# Patient Record
Sex: FEMALE | Race: WHITE | NOT HISPANIC OR LATINO | Employment: OTHER | URBAN - NONMETROPOLITAN AREA
[De-identification: names, ages, dates, MRNs, and addresses within clinical notes are randomized per-mention and may not be internally consistent; named-entity substitution may affect disease eponyms.]

---

## 2017-01-18 ENCOUNTER — HOSPITAL ENCOUNTER (OUTPATIENT)
Dept: GENERAL RADIOLOGY | Facility: HOSPITAL | Age: 67
Discharge: HOME | End: 2017-01-18
Attending: FAMILY MEDICINE | Admitting: FAMILY MEDICINE
Payer: MEDICARE

## 2017-01-18 PROCEDURE — 77080 DXA BONE DENSITY AXIAL: CPT | Mod: TC

## 2017-01-20 ENCOUNTER — TELEPHONE (OUTPATIENT)
Dept: FAMILY MEDICINE | Facility: OTHER | Age: 67
End: 2017-01-20

## 2017-04-17 DIAGNOSIS — R20.2 PARESTHESIA: ICD-10-CM

## 2017-04-19 RX ORDER — GABAPENTIN 300 MG/1
CAPSULE ORAL
Qty: 180 CAPSULE | Refills: 0 | Status: SHIPPED | OUTPATIENT
Start: 2017-04-19 | End: 2020-05-14

## 2017-04-19 NOTE — TELEPHONE ENCOUNTER
Last office visit 11/07/16.  Neurontin last filled 12/14/15 #180 with 1 refill. Medication pended.

## 2017-05-24 ENCOUNTER — TRANSFERRED RECORDS (OUTPATIENT)
Dept: HEALTH INFORMATION MANAGEMENT | Facility: HOSPITAL | Age: 67
End: 2017-05-24

## 2017-05-25 ENCOUNTER — OFFICE VISIT (OUTPATIENT)
Dept: FAMILY MEDICINE | Facility: OTHER | Age: 67
End: 2017-05-25
Attending: FAMILY MEDICINE
Payer: COMMERCIAL

## 2017-05-25 ENCOUNTER — TELEPHONE (OUTPATIENT)
Dept: FAMILY MEDICINE | Facility: OTHER | Age: 67
End: 2017-05-25

## 2017-05-25 VITALS
OXYGEN SATURATION: 99 % | DIASTOLIC BLOOD PRESSURE: 76 MMHG | BODY MASS INDEX: 20.12 KG/M2 | HEART RATE: 62 BPM | TEMPERATURE: 97.3 F | WEIGHT: 110 LBS | SYSTOLIC BLOOD PRESSURE: 116 MMHG | RESPIRATION RATE: 16 BRPM

## 2017-05-25 DIAGNOSIS — L30.9 DERMATITIS: Primary | ICD-10-CM

## 2017-05-25 PROCEDURE — 99213 OFFICE O/P EST LOW 20 MIN: CPT | Performed by: FAMILY MEDICINE

## 2017-05-25 PROCEDURE — 99212 OFFICE O/P EST SF 10 MIN: CPT

## 2017-05-25 RX ORDER — TRIAMCINOLONE ACETONIDE 1 MG/G
CREAM TOPICAL
Qty: 30 G | Refills: 0 | Status: SHIPPED | OUTPATIENT
Start: 2017-05-25 | End: 2017-09-12

## 2017-05-25 ASSESSMENT — PAIN SCALES - GENERAL: PAINLEVEL: NO PAIN (0)

## 2017-05-25 NOTE — MR AVS SNAPSHOT
"              After Visit Summary   5/25/2017    Mihaela Jang    MRN: [id number]           Patient Information     Date Of Birth          1950        Visit Information        Provider Department      5/25/2017 1:30 PM Melyssa Connors MD [facility]        Today's Diagnoses     Dermatitis    -  1      Care Instructions    Gentle soap/cleanser - unscented.    Gentle laundry detergent.    Was bedding, clothing, long socks, etc.  Start nightly antihistamine - Claritin (Loratadine) or Zyrtec (Cetirizein) 10 mg.  Start topical steroid Triamcinolone 2-3 times per day for next 1-2 weeks.  Call if not resolving.            Follow-ups after your visit        Your next 10 appointments already scheduled     Sep 12, 2017  2:30 PM CDT   (Arrive by 2:15 PM)   PROCEDURE with Sejal Gutierrez PA-C   [facility] ([facility])    [address]  [facility] [address]   [phone]              Who to contact     If you have questions or need follow up information about today's clinic visit or your schedule please contact [facility] directly at [phone].  Normal or non-critical lab and imaging results will be communicated to you by MyChart, letter or phone within 4 business days after the clinic has received the results. If you do not hear from us within 7 days, please contact the clinic through MyChart or phone. If you have a critical or abnormal lab result, we will notify you by phone as soon as possible.  Submit refill requests through [employer] or call your pharmacy and they will forward the refill request to us. Please allow 3 business days for your refill to be completed.          Additional Information About Your Visit        MyChart Information     [employer] lets you send messages to your doctor, view your test results, renew your prescriptions, schedule appointments and more. To sign up, go to [email or web address] . Click on \"Log in\" on the left side of the screen, " "which will take you to the Welcome page. Then click on \"Sign up Now\" on the right side of the page.     You will be asked to enter the access code listed below, as well as some personal information. Please follow the directions to create your username and password.     Your access code is: [email or web address]  Expires: 2017  1:30 PM     Your access code will  in 90 days. If you need help or a new code, please call your [facility] or [phone].        Care EveryWhere ID     This is your Care EveryWhere ID. This could be used by other organizations to access your [place] medical records  [id number]        Your Vitals Were     Pulse Temperature Respirations Pulse Oximetry BMI (Body Mass Index)       62 97.3  F (36.3  C) 16 99% 20.12 kg/m2        Blood Pressure from Last 3 Encounters:   17 116/76   16 102/58   16 120/70    Weight from Last 3 Encounters:   17 110 lb (49.9 kg)   16 109 lb (49.4 kg)   16 108 lb (49 kg)              Today, you had the following     No orders found for display         Today's Medication Changes          These changes are accurate as of: 17  1:30 PM.  If you have any questions, ask your nurse or doctor.               Start taking these medicines.        Dose/Directions    triamcinolone 0.1 % cream   Commonly known as:  KENALOG   Used for:  Dermatitis   Started by:  Melyssa Connors MD        Apply sparingly to affected area three times daily for 14 days.   Quantity:  30 g   Refills:  0         These medicines have changed or have updated prescriptions.        Dose/Directions    fluticasone 50 MCG/ACT spray   Commonly known as:  FLONASE   This may have changed:    - when to take this  - reasons to take this   Used for:  Laryngopharyngeal reflux (LPR), Post-nasal drip, Globus sensation        Dose:  1-2 spray   Spray 1-2 sprays into both nostrils daily   Quantity:  16 g   Refills:  5         Stop taking these medicines if you haven't " already. Please contact your care team if you have questions.     Selenium 200 MCG Tabs   Stopped by:  Melyssa Connors MD                Where to get your medicines      These medications were sent to [id number] - [place], [place] - [address]   [address] , [place] [address]     Phone:  [phone]     triamcinolone 0.1 % cream                Primary Care Provider Office Phone # Fax #    Melyssa Connors [phone]       [facility] [address] [place] [address] [address]  [place] [address]        Thank you!     Thank you for choosing [facility]  for your care. Our goal is always to provide you with excellent care. Hearing back from our patients is one way we can continue to improve our services. Please take a few minutes to complete the written survey that you may receive in the mail after your visit with us. Thank you!             Your Updated Medication List - Protect others around you: Learn how to safely use, store and throw away your medicines at www.disposemymeds.org.          This list is accurate as of: 5/25/17  1:30 PM.  Always use your most recent med list.                   Brand Name Dispense Instructions for use    Acidophilus Tabs      Take 1 tablet by mouth daily       aspirin EC 81 MG EC tablet     1 tablet    Take 1 tablet (81 mg) by mouth daily       calcium carbonate 500 MG tablet    OS-LEONID 500 mg [place]. [place]     Take 1,000 mg by mouth       CALCIUM CITRATE      1,500 mg two times daily       cholecalciferol 1000 UNITS Tabs      Take  by mouth. 2 capsules po daily.       cinnamon 500 MG Caps      Take 2 capsules by mouth daily       fish oil-omega-3 fatty acids 1000 MG capsule      Take 1 g by mouth 2 times daily       fluticasone 50 MCG/ACT spray    FLONASE    16 g    Spray 1-2 sprays into both nostrils daily       gabapentin 300 MG capsule    NEURONTIN    180 capsule    TAKE TWO CAPSULES BY MOUTH AT BEDTIME       garlic 150 MG Tabs tablet      Take 150 mg by  mouth daily       Lutein 6 MG Caps      Take 20 mg by mouth every other day 1 tablet daily       MAGNESIUM OXIDE PO      Take 200 mg by mouth daily       MIRALAX PO      Use every other day       Multiple vitamin  s Tabs      Take 1 tablet by mouth daily.       RA TURMERIC 500 MG Caps   Generic drug:  Turmeric      Take 2 capsules by mouth daily       triamcinolone 0.1 % cream    KENALOG    30 g    Apply sparingly to affected area three times daily for 14 days.       zinc 50 MG Tabs

## 2017-05-25 NOTE — NURSING NOTE
"Chief Complaint   Patient presents with     Rash     back of right leg for the last 2 weeks       Initial /76 (BP Location: Left arm, Patient Position: Chair, Cuff Size: Adult Regular)  Pulse 62  Temp 97.3  F (36.3  C)  Resp 16  Wt 110 lb (49.9 kg)  SpO2 99%  BMI 20.12 kg/m2 Estimated body mass index is 20.12 kg/(m^2) as calculated from the following:    Height as of 11/7/16: 5' 2\" (1.575 m).    Weight as of this encounter: 110 lb (49.9 kg).  Medication Reconciliation: complete     Florence Kirkpatrick    "

## 2017-05-25 NOTE — TELEPHONE ENCOUNTER
10:51 AM    Reason for Call: OVERBOOK    Patient is having the following symptoms: Rash/RT leg for 2 weeks.    The patient is requesting an appointment for this afternoon or tomorrow with Dr Connors.    Was an appointment offered for this call? Yes    Preferred method for responding to this message: Telephone Call    If we cannot reach you directly, may we leave a detailed response at the number you provided? Yes    Can this message wait until your PCP/provider returns, if unavailable today? Not applicable    Marysol Mack

## 2017-05-25 NOTE — PATIENT INSTRUCTIONS
Gentle soap/cleanser - unscented.    Gentle laundry detergent.    Was bedding, clothing, long socks, etc.  Start nightly antihistamine - Claritin (Loratadine) or Zyrtec (Cetirizein) 10 mg.  Start topical steroid Triamcinolone 2-3 times per day for next 1-2 weeks.  Call if not resolving.

## 2017-05-25 NOTE — PROGRESS NOTES
SUBJECTIVE:  Mihaela is a 67 year old female who comes in today for rash on back of right leg, behind knee mostly, for past 2 weeks.  It is itchy, not painful.  No exposures or tick bites.  No fever, chills, joint pains.  Has tried topical hydrocortisone 2 times per day for past week with some help.  No blisters.      Current Outpatient Prescriptions   Medication     triamcinolone (KENALOG) 0.1 % cream     gabapentin (NEURONTIN) 300 MG capsule     Lactobacillus (ACIDOPHILUS) TABS     calcium carbonate (OS-LEONID 500 MG [place]. [place]) 500 MG tablet     MAGNESIUM OXIDE PO     garlic 150 MG TABS     zinc 50 MG TABS     fluticasone (FLONASE) 50 MCG/ACT nasal spray     aspirin EC 81 MG tablet     Polyethylene Glycol 3350 (MIRALAX PO)     cinnamon 500 MG CAPS     fish oil-omega-3 fatty acids (FISH OIL) 1000 MG capsule     Lutein 6 MG CAPS     CALCIUM CITRATE     cholecalciferol 1000 UNITS TABS     Multiple vitamin  s TABS     Turmeric (RA TURMERIC) 500 MG CAPS     No current facility-administered medications for this visit.         Allergies   Allergen Reactions     Povidone Iodine Rash     Betadine      Soap Rash     Betadine        Past Medical History:   Diagnosis Date     Atypical nevi      Degenerative skin disorder     solar elastosis     Hallux rigidus 06/15/2000     Hyperlipidemia, unspecified hyperlipidemia type 9/15/2016     Laryngopharyngeal reflux disease     PPI     Malignant neoplasm of breast (female), unspecified site 03/10/2004     Notalgia paresthetica      Osteoarthritis 07/20/2011     Osteoporosis, unspecified 09/10/2001    Dr Moses 3/1/15; hold Reclast; repeat DEXA 1 year      Other abnormal glucose 12/20/2013     Paresthesia     neck; notalgiaparesthetic; dr leahy & Dr Nevarez; neurontin     Personal history of malignant neoplasm of breast 06/07/2005     Rhinitis      Schamberg's purpura      Past Surgical History:   Procedure Laterality Date     BONE MARROW BIOPSY      negative     COLONOSCOPY  07/2000      COLONOSCOPY  8/13/2013    DR Fu; repeat 3 years     double mastectomy  03/2004    right sided breast cancer     HC COLONOSCOPY THRU STOMA WITH BIOPSY  08/25/2010    cancer screening, family h/o colon cancer; hyperplastic polyp     HEMORRHOIDECTOMY  1986     UPPER GI ENDOSCOPY       Social History     Social History     Marital status: Single     Spouse name: N/A     Number of children: N/A     Years of education: N/A     Occupational History     Not on file.     Social History Main Topics     Smoking status: Never Smoker     Smokeless tobacco: Never Used     Alcohol use 0.0 oz/week     0 Standard drinks or equivalent per week      Comment: 1 glasso wine, weekly      Drug use: No     Sexual activity: No     Other Topics Concern      Service No     Blood Transfusions Yes     Permits if needed     Caffeine Concern Yes     Tea, 2 cups daily      Exercise Yes     Walking, walks steps, daily      Seat Belt Yes     Parent/Sibling W/ Cabg, Mi Or Angioplasty Before 65f 55m? No     Social History Narrative       ROS:  General: negative for, fever, chills  Skin: positive for as above, rash, itching  Musculoskeletal: negative for, joint pain and joint swelling  Neurologic: negative for, local weakness, numbness or tingling of hands and numbness or tingling of feet      OBJECTIVE:  Vitals:    05/25/17 1304   BP: 116/76   BP Location: Left arm   Patient Position: Chair   Cuff Size: Adult Regular   Pulse: 62   Resp: 16   Temp: 97.3  F (36.3  C)   SpO2: 99%   Weight: 110 lb (49.9 kg)     GENERAL APPEARANCE: alert, no distress, cooperative and slim  NECK: no adenopathy, no asymmetry, masses, or scars and thyroid normal to palpation  RESP: lungs clear to auscultation - no rales, rhonchi or wheezes  CV: regular rates and rhythm, normal S1 S2, no S3 or S4 and no murmur, click or rub -  ABDOMEN:  soft, nontender, no HSM or masses and bowel sounds normal  MS: extremities normal- no gross deformities noted, no evidence of  inflammation in joints, FROM in all extremities.  SKIN: posterior right knee and upper calf with faint, pink to red macular rash; no discrete papules or vesicles; no thickening or scale  PSYCH: mentation appears normal. and affect normal/bright    ASSESSMENT/ORDERS:    ICD-10-CM    1. Dermatitis L30.9 triamcinolone (KENALOG) 0.1 % cream     PLAN:  Patient Instructions   Gentle soap/cleanser - unscented.    Gentle laundry detergent.    Was bedding, clothing, long socks, etc.  Start nightly antihistamine - Claritin (Loratadine) or Zyrtec (Cetirizein) 10 mg.  Start topical steroid Triamcinolone 2-3 times per day for next 1-2 weeks.  Call if not resolving.          Melyssa Encinas

## 2017-06-01 RX ORDER — ZOLEDRONIC ACID 5 MG/100ML
5 INJECTION, SOLUTION INTRAVENOUS ONCE
Status: CANCELLED
Start: 2017-06-05 | End: 2017-06-05

## 2017-06-01 NOTE — NURSING NOTE
Orders received from Dr. Moses, Dr. Melyssa Connors to cosign for Reclast. Plan updated. [facility] to schedule patient.   Kerri Melgoza RN

## 2017-06-02 ENCOUNTER — INFUSION THERAPY VISIT (OUTPATIENT)
Dept: INFUSION THERAPY | Facility: OTHER | Age: 67
End: 2017-06-02
Attending: FAMILY MEDICINE
Payer: MEDICARE

## 2017-06-02 VITALS
BODY MASS INDEX: 20.68 KG/M2 | OXYGEN SATURATION: 97 % | WEIGHT: 112.4 LBS | SYSTOLIC BLOOD PRESSURE: 115 MMHG | HEIGHT: 62 IN | RESPIRATION RATE: 16 BRPM | DIASTOLIC BLOOD PRESSURE: 47 MMHG | TEMPERATURE: 97.8 F | HEART RATE: 56 BPM

## 2017-06-02 DIAGNOSIS — M81.0 OSTEOPOROSIS: Primary | ICD-10-CM

## 2017-06-02 PROCEDURE — 96365 THER/PROPH/DIAG IV INF INIT: CPT

## 2017-06-02 PROCEDURE — 96374 THER/PROPH/DIAG INJ IV PUSH: CPT

## 2017-06-02 PROCEDURE — 25000128 H RX IP 250 OP 636: Performed by: FAMILY MEDICINE

## 2017-06-02 RX ORDER — ZOLEDRONIC ACID 5 MG/100ML
5 INJECTION, SOLUTION INTRAVENOUS ONCE
Status: CANCELLED
Start: 2017-06-05 | End: 2017-06-05

## 2017-06-02 RX ORDER — ZOLEDRONIC ACID 5 MG/100ML
5 INJECTION, SOLUTION INTRAVENOUS ONCE
Status: COMPLETED | OUTPATIENT
Start: 2017-06-02 | End: 2017-06-02

## 2017-06-02 RX ADMIN — ZOLEDRONIC ACID 5 MG: 0.05 INJECTION, SOLUTION INTRAVENOUS at 14:30

## 2017-06-02 RX ADMIN — SODIUM CHLORIDE 250 ML: 9 INJECTION, SOLUTION INTRAVENOUS at 14:29

## 2017-06-02 NOTE — PATIENT INSTRUCTIONS
Patient Education    Zoledronic Acid Solution for injection    Zoledronic Acid Solution for injection [Hypercalcemia of Malignancy]    Zoledronic Acid Solution for injection [Pagets Disease]  Zoledronic Acid Solution for injection  What is this medicine?  ZOLEDRONIC ACID (YOLETTE le charlie ik AS id) lowers the amount of calcium loss from bone. It is used to treat Paget's disease and osteoporosis in women.  This medicine may be used for other purposes; ask your health care provider or pharmacist if you have questions.  What should I tell my health care provider before I take this medicine?  They need to know if you have any of these conditions:    aspirin-sensitive asthma    cancer, especially if you are receiving medicines used to treat cancer    dental disease or wear dentures    infection    kidney disease    low levels of calcium in the blood    past surgery on the parathyroid gland or intestines    receiving corticosteroids like dexamethasone or prednisone    an unusual or allergic reaction to zoledronic acid, other medicines, foods, dyes, or preservatives    pregnant or trying to get pregnant    breast-feeding  How should I use this medicine?  This medicine is for infusion into a vein. It is given by a health care professional in a hospital or clinic setting.  Talk to your pediatrician regarding the use of this medicine in children. This medicine is not approved for use in children.  Overdosage: If you think you have taken too much of this medicine contact a poison control center or emergency room at once.  NOTE: This medicine is only for you. Do not share this medicine with others.  What if I miss a dose?  It is important not to miss your dose. Call your doctor or health care professional if you are unable to keep an appointment.  What may interact with this medicine?    certain antibiotics given by injection    NSAIDs, medicines for pain and inflammation, like ibuprofen or naproxen    some diuretics like  bumetanide, furosemide    teriparatide  This list may not describe all possible interactions. Give your health care provider a list of all the medicines, herbs, non-prescription drugs, or dietary supplements you use. Also tell them if you smoke, drink alcohol, or use illegal drugs. Some items may interact with your medicine.  What should I watch for while using this medicine?  Visit your doctor or health care professional for regular checkups. It may be some time before you see the benefit from this medicine. Do not stop taking your medicine unless your doctor tells you to. Your doctor may order blood tests or other tests to see how you are doing.  Women should inform their doctor if they wish to become pregnant or think they might be pregnant. There is a potential for serious side effects to an unborn child. Talk to your health care professional or pharmacist for more information.  You should make sure that you get enough calcium and vitamin D while you are taking this medicine. Discuss the foods you eat and the vitamins you take with your health care professional.  Some people who take this medicine have severe bone, joint, and/or muscle pain. This medicine may also increase your risk for jaw problems or a broken thigh bone. Tell your doctor right away if you have severe pain in your jaw, bones, joints, or muscles. Tell your doctor if you have any pain that does not go away or that gets worse.  Tell your dentist and dental surgeon that you are taking this medicine. You should not have major dental surgery while on this medicine. See your dentist to have a dental exam and fix any dental problems before starting this medicine. Take good care of your teeth while on this medicine. Make sure you see your dentist for regular follow-up appointments.  What side effects may I notice from receiving this medicine?  Side effects that you should report to your doctor or health care professional as soon as possible:    allergic  reactions like skin rash, itching or hives, swelling of the face, lips, or tongue    anxiety, confusion, or depression    breathing problems    changes in vision    eye pain    feeling faint or lightheaded, falls    jaw pain, especially after dental work    mouth sores    muscle cramps, stiffness, or weakness    redness, blistering, peeling or loosening of the skin, including inside the mouth    trouble passing urine or change in the amount of urine  Side effects that usually do not require medical attention (report to your doctor or health care professional if they continue or are bothersome):    bone, joint, or muscle pain    constipation    diarrhea    fever    hair loss    irritation at site where injected    loss of appetite    nausea, vomiting    stomach upset    trouble sleeping    trouble swallowing    weak or tired  This list may not describe all possible side effects. Call your doctor for medical advice about side effects. You may report side effects to FDA at 5-398-FDA-2034.    NOTE:This sheet is a summary. It may not cover all possible information. If you have questions about this medicine, talk to your doctor, pharmacist, or health care provider. Copyright  2016 [employer]

## 2017-06-02 NOTE — MR AVS SNAPSHOT
After Visit Summary   6/2/2017    Mihaela Jang    MRN: [id number]           Patient Information     Date Of Birth          1950        Visit Information        Provider Department      6/2/2017 2:30 PM  INF  [address] [facility] [place]        Today's Diagnoses     Osteoporosis    -  1      Care Instructions      Patient Education    Zoledronic Acid Solution for injection    Zoledronic Acid Solution for injection [Hypercalcemia of Malignancy]    Zoledronic Acid Solution for injection [Pagets Disease]  Zoledronic Acid Solution for injection  What is this medicine?  ZOLEDRONIC ACID (YOLETTE le dron ik AS id) lowers the amount of calcium loss from bone. It is used to treat Paget's disease and osteoporosis in women.  This medicine may be used for other purposes; ask your health care provider or pharmacist if you have questions.  What should I tell my health care provider before I take this medicine?  They need to know if you have any of these conditions:    aspirin-sensitive asthma    cancer, especially if you are receiving medicines used to treat cancer    dental disease or wear dentures    infection    kidney disease    low levels of calcium in the blood    past surgery on the parathyroid gland or intestines    receiving corticosteroids like dexamethasone or prednisone    an unusual or allergic reaction to zoledronic acid, other medicines, foods, dyes, or preservatives    pregnant or trying to get pregnant    breast-feeding  How should I use this medicine?  This medicine is for infusion into a vein. It is given by a health care professional in a hospital or clinic setting.  Talk to your pediatrician regarding the use of this medicine in children. This medicine is not approved for use in children.  Overdosage: If you think you have taken too much of this medicine contact a poison control center or emergency room at once.  NOTE: This medicine is only for you. Do not share this medicine with  others.  What if I miss a dose?  It is important not to miss your dose. Call your doctor or health care professional if you are unable to keep an appointment.  What may interact with this medicine?    certain antibiotics given by injection    NSAIDs, medicines for pain and inflammation, like ibuprofen or naproxen    some diuretics like bumetanide, furosemide    teriparatide  This list may not describe all possible interactions. Give your health care provider a list of all the medicines, herbs, non-prescription drugs, or dietary supplements you use. Also tell them if you smoke, drink alcohol, or use illegal drugs. Some items may interact with your medicine.  What should I watch for while using this medicine?  Visit your doctor or health care professional for regular checkups. It may be some time before you see the benefit from this medicine. Do not stop taking your medicine unless your doctor tells you to. Your doctor may order blood tests or other tests to see how you are doing.  Women should inform their doctor if they wish to become pregnant or think they might be pregnant. There is a potential for serious side effects to an unborn child. Talk to your health care professional or pharmacist for more information.  You should make sure that you get enough calcium and vitamin D while you are taking this medicine. Discuss the foods you eat and the vitamins you take with your health care professional.  Some people who take this medicine have severe bone, joint, and/or muscle pain. This medicine may also increase your risk for jaw problems or a broken thigh bone. Tell your doctor right away if you have severe pain in your jaw, bones, joints, or muscles. Tell your doctor if you have any pain that does not go away or that gets worse.  Tell your dentist and dental surgeon that you are taking this medicine. You should not have major dental surgery while on this medicine. See your dentist to have a dental exam and fix any  dental problems before starting this medicine. Take good care of your teeth while on this medicine. Make sure you see your dentist for regular follow-up appointments.  What side effects may I notice from receiving this medicine?  Side effects that you should report to your doctor or health care professional as soon as possible:    allergic reactions like skin rash, itching or hives, swelling of the face, lips, or tongue    anxiety, confusion, or depression    breathing problems    changes in vision    eye pain    feeling faint or lightheaded, falls    jaw pain, especially after dental work    mouth sores    muscle cramps, stiffness, or weakness    redness, blistering, peeling or loosening of the skin, including inside the mouth    trouble passing urine or change in the amount of urine  Side effects that usually do not require medical attention (report to your doctor or health care professional if they continue or are bothersome):    bone, joint, or muscle pain    constipation    diarrhea    fever    hair loss    irritation at site where injected    loss of appetite    nausea, vomiting    stomach upset    trouble sleeping    trouble swallowing    weak or tired  This list may not describe all possible side effects. Call your doctor for medical advice about side effects. You may report side effects to FDA at 1-658-FDA-3310.    NOTE:This sheet is a summary. It may not cover all possible information. If you have questions about this medicine, talk to your doctor, pharmacist, or health care provider. Copyright  2016 Gold Standard                Follow-ups after your visit        Your next 10 appointments already scheduled     Sep 12, 2017  2:30 PM CDT   (Arrive by 2:15 PM)   PROCEDURE with Sejal Gutierrez PA-C   [facility] ([facility])    [place] [address]   [phone]              Who to contact     If you have questions or need follow up information about today's clinic visit or  "your schedule please contact [facility] directly at [phone].  Normal or non-critical lab and imaging results will be communicated to you by MyChart, letter or phone within 4 business days after the clinic has received the results. If you do not hear from us within 7 days, please contact the clinic through [employer] or phone. If you have a critical or abnormal lab result, we will notify you by phone as soon as possible.  Submit refill requests through [employer] or call your pharmacy and they will forward the refill request to us. Please allow 3 business days for your refill to be completed.          Additional Information About Your Visit        [employer] Information     [employer] lets you send messages to your doctor, view your test results, renew your prescriptions, schedule appointments and more. To sign up, go to [email or web address] . Click on \"Log in\" on the left side of the screen, which will take you to the Welcome page. Then click on \"Sign up Now\" on the right side of the page.     You will be asked to enter the access code listed below, as well as some personal information. Please follow the directions to create your username and password.     Your access code is: [email or web address]  Expires: 2017  1:30 PM     Your access code will  in 90 days. If you need help or a new code, please call your [place] clinic or [phone].        Care EveryWhere ID     This is your Care EveryWhere ID. This could be used by other organizations to access your [place] medical records  [id number]        Your Vitals Were     Pulse Temperature Respirations Height Pulse Oximetry BMI (Body Mass Index)    56 97.8  F (36.6  C) (Oral) 16 1.575 m (5' 2\") 97% 20.56 kg/m2       Blood Pressure from Last 3 Encounters:   17 115/47   17 116/76   16 102/58    Weight from Last 3 Encounters:   17 51 kg (112 lb 6.4 oz)   17 49.9 kg (110 lb)   16 49.4 kg (109 lb)              Today, you " had the following     No orders found for display         Today's Medication Changes          These changes are accurate as of: 6/2/17  2:54 PM.  If you have any questions, ask your nurse or doctor.               These medicines have changed or have updated prescriptions.        Dose/Directions    fluticasone 50 MCG/ACT spray   Commonly known as:  FLONASE   This may have changed:    - when to take this  - reasons to take this   Used for:  Laryngopharyngeal reflux (LPR), Post-nasal drip, Globus sensation        Dose:  1-2 spray   Spray 1-2 sprays into both nostrils daily   Quantity:  16 g   Refills:  5                Primary Care Provider Office Phone # Fax #    Melyssa Connors [phone]        FAMILY [facility] [address] [place], [place] [address] [address]        Thank you!     Thank you for choosing [facility]  for your care. Our goal is always to provide you with excellent care. Hearing back from our patients is one way we can continue to improve our services. Please take a few minutes to complete the written survey that you may receive in the mail after your visit with us. Thank you!             Your Updated Medication List - Protect others around you: Learn how to safely use, store and throw away your medicines at www.disposemymeds.org.          This list is accurate as of: 6/2/17  2:54 PM.  Always use your most recent med list.                   Brand Name Dispense Instructions for use    Acidophilus Tabs      Take 1 tablet by mouth daily       aspirin EC 81 MG EC tablet     1 tablet    Take 1 tablet (81 mg) by mouth daily       calcium carbonate 1250 MG tablet    OS-LEONID 500 mg [place]. Ca     Take 1,000 mg by mouth       CALCIUM CITRATE      1,500 mg two times daily       cholecalciferol 1000 UNITS Tabs      Take  by mouth. 2 capsules po daily.       cinnamon 500 MG Caps      Take 2 capsules by mouth daily       fish oil-omega-3 fatty acids 1000 MG capsule      Take 1 g by mouth 2 times  daily       fluticasone 50 MCG/ACT spray    FLONASE    16 g    Spray 1-2 sprays into both nostrils daily       gabapentin 300 MG capsule    NEURONTIN    180 capsule    TAKE TWO CAPSULES BY MOUTH AT BEDTIME       garlic 150 MG Tabs tablet      Take 150 mg by mouth daily       Lutein 6 MG Caps      Take 20 mg by mouth every other day 1 tablet daily       MAGNESIUM OXIDE PO      Take 200 mg by mouth daily       MIRALAX PO      Use every other day       Multiple vitamin  s Tabs      Take 1 tablet by mouth daily.       RA TURMERIC 500 MG Caps   Generic drug:  Turmeric      Take 2 capsules by mouth daily       triamcinolone 0.1 % cream    KENALOG    30 g    Apply sparingly to affected area three times daily for 14 days.       zinc 50 MG Tabs

## 2017-06-02 NOTE — PROGRESS NOTES
Patient is a 67 year old female here accompanied by self today for infusion of reclast per order of Dr. Moses under the supervision of Dr. Connors.  Patient meets parameters for today's infusion. Patient identified with two identifiers, order verified, and verbal consent for today's infusion obtained from patient.      5-24-17 lab values:  Creatinine: 0.73, calcium: 9.6.  Patient meets order parameters for today's treatment.     1412: 22 gauge angio cath inserted into right hand.  Immediate blood return noted.  IV secured with sterile, transparent dressing and tape.  Patient tolerated well, denies pain or discomfort at this time.  Flushes easily without resistance, no signs or symptoms of infiltration or infection.   Patient denies questions or concerns regarding infusion and/or medication(s) being administered.    1430: IV pump verified with reclast 5mg dose, drug, and rate of administration with MAR and medication label.  Infusion administered per protocol.  Patient tolerated infusion well, no signs or symptoms of adverse reaction noted.  Patient denies pain nor discomfort.     1453: IV removed, catheter intact.  Site clean, dry and intact.  No signs or symptoms of infiltration or infection.  Covered with a sterile bandage, slight pressure applied for 30 seconds.  Pt instructed to leave bandage intact for a minimum of one hour, and to call with questions or concerns.  Copy of appointments, discharge instructions, and after visit summary (AVS) provided to patient.  Patient states understanding, discharged ambulatory.  Carina Go RN

## 2017-06-02 NOTE — PHARMACY
Labs were drawn in [place] per Kerri.  Scr=0.73 & Calcium=9.6.  Using Scr of 0.3, CrCl~57.5 ml/min

## 2017-07-20 ENCOUNTER — TRANSFERRED RECORDS (OUTPATIENT)
Dept: HEALTH INFORMATION MANAGEMENT | Facility: HOSPITAL | Age: 67
End: 2017-07-20

## 2017-09-12 ENCOUNTER — OFFICE VISIT (OUTPATIENT)
Dept: OTOLARYNGOLOGY | Facility: OTHER | Age: 67
End: 2017-09-12
Attending: PHYSICIAN ASSISTANT
Payer: COMMERCIAL

## 2017-09-12 VITALS
DIASTOLIC BLOOD PRESSURE: 58 MMHG | TEMPERATURE: 98.7 F | BODY MASS INDEX: 20.24 KG/M2 | HEIGHT: 62 IN | HEART RATE: 62 BPM | SYSTOLIC BLOOD PRESSURE: 112 MMHG | WEIGHT: 110 LBS

## 2017-09-12 DIAGNOSIS — J32.0 CHRONIC MAXILLARY SINUSITIS: Primary | ICD-10-CM

## 2017-09-12 DIAGNOSIS — R09.A2 GLOBUS PHARYNGEUS: ICD-10-CM

## 2017-09-12 DIAGNOSIS — K21.9 LARYNGOPHARYNGEAL REFLUX (LPR): ICD-10-CM

## 2017-09-12 DIAGNOSIS — R09.82 POST-NASAL DRIP: ICD-10-CM

## 2017-09-12 DIAGNOSIS — K21.00 GASTROESOPHAGEAL REFLUX DISEASE WITH ESOPHAGITIS: ICD-10-CM

## 2017-09-12 DIAGNOSIS — H61.002 CHONDRODERMATITIS NODULARIS CHRONICA HELICIS, LEFT: ICD-10-CM

## 2017-09-12 DIAGNOSIS — R09.A2 GLOBUS SENSATION: ICD-10-CM

## 2017-09-12 PROCEDURE — 99212 OFFICE O/P EST SF 10 MIN: CPT

## 2017-09-12 PROCEDURE — 99214 OFFICE O/P EST MOD 30 MIN: CPT | Mod: 25 | Performed by: PHYSICIAN ASSISTANT

## 2017-09-12 PROCEDURE — 92504 EAR MICROSCOPY EXAMINATION: CPT | Performed by: PHYSICIAN ASSISTANT

## 2017-09-12 PROCEDURE — 92504 EAR MICROSCOPY EXAMINATION: CPT

## 2017-09-12 RX ORDER — FLUTICASONE PROPIONATE 50 MCG
2 SPRAY, SUSPENSION (ML) NASAL DAILY
Qty: 16 G | Refills: 5 | Status: SHIPPED | OUTPATIENT
Start: 2017-09-12 | End: 2019-11-05

## 2017-09-12 ASSESSMENT — PAIN SCALES - GENERAL: PAINLEVEL: NO PAIN (0)

## 2017-09-12 NOTE — NURSING NOTE
"Chief Complaint   Patient presents with     Cerumen Impaction     Pt is here for an ear cleaning.       Initial /58 (BP Location: Right arm, Cuff Size: Adult Regular)  Pulse 62  Temp 98.7  F (37.1  C) (Tympanic)  Ht 5' 2\" (1.575 m)  Wt 110 lb (49.9 kg)  BMI 20.12 kg/m2 Estimated body mass index is 20.12 kg/(m^2) as calculated from the following:    Height as of this encounter: 5' 2\" (1.575 m).    Weight as of this encounter: 110 lb (49.9 kg).  Medication Reconciliation: complete   Fela Moses LPN      "

## 2017-09-12 NOTE — PATIENT INSTRUCTIONS
Ears were cleaned    Use aqupahor to left ear.   If lesions remains, call nurse in 2 weeks    Use Flonase 2 sprays to  Each nostril daily  Proceed with allergy testing at [facility]     If there are concerns or questions, Call [phone] and ask for nurse

## 2017-09-12 NOTE — PROGRESS NOTES
Chief Complaint   Patient presents with     Cerumen Impaction     Pt is here for an ear cleaning.       Mihaela presents for ear cleaning. Mihaela has some canal irritation and felt something her ears.   She has no otalgia, otorrhea.   Hearing is stable. Denies fluctuating loss.   No tinnitus.   No vertigo.     No COM or otologic surgeries  She was seen last by Dr. Lau for skin lesions. She had a couple lesions removed. No concerns. She follows with dermatology every year.     EGD was completed with Dr. Moreno. Completed PPI w/o relief.   Barium swallow was negative, completed.    She did have sinus CT completed. CT completed- mild scattered pansinusitis.   Mihaela has some rhinitis, post nasal drip. She feels like there is something in her throat remaining.   She uses Flonase as needed. No improvement w/ Zeb med.   No maxi seasonal allergies.     Past Medical History:   Diagnosis Date     Atypical nevi      Degenerative skin disorder     solar elastosis     Hallux rigidus 06/15/2000     Hyperlipidemia, unspecified hyperlipidemia type 9/15/2016     Laryngopharyngeal reflux disease     PPI     Malignant neoplasm of breast (female), unspecified site 03/10/2004     Notalgia paresthetica      Osteoarthritis 07/20/2011     Osteoporosis, unspecified 09/10/2001    Dr Moses; intermittent reclast     Other abnormal glucose 12/20/2013     Paresthesia     neck; notalgiaparesthetic; dr leahy & Dr Nevarez; neurontin     Personal history of malignant neoplasm of breast 06/07/2005     Rhinitis      Schamberg's purpura         Allergies   Allergen Reactions     Povidone Iodine Rash     Betadine      Soap Rash     Betadine      Current Outpatient Prescriptions   Medication     triamcinolone (KENALOG) 0.1 % cream     gabapentin (NEURONTIN) 300 MG capsule     Lactobacillus (ACIDOPHILUS) TABS     calcium carbonate (OS-LEONID 500 MG [place]. [place]) 500 MG tablet     MAGNESIUM OXIDE PO     garlic 150 MG TABS     zinc 50 MG TABS  "    fluticasone (FLONASE) 50 MCG/ACT nasal spray     aspirin EC 81 MG tablet     Polyethylene Glycol 3350 (MIRALAX PO)     cinnamon 500 MG CAPS     fish oil-omega-3 fatty acids (FISH OIL) 1000 MG capsule     Lutein 6 MG CAPS     CALCIUM CITRATE     cholecalciferol 1000 UNITS TABS     Multiple vitamin  s TABS     Turmeric (RA TURMERIC) 500 MG CAPS     No current facility-administered medications for this visit.       ROS: 10 point ROS neg other than the symptoms noted above in the HPI.  /58 (BP Location: Right arm, Cuff Size: Adult Regular)  Pulse 62  Temp 98.7  F (37.1  C) (Tympanic)  Ht 5' 2\" (1.575 m)  Wt 110 lb (49.9 kg)  BMI 20.12 kg/m2  General - The patient is well nourished and well developed, and appears to have good nutritional status.  Alert and oriented to person and place, answers questions and cooperates with examination appropriately.   Head and Face - Normocephalic and atraumatic, with no gross asymmetry noted.  The facial nerve is intact, with strong symmetric movements.  Voice and Breathing - The patient was breathing comfortably without the use of accessory muscles. There was no wheezing, stridor, or stertor.  The patients voice was clear and strong, and had appropriate pitch and quality.  Ears -examined under microscopy bilaterally  The external auditory canals are cerumen, the tympanic membranes are intact without effusion, retraction or mass.  Bony landmarks are intact.  Small amount of cerumen and cotton filaments removed with cupped forceps.     Left antihelical rim with raised red mildly tender lesion, small raised lesion <0.5 cm.  Eyes - Extraocular movements intact, and the pupils were reactive to light.  Sclera were not icteric or injected, conjunctiva were pink and moist.  Mouth - Examination of the oral cavity showed pink, healthy oral mucosa. No lesions or ulcerations noted.  The tongue was mobile and midline, and the dentition were in good condition.    Throat - The walls of " the oropharynx were smooth, pink, moist, symmetric, and had no lesions or ulcerations.  The tonsillar pillars and soft palate were symmetric.  The uvula was midline on elevation.    Neck - Normal midline excursion of the laryngotracheal complex during swallowing.  Full range of motion on passive movement.  Palpation of the occipital, submental, submandibular, internal jugular chain, and supraclavicular nodes did not demonstrate any abnormal lymph nodes or masses.  Palpation of the thyroid was soft and smooth, with no nodules or goiter appreciated.  The trachea was mobile and midline.  Nose - External contour is symmetric, no gross deflection or scars.  Nasal mucosa is pink and moist with no abnormal mucus.  The septum was intact, turbinates of normal size and position.  No polyps, masses, or purulence noted on examination      ASSESSMENT:    ICD-10-CM    1. Chronic maxillary sinusitis J32.0    2. Laryngopharyngeal reflux (LPR) K21.9 fluticasone (FLONASE) 50 MCG/ACT spray   3. Post-nasal drip R09.82 fluticasone (FLONASE) 50 MCG/ACT spray   4. Globus sensation F45.8 fluticasone (FLONASE) 50 MCG/ACT spray   5. 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 Gastroesophageal reflux disease with esophagitis K21.0    7. Globus pharyngeus F45.8          Would reocommend 24 hour pH dual probe to eval for LPR. However, per Mihaela Moreno did not think this needed to be completed.   JOHNNIE/tanesha Chairez and she is interested in pursuing this to see if any continued PPI may be beneficial     If globus persists despite above, I should rescope her larynx in office within the next 3-4 months    She will have allergy testing at Dr. Messina's office in October  Consider farshad med rinse and daily Flonase.   Renewed Rx for Flonase.   Patient may have mold exposure at home, and working to ensure mold is removed.     Continue LPR precautions (per Dr. Moreno's note GERD with esophagitis). Will monitor.   Increase water  intake. Limit caffeine.   Adult lifestyle changes to prevent LPR reviewed      Avoid eating and drinking within two to three hours prior to bedtime    Do not drink alcohol    Eat small meals and slowly    Limit problem foods:    o Caffeine  o Carbonated drinks  o Chocolate  o Peppermint  o Tomato  o Citrus fruits  o Fatty and fried foods      Lose weight    Quit smoking    Wear loose clothing    Monitor left ear. 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 Patient needs to avoid sleeping on that ear.     Sejal Gutierrez PA-C  ENT  [facility] [place]  [phone]

## 2017-09-12 NOTE — MR AVS SNAPSHOT
"              After Visit Summary   9/12/2017    Mihaela Jang    MRN: [id number]           Patient Information     Date Of Birth          1950        Visit Information        Provider Department      9/12/2017 2:45 PM Sejal Gutierrez PA-C [facility]        Today's Diagnoses     Chronic maxillary sinusitis    -  1    Laryngopharyngeal reflux (LPR)        Post-nasal drip        Globus sensation          Care Instructions    Ears were cleaned    Use aqupahor to left ear.   If lesions remains, call nurse in 2 weeks    Use Flonase 2 sprays to  Each nostril daily  Proceed with allergy testing at [facility]     If there are concerns or questions, Call [phone] and ask for nurse          Follow-ups after your visit        Who to contact     If you have questions or need follow up information about today's clinic visit or your schedule please contact [facility] directly at [phone].  Normal or non-critical lab and imaging results will be communicated to you by MyChart, letter or phone within 4 business days after the clinic has received the results. If you do not hear from us within 7 days, please contact the clinic through MyChart or phone. If you have a critical or abnormal lab result, we will notify you by phone as soon as possible.  Submit refill requests through [employer] or call your pharmacy and they will forward the refill request to us. Please allow 3 business days for your refill to be completed.          Additional Information About Your Visit        MyChart Information     [employer] lets you send messages to your doctor, view your test results, renew your prescriptions, schedule appointments and more. To sign up, go to [email or web address] . Click on \"Log in\" on the left side of the screen, which will take you to the Welcome page. Then click on \"Sign up Now\" on the right side of the page.     You will be asked to enter the access code listed below, as well as some personal " "information. Please follow the directions to create your username and password.     Your access code is: [email or web address]  Expires: 2017  3:00 PM     Your access code will  in 90 days. If you need help or a new code, please call your [place] clinic or [phone].        Care EveryWhere ID     This is your Care EveryWhere ID. This could be used by other organizations to access your [place] medical records  [id number]        Your Vitals Were     Pulse Temperature Height BMI (Body Mass Index)          62 98.7  F (37.1  C) (Tympanic) 5' 2\" (1.575 m) 20.12 kg/m2         Blood Pressure from Last 3 Encounters:   17 112/58   17 115/47   17 116/76    Weight from Last 3 Encounters:   17 110 lb (49.9 kg)   17 112 lb 6.4 oz (51 kg)   17 110 lb (49.9 kg)              Today, you had the following     No orders found for display         Today's Medication Changes          These changes are accurate as of: 17  3:00 PM.  If you have any questions, ask your nurse or doctor.               These medicines have changed or have updated prescriptions.        Dose/Directions    fluticasone 50 MCG/ACT spray   Commonly known as:  FLONASE   This may have changed:  how much to take   Used for:  Laryngopharyngeal reflux (LPR), Post-nasal drip, Globus sensation   Changed by:  Sejal Gutierrez PA-C        Dose:  2 spray   Spray 2 sprays into both nostrils daily   Quantity:  16 g   Refills:  5            Where to get your medicines      These medications were sent to [id number] - [address] - [address]  [address] , [place] [address]     Phone:  [phone]     fluticasone 50 MCG/ACT spray                Primary Care Provider Office Phone # Fax #    Melyssa Connors [phone]       [facility] [address] [facility] [place] [address]        Equal Access to Services     [facility] ANISHA AH: Hadjack Munson, wajosé miguelda lurosalinaavtar clark" haile carrasquillotrey jeynwilma lyon'aan ah. So [facility] [phone].    ATENCIÓN: Si alicela dillan, tiene a quezada disposición servicios gratuitos de asistencia lingüística. Audelia al [phone].    We comply with applicable federal civil rights laws and [place] laws. We do not discriminate on the basis of race, color, national origin, age, disability sex, sexual orientation or gender identity.            Thank you!     Thank you for choosing [facility]  [facility] your [facility]. Our goal is always to provide you with excellent care. Hearing back from our patients is one way we can continue to improve our services. Please take a few minutes to complete the written survey that you may receive in the mail after your visit with us. Thank you!             Your Updated Medication List - Protect others around you: Learn how to safely use, store and throw away your medicines at www.disposemymeds.org.          This list is accurate as of: 9/12/17  3:00 PM.  Always use your most recent med list.                   Brand Name Dispense Instructions for use Diagnosis    Acidophilus Tabs      Take 1 tablet by mouth daily        aspirin EC 81 MG EC tablet     1 tablet    Take 1 tablet (81 mg) by mouth daily        CALCIUM CITRATE      1,500 mg two times daily        cholecalciferol 1000 UNITS Tabs      Take  by mouth. 2 capsules po daily.        cinnamon 500 MG Caps      Take 2 capsules by mouth daily        fish oil-omega-3 fatty acids 1000 MG capsule      Take 1 g by mouth 2 times daily        fluticasone 50 MCG/ACT spray    FLONASE    16 g    Spray 2 sprays into both nostrils daily    Laryngopharyngeal reflux (LPR), Post-nasal drip, Globus sensation       gabapentin 300 MG capsule    NEURONTIN    180 capsule    TAKE TWO CAPSULES BY MOUTH AT BEDTIME    Paresthesia       Lutein 6 MG Caps      Take 20 mg by mouth every other day 1 tablet daily        MAGNESIUM OXIDE PO      Take 200 mg by mouth daily        MIRALAX PO       Use every other day        Multiple vitamin  s Tabs      Take 1 tablet by mouth daily.        RA TURMERIC 500 MG Caps   Generic drug:  Turmeric      Take 2 capsules by mouth daily        zinc 50 MG Tabs

## 2017-09-27 ENCOUNTER — TELEPHONE (OUTPATIENT)
Dept: OTOLARYNGOLOGY | Facility: OTHER | Age: 67
End: 2017-09-27

## 2017-09-27 NOTE — TELEPHONE ENCOUNTER
Pt called and states that she has been using her aquaphor on her sore on her ear.  She states that it looks better but is isn't completely healed.  Please advise in what she should do.

## 2017-09-28 NOTE — TELEPHONE ENCOUNTER
Pt was notified and transferred to [facility] to make an appointment.

## 2017-10-12 ENCOUNTER — OFFICE VISIT (OUTPATIENT)
Dept: OTOLARYNGOLOGY | Facility: OTHER | Age: 67
End: 2017-10-12
Attending: PHYSICIAN ASSISTANT
Payer: COMMERCIAL

## 2017-10-12 VITALS
DIASTOLIC BLOOD PRESSURE: 64 MMHG | TEMPERATURE: 97.7 F | WEIGHT: 110 LBS | SYSTOLIC BLOOD PRESSURE: 120 MMHG | HEIGHT: 62 IN | BODY MASS INDEX: 20.24 KG/M2 | HEART RATE: 60 BPM

## 2017-10-12 DIAGNOSIS — J31.0 CHRONIC RHINITIS, UNSPECIFIED TYPE: ICD-10-CM

## 2017-10-12 DIAGNOSIS — K21.9 LARYNGOPHARYNGEAL REFLUX (LPR): ICD-10-CM

## 2017-10-12 DIAGNOSIS — R09.A2 GLOBUS SENSATION: ICD-10-CM

## 2017-10-12 DIAGNOSIS — H61.002 CHONDRODERMATITIS NODULARIS CHRONICA HELICIS, LEFT: Primary | ICD-10-CM

## 2017-10-12 DIAGNOSIS — R09.82 POST-NASAL DRIP: ICD-10-CM

## 2017-10-12 PROCEDURE — 92504 EAR MICROSCOPY EXAMINATION: CPT

## 2017-10-12 PROCEDURE — 92504 EAR MICROSCOPY EXAMINATION: CPT | Performed by: PHYSICIAN ASSISTANT

## 2017-10-12 PROCEDURE — 99213 OFFICE O/P EST LOW 20 MIN: CPT | Mod: 25 | Performed by: PHYSICIAN ASSISTANT

## 2017-10-12 PROCEDURE — 99212 OFFICE O/P EST SF 10 MIN: CPT

## 2017-10-12 ASSESSMENT — PAIN SCALES - GENERAL: PAINLEVEL: NO PAIN (0)

## 2017-10-12 NOTE — NURSING NOTE
"Chief Complaint   Patient presents with     Lesion     Pt is here for a f/u ear sore.  Left antihelical rim.  Pt states that it looks better but it is still sore.       Initial /64 (BP Location: Right arm, Cuff Size: Adult Regular)  Pulse 60  Temp 97.7  F (36.5  C) (Tympanic)  Ht 5' 2\" (1.575 m)  Wt 110 lb (49.9 kg)  BMI 20.12 kg/m2 Estimated body mass index is 20.12 kg/(m^2) as calculated from the following:    Height as of this encounter: 5' 2\" (1.575 m).    Weight as of this encounter: 110 lb (49.9 kg).  Medication Reconciliation: complete   Fela Moses LPN      "

## 2017-10-12 NOTE — MR AVS SNAPSHOT
After Visit Summary   10/12/2017    Mihaela Jang    MRN: [id number]           Patient Information     Date Of Birth          1950        Visit Information        Provider Department      10/12/2017 11:00 AM Sejal Gutierrez PA-C [facility]        Care Instructions    Try covering site at night  Consider repeat excision w/ Dr. Lau in office    Will have nurse call you in 2-3 weeks  Best treatment for chondrodermatitis is excision.   Topical remedies have limited success.       Complete allergy testing.   Return for repeat scope in 6 weeks  Continue w/ LPR precautions    If there are concerns or questions, Call [phone] and ask for nurse          Follow-ups after your visit        Your next 10 appointments already scheduled     Nov 03, 2017  2:30 PM CDT   (Arrive by 2:15 PM)   PHYSICAL with Melyssa Connors MD   [facility] ([facility] )    [address]  [place] [address]   [phone]              Who to contact     If you have questions or need follow up information about today's clinic visit or your schedule please contact [facility] directly at [phone].  Normal or non-critical lab and imaging results will be communicated to you by MyChart, letter or phone within 4 business days after the clinic has received the results. If you do not hear from us within 7 days, please contact the clinic through MyChart or phone. If you have a critical or abnormal lab result, we will notify you by phone as soon as possible.  Submit refill requests through "[employer]" or call your pharmacy and they will forward the refill request to us. Please allow 3 business days for your refill to be completed.          Additional Information About Your Visit        MyChart Information     "[employer]" lets you send messages to your doctor, view your test results, renew your prescriptions, schedule appointments and more. To sign up, go to  [email or web address] . Click on \"Log in\" on the left side of the screen, which will take you to the Welcome page. Then click on \"Sign up Now\" on the right side of the page.     You will be asked to enter the access code listed below, as well as some personal information. Please follow the directions to create your username and password.     Your access code is: [email or web address]  Expires: 2017  3:00 PM     Your access code will  in 90 days. If you need help or a new code, please call your [place] clinic or [phone].        Care EveryWhere ID     This is your Care EveryWhere ID. This could be used by other organizations to access your [place] medical records  [id number]        Your Vitals Were     Pulse Temperature Height BMI (Body Mass Index)          60 97.7  F (36.5  C) (Tympanic) 5' 2\" (1.575 m) 20.12 kg/m2         Blood Pressure from Last 3 Encounters:   10/12/17 120/64   17 112/58   17 115/47    Weight from Last 3 Encounters:   10/12/17 110 lb (49.9 kg)   17 110 lb (49.9 kg)   17 112 lb 6.4 oz (51 kg)              Today, you had the following     No orders found for display       Primary Care Provider Office Phone # Fax #    Melyssa Connors [phone]       [address] [address]        Equal Access to Services     GREG LANCASTER AH: Hadii patsy ku hadasho Soomaali, waaxda luqadaha, qaybta kaalmada adeegyawanda, haile fernández . So [facility] [phone].    ATENCIÓN: Si habla español, tiene a quezada disposición servicios gratuitos de asistencia lingüística. Llame al [phone].    We comply with applicable federal civil rights laws and [place] laws. We do not discriminate on the basis of race, color, national origin, age, disability, sex, sexual orientation, or gender identity.            Thank you!     Thank you for choosing [facility]  for your care. Our goal is always to provide you " with excellent care. Hearing back from our patients is one way we can continue to improve our services. Please take a few minutes to complete the written survey that you may receive in the mail after your visit with us. Thank you!             Your Updated Medication List - Protect others around you: Learn how to safely use, store and throw away your medicines at www.disposemymeds.org.          This list is accurate as of: 10/12/17 11:28 AM.  Always use your most recent med list.                   Brand Name Dispense Instructions for use Diagnosis    Acidophilus Tabs      Take 1 tablet by mouth daily        aspirin EC 81 MG EC tablet     1 tablet    Take 1 tablet (81 mg) by mouth daily        CALCIUM CITRATE      1,500 mg two times daily        cholecalciferol 1000 UNITS Tabs      Take  by mouth. 2 capsules po daily.        cinnamon 500 MG Caps      Take 2 capsules by mouth daily        fish oil-omega-3 fatty acids 1000 MG capsule      Take 1 g by mouth 2 times daily        fluticasone 50 MCG/ACT spray    FLONASE    16 g    Spray 2 sprays into both nostrils daily    Laryngopharyngeal reflux (LPR), Post-nasal drip, Globus sensation       gabapentin 300 MG capsule    NEURONTIN    180 capsule    TAKE TWO CAPSULES BY MOUTH AT BEDTIME    Paresthesia       Lutein 6 MG Caps      Take 20 mg by mouth every other day 1 tablet daily        MAGNESIUM OXIDE PO      Take 200 mg by mouth daily        MIRALAX PO      Use every other day        Multiple vitamin  s Tabs      Take 1 tablet by mouth daily.        RA TURMERIC 500 MG Caps   Generic drug:  Turmeric      Take 2 capsules by mouth daily        zinc 50 MG Tabs

## 2017-10-12 NOTE — PATIENT INSTRUCTIONS
Try covering site at night  Consider repeat excision w/ Dr. Lau in office    Will have nurse call you in 2-3 weeks  Best treatment for chondrodermatitis is excision.   Topical remedies have limited success.       Complete allergy testing.   Return for repeat scope in 6 weeks  Continue w/ LPR precautions    If there are concerns or questions, Call [phone] and ask for nurse

## 2017-10-12 NOTE — PROGRESS NOTES
Chief Complaint   Patient presents with     Lesion     Pt is here for a f/u ear sore.  Left antihelical rim.  Pt states that it looks better but it is still sore.     She reports past excision w/ Dr. Lau in office on 9/30/16. Since, lesion has slowly returned and painful with pressure and sleeping.   She has tried Aquaphor to area, but soothes short term. She is concerned for removal in office due to discomfort.     EGD was completed with Dr. Moreno. Completed PPI w/o relief. She had 4 trials of PPI.   Barium swallow was negative, completed.  EGD was a few years ago     She did have sinus CT completed. CT completed- mild scattered pansinusitis.   Mihaela has some rhinitis, post nasal drip. She feels like there is something in her throat remaining.   She uses Flonase as needed. No improvement w/ Zeb med.   No maxi seasonal allergies.   Past Medical History:   Diagnosis Date     Atypical nevi      Degenerative skin disorder     solar elastosis     Hallux rigidus 06/15/2000     Hyperlipidemia, unspecified hyperlipidemia type 9/15/2016     Laryngopharyngeal reflux disease     PPI     Malignant neoplasm of breast (female), unspecified site 03/10/2004     Notalgia paresthetica      Osteoarthritis 07/20/2011     Osteoporosis, unspecified 09/10/2001    Dr Moses; intermittent reclast     Other abnormal glucose 12/20/2013     Paresthesia     neck; notalgiaparesthetic; dr leahy & Dr Nevarez; neurontin     Personal history of malignant neoplasm of breast 06/07/2005     Rhinitis      Schamberg's purpura         Allergies   Allergen Reactions     Povidone Iodine Rash     Betadine      Soap Rash     Betadine      Current Outpatient Prescriptions   Medication     fluticasone (FLONASE) 50 MCG/ACT spray     gabapentin (NEURONTIN) 300 MG capsule     Lactobacillus (ACIDOPHILUS) TABS     MAGNESIUM OXIDE PO     zinc 50 MG TABS     aspirin EC 81 MG tablet     Polyethylene Glycol 3350 (MIRALAX PO)     cinnamon 500 MG CAPS      "fish oil-omega-3 fatty acids (FISH OIL) 1000 MG capsule     Lutein 6 MG CAPS     CALCIUM CITRATE     cholecalciferol 1000 UNITS TABS     Multiple vitamin  s TABS     Turmeric (RA TURMERIC) 500 MG CAPS     No current facility-administered medications for this visit.       ROS: 10 point ROS neg other than the symptoms noted above in the HPI.  /64 (BP Location: Right arm, Cuff Size: Adult Regular)  Pulse 60  Temp 97.7  F (36.5  C) (Tympanic)  Ht 5' 2\" (1.575 m)  Wt 110 lb (49.9 kg)  BMI 20.12 kg/m2  General - The patient is well nourished and well developed, and appears to have good nutritional status.  Alert and oriented to person and place, answers questions and cooperates with examination appropriately.   Head and Face - Normocephalic and atraumatic, with no gross asymmetry noted.  The facial nerve is intact, with strong symmetric movements.  Voice and Breathing - The patient was breathing comfortably without the use of accessory muscles. There was no wheezing, stridor, or stertor.  The patients voice was clear and strong, and had appropriate pitch and quality.  Ears -examined under microscopy bilaterally  The external auditory canals are cerumen, the tympanic membranes are intact without effusion, retraction or mass.  Bony landmarks are intact.    Left antihelical rim with raised red mildly tender lesion extending 1.5 cm  Eyes - Extraocular movements intact, and the pupils were reactive to light.  Sclera were not icteric or injected, conjunctiva were pink and moist.  Mouth - Examination of the oral cavity showed pink, healthy oral mucosa. No lesions or ulcerations noted.  The tongue was mobile and midline, and the dentition were in good condition.    Throat - The walls of the oropharynx were smooth, pink, moist, symmetric, and had no lesions or ulcerations.  The tonsillar pillars and soft palate were symmetric.  The uvula was midline on elevation.    Neck - Normal midline excursion of the laryngotracheal " complex during swallowing.  Full range of motion on passive movement.  Palpation of the occipital, submental, submandibular, internal jugular chain, and supraclavicular nodes did not demonstrate any abnormal lymph nodes or masses.  Palpation of the thyroid was soft and smooth, with no nodules or goiter appreciated.  The trachea was mobile and midline.  Nose - External contour is symmetric, no gross deflection or scars.  Nasal mucosa is pink and moist with no abnormal mucus.  The septum was intact, turbinates of normal size and position.  No polyps, masses, or purulence noted on examination    ASSESSMENT:    ICD-10-CM    1. 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 Globus sensation F45.8    3. Laryngopharyngeal reflux (LPR) K21.9    4. Post-nasal drip R09.82    5. Chronic rhinitis, unspecified type J31.0        Would reocommend 24 hour pH dual probe to eval for LPR. However, per Mihaela Moreno did not think this needed to be completed.   D/tanesha Chairez and she is interested in pursuing this to see if any continued PPI may be beneficial      If globus persists despite above, examination of larynx will be completed at her next visit.     She will have allergy testing at Dr. Messina's office in October  Consider farshad med rinse and daily Flonase.   Renewed Rx for Flonase.   Patient may have mold exposure at home, and working to ensure mold is removed.      Continue LPR precautions (per Dr. Moreno's note GERD with esophagitis). Will monitor.   Increase water intake. Limit caffeine.   Adult lifestyle changes to prevent LPR reviewed      Avoid eating and drinking within two to three hours prior to bedtime    Do not drink alcohol    Eat small meals and slowly    Limit problem foods:    o Caffeine  o Carbonated drinks  o Chocolate  o Peppermint  o Tomato  o Citrus fruits  o Fatty and fried foods      Lose weight    Quit smoking    Wear loose clothing     Monitor left ear. There is left helical  chondrodermatitis. Patient needs to avoid sleeping on that ear.      Sejal Gutierrez PA-C  ENT  [facility] [place]  [phone]

## 2017-10-19 ENCOUNTER — TELEPHONE (OUTPATIENT)
Dept: FAMILY MEDICINE | Facility: OTHER | Age: 67
End: 2017-10-19

## 2017-10-19 DIAGNOSIS — Z11.59 ENCOUNTER FOR HEPATITIS C SCREENING TEST FOR LOW RISK PATIENT: ICD-10-CM

## 2017-10-19 DIAGNOSIS — D70.9 NEUTROPENIA, UNSPECIFIED TYPE (H): ICD-10-CM

## 2017-10-19 DIAGNOSIS — E78.5 HYPERLIPIDEMIA, UNSPECIFIED HYPERLIPIDEMIA TYPE: Primary | ICD-10-CM

## 2017-10-19 DIAGNOSIS — R73.09 ABNORMAL GLUCOSE: ICD-10-CM

## 2017-10-19 DIAGNOSIS — M81.0 AGE-RELATED OSTEOPOROSIS WITHOUT CURRENT PATHOLOGICAL FRACTURE: ICD-10-CM

## 2017-10-19 DIAGNOSIS — R73.03 PREDIABETES: ICD-10-CM

## 2017-10-19 NOTE — TELEPHONE ENCOUNTER
3:23 PM    Reason for Call: Phone Call    Description: Patient is requesting that her basic lab orders are put in so she can come in and do them before her physical so they can discuss the results of her lab work at her physical, thank you, please call patient when this is done, so she knows when she can come in to do this    Was an appointment offered for this call? No, patient has an upcoming physical with Dr. Melyssa Connors    Preferred method for responding to this message: Telephone Call     What is your phone number ?   [phone]    If we cannot reach you directly, may we leave a detailed response at the number you provided? Yes    Can this message wait until your PCP/provider returns, if available today? PCP is in    Shazia Austin

## 2017-10-23 ENCOUNTER — TRANSFERRED RECORDS (OUTPATIENT)
Dept: HEALTH INFORMATION MANAGEMENT | Facility: HOSPITAL | Age: 67
End: 2017-10-23

## 2017-11-01 DIAGNOSIS — M81.0 AGE-RELATED OSTEOPOROSIS WITHOUT CURRENT PATHOLOGICAL FRACTURE: ICD-10-CM

## 2017-11-01 DIAGNOSIS — R73.09 ABNORMAL GLUCOSE: ICD-10-CM

## 2017-11-01 DIAGNOSIS — E78.5 HYPERLIPIDEMIA, UNSPECIFIED HYPERLIPIDEMIA TYPE: ICD-10-CM

## 2017-11-01 DIAGNOSIS — R73.03 PREDIABETES: ICD-10-CM

## 2017-11-01 DIAGNOSIS — Z11.59 ENCOUNTER FOR HEPATITIS C SCREENING TEST FOR LOW RISK PATIENT: ICD-10-CM

## 2017-11-01 DIAGNOSIS — D70.9 NEUTROPENIA, UNSPECIFIED TYPE (H): ICD-10-CM

## 2017-11-01 LAB
ALBUMIN SERPL-MCNC: 3.7 G/DL (ref 3.4–5)
ALP SERPL-CCNC: 62 U/L (ref 40–150)
ALT SERPL W P-5'-P-CCNC: 22 U/L (ref 0–50)
ANION GAP SERPL CALCULATED.3IONS-SCNC: 4 MMOL/L (ref 3–14)
AST SERPL W P-5'-P-CCNC: 19 U/L (ref 0–45)
BASOPHILS # BLD AUTO: 0 10E9/L (ref 0–0.2)
BASOPHILS NFR BLD AUTO: 1.1 %
BILIRUB SERPL-MCNC: 0.3 MG/DL (ref 0.2–1.3)
BUN SERPL-MCNC: 9 MG/DL (ref 7–30)
CALCIUM SERPL-MCNC: 9.2 MG/DL (ref 8.5–10.1)
CHLORIDE SERPL-SCNC: 106 MMOL/L (ref 94–109)
CHOLEST SERPL-MCNC: 199 MG/DL
CO2 SERPL-SCNC: 28 MMOL/L (ref 20–32)
CREAT SERPL-MCNC: 0.72 MG/DL (ref 0.52–1.04)
DIFFERENTIAL METHOD BLD: ABNORMAL
EOSINOPHIL # BLD AUTO: 0.1 10E9/L (ref 0–0.7)
EOSINOPHIL NFR BLD AUTO: 2.3 %
ERYTHROCYTE [DISTWIDTH] IN BLOOD BY AUTOMATED COUNT: 13.2 % (ref 10–15)
EST. AVERAGE GLUCOSE BLD GHB EST-MCNC: 111 MG/DL
GFR SERPL CREATININE-BSD FRML MDRD: 81 ML/MIN/1.7M2
GLUCOSE SERPL-MCNC: 93 MG/DL (ref 70–99)
HBA1C MFR BLD: 5.5 % (ref 4.3–6)
HCT VFR BLD AUTO: 37.5 % (ref 35–47)
HDLC SERPL-MCNC: 82 MG/DL
HGB BLD-MCNC: 12.7 G/DL (ref 11.7–15.7)
IMM GRANULOCYTES # BLD: 0 10E9/L (ref 0–0.4)
IMM GRANULOCYTES NFR BLD: 0.3 %
LDLC SERPL CALC-MCNC: 106 MG/DL
LYMPHOCYTES # BLD AUTO: 1.1 10E9/L (ref 0.8–5.3)
LYMPHOCYTES NFR BLD AUTO: 30.7 %
MCH RBC QN AUTO: 32.1 PG (ref 26.5–33)
MCHC RBC AUTO-ENTMCNC: 33.9 G/DL (ref 31.5–36.5)
MCV RBC AUTO: 95 FL (ref 78–100)
MONOCYTES # BLD AUTO: 0.5 10E9/L (ref 0–1.3)
MONOCYTES NFR BLD AUTO: 12.9 %
NEUTROPHILS # BLD AUTO: 1.8 10E9/L (ref 1.6–8.3)
NEUTROPHILS NFR BLD AUTO: 52.7 %
NONHDLC SERPL-MCNC: 117 MG/DL
NRBC # BLD AUTO: 0 10*3/UL
NRBC BLD AUTO-RTO: 0 /100
PLATELET # BLD AUTO: 285 10E9/L (ref 150–450)
POTASSIUM SERPL-SCNC: 4.3 MMOL/L (ref 3.4–5.3)
PROT SERPL-MCNC: 8 G/DL (ref 6.8–8.8)
RBC # BLD AUTO: 3.96 10E12/L (ref 3.8–5.2)
SODIUM SERPL-SCNC: 138 MMOL/L (ref 133–144)
TRIGL SERPL-MCNC: 55 MG/DL
WBC # BLD AUTO: 3.5 10E9/L (ref 4–11)

## 2017-11-01 PROCEDURE — 80053 COMPREHEN METABOLIC PANEL: CPT | Mod: ZL | Performed by: FAMILY MEDICINE

## 2017-11-01 PROCEDURE — 86803 HEPATITIS C AB TEST: CPT | Mod: ZL | Performed by: FAMILY MEDICINE

## 2017-11-01 PROCEDURE — 40000788 ZZHCL STATISTIC ESTIMATED AVERAGE GLUCOSE: Mod: ZL | Performed by: FAMILY MEDICINE

## 2017-11-01 PROCEDURE — 85025 COMPLETE CBC W/AUTO DIFF WBC: CPT | Mod: ZL | Performed by: FAMILY MEDICINE

## 2017-11-01 PROCEDURE — 99000 SPECIMEN HANDLING OFFICE-LAB: CPT | Performed by: FAMILY MEDICINE

## 2017-11-01 PROCEDURE — 82306 VITAMIN D 25 HYDROXY: CPT | Mod: ZL | Performed by: FAMILY MEDICINE

## 2017-11-01 PROCEDURE — 83036 HEMOGLOBIN GLYCOSYLATED A1C: CPT | Mod: ZL | Performed by: FAMILY MEDICINE

## 2017-11-01 PROCEDURE — 36415 COLL VENOUS BLD VENIPUNCTURE: CPT | Mod: ZL | Performed by: FAMILY MEDICINE

## 2017-11-01 PROCEDURE — 80061 LIPID PANEL: CPT | Mod: ZL | Performed by: FAMILY MEDICINE

## 2017-11-02 LAB
DEPRECATED CALCIDIOL+CALCIFEROL SERPL-MC: 65 UG/L (ref 20–75)
HCV AB SERPL QL IA: NONREACTIVE

## 2017-11-03 ENCOUNTER — OFFICE VISIT (OUTPATIENT)
Dept: FAMILY MEDICINE | Facility: OTHER | Age: 67
End: 2017-11-03
Attending: FAMILY MEDICINE
Payer: COMMERCIAL

## 2017-11-03 VITALS
BODY MASS INDEX: 20.01 KG/M2 | WEIGHT: 106 LBS | HEART RATE: 68 BPM | OXYGEN SATURATION: 100 % | RESPIRATION RATE: 20 BRPM | TEMPERATURE: 98.3 F | DIASTOLIC BLOOD PRESSURE: 60 MMHG | HEIGHT: 61 IN | SYSTOLIC BLOOD PRESSURE: 120 MMHG

## 2017-11-03 DIAGNOSIS — Z23 NEED FOR PROPHYLACTIC VACCINATION AND INOCULATION AGAINST INFLUENZA: ICD-10-CM

## 2017-11-03 DIAGNOSIS — M81.0 AGE-RELATED OSTEOPOROSIS WITHOUT CURRENT PATHOLOGICAL FRACTURE: ICD-10-CM

## 2017-11-03 DIAGNOSIS — Z00.00 ROUTINE GENERAL MEDICAL EXAMINATION AT A HEALTH CARE FACILITY: Primary | ICD-10-CM

## 2017-11-03 DIAGNOSIS — R73.09 ABNORMAL GLUCOSE: ICD-10-CM

## 2017-11-03 DIAGNOSIS — D72.819 LEUKOPENIA, UNSPECIFIED TYPE: ICD-10-CM

## 2017-11-03 DIAGNOSIS — R09.82 POST-NASAL DRIP: ICD-10-CM

## 2017-11-03 DIAGNOSIS — J31.0 CHRONIC RHINITIS, UNSPECIFIED TYPE: ICD-10-CM

## 2017-11-03 PROCEDURE — 99397 PER PM REEVAL EST PAT 65+ YR: CPT | Performed by: FAMILY MEDICINE

## 2017-11-03 PROCEDURE — 90662 IIV NO PRSV INCREASED AG IM: CPT | Performed by: FAMILY MEDICINE

## 2017-11-03 PROCEDURE — G0008 ADMIN INFLUENZA VIRUS VAC: HCPCS | Performed by: FAMILY MEDICINE

## 2017-11-03 ASSESSMENT — ANXIETY QUESTIONNAIRES
5. BEING SO RESTLESS THAT IT IS HARD TO SIT STILL: NOT AT ALL
1. FEELING NERVOUS, ANXIOUS, OR ON EDGE: NOT AT ALL
3. WORRYING TOO MUCH ABOUT DIFFERENT THINGS: NOT AT ALL
GAD7 TOTAL SCORE: 0
6. BECOMING EASILY ANNOYED OR IRRITABLE: NOT AT ALL
2. NOT BEING ABLE TO STOP OR CONTROL WORRYING: NOT AT ALL
7. FEELING AFRAID AS IF SOMETHING AWFUL MIGHT HAPPEN: NOT AT ALL

## 2017-11-03 ASSESSMENT — PAIN SCALES - GENERAL: PAINLEVEL: NO PAIN (0)

## 2017-11-03 ASSESSMENT — PATIENT HEALTH QUESTIONNAIRE - PHQ9
SUM OF ALL RESPONSES TO PHQ QUESTIONS 1-9: 0
5. POOR APPETITE OR OVEREATING: NOT AT ALL

## 2017-11-03 NOTE — NURSING NOTE
"Chief Complaint   Patient presents with     Physical       Initial /60  Pulse 68  Temp 98.3  F (36.8  C) (Tympanic)  Resp 20  Ht 5' 0.5\" (1.537 m)  Wt 106 lb (48.1 kg)  SpO2 100%  BMI 20.36 kg/m2 Estimated body mass index is 20.36 kg/(m^2) as calculated from the following:    Height as of this encounter: 5' 0.5\" (1.537 m).    Weight as of this encounter: 106 lb (48.1 kg).  Medication Reconciliation: complete   AQUILINO PATTON LPN  "

## 2017-11-03 NOTE — MR AVS SNAPSHOT
After Visit Summary   11/3/2017    Mihaeal Jang    MRN: [id number]           Patient Information     Date Of Birth          1950        Visit Information        Provider Department      11/3/2017 2:30 PM Melyssa Connors MD [facility]        Today's Diagnoses     Routine general medical examination at a health care facility    -  1    Need for prophylactic vaccination and inoculation against influenza        Age-related osteoporosis without current pathological fracture        Abnormal glucose        Chronic rhinitis, unspecified type        Post-nasal drip        Leukopenia, unspecified type          Care Instructions    Flu shot given.  Labs stable.  Colonoscopy due 2018.  Follow up with dermatology, ENT, and endocrinology as planned.  Continue with daily Flonase for a few weeks, and if not responding, add antihistamine to it (Zyrtec, Claritin, Allegra).      Results for orders placed or performed in visit on 11/01/17   Hepatitis C antibody   Result Value Ref Range    Hepatitis C Antibody Nonreactive NR^Nonreactive   CBC with platelets and differential   Result Value Ref Range    WBC 3.5 (L) 4.0 - 11.0 10e9/L    RBC Count 3.96 3.8 - 5.2 10e12/L    Hemoglobin 12.7 11.7 - 15.7 g/dL    Hematocrit 37.5 35.0 - 47.0 %    MCV 95 78 - 100 fl    MCH 32.1 26.5 - 33.0 pg    MCHC 33.9 31.5 - 36.5 g/dL    RDW 13.2 10.0 - 15.0 %    Platelet Count 285 150 - 450 10e9/L    Diff Method Automated Method     % Neutrophils 52.7 %    % Lymphocytes 30.7 %    % Monocytes 12.9 %    % Eosinophils 2.3 %    % Basophils 1.1 %    % Immature Granulocytes 0.3 %    Nucleated RBCs 0 0 /100    Absolute Neutrophil 1.8 1.6 - 8.3 10e9/L    Absolute Lymphocytes 1.1 0.8 - 5.3 10e9/L    Absolute Monocytes 0.5 0.0 - 1.3 10e9/L    Absolute Eosinophils 0.1 0.0 - 0.7 10e9/L    Absolute Basophils 0.0 0.0 - 0.2 10e9/L    Abs Immature Granulocytes 0.0 0 - 0.4 10e9/L    Absolute Nucleated RBC 0.0    Comprehensive  metabolic panel   Result Value Ref Range    Sodium 138 133 - 144 mmol/L    Potassium 4.3 3.4 - 5.3 mmol/L    Chloride 106 94 - 109 mmol/L    Carbon Dioxide 28 20 - 32 mmol/L    Anion Gap 4 3 - 14 mmol/L    Glucose 93 70 - 99 mg/dL    Urea Nitrogen 9 7 - 30 mg/dL    Creatinine 0.72 0.52 - 1.04 mg/dL    GFR Estimate 81 >60 mL/min/1.7m2    GFR Estimate If Black >90 >60 mL/min/1.7m2    Calcium 9.2 8.5 - 10.1 mg/dL    Bilirubin Total 0.3 0.2 - 1.3 mg/dL    Albumin 3.7 3.4 - 5.0 g/dL    Protein Total 8.0 6.8 - 8.8 g/dL    Alkaline Phosphatase 62 40 - 150 U/L    ALT 22 0 - 50 U/L    AST 19 0 - 45 U/L   Lipid Profile (Chol, Trig, HDL, LDL calc)   Result Value Ref Range    Cholesterol 199 <200 mg/dL    Triglycerides 55 <150 mg/dL    HDL Cholesterol 82 >49 mg/dL    LDL Cholesterol Calculated 106 (H) <100 mg/dL    Non HDL Cholesterol 117 <130 mg/dL   Hemoglobin A1c   Result Value Ref Range    Hemoglobin A1C 5.5 4.3 - 6.0 %   Vitamin D Deficiency   Result Value Ref Range    Vitamin D Deficiency screening 65 20 - 75 ug/L   Estimated Average Glucose   Result Value Ref Range    Estimated Average Glucose 111 mg/dL       Preventive Health Recommendations  Female Ages 65 +    Yearly exam:     See your health care provider every year in order to  o Review health changes.   o Discuss preventive care.    o Review your medicines if your doctor has prescribed any.      You no longer need a yearly Pap test unless you've had an abnormal Pap test in the past 10 years. If you have vaginal symptoms, such as bleeding or discharge, be sure to talk with your provider about a Pap test.      Every 1 to 2 years, have a mammogram.  If you are over 69, talk with your health care provider about whether or not you want to continue having screening mammograms.      Every 10 years, have a colonoscopy. Or, have a yearly FIT test (stool test). These exams will check for colon cancer.       Have a cholesterol test every 5 years, or more often if your doctor  advises it.       Have a diabetes test (fasting glucose) every three years. If you are at risk for diabetes, you should have this test more often.       At age 65, have a bone density scan (DEXA) to check for osteoporosis (brittle bone disease).    Shots:    Get a flu shot each year.    Get a tetanus shot every 10 years.    Talk to your doctor about your pneumonia vaccines. There are now two you should receive - Pneumovax (PPSV 23) and Prevnar (PCV 13).    Talk to your doctor about the shingles vaccine.    Talk to your doctor about the hepatitis B vaccine.    Nutrition:     Eat at least 5 servings of fruits and vegetables each day.      Eat whole-grain bread, whole-wheat pasta and brown rice instead of white grains and rice.      Talk to your provider about Calcium and Vitamin D.     Lifestyle    Exercise at least 150 minutes a week (30 minutes a day, 5 days a week). This will help you control your weight and prevent disease.      Limit alcohol to one drink per day.      No smoking.       Wear sunscreen to prevent skin cancer.       See your dentist twice a year for an exam and cleaning.      See your eye doctor every 1 to 2 years to screen for conditions such as glaucoma, macular degeneration, cataracts, etc           Follow-ups after your visit        Your next 10 appointments already scheduled     Nov 28, 2017  1:45 PM CST   (Arrive by 1:30 PM)   Return Visit with Sejal Gutierrez PA-C   [facility] ([facility] )    [address] [place] [address]   [phone]              Who to contact     If you have questions or need follow up information about today's clinic visit or your schedule please contact [facility] directly at [phone].  Normal or non-critical lab and imaging results will be communicated to you by MyChart, letter or phone within 4 business days after the clinic has received the results. If you do not hear from us within 7 days, please contact the  "clinic through T-Quad [employer]t or phone. If you have a critical or abnormal lab result, we will notify you by phone as soon as possible.  Submit refill requests through Music Messenger (MM) or call your pharmacy and they will forward the refill request to us. Please allow 3 business days for your refill to be completed.          Additional Information About Your Visit        daysofthart Information     Music Messenger (MM) lets you send messages to your doctor, view your test results, renew your prescriptions, schedule appointments and more. To sign up, go to [email or web address] (MM) . Click on \"Log in\" on the left side of the screen, which will take you to the Welcome page. Then click on \"Sign up Now\" on the right side of the page.     You will be asked to enter the access code listed below, as well as some personal information. Please follow the directions to create your username and password.     Your access code is: [email or web address]  Expires: 2017  3:00 PM     Your access code will  in 90 days. If you need help or a new code, please call your [facility] clinic or [phone].        Care EveryWhere ID     This is your Care EveryWhere ID. This could be used by other organizations to access your [facility] medical records  [id number]        Your Vitals Were     Pulse Temperature Respirations Height Pulse Oximetry BMI (Body Mass Index)    68 98.3  F (36.8  C) (Tympanic) 20 5' 0.5\" (1.537 m) 100% 20.36 kg/m2       Blood Pressure from Last 3 Encounters:   17 120/60   10/12/17 120/64   17 112/58    Weight from Last 3 Encounters:   17 106 lb (48.1 kg)   10/12/17 110 lb (49.9 kg)   17 110 lb (49.9 kg)              We Performed the Following     ADMIN INFLUENZA (For MEDICARE Patients ONLY) []     HC FLU VACCINE, INCREASED ANTIGEN, PRESV FREE        Primary Care Provider Office Phone # Fax #    Melyssa Connors [phone]       [address]  [place] [address]        Equal Access " to Services     [facility] : Kami Munson, wajhon guillen, qastevan stokesalhaile jara. So [place] -[phone].    ATENCIÓN: Si habla dillan, tiene a quezada disposición servicios gratuitos de asistencia lingüística. Llame al [phone].    We comply with applicable federal civil rights laws and [place] laws. We do not discriminate on the basis of race, color, national origin, age, disability, sex, sexual orientation, or gender identity.            Thank you!     Thank you for choosing [facility]  for your care. Our goal is always to provide you with excellent care. Hearing back from our patients is one way we can continue to improve our services. Please take a few minutes to complete the written survey that you may receive in the mail after your visit with us. Thank you!             Your Updated Medication List - Protect others around you: Learn how to safely use, store and throw away your medicines at www.disposemymeds.org.          This list is accurate as of: 11/3/17  2:53 PM.  Always use your most recent med list.                   Brand Name Dispense Instructions for use Diagnosis    Acidophilus Tabs      Take 1 tablet by mouth daily        aspirin EC 81 MG EC tablet     1 tablet    Take 1 tablet (81 mg) by mouth daily        CALCIUM CITRATE      1,500 mg two times daily        cholecalciferol 1000 UNITS Tabs      Take  by mouth. 2 capsules po daily.        cinnamon 500 MG Caps      Take 2 capsules by mouth daily        fish oil-omega-3 fatty acids 1000 MG capsule      Take 1 g by mouth 2 times daily        fluticasone 50 MCG/ACT spray    FLONASE    16 g    Spray 2 sprays into both nostrils daily    Laryngopharyngeal reflux (LPR), Post-nasal drip, Globus sensation       gabapentin 300 MG capsule    NEURONTIN    180 capsule    TAKE TWO CAPSULES BY MOUTH AT BEDTIME    Paresthesia       Lutein 6 MG Caps      Take 20 mg by mouth every other  day 1 tablet daily        MAGNESIUM OXIDE PO      Take 200 mg by mouth daily        MIRALAX PO      Use every other day        Multiple vitamin  s Tabs      Take 1 tablet by mouth daily.        RA TURMERIC 500 MG Caps   Generic drug:  Turmeric      Take 2 capsules by mouth daily        zinc 50 MG Tabs

## 2017-11-03 NOTE — PATIENT INSTRUCTIONS
Flu shot given.  Labs stable.  Colonoscopy due 2018.  Follow up with dermatology, ENT, and endocrinology as planned.  Continue with daily Flonase for a few weeks, and if not responding, add antihistamine to it (Zyrtec, Claritin, Allegra).      Results for orders placed or performed in visit on 11/01/17   Hepatitis C antibody   Result Value Ref Range    Hepatitis C Antibody Nonreactive NR^Nonreactive   CBC with platelets and differential   Result Value Ref Range    WBC 3.5 (L) 4.0 - 11.0 10e9/L    RBC Count 3.96 3.8 - 5.2 10e12/L    Hemoglobin 12.7 11.7 - 15.7 g/dL    Hematocrit 37.5 35.0 - 47.0 %    MCV 95 78 - 100 fl    MCH 32.1 26.5 - 33.0 pg    MCHC 33.9 31.5 - 36.5 g/dL    RDW 13.2 10.0 - 15.0 %    Platelet Count 285 150 - 450 10e9/L    Diff Method Automated Method     % Neutrophils 52.7 %    % Lymphocytes 30.7 %    % Monocytes 12.9 %    % Eosinophils 2.3 %    % Basophils 1.1 %    % Immature Granulocytes 0.3 %    Nucleated RBCs 0 0 /100    Absolute Neutrophil 1.8 1.6 - 8.3 10e9/L    Absolute Lymphocytes 1.1 0.8 - 5.3 10e9/L    Absolute Monocytes 0.5 0.0 - 1.3 10e9/L    Absolute Eosinophils 0.1 0.0 - 0.7 10e9/L    Absolute Basophils 0.0 0.0 - 0.2 10e9/L    Abs Immature Granulocytes 0.0 0 - 0.4 10e9/L    Absolute Nucleated RBC 0.0    Comprehensive metabolic panel   Result Value Ref Range    Sodium 138 133 - 144 mmol/L    Potassium 4.3 3.4 - 5.3 mmol/L    Chloride 106 94 - 109 mmol/L    Carbon Dioxide 28 20 - 32 mmol/L    Anion Gap 4 3 - 14 mmol/L    Glucose 93 70 - 99 mg/dL    Urea Nitrogen 9 7 - 30 mg/dL    Creatinine 0.72 0.52 - 1.04 mg/dL    GFR Estimate 81 >60 mL/min/1.7m2    GFR Estimate If Black >90 >60 mL/min/1.7m2    Calcium 9.2 8.5 - 10.1 mg/dL    Bilirubin Total 0.3 0.2 - 1.3 mg/dL    Albumin 3.7 3.4 - 5.0 g/dL    Protein Total 8.0 6.8 - 8.8 g/dL    Alkaline Phosphatase 62 40 - 150 U/L    ALT 22 0 - 50 U/L    AST 19 0 - 45 U/L   Lipid Profile (Chol, Trig, HDL, LDL calc)   Result Value Ref Range     Cholesterol 199 <200 mg/dL    Triglycerides 55 <150 mg/dL    HDL Cholesterol 82 >49 mg/dL    LDL Cholesterol Calculated 106 (H) <100 mg/dL    Non HDL Cholesterol 117 <130 mg/dL   Hemoglobin A1c   Result Value Ref Range    Hemoglobin A1C 5.5 4.3 - 6.0 %   Vitamin D Deficiency   Result Value Ref Range    Vitamin D Deficiency screening 65 20 - 75 ug/L   Estimated Average Glucose   Result Value Ref Range    Estimated Average Glucose 111 mg/dL       Preventive Health Recommendations  Female Ages 65 +    Yearly exam:     See your health care provider every year in order to  o Review health changes.   o Discuss preventive care.    o Review your medicines if your doctor has prescribed any.      You no longer need a yearly Pap test unless you've had an abnormal Pap test in the past 10 years. If you have vaginal symptoms, such as bleeding or discharge, be sure to talk with your provider about a Pap test.      Every 1 to 2 years, have a mammogram.  If you are over 69, talk with your health care provider about whether or not you want to continue having screening mammograms.      Every 10 years, have a colonoscopy. Or, have a yearly FIT test (stool test). These exams will check for colon cancer.       Have a cholesterol test every 5 years, or more often if your doctor advises it.       Have a diabetes test (fasting glucose) every three years. If you are at risk for diabetes, you should have this test more often.       At age 65, have a bone density scan (DEXA) to check for osteoporosis (brittle bone disease).    Shots:    Get a flu shot each year.    Get a tetanus shot every 10 years.    Talk to your doctor about your pneumonia vaccines. There are now two you should receive - Pneumovax (PPSV 23) and Prevnar (PCV 13).    Talk to your doctor about the shingles vaccine.    Talk to your doctor about the hepatitis B vaccine.    Nutrition:     Eat at least 5 servings of fruits and vegetables each day.      Eat whole-grain bread,  whole-wheat pasta and brown rice instead of white grains and rice.      Talk to your provider about Calcium and Vitamin D.     Lifestyle    Exercise at least 150 minutes a week (30 minutes a day, 5 days a week). This will help you control your weight and prevent disease.      Limit alcohol to one drink per day.      No smoking.       Wear sunscreen to prevent skin cancer.       See your dentist twice a year for an exam and cleaning.      See your eye doctor every 1 to 2 years to screen for conditions such as glaucoma, macular degeneration, cataracts, etc

## 2017-11-03 NOTE — PROGRESS NOTES
SUBJECTIVE:   CC: Mihaela Jang is an 67 year old woman who presents for preventive health visit.     Healthy Habits:    Do you get at least three servings of calcium containing foods daily (dairy, green leafy vegetables, etc.)? yes    Amount of exercise or daily activities, outside of work: active lifestyle    Problems taking medications regularly No    Medication side effects: No    Have you had an eye exam in the past two years? Yes, May 2017    Do you see a dentist twice per year? yes  Do you have sleep apnea, excessive snoring or daytime drowsiness?no      Recent allergy testing for chronic post nasal drip, clearing, sore throat.  Negative.  Prior negative EGD.  Seeing ENT next on 12/8/17 along with dermatology for left ear.      Follows with Dr. Moses for osteoporosis.  DEXA 2016.  On Reclast.    Today's PHQ-2 Score: PHQ-2 ( 1999 Pfizer) 12/1/2015 11/19/2015   Q1: Little interest or pleasure in doing things 0 0   Q2: Feeling down, depressed or hopeless 0 0   PHQ-2 Score 0 0       PHQ-9 SCORE 11/3/2017   Total Score -   Total Score 0     JENNIFER-7 SCORE 11/3/2017   Total Score 0         Abuse: Current or Past(Physical, Sexual or Emotional)- No  Do you feel safe in your environment - Yes    Social History   Substance Use Topics     Smoking status: Never Smoker     Smokeless tobacco: Never Used     Alcohol use 0.0 oz/week     0 Standard drinks or equivalent per week      Comment: 1 glasso wine, weekly      The patient does not drink >3 drinks per day nor >7 drinks per week.    Reviewed orders with patient.  Reviewed health maintenance and updated orders accordingly - Yes  Labs reviewed in EPIC    Mammo discussed, not appropriate for or declined by this patient.  S/p bilateral mastectomy.    Pertinent mammograms are reviewed under the imaging tab.  History of abnormal Pap smear: NO - age 65 - see link Cervical Cytology Screening Guidelines      Reviewed and updated as needed this visit by clinical  staffTobacco  Allergies  Meds  Problems  Med Hx  Surg Hx  Fam Hx  Soc Hx          Reviewed and updated as needed this visit by Provider  Allergies  Meds  Problems        Past Medical History:   Diagnosis Date     Atypical nevi      Chronic rhinitis     Dr Messina, [facility]'s allergy; negative skin testing; IgE mediated allergies; f/u with ENT     Degenerative skin disorder     solar elastosis     Hallux rigidus 06/15/2000     Hyperlipidemia, unspecified hyperlipidemia type 9/15/2016     Laryngopharyngeal reflux disease     PPI     Malignant neoplasm of breast (female), unspecified site 03/10/2004     Notalgia paresthetica      Osteoarthritis 07/20/2011     Osteoporosis, unspecified 09/10/2001    Dr Moses; intermittent reclast     Other abnormal glucose 12/20/2013     Paresthesia     neck; notalgiaparesthetic; dr leahy & Dr Nevarez; neurontin     Personal history of malignant neoplasm of breast 06/07/2005     Rhinitis      Schamberg's purpura       Past Surgical History:   Procedure Laterality Date     BONE MARROW BIOPSY      negative     COLONOSCOPY  07/2000     COLONOSCOPY  8/13/2013    DR Fu; repeat 5 years     double mastectomy  03/2004    right sided breast cancer     HC COLONOSCOPY THRU STOMA WITH BIOPSY  08/25/2010    cancer screening, family h/o colon cancer; hyperplastic polyp     HEMORRHOIDECTOMY  1986     UPPER GI ENDOSCOPY         ROS:  C: NEGATIVE for fever, chills, change in weight  I: NEGATIVE for worrisome rashes, moles or lesions  E: NEGATIVE for vision changes or irritation  ENT: NEGATIVE for ear, mouth and throat problems  ENT: POSITIVE for as above, morning phlegm, post nasal drip, sore throat  R: NEGATIVE for significant cough or SOB  B: NEGATIVE for masses, tenderness or discharge  CV: NEGATIVE for chest pain, palpitations or peripheral edema  GI: NEGATIVE for nausea, abdominal pain, heartburn, or change in bowel habits  : NEGATIVE for unusual urinary or vaginal symptoms. No  "vaginal bleeding.  M: NEGATIVE for significant arthralgias or myalgia  N: NEGATIVE for weakness, dizziness or paresthesias  P: NEGATIVE for changes in mood or affect     OBJECTIVE:   /60  Pulse 68  Temp 98.3  F (36.8  C) (Tympanic)  Resp 20  Ht 5' 0.5\" (1.537 m)  Wt 106 lb (48.1 kg)  SpO2 100%  BMI 20.36 kg/m2  EXAM:  GENERAL APPEARANCE: alert, no distress and thin  EYES: Eyes grossly normal to inspection, PERRL and conjunctivae and sclerae normal  HENT: ear canals and TM's normal, nose and mouth without ulcers or lesions, oropharynx clear and oral mucous membranes moist  HENT: left pinna with scabbed area, surrounding erythema  NECK: no adenopathy, no asymmetry, masses, or scars and thyroid normal to palpation  RESP: lungs clear to auscultation - no rales, rhonchi or wheezes  BREAST: s/p bilateral mastectomy; no masses, skin changes, or lymphadenopathy  CV: regular rate and rhythm, normal S1 S2, no S3 or S4, no murmur, click or rub, no peripheral edema and peripheral pulses strong  ABDOMEN: soft, nontender, no hepatosplenomegaly, no masses and bowel sounds normal   (female): normal female external genitalia, normal urethral meatus, vaginal mucosal atrophy noted, normal  adnexae, and uterus without masses. and small introitus  MS: no musculoskeletal defects are noted and gait is age appropriate without ataxia  SKIN: no suspicious lesions or rashes  NEURO: Normal strength and tone, sensory exam grossly normal, mentation intact and speech normal  PSYCH: mentation appears normal and affect normal/bright    ASSESSMENT/PLAN:       ICD-10-CM    1. Routine general medical examination at a health care facility Z00.00    2. Need for prophylactic vaccination and inoculation against influenza Z23 HC FLU VACCINE, INCREASED ANTIGEN, PRESV FREE     ADMIN INFLUENZA (For MEDICARE Patients ONLY) []   3. Age-related osteoporosis without current pathological fracture M81.0    4. Abnormal glucose R73.09    5. " "Chronic rhinitis, unspecified type J31.0    6. Post-nasal drip R09.82    7. Leukopenia, unspecified type D72.819        Flu shot given.  Labs stable.  Colonoscopy due 2018.  Follow up with dermatology, ENT, and endocrinology as planned.  Continue with daily Flonase for a few weeks, and if not responding, add antihistamine to it (Zyrtec, Claritin, Allegra).    COUNSELING:   Reviewed preventive health counseling, as reflected in patient instructions  Special attention given to:        Regular exercise       Healthy diet/nutrition       Vision screening       Hearing screening       Immunizations    Vaccinated for: Influenza           Aspirin Prophylaxsis       Alcohol Use       Osteoporosis Prevention/Bone Health       Colon cancer screening       Consider Hep C screening for patients born between 1945 and 1965             reports that she has never smoked. She has never used smokeless tobacco.    Estimated body mass index is 20.36 kg/(m^2) as calculated from the following:    Height as of this encounter: 5' 0.5\" (1.537 m).    Weight as of this encounter: 106 lb (48.1 kg).   Weight management plan noted, stable and monitoring    Counseling Resources:  ATP IV Guidelines  Pooled Cohorts Equation Calculator  Breast Cancer Risk Calculator  FRAX Risk Assessment  ICSI Preventive Guidelines  Dietary Guidelines for Americans, 2010  USDA's MyPlate  ASA Prophylaxis  Lung CA Screening    Melyssa Encinas MD  [facility] [place]  Injectable Influenza Immunization Documentation    1.  Is the person to be vaccinated sick today?   No    2. Does the person to be vaccinated have an allergy to a component   of the vaccine?   No  Egg Allergy Algorithm Link    3. Has the person to be vaccinated ever had a serious reaction   to influenza vaccine in the past?   No    4. Has the person to be vaccinated ever had Guillain-Barré syndrome?   No    Form completed by [employer]         "

## 2017-11-04 ASSESSMENT — ANXIETY QUESTIONNAIRES: GAD7 TOTAL SCORE: 0

## 2017-11-07 ENCOUNTER — TELEPHONE (OUTPATIENT)
Dept: OTOLARYNGOLOGY | Facility: OTHER | Age: 67
End: 2017-11-07

## 2017-11-07 NOTE — TELEPHONE ENCOUNTER
I called pt to see how the lesion on her left ear was.  She states that she has had no change in it.  She said that she is seeing D[facility]atology and ENT in [place] on 12/8/17 for another opinion.  She will let us know how that goes.

## 2017-11-08 NOTE — TELEPHONE ENCOUNTER
The patient has an appointment with [facility] on 12/8. She will be getting a second opinion and will call us back if she wishes to schedule a follow up.

## 2017-12-08 ENCOUNTER — TRANSFERRED RECORDS (OUTPATIENT)
Dept: HEALTH INFORMATION MANAGEMENT | Facility: HOSPITAL | Age: 67
End: 2017-12-08

## 2018-01-22 ENCOUNTER — TELEPHONE (OUTPATIENT)
Dept: FAMILY MEDICINE | Facility: OTHER | Age: 68
End: 2018-01-22

## 2018-01-22 NOTE — TELEPHONE ENCOUNTER
12:00 PM    Reason for Call: OVERBOOK    Patient is having the following symptoms: hurt thumb for 1 months.    The patient is requesting an appointment for Sometime this week  with .    Was an appointment offered for this call? No  If yes : Appointment type              Date    Preferred method for responding to this message: Telephone Call  What is your phone number ?    If we cannot reach you directly, may we leave a detailed response at the number you provided? Yes    Can this message wait until your PCP/provider returns, if unavailable today? Not applicable[facility]

## 2018-01-24 ENCOUNTER — RADIANT APPOINTMENT (OUTPATIENT)
Dept: GENERAL RADIOLOGY | Facility: OTHER | Age: 68
End: 2018-01-24
Attending: FAMILY MEDICINE
Payer: MEDICARE

## 2018-01-24 ENCOUNTER — OFFICE VISIT (OUTPATIENT)
Dept: FAMILY MEDICINE | Facility: OTHER | Age: 68
End: 2018-01-24
Attending: FAMILY MEDICINE
Payer: MEDICARE

## 2018-01-24 VITALS
WEIGHT: 110 LBS | DIASTOLIC BLOOD PRESSURE: 74 MMHG | HEART RATE: 74 BPM | SYSTOLIC BLOOD PRESSURE: 118 MMHG | BODY MASS INDEX: 21.6 KG/M2 | RESPIRATION RATE: 19 BRPM | OXYGEN SATURATION: 99 % | HEIGHT: 60 IN

## 2018-01-24 DIAGNOSIS — M79.644 THUMB PAIN, RIGHT: ICD-10-CM

## 2018-01-24 DIAGNOSIS — M79.644 THUMB PAIN, RIGHT: Primary | ICD-10-CM

## 2018-01-24 PROCEDURE — 99213 OFFICE O/P EST LOW 20 MIN: CPT | Performed by: FAMILY MEDICINE

## 2018-01-24 PROCEDURE — G0463 HOSPITAL OUTPT CLINIC VISIT: HCPCS

## 2018-01-24 PROCEDURE — 73140 X-RAY EXAM OF FINGER(S): CPT | Mod: TC,RT

## 2018-01-24 ASSESSMENT — PAIN SCALES - GENERAL: PAINLEVEL: MILD PAIN (2)

## 2018-01-24 NOTE — PROGRESS NOTES
SUBJECTIVE:  Mihaela is a 67 year old female who comes in today for right thumb pain for past few weeks.  [place] a popping several nights ago.  No injury.  Is right handed.  No redness or rash.  Does note some swelling.  No wrist or forearm pain.    Current Outpatient Prescriptions   Medication     fluticasone (FLONASE) 50 MCG/ACT spray     gabapentin (NEURONTIN) 300 MG capsule     Lactobacillus (ACIDOPHILUS) TABS     MAGNESIUM OXIDE PO     zinc 50 MG TABS     aspirin EC 81 MG tablet     Polyethylene Glycol 3350 (MIRALAX PO)     cinnamon 500 MG CAPS     fish oil-omega-3 fatty acids (FISH OIL) 1000 MG capsule     Lutein 6 MG CAPS     CALCIUM CITRATE     cholecalciferol 1000 UNITS TABS     Multiple vitamin  s TABS     Turmeric (RA TURMERIC) 500 MG CAPS     No current facility-administered medications for this visit.         Allergies   Allergen Reactions     Povidone Iodine Rash     Betadine      Soap Rash     Betadine        Past Medical History:   Diagnosis Date     Atypical nevi      Chondrodermatitis nodularis helicis of left ear     Dr Shipley, [facility]     Chronic rhinitis     Dr Messina, [facility] allergy; negative skin testing; IgE mediated allergies; ENT - DC nasal steroid and antihistamine; saline rinses     Degenerative skin disorder     solar elastosis     Hallux rigidus 06/15/2000     Hyperlipidemia, unspecified hyperlipidemia type 9/15/2016     Laryngopharyngeal reflux disease     PPI     Malignant neoplasm of breast (female), unspecified site 03/10/2004     Notalgia paresthetica      Osteoarthritis 07/20/2011     Osteoporosis, unspecified 09/10/2001    Dr Moses; intermittent reclast     Other abnormal glucose 12/20/2013     Paresthesia     neck; notalgiaparesthetic; dr leahy & Dr Nevarez; neurontin     Personal history of malignant neoplasm of breast 06/07/2005     Rhinitis      Schamberg's purpura      Past Surgical History:   Procedure Laterality Date     BONE MARROW BIOPSY      negative      "COLONOSCOPY  07/2000     COLONOSCOPY  8/13/2013    DR Fu; repeat 5 years     double mastectomy  03/2004    right sided breast cancer     HC COLONOSCOPY THRU STOMA WITH BIOPSY  08/25/2010    cancer screening, family h/o colon cancer; hyperplastic polyp     HEMORRHOIDECTOMY  1986     UPPER GI ENDOSCOPY       Social History     Social History     Marital status: Single     Spouse name: N/A     Number of children: N/A     Years of education: N/A     Occupational History     Not on file.     Social History Main Topics     Smoking status: Never Smoker     Smokeless tobacco: Never Used     Alcohol use 0.0 oz/week     0 Standard drinks or equivalent per week      Comment: 1 glasso wine, weekly      Drug use: No     Sexual activity: No     Other Topics Concern      Service No     Blood Transfusions Yes     Permits if needed     Caffeine Concern Yes     Tea, 2 cups daily      Exercise Yes     Walking, walks steps, daily      Seat Belt Yes     Parent/Sibling W/ Cabg, Mi Or Angioplasty Before 65f 55m? No     Social History Narrative       ROS:  General: negative for, fever  Skin: negative for, rash  Musculoskeletal: positive for as above, joint pain and joint swelling  Neurologic: negative for, local weakness and numbness or tingling of hands    OBJECTIVE:  Vitals:    01/24/18 1110   BP: 118/74   BP Location: Right arm   Patient Position: Chair   Cuff Size: Adult Regular   Pulse: 74   Resp: 19   SpO2: 99%   Weight: 110 lb (49.9 kg)   Height: 5' 0.05\" (1.525 m)     GENERAL APPEARANCE: healthy, alert and no distress  MS: extremities normal- no gross deformities noted, normal muscle tone, peripheral pulses normal, normal range of motion right wrist and hand and tender to palpation minimally thumb IP, MCP joints; ligaments table; negative Finkelstein's  SKIN: no suspicious lesions or rashes  PSYCH: mentation appears normal. and affect normal/bright    ASSESSMENT/ORDERS:    ICD-10-CM    1. Thumb pain, right M79.644 XR " FINGER RT G/E 2 VW (Clinic Performed)     PLAN:  Patient Instructions   Will call with xray results.  Consider orthopedic consultation, possible cortisone injection.  Fitted with thumb spica splint.  Ice intermittently.    OTC Tylenol or NSAID such as Aleve or Ibuprofen/Motrin as needed.             Melyssa Encinas

## 2018-01-24 NOTE — MR AVS SNAPSHOT
"              After Visit Summary   1/24/2018    Mihaela Jang    MRN: [id number]           Patient Information     Date Of Birth          1950        Visit Information        Provider Department      1/24/2018 11:45 AM Melyssa Connors MD [facility]        Today's Diagnoses     Thumb pain, right    -  1      Care Instructions    Will call with xray results.  Consider orthopedic consultation, possible cortisone injection.  Fitted with thumb spica splint.  Ice intermittently.    OTC Tylenol or NSAID such as Aleve or Ibuprofen/Motrin as needed.              Follow-ups after your visit        Who to contact     If you have questions or need follow up information about today's clinic visit or your schedule please contact [facility] directly at [phone].  Normal or non-critical lab and imaging results will be communicated to you by MyChart, letter or phone within 4 business days after the clinic has received the results. If you do not hear from us within 7 days, please contact the clinic through MyChart or phone. If you have a critical or abnormal lab result, we will notify you by phone as soon as possible.  Submit refill requests through YAMAP or call your pharmacy and they will forward the refill request to us. Please allow 3 business days for your refill to be completed.          Additional Information About Your Visit        MyChart Information     YAMAP lets you send messages to your doctor, view your test results, renew your prescriptions, schedule appointments and more. To sign up, go to [email or web address] . Click on \"Log in\" on the left side of the screen, which will take you to the Welcome page. Then click on \"Sign up Now\" on the right side of the page.     You will be asked to enter the access code listed below, as well as some personal information. Please follow the directions to create your username and password.     Your access code is: " "[email or web address]  Expires: 2018 12:13 PM     Your access code will  in 90 days. If you need help or a new code, please call your [place] clinic or [phone].        Care EveryWhere ID     This is your Care EveryWhere ID. This could be used by other organizations to access your [facility] medical records  [id number]        Your Vitals Were     Pulse Respirations Height Pulse Oximetry Breastfeeding? BMI (Body Mass Index)    74 19 5' 0.05\" (1.525 m) 99% No 21.45 kg/m2       Blood Pressure from Last 3 Encounters:   18 118/74   17 120/60   10/12/17 120/64    Weight from Last 3 Encounters:   18 110 lb (49.9 kg)   17 106 lb (48.1 kg)   10/12/17 110 lb (49.9 kg)               Primary Care Provider Office Phone # Fax #    Melyssa Connors [phone]       [facility] [address] [facility] [address]        Equal Access to Services     [facility]: Hadii aad ku hadasho Soomaali, waaxda luqadaha, qaybta kaalmada adebeba, haile fernández . So [facility] [phone].    ATENCIÓN: Si habla español, tiene a quezada disposición servicios gratuitos de asistencia lingüística. Audelia al [phone].    We comply with applicable federal civil rights laws and [place] laws. We do not discriminate on the basis of race, color, national origin, age, disability, sex, sexual orientation, or gender identity.            Thank you!     Thank you for choosing [facility]  for your care. Our goal is always to provide you with excellent care. Hearing back from our patients is one way we can continue to improve our services. Please take a few minutes to complete the written survey that you may receive in the mail after your visit with us. Thank you!             Your Updated Medication List - Protect others around you: Learn how to safely use, store and throw away your medicines at www.disposemymeds.org.          This list is accurate as of 18 " 12:13 PM.  Always use your most recent med list.                   Brand Name Dispense Instructions for use Diagnosis    Acidophilus Tabs      Take 1 tablet by mouth daily        aspirin EC 81 MG EC tablet     1 tablet    Take 1 tablet (81 mg) by mouth daily        CALCIUM CITRATE      1,500 mg two times daily        cholecalciferol 1000 UNITS Tabs      Take  by mouth. 2 capsules po daily.        cinnamon 500 MG Caps      Take 2 capsules by mouth daily        fish oil-omega-3 fatty acids 1000 MG capsule      Take 1 g by mouth 2 times daily        fluticasone 50 MCG/ACT spray    FLONASE    16 g    Spray 2 sprays into both nostrils daily    Laryngopharyngeal reflux (LPR), Post-nasal drip, Globus sensation       gabapentin 300 MG capsule    NEURONTIN    180 capsule    TAKE TWO CAPSULES BY MOUTH AT BEDTIME    Paresthesia       Lutein 6 MG Caps      Take 20 mg by mouth every other day 1 tablet daily        MAGNESIUM OXIDE PO      Take 200 mg by mouth daily        MIRALAX PO      Use every other day        Multiple vitamin  s Tabs      Take 1 tablet by mouth daily.        RA TURMERIC 500 MG Caps   Generic drug:  Turmeric      Take 2 capsules by mouth daily        zinc 50 MG Tabs

## 2018-01-24 NOTE — PATIENT INSTRUCTIONS
Will call with xray results.  Consider orthopedic consultation, possible cortisone injection.  Fitted with thumb spica splint.  Ice intermittently.    OTC Tylenol or NSAID such as Aleve or Ibuprofen/Motrin as needed.

## 2018-01-24 NOTE — NURSING NOTE
"Chief Complaint   Patient presents with     Musculoskeletal Problem     right thumb pain sore for a few weeks, worsened on friday night states she heard a popping sound. Rates pain 2/10       Initial /74 (BP Location: Right arm, Patient Position: Chair, Cuff Size: Adult Regular)  Pulse 74  Resp 19  Ht 5' 0.05\" (1.525 m)  Wt 110 lb (49.9 kg)  SpO2 99%  Breastfeeding? No  BMI 21.45 kg/m2 Estimated body mass index is 21.45 kg/(m^2) as calculated from the following:    Height as of this encounter: 5' 0.05\" (1.525 m).    Weight as of this encounter: 110 lb (49.9 kg).  Medication Reconciliation: complete   Ragini Claudio      "

## 2018-02-07 ENCOUNTER — TELEPHONE (OUTPATIENT)
Dept: FAMILY MEDICINE | Facility: OTHER | Age: 68
End: 2018-02-07

## 2018-02-07 DIAGNOSIS — M79.641 PAIN OF RIGHT HAND: Primary | ICD-10-CM

## 2018-02-07 NOTE — TELEPHONE ENCOUNTER
9:51 AM    Reason for Call: Phone Call    Description:  Mihaela would like a referral done to Orthopedics for he thumb. It is not much better. Please call Mihaela to advise.    Was an appointment offered for this call?   No    Preferred method for responding to this message: [phone]    If we cannot reach you directly, may we leave a detailed response at the number you provided?  No, but she does have caller ID        Sara Guallpa

## 2018-02-07 NOTE — TELEPHONE ENCOUNTER
Spoke with patient who would like to be see by an orthopedic doctor here at [facility].  Referral placed for you to sign.  Patient aware of situation et agreeable.

## 2018-02-27 ENCOUNTER — OFFICE VISIT (OUTPATIENT)
Dept: ORTHOPEDICS | Facility: OTHER | Age: 68
End: 2018-02-27
Attending: FAMILY MEDICINE
Payer: COMMERCIAL

## 2018-02-27 VITALS
HEART RATE: 68 BPM | WEIGHT: 110 LBS | SYSTOLIC BLOOD PRESSURE: 120 MMHG | BODY MASS INDEX: 20.77 KG/M2 | TEMPERATURE: 97.5 F | DIASTOLIC BLOOD PRESSURE: 70 MMHG | OXYGEN SATURATION: 99 % | HEIGHT: 61 IN

## 2018-02-27 DIAGNOSIS — M79.641 PAIN OF RIGHT HAND: ICD-10-CM

## 2018-02-27 PROCEDURE — 99202 OFFICE O/P NEW SF 15 MIN: CPT | Performed by: ORTHOPAEDIC SURGERY

## 2018-02-27 PROCEDURE — G0463 HOSPITAL OUTPT CLINIC VISIT: HCPCS

## 2018-02-27 RX ORDER — SPIRONOLACTONE 25 MG
20 TABLET ORAL DAILY
COMMUNITY
End: 2021-11-26

## 2018-02-27 ASSESSMENT — PAIN SCALES - GENERAL: PAINLEVEL: NO PAIN (0)

## 2018-02-27 NOTE — PROGRESS NOTES
"Patient presents today for evaluation of her right thumb.  For several months and catching and popping at the IP joint and swelling along the volar aspect of the digit.  She recalls no specific injury but does use the hand a lot completed this is related to overuse.  He has tried avoiding any reactivity tape up splint and etc. without any improvement in the symptoms.    Past medical history is reviewed and updated with her and is accurate in her appetite chart.    Allergies: Povidone iodine and other soaps    Physical exam: The patient has no obvious deformity to either left or right hand.  She has full range of motion of all digits with the exception of the right thumb IP joint.  This joint locks and snaps with flexion and extension.  There is tenderness over the base of the thumb at the MCP joint and some swelling along the tendon sheath.  The limb is neurovascularly intact.    X-rays demonstrate no abnormalities specifically no arthritic change in the MCP or IP joint of the thumb.    Assessment and plan: Trigger digit, right thumb we discussed treatment options and she elected to proceed with surgical release as this gives the best option for complete correction.  The surgery is scheduled for 7 March 2018.  Risks and benefits and anesthetic options were also discussed and her consent was obtained today.  She will schedule a preoperative physical with her primary care provider./70  Pulse 68  Temp 97.5  F (36.4  C) (Tympanic)  Ht 5' 0.5\" (1.537 m)  Wt 110 lb (49.9 kg)  SpO2 99%  BMI 21.13 kg/m2  "

## 2018-02-27 NOTE — NURSING NOTE
"Chief Complaint   Patient presents with     Musculoskeletal Problem     NPT Right thumb  X 2 months       Initial /70  Pulse 68  Temp 97.5  F (36.4  C) (Tympanic)  Ht 5' 0.5\" (1.537 m)  Wt 110 lb (49.9 kg)  SpO2 99%  BMI 21.13 kg/m2 Estimated body mass index is 21.13 kg/(m^2) as calculated from the following:    Height as of this encounter: 5' 0.5\" (1.537 m).    Weight as of this encounter: 110 lb (49.9 kg).  Medication Reconciliation: complete   Tammi Coffey      "

## 2018-02-27 NOTE — MR AVS SNAPSHOT
"              After Visit Summary   2/27/2018    Mihaela Jang    MRN: [id number]           Patient Information     Date Of Birth          1950        Visit Information        Provider Department      2/27/2018 1:40 PM Jose Alfredo Brewster DO  ORTHOPEDICS        Today's Diagnoses     Pain of right hand           Follow-ups after your visit        Your next 10 appointments already scheduled     Feb 28, 2018  3:00 PM CST   (Arrive by 2:45 PM)   Pre-Op physical with MICHAEL Rai CNP   [facility] [place] [facility]    [address]  [place] [address]   [phone]            Mar 20, 2018  4:20 PM CDT   (Arrive by 4:05 PM)   Post Op with Jose Alfredo Brewster DO    ORTHOPEDICS ([facility] )    [address]  [place] [id number] [phone]              Who to contact     If you have questions or need follow up information about today's clinic visit or your schedule please contact  ORTHOPEDICS directly at [phone].  Normal or non-critical lab and imaging results will be communicated to you by MyChart, letter or phone within 4 business days after the clinic has received the results. If you do not hear from us within 7 days, please contact the clinic through FanTrail[employer] or phone. If you have a critical or abnormal lab result, we will notify you by phone as soon as possible.  Submit refill requests through Zokem or call your pharmacy and they will forward the refill request to us. Please allow 3 business days for your refill to be completed.          Additional Information About Your Visit        MyChart Information     Zokem lets you send messages to your doctor, view your test results, renew your prescriptions, schedule appointments and more. To sign up, go to [email or web address] . Click on \"Log in\" on the left side of the screen, which will take you to the Welcome page. Then click on \"Sign up Now\" on the right side of the " "page.     You will be asked to enter the access code listed below, as well as some personal information. Please follow the directions to create your username and password.     Your access code is: [email or web address]  Expires: 2018 12:13 PM     Your access code will  in 90 days. If you need help or a new code, please call your [place] clinic or [phone].        Care EveryWhere ID     This is your Care EveryWhere ID. This could be used by other organizations to access your [facility] medical records  [id number]        Your Vitals Were     Pulse Temperature Height Pulse Oximetry BMI (Body Mass Index)       68 97.5  F (36.4  C) (Tympanic) 5' 0.5\" (1.537 m) 99% 21.13 kg/m2        Blood Pressure from Last 3 Encounters:   18 120/70   18 118/74   17 120/60    Weight from Last 3 Encounters:   18 110 lb (49.9 kg)   18 110 lb (49.9 kg)   17 106 lb (48.1 kg)              Today, you had the following     No orders found for display         Today's Medication Changes          These changes are accurate as of 18  1:42 PM.  If you have any questions, ask your nurse or doctor.               These medicines have changed or have updated prescriptions.        Dose/Directions    Lutein 20 MG Caps   This may have changed:  Another medication with the same name was removed. Continue taking this medication, and follow the directions you see here.   Changed by:  Jose Alfredo Brewster, DO        Refills:  0                Primary Care Provider Office Phone # Fax #    Melyssa Connors [phone]       [address]  [place] [address]        Equal Access to Services     [facility]: Hadjack Munson, waaxda luqadaha, qaybta kaalmada foreign, haile wade. So [facility] [phone].    ATENCIÓN: Si habla español, tiene a quezada disposición servicios gratuitos de asistencia lingüística. Llame al [phone].    We comply with applicable " federal civil rights laws and [place] laws. We do not discriminate on the basis of race, color, national origin, age, disability, sex, sexual orientation, or gender identity.            Thank you!     Thank you for choosing  ORTHOPEDICS  for your care. Our goal is always to provide you with excellent care. Hearing back from our patients is one way we can continue to improve our services. Please take a few minutes to complete the written survey that you may receive in the mail after your visit with us. Thank you!             Your Updated Medication List - Protect others around you: Learn how to safely use, store and throw away your medicines at www.disposemymeds.org.          This list is accurate as of 2/27/18  1:42 PM.  Always use your most recent med list.                   Brand Name Dispense Instructions for use Diagnosis    Acidophilus Tabs      Take 1 tablet by mouth daily        aspirin EC 81 MG EC tablet     1 tablet    Take 1 tablet (81 mg) by mouth daily        CALCIUM CITRATE      1,500 mg two times daily        cholecalciferol 1000 UNITS Tabs      Take  by mouth. 2 capsules po daily.        cinnamon 500 MG Caps      Take 2 capsules by mouth daily        fish oil-omega-3 fatty acids 1000 MG capsule      Take 1 g by mouth 2 times daily        fluticasone 50 MCG/ACT spray    FLONASE    16 g    Spray 2 sprays into both nostrils daily    Laryngopharyngeal reflux (LPR), Post-nasal drip, Globus sensation       gabapentin 300 MG capsule    NEURONTIN    180 capsule    TAKE TWO CAPSULES BY MOUTH AT BEDTIME    Paresthesia       Lutein 20 MG Caps           MAGNESIUM OXIDE PO      Take 200 mg by mouth daily        MIRALAX PO      Use every other day        Multiple vitamin  s Tabs      Take 1 tablet by mouth daily.        RA TURMERIC 500 MG Caps   Generic drug:  Turmeric      Take 2 capsules by mouth daily        zinc 50 MG Tabs

## 2018-02-28 ENCOUNTER — OFFICE VISIT (OUTPATIENT)
Dept: FAMILY MEDICINE | Facility: OTHER | Age: 68
End: 2018-02-28
Attending: NURSE PRACTITIONER
Payer: COMMERCIAL

## 2018-02-28 VITALS
RESPIRATION RATE: 16 BRPM | DIASTOLIC BLOOD PRESSURE: 60 MMHG | TEMPERATURE: 98.8 F | HEART RATE: 60 BPM | HEIGHT: 62 IN | BODY MASS INDEX: 20.24 KG/M2 | WEIGHT: 110 LBS | OXYGEN SATURATION: 97 % | SYSTOLIC BLOOD PRESSURE: 124 MMHG

## 2018-02-28 DIAGNOSIS — Z01.818 PREOP GENERAL PHYSICAL EXAM: Primary | ICD-10-CM

## 2018-02-28 DIAGNOSIS — M65.311 TRIGGER FINGER OF RIGHT THUMB: ICD-10-CM

## 2018-02-28 LAB
ANION GAP SERPL CALCULATED.3IONS-SCNC: 5 MMOL/L (ref 3–14)
BASOPHILS # BLD AUTO: 0 10E9/L (ref 0–0.2)
BASOPHILS NFR BLD AUTO: 0.7 %
BUN SERPL-MCNC: 11 MG/DL (ref 7–30)
CALCIUM SERPL-MCNC: 9.1 MG/DL (ref 8.5–10.1)
CHLORIDE SERPL-SCNC: 99 MMOL/L (ref 94–109)
CO2 SERPL-SCNC: 29 MMOL/L (ref 20–32)
CREAT SERPL-MCNC: 0.69 MG/DL (ref 0.52–1.04)
DIFFERENTIAL METHOD BLD: ABNORMAL
EOSINOPHIL # BLD AUTO: 0 10E9/L (ref 0–0.7)
EOSINOPHIL NFR BLD AUTO: 0.5 %
ERYTHROCYTE [DISTWIDTH] IN BLOOD BY AUTOMATED COUNT: 13.3 % (ref 10–15)
GFR SERPL CREATININE-BSD FRML MDRD: 84 ML/MIN/1.7M2
GLUCOSE SERPL-MCNC: 99 MG/DL (ref 70–99)
HCT VFR BLD AUTO: 35.7 % (ref 35–47)
HGB BLD-MCNC: 12.2 G/DL (ref 11.7–15.7)
IMM GRANULOCYTES # BLD: 0 10E9/L (ref 0–0.4)
IMM GRANULOCYTES NFR BLD: 0 %
LYMPHOCYTES # BLD AUTO: 1 10E9/L (ref 0.8–5.3)
LYMPHOCYTES NFR BLD AUTO: 22.8 %
MCH RBC QN AUTO: 32.4 PG (ref 26.5–33)
MCHC RBC AUTO-ENTMCNC: 34.2 G/DL (ref 31.5–36.5)
MCV RBC AUTO: 95 FL (ref 78–100)
MONOCYTES # BLD AUTO: 0.5 10E9/L (ref 0–1.3)
MONOCYTES NFR BLD AUTO: 11.4 %
NEUTROPHILS # BLD AUTO: 2.7 10E9/L (ref 1.6–8.3)
NEUTROPHILS NFR BLD AUTO: 64.6 %
NRBC # BLD AUTO: 0 10*3/UL
NRBC BLD AUTO-RTO: 0 /100
PLATELET # BLD AUTO: 272 10E9/L (ref 150–450)
POTASSIUM SERPL-SCNC: 4.4 MMOL/L (ref 3.4–5.3)
RBC # BLD AUTO: 3.76 10E12/L (ref 3.8–5.2)
SODIUM SERPL-SCNC: 133 MMOL/L (ref 133–144)
WBC # BLD AUTO: 4.2 10E9/L (ref 4–11)

## 2018-02-28 PROCEDURE — 80048 BASIC METABOLIC PNL TOTAL CA: CPT | Mod: ZL | Performed by: NURSE PRACTITIONER

## 2018-02-28 PROCEDURE — 36415 COLL VENOUS BLD VENIPUNCTURE: CPT | Mod: ZL | Performed by: NURSE PRACTITIONER

## 2018-02-28 PROCEDURE — 99214 OFFICE O/P EST MOD 30 MIN: CPT | Performed by: NURSE PRACTITIONER

## 2018-02-28 PROCEDURE — G0463 HOSPITAL OUTPT CLINIC VISIT: HCPCS | Mod: 25

## 2018-02-28 PROCEDURE — G0463 HOSPITAL OUTPT CLINIC VISIT: HCPCS

## 2018-02-28 PROCEDURE — 85025 COMPLETE CBC W/AUTO DIFF WBC: CPT | Mod: ZL | Performed by: NURSE PRACTITIONER

## 2018-02-28 ASSESSMENT — ANXIETY QUESTIONNAIRES
GAD7 TOTAL SCORE: 0
6. BECOMING EASILY ANNOYED OR IRRITABLE: NOT AT ALL
IF YOU CHECKED OFF ANY PROBLEMS ON THIS QUESTIONNAIRE, HOW DIFFICULT HAVE THESE PROBLEMS MADE IT FOR YOU TO DO YOUR WORK, TAKE CARE OF THINGS AT HOME, OR GET ALONG WITH OTHER PEOPLE: NOT DIFFICULT AT ALL
1. FEELING NERVOUS, ANXIOUS, OR ON EDGE: NOT AT ALL
4. TROUBLE RELAXING: NOT AT ALL
5. BEING SO RESTLESS THAT IT IS HARD TO SIT STILL: NOT AT ALL
2. NOT BEING ABLE TO STOP OR CONTROL WORRYING: NOT AT ALL
3. WORRYING TOO MUCH ABOUT DIFFERENT THINGS: NOT AT ALL
7. FEELING AFRAID AS IF SOMETHING AWFUL MIGHT HAPPEN: NOT AT ALL

## 2018-02-28 ASSESSMENT — PAIN SCALES - GENERAL: PAINLEVEL: NO PAIN (0)

## 2018-02-28 NOTE — MR AVS SNAPSHOT
After Visit Summary   2/28/2018    Mihaela Jang    MRN: [id number]           Patient Information     Date Of Birth          1950        Visit Information        Provider Department      2/28/2018 3:00 PM Hazel Goodman APRN CNP [facility]        Today's Diagnoses     Preop general physical exam    -  1    Trigger finger of right thumb          Care Instructions      Before Your Surgery      Call your surgeon if there is any change in your health. This includes signs of a cold or flu (such as a sore throat, runny nose, cough, rash or fever).    Do not smoke, drink alcohol or take over the counter medicine (unless your surgeon or primary care doctor tells you to) for the 24 hours before and after surgery.    If you take prescribed drugs: Follow your doctor s orders about which medicines to take and which to stop until after surgery.    Eating and drinking prior to surgery: follow the instructions from your surgeon    Take a shower or bath the night before surgery. Use the soap your surgeon gave you to gently clean your skin. If you do not have soap from your surgeon, use your regular soap. Do not shave or scrub the surgery site.  Wear clean pajamas and have clean sheets on your bed.           Follow-ups after your visit        Your next 10 appointments already scheduled     Mar 07, 2018   Procedure with Jose Alfredo Brewster DO   [facility] ([facility] )    [address] [place] [id number] [phone]            Mar 20, 2018  4:20 PM CDT   (Arrive by 4:05 PM)   Post Op with Jose Alfredo Brewster DO    ORTHOPEDICS ([facility] )    [address] [id number] [phone]              Who to contact     If you have questions or need follow up information about today's clinic visit or your schedule please contact [facility] directly at [phone].  Normal or non-critical lab and imaging results  "will be communicated to you by MyChart, letter or phone within 4 business days after the clinic has received the results. If you do not hear from us within 7 days, please contact the clinic through [employer] or phone. If you have a critical or abnormal lab result, we will notify you by phone as soon as possible.  Submit refill requests through [employer] or call your pharmacy and they will forward the refill request to us. Please allow 3 business days for your refill to be completed.          Additional Information About Your Visit        [employer]ely Information     [employer] lets you send messages to your doctor, view your test results, renew your prescriptions, schedule appointments and more. To sign up, go to [email or web address] . Click on \"Log in\" on the left side of the screen, which will take you to the Welcome page. Then click on \"Sign up Now\" on the right side of the page.     You will be asked to enter the access code listed below, as well as some personal information. Please follow the directions to create your username and password.     Your access code is: [email or web address]  Expires: 2018 12:13 PM     Your access code will  in 90 days. If you need help or a new code, please call your [facility] clinic or [phone].        Care EveryWhere ID     This is your Care EveryWhere ID. This could be used by other organizations to access your [facility] medical records  [id number]        Your Vitals Were     Pulse Temperature Respirations Height Pulse Oximetry BMI (Body Mass Index)    60 98.8  F (37.1  C) (Tympanic) 16 5' 2\" (1.575 m) 97% 20.12 kg/m2       Blood Pressure from Last 3 Encounters:   18 124/60   18 120/70   18 118/74    Weight from Last 3 Encounters:   18 110 lb (49.9 kg)   18 110 lb (49.9 kg)   18 110 lb (49.9 kg)              We Performed the Following     Basic metabolic panel  (Ca, Cl, CO2, Creat, Gluc, K, Na, BUN)     CBC with platelets and differential        " Primary Care Provider Office Phone # Fax #    Melyssa Connors [phone]  [facility] [address]        Equal Access to Services     JANETGREG ANISHA : Kami patsy zhang robbieo Sosunitha, waaxda luqadaha, qaybta kaalmada foreign, haile tilleyvicki sheri. So [facility] [phone].    ATENCIÓN: Si habla español, tiene a quezada disposición servicios gratuitos de asistencia lingüística. Llame al [phone].    We comply with applicable federal civil rights laws and [place] laws. We do not discriminate on the basis of race, color, national origin, age, disability, sex, sexual orientation, or gender identity.            Thank you!     Thank you for choosing [facility]  for your care. Our goal is always to provide you with excellent care. Hearing back from our patients is one way we can continue to improve our services. Please take a few minutes to complete the written survey that you may receive in the mail after your visit with us. Thank you!             Your Updated Medication List - Protect others around you: Learn how to safely use, store and throw away your medicines at www.disposemymeds.org.          This list is accurate as of 2/28/18  3:16 PM.  Always use your most recent med list.                   Brand Name Dispense Instructions for use Diagnosis    Acidophilus Tabs      Take 1 tablet by mouth daily        aspirin EC 81 MG EC tablet     1 tablet    Take 1 tablet (81 mg) by mouth daily        CALCIUM CITRATE      1,500 mg two times daily        cholecalciferol 1000 UNITS Tabs      Take  by mouth. 2 capsules po daily.        cinnamon 500 MG Caps      Take 2 capsules by mouth daily        fish oil-omega-3 fatty acids 1000 MG capsule      Take 1 g by mouth 2 times daily        fluticasone 50 MCG/ACT spray    FLONASE    16 g    Spray 2 sprays into both nostrils daily    Laryngopharyngeal reflux (LPR), Post-nasal drip, Globus sensation       gabapentin 300  MG capsule    NEURONTIN    180 capsule    TAKE TWO CAPSULES BY MOUTH AT BEDTIME    Paresthesia       Lutein 20 MG Caps      Take 20 mg by mouth daily        MAGNESIUM OXIDE PO      Take 200 mg by mouth daily        MIRALAX PO      Use every other day        Multiple vitamin  s Tabs      Take 1 tablet by mouth daily.        RA TURMERIC 500 MG Caps   Generic drug:  Turmeric      Take 2 capsules by mouth daily        zinc 50 MG Tabs      Take 1 tablet by mouth daily

## 2018-02-28 NOTE — NURSING NOTE
"Chief Complaint   Patient presents with     Pre-Op Exam       Initial /60  Pulse 60  Temp 98.8  F (37.1  C) (Tympanic)  Resp 16  Ht 5' 2\" (1.575 m)  Wt 110 lb (49.9 kg)  SpO2 97%  BMI 20.12 kg/m2 Estimated body mass index is 20.12 kg/(m^2) as calculated from the following:    Height as of this encounter: 5' 2\" (1.575 m).    Weight as of this encounter: 110 lb (49.9 kg).  Medication Reconciliation: complete   Selma Littlejohn      "

## 2018-02-28 NOTE — PROGRESS NOTES
[facility] [place]  [address]  [place] [address]  [phone]  Dept: [phone]    PRE-OP EVALUATION:  Today's date: 2018    Mihaela Jang (: 1950) presents for pre-operative evaluation assessment as requested by Dr. Brewster.  She requires evaluation and anesthesia risk assessment prior to undergoing surgery/procedure for treatment of right thumb, trigger finger .    Proposed Surgery/ Procedure: Trigger release right thumb  Date of Surgery/ Procedure: 3-7-2018  Time of Surgery/ Procedure: TBD  Hospital/Surgical Facility[facility]  Primary Physician: Melyssa Connors  Type of Anesthesia Anticipated: to be determined    Patient has a Health Care Directive or Living Will:  YES on file here    1. NO - Do you have a history of heart attack, stroke, stent, bypass or surgery on an artery in the head, neck, heart or legs?  2. NO - Do you ever have any pain or discomfort in your chest?  3. NO - Do you have a history of  Heart Failure?  4. NO - Are you troubled by shortness of breath when: walking on the level, up a slight hill or at night?  5. NO - Do you currently have a cold, bronchitis or other respiratory infection?  6. NO - Do you have a cough, shortness of breath or wheezing?  7. NO - Do you sometimes get pains in the calves of your legs when you walk?  8. NO - Do you or anyone in your family have previous history of blood clots?  9. NO - Do you or does anyone in your family have a serious bleeding problem such as prolonged bleeding following surgeries or cuts?  10. NO - Have you ever had problems with anemia or been told to take iron pills?  11. NO - Have you had any abnormal blood loss such as black, tarry or bloody stools, or abnormal vaginal bleeding?  12. NO - Have you ever had a blood transfusion?  13. NO - Have you or any of your relatives ever had problems with anesthesia?  14. NO - Do you have sleep apnea, excessive snoring or daytime drowsiness?  15. NO - Do you have any  prosthetic heart valves?  16. NO - Do you have prosthetic joints?  17. NO - Is there any chance that you may be pregnant?      HPI:     HPI related to upcoming procedure: difficulty using right hand because of pain in the right thumb with use      See problem list for active medical problems.  Problems all longstanding and stable, except as noted/documented.  See ROS for pertinent symptoms related to these conditions.                                                                                                  .    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  Ensure this is not a melanoma, path pending       Hyperlipidemia, unspecified hyperlipidemia type 09/15/2016     Priority: Medium     ACP (advance care planning) 01/22/2016     Priority: Medium     Advance Care Planning 6/2/2016: Receipt of ACP document:  Received: Health Care Directive which was witnessed or notarized on 6/1/16.  Document not previously scanned.  Validation form completed and sent with document to be scanned.  Code Status reflects choices in most recent ACP document.  Confirmed/documented designated decision maker(s).  Added by Paola Johnson  Advance Care Planning 1/22/2016: Receipt of ACP document:  Received: Health Care Directive which was witnessed or notarized on 10-21-08.  Document previously scanned on 12-7-15.  Validation form completed and sent to be scanned.  Code Status needs to be updated to reflect choices in most recent ACP document.  Confirmed/documented designated decision maker(s).  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 11/16/2015     Priority: Medium     Personal history of malignant neoplasm of breast 12/23/2014     Priority: Medium      Neutropenia (H) 12/23/2014     Priority: Medium     Problem list name updated by automated process. Provider to review       Early satiety 12/23/2014     Priority: Medium     Abnormal glucose 12/20/2013     Priority: Medium     Problem list name updated by automated process. 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 Provider to review        Past Medical History:   Diagnosis Date     Atypical nevi      Chondrodermatitis nodularis helicis of left ear     Dr Shipley, [facility]     Chronic rhinitis     Dr Messina, [facility] allergy; negative skin testing; IgE mediated allergies; ENT - DC nasal steroid and antihistamine; saline rinses     Degenerative skin disorder     solar elastosis     Hallux rigidus 06/15/2000     Hyperlipidemia, unspecified hyperlipidemia type 9/15/2016     Laryngopharyngeal reflux disease     PPI     Malignant neoplasm of breast (female), unspecified site 03/10/2004     Notalgia paresthetica      Osteoarthritis 07/20/2011     Osteoporosis, unspecified 09/10/2001    Dr Moses; intermittent reclast     Other abnormal glucose 12/20/2013     Paresthesia     neck; notalgiaparesthetic; dr leahy & Dr Nevarez; neurontin     Personal history of malignant neoplasm of breast 06/07/2005     Rhinitis      Schamberg's purpura      Past Surgical History:   Procedure Laterality Date     BONE MARROW BIOPSY      negative     COLONOSCOPY  07/2000     COLONOSCOPY  8/13/2013    DR Fu; repeat 5 years     double mastectomy  03/2004    right sided breast cancer     HC COLONOSCOPY  THRU STOMA WITH BIOPSY  08/25/2010    cancer screening, family h/o colon cancer; hyperplastic polyp     HEMORRHOIDECTOMY  1986     UPPER GI ENDOSCOPY       Current Outpatient Prescriptions   Medication Sig Dispense Refill     Lutein 20 MG CAPS Take 20 mg by mouth daily        fluticasone (FLONASE) 50 MCG/ACT spray Spray 2 sprays into both nostrils daily 16 g 5     gabapentin (NEURONTIN) 300 MG capsule TAKE TWO CAPSULES BY MOUTH AT BEDTIME 180 capsule 0     Lactobacillus (ACIDOPHILUS) TABS Take 1 tablet by mouth daily       MAGNESIUM OXIDE PO Take 200 mg by mouth daily       zinc 50 MG TABS Take 1 tablet by mouth daily        aspirin EC 81 MG tablet Take 1 tablet (81 mg) by mouth daily 1 tablet 0     Polyethylene Glycol 3350 (MIRALAX PO) Use every other day       cinnamon 500 MG CAPS Take 2 capsules by mouth daily        fish oil-omega-3 fatty acids (FISH OIL) 1000 MG capsule Take 1 g by mouth 2 times daily        CALCIUM CITRATE 1,500 mg two times daily        cholecalciferol 1000 UNITS TABS Take  by mouth. 2 capsules po daily.        Multiple vitamin  s TABS Take 1 tablet by mouth daily.       Turmeric (RA TURMERIC) 500 MG CAPS Take 2 capsules by mouth daily        OTC products: no recent use of OTC ASA, NSAIDS or Steroids  Plan to stop Turmeric, ASA, and fish oil prior to surgery    Allergies   Allergen Reactions     Povidone Iodine Rash     Betadine      Soap Rash     Betadine       Latex Allergy: NO    Social History   Substance Use Topics     Smoking status: Never Smoker     Smokeless tobacco: Never Used     Alcohol use 0.0 oz/week     0 Standard drinks or equivalent per week      Comment: 1 glasso wine, weekly      History   Drug Use No       REVIEW OF SYSTEMS:   CONSTITUTIONAL: NEGATIVE for fever, chills, change in weight  INTEGUMENTARY/SKIN: NEGATIVE for worrisome rashes, moles or lesions  EYES: NEGATIVE for vision changes or irritation  ENT/MOUTH: NEGATIVE for ear, mouth and throat problems  RESP: NEGATIVE  "for significant cough or SOB  CV: NEGATIVE for chest pain, palpitations or peripheral edema  GI: NEGATIVE for nausea, abdominal pain, heartburn, or change in bowel habits  : NEGATIVE for frequency, dysuria, or hematuria  MUSCULOSKELETAL:right thumb trigger finger  NEURO: NEGATIVE for weakness, dizziness or paresthesias  ENDOCRINE: NEGATIVE for temperature intolerance, skin/hair changes  HEME: NEGATIVE for bleeding problems  PSYCHIATRIC: NEGATIVE for changes in mood or affect    EXAM:   /60  Pulse 60  Temp 98.8  F (37.1  C) (Tympanic)  Resp 16  Ht 5' 2\" (1.575 m)  Wt 110 lb (49.9 kg)  SpO2 97%  BMI 20.12 kg/m2    GENERAL APPEARANCE: healthy, alert and no distress     EYES: EOMI, PERRL     HENT: ear canals and TM's normal and nose and mouth without ulcers or lesions     NECK: no adenopathy, no asymmetry, masses, or scars and thyroid normal to palpation     RESP: lungs clear to auscultation - no rales, rhonchi or wheezes     CV: regular rates and rhythm, normal S1 S2, no S3 or S4 and no murmur, click or rub     ABDOMEN:  soft, nontender, no HSM or masses and bowel sounds normal     MS: extremities normal- no gross deformities noted, no evidence of inflammation in joints, FROM in all extremities.     SKIN: no suspicious lesions or rashes     NEURO: Normal strength and tone, sensory exam grossly normal, mentation intact and speech normal     PSYCH: mentation appears normal. and affect normal/bright     LYMPHATICS: normal ant/post cervical, supraclavicular nodes    DIAGNOSTICS:     Labs Resulted Today:   Results for orders placed or performed in visit on 02/28/18   Basic metabolic panel  (Ca, Cl, CO2, Creat, Gluc, K, Na, BUN)   Result Value Ref Range    Sodium 133 133 - 144 mmol/L    Potassium 4.4 3.4 - 5.3 mmol/L    Chloride 99 94 - 109 mmol/L    Carbon Dioxide 29 20 - 32 mmol/L    Anion Gap 5 3 - 14 mmol/L    Glucose 99 70 - 99 mg/dL    Urea Nitrogen 11 7 - 30 mg/dL    Creatinine 0.69 0.52 - 1.04 mg/dL    " GFR Estimate 84 >60 mL/min/1.7m2    GFR Estimate If Black >90 >60 mL/min/1.7m2    Calcium 9.1 8.5 - 10.1 mg/dL   CBC with platelets and differential   Result Value Ref Range    WBC 4.2 4.0 - 11.0 10e9/L    RBC Count 3.76 (L) 3.8 - 5.2 10e12/L    Hemoglobin 12.2 11.7 - 15.7 g/dL    Hematocrit 35.7 35.0 - 47.0 %    MCV 95 78 - 100 fl    MCH 32.4 26.5 - 33.0 pg    MCHC 34.2 31.5 - 36.5 g/dL    RDW 13.3 10.0 - 15.0 %    Platelet Count 272 150 - 450 10e9/L    Diff Method Automated Method     % Neutrophils 64.6 %    % Lymphocytes 22.8 %    % Monocytes 11.4 %    % Eosinophils 0.5 %    % Basophils 0.7 %    % Immature Granulocytes 0.0 %    Nucleated RBCs 0 0 /100    Absolute Neutrophil 2.7 1.6 - 8.3 10e9/L    Absolute Lymphocytes 1.0 0.8 - 5.3 10e9/L    Absolute Monocytes 0.5 0.0 - 1.3 10e9/L    Absolute Eosinophils 0.0 0.0 - 0.7 10e9/L    Absolute Basophils 0.0 0.0 - 0.2 10e9/L    Abs Immature Granulocytes 0.0 0 - 0.4 10e9/L    Absolute Nucleated RBC 0.0        Recent Labs   Lab Test  11/01/17   1043  10/28/16   1204   HGB  12.7  12.8   PLT  285  274   NA  138  141   POTASSIUM  4.3  4.2   CR  0.72  0.76   A1C  5.5  5.6        IMPRESSION:   Reason for surgery/procedure: pain with use of right thumb  Diagnosis/reason for consult: pre-op    The proposed surgical procedure is considered LOW risk.    REVISED CARDIAC RISK INDEX  The patient has the following serious cardiovascular risks for perioperative complications such as (MI, PE, VFib and 3  AV Block):  No serious cardiac risks  INTERPRETATION: 0 risks: Class I (very low risk - 0.4% complication rate)    The patient has the following additional risks for perioperative complications:  No identified additional risks    ASSESSMENT / PLAN:  (Z01.818) Preop general physical exam  (primary encounter diagnosis)  Comment: discussed with Mihaela to stop fish oil, turmeric and ASA before surgery.  Plan:  Basic metabolic panel  (Ca, Cl, CO2, Creat, Gluc, K, Na, BUN),    CBC with  platelets and differential            (M65.311) Trigger finger of right thumb  Comment: cleared for surgery  Plan:         RECOMMENDATIONS:         --Patient is to take all scheduled medications on the day of surgery EXCEPT for modifications listed below.    APPROVAL GIVEN to proceed with proposed procedure, without further diagnostic evaluation       Signed Electronically by: MICHAEL Weaver CNP    Copy of this evaluation report is provided to requesting physician.    [place] Preop Guidelines

## 2018-03-01 ASSESSMENT — ANXIETY QUESTIONNAIRES: GAD7 TOTAL SCORE: 0

## 2018-03-01 ASSESSMENT — PATIENT HEALTH QUESTIONNAIRE - PHQ9: SUM OF ALL RESPONSES TO PHQ QUESTIONS 1-9: 0

## 2018-03-02 ENCOUNTER — TELEPHONE (OUTPATIENT)
Dept: FAMILY MEDICINE | Facility: OTHER | Age: 68
End: 2018-03-02

## 2018-03-02 ENCOUNTER — ALLIED HEALTH/NURSE VISIT (OUTPATIENT)
Dept: FAMILY MEDICINE | Facility: OTHER | Age: 68
End: 2018-03-02
Attending: NURSE PRACTITIONER
Payer: COMMERCIAL

## 2018-03-02 DIAGNOSIS — M79.89 SWELLING OF LIMB: Primary | ICD-10-CM

## 2018-03-02 NOTE — H&P (VIEW-ONLY)
[facility] [place]  [address]  [place] [address]  [phone]  Dept: [phone]    PRE-OP EVALUATION:  Today's date: 2018    Mihaela Jang (: 1950) presents for pre-operative evaluation assessment as requested by Dr. Brewster.  She requires evaluation and anesthesia risk assessment prior to undergoing surgery/procedure for treatment of right thumb, trigger finger .    Proposed Surgery/ Procedure: Trigger release right thumb  Date of Surgery/ Procedure: 3-7-2018  Time of Surgery/ Procedure: TBD  Hospital/Surgical Facility: [facility]  Primary Physician: Melyssa Connors  Type of Anesthesia Anticipated: to be determined    Patient has a Health Care Directive or Living Will:  YES on file here    1. NO - Do you have a history of heart attack, stroke, stent, bypass or surgery on an artery in the head, neck, heart or legs?  2. NO - Do you ever have any pain or discomfort in your chest?  3. NO - Do you have a history of  Heart Failure?  4. NO - Are you troubled by shortness of breath when: walking on the level, up a slight hill or at night?  5. NO - Do you currently have a cold, bronchitis or other respiratory infection?  6. NO - Do you have a cough, shortness of breath or wheezing?  7. NO - Do you sometimes get pains in the calves of your legs when you walk?  8. NO - Do you or anyone in your family have previous history of blood clots?  9. NO - Do you or does anyone in your family have a serious bleeding problem such as prolonged bleeding following surgeries or cuts?  10. NO - Have you ever had problems with anemia or been told to take iron pills?  11. NO - Have you had any abnormal blood loss such as black, tarry or bloody stools, or abnormal vaginal bleeding?  12. NO - Have you ever had a blood transfusion?  13. NO - Have you or any of your relatives ever had problems with anesthesia?  14. NO - Do you have sleep apnea, excessive snoring or daytime drowsiness?  15. NO - Do you have any  prosthetic heart valves?  16. NO - Do you have prosthetic joints?  17. NO - Is there any chance that you may be pregnant?      HPI:     HPI related to upcoming procedure: difficulty using right hand because of pain in the right thumb with use      See problem list for active medical problems.  Problems all longstanding and stable, except as noted/documented.  See ROS for pertinent symptoms related to these conditions.                                                                                                  .    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  Ensure this is not a melanoma, path pending       Hyperlipidemia, unspecified hyperlipidemia type 09/15/2016     Priority: Medium     ACP (advance care planning) 01/22/2016     Priority: Medium     Advance Care Planning 6/2/2016: Receipt of ACP document:  Received: Health Care Directive which was witnessed or notarized on 6/1/16.  Document not previously scanned.  Validation form completed and sent with document to be scanned.  Code Status reflects choices in most recent ACP document.  Confirmed/documented designated decision maker(s).  Added by Paola Johnson  Advance Care Planning 1/22/2016: Receipt of ACP document:  Received: Health Care Directive which was witnessed or notarized on 10-21-08.  Document previously scanned on 12-7-15.  Validation form completed and sent to be scanned.  Code Status needs to be updated to reflect choices in most recent ACP document.  Confirmed/documented designated decision maker(s).  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 11/16/2015     Priority: Medium     Personal history of malignant neoplasm of breast 12/23/2014     Priority: Medium      Neutropenia (H) 12/23/2014     Priority: Medium     Problem list name updated by automated process. Provider to review       Early satiety 12/23/2014     Priority: Medium     Abnormal glucose 12/20/2013     Priority: Medium     Problem list name updated by automated process. 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 Provider to review        Past Medical History:   Diagnosis Date     Atypical nevi      Chondrodermatitis nodularis helicis of left ear     Dr Shipley, [facility]     Chronic rhinitis     Dr Messina, [facility] allergy; negative skin testing; IgE mediated allergies; ENT - DC nasal steroid and antihistamine; saline rinses     Degenerative skin disorder     solar elastosis     Hallux rigidus 06/15/2000     Hyperlipidemia, unspecified hyperlipidemia type 9/15/2016     Laryngopharyngeal reflux disease     PPI     Malignant neoplasm of breast (female), unspecified site 03/10/2004     Notalgia paresthetica      Osteoarthritis 07/20/2011     Osteoporosis, unspecified 09/10/2001    Dr Moses; intermittent reclast     Other abnormal glucose 12/20/2013     Paresthesia     neck; notalgiaparesthetic; dr leahy & Dr Nevarez; neurontin     Personal history of malignant neoplasm of breast 06/07/2005     Rhinitis      Schamberg's purpura      Past Surgical History:   Procedure Laterality Date     BONE MARROW BIOPSY      negative     COLONOSCOPY  07/2000     COLONOSCOPY  8/13/2013    DR Fu; repeat 5 years     double mastectomy  03/2004    right sided breast cancer     HC COLONOSCOPY  THRU STOMA WITH BIOPSY  08/25/2010    cancer screening, family h/o colon cancer; hyperplastic polyp     HEMORRHOIDECTOMY  1986     UPPER GI ENDOSCOPY       Current Outpatient Prescriptions   Medication Sig Dispense Refill     Lutein 20 MG CAPS Take 20 mg by mouth daily        fluticasone (FLONASE) 50 MCG/ACT spray Spray 2 sprays into both nostrils daily 16 g 5     gabapentin (NEURONTIN) 300 MG capsule TAKE TWO CAPSULES BY MOUTH AT BEDTIME 180 capsule 0     Lactobacillus (ACIDOPHILUS) TABS Take 1 tablet by mouth daily       MAGNESIUM OXIDE PO Take 200 mg by mouth daily       zinc 50 MG TABS Take 1 tablet by mouth daily        aspirin EC 81 MG tablet Take 1 tablet (81 mg) by mouth daily 1 tablet 0     Polyethylene Glycol 3350 (MIRALAX PO) Use every other day       cinnamon 500 MG CAPS Take 2 capsules by mouth daily        fish oil-omega-3 fatty acids (FISH OIL) 1000 MG capsule Take 1 g by mouth 2 times daily        CALCIUM CITRATE 1,500 mg two times daily        cholecalciferol 1000 UNITS TABS Take  by mouth. 2 capsules po daily.        Multiple vitamin  s TABS Take 1 tablet by mouth daily.       Turmeric (RA TURMERIC) 500 MG CAPS Take 2 capsules by mouth daily        OTC products: no recent use of OTC ASA, NSAIDS or Steroids  Plan to stop Turmeric, ASA, and fish oil prior to surgery    Allergies   Allergen Reactions     Povidone Iodine Rash     Betadine      Soap Rash     Betadine       Latex Allergy: NO    Social History   Substance Use Topics     Smoking status: Never Smoker     Smokeless tobacco: Never Used     Alcohol use 0.0 oz/week     0 Standard drinks or equivalent per week      Comment: 1 glasso wine, weekly      History   Drug Use No       REVIEW OF SYSTEMS:   CONSTITUTIONAL: NEGATIVE for fever, chills, change in weight  INTEGUMENTARY/SKIN: NEGATIVE for worrisome rashes, moles or lesions  EYES: NEGATIVE for vision changes or irritation  ENT/MOUTH: NEGATIVE for ear, mouth and throat problems  RESP: NEGATIVE  "for significant cough or SOB  CV: NEGATIVE for chest pain, palpitations or peripheral edema  GI: NEGATIVE for nausea, abdominal pain, heartburn, or change in bowel habits  : NEGATIVE for frequency, dysuria, or hematuria  MUSCULOSKELETAL:right thumb trigger finger  NEURO: NEGATIVE for weakness, dizziness or paresthesias  ENDOCRINE: NEGATIVE for temperature intolerance, skin/hair changes  HEME: NEGATIVE for bleeding problems  PSYCHIATRIC: NEGATIVE for changes in mood or affect    EXAM:   /60  Pulse 60  Temp 98.8  F (37.1  C) (Tympanic)  Resp 16  Ht 5' 2\" (1.575 m)  Wt 110 lb (49.9 kg)  SpO2 97%  BMI 20.12 kg/m2    GENERAL APPEARANCE: healthy, alert and no distress     EYES: EOMI, PERRL     HENT: ear canals and TM's normal and nose and mouth without ulcers or lesions     NECK: no adenopathy, no asymmetry, masses, or scars and thyroid normal to palpation     RESP: lungs clear to auscultation - no rales, rhonchi or wheezes     CV: regular rates and rhythm, normal S1 S2, no S3 or S4 and no murmur, click or rub     ABDOMEN:  soft, nontender, no HSM or masses and bowel sounds normal     MS: extremities normal- no gross deformities noted, no evidence of inflammation in joints, FROM in all extremities.     SKIN: no suspicious lesions or rashes     NEURO: Normal strength and tone, sensory exam grossly normal, mentation intact and speech normal     PSYCH: mentation appears normal. and affect normal/bright     LYMPHATICS: normal ant/post cervical, supraclavicular nodes    DIAGNOSTICS:     Labs Resulted Today:   Results for orders placed or performed in visit on 02/28/18   Basic metabolic panel  (Ca, Cl, CO2, Creat, Gluc, K, Na, BUN)   Result Value Ref Range    Sodium 133 133 - 144 mmol/L    Potassium 4.4 3.4 - 5.3 mmol/L    Chloride 99 94 - 109 mmol/L    Carbon Dioxide 29 20 - 32 mmol/L    Anion Gap 5 3 - 14 mmol/L    Glucose 99 70 - 99 mg/dL    Urea Nitrogen 11 7 - 30 mg/dL    Creatinine 0.69 0.52 - 1.04 mg/dL    " GFR Estimate 84 >60 mL/min/1.7m2    GFR Estimate If Black >90 >60 mL/min/1.7m2    Calcium 9.1 8.5 - 10.1 mg/dL   CBC with platelets and differential   Result Value Ref Range    WBC 4.2 4.0 - 11.0 10e9/L    RBC Count 3.76 (L) 3.8 - 5.2 10e12/L    Hemoglobin 12.2 11.7 - 15.7 g/dL    Hematocrit 35.7 35.0 - 47.0 %    MCV 95 78 - 100 fl    MCH 32.4 26.5 - 33.0 pg    MCHC 34.2 31.5 - 36.5 g/dL    RDW 13.3 10.0 - 15.0 %    Platelet Count 272 150 - 450 10e9/L    Diff Method Automated Method     % Neutrophils 64.6 %    % Lymphocytes 22.8 %    % Monocytes 11.4 %    % Eosinophils 0.5 %    % Basophils 0.7 %    % Immature Granulocytes 0.0 %    Nucleated RBCs 0 0 /100    Absolute Neutrophil 2.7 1.6 - 8.3 10e9/L    Absolute Lymphocytes 1.0 0.8 - 5.3 10e9/L    Absolute Monocytes 0.5 0.0 - 1.3 10e9/L    Absolute Eosinophils 0.0 0.0 - 0.7 10e9/L    Absolute Basophils 0.0 0.0 - 0.2 10e9/L    Abs Immature Granulocytes 0.0 0 - 0.4 10e9/L    Absolute Nucleated RBC 0.0        Recent Labs   Lab Test  11/01/17   1043  10/28/16   1204   HGB  12.7  12.8   PLT  285  274   NA  138  141   POTASSIUM  4.3  4.2   CR  0.72  0.76   A1C  5.5  5.6        IMPRESSION:   Reason for surgery/procedure: pain with use of right thumb  Diagnosis/reason for consult: pre-op    The proposed surgical procedure is considered LOW risk.    REVISED CARDIAC RISK INDEX  The patient has the following serious cardiovascular risks for perioperative complications such as (MI, PE, VFib and 3  AV Block):  No serious cardiac risks  INTERPRETATION: 0 risks: Class I (very low risk - 0.4% complication rate)    The patient has the following additional risks for perioperative complications:  No identified additional risks    ASSESSMENT / PLAN:  (Z01.818) Preop general physical exam  (primary encounter diagnosis)  Comment: discussed with Mihaela to stop fish oil, turmeric and ASA before surgery.  Plan:  Basic metabolic panel  (Ca, Cl, CO2, Creat, Gluc, K, Na, BUN),    CBC with  platelets and differential            (M65.311) Trigger finger of right thumb  Comment: cleared for surgery  Plan:         RECOMMENDATIONS:         --Patient is to take all scheduled medications on the day of surgery EXCEPT for modifications listed below.    APPROVAL GIVEN to proceed with proposed procedure, without further diagnostic evaluation       Signed Electronically by: MICHAEL Weaver CNP    Copy of this evaluation report is provided to requesting physician.    [facility] Preop Guidelines

## 2018-03-02 NOTE — NURSING NOTE
Chema'd phone call and missed it. Labs are reviewed with her here today.  Also, looks like a missed call from pre-admitting.  She will wait for their return call.  Selma Littlejohn

## 2018-03-02 NOTE — MR AVS SNAPSHOT
"              After Visit Summary   3/2/2018    Mihaela Jang    MRN: [id number]           Patient Information     Date Of Birth          1950        Visit Information        Provider Department      3/2/2018 4:00 PM HC FP NURSE [facility]        Today's Diagnoses     Swelling of limb    -  1       Follow-ups after your visit        Your next 10 appointments already scheduled     Mar 07, 2018   Procedure with Jose Alfredo Brewster DO   [facility] ([facility] )    [address] [facility] [id number] [phone]            Mar 20, 2018  4:20 PM CDT   (Arrive by 4:05 PM)   Post Op with Jose Alfredo Brewster DO    ORTHOPEDICS ([facility] )    [address] [facility] [id number] [phone]              Who to contact     If you have questions or need follow up information about today's clinic visit or your schedule please contact [facility] directly at [phone].  Normal or non-critical lab and imaging results will be communicated to you by Biotteryhart, letter or phone within 4 business days after the clinic has received the results. If you do not hear from us within 7 days, please contact the clinic through Skribitt or phone. If you have a critical or abnormal lab result, we will notify you by phone as soon as possible.  Submit refill requests through FanFueled or call your pharmacy and they will forward the refill request to us. Please allow 3 business days for your refill to be completed.          Additional Information About Your Visit        BiotteryharJRKICKZ Information     FanFueled lets you send messages to your doctor, view your test results, renew your prescriptions, schedule appointments and more. To sign up, go to [email or web address] . Click on \"Log in\" on the left side of the screen, which will take you to the Welcome page. Then click on \"Sign up Now\" on the right side of the page.     You will be asked to enter the access code " listed below, as well as some personal information. Please follow the directions to create your username and password.     Your access code is: [email or web address]  Expires: 2018 12:13 PM     Your access code will  in 90 days. If you need help or a new code, please call your [place] clinic or [phone].        Care EveryWhere ID     This is your Care EveryWhere ID. This could be used by other organizations to access your [place] medical records  [id number]         Blood Pressure from Last 3 Encounters:   18 124/60   18 120/70   18 118/74    Weight from Last 3 Encounters:   18 110 lb (49.9 kg)   18 110 lb (49.9 kg)   18 110 lb (49.9 kg)              Today, you had the following     No orders found for display       Primary Care Provider Office Phone # Fax #    Melyssa Connors [phone] [address]  [facility] [address]        Equal Access to Services     [facility]: Hadii aad ku hadasho Soomaali, waaxda luqadaha, qaybta kaalmada adeegyawanda, haile fernández . So [facility] [phone].    ATENCIÓN: Si habla español, tiene a quezada disposición servicios gratuitos de asistencia lingüística. Llame al [phone].    We comply with applicable federal civil rights laws and [place] laws. We do not discriminate on the basis of race, color, national origin, age, disability, sex, sexual orientation, or gender identity.            Thank you!     Thank you for choosing [facility]  for your care. Our goal is always to provide you with excellent care. Hearing back from our patients is one way we can continue to improve our services. Please take a few minutes to complete the written survey that you may receive in the mail after your visit with us. Thank you!             Your Updated Medication List - Protect others around you: Learn how to safely use, store and throw away your medicines at www.disposemymeds.org.           This list is accurate as of 3/2/18  4:26 PM.  Always use your most recent med list.                   Brand Name Dispense Instructions for use Diagnosis    Acidophilus Tabs      Take 1 tablet by mouth daily        aspirin EC 81 MG EC tablet     1 tablet    Take 1 tablet (81 mg) by mouth daily        CALCIUM CITRATE      1,500 mg two times daily        cholecalciferol 1000 UNITS Tabs      Take  by mouth. 2 capsules po daily.        cinnamon 500 MG Caps      Take 2 capsules by mouth daily        fish oil-omega-3 fatty acids 1000 MG capsule      Take 1 g by mouth 2 times daily        fluticasone 50 MCG/ACT spray    FLONASE    16 g    Spray 2 sprays into both nostrils daily    Laryngopharyngeal reflux (LPR), Post-nasal drip, Globus sensation       gabapentin 300 MG capsule    NEURONTIN    180 capsule    TAKE TWO CAPSULES BY MOUTH AT BEDTIME    Paresthesia       Lutein 20 MG Caps      Take 20 mg by mouth daily        MAGNESIUM OXIDE PO      Take 200 mg by mouth daily        MIRALAX PO      Use every other day        Multiple vitamin  s Tabs      Take 1 tablet by mouth daily.        RA TURMERIC 500 MG Caps   Generic drug:  Turmeric      Take 2 capsules by mouth daily        zinc 50 MG Tabs      Take 1 tablet by mouth daily

## 2018-03-02 NOTE — TELEPHONE ENCOUNTER
11:08 AM    Reason for Call: Phone Call    Description: Pt received a call regarding blood test results from her pre-op with Hazel Rex on 02/28/18.  Please call back    Was an appointment offered for this call? No  If yes : Appointment type              Date    Preferred method for responding to this message: Telephone Call  What is your phone number ? [phone] but if she is not home, please try her cell [phone]    If we cannot reach you directly, may we leave a detailed response at the number you provided? N/A pt states voicemail not set up    Can this message wait until your PCP/provider returns, if available today? Not applicable, provider is in    Dieter Zarco

## 2018-03-06 ENCOUNTER — ANESTHESIA EVENT (OUTPATIENT)
Dept: SURGERY | Facility: HOSPITAL | Age: 68
End: 2018-03-06
Payer: MEDICARE

## 2018-03-06 NOTE — ANESTHESIA PREPROCEDURE EVALUATION
Anesthesia Evaluation     . Pt has had prior anesthetic.            ROS/MED HX    ENT/Pulmonary:     (+)allergic rhinitis, other ENT- LEFT first branchial cleft cyst, Hypertrophic soft palate, , . .    Neurologic:  - neg neurologic ROS     Cardiovascular:     (+) Dyslipidemia, ----. : . . . :. .       METS/Exercise Tolerance:     Hematologic:     (+) Other Hematologic Disorder-chronic neutropenia      Musculoskeletal:   (+) arthritis, , , other musculoskeletal- PAIN OF RIGHT HAND, solar elastosis, Schamberg's purpura, Notalgia paresthetica      GI/Hepatic:     (+) GERD       Renal/Genitourinary:  - ROS Renal section negative       Endo:  - neg endo ROS       Psychiatric:  - neg psychiatric ROS       Infectious Disease:  - neg infectious disease ROS       Malignancy:   (+) Malignancy History of Breast  Breast CA status post Surgery.         Other:    - neg other ROS                 Physical Exam  Normal systems: cardiovascular and pulmonary    Airway   Mallampati: II  TM distance: >3 FB  Neck ROM: full    Dental     Cardiovascular   Rhythm and rate: regular and normal      Pulmonary    breath sounds clear to auscultation                    Anesthesia Plan      History & Physical Review  History and physical reviewed and following examination; no interval change.    ASA Status:  3 .    NPO Status:  > 8 hours    Plan for IV Regional Anesthesia with Intravenous and Propofol induction. Maintenance will be TIVA.    PONV prophylaxis:  Ondansetron (or other 5HT-3) and Dexamethasone or Solumedrol       Postoperative Care  Postoperative pain management:  IV analgesics and Oral pain medications.      Consents  Anesthetic plan, risks, benefits and alternatives discussed with:  Patient..                          .

## 2018-03-07 ENCOUNTER — HOSPITAL ENCOUNTER (OUTPATIENT)
Facility: HOSPITAL | Age: 68
Discharge: HOME | End: 2018-03-07
Attending: ORTHOPAEDIC SURGERY | Admitting: ORTHOPAEDIC SURGERY
Payer: MEDICARE

## 2018-03-07 ENCOUNTER — SURGERY (OUTPATIENT)
Age: 68
End: 2018-03-07

## 2018-03-07 ENCOUNTER — ANESTHESIA (OUTPATIENT)
Dept: SURGERY | Facility: HOSPITAL | Age: 68
End: 2018-03-07
Payer: MEDICARE

## 2018-03-07 VITALS
TEMPERATURE: 97 F | SYSTOLIC BLOOD PRESSURE: 124 MMHG | WEIGHT: 112.2 LBS | HEIGHT: 62 IN | DIASTOLIC BLOOD PRESSURE: 70 MMHG | HEART RATE: 61 BPM | BODY MASS INDEX: 20.65 KG/M2 | OXYGEN SATURATION: 96 % | RESPIRATION RATE: 16 BRPM

## 2018-03-07 DIAGNOSIS — Z98.890 H/O HAND SURGERY: Primary | ICD-10-CM

## 2018-03-07 PROCEDURE — 27210794 ZZH OR GENERAL SUPPLY STERILE: Performed by: ORTHOPAEDIC SURGERY

## 2018-03-07 PROCEDURE — 27210995 ZZH RX 272: Performed by: NURSE ANESTHETIST, CERTIFIED REGISTERED

## 2018-03-07 PROCEDURE — 71000027 ZZH RECOVERY PHASE 2 EACH 15 MINS: Performed by: ORTHOPAEDIC SURGERY

## 2018-03-07 PROCEDURE — 26055 INCISE FINGER TENDON SHEATH: CPT | Mod: F5 | Performed by: ORTHOPAEDIC SURGERY

## 2018-03-07 PROCEDURE — 25000125 ZZHC RX 250: Performed by: ORTHOPAEDIC SURGERY

## 2018-03-07 PROCEDURE — 26055 INCISE FINGER TENDON SHEATH: CPT | Performed by: NURSE ANESTHETIST, CERTIFIED REGISTERED

## 2018-03-07 PROCEDURE — 37000008 ZZH ANESTHESIA TECHNICAL FEE, 1ST 30 MIN: Performed by: ORTHOPAEDIC SURGERY

## 2018-03-07 PROCEDURE — 25000128 H RX IP 250 OP 636: Performed by: NURSE ANESTHETIST, CERTIFIED REGISTERED

## 2018-03-07 PROCEDURE — 37000009 ZZH ANESTHESIA TECHNICAL FEE, EACH ADDTL 15 MIN: Performed by: ORTHOPAEDIC SURGERY

## 2018-03-07 PROCEDURE — 25000125 ZZHC RX 250: Performed by: NURSE ANESTHETIST, CERTIFIED REGISTERED

## 2018-03-07 PROCEDURE — 36000052 ZZH SURGERY LEVEL 2 EA 15 ADDTL MIN: Performed by: ORTHOPAEDIC SURGERY

## 2018-03-07 PROCEDURE — 25000128 H RX IP 250 OP 636: Performed by: ANESTHESIOLOGY

## 2018-03-07 PROCEDURE — 40000305 ZZH STATISTIC PRE PROC ASSESS I: Performed by: ORTHOPAEDIC SURGERY

## 2018-03-07 PROCEDURE — 36000050 ZZH SURGERY LEVEL 2 1ST 30 MIN: Performed by: ORTHOPAEDIC SURGERY

## 2018-03-07 RX ORDER — NALOXONE HYDROCHLORIDE 0.4 MG/ML
.1-.4 INJECTION, SOLUTION INTRAMUSCULAR; INTRAVENOUS; SUBCUTANEOUS
Status: DISCONTINUED | OUTPATIENT
Start: 2018-03-07 | End: 2018-03-07 | Stop reason: HOSPADM

## 2018-03-07 RX ORDER — ONDANSETRON 4 MG/1
4 TABLET, ORALLY DISINTEGRATING ORAL
Status: DISCONTINUED | OUTPATIENT
Start: 2018-03-07 | End: 2018-03-07 | Stop reason: HOSPADM

## 2018-03-07 RX ORDER — BUPIVACAINE HYDROCHLORIDE 5 MG/ML
INJECTION, SOLUTION PERINEURAL PRN
Status: DISCONTINUED | OUTPATIENT
Start: 2018-03-07 | End: 2018-03-07 | Stop reason: HOSPADM

## 2018-03-07 RX ORDER — MEPERIDINE HYDROCHLORIDE 25 MG/ML
12.5 INJECTION INTRAMUSCULAR; INTRAVENOUS; SUBCUTANEOUS
Status: DISCONTINUED | OUTPATIENT
Start: 2018-03-07 | End: 2018-03-07 | Stop reason: HOSPADM

## 2018-03-07 RX ORDER — FENTANYL CITRATE 50 UG/ML
25-50 INJECTION, SOLUTION INTRAMUSCULAR; INTRAVENOUS
Status: DISCONTINUED | OUTPATIENT
Start: 2018-03-07 | End: 2018-03-07 | Stop reason: HOSPADM

## 2018-03-07 RX ORDER — OXYCODONE HYDROCHLORIDE 5 MG/1
5 TABLET ORAL EVERY 4 HOURS PRN
Status: DISCONTINUED | OUTPATIENT
Start: 2018-03-07 | End: 2018-03-07 | Stop reason: HOSPADM

## 2018-03-07 RX ORDER — HYDRALAZINE HYDROCHLORIDE 20 MG/ML
2.5-5 INJECTION INTRAMUSCULAR; INTRAVENOUS EVERY 10 MIN PRN
Status: DISCONTINUED | OUTPATIENT
Start: 2018-03-07 | End: 2018-03-07 | Stop reason: HOSPADM

## 2018-03-07 RX ORDER — LABETALOL HYDROCHLORIDE 5 MG/ML
10 INJECTION, SOLUTION INTRAVENOUS
Status: DISCONTINUED | OUTPATIENT
Start: 2018-03-07 | End: 2018-03-07 | Stop reason: HOSPADM

## 2018-03-07 RX ORDER — DEXAMETHASONE SODIUM PHOSPHATE 4 MG/ML
4 INJECTION, SOLUTION INTRA-ARTICULAR; INTRALESIONAL; INTRAMUSCULAR; INTRAVENOUS; SOFT TISSUE EVERY 10 MIN PRN
Status: DISCONTINUED | OUTPATIENT
Start: 2018-03-07 | End: 2018-03-07 | Stop reason: HOSPADM

## 2018-03-07 RX ORDER — ONDANSETRON 2 MG/ML
4 INJECTION INTRAMUSCULAR; INTRAVENOUS EVERY 30 MIN PRN
Status: DISCONTINUED | OUTPATIENT
Start: 2018-03-07 | End: 2018-03-07 | Stop reason: HOSPADM

## 2018-03-07 RX ORDER — LIDOCAINE HYDROCHLORIDE 5 MG/ML
INJECTION, SOLUTION INFILTRATION; PERINEURAL PRN
Status: DISCONTINUED | OUTPATIENT
Start: 2018-03-07 | End: 2018-03-07

## 2018-03-07 RX ORDER — HYDROCODONE BITARTRATE AND ACETAMINOPHEN 5; 325 MG/1; MG/1
1-2 TABLET ORAL EVERY 4 HOURS PRN
Qty: 20 TABLET | Refills: 0 | Status: SHIPPED | OUTPATIENT
Start: 2018-03-07 | End: 2018-03-20

## 2018-03-07 RX ORDER — ALBUTEROL SULFATE 0.83 MG/ML
2.5 SOLUTION RESPIRATORY (INHALATION) EVERY 4 HOURS PRN
Status: DISCONTINUED | OUTPATIENT
Start: 2018-03-07 | End: 2018-03-07 | Stop reason: HOSPADM

## 2018-03-07 RX ORDER — SODIUM CHLORIDE, SODIUM LACTATE, POTASSIUM CHLORIDE, CALCIUM CHLORIDE 600; 310; 30; 20 MG/100ML; MG/100ML; MG/100ML; MG/100ML
INJECTION, SOLUTION INTRAVENOUS CONTINUOUS
Status: DISCONTINUED | OUTPATIENT
Start: 2018-03-07 | End: 2018-03-07 | Stop reason: HOSPADM

## 2018-03-07 RX ORDER — CEFAZOLIN SODIUM 2 G/100ML
2 INJECTION, SOLUTION INTRAVENOUS
Status: DISCONTINUED | OUTPATIENT
Start: 2018-03-07 | End: 2018-03-07 | Stop reason: HOSPADM

## 2018-03-07 RX ORDER — HYDROCODONE BITARTRATE AND ACETAMINOPHEN 5; 325 MG/1; MG/1
1 TABLET ORAL
Status: DISCONTINUED | OUTPATIENT
Start: 2018-03-07 | End: 2018-03-07 | Stop reason: HOSPADM

## 2018-03-07 RX ORDER — ONDANSETRON 4 MG/1
4 TABLET, ORALLY DISINTEGRATING ORAL EVERY 30 MIN PRN
Status: DISCONTINUED | OUTPATIENT
Start: 2018-03-07 | End: 2018-03-07 | Stop reason: HOSPADM

## 2018-03-07 RX ORDER — PROPOFOL 10 MG/ML
INJECTION, EMULSION INTRAVENOUS PRN
Status: DISCONTINUED | OUTPATIENT
Start: 2018-03-07 | End: 2018-03-07

## 2018-03-07 RX ORDER — PROMETHAZINE HYDROCHLORIDE 25 MG/ML
12.5 INJECTION, SOLUTION INTRAMUSCULAR; INTRAVENOUS
Status: DISCONTINUED | OUTPATIENT
Start: 2018-03-07 | End: 2018-03-07 | Stop reason: HOSPADM

## 2018-03-07 RX ORDER — LIDOCAINE HYDROCHLORIDE 20 MG/ML
INJECTION, SOLUTION INFILTRATION; PERINEURAL PRN
Status: DISCONTINUED | OUTPATIENT
Start: 2018-03-07 | End: 2018-03-07

## 2018-03-07 RX ADMIN — BUPIVACAINE HYDROCHLORIDE 4 ML: 5 INJECTION, SOLUTION PERINEURAL at 08:19

## 2018-03-07 RX ADMIN — MIDAZOLAM 2 MG: 1 INJECTION INTRAMUSCULAR; INTRAVENOUS at 07:54

## 2018-03-07 RX ADMIN — LIDOCAINE HYDROCHLORIDE 20 MG: 20 INJECTION, SOLUTION INFILTRATION; PERINEURAL at 08:00

## 2018-03-07 RX ADMIN — LIDOCAINE HYDROCHLORIDE 30 ML: 5 INJECTION, SOLUTION INFILTRATION; PERINEURAL at 08:03

## 2018-03-07 RX ADMIN — PROPOFOL 30 MG: 10 INJECTION, EMULSION INTRAVENOUS at 08:00

## 2018-03-07 RX ADMIN — PROPOFOL 40 MG: 10 INJECTION, EMULSION INTRAVENOUS at 08:12

## 2018-03-07 RX ADMIN — PROPOFOL 30 MG: 10 INJECTION, EMULSION INTRAVENOUS at 08:08

## 2018-03-07 RX ADMIN — SODIUM CHLORIDE, POTASSIUM CHLORIDE, SODIUM LACTATE AND CALCIUM CHLORIDE: 600; 310; 30; 20 INJECTION, SOLUTION INTRAVENOUS at 07:30

## 2018-03-07 NOTE — ANESTHESIA POSTPROCEDURE EVALUATION
Patient: Mihaela Jang    Procedure(s):  RELEASE TRIGGER DIGIT RIGHT THUMB - Wound Class: I-Clean    Diagnosis:PAIN OF RIGHT HAND  Diagnosis Additional Information: No value filed.    Anesthesia Type:  IV Regional Anesthesia    Note:  Anesthesia Post Evaluation    Patient location during evaluation: Phase 2  Patient participation: Able to fully participate in evaluation  Level of consciousness: awake and alert  Pain management: adequate  Airway patency: patent  Cardiovascular status: acceptable  Respiratory status: acceptable  Hydration status: acceptable  PONV: none             Last vitals:  Vitals:    03/07/18 0700 03/07/18 0830   BP: 146/71 118/62   Pulse: 61    Resp: 16 16   Temp: 97.4  F (36.3  C)    SpO2: 98% 100%         Electronically Signed By: MICHAEL Frey CRNA  March 7, 2018  8:36 AM

## 2018-03-07 NOTE — IP AVS SNAPSHOT
[facility]    [address] [address]    Phone:  [phone]                                       After Visit Summary   3/7/2018    Mihaela Jang    MRN: [id number]           After Visit Summary Signature Page     I have received my discharge instructions, and my questions have been answered. I have discussed any challenges I see with this plan with the nurse or doctor.    ..........................................................................................................................................  Patient/Patient Representative Signature      ..........................................................................................................................................  Patient Representative Print Name and Relationship to Patient    ..................................................               ................................................  Date                                            Time    ..........................................................................................................................................  Reviewed by Signature/Title    ...................................................              ..............................................  Date                                                            Time

## 2018-03-07 NOTE — ANESTHESIA POSTPROCEDURE EVALUATION
Patient: Mihaela Jang    Procedure(s):  RELEASE TRIGGER DIGIT RIGHT THUMB - Wound Class: I-Clean    Diagnosis:PAIN OF RIGHT HAND  Diagnosis Additional Information: No value filed.    Anesthesia Type:  IV Regional Anesthesia    Note:  Anesthesia Post Evaluation    Patient location during evaluation: Bedside  Patient participation: Able to fully participate in evaluation  Level of consciousness: awake and alert  Pain management: adequate  Airway patency: patent  Cardiovascular status: acceptable and stable  Respiratory status: acceptable and room air  Hydration status: acceptable  PONV: none     Anesthetic complications: None          Last vitals:  Vitals:    03/07/18 0855 03/07/18 0900 03/07/18 0905   BP: 125/64 135/68 125/68   Pulse:      Resp: 16 16 16   Temp:      SpO2: 97% 96% 96%         Electronically Signed By: MICHAEL Hi CRNA  March 7, 2018  9:11 AM

## 2018-03-07 NOTE — OP NOTE
Preoperative diagnosis: Right thumb trigger digit    Postoperative diagnosis: Right thumb trigger digit    Procedure performed: Release trigger digit, right thumb    Anesthetic utilized: Sedation plus IV regional block    Wound classification clean    Estimated blood loss: Minimal    Complications: None    Description of procedure: After appropriate level anesthetic administered, her right upper extremity was prepped and draped.  A small incision was made at the crease of the base the thumb and blunt sharp dissection used to expose the first annular pulley.  The digital nerve crossing the pulley was carefully protected with a Ragnell retractor, and then the transversely oriented fibers of the annular pulley were opened sharply with para tenotomy scissors.  This allowed the tendon to glide freely with no obstruction, and up and corrected the triggering at the IP joint.  The tendon appeared abraded and inflamed but was intact.  Wound was then closed with some particular suture and a sterile dressing applied.  She was then awakened from her anesthetic sedation, and taken back to the day surgery unit to complete her recovery.

## 2018-03-07 NOTE — OR NURSING
Denies nausea.Dressing dry.CMS to rt hand dry and intact.Patient and responsible adult given discharge instructions with no questions regarding instructions. Ashwini score 20. Pain level 0/10.  Discharged from unit via walking. Patient discharged to home.

## 2018-03-07 NOTE — ANESTHESIA CARE TRANSFER NOTE
Patient: Mihaela Jang    Procedure(s):  RELEASE TRIGGER DIGIT RIGHT THUMB - Wound Class: I-Clean    Diagnosis: PAIN OF RIGHT HAND  Diagnosis Additional Information: No value filed.    Anesthesia Type:   IV Regional Anesthesia     Note:  Airway :Nasal Cannula  Patient transferred to:Phase II  Handoff Report: Identifed the Patient, Identified the Reponsible Provider, Reviewed the pertinent medical history, Discussed the surgical course, Reviewed Intra-OP anesthesia mangement and issues during anesthesia, Set expectations for post-procedure period and Allowed opportunity for questions and acknowledgement of understanding      Vitals: (Last set prior to Anesthesia Care Transfer)    CRNA VITALS  3/7/2018 0753 - 3/7/2018 0836      3/7/2018             Pulse: 64    SpO2: 99 %    Resp Rate (set): 8                Electronically Signed By: MICHAEL Frey CRNA  March 7, 2018  8:36 AM

## 2018-03-07 NOTE — ADDENDUM NOTE
Addendum  created 03/07/18 0943 by Isidro Miller APRN CRNA    Anesthesia Intra Flowsheets edited

## 2018-03-08 ENCOUNTER — TELEPHONE (OUTPATIENT)
Dept: ORTHOPEDICS | Facility: OTHER | Age: 68
End: 2018-03-08

## 2018-03-08 NOTE — TELEPHONE ENCOUNTER
Patient is doing okay but did take pain medication due to pain and she thought maybe wrap is to tight and she release wrap and she is now doing fine patient asked if she could wear thumb spica splint after bedtime after she removes all dressing to protect surgical site, writer stated yes but not to be wearing all day patient agreed to plan.  Marcie Goodman, ROSALIA

## 2018-03-08 NOTE — TELEPHONE ENCOUNTER
Patient called and left message that she had surgery and has a couple of questions.     Marcie please call patient.     Thank you

## 2018-03-09 ENCOUNTER — TELEPHONE (OUTPATIENT)
Dept: ORTHOPEDICS | Facility: OTHER | Age: 68
End: 2018-03-09

## 2018-03-09 NOTE — TELEPHONE ENCOUNTER
Patient called and left message on voice mail. She was wondering if she could shower today or tomorrow. Stated that her discharge papers stated Saturday and not to scrub wound area. I instructed patient that was corrwct and after shower to keep it clean and dry and cover with band aides. Patient also stated that she was having little pain at this point and was only taking pain medication at night to help her rest. Answered all patient questions and concerns. Patient states understands and will follow up at post op. Also if she has any other questions or concerns to call the office back and we will assist her as best as we can.

## 2018-03-14 ENCOUNTER — OFFICE VISIT (OUTPATIENT)
Dept: FAMILY MEDICINE | Facility: OTHER | Age: 68
End: 2018-03-14
Attending: NURSE PRACTITIONER
Payer: COMMERCIAL

## 2018-03-14 VITALS
TEMPERATURE: 98.3 F | OXYGEN SATURATION: 98 % | BODY MASS INDEX: 20.24 KG/M2 | RESPIRATION RATE: 18 BRPM | HEIGHT: 62 IN | DIASTOLIC BLOOD PRESSURE: 54 MMHG | SYSTOLIC BLOOD PRESSURE: 94 MMHG | WEIGHT: 110 LBS | HEART RATE: 68 BPM

## 2018-03-14 DIAGNOSIS — L23.3 ALLERGIC CONTACT DERMATITIS DUE TO DRUGS IN CONTACT WITH SKIN: Primary | ICD-10-CM

## 2018-03-14 PROCEDURE — G0463 HOSPITAL OUTPT CLINIC VISIT: HCPCS

## 2018-03-14 PROCEDURE — 99213 OFFICE O/P EST LOW 20 MIN: CPT | Performed by: NURSE PRACTITIONER

## 2018-03-14 RX ORDER — PREDNISONE 20 MG/1
40 TABLET ORAL DAILY
Qty: 10 TABLET | Refills: 0 | Status: SHIPPED | OUTPATIENT
Start: 2018-03-14 | End: 2019-02-06

## 2018-03-14 ASSESSMENT — ANXIETY QUESTIONNAIRES
6. BECOMING EASILY ANNOYED OR IRRITABLE: NOT AT ALL
4. TROUBLE RELAXING: NOT AT ALL
5. BEING SO RESTLESS THAT IT IS HARD TO SIT STILL: NOT AT ALL
3. WORRYING TOO MUCH ABOUT DIFFERENT THINGS: NOT AT ALL
GAD7 TOTAL SCORE: 0
2. NOT BEING ABLE TO STOP OR CONTROL WORRYING: NOT AT ALL
IF YOU CHECKED OFF ANY PROBLEMS ON THIS QUESTIONNAIRE, HOW DIFFICULT HAVE THESE PROBLEMS MADE IT FOR YOU TO DO YOUR WORK, TAKE CARE OF THINGS AT HOME, OR GET ALONG WITH OTHER PEOPLE: NOT DIFFICULT AT ALL
1. FEELING NERVOUS, ANXIOUS, OR ON EDGE: NOT AT ALL
7. FEELING AFRAID AS IF SOMETHING AWFUL MIGHT HAPPEN: NOT AT ALL

## 2018-03-14 ASSESSMENT — PAIN SCALES - GENERAL: PAINLEVEL: NO PAIN (0)

## 2018-03-14 NOTE — PATIENT INSTRUCTIONS
Diphenhydramine capsules or tablets  Brand Names: [place]ini-[place] Plus Allergy, Aller-G-Time, Banophen, Benadryl Allergy, Benadryl Allergy Dye Free, Benadryl Allergy Kapgel, Benadryl Allergy Ultratab, Diphedryl, Diphenhist, Genahist, PHARBEDRYL, Q-Dryl, Sleepinal, Valu-Dryl  What is this medicine?  DIPHENHYDRAMINE (dye sam scott) is an antihistamine. It is used to treat the symptoms of an allergic reaction. It is also used to treat Parkinson's disease. This medicine is also used to prevent and to treat motion sickness and as a nighttime sleep aid.  How should I use this medicine?  Take this medicine by mouth with a full glass of water. Follow the directions on the prescription label. Take your doses at regular intervals. Do not take your medicine more often than directed. To prevent motion sickness start taking this medicine 30 to 60 minutes before you leave.  Talk to your pediatrician regarding the use of this medicine in children. Special care may be needed.  Patients over 65 years old may have a stronger reaction and need a smaller dose.  What side effects may I notice from receiving this medicine?  Side effects that you should report to your doctor or health care professional as soon as possible:    allergic reactions like skin rash, itching or hives, swelling of the face, lips, or tongue    changes in vision    confused, agitated, nervous    irregular or fast heartbeat    tremor    trouble passing urine    unusual bleeding or bruising    unusually weak or tired  Side effects that usually do not require medical attention (report to your doctor or health care professional if they continue or are bothersome):    constipation, diarrhea    drowsy    headache    loss of appetite    stomach upset, vomiting    thick mucous  What may interact with this medicine?  Do not take this medicine with any of the following medications:    MAOIs like Carbex, Eldepryl, Marplan, Nardil, and Parnate  This medicine may also  interact with the following medications:    alcohol    barbiturates, like phenobarbital    medicines for bladder spasm like oxybutynin, tolterodine    medicines for blood pressure    medicines for depression, anxiety, or psychotic disturbances    medicines for movement abnormalities or Parkinson's disease    medicines for sleep    other medicines for cold, cough or allergy    some medicines for the stomach like chlordiazepoxide, dicyclomine  What if I miss a dose?  If you miss a dose, take it as soon as you can. If it is almost time for your next dose, take only that dose. Do not take double or extra doses.  Where should I keep my medicine?  Keep out of the reach of children.  Store at room temperature between 15 and 30 degrees C (59 and 86 degrees F). Keep container closed tightly. Throw away any unused medicine after the expiration date.  What should I tell my health care provider before I take this medicine?  They need to know if you have any of these conditions:    asthma or lung disease    glaucoma    high blood pressure or heart disease    liver disease    pain or difficulty passing urine    prostate trouble    ulcers or other stomach problems    an unusual or allergic reaction to diphenhydramine, other medicines foods, dyes, or preservatives such as sulfites    pregnant or trying to get pregnant    breast-feeding  What should I watch for while using this medicine?  Visit your doctor or health care professional for regular check ups. Tell your doctor if your symptoms do not improve or if they get worse.  Your mouth may get dry. Chewing sugarless gum or sucking hard candy, and drinking plenty of water may help. Contact your doctor if the problem does not go away or is severe.  This medicine may cause dry eyes and blurred vision. If you wear contact lenses you may feel some discomfort. Lubricating drops may help. See your eye doctor if the problem does not go away or is severe.  You may get drowsy or dizzy. Do  "not drive, use machinery, or do anything that needs mental alertness until you know how this medicine affects you. Do not stand or sit up quickly, especially if you are an older patient. This reduces the risk of dizzy or fainting spells. Alcohol may interfere with the effect of this medicine. Avoid alcoholic drinks.  NOTE:This sheet is a summary. It may not cover all possible information. If you have questions about this medicine, talk to your doctor, pharmacist, or health care provider. Copyright  2017 [employer]        Contact Dermatitis  Contact dermatitis is a skin rash caused by something that touches the skin and makes it irritated and inflamed. Your skin may be red, swollen, dry, and may be cracked. Blisters may form and ooze. The rash will itch.  Contact dermatitis can form on the face and neck, backs of hands, forearms, genitals, and lower legs.  People can get contact dermatitis from lots of sources. These include:    Plants such as poison ivy, oak, or sumac    Chemicals in hair dyes and rinses, soaps, solvents, waxes, fingernail polish, and deodorants     Jewelry or watchbands made of nickel  Contact dermatitis is not passed from person to person.  Talk with your healthcare provider about what may have caused the rash. A type of allergy testing called \"patch testing\" may be used to discover what you are allergic to. You will need to avoid the source of your rash in the future to prevent it from coming back.  Treatment is done to relieve itching and prevent the rash from coming back. The rash should go away in a few days to a few weeks.  Home care  Your healthcare provider may prescribe medicine to relieve swelling and itching. Follow all instructions when using these medicines.  General care:    Avoid anything that heats up your skin, such as hot showers or baths, or direct sunlight. This can make itching worse.    Apply cold compresses to soothe your sores to help relieve your symptoms. Do this for 30 " minutes 3 to 4 times a day. You can make a cold compress by soaking a cloth in cold water. Squeeze out excess water. You can add colloidal oatmeal to the water to help reduce itching. For severe itching in a small area, apply an ice pack wrapped in a thin towel. Do this for 20 minutes 3 to 4 times a day.    You can also try wet dressings. One way to do this is to wear a wet piece of clothing under a dry one. Wear a damp shirt under a dry shirt if your upper body is affected. This can relieve itching and prevent you from scratching the affected area.    You can also help relieve large areas of itching by taking a lukewarm bath with colloidal oatmeal added to the water.    Use hydrocortisone cream for redness and irritation, unless another medicine was prescribed. You can also use benzocaine anesthetic cream or spray. Calamine lotion can also relieve mild symptoms.    Use oral diphenhydramine to help reduce itching. You can buy this antihistamine at drug and grocery stores. It can make you sleepy, so use lower doses during the daytime. Or you can use loratadine. This is an antihistamine that will not make you sleepy. Do not use diphenhydramine if you have glaucoma or have trouble urinating due to an enlarged prostate.    If a plant causes your rash, make sure to wash your skin and the clothes you were wearing when you came into contact with the plant. This is to wash away the plant oils that gave you the rash and prevent more or worse symptoms.    Stay away from the substance or object that causes your symptoms. If you can t avoid it, wear gloves or some other type of protection.  Follow-up care  Follow up with your healthcare provider, or as advised.  When to seek medical advice  Call your healthcare provider right away if any of these occur:    Spreading of the rash to other parts of your body    Severe swelling of your face, eyelids, mouth, throat or tongue    Trouble urinating due to swelling in the genital area     Fever of 100.4 F (38 C) or higher    Redness or swelling that gets worse    Pain that gets worse    Foul-smelling fluid leaking from the skin    Yellow-brown crusts on the open blisters  Date Last Reviewed: 9/1/2016 2000-2017 [employer]. [address] [place] [id number]. All rights reserved. This information is not intended as a substitute for professional medical care. Always follow your healthcare professional's instructions.

## 2018-03-14 NOTE — PROGRESS NOTES
SUBJECTIVE:   Mihaela Jang is a 67 year old female who presents to clinic today for the following health issues:        Rash      Duration: 3-7-18    Description  Location: right arm from the elbow to wrist  Itching: severe    Intensity:  severe    Accompanying signs and symptoms: red itchy bumps    History (similar episodes/previous evaluation): recent surgery with topical scrub, rash and itchy started after that.    Precipitating or alleviating factors:  New exposures:  Surgical scrub  Recent travel: no      Therapies tried and outcome: OTC hydrocortisone 10 and kenalog cream - mild relief          Problem list and histories reviewed & adjusted, as indicated.  Additional history: as documented    Patient Active Problem List   Diagnosis     Laryngopharyngeal reflux (LPR)     Chronic rhinitis     ETD (eustachian tube dysfunction)     Osteoporosis     Abnormal glucose     Personal history of malignant neoplasm of breast     Neutropenia (H)     Early satiety     chronic neutropenia.     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     Past Surgical History:   Procedure Laterality Date     BONE MARROW BIOPSY      negative     COLONOSCOPY  07/2000     COLONOSCOPY  8/13/2013    DR Fu; repeat 5 years     double mastectomy  03/2004    right sided breast cancer     HC COLONOSCOPY THRU STOMA WITH BIOPSY  08/25/2010    cancer screening, family h/o colon cancer; hyperplastic polyp     HEMORRHOIDECTOMY  1986     RELEASE TRIGGER FINGER Right 3/7/2018    Procedure: RELEASE TRIGGER FINGER;  RELEASE TRIGGER DIGIT RIGHT THUMB;  Surgeon: Jose Alfredo Brewster DO;  Location: [facility]     UPPER GI ENDOSCOPY         Social History   Substance Use Topics     Smoking status: Never Smoker     Smokeless tobacco: Never Used     Alcohol use 0.0 oz/week      0 Standard drinks or equivalent per week      Comment: 1 glasso wine, weekly      Family History   Problem Relation Age of Onset     Dementia Mother      (cause of death)      High cholesterol Mother      Osteoarthritis Mother      C.A.D. Father      (cause of death)      Prostate Cancer Father      Breast Cancer Maternal Aunt 72     CEREBROVASCULAR DISEASE Maternal Grandmother      CVA     DIABETES Maternal Grandmother          Current Outpatient Prescriptions   Medication Sig Dispense Refill     HYDROcodone-acetaminophen (NORCO) 5-325 MG per tablet Take 1-2 tablets by mouth every 4 hours as needed for other (Moderate to Severe Pain) 20 tablet 0     Lutein 20 MG CAPS Take 20 mg by mouth daily        fluticasone (FLONASE) 50 MCG/ACT spray Spray 2 sprays into both nostrils daily 16 g 5     gabapentin (NEURONTIN) 300 MG capsule TAKE TWO CAPSULES BY MOUTH AT BEDTIME 180 capsule 0     Lactobacillus (ACIDOPHILUS) TABS Take 1 tablet by mouth daily       MAGNESIUM OXIDE PO Take 200 mg by mouth daily       zinc 50 MG TABS Take 1 tablet by mouth daily        aspirin EC 81 MG tablet Take 1 tablet (81 mg) by mouth daily 1 tablet 0     Polyethylene Glycol 3350 (MIRALAX PO) Use every other day       cinnamon 500 MG CAPS Take 2 capsules by mouth daily        fish oil-omega-3 fatty acids (FISH OIL) 1000 MG capsule Take 1 g by mouth 2 times daily        CALCIUM CITRATE 1,500 mg two times daily        cholecalciferol 1000 UNITS TABS Take  by mouth. 2 capsules po daily.        Multiple vitamin  s TABS Take 1 tablet by mouth daily.       Turmeric (RA TURMERIC) 500 MG CAPS Take 2 capsules by mouth daily        Allergies   Allergen Reactions     Povidone Iodine Rash     Betadine      Soap Rash     Betadine        Reviewed and updated as needed this visit by clinical staff  Tobacco  Allergies  Meds  Med Hx  Surg Hx  Fam Hx  Soc Hx      Reviewed and updated as needed this visit by Provider         ROS:  CONSTITUTIONAL: NEGATIVE  "for fever, chills, change in weight  INTEGUMENTARY/SKIN: itchy rash to right arm elbow to wrist  RESP: NEGATIVE for significant cough or SOB  CV: NEGATIVE for chest pain, palpitations or peripheral edema    OBJECTIVE:     BP 94/54  Pulse 68  Temp 98.3  F (36.8  C) (Tympanic)  Resp 18  Ht 5' 2\" (1.575 m)  Wt 110 lb (49.9 kg)  SpO2 98%  BMI 20.12 kg/m2  Body mass index is 20.12 kg/(m^2).   GENERAL: healthy, alert and no distress  RESP: lungs clear to auscultation - no rales, rhonchi or wheezes  CV: regular rate and rhythm, normal S1 S2, no S3 or S4, no murmur, click or rub, no peripheral edema and peripheral pulses strong  SKIN: multiple macular/papular areas to right lower arm from elbow to wrist.  PSYCH: mentation appears normal, affect normal/bright    Diagnostic Test Results:  none     ASSESSMENT/PLAN:     1. Allergic contact dermatitis due to drugs in contact with skin  Possible reaction to skin prep from surgery. Mihaela is encouraged to try benadryl for localized itching. Prednisone prescription provided for Mihaela if she does not have any improvement in the rash over the next couple of days or if rash and itching worsens. If rash spreads, she develops SOB or difficulty swallowing she is encouraged to go to the ER.  Mihaela agrees with plan.   - predniSONE (DELTASONE) 20 MG tablet; Take 2 tablets (40 mg) by mouth daily for 5 days  Dispense: 10 tablet; Refill: 0        See Patient Instructions    MICHAEL Weaver [facility] [place]  "

## 2018-03-14 NOTE — MR AVS SNAPSHOT
After Visit Summary   3/14/2018    Mihaela Jang    MRN: [id number]           Patient Information     Date Of Birth          1950        Visit Information        Provider Department      3/14/2018 4:00 PM Hazel Goodman APRN [facility]        Today's Diagnoses     Allergic contact dermatitis due to drugs in contact with skin    -  1      Care Instructions      Diphenhydramine capsules or tablets  Brand Names: [place]ini-[place] Plus Allergy, Aller-G-Time, Banophen, Benadryl Allergy, Benadryl Allergy Dye Free, Benadryl Allergy Kapgel, Benadryl Allergy Ultratab, Diphedryl, Diphenhist, Genahist, PHARBEDRYL, Q-Dryl, Sleepinal, Valu-Dryl  What is this medicine?  DIPHENHYDRAMINE (dye fen GILMA scott) is an antihistamine. It is used to treat the symptoms of an allergic reaction. It is also used to treat Parkinson's disease. This medicine is also used to prevent and to treat motion sickness and as a nighttime sleep aid.  How should I use this medicine?  Take this medicine by mouth with a full glass of water. Follow the directions on the prescription label. Take your doses at regular intervals. Do not take your medicine more often than directed. To prevent motion sickness start taking this medicine 30 to 60 minutes before you leave.  Talk to your pediatrician regarding the use of this medicine in children. Special care may be needed.  Patients over 65 years old may have a stronger reaction and need a smaller dose.  What side effects may I notice from receiving this medicine?  Side effects that you should report to your doctor or health care professional as soon as possible:    allergic reactions like skin rash, itching or hives, swelling of the face, lips, or tongue    changes in vision    confused, agitated, nervous    irregular or fast heartbeat    tremor    trouble passing urine    unusual bleeding or bruising    unusually weak or tired  Side effects that usually do not  require medical attention (report to your doctor or health care professional if they continue or are bothersome):    constipation, diarrhea    drowsy    headache    loss of appetite    stomach upset, vomiting    thick mucous  What may interact with this medicine?  Do not take this medicine with any of the following medications:    MAOIs like Carbex, Eldepryl, Marplan, Nardil, and Parnate  This medicine may also interact with the following medications:    alcohol    barbiturates, like phenobarbital    medicines for bladder spasm like oxybutynin, tolterodine    medicines for blood pressure    medicines for depression, anxiety, or psychotic disturbances    medicines for movement abnormalities or Parkinson's disease    medicines for sleep    other medicines for cold, cough or allergy    some medicines for the stomach like chlordiazepoxide, dicyclomine  What if I miss a dose?  If you miss a dose, take it as soon as you can. If it is almost time for your next dose, take only that dose. Do not take double or extra doses.  Where should I keep my medicine?  Keep out of the reach of children.  Store at room temperature between 15 and 30 degrees C (59 and 86 degrees F). Keep container closed tightly. Throw away any unused medicine after the expiration date.  What should I tell my health care provider before I take this medicine?  They need to know if you have any of these conditions:    asthma or lung disease    glaucoma    high blood pressure or heart disease    liver disease    pain or difficulty passing urine    prostate trouble    ulcers or other stomach problems    an unusual or allergic reaction to diphenhydramine, other medicines foods, dyes, or preservatives such as sulfites    pregnant or trying to get pregnant    breast-feeding  What should I watch for while using this medicine?  Visit your doctor or health care professional for regular check ups. Tell your doctor if your symptoms do not improve or if they get  "worse.  Your mouth may get dry. Chewing sugarless gum or sucking hard candy, and drinking plenty of water may help. Contact your doctor if the problem does not go away or is severe.  This medicine may cause dry eyes and blurred vision. If you wear contact lenses you may feel some discomfort. Lubricating drops may help. See your eye doctor if the problem does not go away or is severe.  You may get drowsy or dizzy. Do not drive, use machinery, or do anything that needs mental alertness until you know how this medicine affects you. Do not stand or sit up quickly, especially if you are an older patient. This reduces the risk of dizzy or fainting spells. Alcohol may interfere with the effect of this medicine. Avoid alcoholic drinks.  NOTE:This sheet is a summary. It may not cover all possible information. If you have questions about this medicine, talk to your doctor, pharmacist, or health care provider. Copyright  2017 [employer]        Contact Dermatitis  Contact dermatitis is a skin rash caused by something that touches the skin and makes it irritated and inflamed. Your skin may be red, swollen, dry, and may be cracked. Blisters may form and ooze. The rash will itch.  Contact dermatitis can form on the face and neck, backs of hands, forearms, genitals, and lower legs.  People can get contact dermatitis from lots of sources. These include:    Plants such as poison ivy, oak, or sumac    Chemicals in hair dyes and rinses, soaps, solvents, waxes, fingernail polish, and deodorants     Jewelry or watchbands made of nickel  Contact dermatitis is not passed from person to person.  Talk with your healthcare provider about what may have caused the rash. A type of allergy testing called \"patch testing\" may be used to discover what you are allergic to. You will need to avoid the source of your rash in the future to prevent it from coming back.  Treatment is done to relieve itching and prevent the rash from coming back. The rash " should go away in a few days to a few weeks.  Home care  Your healthcare provider may prescribe medicine to relieve swelling and itching. Follow all instructions when using these medicines.  General care:    Avoid anything that heats up your skin, such as hot showers or baths, or direct sunlight. This can make itching worse.    Apply cold compresses to soothe your sores to help relieve your symptoms. Do this for 30 minutes 3 to 4 times a day. You can make a cold compress by soaking a cloth in cold water. Squeeze out excess water. You can add colloidal oatmeal to the water to help reduce itching. For severe itching in a small area, apply an ice pack wrapped in a thin towel. Do this for 20 minutes 3 to 4 times a day.    You can also try wet dressings. One way to do this is to wear a wet piece of clothing under a dry one. Wear a damp shirt under a dry shirt if your upper body is affected. This can relieve itching and prevent you from scratching the affected area.    You can also help relieve large areas of itching by taking a lukewarm bath with colloidal oatmeal added to the water.    Use hydrocortisone cream for redness and irritation, unless another medicine was prescribed. You can also use benzocaine anesthetic cream or spray. Calamine lotion can also relieve mild symptoms.    Use oral diphenhydramine to help reduce itching. You can buy this antihistamine at drug and grocery stores. It can make you sleepy, so use lower doses during the daytime. Or you can use loratadine. This is an antihistamine that will not make you sleepy. Do not use diphenhydramine if you have glaucoma or have trouble urinating due to an enlarged prostate.    If a plant causes your rash, make sure to wash your skin and the clothes you were wearing when you came into contact with the plant. This is to wash away the plant oils that gave you the rash and prevent more or worse symptoms.    Stay away from the substance or object that causes your  symptoms. If you can t avoid it, wear gloves or some other type of protection.  Follow-up care  Follow up with your healthcare provider, or as advised.  When to seek medical advice  Call your healthcare provider right away if any of these occur:    Spreading of the rash to other parts of your body    Severe swelling of your face, eyelids, mouth, throat or tongue    Trouble urinating due to swelling in the genital area    Fever of 100.4 F (38 C) or higher    Redness or swelling that gets worse    Pain that gets worse    Foul-smelling fluid leaking from the skin    Yellow-brown crusts on the open blisters  Date Last Reviewed: 9/1/2016 2000-2017 [employer]. [address] [place] [address], [place] [address]. All rights reserved. This information is not intended as a substitute for professional medical care. Always follow your healthcare professional's instructions.                Follow-ups after your visit        Follow-up notes from your care team     Return if symptoms worsen or fail to improve.      Your next 10 appointments already scheduled     Mar 20, 2018  4:20 PM CDT   (Arrive by 4:05 PM)   Post Op with oJse Alfredo Brewster DO    ORTHOPEDICS ([facility] )    [address] [facility] [address] [phone]              Who to contact     If you have questions or need follow up information about today's clinic visit or your schedule please contact [facility] directly at [phone].  Normal or non-critical lab and imaging results will be communicated to you by MyChart, letter or phone within 4 business days after the clinic has received the results. If you do not hear from us within 7 days, please contact the clinic through MyChart or phone. If you have a critical or abnormal lab result, we will notify you by phone as soon as possible.  Submit refill requests through [employer] or call your pharmacy and they will forward the refill request to us. Please allow 3  "business days for your refill to be completed.          Additional Information About Your Visit        MyChart Information     [employer] lets you send messages to your doctor, view your test results, renew your prescriptions, schedule appointments and more. To sign up, go to [email or web address] . Click on \"Log in\" on the left side of the screen, which will take you to the Welcome page. Then click on \"Sign up Now\" on the right side of the page.     You will be asked to enter the access code listed below, as well as some personal information. Please follow the directions to create your username and password.     Your access code is: [email or web address]  Expires: 2018  1:13 PM     Your access code will  in 90 days. If you need help or a new code, please call your [place] clinic or [phone].        Care EveryWhere ID     This is your Care EveryWhere ID. This could be used by other organizations to access your [place] medical records  [id number]        Your Vitals Were     Pulse Temperature Respirations Height Pulse Oximetry BMI (Body Mass Index)    68 98.3  F (36.8  C) (Tympanic) 18 5' 2\" (1.575 m) 98% 20.12 kg/m2       Blood Pressure from Last 3 Encounters:   18 94/54   18 124/70   18 124/60    Weight from Last 3 Encounters:   18 110 lb (49.9 kg)   18 112 lb 3.2 oz (50.9 kg)   18 110 lb (49.9 kg)              Today, you had the following     No orders found for display         Today's Medication Changes          These changes are accurate as of 3/14/18  4:05 PM.  If you have any questions, ask your nurse or doctor.               Start taking these medicines.        Dose/Directions    predniSONE 20 MG tablet   Commonly known as:  DELTASONE   Used for:  Allergic contact dermatitis due to drugs in contact with skin   Started by:  Hazel Goodman APRN CNP        Dose:  40 mg   Take 2 tablets (40 mg) by mouth daily for 5 days   Quantity:  10 tablet   Refills:  0    "         Where to get your medicines      Some of these will need a paper prescription and others can be bought over the counter.  Ask your nurse if you have questions.     Bring a paper prescription for each of these medications     predniSONE 20 MG tablet                Primary Care Provider Office Phone # Fax #    Melyssa oCnnors -[phone] [address] [place] [address]        Equal Access to Services     [facility] : Hadii aad ku hadasho Soomaali, waaxda luqadaha, qaybta kaalmada adeegyada, waxay idiin hayaan adeeg khtimosh la'aan . So [facility] [phone].    ATENCIÓN: Si habla español, tiene a quezada disposición servicios gratuitos de asistencia lingüística. Llame al [phone].    We comply with applicable federal civil rights laws and [place] laws. We do not discriminate on the basis of race, color, national origin, age, disability, sex, sexual orientation, or gender identity.            Thank you!     Thank you for choosing [facility]  for your care. Our goal is always to provide you with excellent care. Hearing back from our patients is one way we can continue to improve our services. Please take a few minutes to complete the written survey that you may receive in the mail after your visit with us. Thank you!             Your Updated Medication List - Protect others around you: Learn how to safely use, store and throw away your medicines at www.disposemymeds.org.          This list is accurate as of 3/14/18  4:05 PM.  Always use your most recent med list.                   Brand Name Dispense Instructions for use Diagnosis    Acidophilus Tabs      Take 1 tablet by mouth daily        aspirin EC 81 MG EC tablet     1 tablet    Take 1 tablet (81 mg) by mouth daily        CALCIUM CITRATE      1,500 mg two times daily        cholecalciferol 1000 UNITS Tabs      Take  by mouth. 2 capsules po daily.        cinnamon 500 MG Caps      Take 2 capsules by mouth daily         fish oil-omega-3 fatty acids 1000 MG capsule      Take 1 g by mouth 2 times daily        fluticasone 50 MCG/ACT spray    FLONASE    16 g    Spray 2 sprays into both nostrils daily    Laryngopharyngeal reflux (LPR), Post-nasal drip, Globus sensation       gabapentin 300 MG capsule    NEURONTIN    180 capsule    TAKE TWO CAPSULES BY MOUTH AT BEDTIME    Paresthesia       HYDROcodone-acetaminophen 5-325 MG per tablet    NORCO    20 tablet    Take 1-2 tablets by mouth every 4 hours as needed for other (Moderate to Severe Pain)    H/O hand surgery       Lutein 20 MG Caps      Take 20 mg by mouth daily        MAGNESIUM OXIDE PO      Take 200 mg by mouth daily        MIRALAX PO      Use every other day        Multiple vitamin  s Tabs      Take 1 tablet by mouth daily.        predniSONE 20 MG tablet    DELTASONE    10 tablet    Take 2 tablets (40 mg) by mouth daily for 5 days    Allergic contact dermatitis due to drugs in contact with skin       RA TURMERIC 500 MG Caps   Generic drug:  Turmeric      Take 2 capsules by mouth daily        zinc 50 MG Tabs      Take 1 tablet by mouth daily

## 2018-03-14 NOTE — NURSING NOTE
"Chief Complaint   Patient presents with     Derm Problem       Initial BP 94/54  Pulse 68  Temp 98.3  F (36.8  C) (Tympanic)  Resp 18  Ht 5' 2\" (1.575 m)  Wt 110 lb (49.9 kg)  SpO2 98%  BMI 20.12 kg/m2 Estimated body mass index is 20.12 kg/(m^2) as calculated from the following:    Height as of this encounter: 5' 2\" (1.575 m).    Weight as of this encounter: 110 lb (49.9 kg).  Medication Reconciliation: complete   Selma Littlejohn      "

## 2018-03-15 ASSESSMENT — ANXIETY QUESTIONNAIRES: GAD7 TOTAL SCORE: 0

## 2018-03-15 ASSESSMENT — PATIENT HEALTH QUESTIONNAIRE - PHQ9: SUM OF ALL RESPONSES TO PHQ QUESTIONS 1-9: 0

## 2018-03-20 ENCOUNTER — OFFICE VISIT (OUTPATIENT)
Dept: ORTHOPEDICS | Facility: OTHER | Age: 68
End: 2018-03-20
Attending: ORTHOPAEDIC SURGERY
Payer: MEDICARE

## 2018-03-20 VITALS
HEART RATE: 60 BPM | HEIGHT: 62 IN | OXYGEN SATURATION: 99 % | TEMPERATURE: 97 F | WEIGHT: 110 LBS | DIASTOLIC BLOOD PRESSURE: 50 MMHG | SYSTOLIC BLOOD PRESSURE: 110 MMHG | BODY MASS INDEX: 20.24 KG/M2

## 2018-03-20 DIAGNOSIS — M79.641 PAIN OF RIGHT HAND: Primary | ICD-10-CM

## 2018-03-20 PROCEDURE — G0463 HOSPITAL OUTPT CLINIC VISIT: HCPCS

## 2018-03-20 PROCEDURE — 99024 POSTOP FOLLOW-UP VISIT: CPT | Performed by: ORTHOPAEDIC SURGERY

## 2018-03-20 ASSESSMENT — PAIN SCALES - GENERAL: PAINLEVEL: NO PAIN (0)

## 2018-03-20 NOTE — MR AVS SNAPSHOT
"              After Visit Summary   3/20/2018    Mihaela Jang    MRN: [id number]           Patient Information     Date Of Birth          1950        Visit Information        Provider Department      3/20/2018 4:20 PM Jose Alfredo Brewster DO  ORTHOPEDICS        Today's Diagnoses     Pain of right hand    -  1       Follow-ups after your visit        Your next 10 appointments already scheduled     Mar 20, 2018  4:20 PM CDT   (Arrive by 4:05 PM)   Post Op with Jose Alfredo Brewster DO    ORTHOPEDICS ([facility] )    [address] [facility] [id number] [phone]              Who to contact     If you have questions or need follow up information about today's clinic visit or your schedule please contact  ORTHOPEDICS directly at [phone].  Normal or non-critical lab and imaging results will be communicated to you by [employer], letter or phone within 4 business days after the clinic has received the results. If you do not hear from us within 7 days, please contact the clinic through [employer] or phone. If you have a critical or abnormal lab result, we will notify you by phone as soon as possible.  Submit refill requests through [employer] or call your pharmacy and they will forward the refill request to us. Please allow 3 business days for your refill to be completed.          Additional Information About Your Visit        MyChart Information     [employer] lets you send messages to your doctor, view your test results, renew your prescriptions, schedule appointments and more. To sign up, go to [email or web address] . Click on \"Log in\" on the left side of the screen, which will take you to the Welcome page. Then click on \"Sign up Now\" on the right side of the page.     You will be asked to enter the access code listed below, as well as some personal information. Please follow the directions to create your username and password.     Your access code is: [email or web address]  Expires: 4/24/2018  " "1:13 PM     Your access code will  in 90 days. If you need help or a new code, please call your [place] clinic or [phone].        Care EveryWhere ID     This is your Care EveryWhere ID. This could be used by other organizations to access your [place] medical records  [id number]        Your Vitals Were     Pulse Temperature Height Pulse Oximetry BMI (Body Mass Index)       60 97  F (36.1  C) (Tympanic) 5' 2\" (1.575 m) 99% 20.12 kg/m2        Blood Pressure from Last 3 Encounters:   18 110/50   18 94/54   18 124/70    Weight from Last 3 Encounters:   18 110 lb (49.9 kg)   18 110 lb (49.9 kg)   18 112 lb 3.2 oz (50.9 kg)              Today, you had the following     No orders found for display         Today's Medication Changes          These changes are accurate as of 3/20/18  4:16 PM.  If you have any questions, ask your nurse or doctor.               Stop taking these medicines if you haven't already. Please contact your care team if you have questions.     HYDROcodone-acetaminophen 5-325 MG per tablet   Commonly known as:  NORCO   Stopped by:  Jose Alfredo Brewster,                     Primary Care Provider Office Phone # Fax #    Melyssa Connors [phone] [address] [facility] [address]        Equal Access to Services     [facility] AH: Hadii patsy avaloso Sosunitha, waaxda luqadaha, qaybta kaalmada rasyada, haile wade. So [facility] [phone].    ATENCIÓN: Si habla español, tiene a quezada disposición servicios gratuitos de asistencia lingüística. Llame al [phone].    We comply with applicable federal civil rights laws and [place] laws. We do not discriminate on the basis of race, color, national origin, age, disability, sex, sexual orientation, or gender identity.            Thank you!     Thank you for choosing  ORTHOPEDICS  for your care. Our goal is always to provide you with excellent care. " Hearing back from our patients is one way we can continue to improve our services. Please take a few minutes to complete the written survey that you may receive in the mail after your visit with us. Thank you!             Your Updated Medication List - Protect others around you: Learn how to safely use, store and throw away your medicines at www.disposemymeds.org.          This list is accurate as of 3/20/18  4:16 PM.  Always use your most recent med list.                   Brand Name Dispense Instructions for use Diagnosis    Acidophilus Tabs      Take 1 tablet by mouth daily        aspirin EC 81 MG EC tablet     1 tablet    Take 1 tablet (81 mg) by mouth daily        CALCIUM CITRATE      1,500 mg two times daily        cholecalciferol 1000 UNITS Tabs      Take  by mouth. 2 capsules po daily.        cinnamon 500 MG Caps      Take 2 capsules by mouth daily        fish oil-omega-3 fatty acids 1000 MG capsule      Take 1 g by mouth 2 times daily        fluticasone 50 MCG/ACT spray    FLONASE    16 g    Spray 2 sprays into both nostrils daily    Laryngopharyngeal reflux (LPR), Post-nasal drip, Globus sensation       gabapentin 300 MG capsule    NEURONTIN    180 capsule    TAKE TWO CAPSULES BY MOUTH AT BEDTIME    Paresthesia       Lutein 20 MG Caps      Take 20 mg by mouth daily        MAGNESIUM OXIDE PO      Take 200 mg by mouth daily        MIRALAX PO      Use every other day        Multiple vitamin  s Tabs      Take 1 tablet by mouth daily.        RA TURMERIC 500 MG Caps   Generic drug:  Turmeric      Take 2 capsules by mouth daily        zinc 50 MG Tabs      Take 1 tablet by mouth daily

## 2018-03-20 NOTE — NURSING NOTE
"Chief Complaint   Patient presents with     Surgical Followup     Right trigger finger 3/7/18       Initial /50  Pulse 60  Temp 97  F (36.1  C) (Tympanic)  Ht 5' 2\" (1.575 m)  Wt 110 lb (49.9 kg)  SpO2 99%  BMI 20.12 kg/m2 Estimated body mass index is 20.12 kg/(m^2) as calculated from the following:    Height as of this encounter: 5' 2\" (1.575 m).    Weight as of this encounter: 110 lb (49.9 kg).  Medication Reconciliation: complete   Tammi Coffey      "

## 2018-03-20 NOTE — PROGRESS NOTES
"Patient presents today for follow-up after trigger thumb release of her right hand that was performed on 3/7/2018.  The wound is healed well, she is neurovascularly intact, she no longer has any catching or popping at the IP joint of her thumb.  She is encouraged to increase use of her thumb as tolerated, and follow-up on an as-needed basis.  She states that since she is had to use her left hand more, she is developing mild but similar symptoms in her left thumb she should continue to monitor this hopefully will resolve as she is able to use her right hand more she is free to follow-up with us at any time for anything that she thinks we may be able to help her with./50  Pulse 60  Temp 97  F (36.1  C) (Tympanic)  Ht 5' 2\" (1.575 m)  Wt 110 lb (49.9 kg)  SpO2 99%  BMI 20.12 kg/m2  "

## 2018-03-29 DIAGNOSIS — L30.9 DERMATITIS: ICD-10-CM

## 2018-03-29 RX ORDER — TRIAMCINOLONE ACETONIDE 1 MG/G
CREAM TOPICAL
Qty: 30 G | Refills: 1 | Status: SHIPPED | OUTPATIENT
Start: 2018-03-29 | End: 2018-07-25

## 2018-03-29 NOTE — TELEPHONE ENCOUNTER
Please see note below.  Kenalog not on current medication list.  Last signed 5/25/17 #30 g, 0 R.  Medication discontinued on 9/12/17 for reason therapy completed.  Please advise.  Thank you.

## 2018-04-30 ENCOUNTER — OFFICE VISIT (OUTPATIENT)
Dept: FAMILY MEDICINE | Facility: OTHER | Age: 68
End: 2018-04-30
Attending: FAMILY MEDICINE
Payer: MEDICARE

## 2018-04-30 VITALS
WEIGHT: 110 LBS | TEMPERATURE: 98 F | SYSTOLIC BLOOD PRESSURE: 122 MMHG | RESPIRATION RATE: 18 BRPM | DIASTOLIC BLOOD PRESSURE: 62 MMHG | HEIGHT: 62 IN | HEART RATE: 61 BPM | OXYGEN SATURATION: 99 % | BODY MASS INDEX: 20.24 KG/M2

## 2018-04-30 DIAGNOSIS — L72.3 INFECTED SEBACEOUS CYST: Primary | ICD-10-CM

## 2018-04-30 DIAGNOSIS — L08.9 INFECTED SEBACEOUS CYST: Primary | ICD-10-CM

## 2018-04-30 PROCEDURE — G0463 HOSPITAL OUTPT CLINIC VISIT: HCPCS | Mod: 25

## 2018-04-30 PROCEDURE — 10060 I&D ABSCESS SIMPLE/SINGLE: CPT | Performed by: FAMILY MEDICINE

## 2018-04-30 PROCEDURE — 87070 CULTURE OTHR SPECIMN AEROBIC: CPT | Mod: ZL | Performed by: FAMILY MEDICINE

## 2018-04-30 PROCEDURE — 99213 OFFICE O/P EST LOW 20 MIN: CPT | Mod: 25 | Performed by: FAMILY MEDICINE

## 2018-04-30 PROCEDURE — G0463 HOSPITAL OUTPT CLINIC VISIT: HCPCS

## 2018-04-30 RX ORDER — CEPHALEXIN 500 MG/1
500 CAPSULE ORAL 2 TIMES DAILY
Qty: 14 CAPSULE | Refills: 0 | Status: SHIPPED | OUTPATIENT
Start: 2018-04-30 | End: 2019-02-06

## 2018-04-30 ASSESSMENT — PAIN SCALES - GENERAL: PAINLEVEL: NO PAIN (1)

## 2018-04-30 ASSESSMENT — ANXIETY QUESTIONNAIRES
7. FEELING AFRAID AS IF SOMETHING AWFUL MIGHT HAPPEN: NOT AT ALL
IF YOU CHECKED OFF ANY PROBLEMS ON THIS QUESTIONNAIRE, HOW DIFFICULT HAVE THESE PROBLEMS MADE IT FOR YOU TO DO YOUR WORK, TAKE CARE OF THINGS AT HOME, OR GET ALONG WITH OTHER PEOPLE: NOT DIFFICULT AT ALL
3. WORRYING TOO MUCH ABOUT DIFFERENT THINGS: NOT AT ALL
5. BEING SO RESTLESS THAT IT IS HARD TO SIT STILL: NOT AT ALL
1. FEELING NERVOUS, ANXIOUS, OR ON EDGE: NOT AT ALL
GAD7 TOTAL SCORE: 0
4. TROUBLE RELAXING: NOT AT ALL
2. NOT BEING ABLE TO STOP OR CONTROL WORRYING: NOT AT ALL
6. BECOMING EASILY ANNOYED OR IRRITABLE: NOT AT ALL

## 2018-04-30 NOTE — PROGRESS NOTES
SUBJECTIVE:                                                    Mihaela Jang is a 68 year old female who presents to clinic today for the following health issues:        cyst      Duration: years    Description (location/character/radiation): inside the right armpit    Intensity:  Varies dependent on the amount of pressure she puts on it.  From moderate to severe    Accompanying signs and symptoms: n/a    History (similar episodes/previous evaluation): has had it for some time but was always flesh colored.    Precipitating or alleviating factors: None    Therapies tried and outcome: None     Has had cyst for quite some time, but past week, red, warm, painful; almost to a head, but not draining      Problem list and histories reviewed & adjusted, as indicated.  Additional history: as documented    Current Outpatient Prescriptions   Medication     aspirin EC 81 MG tablet     CALCIUM CITRATE     cephALEXin (KEFLEX) 500 MG capsule     cholecalciferol 1000 UNITS TABS     cinnamon 500 MG CAPS     fish oil-omega-3 fatty acids (FISH OIL) 1000 MG capsule     fluticasone (FLONASE) 50 MCG/ACT spray     gabapentin (NEURONTIN) 300 MG capsule     Lactobacillus (ACIDOPHILUS) TABS     Lutein 20 MG CAPS     MAGNESIUM OXIDE PO     Multiple vitamin  s TABS     Polyethylene Glycol 3350 (MIRALAX PO)     triamcinolone (KENALOG) 0.1 % cream     Turmeric (RA TURMERIC) 500 MG CAPS     zinc 50 MG TABS     No current facility-administered medications for this visit.        Patient Active Problem List   Diagnosis     Laryngopharyngeal reflux (LPR)     Chronic rhinitis     ETD (eustachian tube dysfunction)     Osteoporosis     Abnormal glucose     Personal history of malignant neoplasm of breast     Neutropenia (H)     Early satiety     chronic neutropenia.     Hypertrophic soft palate     ACP (advance care planning)     Atypical nevus     Skin sensation disturbance     Notalgia paresthetica     Hyperlipidemia, unspecified  "hyperlipidemia type     probably LEFT first branchial cleft cyst     Past Surgical History:   Procedure Laterality Date     BONE MARROW BIOPSY      negative     COLONOSCOPY  07/2000     COLONOSCOPY  8/13/2013    DR Fu; repeat 5 years     double mastectomy  03/2004    right sided breast cancer     HC COLONOSCOPY THRU STOMA WITH BIOPSY  08/25/2010    cancer screening, family h/o colon cancer; hyperplastic polyp     HEMORRHOIDECTOMY  1986     RELEASE TRIGGER FINGER Right 3/7/2018    Procedure: RELEASE TRIGGER FINGER;  RELEASE TRIGGER DIGIT RIGHT THUMB;  Surgeon: Jose Alfredo Brewster DO;  Location: [facility]     UPPER GI ENDOSCOPY         Social History   Substance Use Topics     Smoking status: Never Smoker     Smokeless tobacco: Never Used     Alcohol use 0.0 oz/week     0 Standard drinks or equivalent per week      Comment: 1 glasso wine, weekly      Family History   Problem Relation Age of Onset     Dementia Mother      (cause of death)      High cholesterol Mother      Osteoarthritis Mother      C.A.D. Father      (cause of death)      Prostate Cancer Father      Breast Cancer Maternal Aunt 72     CEREBROVASCULAR DISEASE Maternal Grandmother      CVA     DIABETES Maternal Grandmother            ROS:  CONSTITUTIONAL:NEGATIVE for fever, chills, change in weight  INTEGUMENTARY/SKIN: POSITIVE for as above    OBJECTIVE:     /62 (BP Location: Right arm, Patient Position: Sitting, Cuff Size: Adult Regular)  Pulse 61  Temp 98  F (36.7  C) (Tympanic)  Resp 18  Ht 5' 2\" (1.575 m)  Wt 110 lb (49.9 kg)  SpO2 99%  BMI 20.12 kg/m2  Body mass index is 20.12 kg/(m^2).  GENERAL: healthy, alert and no distress  CV: regular rate, rhythm  RESP: clear to ascultatoin  SKIN: right axilla with 2cm cyst with overlying erythema, warmth, tenderness  PSYCH: mentation appears normal, affect normal/bright      ASSESSMENT/PLAN:     (L72.3,  L08.9) Infected sebaceous cyst  (primary encounter diagnosis)  Plan: cephALEXin (KEFLEX) 500 MG " capsule, Skin         Culture Aerobic Bacterial, DRAIN SKIN ABSCESS         SIMPLE [[id number]]      Melyssa Encinas MD  [facility] [place]        Subjective: Mihaela Jang a 68 year old female who presents today for incision and drainage.  The lesion(s) is/are located on the right axilla, number 1 and measures 2cm. The patient reports the lesion is painfull, red, swollen, and denies other significant symptoms on ROS. Medications reviewed.    Pause for the cause has been completed prior to the prceedure.   1. Mihaela was identified by both name and date of birth   2. The correct site was identified   3. Site was marked by provider    4. Written informed consent correct and signed or verbal authorization  to proceed was obtained   5. Verifed necessary supplies, equipment, and diagnostics are available    6. Time out was performed immediately prior to procedure    Objective: The lesion(s) is/are of the above mentioned size and location and is/are erythematous. The area was prepped and appropriately anesthetized with lidoaine. Using the usual technique, incision and drainage of abscess was performed. An appropriate dressing was applied. The procedure was well tolerated and without complications.     Assessment: sebaceous cyst, infected    Plan: Follow up: The patient may return prn.. Wound care instructions provided.  Patient was instructed to be alert for any signs of cutaneous infection.

## 2018-04-30 NOTE — PATIENT INSTRUCTIONS
Epidermoid Cyst (Sebaceous Cyst), Infected (Antibiotic Treatment)  You have an epidermoid cyst. This is a small, painless lump under your skin. An epidermoid cyst (often called a sebaceous cyst, epidermal cyst, or epidermal inclusion cyst) is a term most often used for 2 similar types of cysts:    Epidermoid cysts. These cysts form slowly under the skin. They can be found on most parts of the body. But they are most often found on areas with more hair such as the scalp, face, upper back, and genitals.    Pilar cysts. These are similar to epidermoid cysts. But they start from a different part of the hair follicle. They are more likely to be on the scalp.  Here are some general facts about these cysts:    A cyst is a sac filled with material that is often cheesy, fatty, oily, or stringy. The material inside can be thick. Or it can be a liquid.    You can usually move the cyst slightly if you try.    The cysts can be smaller than a pea or as large as a few inches.    The cysts are usually not painful, unless they become inflamed or infected.    The area around the cyst may smell bad. If the cyst breaks open, the material inside it often smells bad as well.  Your cyst became infected and your healthcare provider wanted to treat it with antibiotics. If the antibiotics don t clear up the infection, the cyst will need to be drained by making a small cut (incision). Local anesthesia will be used to numb the area.  Home care    Resist the temptation to squeeze or pop the cyst, stick a needle in it, or cut it open. This often leads to a worsening infection and scarring.    Take the antibiotic as directed until it is all used up.    Soak the affected area in hot water or apply a hot pack (a thin, clean towel soaked in hot water) for 20 minutes at a time. Do this 3 to 4 times a day.    Apply antibiotic cream or ointment 3 times a day.    You may use over-the-counter pain medicine to control pain, unless another medicine was  given. If you have chronic liver or kidney disease or ever had a stomach ulcer or GI bleeding, talk with your healthcare provider before using these medicines.  Prevention  Once this infection has healed, reduce the risk of future infections by:    Keeping the cyst area clean by bathing or showering daily    Avoiding tight-fitting clothing in the cyst area  Follow-up care  Follow up with your healthcare provider, or as advised. If a gauze packing was put in your wound, it should be removed in a few days as advised by your healthcare provider. Check your wound every day for the signs listed below.  When to seek medical advice  Call your healthcare provider right away if any of these occur:    Pus coming from the cyst    Increasing redness around the wound    Increasing local pain or swelling    Fever of 100.4 F (38 C) or higher, or as directed by your provider  Date Last Reviewed: 10/5/2016    7683-5507 [employer]. [address] [place] [address]. All rights reserved. This information is not intended as a substitute for professional medical care. Always follow your healthcare professional's instructions.      Complete antibiotic course.  If recurring, please call for general surgery referral.

## 2018-04-30 NOTE — NURSING NOTE
"Chief Complaint   Patient presents with     Derm Problem     cyst       Initial /62 (BP Location: Right arm, Patient Position: Sitting, Cuff Size: Adult Regular)  Pulse 61  Temp 98  F (36.7  C) (Tympanic)  Resp 18  Ht 5' 2\" (1.575 m)  Wt 110 lb (49.9 kg)  SpO2 99%  BMI 20.12 kg/m2 Estimated body mass index is 20.12 kg/(m^2) as calculated from the following:    Height as of this encounter: 5' 2\" (1.575 m).    Weight as of this encounter: 110 lb (49.9 kg).  Medication Reconciliation: complete     Elsa Swartz    "

## 2018-04-30 NOTE — MR AVS SNAPSHOT
After Visit Summary   4/30/2018    Mihaela Jang    MRN: [id number]           Patient Information     Date Of Birth          1950        Visit Information        Provider Department      4/30/2018 2:00 PM Melyssa Connors MD [facility]        Today's Diagnoses     Infected sebaceous cyst    -  1      Care Instructions      Epidermoid Cyst (Sebaceous Cyst), Infected (Antibiotic Treatment)  You have an epidermoid cyst. This is a small, painless lump under your skin. An epidermoid cyst (often called a sebaceous cyst, epidermal cyst, or epidermal inclusion cyst) is a term most often used for 2 similar types of cysts:    Epidermoid cysts. These cysts form slowly under the skin. They can be found on most parts of the body. But they are most often found on areas with more hair such as the scalp, face, upper back, and genitals.    Pilar cysts. These are similar to epidermoid cysts. But they start from a different part of the hair follicle. They are more likely to be on the scalp.  Here are some general facts about these cysts:    A cyst is a sac filled with material that is often cheesy, fatty, oily, or stringy. The material inside can be thick. Or it can be a liquid.    You can usually move the cyst slightly if you try.    The cysts can be smaller than a pea or as large as a few inches.    The cysts are usually not painful, unless they become inflamed or infected.    The area around the cyst may smell bad. If the cyst breaks open, the material inside it often smells bad as well.  Your cyst became infected and your healthcare provider wanted to treat it with antibiotics. If the antibiotics don t clear up the infection, the cyst will need to be drained by making a small cut (incision). Local anesthesia will be used to numb the area.  Home care    Resist the temptation to squeeze or pop the cyst, stick a needle in it, or cut it open. This often leads to a worsening infection and  scarring.    Take the antibiotic as directed until it is all used up.    Soak the affected area in hot water or apply a hot pack (a thin, clean towel soaked in hot water) for 20 minutes at a time. Do this 3 to 4 times a day.    Apply antibiotic cream or ointment 3 times a day.    You may use over-the-counter pain medicine to control pain, unless another medicine was given. If you have chronic liver or kidney disease or ever had a stomach ulcer or GI bleeding, talk with your healthcare provider before using these medicines.  Prevention  Once this infection has healed, reduce the risk of future infections by:    Keeping the cyst area clean by bathing or showering daily    Avoiding tight-fitting clothing in the cyst area  Follow-up care  Follow up with your healthcare provider, or as advised. If a gauze packing was put in your wound, it should be removed in a few days as advised by your healthcare provider. Check your wound every day for the signs listed below.  When to seek medical advice  Call your healthcare provider right away if any of these occur:    Pus coming from the cyst    Increasing redness around the wound    Increasing local pain or swelling    Fever of 100.4 F (38 C) or higher, or as directed by your provider  Date Last Reviewed: 10/5/2016    0146-3532 [employer]. [address] [place] [address], [place] [address]. All rights reserved. This information is not intended as a substitute for professional medical care. Always follow your healthcare professional's instructions.      Complete antibiotic course.  If recurring, please call for general surgery referral.            Follow-ups after your visit        Who to contact     If you have questions or need follow up information about today's clinic visit or your schedule please contact [facility] directly at [phone].  Normal or non-critical lab and imaging results will be communicated to you by MyChart, letter or phone within 4  "business days after the clinic has received the results. If you do not hear from us within 7 days, please contact the clinic through [employer] or phone. If you have a critical or abnormal lab result, we will notify you by phone as soon as possible.  Submit refill requests through [employer] or call your pharmacy and they will forward the refill request to us. Please allow 3 business days for your refill to be completed.          Additional Information About Your Visit        [employer] Information     [employer] lets you send messages to your doctor, view your test results, renew your prescriptions, schedule appointments and more. To sign up, go to [email or web address] . Click on \"Log in\" on the left side of the screen, which will take you to the Welcome page. Then click on \"Sign up Now\" on the right side of the page.     You will be asked to enter the access code listed below, as well as some personal information. Please follow the directions to create your username and password.     Your access code is: [email or web address]  Expires: 2018  2:34 PM     Your access code will  in 90 days. If you need help or a new code, please call your [facility] clinic or [phone].        Care EveryWhere ID     This is your Care EveryWhere ID. This could be used by other organizations to access your [facility] medical records  [id number]        Your Vitals Were     Pulse Temperature Respirations Height Pulse Oximetry BMI (Body Mass Index)    61 98  F (36.7  C) (Tympanic) 18 5' 2\" (1.575 m) 99% 20.12 kg/m2       Blood Pressure from Last 3 Encounters:   18 122/62   18 110/50   18 94/54    Weight from Last 3 Encounters:   18 110 lb (49.9 kg)   18 110 lb (49.9 kg)   18 110 lb (49.9 kg)              We Performed the Following     DRAIN SKIN ABSCESS SIMPLE [[id number]]     Skin Culture Aerobic Bacterial          Today's Medication Changes          These changes are accurate as of 18  2:34 PM.  If you have " any questions, ask your nurse or doctor.               Start taking these medicines.        Dose/Directions    cephALEXin 500 MG capsule   Commonly known as:  KEFLEX   Used for:  Infected sebaceous cyst   Started by:  Melyssa Connors MD        Dose:  500 mg   Take 1 capsule (500 mg) by mouth 2 times daily for 7 days   Quantity:  14 capsule   Refills:  0            Where to get your medicines      These medications were sent to [id number] - [place], [place] - [address]   [id number] , [place] [address]     Phone:  [phone]     cephALEXin 500 MG capsule                Primary Care Provider Office Phone # Fax #    Melyssa Connors [phone]       [address]  [place] [address]        Equal Access to Services     [facility]: Hadii patsy zhang hadasho Soomaali, waaxda luqadaha, qaybta kaalmada adeegyada, waxay idiin haygladys fernández . So [facility] [phone].    ATENCIÓN: Si habla español, tiene a quezada disposición servicios gratuitos de asistencia lingüística. [facility] [phone].    We comply with applicable federal civil rights laws and [place] laws. We do not discriminate on the basis of race, color, national origin, age, disability, sex, sexual orientation, or gender identity.            Thank you!     Thank you for choosing [facility]  for your care. Our goal is always to provide you with excellent care. Hearing back from our patients is one way we can continue to improve our services. Please take a few minutes to complete the written survey that you may receive in the mail after your visit with us. Thank you!             Your Updated Medication List - Protect others around you: Learn how to safely use, store and throw away your medicines at www.disposemymeds.org.          This list is accurate as of 4/30/18  2:34 PM.  Always use your most recent med list.                   Brand Name Dispense Instructions for use Diagnosis    Acidophilus Tabs       Take 1 tablet by mouth daily        aspirin EC 81 MG EC tablet     1 tablet    Take 1 tablet (81 mg) by mouth daily        CALCIUM CITRATE      1,500 mg two times daily        cephALEXin 500 MG capsule    KEFLEX    14 capsule    Take 1 capsule (500 mg) by mouth 2 times daily for 7 days    Infected sebaceous cyst       cholecalciferol 1000 units Tabs      Take  by mouth. 2 capsules po daily.        cinnamon 500 MG Caps      Take 2 capsules by mouth daily        fish oil-omega-3 fatty acids 1000 MG capsule      Take 1 g by mouth 2 times daily        fluticasone 50 MCG/ACT spray    FLONASE    16 g    Spray 2 sprays into both nostrils daily    Laryngopharyngeal reflux (LPR), Post-nasal drip, Globus sensation       gabapentin 300 MG capsule    NEURONTIN    180 capsule    TAKE TWO CAPSULES BY MOUTH AT BEDTIME    Paresthesia       Lutein 20 MG Caps      Take 20 mg by mouth daily        MAGNESIUM OXIDE PO      Take 200 mg by mouth daily        MIRALAX PO      Use every other day        Multiple vitamin  s Tabs      Take 1 tablet by mouth daily.        RA TURMERIC 500 MG Caps   Generic drug:  Turmeric      Take 2 capsules by mouth daily        triamcinolone 0.1 % cream    KENALOG    30 g    SPARINGLY APPLY  CREAM EXTERNALLY TO AFFECTED AREA THREE TIMES DAILY FOR 14 DAYS    Dermatitis       zinc 50 MG Tabs      Take 1 tablet by mouth daily

## 2018-05-01 ENCOUNTER — TELEPHONE (OUTPATIENT)
Dept: FAMILY MEDICINE | Facility: OTHER | Age: 68
End: 2018-05-01

## 2018-05-01 ASSESSMENT — PATIENT HEALTH QUESTIONNAIRE - PHQ9: SUM OF ALL RESPONSES TO PHQ QUESTIONS 1-9: 0

## 2018-05-01 ASSESSMENT — ANXIETY QUESTIONNAIRES: GAD7 TOTAL SCORE: 0

## 2018-05-01 NOTE — TELEPHONE ENCOUNTER
2:25 PM    Reason for Call: Phone Call    Description: Pt stated she was seen yesterday for a cyst. She was told if it comes back she should call and referral would be put in for surgery to remove it. Pt wondering if she should just do this right away? Please call her back at [phone]. Pt will be gone most of the day on Wed and asked if nurse could call her on Thurs instead    Was an appointment offered for this call? No  If yes : Appointment type              Date    Preferred method for responding to this message: Telephone Call  What is your phone number ?    If we cannot reach you directly, may we leave a detailed response at the number you provided? No    Can this message wait until your PCP/provider returns, if available today? YES, pt aware provider out today    Marysol Mack

## 2018-05-02 ENCOUNTER — ALLIED HEALTH/NURSE VISIT (OUTPATIENT)
Dept: FAMILY MEDICINE | Facility: OTHER | Age: 68
End: 2018-05-02
Attending: FAMILY MEDICINE
Payer: MEDICARE

## 2018-05-02 ENCOUNTER — TELEPHONE (OUTPATIENT)
Dept: FAMILY MEDICINE | Facility: OTHER | Age: 68
End: 2018-05-02

## 2018-05-02 DIAGNOSIS — L72.3 SEBACEOUS CYST OF RIGHT AXILLA: Primary | ICD-10-CM

## 2018-05-02 LAB
BACTERIA SPEC CULT: NORMAL
SPECIMEN SOURCE: NORMAL

## 2018-05-02 NOTE — TELEPHONE ENCOUNTER
This writer did attempt to call but patient does not have an answering machine.  Will try tomorrow per patient request.

## 2018-05-02 NOTE — TELEPHONE ENCOUNTER
Patient came in today for a nurse only visit to have a referral for general surgery.  Referral placed for you.

## 2018-05-16 ENCOUNTER — OFFICE VISIT (OUTPATIENT)
Dept: SURGERY | Facility: OTHER | Age: 68
End: 2018-05-16
Attending: FAMILY MEDICINE
Payer: COMMERCIAL

## 2018-05-16 VITALS
BODY MASS INDEX: 20.24 KG/M2 | HEIGHT: 62 IN | TEMPERATURE: 98 F | OXYGEN SATURATION: 98 % | HEART RATE: 60 BPM | SYSTOLIC BLOOD PRESSURE: 116 MMHG | DIASTOLIC BLOOD PRESSURE: 60 MMHG | WEIGHT: 110 LBS

## 2018-05-16 DIAGNOSIS — L72.3 SEBACEOUS CYST OF RIGHT AXILLA: ICD-10-CM

## 2018-05-16 PROCEDURE — G0463 HOSPITAL OUTPT CLINIC VISIT: HCPCS

## 2018-05-16 PROCEDURE — 99213 OFFICE O/P EST LOW 20 MIN: CPT | Performed by: SURGERY

## 2018-05-16 ASSESSMENT — PAIN SCALES - GENERAL: PAINLEVEL: NO PAIN (1)

## 2018-05-16 NOTE — PROGRESS NOTES
"[facility] Surgery Consultation    CC:  Sebaceous cyst    HPI:  This 68 year old year old female is seen at the request of Melyssa Connors for evaluation of sebaceous cyst.  The history is obtained from the patient, and reviewing the medical record.  She is good medical historian. She says that recently she developed swelling to her right axilla. She had noticed a lump there for many years but has never had it become red and inflamed. She saw her primary care who lanced the cyst and drained out purulent discharge/white material. She was then placed on antibiotics. Since that time she says she has been feeling good with no problems. She has not had any further pain in the area and says the lump is basically gone at this point.     She then goes on to ask about a lesion she has on her left ear lobe. She says its tender and concerned about an open wound. She says she has problems laying on it and it is very sore.     She also mentions a concern about a \"swelling\" along her lateral aspect of her right pectoralis. She has not pain here and no problems but was concerned about the swelling and possibly aspirating out fluid.    Past Medical History:   Diagnosis Date     Atypical nevi      Chondrodermatitis nodularis helicis of left ear     [facility]     Chronic rhinitis     [facility] allergy; negative skin testing; IgE mediated allergies; ENT - DC nasal steroid and antihistamine; saline rinses     Degenerative skin disorder     solar elastosis     Hallux rigidus 06/15/2000     Hyperlipidemia, unspecified hyperlipidemia type 9/15/2016     Laryngopharyngeal reflux disease     PPI     Malignant neoplasm of breast (female), unspecified site 03/10/2004     Notalgia paresthetica      Osteoarthritis 07/20/2011     Osteoporosis, unspecified 09/10/2001    Dr Moses; intermittent reclast     Other abnormal glucose 12/20/2013     Paresthesia     neck; notalgiaparesthetic; dr leahy & Dr Nevarez; neurontin "     Personal history of malignant neoplasm of breast 06/07/2005     Rhinitis      Schamberg's purpura        Past Surgical History:   Procedure Laterality Date     BONE MARROW BIOPSY      negative     COLONOSCOPY  07/2000     COLONOSCOPY  8/13/2013    DR Fu; repeat 5 years     double mastectomy  03/2004    right sided breast cancer     HC COLONOSCOPY THRU STOMA WITH BIOPSY  08/25/2010    cancer screening, family h/o colon cancer; hyperplastic polyp     HEMORRHOIDECTOMY  1986     RELEASE TRIGGER FINGER Right 3/7/2018    Procedure: RELEASE TRIGGER FINGER;  RELEASE TRIGGER DIGIT RIGHT THUMB;  Surgeon: Jose Alfredo Brewster DO;  Location: [facility]     UPPER GI ENDOSCOPY         Pt denied problems with bleeding or anesthesia    Prior to Admission medications    Medication Sig Start Date End Date Taking? Authorizing Provider   aspirin EC 81 MG tablet Take 1 tablet (81 mg) by mouth daily 5/19/15  Yes Tamera Proctor NP   CALCIUM CITRATE 1,500 mg two times daily    Yes Reported, Patient   cholecalciferol 1000 UNITS TABS Take  by mouth. 2 capsules po daily.    Yes Reported, Patient   cinnamon 500 MG CAPS Take 2 capsules by mouth daily    Yes Reported, Patient   fish oil-omega-3 fatty acids (FISH OIL) 1000 MG capsule Take 1 g by mouth 2 times daily    Yes Reported, Patient   fluticasone (FLONASE) 50 MCG/ACT spray Spray 2 sprays into both nostrils daily 9/12/17  Yes Sejal Gutierrez PA-C   gabapentin (NEURONTIN) 300 MG capsule TAKE TWO CAPSULES BY MOUTH AT BEDTIME 4/19/17  Yes Melyssa Connors MD   Lactobacillus (ACIDOPHILUS) TABS Take 1 tablet by mouth daily   Yes Reported, Patient   Lutein 20 MG CAPS Take 20 mg by mouth daily    Yes Reported, Patient   MAGNESIUM OXIDE PO Take 200 mg by mouth daily   Yes Reported, Patient   Multiple vitamin  s TABS Take 1 tablet by mouth daily.   Yes Reported, Patient   Polyethylene Glycol 3350 (MIRALAX PO) Use every other day   Yes Reported, Patient   Turmeric (RA TURMERIC) 500 MG CAPS  "Take 2 capsules by mouth daily    Yes Reported, Patient   zinc 50 MG TABS Take 1 tablet by mouth daily    Yes Reported, Patient   triamcinolone (KENALOG) 0.1 % cream SPARINGLY APPLY  CREAM EXTERNALLY TO AFFECTED AREA THREE TIMES DAILY FOR 14 DAYS  Patient not taking: Reported on 5/16/2018 3/29/18   Melyssa Connors MD          Allergies   Allergen Reactions     Chloraprep One Step Rash     Povidone Iodine Rash     Betadine      Soap Rash     Betadine          HABITS:    Social History   Substance Use Topics     Smoking status: Never Smoker     Smokeless tobacco: Never Used     Alcohol use 0.0 oz/week     0 Standard drinks or equivalent per week      Comment: 1 glasso wine, weekly      No mood altering drug use.    Family History   Problem Relation Age of Onset     Dementia Mother      (cause of death)      High cholesterol Mother      Osteoarthritis Mother      C.A.D. Father      (cause of death)      Prostate Cancer Father      Breast Cancer Maternal Aunt 72     CEREBROVASCULAR DISEASE Maternal Grandmother      CVA     DIABETES Maternal Grandmother        REVIEW OF SYSTEMS:  Ten point review of systems negative except those mentioned in the HPI.     The patient denies sleep apnea, latex allergies or MRSA    OBJECTIVE:    /60 (BP Location: Left arm, Patient Position: Sitting, Cuff Size: Adult Regular)  Pulse 60  Temp 98  F (36.7  C) (Tympanic)  Ht 5' 2\" (1.575 m)  Wt 110 lb (49.9 kg)  SpO2 98%  BMI 20.12 kg/m2    GENERAL: Generally appears well, in no distress with appropriate affect.  HEENT:   Sclerae anicteric - No cervical, supra/infraclavicular lymphadenopathy, no thyroid masses. Left ear lobe there was no open wound, the previous biopsy site appears well healed with no exposed subcutaneous tissue or cartilage. Non-tender to paplation  Respiratory:  Lungs clear to ausculation bilaterally with good air excursion  Cardiovascular:  Regular Rate and Rhythm with no murmurs gallops or rubs, normal " "  Extremities:  Extremities normal. No deformities, edema, or skin discoloration.  Skin:  no suspicious lesions or rashes, right axilla previous cyst nonpalpable at this point. Incision well healed with no erythema or drainage. Right lateral aspect of the mastectomy site overlying the pectoralis muscle there was no fluctuance, mass or abnormality. The \"swelling\" is soft subcutaneous tissue  Neurological: grossly intact    Psych:  Alert, oriented, affect appropriate with normal decision making ability.      IMPRESSION:  68 year old female with a sebaceous cyst    PLAN:  I discussed that at this time the sebaceous cyst appears to be well healed and drained. There is minimal palpable abnormality. As the lesion has been fully drained I would not recommend further treatment as I would have to excise the surrounding skin and subcutaneous tissue. If she does develop a recurrent sebaceous cyst then we can re-evaluate possible excision.     As for the patients earlobe I recommend that she continue to follow her dermatologists recommendations. I discussed that I do not perform operations on the ear and I would not perform any excision.    Reassurance was given regarding the patients soft tissue swelling along her lateral aspect of the right mastectomy site.     All questions and concerns were addressed.    Thank you for allowing me to participate in the care of your patient.             Ajit Uriarte MD    5/16/2018  5:44 PM    cc:  Melyssa Connors    "

## 2018-05-16 NOTE — MR AVS SNAPSHOT
"              After Visit Summary   2018    Mihaela Jang    MRN: [id number]           Patient Information     Date Of Birth          1950        Visit Information        Provider Department      2018 3:00 PM Ajit Uriarte MD [facility]        Today's Diagnoses     Sebaceous cyst of right axilla           Follow-ups after your visit        Who to contact     If you have questions or need follow up information about today's clinic visit or your schedule please contact [facility] directly at [phone].  Normal or non-critical lab and imaging results will be communicated to you by [employer], letter or phone within 4 business days after the clinic has received the results. If you do not hear from us within 7 days, please contact the clinic through [employer] or phone. If you have a critical or abnormal lab result, we will notify you by phone as soon as possible.  Submit refill requests through [employer] or call your pharmacy and they will forward the refill request to us. Please allow 3 business days for your refill to be completed.          Additional Information About Your Visit        MyChart Information     [employer] lets you send messages to your doctor, view your test results, renew your prescriptions, schedule appointments and more. To sign up, go to [email or web address] . Click on \"Log in\" on the left side of the screen, which will take you to the Welcome page. Then click on \"Sign up Now\" on the right side of the page.     You will be asked to enter the access code listed below, as well as some personal information. Please follow the directions to create your username and password.     Your access code is: [email or web address]  Expires: 2018  2:34 PM     Your access code will  in 90 days. If you need help or a new code, please call your [facility] or [phone].        Care EveryWhere ID     This is your Care EveryWhere ID. This could be used by " "other organizations to access your [place] medical records  [id number]        Your Vitals Were     Pulse Temperature Height Pulse Oximetry BMI (Body Mass Index)       60 98  F (36.7  C) (Tympanic) 5' 2\" (1.575 m) 98% 20.12 kg/m2        Blood Pressure from Last 3 Encounters:   05/16/18 116/60   04/30/18 122/62   03/20/18 110/50    Weight from Last 3 Encounters:   05/16/18 110 lb (49.9 kg)   04/30/18 110 lb (49.9 kg)   03/20/18 110 lb (49.9 kg)              Today, you had the following     No orders found for display       Primary Care Provider Office Phone # Fax #    Melyssa Connors [phone]  [facility] [address]        Equal Access to Services     [facility] : Hadii patsy zhang hadasho Soomaali, waaxda luqadaha, qaybta kaalmada adeegyada, haile fernnádez . So [facility] [phone].    ATENCIÓN: Si habla español, tiene a quezada disposición servicios gratuitos de asistencia lingüística. Llame al [phone].    We comply with applicable federal civil rights laws and [place] laws. We do not discriminate on the basis of race, color, national origin, age, disability, sex, sexual orientation, or gender identity.            Thank you!     Thank you for choosing [facility]  for your care. Our goal is always to provide you with excellent care. Hearing back from our patients is one way we can continue to improve our services. Please take a few minutes to complete the written survey that you may receive in the mail after your visit with us. Thank you!             Your Updated Medication List - Protect others around you: Learn how to safely use, store and throw away your medicines at www.disposemymeds.org.          This list is accurate as of 5/16/18 11:59 PM.  Always use your most recent med list.                   Brand Name Dispense Instructions for use Diagnosis    Acidophilus Tabs      Take 1 tablet by mouth daily        aspirin 81 MG EC tablet "     1 tablet    Take 1 tablet (81 mg) by mouth daily        CALCIUM CITRATE      1,500 mg two times daily        cholecalciferol 1000 units Tabs      Take  by mouth. 2 capsules po daily.        cinnamon 500 MG Caps      Take 2 capsules by mouth daily        fish oil-omega-3 fatty acids 1000 MG capsule      Take 1 g by mouth 2 times daily        fluticasone 50 MCG/ACT spray    FLONASE    16 g    Spray 2 sprays into both nostrils daily    Laryngopharyngeal reflux (LPR), Post-nasal drip, Globus sensation       gabapentin 300 MG capsule    NEURONTIN    180 capsule    TAKE TWO CAPSULES BY MOUTH AT BEDTIME    Paresthesia       Lutein 20 MG Caps      Take 20 mg by mouth daily        MAGNESIUM OXIDE PO      Take 200 mg by mouth daily        MIRALAX PO      Use every other day        Multiple vitamin  s Tabs      Take 1 tablet by mouth daily.        RA TURMERIC 500 MG Caps   Generic drug:  Turmeric      Take 2 capsules by mouth daily        triamcinolone 0.1 % cream    KENALOG    30 g    SPARINGLY APPLY  CREAM EXTERNALLY TO AFFECTED AREA THREE TIMES DAILY FOR 14 DAYS    Dermatitis       zinc 50 MG Tabs      Take 1 tablet by mouth daily

## 2018-05-16 NOTE — NURSING NOTE
"Chief Complaint   Patient presents with     Consult     Sebaceous cyst on right axilla.       Initial /60 (BP Location: Left arm, Patient Position: Sitting, Cuff Size: Adult Regular)  Pulse 60  Temp 98  F (36.7  C) (Tympanic)  Ht 5' 2\" (1.575 m)  Wt 110 lb (49.9 kg)  SpO2 98%  BMI 20.12 kg/m2 Estimated body mass index is 20.12 kg/(m^2) as calculated from the following:    Height as of this encounter: 5' 2\" (1.575 m).    Weight as of this encounter: 110 lb (49.9 kg).  Medication Reconciliation: complete    Naomi Poole LPN    "

## 2018-07-03 ENCOUNTER — TELEPHONE (OUTPATIENT)
Dept: INFUSION THERAPY | Facility: OTHER | Age: 68
End: 2018-07-03

## 2018-07-03 NOTE — TELEPHONE ENCOUNTER
Our records show this patient is due for her third Reclast. The last infusion was given on 6/2/17. Would you like her to receive another Reclast infusion?     Carina Jha RN

## 2018-07-06 RX ORDER — ZOLEDRONIC ACID 5 MG/100ML
5 INJECTION, SOLUTION INTRAVENOUS ONCE
Status: CANCELLED
Start: 2018-07-06 | End: 2018-07-06

## 2018-07-10 NOTE — TELEPHONE ENCOUNTER
Reclast therapy plan updated, TORB from Dr. Melyssa Connors. Patient to be scheduled, waiting for return phone call.

## 2018-07-25 ENCOUNTER — INFUSION THERAPY VISIT (OUTPATIENT)
Dept: INFUSION THERAPY | Facility: OTHER | Age: 68
End: 2018-07-25
Attending: FAMILY MEDICINE
Payer: COMMERCIAL

## 2018-07-25 ENCOUNTER — OFFICE VISIT (OUTPATIENT)
Dept: SURGERY | Facility: OTHER | Age: 68
End: 2018-07-25
Attending: SURGERY
Payer: MEDICARE

## 2018-07-25 VITALS
OXYGEN SATURATION: 97 % | SYSTOLIC BLOOD PRESSURE: 104 MMHG | WEIGHT: 113 LBS | HEIGHT: 62 IN | TEMPERATURE: 97.7 F | DIASTOLIC BLOOD PRESSURE: 62 MMHG | HEART RATE: 57 BPM | BODY MASS INDEX: 20.8 KG/M2

## 2018-07-25 DIAGNOSIS — Z53.9 ERRONEOUS ENCOUNTER--DISREGARD: ICD-10-CM

## 2018-07-25 DIAGNOSIS — Z12.11 ENCOUNTER FOR COLORECTAL CANCER SCREENING: Primary | ICD-10-CM

## 2018-07-25 DIAGNOSIS — M81.0 AGE-RELATED OSTEOPOROSIS WITHOUT CURRENT PATHOLOGICAL FRACTURE: Primary | ICD-10-CM

## 2018-07-25 DIAGNOSIS — Z12.12 ENCOUNTER FOR COLORECTAL CANCER SCREENING: Primary | ICD-10-CM

## 2018-07-25 PROCEDURE — 99213 OFFICE O/P EST LOW 20 MIN: CPT | Performed by: SURGERY

## 2018-07-25 PROCEDURE — G0463 HOSPITAL OUTPT CLINIC VISIT: HCPCS

## 2018-07-25 PROCEDURE — 82310 ASSAY OF CALCIUM: CPT | Mod: ZL | Performed by: FAMILY MEDICINE

## 2018-07-25 PROCEDURE — 36415 COLL VENOUS BLD VENIPUNCTURE: CPT | Mod: ZL | Performed by: FAMILY MEDICINE

## 2018-07-25 RX ORDER — ZOLEDRONIC ACID 5 MG/100ML
5 INJECTION, SOLUTION INTRAVENOUS ONCE
Status: CANCELLED
Start: 2018-07-25 | End: 2018-07-25

## 2018-07-25 ASSESSMENT — PAIN SCALES - GENERAL: PAINLEVEL: NO PAIN (0)

## 2018-07-25 NOTE — PROGRESS NOTES
[facility] Surgery Consultation    CC:  Colorectal cancer screengin    HPI:  This 68 year old year old female is seen for evaluation of colorectal cancer screengin.  The history is obtained from the patient, and reviewing the medical record.  She is good medical historian. She has undergone previous colonoscopies with identification of polyps. She then has a normal colonoscopy done 5 years with a repeat needed in 5 years. Due to this she can in for evaluation. She has not had any issues with constipation or diarrhea. She has not had any abdominal pain or bloating. She has not has any melena or hematochezia. She has no family history of colon cancer.    Past Medical History:   Diagnosis Date     Atypical nevi      Chondrodermatitis nodularis helicis of left ear     Dr Shipley, [facility]     Chronic rhinitis     Dr Messina, [facility] allergy; negative skin testing; IgE mediated allergies; ENT - DC nasal steroid and antihistamine; saline rinses     Degenerative skin disorder     solar elastosis     Hallux rigidus 06/15/2000     Hyperlipidemia, unspecified hyperlipidemia type 9/15/2016     Laryngopharyngeal reflux disease     PPI     Malignant neoplasm of breast (female), unspecified site 03/10/2004     Notalgia paresthetica      Osteoarthritis 07/20/2011     Osteoporosis, unspecified 09/10/2001    Dr Moses; intermittent reclast     Other abnormal glucose 12/20/2013     Paresthesia     neck; notalgiaparesthetic; dr leahy & Dr Nevarez; neurontin     Personal history of malignant neoplasm of breast 06/07/2005     Rhinitis      Schamberg's purpura        Past Surgical History:   Procedure Laterality Date     BONE MARROW BIOPSY      negative     COLONOSCOPY  07/2000     COLONOSCOPY  8/13/2013    DR Fu; repeat 5 years     HC COLONOSCOPY THRU STOMA WITH BIOPSY  08/25/2010    cancer screening, family h/o colon cancer; hyperplastic polyp     HEMORRHOIDECTOMY  1986     MASTECTOMY, BILATERAL  03/01/2004    right sided  breast cancer     RELEASE TRIGGER FINGER Right 3/7/2018    Procedure: RELEASE TRIGGER FINGER;  RELEASE TRIGGER DIGIT RIGHT THUMB;  Surgeon: Jose Alfredo Brewster DO;  Location: [facility]     UPPER GI ENDOSCOPY         Pt denied problems with bleeding or anesthesia    Prior to Admission medications    Medication Sig Start Date End Date Taking? Authorizing Provider   aspirin EC 81 MG tablet Take 1 tablet (81 mg) by mouth daily 5/19/15  Yes Tamera Proctor NP   CALCIUM CITRATE 1,500 mg two times daily    Yes Reported, Patient   cholecalciferol 1000 UNITS TABS Take  by mouth. 2 capsules po daily.    Yes Reported, Patient   cinnamon 500 MG CAPS Take 2 capsules by mouth daily    Yes Reported, Patient   fish oil-omega-3 fatty acids (FISH OIL) 1000 MG capsule Take 1 g by mouth 2 times daily    Yes Reported, Patient   fluticasone (FLONASE) 50 MCG/ACT spray Spray 2 sprays into both nostrils daily  Patient taking differently: Spray 2 sprays into both nostrils daily as needed  9/12/17  Yes Sejal Gutierrez PA-C   gabapentin (NEURONTIN) 300 MG capsule TAKE TWO CAPSULES BY MOUTH AT BEDTIME  Patient taking differently: TAKE 1-2 CAPSULES BY MOUTH AT BEDTIME 4/19/17  Yes Melyssa Connors MD   Lactobacillus (ACIDOPHILUS) TABS Take 1 tablet by mouth daily   Yes Reported, Patient   Lutein 20 MG CAPS Take 20 mg by mouth daily    Yes Reported, Patient   MAGNESIUM OXIDE PO Take 200 mg by mouth daily   Yes Reported, Patient   Multiple vitamin  s TABS Take 1 tablet by mouth daily.   Yes Reported, Patient   Polyethylene Glycol 3350 (MIRALAX PO) Use every other day   Yes Reported, Patient   Turmeric (RA TURMERIC) 500 MG CAPS Take 2 capsules by mouth daily    Yes Reported, Patient   zinc 50 MG TABS Take 1 tablet by mouth daily    Yes Reported, Patient          Allergies   Allergen Reactions     Chloraprep One Step Rash     Povidone Iodine Rash     Betadine      Soap Rash     Betadine          HABITS:    Social History   Substance Use Topics      "Smoking status: Never Smoker     Smokeless tobacco: Never Used     Alcohol use 0.0 oz/week     0 Standard drinks or equivalent per week      Comment: 1 glasso wine, weekly      No mood altering drug use.    Family History   Problem Relation Age of Onset     Dementia Mother      (cause of death)      High cholesterol Mother      Osteoarthritis Mother      C.A.D. Father      (cause of death)      Prostate Cancer Father      Breast Cancer Maternal Aunt 72     Cerebrovascular Disease Maternal Grandmother      CVA     Diabetes Maternal Grandmother        REVIEW OF SYSTEMS:  Ten point review of systems negative except those mentioned in the HPI.     The patient denies sleep apnea, latex allergies or MRSA    OBJECTIVE:    /62  Pulse 57  Temp 97.7  F (36.5  C)  Ht 5' 2\" (1.575 m)  Wt 113 lb (51.3 kg)  SpO2 97%  BMI 20.67 kg/m2    GENERAL: Generally appears well, in no distress with appropriate affect.  HEENT:   Sclerae anicteric - No cervical, supra/infraclavicular lymphadenopathy, no thyroid masses  Respiratory:  Lungs clear to ausculation bilaterally with good air excursion  Cardiovascular:  Regular Rate and Rhythm with no murmurs gallops or rubs, normal   Abdomen: soft, NT/ND  :  deferred  Extremities:  Extremities normal. No deformities, edema, or skin discoloration.  Skin:  no suspicious lesions or rashes  Neurological: grossly intact    Psych:  Alert, oriented, affect appropriate with normal decision making ability.      IMPRESSION:  69 yo female for colorectal cancer screening    PLAN:  A detailed description of the [place] Preventive Task Force development of colorectal cancer screening was had. I described the pathology of the adenoma to carcinoma progression and its genetic changes that occur. I discussed how with colorectal cancer screening by endoscopic surveillance we are able to identify potential malignancies and remove them before they progress along the adenoma to carcinoma pathway. The " risks for colon cancer progression were discussed including first degree relatives with colon cancer, inflammatory bowel disease, smoking, obesity, and diet. I then discussed how there are certain attributes which can decrease the risk of colon cancer such as a healthy diet and physical activity. The patient understood the adenoma to carcinoma sequence, the reasoning for screening at specific intervals, and risk factor modification. I then described the technical portion of the procedure.    The indications, risks, benefits and technical aspects of whole colon colonoscopy were outlined with risks including, but not limited to, perforation, bleeding and inability to visualize entire colon.  Management of each was reviewed.  The need of mechanical preparation of the colon was reviewed along with the use of monitored anesthetic care.  The patient's questions were asked and answered.  Scheduled first available date.        Thank you for allowing me to participate in the care of your patient.       Ajit Uriarte MD    7/25/2018  3:06 PM

## 2018-07-25 NOTE — PATIENT INSTRUCTIONS
PLEASE CALL NURSE [phone] TO CHOOSE A SURGERY DATE.    THESE ARE THE AVAILABLE DATES: 8/6/18, 8/13/18, 8/20/18, 8/27/18, 8/31/18, 9/7/18, 9/10/18, 9/17/18, 9/28/18.       Guide to your Colonoscopy with Gatorade prep       Date of Procedure ____________________ with Dr. Uriarte    Admit time: Surgery Department will call you the day before your procedure by 5pm with your admit time. If your surgery is on Monday, please expect a call on Friday.    If we were unable to reach you before 5PM, you may call admitting at [phone] or toll free at [phone] ext [phone]    Please call the Registered Nurse in Surgery Education at [phone] or toll free at [phone] one to two weeks before your procedure and have an allergy and medication list ready        YOUR UPCOMING COLONOSCOPY    At [facility], we want to make sure that your colonoscopy is as pleasant as possible. This guide is designed to answer any questions you might have and to walk you through the preparations you will need to make before your procedure.    Should you have additional questions, please feel free to contact us. Contact numbers are listed below. Thank you for choosing [facility].    Important Numbers    Clinic Health Unit Coordinator: [phone]  Clinic Nurse: [phone] (or [phone]; [phone])  Surgery Education Nurse: [phone] or toll free [phone]    All nursing questions or concerns can be directed to the clinic or surgery education nurse    If you have a scheduling or appointment question, or need to postoone your procedure, please call the Health Unit Coordinator between 8am and 4pm Monday through Friday.    After hours or on weekends, please call [phone] to postpone.           COLONOSCOPY PREP    7 DAYS BEFORE THE EXAM:     Do not take Aspirin or other NSAIDS (Ibuprofen, Motrin, Aleve, Celebrex, Naproxen, etc) 7 days before your surgery. Tylenol is fine.    If you are prescribed blood  thinners (Aspirin, Coumadin/Warfarin, Plavix, etc) talk to your provider.    Stop taking fiber supplements, vitamins, iron or multivitamins that contain iron.    Stop eating nuts and seeds.    If you are a diabetic and take medications to control your blood sugar, follow the special instructions listed or talk to your provider.     Arrange transportation with a family member or friend to drive you home and have an adult available to stay with you for the next 4 hours when you arrive home for your safety. If you need to take a taxi or the bus, you MUST have a responsible adult to ride with you OR YOUR PROCEDURE WILL BE CANCELLED. It is recommended that you DO NOT DRIVE for the next 24 hours after receiving anesthesia.     You have been ordered Gatorade Prep as your mechanical bowel prep. Please  the items listed below from your preferred pharmacy. They are all over the counter and do not require a prescription.     Two 5 mg Dulcolax tablets   One 8.3 ounce bottle of miralax   One 10 ounce bottle of magnesium citrate   One 64 ounce bottle of gatorade-No red or purple, not powdered.      2 DAYS BEFORE THE EXAM:   Begin a low fiber diet. No raw fruits or vegatables, whole grains, nuts, seeds, popcorn, or other high-fiber foods. No bulking agents (metamucil, Fibercon, etc) and no Olestra (fat substitute).     Drink at least 4-6 large glasses of sports drink each day (not red or purple).      1 DAY BEFORE THE EXAM:    DO NOT EAT ANY SOLID FOOD OR MILK PRODUCTS AFTER 12:00 AM (MIDNIGHT).    Drink only clear liquids for breakfast, lunch and dinner. (No red or purple colors as these colors can be mistaken for blood.)    Clear liquids include water, flavored water, coconut water, juices without pulp, soft drinks, broth, bouillon, black coffee without cream, tea, Delonte-Aid, Propel, Gatorade, Clear nutrition drinks (Resource Breeze, Ensure Active Protein peach flavor) Jell-O and popsicles.     Drink at least 8-10 glasses  "clear liquids during the day.    Follow the instructions of your colon preparation.    No red or purple colors, milk products, or alcohol.     AT 12:00 PM NOON THE DAY BEFORE EXAM:  Take 2 Dulcolax tablets by mouth with clear liquids.    AT 6:00 PM THE DAY BEFORE EXAM:  Mix the bottle of Miralax and 64 ounces of Gatorade in a pitcher. Drink one 8 ounce glass every 15 minutes until gone.    Stay near a toilet. You will have watery stools and may have cramping, bloating and nausea.        DAY OF COLONOSCOPY EXAM:     6 HOURS PRIOR TO EXAM:  Drink the bottle of magnesium citrate. Right after drinking the medication, drink one full glass of water.    You many have clear liquids up until 3 hours before you check in at admitting.  Wear comfortable clothes. No jewelry, body piercings, make-up, nail polish, hair spray, lotions, perfumes or colognes. Shower before you arrive.  Take blood pressure and heart medications as usual with a sip of water.  If you have asthma, bring your inhaler with you.  [place] in Admitting through the [place] Entrance.  You must have a  with you and and adult available to stay with your for 4 hours at home. The medicine used in this test will make you sleepy. If you do not have someone, please reschedule or your test will be cancelled.  It is recommended that you do not drive for 24 hours after your test. Do not operate power equipment, drink alcoholic beverages, make important decisions or sign legal documents.     COLONOSCOPY FREQUENTLY ASKED QUESTIONS    What is a colonoscopy?    A colonoscopy is a test to look at the lining of your large intestine. The purpose of the exam is to check for abnormalities including growths called \"polyps\" that can lead to serious disease. A flexibles scope is inserted into your rectum by the doctor to examine your large intestine.    What are polyps?  Polyps are abnormal growths on the lining of the colon. Most polyps are not cancerous, but some polyps have " the potential to turn into cancer with time. Polyps can also bleed. For these reasons, most polyps are removed during a colonoscopy and sent to the laboratory for microscopic examination.    What preparation is needed?  The colon must be completely clean for the procedure to be performed. You may be given one or two different prep solutions to cleanse your bowel. You will also need to follow a clear liquid diet the day before your procedure.    What happens after the procedure?  After your procedure is complete, you will be taken back to your day surgery room where you will be monitored for approximately 1 hour. You can expect to feel drowsy for several hours afterward. You may experience some cramping or bloating due to the air introduced into your colon during the exam. You will not be able to drive or operate machinery the rest of the day. You will be given written discharge instructions and appropriate learning material before you go home. You must have an adult to stay at home with you for the next 4 hours after you leave the hospital for your safety.    When will I find out the results of my test?  Your surgeon will talk to you and your designated  before you leave and usually the preliminary results can be given to you at that time. If a biopsy was taken during your procedure, it will be sent to the laboratory for examination. Results usually take one week. You will be contacted by phone or by letter with results.      TIPS FOR COLON CLEANSING BEFORE YOUR COLONOSCOPY    To get accurate results from your exam, your colon must be completely clean and empty. Please follow your doctor's instructions. If you do not, you may need to repeat both the exam and colon-cleansing process.    The medicine you take may cause bloating, nausea and other discomfort. Follow these tips to make the process as easy as possible:     You may use alcohol-free baby wipes to ease anal irritation. You may also use Vaseline to help  protect the skin. Other options include Tucks wipes, hemorrhoid treatments and hydrocortisone cream.     You may wish to squeeze some lemon juice into your preparation or add a packet of Crystal Light lemon-lime or ice tea flavor. (remember to not use red or purple)    To chill the solution, put it in your refrigerator or set it in a bowl of ice. Do not add ice in your drinking glass. You may remove the colon preparation from the refrigerator 15-30 minutes before drinking.    Quickly drink one whole glass every 5 to 10 minutes. It may help to use a timer. If the liquid is too salty, use a straw.    Stay near a toilet!    You will have diarrhea (loose watery stools) and may also have chills. Dress for comfort.    Expect to feel discomfort until the stool clears from your colon. This usually takes about 2 to 4 hours.    Even when you are sitting on the toilet, keep drinking a glass of solution every 10-15 minutes.    If you have nausea or vomiting, rinse your mouth with water. Take a break for 15 to 30 minutes, and then keep drinking the solution.    Some people find it helpful to suck on a wedge of lemon or lime. You may also try sucking on hard candy (not red or purple) or washing your mouth out with water, clear soda or mouthwash.    If you followed your doctor's orders and your stool is a clear or yellow liquid, you are ready for the exam.    If you are not sure if your colon is clean, please call your clinic and ask to speak to a nurse.       .

## 2018-07-25 NOTE — MR AVS SNAPSHOT
"              After Visit Summary   7/25/2018    Mihaela Jang    MRN: [id number]           Patient Information     Date Of Birth          1950        Visit Information        Provider Department      7/25/2018 12:30 PM [facility]        Today's Diagnoses     Age-related osteoporosis without current pathological fracture    -  1    ERRONEOUS ENCOUNTER--DISREGARD           Follow-ups after your visit        Your next 10 appointments already scheduled     Jul 25, 2018  3:00 PM CDT   (Arrive by 2:45 PM)   CONSULT with Ajit Uriarte MD   [facility] [place] ([facility] )    [address]  [place] [address]   [phone]              Who to contact     If you have questions or need follow up information about today's clinic visit or your schedule please contact [facility] directly at [phone].  Normal or non-critical lab and imaging results will be communicated to you by MyChart, letter or phone within 4 business days after the clinic has received the results. If you do not hear from us within 7 days, please contact the clinic through MyChart or phone. If you have a critical or abnormal lab result, we will notify you by phone as soon as possible.  Submit refill requests through [employer] or call your pharmacy and they will forward the refill request to us. Please allow 3 business days for your refill to be completed.          Additional Information About Your Visit        MyChart Information     [employer] lets you send messages to your doctor, view your test results, renew your prescriptions, schedule appointments and more. To sign up, go to [email or web address] . Click on \"Log in\" on the left side of the screen, which will take you to the Welcome page. Then click on \"Sign up Now\" on the right side of the page.     You will be asked to enter the access code listed below, as well as some personal information. Please follow the " directions to create your username and password.     Your access code is: [email or web address]  Expires: 2018  2:34 PM     Your access code will  in 90 days. If you need help or a new code, please call your [place] clinic or [phone].        Care EveryWhere ID     This is your Care EveryWhere ID. This could be used by other organizations to access your [place] medical records  [id number]         Blood Pressure from Last 3 Encounters:   18 116/60   18 122/62   18 110/50    Weight from Last 3 Encounters:   18 49.9 kg (110 lb)   18 49.9 kg (110 lb)   18 49.9 kg (110 lb)              Today, you had the following     No orders found for display       Primary Care Provider Office Phone # Fax #    Melyssa Connors [phone]       [address]  [facility] [address]        Equal Access to Services     [facility] : Hadii aad ku hadasho Soomaali, waaxda luqadaha, qaybta kaalmada adeegyada, waxay idiin haykodin ras fernández . So [facility] [phone].    ATENCIÓN: Si habla español, tiene a quezada disposición servicios gratuitos de asistencia lingüística. Llame al [phone].    We comply with applicable federal civil rights laws and [place] laws. We do not discriminate on the basis of race, color, national origin, age, disability, sex, sexual orientation, or gender identity.            Thank you!     Thank you for choosing [facility] [place]  for your care. Our goal is always to provide you with excellent care. Hearing back from our patients is one way we can continue to improve our services. Please take a few minutes to complete the written survey that you may receive in the mail after your visit with us. Thank you!             Your Updated Medication List - Protect others around you: Learn how to safely use, store and throw away your medicines at www.disposemymeds.org.          This list is accurate as of 18  1:09 PM.  Always use your most  recent med list.                   Brand Name Dispense Instructions for use Diagnosis    Acidophilus Tabs      Take 1 tablet by mouth daily        aspirin 81 MG EC tablet     1 tablet    Take 1 tablet (81 mg) by mouth daily        CALCIUM CITRATE      1,500 mg two times daily        cholecalciferol 1000 units Tabs      Take  by mouth. 2 capsules po daily.        cinnamon 500 MG Caps      Take 2 capsules by mouth daily        fish oil-omega-3 fatty acids 1000 MG capsule      Take 1 g by mouth 2 times daily        fluticasone 50 MCG/ACT spray    FLONASE    16 g    Spray 2 sprays into both nostrils daily    Laryngopharyngeal reflux (LPR), Post-nasal drip, Globus sensation       gabapentin 300 MG capsule    NEURONTIN    180 capsule    TAKE TWO CAPSULES BY MOUTH AT BEDTIME    Paresthesia       Lutein 20 MG Caps      Take 20 mg by mouth daily        MAGNESIUM OXIDE PO      Take 200 mg by mouth daily        MIRALAX PO      Use every other day        Multiple vitamin  s Tabs      Take 1 tablet by mouth daily.        RA TURMERIC 500 MG Caps   Generic drug:  Turmeric      Take 2 capsules by mouth daily        triamcinolone 0.1 % cream    KENALOG    30 g    SPARINGLY APPLY  CREAM EXTERNALLY TO AFFECTED AREA THREE TIMES DAILY FOR 14 DAYS    Dermatitis       zinc 50 MG Tabs      Take 1 tablet by mouth daily

## 2018-07-25 NOTE — MR AVS SNAPSHOT
After Visit Summary   7/25/2018    Mihaela Jang    MRN: [id number]           Patient Information     Date Of Birth          1950        Visit Information        Provider Department      7/25/2018 3:00 PM Ajit Uriarte MD [facility] [place]        Care Instructions    PLEASE CALL NURSE [phone] TO CHOOSE A SURGERY DATE.    THESE ARE THE AVAILABLE DATES: 8/6/18, 8/13/18, 8/20/18, 8/27/18, 8/31/18, 9/7/18, 9/10/18, 9/17/18, 9/28/18.       Guide to your Colonoscopy with Gatorade prep       Date of Procedure ____________________ with Dr. Uriarte    Admit time: Surgery Department will call you the day before your procedure by 5pm with your admit time. If your surgery is on Monday, please expect a call on Friday.    If we were unable to reach you before 5PM, you may call admitting at [phone] or toll free at [phone] ext [phone]    Please call the Registered Nurse in Surgery Education at [phone] or toll free at [phone] one to two weeks before your procedure and have an allergy and medication list ready        YOUR UPCOMING COLONOSCOPY    At [facility], we want to make sure that your colonoscopy is as pleasant as possible. This guide is designed to answer any questions you might have and to walk you through the preparations you will need to make before your procedure.    Should you have additional questions, please feel free to contact us. Contact numbers are listed below. Thank you for choosing [facility].    Important Numbers    Clinic Health Unit Coordinator: [phone]  Clinic Nurse: [phone] (or [phone]; [phone])  Surgery Education Nurse: [phone] or toll free [phone]    All nursing questions or concerns can be directed to the clinic or surgery education nurse    If you have a scheduling or appointment question, or need to postoone your procedure, please call the Health Unit Coordinator between 8am and 4pm  Monday through Friday.    After hours or on weekends, please call [phone] to postpone.           COLONOSCOPY PREP    7 DAYS BEFORE THE EXAM:     Do not take Aspirin or other NSAIDS (Ibuprofen, Motrin, Aleve, Celebrex, Naproxen, etc) 7 days before your surgery. Tylenol is fine.    If you are prescribed blood thinners (Aspirin, Coumadin/Warfarin, Plavix, etc) talk to your provider.    Stop taking fiber supplements, vitamins, iron or multivitamins that contain iron.    Stop eating nuts and seeds.    If you are a diabetic and take medications to control your blood sugar, follow the special instructions listed or talk to your provider.     Arrange transportation with a family member or friend to drive you home and have an adult available to stay with you for the next 4 hours when you arrive home for your safety. If you need to take a taxi or the bus, you MUST have a responsible adult to ride with you OR YOUR PROCEDURE WILL BE CANCELLED. It is recommended that you DO NOT DRIVE for the next 24 hours after receiving anesthesia.     You have been ordered Gatorade Prep as your mechanical bowel prep. Please  the items listed below from your preferred pharmacy. They are all over the counter and do not require a prescription.     Two 5 mg Dulcolax tablets   One 8.3 ounce bottle of miralax   One 10 ounce bottle of magnesium citrate   One 64 ounce bottle of gatorade-No red or purple, not powdered.      2 DAYS BEFORE THE EXAM:   Begin a low fiber diet. No raw fruits or vegatables, whole grains, nuts, seeds, popcorn, or other high-fiber foods. No bulking agents (metamucil, Fibercon, etc) and no Olestra (fat substitute).     Drink at least 4-6 large glasses of sports drink each day (not red or purple).      1 DAY BEFORE THE EXAM:    DO NOT EAT ANY SOLID FOOD OR MILK PRODUCTS AFTER 12:00 AM (MIDNIGHT).    Drink only clear liquids for breakfast, lunch and dinner. (No red or purple colors as these colors can be mistaken for  blood.)    Clear liquids include water, flavored water, coconut water, juices without pulp, soft drinks, broth, bouillon, black coffee without cream, tea, Delonte-Aid, Propel, Gatorade, Clear nutrition drinks (Resource Breeze, Ensure Active Protein peach flavor) Jell-O and popsicles.     Drink at least 8-10 glasses clear liquids during the day.    Follow the instructions of your colon preparation.    No red or purple colors, milk products, or alcohol.     AT 12:00 PM NOON THE DAY BEFORE EXAM:  Take 2 Dulcolax tablets by mouth with clear liquids.    AT 6:00 PM THE DAY BEFORE EXAM:  Mix the bottle of Miralax and 64 ounces of Gatorade in a pitcher. Drink one 8 ounce glass every 15 minutes until gone.    Stay near a toilet. You will have watery stools and may have cramping, bloating and nausea.        DAY OF COLONOSCOPY EXAM:     6 HOURS PRIOR TO EXAM:  Drink the bottle of magnesium citrate. Right after drinking the medication, drink one full glass of water.    You many have clear liquids up until 3 hours before you check in at admitting.  Wear comfortable clothes. No jewelry, body piercings, make-up, nail polish, hair spray, lotions, perfumes or colognes. Shower before you arrive.  Take blood pressure and heart medications as usual with a sip of water.  If you have asthma, bring your inhaler with you.  [place] in Admitting through the [facility].  You must have a  with you and and adult available to stay with your for 4 hours at home. The medicine used in this test will make you sleepy. If you do not have someone, please reschedule or your test will be cancelled.  It is recommended that you do not drive for 24 hours after your test. Do not operate power equipment, drink alcoholic beverages, make important decisions or sign legal documents.     COLONOSCOPY FREQUENTLY ASKED QUESTIONS    What is a colonoscopy?    A colonoscopy is a test to look at the lining of your large intestine. The purpose of the exam is to  "check for abnormalities including growths called \"polyps\" that can lead to serious disease. A flexibles scope is inserted into your rectum by the doctor to examine your large intestine.    What are polyps?  Polyps are abnormal growths on the lining of the colon. Most polyps are not cancerous, but some polyps have the potential to turn into cancer with time. Polyps can also bleed. For these reasons, most polyps are removed during a colonoscopy and sent to the laboratory for microscopic examination.    What preparation is needed?  The colon must be completely clean for the procedure to be performed. You may be given one or two different prep solutions to cleanse your bowel. You will also need to follow a clear liquid diet the day before your procedure.    What happens after the procedure?  After your procedure is complete, you will be taken back to your day surgery room where you will be monitored for approximately 1 hour. You can expect to feel drowsy for several hours afterward. You may experience some cramping or bloating due to the air introduced into your colon during the exam. You will not be able to drive or operate machinery the rest of the day. You will be given written discharge instructions and appropriate learning material before you go home. You must have an adult to stay at home with you for the next 4 hours after you leave the hospital for your safety.    When will I find out the results of my test?  Your surgeon will talk to you and your designated  before you leave and usually the preliminary results can be given to you at that time. If a biopsy was taken during your procedure, it will be sent to the laboratory for examination. Results usually take one week. You will be contacted by phone or by letter with results.      TIPS FOR COLON CLEANSING BEFORE YOUR COLONOSCOPY    To get accurate results from your exam, your colon must be completely clean and empty. Please follow your doctor's " instructions. If you do not, you may need to repeat both the exam and colon-cleansing process.    The medicine you take may cause bloating, nausea and other discomfort. Follow these tips to make the process as easy as possible:     You may use alcohol-free baby wipes to ease anal irritation. You may also use Vaseline to help protect the skin. Other options include Tucks wipes, hemorrhoid treatments and hydrocortisone cream.     You may wish to squeeze some lemon juice into your preparation or add a packet of Crystal Light lemon-lime or ice tea flavor. (remember to not use red or purple)    To chill the solution, put it in your refrigerator or set it in a bowl of ice. Do not add ice in your drinking glass. You may remove the colon preparation from the refrigerator 15-30 minutes before drinking.    Quickly drink one whole glass every 5 to 10 minutes. It may help to use a timer. If the liquid is too salty, use a straw.    Stay near a toilet!    You will have diarrhea (loose watery stools) and may also have chills. Dress for comfort.    Expect to feel discomfort until the stool clears from your colon. This usually takes about 2 to 4 hours.    Even when you are sitting on the toilet, keep drinking a glass of solution every 10-15 minutes.    If you have nausea or vomiting, rinse your mouth with water. Take a break for 15 to 30 minutes, and then keep drinking the solution.    Some people find it helpful to suck on a wedge of lemon or lime. You may also try sucking on hard candy (not red or purple) or washing your mouth out with water, clear soda or mouthwash.    If you followed your doctor's orders and your stool is a clear or yellow liquid, you are ready for the exam.    If you are not sure if your colon is clean, please call your clinic and ask to speak to a nurse.       .          Follow-ups after your visit        Who to contact     If you have questions or need follow up information about today's clinic visit or your  "schedule please contact [facility] directly at [phone].  Normal or non-critical lab and imaging results will be communicated to you by MyChart, letter or phone within 4 business days after the clinic has received the results. If you do not hear from us within 7 days, please contact the clinic through MyChart or phone. If you have a critical or abnormal lab result, we will notify you by phone as soon as possible.  Submit refill requests through [employer] or call your pharmacy and they will forward the refill request to us. Please allow 3 business days for your refill to be completed.          Additional Information About Your Visit        [employer] Information     [employer] lets you send messages to your doctor, view your test results, renew your prescriptions, schedule appointments and more. To sign up, go to [email or web address] . Click on \"Log in\" on the left side of the screen, which will take you to the Welcome page. Then click on \"Sign up Now\" on the right side of the page.     You will be asked to enter the access code listed below, as well as some personal information. Please follow the directions to create your username and password.     Your access code is: [email or web address]  Expires: 2018  2:34 PM     Your access code will  in 90 days. If you need help or a new code, please call your [place] clinic or [phone].        Care EveryWhere ID     This is your Care EveryWhere ID. This could be used by other organizations to access your [facility] medical records  [id number]        Your Vitals Were     Pulse Temperature Height Pulse Oximetry BMI (Body Mass Index)       57 97.7  F (36.5  C) 5' 2\" (1.575 m) 97% 20.67 kg/m2        Blood Pressure from Last 3 Encounters:   18 104/62   18 116/60   18 122/62    Weight from Last 3 Encounters:   18 113 lb (51.3 kg)   18 110 lb (49.9 kg)   18 110 lb (49.9 kg)              Today, you had the following     No " orders found for display         Today's Medication Changes          These changes are accurate as of 7/25/18  3:08 PM.  If you have any questions, ask your nurse or doctor.               These medicines have changed or have updated prescriptions.        Dose/Directions    fluticasone 50 MCG/ACT spray   Commonly known as:  FLONASE   This may have changed:    - when to take this  - reasons to take this   Used for:  Laryngopharyngeal reflux (LPR), Post-nasal drip, Globus sensation        Dose:  2 spray   Spray 2 sprays into both nostrils daily   Quantity:  16 g   Refills:  5       gabapentin 300 MG capsule   Commonly known as:  NEURONTIN   This may have changed:  See the new instructions.   Used for:  Paresthesia        TAKE TWO CAPSULES BY MOUTH AT BEDTIME   Quantity:  180 capsule   Refills:  0                Primary Care Provider Office Phone # Fax #    Melyssa Connors [phone]  [place] [address]        Equal Access to Services     [facility]: Hadii patsy zhang hadasho Soomaali, waaxda luqadaha, qaybta kaalmada adeegyada, waxay nicollein haykodin ras fernández . So [facility] [phone].    ATENCIÓN: Si habla español, tiene a quezada disposición servicios gratuitos de asistencia lingüística. Audelia al [phone].    We comply with applicable federal civil rights laws and [place] laws. We do not discriminate on the basis of race, color, national origin, age, disability, sex, sexual orientation, or gender identity.            Thank you!     Thank you for choosing [facility]  for your care. Our goal is always to provide you with excellent care. Hearing back from our patients is one way we can continue to improve our services. Please take a few minutes to complete the written survey that you may receive in the mail after your visit with us. Thank you!             Your Updated Medication List - Protect others around you: Learn how to safely use, store and throw away  your medicines at www.disposemymeds.org.          This list is accurate as of 7/25/18  3:08 PM.  Always use your most recent med list.                   Brand Name Dispense Instructions for use Diagnosis    Acidophilus Tabs      Take 1 tablet by mouth daily        aspirin 81 MG EC tablet     1 tablet    Take 1 tablet (81 mg) by mouth daily        CALCIUM CITRATE      1,500 mg two times daily        cholecalciferol 1000 units Tabs      Take  by mouth. 2 capsules po daily.        cinnamon 500 MG Caps      Take 2 capsules by mouth daily        fish oil-omega-3 fatty acids 1000 MG capsule      Take 1 g by mouth 2 times daily        fluticasone 50 MCG/ACT spray    FLONASE    16 g    Spray 2 sprays into both nostrils daily    Laryngopharyngeal reflux (LPR), Post-nasal drip, Globus sensation       gabapentin 300 MG capsule    NEURONTIN    180 capsule    TAKE TWO CAPSULES BY MOUTH AT BEDTIME    Paresthesia       Lutein 20 MG Caps      Take 20 mg by mouth daily        MAGNESIUM OXIDE PO      Take 200 mg by mouth daily        MIRALAX PO      Use every other day        Multiple vitamin  s Tabs      Take 1 tablet by mouth daily.        RA TURMERIC 500 MG Caps   Generic drug:  Turmeric      Take 2 capsules by mouth daily        zinc 50 MG Tabs      Take 1 tablet by mouth daily

## 2018-07-25 NOTE — NURSING NOTE
"Chief Complaint   Patient presents with     Colonoscopy       Initial /62  Pulse 57  Temp 97.7  F (36.5  C)  Ht 5' 2\" (1.575 m)  Wt 113 lb (51.3 kg)  SpO2 97%  BMI 20.67 kg/m2 Estimated body mass index is 20.67 kg/(m^2) as calculated from the following:    Height as of this encounter: 5' 2\" (1.575 m).    Weight as of this encounter: 113 lb (51.3 kg).  Medication Reconciliation: complete    MARY ELLEN RAMACHANDRAN LPN\  "

## 2018-07-25 NOTE — PROGRESS NOTES
Patient here for Reclast infusion per order of Dr. Connors. Patient questioning whether or not she should be receiving this infusion, thought that Dr. Moses wanted it held or D/C'd. TC to Dr. Connors and verbal order received to hold Reclast infusions. Is due for f/u with Dr. Moses and DEXA scan later this year. Patient updated and in agreement with plan.

## 2018-07-30 NOTE — H&P (VIEW-ONLY)
[facility] Surgery Consultation    CC:  Colorectal cancer screengin    HPI:  This 68 year old year old female is seen for evaluation of colorectal cancer screengin.  The history is obtained from the patient, and reviewing the medical record.  She is good medical historian. She has undergone previous colonoscopies with identification of polyps. She then has a normal colonoscopy done 5 years with a repeat needed in 5 years. Due to this she can in for evaluation. She has not had any issues with constipation or diarrhea. She has not had any abdominal pain or bloating. She has not has any melena or hematochezia. She has no family history of colon cancer.    Past Medical History:   Diagnosis Date     Atypical nevi      Chondrodermatitis nodularis helicis of left ear     Dr Shipley, [facility]     Chronic rhinitis     Dr Messina, [facility] allergy; negative skin testing; IgE mediated allergies; ENT - DC nasal steroid and antihistamine; saline rinses     Degenerative skin disorder     solar elastosis     Hallux rigidus 06/15/2000     Hyperlipidemia, unspecified hyperlipidemia type 9/15/2016     Laryngopharyngeal reflux disease     PPI     Malignant neoplasm of breast (female), unspecified site 03/10/2004     Notalgia paresthetica      Osteoarthritis 07/20/2011     Osteoporosis, unspecified 09/10/2001    Dr Moses; intermittent reclast     Other abnormal glucose 12/20/2013     Paresthesia     neck; notalgiaparesthetic; dr leahy & Dr Nevarez; neurontin     Personal history of malignant neoplasm of breast 06/07/2005     Rhinitis      Schamberg's purpura        Past Surgical History:   Procedure Laterality Date     BONE MARROW BIOPSY      negative     COLONOSCOPY  07/2000     COLONOSCOPY  8/13/2013    DR Fu; repeat 5 years     HC COLONOSCOPY THRU STOMA WITH BIOPSY  08/25/2010    cancer screening, family h/o colon cancer; hyperplastic polyp     HEMORRHOIDECTOMY  1986     MASTECTOMY, BILATERAL  03/01/2004    right sided  breast cancer     RELEASE TRIGGER FINGER Right 3/7/2018    Procedure: RELEASE TRIGGER FINGER;  RELEASE TRIGGER DIGIT RIGHT THUMB;  Surgeon: Jose Alfredo Brewster DO;  Location: [facility] OR     UPPER GI ENDOSCOPY         Pt denied problems with bleeding or anesthesia    Prior to Admission medications    Medication Sig Start Date End Date Taking? Authorizing Provider   aspirin EC 81 MG tablet Take 1 tablet (81 mg) by mouth daily 5/19/15  Yes Tamera Proctor NP   CALCIUM CITRATE 1,500 mg two times daily    Yes Reported, Patient   cholecalciferol 1000 UNITS TABS Take  by mouth. 2 capsules po daily.    Yes Reported, Patient   cinnamon 500 MG CAPS Take 2 capsules by mouth daily    Yes Reported, Patient   fish oil-omega-3 fatty acids (FISH OIL) 1000 MG capsule Take 1 g by mouth 2 times daily    Yes Reported, Patient   fluticasone (FLONASE) 50 MCG/ACT spray Spray 2 sprays into both nostrils daily  Patient taking differently: Spray 2 sprays into both nostrils daily as needed  9/12/17  Yes Sejal Gutierrez PA-C   gabapentin (NEURONTIN) 300 MG capsule TAKE TWO CAPSULES BY MOUTH AT BEDTIME  Patient taking differently: TAKE 1-2 CAPSULES BY MOUTH AT BEDTIME 4/19/17  Yes Melyssa Connors MD   Lactobacillus (ACIDOPHILUS) TABS Take 1 tablet by mouth daily   Yes Reported, Patient   Lutein 20 MG CAPS Take 20 mg by mouth daily    Yes Reported, Patient   MAGNESIUM OXIDE PO Take 200 mg by mouth daily   Yes Reported, Patient   Multiple vitamin  s TABS Take 1 tablet by mouth daily.   Yes Reported, Patient   Polyethylene Glycol 3350 (MIRALAX PO) Use every other day   Yes Reported, Patient   Turmeric (RA TURMERIC) 500 MG CAPS Take 2 capsules by mouth daily    Yes Reported, Patient   zinc 50 MG TABS Take 1 tablet by mouth daily    Yes Reported, Patient          Allergies   Allergen Reactions     Chloraprep One Step Rash     Povidone Iodine Rash     Betadine      Soap Rash     Betadine          HABITS:    Social History   Substance Use Topics      "Smoking status: Never Smoker     Smokeless tobacco: Never Used     Alcohol use 0.0 oz/week     0 Standard drinks or equivalent per week      Comment: 1 glasso wine, weekly      No mood altering drug use.    Family History   Problem Relation Age of Onset     Dementia Mother      (cause of death)      High cholesterol Mother      Osteoarthritis Mother      C.A.D. Father      (cause of death)      Prostate Cancer Father      Breast Cancer Maternal Aunt 72     Cerebrovascular Disease Maternal Grandmother      CVA     Diabetes Maternal Grandmother        REVIEW OF SYSTEMS:  Ten point review of systems negative except those mentioned in the HPI.     The patient denies sleep apnea, latex allergies or MRSA    OBJECTIVE:    /62  Pulse 57  Temp 97.7  F (36.5  C)  Ht 5' 2\" (1.575 m)  Wt 113 lb (51.3 kg)  SpO2 97%  BMI 20.67 kg/m2    GENERAL: Generally appears well, in no distress with appropriate affect.  HEENT:   Sclerae anicteric - No cervical, supra/infraclavicular lymphadenopathy, no thyroid masses  Respiratory:  Lungs clear to ausculation bilaterally with good air excursion  Cardiovascular:  Regular Rate and Rhythm with no murmurs gallops or rubs, normal   Abdomen: soft, NT/ND  :  deferred  Extremities:  Extremities normal. No deformities, edema, or skin discoloration.  Skin:  no suspicious lesions or rashes  Neurological: grossly intact    Psych:  Alert, oriented, affect appropriate with normal decision making ability.      IMPRESSION:  69 yo female for colorectal cancer screening    PLAN:  A detailed description of the [place] Preventive Task Force development of colorectal cancer screening was had. I described the pathology of the adenoma to carcinoma progression and its genetic changes that occur. I discussed how with colorectal cancer screening by endoscopic surveillance we are able to identify potential malignancies and remove them before they progress along the adenoma to carcinoma pathway. The " risks for colon cancer progression were discussed including first degree relatives with colon cancer, inflammatory bowel disease, smoking, obesity, and diet. I then discussed how there are certain attributes which can decrease the risk of colon cancer such as a healthy diet and physical activity. The patient understood the adenoma to carcinoma sequence, the reasoning for screening at specific intervals, and risk factor modification. I then described the technical portion of the procedure.    The indications, risks, benefits and technical aspects of whole colon colonoscopy were outlined with risks including, but not limited to, perforation, bleeding and inability to visualize entire colon.  Management of each was reviewed.  The need of mechanical preparation of the colon was reviewed along with the use of monitored anesthetic care.  The patient's questions were asked and answered.  Scheduled first available date.        Thank you for allowing me to participate in the care of your patient.       Ajit Uriarte MD    7/25/2018  3:06 PM

## 2018-08-10 ENCOUNTER — ANESTHESIA EVENT (OUTPATIENT)
Dept: SURGERY | Facility: HOSPITAL | Age: 68
End: 2018-08-10
Payer: MEDICARE

## 2018-08-10 NOTE — ANESTHESIA PREPROCEDURE EVALUATION
Anesthesia Evaluation     . Pt has had prior anesthetic. Type: MAC    No history of anesthetic complications          ROS/MED HX    ENT/Pulmonary:     (+)allergic rhinitis, , . .    Neurologic:     (+)other neuro parasthesia    Cardiovascular:     (+) Dyslipidemia, ----. : . . . :. .       METS/Exercise Tolerance:     Hematologic:  - neg hematologic  ROS       Musculoskeletal:   (+) arthritis, , , other musculoskeletal- osteoporosis      GI/Hepatic:     (+) GERD bowel prep, Other GI/Hepatic hx colon polyps, LPR      Renal/Genitourinary:  - ROS Renal section negative       Endo:  - neg endo ROS       Psychiatric:  - neg psychiatric ROS       Infectious Disease:  - neg infectious disease ROS       Malignancy:   (+) Malignancy History of Breast          Other: Comment: Degenerative skin disorder   - neg other ROS                 Physical Exam  Normal systems: cardiovascular, pulmonary and dental    Airway   Mallampati: III  TM distance: >3 FB  Neck ROM: full    Dental     Cardiovascular   Rhythm and rate: regular and normal      Pulmonary    breath sounds clear to auscultation                    Anesthesia Plan      History & Physical Review  History and physical reviewed and following examination; no interval change.    ASA Status:  3 .    NPO Status:  > 8 hours    Plan for MAC with Propofol induction. Maintenance will be TIVA.  Reason for MAC:  Deep or markedly invasive procedure (G8) and Difficulty with conscious sedation (QS)  PONV prophylaxis:  Ondansetron (or other 5HT-3)  Risks and benefits of MAC anesthetic discussed including dental damage, aspiration, loss of airway, conversion to general anesthetic, CV complications, MI, stroke, death. Pt wishes to proceed.       Postoperative Care  Postoperative pain management:  IV analgesics.      Consents  Anesthetic plan, risks, benefits and alternatives discussed with:  Patient..                          .

## 2018-08-11 ENCOUNTER — TRANSFERRED RECORDS (OUTPATIENT)
Dept: HEALTH INFORMATION MANAGEMENT | Facility: CLINIC | Age: 68
End: 2018-08-11

## 2018-08-13 ENCOUNTER — HOSPITAL ENCOUNTER (OUTPATIENT)
Facility: HOSPITAL | Age: 68
Discharge: HOME | End: 2018-08-13
Attending: SURGERY | Admitting: SURGERY
Payer: MEDICARE

## 2018-08-13 ENCOUNTER — ANESTHESIA (OUTPATIENT)
Dept: SURGERY | Facility: HOSPITAL | Age: 68
End: 2018-08-13
Payer: MEDICARE

## 2018-08-13 ENCOUNTER — SURGERY (OUTPATIENT)
Age: 68
End: 2018-08-13

## 2018-08-13 VITALS
HEIGHT: 62 IN | WEIGHT: 111.3 LBS | BODY MASS INDEX: 20.48 KG/M2 | RESPIRATION RATE: 18 BRPM | TEMPERATURE: 96.7 F | SYSTOLIC BLOOD PRESSURE: 148 MMHG | OXYGEN SATURATION: 98 % | DIASTOLIC BLOOD PRESSURE: 81 MMHG

## 2018-08-13 PROCEDURE — 25000128 H RX IP 250 OP 636: Performed by: NURSE ANESTHETIST, CERTIFIED REGISTERED

## 2018-08-13 PROCEDURE — 45378 DIAGNOSTIC COLONOSCOPY: CPT | Performed by: NURSE ANESTHETIST, CERTIFIED REGISTERED

## 2018-08-13 PROCEDURE — 40000305 ZZH STATISTIC PRE PROC ASSESS I: Performed by: SURGERY

## 2018-08-13 PROCEDURE — 37000008 ZZH ANESTHESIA TECHNICAL FEE, 1ST 30 MIN: Performed by: SURGERY

## 2018-08-13 PROCEDURE — G0105 COLORECTAL SCRN; HI RISK IND: HCPCS | Performed by: SURGERY

## 2018-08-13 PROCEDURE — 71000027 ZZH RECOVERY PHASE 2 EACH 15 MINS: Performed by: SURGERY

## 2018-08-13 PROCEDURE — 36000050 ZZH SURGERY LEVEL 2 1ST 30 MIN: Performed by: SURGERY

## 2018-08-13 PROCEDURE — 25000125 ZZHC RX 250: Performed by: NURSE ANESTHETIST, CERTIFIED REGISTERED

## 2018-08-13 RX ORDER — LIDOCAINE HYDROCHLORIDE 20 MG/ML
INJECTION, SOLUTION INFILTRATION; PERINEURAL PRN
Status: DISCONTINUED | OUTPATIENT
Start: 2018-08-13 | End: 2018-08-13

## 2018-08-13 RX ORDER — PROPOFOL 10 MG/ML
INJECTION, EMULSION INTRAVENOUS PRN
Status: DISCONTINUED | OUTPATIENT
Start: 2018-08-13 | End: 2018-08-13

## 2018-08-13 RX ORDER — LIDOCAINE 40 MG/G
CREAM TOPICAL
Status: DISCONTINUED | OUTPATIENT
Start: 2018-08-13 | End: 2018-08-13 | Stop reason: HOSPADM

## 2018-08-13 RX ORDER — FLUMAZENIL 0.1 MG/ML
0.2 INJECTION, SOLUTION INTRAVENOUS
Status: DISCONTINUED | OUTPATIENT
Start: 2018-08-13 | End: 2018-08-13 | Stop reason: HOSPADM

## 2018-08-13 RX ORDER — NALOXONE HYDROCHLORIDE 0.4 MG/ML
.1-.4 INJECTION, SOLUTION INTRAMUSCULAR; INTRAVENOUS; SUBCUTANEOUS
Status: DISCONTINUED | OUTPATIENT
Start: 2018-08-13 | End: 2018-08-13 | Stop reason: HOSPADM

## 2018-08-13 RX ORDER — SODIUM CHLORIDE, SODIUM LACTATE, POTASSIUM CHLORIDE, CALCIUM CHLORIDE 600; 310; 30; 20 MG/100ML; MG/100ML; MG/100ML; MG/100ML
INJECTION, SOLUTION INTRAVENOUS CONTINUOUS PRN
Status: DISCONTINUED | OUTPATIENT
Start: 2018-08-13 | End: 2018-08-13

## 2018-08-13 RX ADMIN — LIDOCAINE HYDROCHLORIDE 40 MG: 20 INJECTION, SOLUTION INFILTRATION; PERINEURAL at 08:38

## 2018-08-13 RX ADMIN — PROPOFOL 10 MG: 10 INJECTION, EMULSION INTRAVENOUS at 08:44

## 2018-08-13 RX ADMIN — PROPOFOL 10 MG: 10 INJECTION, EMULSION INTRAVENOUS at 08:42

## 2018-08-13 RX ADMIN — SODIUM CHLORIDE, POTASSIUM CHLORIDE, SODIUM LACTATE AND CALCIUM CHLORIDE: 600; 310; 30; 20 INJECTION, SOLUTION INTRAVENOUS at 07:49

## 2018-08-13 RX ADMIN — PROPOFOL 10 MG: 10 INJECTION, EMULSION INTRAVENOUS at 08:45

## 2018-08-13 RX ADMIN — PROPOFOL 10 MG: 10 INJECTION, EMULSION INTRAVENOUS at 08:52

## 2018-08-13 RX ADMIN — PROPOFOL 10 MG: 10 INJECTION, EMULSION INTRAVENOUS at 08:41

## 2018-08-13 RX ADMIN — PROPOFOL 10 MG: 10 INJECTION, EMULSION INTRAVENOUS at 08:47

## 2018-08-13 RX ADMIN — PROPOFOL 10 MG: 10 INJECTION, EMULSION INTRAVENOUS at 08:43

## 2018-08-13 RX ADMIN — PROPOFOL 30 MG: 10 INJECTION, EMULSION INTRAVENOUS at 08:40

## 2018-08-13 RX ADMIN — PROPOFOL 10 MG: 10 INJECTION, EMULSION INTRAVENOUS at 08:50

## 2018-08-13 RX ADMIN — PROPOFOL 10 MG: 10 INJECTION, EMULSION INTRAVENOUS at 08:46

## 2018-08-13 RX ADMIN — PROPOFOL 40 MG: 10 INJECTION, EMULSION INTRAVENOUS at 08:39

## 2018-08-13 NOTE — ANESTHESIA POSTPROCEDURE EVALUATION
Patient: Mihaela Jang    Procedure(s):  COLONOSCOPY - Wound Class: II-Clean Contaminated    Diagnosis:SCREENING FOR COLORECTAL CANCER  Diagnosis Additional Information: No value filed.    Anesthesia Type:  MAC    Note:  Anesthesia Post Evaluation    Patient location during evaluation: Phase 2  Patient participation: Able to fully participate in evaluation  Level of consciousness: awake and alert  Pain management: adequate  Airway patency: patent  Cardiovascular status: acceptable  Respiratory status: acceptable  Hydration status: acceptable  PONV: none     Anesthetic complications: None          Last vitals:  Vitals:    08/13/18 0915 08/13/18 0920 08/13/18 0925   BP: 122/66 135/75 148/81   Resp:      Temp:      SpO2: 100% 99% 98%         Electronically Signed By: MICHAEL Wilkinson CRNA  August 13, 2018  9:40 AM

## 2018-08-13 NOTE — DISCHARGE INSTRUCTIONS
INSTRUCTIONS AFTER COLONOSCOPY    WHEN YOU ARE BACK HOME:    Plan to rest for an hour or two after you get home.    You may have some cramping or pressure until you pass gas.    You may resume your regular medications.    Eat a small, light meal at first, and then gradually return to normal meal sizes.  If you had a polyp removed:    Slight bleeding may occur.  You may have a slight blood stain on the toilet paper after a bowel movement.    To lessen the chance of bleeding, avoid heavy exercise for ONE WEEK.  This includes heavy lifting, vigorous sport activities, and heavy physical labor.  You may resume your normal sexual activity.      Avoid aspirin or aspirin products if instructed by your doctor.    WHAT TO WATCH FOR:  Problems rarely occur after the exam; however, it is important for you to watch for early signs of possible problems.  If you have     Unusual pain in your abdomen    Nausea and vomiting that persists    Excessive bleeding    Black or bloody bowel movements    Fever or temperature above 100.6 F  Please call your doctor ([facility] [phone]) or go to the nearest hospital emergency room.    Post-Anesthesia Patient Instructions    IMMEDIATELY FOLLOWING SURGERY:  Do not drive or operate machinery for the first twenty four hours after surgery.  Do not make any important decisions for twenty four hours after surgery or while taking narcotic pain medications or sedatives.  If you develop intractable nausea and vomiting or a severe headache please notify your doctor immediately.    FOLLOW-UP:  Please make an appointment with your surgeon as instructed. You do not need to follow up with anesthesia unless specifically instructed to do so.    WOUND CARE INSTRUCTIONS (if applicable):  Keep a dry clean dressing on the anesthesia/puncture wound site if there is drainage.  Once the wound has quit draining you may leave it open to air.  Generally you should leave the bandage intact for twenty four  hours unless there is drainage.  If the epidural site drains for more than 36-48 hours please call the anesthesia department.    QUESTIONS?:  Please feel free to call your physician or the hospital  if you have any questions, and they will be happy to assist you.

## 2018-08-13 NOTE — ANESTHESIA CARE TRANSFER NOTE
Patient: Mihaela Jang    Procedure(s):  COLONOSCOPY - Wound Class: II-Clean Contaminated    Diagnosis: SCREENING FOR COLORECTAL CANCER  Diagnosis Additional Information: No value filed.    Anesthesia Type:   MAC     Note:  Airway :Nasal Cannula  Patient transferred to:Phase II  Handoff Report: Identifed the Patient, Identified the Reponsible Provider, Reviewed the pertinent medical history, Discussed the surgical course, Reviewed Intra-OP anesthesia mangement and issues during anesthesia, Set expectations for post-procedure period and Allowed opportunity for questions and acknowledgement of understanding      Vitals: (Last set prior to Anesthesia Care Transfer)    CRNA VITALS  8/13/2018 0828 - 8/13/2018 0902      8/13/2018             Resp Rate (set): 8                Electronically Signed By: MICHAEL Cantu CRNA  August 13, 2018  9:02 AM

## 2018-08-13 NOTE — OR NURSING
Pateint discharged to home .  Ashwini score 20. Pain level 0/10.  Discharged from unit via walking .

## 2018-08-13 NOTE — OP NOTE
Mihaela Jang MRN# [id number]   YOB: 1950 Age: 68 year old      Date of Admission:  8/13/2018    Primary care provider: Melyssa Connors    PREOPERATIVE DIAGNOSIS:  Screening colonoscopy.         POSTOPERATIVE DIAGNOSIS:  Normal colonoscopic examination.          PROCEDURE:  Whole colon colonoscopy.         INDICATIONS:  This 68 year old female presents for interval screening colonoscopy.        OPERATIVE FINDINGS:  The colonic mucosa was normal from anus to cecum.          DESCRIPTION OF PROCEDURE:  With the patient in the supine position on the transport cart, IV sedation was administered by the nurse anesthetist.  His correct identity and planned procedure were confirmed during the requisite timeout pause and he was rolled to the left lateral position.  The anus was digitally dilated and the fiberoptic colonoscope was introduced and negotiated through the length of the colon to the cecal base.  The cecum was intubated and its landmarks clearly identified.  A circumferential examination of the mucosa on introduction of the colonoscope and on its slow withdrawal confirmed the absence of polyp, neoplasia, inflammation and/or stricture.  There were minimal diverticuli noted.  Retroflex in the rectal ampulla showed no evidence of pathology.  Air was aspirated and the colonoscope was withdrawn; the patient was returned to day surgery in good condition, without suggestion of complication and with our invitation to return in 10 years for followup screening examination.   The post surgical debriefing was held and acknowledged at completion.          Ajit Uriarte MD    8/13/2018 8:55 AM

## 2018-08-13 NOTE — IP AVS SNAPSHOT
[facility] Preop/Phase II    [address] [address]    Phone:  [phone]                                       After Visit Summary   8/13/2018    Mihaela Jang    MRN: [id number]           After Visit Summary Signature Page     I have received my discharge instructions, and my questions have been answered. I have discussed any challenges I see with this plan with the nurse or doctor.    ..........................................................................................................................................  Patient/Patient Representative Signature      ..........................................................................................................................................  Patient Representative Print Name and Relationship to Patient    ..................................................               ................................................  Date                                            Time    ..........................................................................................................................................  Reviewed by Signature/Title    ...................................................              ..............................................  Date                                                            Time

## 2018-08-13 NOTE — IP AVS SNAPSHOT
MRN:[id number]                      After Visit Summary   8/13/2018    Mihaela Jang    MRN: [id number]           Thank you!     Thank you for choosing [facility] for your care. Our goal is always to provide you with excellent care. Hearing back from our patients is one way we can continue to improve our services. Please take a few minutes to complete the written survey that you may receive in the mail after you visit with us. Thank you!        Patient Information     Date Of Birth          1950        About your hospital stay     You were admitted on:  August 13, 2018 You last received care in the:  [facility] Preop/Phase II    You were discharged on:  August 13, 2018       Who to Call     For medical emergencies, please call 911.  For non-urgent questions about your medical care, please call your primary care provider or clinic, [phone]  For questions related to your surgery, please call your surgery clinic        Attending Provider     Provider Specialty    Ajit Uriarte MD Surgery       Primary Care Provider Office Phone # Fax #    Melyssa Connors [phone]      After Care Instructions     Discharge Instructions       Resume pre procedure diet            Discharge Instructions       Restart home medications.                  Further instructions from your care team           INSTRUCTIONS AFTER COLONOSCOPY    WHEN YOU ARE BACK HOME:    Plan to rest for an hour or two after you get home.    You may have some cramping or pressure until you pass gas.    You may resume your regular medications.    Eat a small, light meal at first, and then gradually return to normal meal sizes.  If you had a polyp removed:    Slight bleeding may occur.  You may have a slight blood stain on the toilet paper after a bowel movement.    To lessen the chance of bleeding, avoid heavy exercise for ONE WEEK.  This includes heavy lifting, vigorous sport activities, and heavy physical  labor.  You may resume your normal sexual activity.      Avoid aspirin or aspirin products if instructed by your doctor.    WHAT TO WATCH FOR:  Problems rarely occur after the exam; however, it is important for you to watch for early signs of possible problems.  If you have     Unusual pain in your abdomen    Nausea and vomiting that persists    Excessive bleeding    Black or bloody bowel movements    Fever or temperature above 100.6 F  Please call your doctor ([facility] [phone]) or go to the nearest hospital emergency room.    Post-Anesthesia Patient Instructions    IMMEDIATELY FOLLOWING SURGERY:  Do not drive or operate machinery for the first twenty four hours after surgery.  Do not make any important decisions for twenty four hours after surgery or while taking narcotic pain medications or sedatives.  If you develop intractable nausea and vomiting or a severe headache please notify your doctor immediately.    FOLLOW-UP:  Please make an appointment with your surgeon as instructed. You do not need to follow up with anesthesia unless specifically instructed to do so.    WOUND CARE INSTRUCTIONS (if applicable):  Keep a dry clean dressing on the anesthesia/puncture wound site if there is drainage.  Once the wound has quit draining you may leave it open to air.  Generally you should leave the bandage intact for twenty four hours unless there is drainage.  If the epidural site drains for more than 36-48 hours please call the anesthesia department.    QUESTIONS?:  Please feel free to call your physician or the hospital  if you have any questions, and they will be happy to assist you.       Pending Results     No orders found from 8/11/2018 to 8/14/2018.            Admission Information     Date & Time Provider Department Dept. Phone    8/13/2018 Ajit Uriarte MD [facility] Preop/Phase [phone]      Your Vitals Were     Blood Pressure Temperature Respirations Height Weight Pulse Oximetry    147/65  "96.7  F (35.9  C) 18 1.575 m (5' 2\") 50.5 kg (111 lb 4.8 oz) 100%    BMI (Body Mass Index)                   20.36 kg/m2           [employer] Information     [employer] lets you send messages to your doctor, view your test results, renew your prescriptions, schedule appointments and more. To sign up, go to [email or web address] . Click on \"Log in\" on the left side of the screen, which will take you to the Welcome page. Then click on \"Sign up Now\" on the right side of the page.     You will be asked to enter the access code listed below, as well as some personal information. Please follow the directions to create your username and password.     Your access code is: [email or web address]  Expires: 2018  8:58 AM     Your access code will  in 90 days. If you need help or a new code, please call your [facility] clinic or [phone].        Care EveryWhere ID     This is your Care EveryWhere ID. This could be used by other organizations to access your [facility] medical records  [id number]        Equal Access to Services     CORINNA LANCASTER AH: Hadii patsy Munson, chip guillen, avtar carrasquillo, haile wade. So [facility] [phone].    ATENCIÓN: Si habla español, tiene a quezada disposición servicios gratuitos de asistencia lingüística. Audelia al [phone].    We comply with applicable federal civil rights laws and [place] laws. We do not discriminate on the basis of race, color, national origin, age, disability, sex, sexual orientation, or gender identity.               Review of your medicines      CONTINUE these medicines which may have CHANGED, or have new prescriptions. If we are uncertain of the size of tablets/capsules you have at home, strength may be listed as something that might have changed.        Dose / Directions    fluticasone 50 MCG/ACT spray   Commonly known as:  FLONASE   This may have changed:    - when to take this  - reasons to take this   Used for:  " Laryngopharyngeal reflux (LPR), Post-nasal drip, Globus sensation        Dose:  2 spray   Spray 2 sprays into both nostrils daily   Quantity:  16 g   Refills:  5       gabapentin 300 MG capsule   Commonly known as:  NEURONTIN   This may have changed:  See the new instructions.   Used for:  Paresthesia        TAKE TWO CAPSULES BY MOUTH AT BEDTIME   Quantity:  180 capsule   Refills:  0         CONTINUE these medicines which have NOT CHANGED        Dose / Directions    Acidophilus Tabs        Dose:  1 tablet   Take 1 tablet by mouth daily   Refills:  0       aspirin 81 MG EC tablet        Dose:  81 mg   Take 1 tablet (81 mg) by mouth daily   Quantity:  1 tablet   Refills:  0       CALCIUM CITRATE   Indication:  uncertain dose is correct        Dose:  1500 mg   1,500 mg two times daily   Refills:  0       cholecalciferol 1000 units Tabs        Take  by mouth. 2 capsules po daily.   Refills:  0       cinnamon 500 MG Caps        Dose:  2 capsule   Take 2 capsules by mouth daily   Refills:  0       fish oil-omega-3 fatty acids 1000 MG capsule        Dose:  1 g   Take 1 g by mouth 2 times daily   Refills:  0       Lutein 20 MG Caps        Dose:  20 mg   Take 20 mg by mouth daily   Refills:  0       MAGNESIUM OXIDE PO        Dose:  200 mg   Take 200 mg by mouth daily   Refills:  0       MIRALAX PO        Use every other day   Refills:  0       Multiple vitamin  s Tabs        Dose:  1 tablet   Take 1 tablet by mouth daily.   Refills:  0       RA TURMERIC 500 MG Caps   Generic drug:  Turmeric        Dose:  2 capsule   Take 2 capsules by mouth daily   Refills:  0       zinc 50 MG Tabs        Dose:  1 tablet   Take 1 tablet by mouth daily   Refills:  0                Protect others around you: Learn how to safely use, store and throw away your medicines at www.disposemymeds.org.             Medication List: This is a list of all your medications and when to take them. Check marks below indicate your daily home schedule. Keep  this list as a reference.      Medications           Morning Afternoon Evening Bedtime As Needed    Acidophilus Tabs   Take 1 tablet by mouth daily                                aspirin 81 MG EC tablet   Take 1 tablet (81 mg) by mouth daily                                CALCIUM CITRATE   1,500 mg two times daily                                cholecalciferol 1000 units Tabs   Take  by mouth. 2 capsules po daily.                                cinnamon 500 MG Caps   Take 2 capsules by mouth daily                                fish oil-omega-3 fatty acids 1000 MG capsule   Take 1 g by mouth 2 times daily                                fluticasone 50 MCG/ACT spray   Commonly known as:  FLONASE   Spray 2 sprays into both nostrils daily                                gabapentin 300 MG capsule   Commonly known as:  NEURONTIN   TAKE TWO CAPSULES BY MOUTH AT BEDTIME                                Lutein 20 MG Caps   Take 20 mg by mouth daily                                MAGNESIUM OXIDE PO   Take 200 mg by mouth daily                                MIRALAX PO   Use every other day                                Multiple vitamin  s Tabs   Take 1 tablet by mouth daily.                                RA TURMERIC 500 MG Caps   Take 2 capsules by mouth daily   Generic drug:  Turmeric                                zinc 50 MG Tabs   Take 1 tablet by mouth daily

## 2018-09-28 ENCOUNTER — OFFICE VISIT (OUTPATIENT)
Dept: OTOLARYNGOLOGY | Facility: OTHER | Age: 68
End: 2018-09-28
Attending: PHYSICIAN ASSISTANT
Payer: COMMERCIAL

## 2018-09-28 VITALS
TEMPERATURE: 97.9 F | HEART RATE: 63 BPM | WEIGHT: 111 LBS | DIASTOLIC BLOOD PRESSURE: 64 MMHG | HEIGHT: 62 IN | OXYGEN SATURATION: 97 % | BODY MASS INDEX: 20.43 KG/M2 | SYSTOLIC BLOOD PRESSURE: 112 MMHG

## 2018-09-28 DIAGNOSIS — R09.82 POST-NASAL DRIP: ICD-10-CM

## 2018-09-28 DIAGNOSIS — H61.23 EXCESSIVE CERUMEN IN BOTH EAR CANALS: Primary | ICD-10-CM

## 2018-09-28 PROCEDURE — 92504 EAR MICROSCOPY EXAMINATION: CPT | Performed by: PHYSICIAN ASSISTANT

## 2018-09-28 PROCEDURE — 92504 EAR MICROSCOPY EXAMINATION: CPT

## 2018-09-28 PROCEDURE — G0463 HOSPITAL OUTPT CLINIC VISIT: HCPCS | Mod: 25

## 2018-09-28 PROCEDURE — 99213 OFFICE O/P EST LOW 20 MIN: CPT | Mod: 25 | Performed by: PHYSICIAN ASSISTANT

## 2018-09-28 ASSESSMENT — PAIN SCALES - GENERAL: PAINLEVEL: NO PAIN (0)

## 2018-09-28 NOTE — LETTER
9/28/2018         RE: Mihaela Jang  [address]  [address]  [facility] [address]        Dear Colleague,    Thank you for referring your patient, Mihaela Jang, to the [facility]. Please see a copy of my visit note below.    Chief Complaint   Patient presents with     Cerumen Impaction     ear cleaning      Patient presents for ear cleaning and ear exam. SHe has no otalgia, otorrhea.   Hearing has been good. Denies concerns with tinnitus.   She does use ear plugs and wishes to have her ears checked and cleaned.     She has been using sinus rinse for drainage which has aided in throat irritation. Mihaela noted her throat irritation remains. No reflux. She does use Zeb med rinse with helps.       She reports past excision w/ Dr. Lau in office on 9/30/16. Since, lesion had slowly returned and painful with pressure and sleeping.   She further went to [place] to dermatology and had excision, then completed a series of Kenalog injections following healing process. She feels this has helped greatly.       Past Medical History:   Diagnosis Date     Atypical nevi      Chondrodermatitis nodularis helicis of left ear     Dr Shipley, [facility]     Chronic rhinitis     Dr Messina, [facility] allergy; negative skin testing; IgE mediated allergies; ENT - DC nasal steroid and antihistamine; saline rinses     Degenerative skin disorder     solar elastosis     Hallux rigidus 06/15/2000     Hyperlipidemia, unspecified hyperlipidemia type 9/15/2016     Laryngopharyngeal reflux disease     PPI     Malignant neoplasm of breast (female), unspecified site 03/10/2004     Notalgia paresthetica      Osteoarthritis 07/20/2011     Osteoporosis, unspecified 09/10/2001    Dr Moses; intermittent reclast; repeat Reclast 2018 per Dr. Moess; repeat dexa 1 year     Other abnormal glucose 12/20/2013     Paresthesia     neck; notalgiaparesthetic; dr leahy & Dr Nevarez; neurontin     Personal  "history of malignant neoplasm of breast 06/07/2005     Rhinitis      Schamberg's purpura         Allergies   Allergen Reactions     Chloraprep One Step Rash     Povidone Iodine Rash     Betadine      Soap Rash     Betadine      Current Outpatient Prescriptions   Medication     aspirin EC 81 MG tablet     CALCIUM CITRATE     cholecalciferol 1000 UNITS TABS     cinnamon 500 MG CAPS     fish oil-omega-3 fatty acids (FISH OIL) 1000 MG capsule     fluticasone (FLONASE) 50 MCG/ACT spray     gabapentin (NEURONTIN) 300 MG capsule     Lactobacillus (ACIDOPHILUS) TABS     Lutein 20 MG CAPS     MAGNESIUM OXIDE PO     Multiple vitamin  s TABS     Polyethylene Glycol 3350 (MIRALAX PO)     Turmeric (RA TURMERIC) 500 MG CAPS     zinc 50 MG TABS     No current facility-administered medications for this visit.      Current Outpatient Prescriptions   Medication     aspirin EC 81 MG tablet     CALCIUM CITRATE     cholecalciferol 1000 UNITS TABS     cinnamon 500 MG CAPS     fish oil-omega-3 fatty acids (FISH OIL) 1000 MG capsule     fluticasone (FLONASE) 50 MCG/ACT spray     gabapentin (NEURONTIN) 300 MG capsule     Lactobacillus (ACIDOPHILUS) TABS     Lutein 20 MG CAPS     MAGNESIUM OXIDE PO     Multiple vitamin  s TABS     Polyethylene Glycol 3350 (MIRALAX PO)     Turmeric (RA TURMERIC) 500 MG CAPS     zinc 50 MG TABS     No current facility-administered medications for this visit.       ROS: 10 point ROS neg other than the symptoms noted above in the HPI.  /64 (BP Location: Left arm, Patient Position: Chair, Cuff Size: Adult Regular)  Pulse 63  Temp 97.9  F (36.6  C) (Tympanic)  Ht 5' 2\" (1.575 m)  Wt 111 lb (50.3 kg)  SpO2 97%  BMI 20.3 kg/m2  General - The patient is well nourished and well developed, and appears to have good nutritional status.  Alert and oriented to person and place, answers questions and cooperates with examination appropriately.   Head and Face - Normocephalic and atraumatic, with no gross " asymmetry noted.  The facial nerve is intact, with strong symmetric movements.  Voice and Breathing - The patient was breathing comfortably without the use of accessory muscles. There was no wheezing, stridor, or stertor.  The patients voice was clear and strong, and had appropriate pitch and quality.  Ears -examined under microscopy bilaterally using otologic speculum. Ears were cleaned with cupped forceps.   The external auditory canals are cerumen, the tympanic membranes are intact without effusion, retraction or mass.  Bony landmarks are intact.    Mouth - Examination of the oral cavity showed pink, healthy oral mucosa. No lesions or ulcerations noted.  The tongue was mobile and midline, and the dentition were in good condition.    Throat - The walls of the oropharynx were smooth, pink, moist, symmetric, and had no lesions or ulcerations.  The tonsillar pillars and soft palate were symmetric.  The uvula was midline on elevation.    Neck - Normal midline excursion of the laryngotracheal complex during swallowing.  Full range of motion on passive movement.  Palpation of the occipital, submental, submandibular, internal jugular chain, and supraclavicular nodes did not demonstrate any abnormal lymph nodes or masses.  Palpation of the thyroid was soft and smooth, with no nodules or goiter appreciated.  The trachea was mobile and midline.  Nose - External contour is symmetric, no gross deflection or scars.  Nasal mucosa is pink and moist with no abnormal mucus.  The septum was intact, turbinates of normal size and position.  No polyps, masses, or purulence noted on examination    ASSESSMENT:    ICD-10-CM    1. Excessive cerumen in both ear canals H61.23    2. Post-nasal drip R09.82       Ears were cleaned. No concerns. Doing well.   Return in 1 year or PRN ear cleaning    Use Zeb med rinse 1-2 times daily.   Maintain good water intake, limit caffeine.         Sejal Gutierrez PA-C  ENT  [facility],  [place]  [phone]      Again, thank you for allowing me to participate in the care of your patient.        Sincerely,        Sejal Gutierrez PA-C

## 2018-09-28 NOTE — NURSING NOTE
"Chief Complaint   Patient presents with     Cerumen Impaction     ear cleaning        Initial /64 (BP Location: Left arm, Patient Position: Chair, Cuff Size: Adult Regular)  Pulse 63  Temp 97.9  F (36.6  C) (Tympanic)  Ht 5' 2\" (1.575 m)  Wt 111 lb (50.3 kg)  SpO2 97%  BMI 20.3 kg/m2 Estimated body mass index is 20.3 kg/(m^2) as calculated from the following:    Height as of this encounter: 5' 2\" (1.575 m).    Weight as of this encounter: 111 lb (50.3 kg).  Medication Reconciliation: complete    Emperatriz Hameed LPN    "

## 2018-09-28 NOTE — MR AVS SNAPSHOT
After Visit Summary   9/28/2018    Mihaela Jang    MRN: [id number]           Patient Information     Date Of Birth          1950        Visit Information        Provider Department      9/28/2018 2:45 PM Sejal Gutierrez PA-C [facility]        Today's Diagnoses     Excessive cerumen in both ear canals    -  1    Post-nasal drip          Care Instructions    Ears were cleaned. Normal ear exam.   Try Zeb med rinse 1-2 times daily    Follow up in 1 year or as needed    Thank you for allowing KIM Cooney and our ENT team to participate in your care.  If your medications are too expensive, please give the nurse a call.  We can possibly change this medication.  If you have a scheduling or an appointment question please contact [facility] Unit Coordinator at their direct line [phone].   ALL nursing questions or concerns can be directed to your ENT nurse at: [phone] [facility]              Follow-ups after your visit        Follow-up notes from your care team     Return in about 1 year (around 9/28/2019).      Your next 10 appointments already scheduled     Oct 10, 2018  3:15 PM CDT   (Arrive by 3:00 PM)   PHYSICAL with Melyssa Connors MD   [facility] ([facility] )    [address]  [place] [address]   [phone]              Who to contact     If you have questions or need follow up information about today's clinic visit or your schedule please contact [facility] directly at [phone].  Normal or non-critical lab and imaging results will be communicated to you by MyChart, letter or phone within 4 business days after the clinic has received the results. If you do not hear from us within 7 days, please contact the clinic through MyChart or phone. If you have a critical or abnormal lab result, we will notify you by phone as soon as possible.  Submit refill requests through [employer] or call  "your pharmacy and they will forward the refill request to us. Please allow 3 business days for your refill to be completed.          Additional Information About Your Visit        MyChart Information     [employer] lets you send messages to your doctor, view your test results, renew your prescriptions, schedule appointments and more. To sign up, go to [email or web address] . Click on \"Log in\" on the left side of the screen, which will take you to the Welcome page. Then click on \"Sign up Now\" on the right side of the page.     You will be asked to enter the access code listed below, as well as some personal information. Please follow the directions to create your username and password.     Your access code is: [email or web address]  Expires: 2018  8:58 AM     Your access code will  in 90 days. If you need help or a new code, please call your [place] clinic or [phone].        Care EveryWhere ID     This is your Care EveryWhere ID. This could be used by other organizations to access your [place] medical records  [id number]        Your Vitals Were     Pulse Temperature Height Pulse Oximetry BMI (Body Mass Index)       63 97.9  F (36.6  C) (Tympanic) 5' 2\" (1.575 m) 97% 20.3 kg/m2        Blood Pressure from Last 3 Encounters:   18 112/64   18 148/81   18 104/62    Weight from Last 3 Encounters:   18 111 lb (50.3 kg)   18 111 lb 4.8 oz (50.5 kg)   18 113 lb (51.3 kg)              Today, you had the following     No orders found for display         Today's Medication Changes          These changes are accurate as of 18  3:05 PM.  If you have any questions, ask your nurse or doctor.               These medicines have changed or have updated prescriptions.        Dose/Directions    fluticasone 50 MCG/ACT spray   Commonly known as:  FLONASE   This may have changed:    - when to take this  - reasons to take this   Used for:  Laryngopharyngeal reflux (LPR), Post-nasal drip, " Globus sensation        Dose:  2 spray   Spray 2 sprays into both nostrils daily   Quantity:  16 g   Refills:  5       gabapentin 300 MG capsule   Commonly known as:  NEURONTIN   This may have changed:  See the new instructions.   Used for:  Paresthesia        TAKE TWO CAPSULES BY MOUTH AT BEDTIME   Quantity:  180 capsule   Refills:  0                Primary Care Provider Office Phone # Fax #    Melyssa Connors [phone]       [address]  [facility] [address]        Equal Access to Services     [facility] AH: Hadii aad ku hadasho Soomaali, waaxda luqadaha, qaybta kaalmada adeegyada, waxay idiin hayaan adeeg kharash laprakash . So [facility] [phone].    ATENCIÓN: Si habla español, tiene a quezada disposición servicios gratuitos de asistencia lingüística. [facility] [phone].    We comply with applicable federal civil rights laws and [place] laws. We do not discriminate on the basis of race, color, national origin, age, disability, sex, sexual orientation, or gender identity.            Thank you!     Thank you for choosing [facility]  for your care. Our goal is always to provide you with excellent care. Hearing back from our patients is one way we can continue to improve our services. Please take a few minutes to complete the written survey that you may receive in the mail after your visit with us. Thank you!             Your Updated Medication List - Protect others around you: Learn how to safely use, store and throw away your medicines at www.disposemymeds.org.          This list is accurate as of 9/28/18  3:05 PM.  Always use your most recent med list.                   Brand Name Dispense Instructions for use Diagnosis    Acidophilus Tabs      Take 1 tablet by mouth daily        aspirin 81 MG EC tablet     1 tablet    Take 1 tablet (81 mg) by mouth daily        CALCIUM CITRATE      1,500 mg two times daily        cholecalciferol 1000 units Tabs      Take  by mouth. 2  capsules po daily.        cinnamon 500 MG Caps      Take 2 capsules by mouth daily        fish oil-omega-3 fatty acids 1000 MG capsule      Take 1 g by mouth 2 times daily        fluticasone 50 MCG/ACT spray    FLONASE    16 g    Spray 2 sprays into both nostrils daily    Laryngopharyngeal reflux (LPR), Post-nasal drip, Globus sensation       gabapentin 300 MG capsule    NEURONTIN    180 capsule    TAKE TWO CAPSULES BY MOUTH AT BEDTIME    Paresthesia       Lutein 20 MG Caps      Take 20 mg by mouth daily        MAGNESIUM OXIDE PO      Take 200 mg by mouth daily        MIRALAX PO      Use every other day        Multiple vitamin  s Tabs      Take 1 tablet by mouth daily.        RA TURMERIC 500 MG Caps   Generic drug:  Turmeric      Take 2 capsules by mouth daily        zinc 50 MG Tabs      Take 1 tablet by mouth daily

## 2018-09-28 NOTE — PATIENT INSTRUCTIONS
Ears were cleaned. Normal ear exam.   Try Zeb med rinse 1-2 times daily    Follow up in 1 year or as needed    Thank you for allowing KIM Cooney and our ENT team to participate in your care.  If your medications are too expensive, please give the nurse a call.  We can possibly change this medication.  If you have a scheduling or an appointment question please contact [facility] Unit Coordinator at their direct line [phone].   ALL nursing questions or concerns can be directed to your ENT nurse at: [phone] [place]

## 2018-09-28 NOTE — PROGRESS NOTES
Chief Complaint   Patient presents with     Cerumen Impaction     ear cleaning      Patient presents for ear cleaning and ear exam. SHe has no otalgia, otorrhea.   Hearing has been good. Denies concerns with tinnitus.   She does use ear plugs and wishes to have her ears checked and cleaned.     She has been using sinus rinse for drainage which has aided in throat irritation. Mihaela noted her throat irritation remains. No reflux. She does use Zeb med rinse with helps.       She reports past excision w/ Dr. Lau in office on 9/30/16. Since, lesion had slowly returned and painful with pressure and sleeping.   She further went to [place] to dermatology and had excision, then completed a series of Kenalog injections following healing process. She feels this has helped greatly.       Past Medical History:   Diagnosis Date     Atypical nevi      Chondrodermatitis nodularis helicis of left ear     Dr Shipley, [facility]     Chronic rhinitis     Dr Messina, [facility] allergy; negative skin testing; IgE mediated allergies; ENT - DC nasal steroid and antihistamine; saline rinses     Degenerative skin disorder     solar elastosis     Hallux rigidus 06/15/2000     Hyperlipidemia, unspecified hyperlipidemia type 9/15/2016     Laryngopharyngeal reflux disease     PPI     Malignant neoplasm of breast (female), unspecified site 03/10/2004     Notalgia paresthetica      Osteoarthritis 07/20/2011     Osteoporosis, unspecified 09/10/2001    Dr Moses; intermittent reclast; repeat Reclast 2018 per Dr. Moses; repeat dexa 1 year     Other abnormal glucose 12/20/2013     Paresthesia     neck; notalgiaparesthetic; dr leahy & Dr Nevarez; neurontin     Personal history of malignant neoplasm of breast 06/07/2005     Rhinitis      Schamberg's purpura         Allergies   Allergen Reactions     Chloraprep One Step Rash     Povidone Iodine Rash     Betadine      Soap Rash     Betadine      Current Outpatient Prescriptions   Medication  "    aspirin EC 81 MG tablet     CALCIUM CITRATE     cholecalciferol 1000 UNITS TABS     cinnamon 500 MG CAPS     fish oil-omega-3 fatty acids (FISH OIL) 1000 MG capsule     fluticasone (FLONASE) 50 MCG/ACT spray     gabapentin (NEURONTIN) 300 MG capsule     Lactobacillus (ACIDOPHILUS) TABS     Lutein 20 MG CAPS     MAGNESIUM OXIDE PO     Multiple vitamin  s TABS     Polyethylene Glycol 3350 (MIRALAX PO)     Turmeric (RA TURMERIC) 500 MG CAPS     zinc 50 MG TABS     No current facility-administered medications for this visit.      Current Outpatient Prescriptions   Medication     aspirin EC 81 MG tablet     CALCIUM CITRATE     cholecalciferol 1000 UNITS TABS     cinnamon 500 MG CAPS     fish oil-omega-3 fatty acids (FISH OIL) 1000 MG capsule     fluticasone (FLONASE) 50 MCG/ACT spray     gabapentin (NEURONTIN) 300 MG capsule     Lactobacillus (ACIDOPHILUS) TABS     Lutein 20 MG CAPS     MAGNESIUM OXIDE PO     Multiple vitamin  s TABS     Polyethylene Glycol 3350 (MIRALAX PO)     Turmeric (RA TURMERIC) 500 MG CAPS     zinc 50 MG TABS     No current facility-administered medications for this visit.       ROS: 10 point ROS neg other than the symptoms noted above in the HPI.  /64 (BP Location: Left arm, Patient Position: Chair, Cuff Size: Adult Regular)  Pulse 63  Temp 97.9  F (36.6  C) (Tympanic)  Ht 5' 2\" (1.575 m)  Wt 111 lb (50.3 kg)  SpO2 97%  BMI 20.3 kg/m2  General - The patient is well nourished and well developed, and appears to have good nutritional status.  Alert and oriented to person and place, answers questions and cooperates with examination appropriately.   Head and Face - Normocephalic and atraumatic, with no gross asymmetry noted.  The facial nerve is intact, with strong symmetric movements.  Voice and Breathing - The patient was breathing comfortably without the use of accessory muscles. There was no wheezing, stridor, or stertor.  The patients voice was clear and strong, and had " appropriate pitch and quality.  Ears -examined under microscopy bilaterally using otologic speculum. Ears were cleaned with cupped forceps.   The external auditory canals are cerumen, the tympanic membranes are intact without effusion, retraction or mass.  Bony landmarks are intact.    Mouth - Examination of the oral cavity showed pink, healthy oral mucosa. No lesions or ulcerations noted.  The tongue was mobile and midline, and the dentition were in good condition.    Throat - The walls of the oropharynx were smooth, pink, moist, symmetric, and had no lesions or ulcerations.  The tonsillar pillars and soft palate were symmetric.  The uvula was midline on elevation.    Neck - Normal midline excursion of the laryngotracheal complex during swallowing.  Full range of motion on passive movement.  Palpation of the occipital, submental, submandibular, internal jugular chain, and supraclavicular nodes did not demonstrate any abnormal lymph nodes or masses.  Palpation of the thyroid was soft and smooth, with no nodules or goiter appreciated.  The trachea was mobile and midline.  Nose - External contour is symmetric, no gross deflection or scars.  Nasal mucosa is pink and moist with no abnormal mucus.  The septum was intact, turbinates of normal size and position.  No polyps, masses, or purulence noted on examination    ASSESSMENT:    ICD-10-CM    1. Excessive cerumen in both ear canals H61.23    2. Post-nasal drip R09.82       Ears were cleaned. No concerns. Doing well.   Return in 1 year or PRN ear cleaning    Use Zeb med rinse 1-2 times daily.   Maintain good water intake, limit caffeine.         Sejal Gutierrez PA-C  ENT  [facility], [place]  [phone]

## 2018-10-01 ENCOUNTER — TELEPHONE (OUTPATIENT)
Dept: FAMILY MEDICINE | Facility: OTHER | Age: 68
End: 2018-10-01

## 2018-10-01 DIAGNOSIS — R73.03 PREDIABETES: Primary | ICD-10-CM

## 2018-10-01 DIAGNOSIS — Z00.00 ENCOUNTER FOR PREVENTIVE HEALTH EXAMINATION: ICD-10-CM

## 2018-10-01 DIAGNOSIS — Z13.220 LIPID SCREENING: ICD-10-CM

## 2018-10-01 DIAGNOSIS — M81.0 AGE-RELATED OSTEOPOROSIS WITHOUT CURRENT PATHOLOGICAL FRACTURE: ICD-10-CM

## 2018-10-01 NOTE — TELEPHONE ENCOUNTER
4:42 PM    Reason for Call: Phone Call    Description: Pt would like labs to be ordered a day or 2 before physical appt on 10/10/2018. Please call when ordered.    Was an appointment offered for this call? No  If yes : Appointment type              Date    Preferred method for responding to this message: Telephone Call  What is your phone number ?[phone]    If we cannot reach you directly, may we leave a detailed response at the number you provided? Yes    Can this message wait until your PCP/provider returns, if available today? Not applicable, provider is in    Vaishali Lara

## 2018-10-02 NOTE — TELEPHONE ENCOUNTER
Dr. Connors,  Could you please order the labs you want and/or let me know what ones you want ordered.  Patient is aware that you are out of the office today.  Please advise et I will call her tomorrow when you are back.  Thank you.

## 2018-10-03 NOTE — TELEPHONE ENCOUNTER
Left patient a message letting her know lab orders were placed.  Also explained the fasting aspect in case she has never done that before.

## 2018-10-04 NOTE — PATIENT INSTRUCTIONS
Flu shot given.  Labs reviewed.  Copy given.  Repeat Reclast infusion - will be contacted to schedule.  DEXA next year.    Results for orders placed or performed in visit on 10/08/18   CBC with platelets and differential   Result Value Ref Range    WBC 4.4 4.0 - 11.0 10e9/L    RBC Count 3.96 3.8 - 5.2 10e12/L    Hemoglobin 13.0 11.7 - 15.7 g/dL    Hematocrit 37.7 35.0 - 47.0 %    MCV 95 78 - 100 fl    MCH 32.8 26.5 - 33.0 pg    MCHC 34.5 31.5 - 36.5 g/dL    RDW 12.5 10.0 - 15.0 %    Platelet Count 300 150 - 450 10e9/L    Diff Method Automated Method     % Neutrophils 58.2 %    % Lymphocytes 28.2 %    % Monocytes 11.5 %    % Eosinophils 0.9 %    % Basophils 0.7 %    % Immature Granulocytes 0.5 %    Nucleated RBCs 0 0 /100    Absolute Neutrophil 2.6 1.6 - 8.3 10e9/L    Absolute Lymphocytes 1.3 0.8 - 5.3 10e9/L    Absolute Monocytes 0.5 0.0 - 1.3 10e9/L    Absolute Eosinophils 0.0 0.0 - 0.7 10e9/L    Absolute Basophils 0.0 0.0 - 0.2 10e9/L    Abs Immature Granulocytes 0.0 0 - 0.4 10e9/L    Absolute Nucleated RBC 0.0    Vitamin D Deficiency   Result Value Ref Range    Vitamin D Deficiency screening 56 20 - 75 ug/L   Lipid Profile (Chol, Trig, HDL, LDL calc)   Result Value Ref Range    Cholesterol 190 <200 mg/dL    Triglycerides 55 <150 mg/dL    HDL Cholesterol 69 >49 mg/dL    LDL Cholesterol Calculated 110 (H) <100 mg/dL    Non HDL Cholesterol 121 <130 mg/dL   Comprehensive metabolic panel   Result Value Ref Range    Sodium 137 133 - 144 mmol/L    Potassium 4.1 3.4 - 5.3 mmol/L    Chloride 105 94 - 109 mmol/L    Carbon Dioxide 27 20 - 32 mmol/L    Anion Gap 5 3 - 14 mmol/L    Glucose 93 70 - 99 mg/dL    Urea Nitrogen 15 7 - 30 mg/dL    Creatinine 0.81 0.52 - 1.04 mg/dL    GFR Estimate 70 >60 mL/min/1.7m2    GFR Estimate If Black 84 >60 mL/min/1.7m2    Calcium 8.9 8.5 - 10.1 mg/dL    Bilirubin Total 0.2 0.2 - 1.3 mg/dL    Albumin 3.7 3.4 - 5.0 g/dL    Protein Total 7.6 6.8 - 8.8 g/dL    Alkaline Phosphatase 65 40 - 150  U/L    ALT 24 0 - 50 U/L    AST 14 0 - 45 U/L   Hemoglobin A1c   Result Value Ref Range    Hemoglobin A1C 5.7 (H) 0 - 5.6 %   Estimated Average Glucose   Result Value Ref Range    Estimated Average Glucose 117 mg/dL       Preventive Health Recommendations  Female Ages 65 +    Yearly exam:     See your health care provider every year in order to  o Review health changes.   o Discuss preventive care.    o Review your medicines if your doctor has prescribed any.      You no longer need a yearly Pap test unless you've had an abnormal Pap test in the past 10 years. If you have vaginal symptoms, such as bleeding or discharge, be sure to talk with your provider about a Pap test.      Every 1 to 2 years, have a mammogram.  If you are over 69, talk with your health care provider about whether or not you want to continue having screening mammograms.      Every 10 years, have a colonoscopy. Or, have a yearly FIT test (stool test). These exams will check for colon cancer.       Have a cholesterol test every 5 years, or more often if your doctor advises it.       Have a diabetes test (fasting glucose) every three years. If you are at risk for diabetes, you should have this test more often.       At age 65, have a bone density scan (DEXA) to check for osteoporosis (brittle bone disease).    Shots:    Get a flu shot each year.    Get a tetanus shot every 10 years.    Talk to your doctor about your pneumonia vaccines. There are now two you should receive - Pneumovax (PPSV 23) and Prevnar (PCV 13).    Talk to your pharmacist about the shingles vaccine.    Talk to your doctor about the hepatitis B vaccine.    Nutrition:     Eat at least 5 servings of fruits and vegetables each day.      Eat whole-grain bread, whole-wheat pasta and brown rice instead of white grains and rice.      Get adequate about Calcium and Vitamin D.     Lifestyle    Exercise at least 150 minutes a week (30 minutes a day, 5 days a week). This will help you  control your weight and prevent disease.      Limit alcohol to one drink per day.      No smoking.       Wear sunscreen to prevent skin cancer.       See your dentist twice a year for an exam and cleaning.      See your eye doctor every 1 to 2 years to screen for conditions such as glaucoma, macular degeneration, cataracts, etc

## 2018-10-04 NOTE — PROGRESS NOTES
SUBJECTIVE:   Mihaela Jang is a 68 year old female who presents for Preventive Visit.  click delete button to remove this line now  click delete button to remove this line now  Are you in the first 12 months of your Medicare Part B coverage?  Patient is unsure    Healthy Habits:    Do you get at least three servings of calcium containing foods daily (dairy, green leafy vegetables, etc.)? yes    Amount of exercise or daily activities, outside of work: every day; walks up and down stairs in home and out to town, etc.    Problems taking medications regularly No    Medication side effects: No    Have you had an eye exam in the past two years? yes    Do you see a dentist twice per year? yes    Do you have sleep apnea, excessive snoring or daytime drowsiness?no      Ability to successfully perform activities of daily living: Yes, no assistance needed    Home safety:  none identified     Hearing impairment: No    Fall risk:  Fallen 2 or more times in the past year?: No  Any fall with injury in the past year?: No      COGNITIVE SCREEN  1) Repeat 3 items (Leader, Season, Table)    2) Clock draw: NORMAL  3) 3 item recall: Recalls 3 objects  Results: 3 items recalled: COGNITIVE IMPAIRMENT LESS LIKELY; clock normal    Mom with dementia in early to mid 80s    Mini-CogTM Copyright S Carlos Enrique. Licensed by the author for use in [facility]; reprinted with permission (dennis@[email or web address]). All rights reserved.        Outside letter with Dr. Moses reviewed.  Osteoporosis.  On Reclast.  Advised 2nd year of infusion and recheck DEXA next year (after 2 years of Reclast).    Hyperlipidemia Follow-Up      Rate your low fat/cholesterol diet?: good    Taking statin?  No    Other lipid medications/supplements?:  Fish oil/Omega 3, dose 1000mg BID without side effects      Reviewed and updated as needed this visit by clinical staff  Tobacco  Allergies  Meds  Med Hx  Surg Hx  Fam Hx  Soc Hx        Reviewed and updated as  needed this visit by Provider  Tobacco  Allergies  Meds  Med Hx  Surg Hx  Fam Hx  Soc Hx       Social History   Substance Use Topics     Smoking status: Never Smoker     Smokeless tobacco: Never Used     Alcohol use 0.0 oz/week     0 Standard drinks or equivalent per week      Comment: 1 glasso wine, weekly        If you drink alcohol do you typically have >3 drinks per day or >7 drinks per week? No                        Today's PHQ-2 Score:   PHQ-2 ( 1999 Pf[employer]) 12/1/2015 11/19/2015   Q1: Little interest or pleasure in doing things 0 0   Q2: Feeling down, depressed or hopeless 0 0   PHQ-2 Score 0 0     PHQ-9 SCORE 10/10/2018   Total Score -   Total Score 0     JENNIFER-7 SCORE 3/14/2018 4/30/2018 10/10/2018   Total Score 0 0 0         Do you feel safe in your environment - Yes    Current providers sharing in care for this patient include:   Patient Care Team:  Melyssa Connors MD as PCP - General    The following health maintenance items are reviewed in Epic and correct as of today:  Health Maintenance   Topic Date Due     INFLUENZA VACCINE (1) 09/01/2018     JENNIFER QUESTIONNAIRE 6 MONTHS  10/30/2018     PHQ-9 Q6 MONTHS  10/30/2018     FALL RISK ASSESSMENT  09/28/2019     TETANUS IMMUNIZATION (SYSTEM ASSIGNED)  12/17/2020     ADVANCE DIRECTIVE PLANNING Q5 YRS  08/15/2021     LIPID SCREEN Q5 YR FEMALE (SYSTEM ASSIGNED)  10/08/2023     COLONOSCOPY Q10 YR  08/13/2028     DEXA SCAN SCREENING (SYSTEM ASSIGNED)  Completed     PNEUMOCOCCAL  Completed     HEPATITIS C SCREENING  Completed     Current Outpatient Prescriptions   Medication     aspirin EC 81 MG tablet     CALCIUM CITRATE     cholecalciferol 1000 UNITS TABS     cinnamon 500 MG CAPS     fish oil-omega-3 fatty acids (FISH OIL) 1000 MG capsule     fluticasone (FLONASE) 50 MCG/ACT spray     gabapentin (NEURONTIN) 300 MG capsule     Lactobacillus (ACIDOPHILUS) TABS     Lutein 20 MG CAPS     MAGNESIUM OXIDE PO     Multiple vitamin  s TABS     Polyethylene Glycol  "3350 (MIRALAX PO)     Turmeric (RA TURMERIC) 500 MG CAPS     zinc 50 MG TABS     No current facility-administered medications for this visit.        Labs reviewed in EPIC      ROS:  Constitutional, HEENT, cardiovascular, pulmonary, GI, , musculoskeletal, neuro, skin, endocrine and psych systems are negative, except as otherwise noted.    OBJECTIVE:   /62 (BP Location: Right arm, Patient Position: Sitting, Cuff Size: Child)  Pulse 57  Temp 98.4  F (36.9  C) (Tympanic)  Resp 18  Wt 108 lb 9.6 oz (49.3 kg)  SpO2 99%  BMI 19.86 kg/m2 Estimated body mass index is 19.86 kg/(m^2) as calculated from the following:    Height as of 9/28/18: 5' 2\" (1.575 m).    Weight as of this encounter: 108 lb 9.6 oz (49.3 kg).  EXAM:   GENERAL APPEARANCE: healthy, alert, no distress and thin  EYES: Eyes grossly normal to inspection, PERRL and conjunctivae and sclerae normal  HENT: ear canals and TM's normal, nose and mouth without ulcers or lesions, oropharynx clear and oral mucous membranes moist  NECK: no adenopathy, no asymmetry, masses, or scars and thyroid normal to palpation  RESP: lungs clear to auscultation - no rales, rhonchi or wheezes  BREAST: normal without masses, tenderness or nipple discharge and no palpable axillary masses or adenopathy  CV: regular rate and rhythm, normal S1 S2, no S3 or S4, no murmur, click or rub, no peripheral edema and peripheral pulses strong  ABDOMEN: soft, nontender, no hepatosplenomegaly, no masses and bowel sounds normal   (female): normal female external genitalia, normal urethral meatus, vaginal mucosal atrophy noted and atrophic; small vaginal introitus; limited bimanual exam without masses  MS: no musculoskeletal defects are noted and gait is age appropriate without ataxia  SKIN: no suspicious lesions or rashes  NEURO: Normal strength and tone, sensory exam grossly normal, mentation intact and speech normal  PSYCH: mentation appears normal and affect " "normal/bright      ASSESSMENT / PLAN:       ICD-10-CM    1. Routine general medical examination at a health care facility Z00.00    2. Need for prophylactic vaccination and inoculation against influenza Z23    3. Age-related osteoporosis without current pathological fracture M81.0    4. Prediabetes R73.03    5. Needs flu shot Z23 FLU VACCINE, INCREASED ANTIGEN, PRESV FREE     Flu shot given.  Labs reviewed.  Copy given.  Repeat Reclast infusion - will be contacted to schedule.  DEXA next year.    End of Life Planning:  Patient currently has an advanced directive: Yes.  Practitioner is supportive of decision.    COUNSELING:  Reviewed preventive health counseling, as reflected in patient instructions       Regular exercise       Healthy diet/nutrition       Vision screening       Hearing screening       Dental care       Immunizations    Vaccinated for: Influenza             Aspirin Prophylaxsis       Alcohol Use       Colon cancer screening       Advanced Planning     BP Readings from Last 1 Encounters:   10/10/18 122/62     Estimated body mass index is 19.86 kg/(m^2) as calculated from the following:    Height as of 9/28/18: 5' 2\" (1.575 m).    Weight as of this encounter: 108 lb 9.6 oz (49.3 kg).    BP Screening:   Last 3 BP Readings:    BP Readings from Last 3 Encounters:   10/10/18 122/62   09/28/18 112/64   08/13/18 148/81       The following was recommended to the patient:  Re-screen BP within a year and recommended lifestyle modifications       reports that she has never smoked. She has never used smokeless tobacco.      Appropriate preventive services were discussed with this patient, including applicable screening as appropriate for cardiovascular disease, diabetes, osteopenia/osteoporosis, and glaucoma.  As appropriate for age/gender, discussed screening for colorectal cancer, prostate cancer, breast cancer, and cervical cancer. Checklist reviewing preventive services available has been given to the " patient.    Reviewed patients plan of care and provided an AVS. The Basic Care Plan (routine screening as documented in Health Maintenance) for Mihaela meets the Care Plan requirement. This Care Plan has been established and reviewed with the Patient.    Counseling Resources:  ATP IV Guidelines  Pooled Cohorts Equation Calculator  Breast Cancer Risk Calculator  FRAX Risk Assessment  ICSI Preventive Guidelines  Dietary Guidelines for Americans, 2010  USDA's MyPlate  ASA Prophylaxis  Lung CA Screening    Melyssa Encinas MD  [facility]

## 2018-10-08 DIAGNOSIS — Z00.00 ENCOUNTER FOR PREVENTIVE HEALTH EXAMINATION: ICD-10-CM

## 2018-10-08 DIAGNOSIS — Z13.220 LIPID SCREENING: ICD-10-CM

## 2018-10-08 DIAGNOSIS — M81.0 AGE-RELATED OSTEOPOROSIS WITHOUT CURRENT PATHOLOGICAL FRACTURE: ICD-10-CM

## 2018-10-08 DIAGNOSIS — R73.03 PREDIABETES: ICD-10-CM

## 2018-10-08 LAB
ALBUMIN SERPL-MCNC: 3.7 G/DL (ref 3.4–5)
ALP SERPL-CCNC: 65 U/L (ref 40–150)
ALT SERPL W P-5'-P-CCNC: 24 U/L (ref 0–50)
ANION GAP SERPL CALCULATED.3IONS-SCNC: 5 MMOL/L (ref 3–14)
AST SERPL W P-5'-P-CCNC: 14 U/L (ref 0–45)
BASOPHILS # BLD AUTO: 0 10E9/L (ref 0–0.2)
BASOPHILS NFR BLD AUTO: 0.7 %
BILIRUB SERPL-MCNC: 0.2 MG/DL (ref 0.2–1.3)
BUN SERPL-MCNC: 15 MG/DL (ref 7–30)
CALCIUM SERPL-MCNC: 8.9 MG/DL (ref 8.5–10.1)
CHLORIDE SERPL-SCNC: 105 MMOL/L (ref 94–109)
CHOLEST SERPL-MCNC: 190 MG/DL
CO2 SERPL-SCNC: 27 MMOL/L (ref 20–32)
CREAT SERPL-MCNC: 0.81 MG/DL (ref 0.52–1.04)
DIFFERENTIAL METHOD BLD: NORMAL
EOSINOPHIL # BLD AUTO: 0 10E9/L (ref 0–0.7)
EOSINOPHIL NFR BLD AUTO: 0.9 %
ERYTHROCYTE [DISTWIDTH] IN BLOOD BY AUTOMATED COUNT: 12.5 % (ref 10–15)
EST. AVERAGE GLUCOSE BLD GHB EST-MCNC: 117 MG/DL
GFR SERPL CREATININE-BSD FRML MDRD: 70 ML/MIN/1.7M2
GLUCOSE SERPL-MCNC: 93 MG/DL (ref 70–99)
HBA1C MFR BLD: 5.7 % (ref 0–5.6)
HCT VFR BLD AUTO: 37.7 % (ref 35–47)
HDLC SERPL-MCNC: 69 MG/DL
HGB BLD-MCNC: 13 G/DL (ref 11.7–15.7)
IMM GRANULOCYTES # BLD: 0 10E9/L (ref 0–0.4)
IMM GRANULOCYTES NFR BLD: 0.5 %
LDLC SERPL CALC-MCNC: 110 MG/DL
LYMPHOCYTES # BLD AUTO: 1.3 10E9/L (ref 0.8–5.3)
LYMPHOCYTES NFR BLD AUTO: 28.2 %
MCH RBC QN AUTO: 32.8 PG (ref 26.5–33)
MCHC RBC AUTO-ENTMCNC: 34.5 G/DL (ref 31.5–36.5)
MCV RBC AUTO: 95 FL (ref 78–100)
MONOCYTES # BLD AUTO: 0.5 10E9/L (ref 0–1.3)
MONOCYTES NFR BLD AUTO: 11.5 %
NEUTROPHILS # BLD AUTO: 2.6 10E9/L (ref 1.6–8.3)
NEUTROPHILS NFR BLD AUTO: 58.2 %
NONHDLC SERPL-MCNC: 121 MG/DL
NRBC # BLD AUTO: 0 10*3/UL
NRBC BLD AUTO-RTO: 0 /100
PLATELET # BLD AUTO: 300 10E9/L (ref 150–450)
POTASSIUM SERPL-SCNC: 4.1 MMOL/L (ref 3.4–5.3)
PROT SERPL-MCNC: 7.6 G/DL (ref 6.8–8.8)
RBC # BLD AUTO: 3.96 10E12/L (ref 3.8–5.2)
SODIUM SERPL-SCNC: 137 MMOL/L (ref 133–144)
TRIGL SERPL-MCNC: 55 MG/DL
WBC # BLD AUTO: 4.4 10E9/L (ref 4–11)

## 2018-10-08 PROCEDURE — 85025 COMPLETE CBC W/AUTO DIFF WBC: CPT | Mod: ZL | Performed by: FAMILY MEDICINE

## 2018-10-08 PROCEDURE — 40000788 ZZHCL STATISTIC ESTIMATED AVERAGE GLUCOSE: Mod: ZL | Performed by: FAMILY MEDICINE

## 2018-10-08 PROCEDURE — 99000 SPECIMEN HANDLING OFFICE-LAB: CPT | Performed by: FAMILY MEDICINE

## 2018-10-08 PROCEDURE — 82306 VITAMIN D 25 HYDROXY: CPT | Mod: ZL | Performed by: FAMILY MEDICINE

## 2018-10-08 PROCEDURE — 83036 HEMOGLOBIN GLYCOSYLATED A1C: CPT | Mod: ZL | Performed by: FAMILY MEDICINE

## 2018-10-08 PROCEDURE — 36415 COLL VENOUS BLD VENIPUNCTURE: CPT | Mod: ZL | Performed by: FAMILY MEDICINE

## 2018-10-08 PROCEDURE — 80053 COMPREHEN METABOLIC PANEL: CPT | Mod: ZL | Performed by: FAMILY MEDICINE

## 2018-10-08 PROCEDURE — 80061 LIPID PANEL: CPT | Mod: ZL | Performed by: FAMILY MEDICINE

## 2018-10-09 LAB — DEPRECATED CALCIDIOL+CALCIFEROL SERPL-MC: 56 UG/L (ref 20–75)

## 2018-10-10 ENCOUNTER — OFFICE VISIT (OUTPATIENT)
Dept: FAMILY MEDICINE | Facility: OTHER | Age: 68
End: 2018-10-10
Attending: FAMILY MEDICINE
Payer: COMMERCIAL

## 2018-10-10 VITALS
WEIGHT: 108.6 LBS | RESPIRATION RATE: 18 BRPM | OXYGEN SATURATION: 99 % | DIASTOLIC BLOOD PRESSURE: 62 MMHG | HEART RATE: 57 BPM | BODY MASS INDEX: 19.86 KG/M2 | TEMPERATURE: 98.4 F | SYSTOLIC BLOOD PRESSURE: 122 MMHG

## 2018-10-10 DIAGNOSIS — M81.0 AGE-RELATED OSTEOPOROSIS WITHOUT CURRENT PATHOLOGICAL FRACTURE: ICD-10-CM

## 2018-10-10 DIAGNOSIS — Z00.00 ROUTINE GENERAL MEDICAL EXAMINATION AT A HEALTH CARE FACILITY: Primary | ICD-10-CM

## 2018-10-10 DIAGNOSIS — Z23 NEED FOR PROPHYLACTIC VACCINATION AND INOCULATION AGAINST INFLUENZA: ICD-10-CM

## 2018-10-10 DIAGNOSIS — R73.03 PREDIABETES: ICD-10-CM

## 2018-10-10 DIAGNOSIS — Z23 NEEDS FLU SHOT: ICD-10-CM

## 2018-10-10 PROCEDURE — G0008 ADMIN INFLUENZA VIRUS VAC: HCPCS

## 2018-10-10 PROCEDURE — 90662 IIV NO PRSV INCREASED AG IM: CPT | Performed by: FAMILY MEDICINE

## 2018-10-10 PROCEDURE — 99397 PER PM REEVAL EST PAT 65+ YR: CPT | Performed by: FAMILY MEDICINE

## 2018-10-10 ASSESSMENT — PATIENT HEALTH QUESTIONNAIRE - PHQ9: 5. POOR APPETITE OR OVEREATING: NOT AT ALL

## 2018-10-10 ASSESSMENT — ANXIETY QUESTIONNAIRES
6. BECOMING EASILY ANNOYED OR IRRITABLE: NOT AT ALL
1. FEELING NERVOUS, ANXIOUS, OR ON EDGE: NOT AT ALL
GAD7 TOTAL SCORE: 0
7. FEELING AFRAID AS IF SOMETHING AWFUL MIGHT HAPPEN: NOT AT ALL
2. NOT BEING ABLE TO STOP OR CONTROL WORRYING: NOT AT ALL
5. BEING SO RESTLESS THAT IT IS HARD TO SIT STILL: NOT AT ALL
3. WORRYING TOO MUCH ABOUT DIFFERENT THINGS: NOT AT ALL

## 2018-10-10 ASSESSMENT — PAIN SCALES - GENERAL: PAINLEVEL: NO PAIN (0)

## 2018-10-10 NOTE — NURSING NOTE
"Chief Complaint   Patient presents with     Physical       Initial /62 (BP Location: Right arm, Patient Position: Sitting, Cuff Size: Child)  Pulse 57  Temp 98.4  F (36.9  C) (Tympanic)  Resp 18  Wt 108 lb 9.6 oz (49.3 kg)  SpO2 99%  BMI 19.86 kg/m2 Estimated body mass index is 19.86 kg/(m^2) as calculated from the following:    Height as of 9/28/18: 5' 2\" (1.575 m).    Weight as of this encounter: 108 lb 9.6 oz (49.3 kg).  Medication Reconciliation: complete    Sejal Moreno LPN  "

## 2018-10-10 NOTE — MR AVS SNAPSHOT
After Visit Summary   10/10/2018    Mihalea Jang    MRN: [id number]           Patient Information     Date Of Birth          1950        Visit Information        Provider Department      10/10/2018 3:15 PM Melyssa Connors MD [facility]        Today's Diagnoses     Routine general medical examination at a health care facility    -  1    Need for prophylactic vaccination and inoculation against influenza        Age-related osteoporosis without current pathological fracture        Prediabetes        Needs flu shot          Care Instructions    Flu shot given.  Labs reviewed.  Copy given.  Repeat Reclast infusion - will be contacted to schedule.  DEXA next year.    Results for orders placed or performed in visit on 10/08/18   CBC with platelets and differential   Result Value Ref Range    WBC 4.4 4.0 - 11.0 10e9/L    RBC Count 3.96 3.8 - 5.2 10e12/L    Hemoglobin 13.0 11.7 - 15.7 g/dL    Hematocrit 37.7 35.0 - 47.0 %    MCV 95 78 - 100 fl    MCH 32.8 26.5 - 33.0 pg    MCHC 34.5 31.5 - 36.5 g/dL    RDW 12.5 10.0 - 15.0 %    Platelet Count 300 150 - 450 10e9/L    Diff Method Automated Method     % Neutrophils 58.2 %    % Lymphocytes 28.2 %    % Monocytes 11.5 %    % Eosinophils 0.9 %    % Basophils 0.7 %    % Immature Granulocytes 0.5 %    Nucleated RBCs 0 0 /100    Absolute Neutrophil 2.6 1.6 - 8.3 10e9/L    Absolute Lymphocytes 1.3 0.8 - 5.3 10e9/L    Absolute Monocytes 0.5 0.0 - 1.3 10e9/L    Absolute Eosinophils 0.0 0.0 - 0.7 10e9/L    Absolute Basophils 0.0 0.0 - 0.2 10e9/L    Abs Immature Granulocytes 0.0 0 - 0.4 10e9/L    Absolute Nucleated RBC 0.0    Vitamin D Deficiency   Result Value Ref Range    Vitamin D Deficiency screening 56 20 - 75 ug/L   Lipid Profile (Chol, Trig, HDL, LDL calc)   Result Value Ref Range    Cholesterol 190 <200 mg/dL    Triglycerides 55 <150 mg/dL    HDL Cholesterol 69 >49 mg/dL    LDL Cholesterol Calculated 110 (H) <100 mg/dL    Non  HDL Cholesterol 121 <130 mg/dL   Comprehensive metabolic panel   Result Value Ref Range    Sodium 137 133 - 144 mmol/L    Potassium 4.1 3.4 - 5.3 mmol/L    Chloride 105 94 - 109 mmol/L    Carbon Dioxide 27 20 - 32 mmol/L    Anion Gap 5 3 - 14 mmol/L    Glucose 93 70 - 99 mg/dL    Urea Nitrogen 15 7 - 30 mg/dL    Creatinine 0.81 0.52 - 1.04 mg/dL    GFR Estimate 70 >60 mL/min/1.7m2    GFR Estimate If Black 84 >60 mL/min/1.7m2    Calcium 8.9 8.5 - 10.1 mg/dL    Bilirubin Total 0.2 0.2 - 1.3 mg/dL    Albumin 3.7 3.4 - 5.0 g/dL    Protein Total 7.6 6.8 - 8.8 g/dL    Alkaline Phosphatase 65 40 - 150 U/L    ALT 24 0 - 50 U/L    AST 14 0 - 45 U/L   Hemoglobin A1c   Result Value Ref Range    Hemoglobin A1C 5.7 (H) 0 - 5.6 %   Estimated Average Glucose   Result Value Ref Range    Estimated Average Glucose 117 mg/dL       Preventive Health Recommendations  Female Ages 65 +    Yearly exam:     See your health care provider every year in order to  o Review health changes.   o Discuss preventive care.    o Review your medicines if your doctor has prescribed any.      You no longer need a yearly Pap test unless you've had an abnormal Pap test in the past 10 years. If you have vaginal symptoms, such as bleeding or discharge, be sure to talk with your provider about a Pap test.      Every 1 to 2 years, have a mammogram.  If you are over 69, talk with your health care provider about whether or not you want to continue having screening mammograms.      Every 10 years, have a colonoscopy. Or, have a yearly FIT test (stool test). These exams will check for colon cancer.       Have a cholesterol test every 5 years, or more often if your doctor advises it.       Have a diabetes test (fasting glucose) every three years. If you are at risk for diabetes, you should have this test more often.       At age 65, have a bone density scan (DEXA) to check for osteoporosis (brittle bone disease).    Shots:    Get a flu shot each year.    Get a  tetanus shot every 10 years.    Talk to your doctor about your pneumonia vaccines. There are now two you should receive - Pneumovax (PPSV 23) and Prevnar (PCV 13).    Talk to your pharmacist about the shingles vaccine.    Talk to your doctor about the hepatitis B vaccine.    Nutrition:     Eat at least 5 servings of fruits and vegetables each day.      Eat whole-grain bread, whole-wheat pasta and brown rice instead of white grains and rice.      Get adequate about Calcium and Vitamin D.     Lifestyle    Exercise at least 150 minutes a week (30 minutes a day, 5 days a week). This will help you control your weight and prevent disease.      Limit alcohol to one drink per day.      No smoking.       Wear sunscreen to prevent skin cancer.       See your dentist twice a year for an exam and cleaning.      See your eye doctor every 1 to 2 years to screen for conditions such as glaucoma, macular degeneration, cataracts, etc           Follow-ups after your visit        Who to contact     If you have questions or need follow up information about today's clinic visit or your schedule please contact [facility] directly at [phone].  Normal or non-critical lab and imaging results will be communicated to you by MyChart, letter or phone within 4 business days after the clinic has received the results. If you do not hear from us within 7 days, please contact the clinic through MyChart or phone. If you have a critical or abnormal lab result, we will notify you by phone as soon as possible.  Submit refill requests through [employer] or call your pharmacy and they will forward the refill request to us. Please allow 3 business days for your refill to be completed.          Additional Information About Your Visit        Care EveryWhere ID     This is your Care EveryWhere ID. This could be used by other organizations to access your [facility] medical records  [id number]        Your Vitals Were     Pulse  Temperature Respirations Pulse Oximetry BMI (Body Mass Index)       57 98.4  F (36.9  C) (Tympanic) 18 99% 19.86 kg/m2        Blood Pressure from Last 3 Encounters:   10/10/18 122/62   09/28/18 112/64   08/13/18 148/81    Weight from Last 3 Encounters:   10/10/18 108 lb 9.6 oz (49.3 kg)   09/28/18 111 lb (50.3 kg)   08/13/18 111 lb 4.8 oz (50.5 kg)              We Performed the Following     FLU VACCINE, INCREASED ANTIGEN, PRESV FREE          Today's Medication Changes          These changes are accurate as of 10/10/18  3:47 PM.  If you have any questions, ask your nurse or doctor.               These medicines have changed or have updated prescriptions.        Dose/Directions    fluticasone 50 MCG/ACT spray   Commonly known as:  FLONASE   This may have changed:    - when to take this  - reasons to take this   Used for:  Laryngopharyngeal reflux (LPR), Post-nasal drip, Globus sensation        Dose:  2 spray   Spray 2 sprays into both nostrils daily   Quantity:  16 g   Refills:  5       gabapentin 300 MG capsule   Commonly known as:  NEURONTIN   This may have changed:  See the new instructions.   Used for:  Paresthesia        TAKE TWO CAPSULES BY MOUTH AT BEDTIME   Quantity:  180 capsule   Refills:  0                Primary Care Provider Office Phone # Fax #    Melyssa Connors [phone]       [address]  [facility] [address]        Equal Access to Services     [facility] AH: Hadii patsy zhang hadasho Soomaali, waaxda luqadaha, qaybta kaalmada adeegyada, waxay fidel wade. So [facility] [phone].    ATENCIÓN: Si habla español, tiene a quezada disposición servicios gratuitos de asistencia lingüística. Llame al [phone].    We comply with applicable federal civil rights laws and [place] laws. We do not discriminate on the basis of race, color, national origin, age, disability, sex, sexual orientation, or gender identity.            Thank you!     Thank you for choosing  [facility]  for your care. Our goal is always to provide you with excellent care. Hearing back from our patients is one way we can continue to improve our services. Please take a few minutes to complete the written survey that you may receive in the mail after your visit with us. Thank you!             Your Updated Medication List - Protect others around you: Learn how to safely use, store and throw away your medicines at www.disposemymeds.org.          This list is accurate as of 10/10/18  3:47 PM.  Always use your most recent med list.                   Brand Name Dispense Instructions for use Diagnosis    Acidophilus Tabs      Take 1 tablet by mouth daily        aspirin 81 MG EC tablet     1 tablet    Take 1 tablet (81 mg) by mouth daily        CALCIUM CITRATE      1,500 mg two times daily        cholecalciferol 1000 units Tabs      Take  by mouth. 2 capsules po daily.        cinnamon 500 MG Caps      Take 2 capsules by mouth daily        fish oil-omega-3 fatty acids 1000 MG capsule      Take 1 g by mouth 2 times daily        fluticasone 50 MCG/ACT spray    FLONASE    16 g    Spray 2 sprays into both nostrils daily    Laryngopharyngeal reflux (LPR), Post-nasal drip, Globus sensation       gabapentin 300 MG capsule    NEURONTIN    180 capsule    TAKE TWO CAPSULES BY MOUTH AT BEDTIME    Paresthesia       Lutein 20 MG Caps      Take 20 mg by mouth daily        MAGNESIUM OXIDE PO      Take 200 mg by mouth daily        MIRALAX PO      Use every other day        Multiple vitamin  s Tabs      Take 1 tablet by mouth daily.        RA TURMERIC 500 MG Caps   Generic drug:  Turmeric      Take 2 capsules by mouth daily        zinc 50 MG Tabs      Take 1 tablet by mouth daily

## 2018-10-10 NOTE — PROGRESS NOTES

## 2018-10-11 ENCOUNTER — TELEPHONE (OUTPATIENT)
Dept: INFUSION THERAPY | Facility: OTHER | Age: 68
End: 2018-10-11

## 2018-10-11 RX ORDER — ZOLEDRONIC ACID 5 MG/100ML
5 INJECTION, SOLUTION INTRAVENOUS ONCE
Status: CANCELLED
Start: 2018-10-11 | End: 2018-10-11

## 2018-10-11 ASSESSMENT — PATIENT HEALTH QUESTIONNAIRE - PHQ9: SUM OF ALL RESPONSES TO PHQ QUESTIONS 1-9: 0

## 2018-10-11 ASSESSMENT — ANXIETY QUESTIONNAIRES: GAD7 TOTAL SCORE: 0

## 2018-10-11 NOTE — TELEPHONE ENCOUNTER
Received voicemail that patient will need Reclast infusion.  This will be 3rd infusion.  Therapy plan placed under Dr. Connors.  Requested [facility] to call patient and schedule.  She will need labwork prior to infusion.

## 2018-10-18 ENCOUNTER — INFUSION THERAPY VISIT (OUTPATIENT)
Dept: INFUSION THERAPY | Facility: OTHER | Age: 68
End: 2018-10-18
Attending: FAMILY MEDICINE
Payer: MEDICARE

## 2018-10-18 VITALS
OXYGEN SATURATION: 94 % | DIASTOLIC BLOOD PRESSURE: 60 MMHG | RESPIRATION RATE: 18 BRPM | HEIGHT: 62 IN | WEIGHT: 113.3 LBS | BODY MASS INDEX: 20.85 KG/M2 | HEART RATE: 75 BPM | TEMPERATURE: 97.2 F | SYSTOLIC BLOOD PRESSURE: 112 MMHG

## 2018-10-18 DIAGNOSIS — M81.0 AGE-RELATED OSTEOPOROSIS WITHOUT CURRENT PATHOLOGICAL FRACTURE: Primary | ICD-10-CM

## 2018-10-18 PROCEDURE — 96365 THER/PROPH/DIAG IV INF INIT: CPT

## 2018-10-18 PROCEDURE — 25000128 H RX IP 250 OP 636: Performed by: FAMILY MEDICINE

## 2018-10-18 RX ORDER — ZOLEDRONIC ACID 5 MG/100ML
5 INJECTION, SOLUTION INTRAVENOUS ONCE
Status: COMPLETED | OUTPATIENT
Start: 2018-10-18 | End: 2018-10-18

## 2018-10-18 RX ORDER — ZOLEDRONIC ACID 5 MG/100ML
5 INJECTION, SOLUTION INTRAVENOUS ONCE
Status: CANCELLED
Start: 2018-10-18 | End: 2018-10-18

## 2018-10-18 RX ADMIN — ZOLEDRONIC ACID 5 MG: 5 INJECTION, SOLUTION INTRAVENOUS at 14:53

## 2018-10-18 NOTE — MR AVS SNAPSHOT
After Visit Summary   10/18/2018    Mihaela Jang    MRN: [id number]           Patient Information     Date Of Birth          1950        Visit Information        Provider Department      10/18/2018 2:30 PM  INF [facility] [address] [facility]        Today's Diagnoses     Age-related osteoporosis without current pathological fracture    -  1      Care Instructions      Patient Education    Zoledronic Acid Solution for injection    Zoledronic Acid Solution for injection [Hypercalcemia of Malignancy]    Zoledronic Acid Solution for injection [Pagets Disease]  Zoledronic Acid Solution for injection  What is this medicine?  ZOLEDRONIC ACID (YOLETTE le dron ik AS id) lowers the amount of calcium loss from bone. It is used to treat Paget's disease and osteoporosis in women.  This medicine may be used for other purposes; ask your health care provider or pharmacist if you have questions.  What should I tell my health care provider before I take this medicine?  They need to know if you have any of these conditions:    aspirin-sensitive asthma    cancer, especially if you are receiving medicines used to treat cancer    dental disease or wear dentures    infection    kidney disease    low levels of calcium in the blood    past surgery on the parathyroid gland or intestines    receiving corticosteroids like dexamethasone or prednisone    an unusual or allergic reaction to zoledronic acid, other medicines, foods, dyes, or preservatives    pregnant or trying to get pregnant    breast-feeding  How should I use this medicine?  This medicine is for infusion into a vein. It is given by a health care professional in a hospital or clinic setting.  Talk to your pediatrician regarding the use of this medicine in children. This medicine is not approved for use in children.  Overdosage: If you think you have taken too much of this medicine contact a poison control center or emergency room at once.  NOTE:  This medicine is only for you. Do not share this medicine with others.  What if I miss a dose?  It is important not to miss your dose. Call your doctor or health care professional if you are unable to keep an appointment.  What may interact with this medicine?    certain antibiotics given by injection    NSAIDs, medicines for pain and inflammation, like ibuprofen or naproxen    some diuretics like bumetanide, furosemide    teriparatide  This list may not describe all possible interactions. Give your health care provider a list of all the medicines, herbs, non-prescription drugs, or dietary supplements you use. Also tell them if you smoke, drink alcohol, or use illegal drugs. Some items may interact with your medicine.  What should I watch for while using this medicine?  Visit your doctor or health care professional for regular checkups. It may be some time before you see the benefit from this medicine. Do not stop taking your medicine unless your doctor tells you to. Your doctor may order blood tests or other tests to see how you are doing.  Women should inform their doctor if they wish to become pregnant or think they might be pregnant. There is a potential for serious side effects to an unborn child. Talk to your health care professional or pharmacist for more information.  You should make sure that you get enough calcium and vitamin D while you are taking this medicine. Discuss the foods you eat and the vitamins you take with your health care professional.  Some people who take this medicine have severe bone, joint, and/or muscle pain. This medicine may also increase your risk for jaw problems or a broken thigh bone. Tell your doctor right away if you have severe pain in your jaw, bones, joints, or muscles. Tell your doctor if you have any pain that does not go away or that gets worse.  Tell your dentist and dental surgeon that you are taking this medicine. You should not have major dental surgery while on this  medicine. See your dentist to have a dental exam and fix any dental problems before starting this medicine. Take good care of your teeth while on this medicine. Make sure you see your dentist for regular follow-up appointments.  What side effects may I notice from receiving this medicine?  Side effects that you should report to your doctor or health care professional as soon as possible:    allergic reactions like skin rash, itching or hives, swelling of the face, lips, or tongue    anxiety, confusion, or depression    breathing problems    changes in vision    eye pain    feeling faint or lightheaded, falls    jaw pain, especially after dental work    mouth sores    muscle cramps, stiffness, or weakness    redness, blistering, peeling or loosening of the skin, including inside the mouth    trouble passing urine or change in the amount of urine  Side effects that usually do not require medical attention (report to your doctor or health care professional if they continue or are bothersome):    bone, joint, or muscle pain    constipation    diarrhea    fever    hair loss    irritation at site where injected    loss of appetite    nausea, vomiting    stomach upset    trouble sleeping    trouble swallowing    weak or tired  This list may not describe all possible side effects. Call your doctor for medical advice about side effects. You may report side effects to FDA at 4-275-FDA-5264.  Where should I keep my medicine?  This drug is given in a hospital or clinic and will not be stored at home.  NOTE:This sheet is a summary. It may not cover all possible information. If you have questions about this medicine, talk to your doctor, pharmacist, or health care provider. Copyright  2016 Gold Standard                Follow-ups after your visit        Who to contact     If you have questions or need follow up information about today's clinic visit or your schedule please contact [facility] directly at  "[phone].  Normal or non-critical lab and imaging results will be communicated to you by MyChart, letter or phone within 4 business days after the clinic has received the results. If you do not hear from us within 7 days, please contact the clinic through MyChart or phone. If you have a critical or abnormal lab result, we will notify you by phone as soon as possible.  Submit refill requests through [employer] or call your pharmacy and they will forward the refill request to us. Please allow 3 business days for your refill to be completed.          Additional Information About Your Visit        Care EveryWhere ID     This is your Care EveryWhere ID. This could be used by other organizations to access your [place] medical records  [id number]        Your Vitals Were     Pulse Temperature Respirations Height Pulse Oximetry BMI (Body Mass Index)    75 97.2  F (36.2  C) (Oral) 18 1.575 m (5' 2.01\") 94% 20.72 kg/m2       Blood Pressure from Last 3 Encounters:   10/18/18 112/60   10/10/18 122/62   09/28/18 112/64    Weight from Last 3 Encounters:   10/18/18 51.4 kg (113 lb 4.8 oz)   10/10/18 49.3 kg (108 lb 9.6 oz)   09/28/18 50.3 kg (111 lb)              We Performed the Following     Calcium     Creatinine          Today's Medication Changes          These changes are accurate as of 10/18/18  3:15 PM.  If you have any questions, ask your nurse or doctor.               These medicines have changed or have updated prescriptions.        Dose/Directions    fluticasone 50 MCG/ACT spray   Commonly known as:  FLONASE   This may have changed:    - when to take this  - reasons to take this   Used for:  Laryngopharyngeal reflux (LPR), Post-nasal drip, Globus sensation        Dose:  2 spray   Spray 2 sprays into both nostrils daily   Quantity:  16 g   Refills:  5       gabapentin 300 MG capsule   Commonly known as:  NEURONTIN   This may have changed:  See the new instructions.   Used for:  Paresthesia        TAKE TWO CAPSULES BY " MOUTH AT BEDTIME   Quantity:  180 capsule   Refills:  0                Primary Care Provider Office Phone # Fax #    Melyssa Connors [phone] [address] [facility] [address]  [facility] [address]        Equal Access to Services     JANETGREG ANISHA AH: Hadii patsy zhang robbieo Soomaali, waaxda luqadaha, qaybta kaalmada adeegyada, haile riveron ras shea lateodoravicki wade. So [facility] [phone].    ATENCIÓN: Si habla español, tiene a quezada disposición servicios gratuitos de asistencia lingüística. Llame al [phone].    We comply with applicable federal civil rights laws and [place] laws. We do not discriminate on the basis of race, color, national origin, age, disability, sex, sexual orientation, or gender identity.            Thank you!     Thank you for choosing [facility]  for your care. Our goal is always to provide you with excellent care. Hearing back from our patients is one way we can continue to improve our services. Please take a few minutes to complete the written survey that you may receive in the mail after your visit with us. Thank you!             Your Updated Medication List - Protect others around you: Learn how to safely use, store and throw away your medicines at www.disposemymeds.org.          This list is accurate as of 10/18/18  3:15 PM.  Always use your most recent med list.                   Brand Name Dispense Instructions for use Diagnosis    Acidophilus Tabs      Take 1 tablet by mouth daily        aspirin 81 MG EC tablet     1 tablet    Take 1 tablet (81 mg) by mouth daily        CALCIUM CITRATE      1,500 mg two times daily        cholecalciferol 1000 units Tabs      Take  by mouth. 2 capsules po daily.        cinnamon 500 MG Caps      Take 2 capsules by mouth daily        fish oil-omega-3 fatty acids 1000 MG capsule      Take 1 g by mouth 2 times daily        fluticasone 50 MCG/ACT spray    FLONASE    16 g    Spray 2 sprays into both nostrils daily     Laryngopharyngeal reflux (LPR), Post-nasal drip, Globus sensation       gabapentin 300 MG capsule    NEURONTIN    180 capsule    TAKE TWO CAPSULES BY MOUTH AT BEDTIME    Paresthesia       Lutein 20 MG Caps      Take 20 mg by mouth daily        MAGNESIUM OXIDE PO      Take 200 mg by mouth daily        MIRALAX PO      Use every other day        Multiple vitamin  s Tabs      Take 1 tablet by mouth daily.        RA TURMERIC 500 MG Caps   Generic drug:  Turmeric      Take 2 capsules by mouth daily        zinc 50 MG Tabs      Take 1 tablet by mouth daily

## 2018-10-18 NOTE — PROGRESS NOTES
Patient is a 68 year old female here accompanied by self today for infusion of reclast per order of Dr SOFI Dickson.  Patient meets parameters for today's infusion. Patient identified with two identifiers, order verified, and verbal consent for today's infusion obtained from patient.      Recent lab values:  Creatinine Clearance 52; Calcium 8.9 Patient meets order parameters for today's treatment.     24 gauge angio cath inserted into right hand.  Immediate blood return noted.  IV secured with sterile, transparent dressing and tape.  Patient tolerated well, denies pain or discomfort at this time.  Flushes easily without resistance, no signs or symptoms of infiltration or infection.   Patient denies questions or concerns regarding infusion and/or medication(s) being administered.    Patient tolerated infusion well, no signs or symptoms of adverse reaction noted.  Patient denies pain nor discomfort.     IV removed, catheter intact.  Site clean, dry and intact.  No signs or symptoms of infiltration or infection.  Covered with a sterile bandage, slight pressure applied for 30 seconds.  Pt instructed to leave bandage intact for a minimum of one hour, and to call with questions or concerns.  Copy of appointments, discharge instructions, and after visit summary (AVS) provided to patient.  Patient states understanding, discharged ambulatory.

## 2018-10-18 NOTE — PROGRESS NOTES
1453: IV pump verified with Reclast 5mg dose, drug, and rate of administration with MAR and medication label. Infusion administered per protocol.

## 2018-11-19 ENCOUNTER — OFFICE VISIT (OUTPATIENT)
Dept: FAMILY MEDICINE | Facility: OTHER | Age: 68
End: 2018-11-19
Attending: FAMILY MEDICINE
Payer: MEDICARE

## 2018-11-19 ENCOUNTER — RADIANT APPOINTMENT (OUTPATIENT)
Dept: GENERAL RADIOLOGY | Facility: OTHER | Age: 68
End: 2018-11-19
Attending: FAMILY MEDICINE
Payer: MEDICARE

## 2018-11-19 VITALS
HEART RATE: 57 BPM | BODY MASS INDEX: 20.15 KG/M2 | TEMPERATURE: 98.1 F | DIASTOLIC BLOOD PRESSURE: 52 MMHG | OXYGEN SATURATION: 98 % | WEIGHT: 110.2 LBS | SYSTOLIC BLOOD PRESSURE: 106 MMHG

## 2018-11-19 DIAGNOSIS — M25.511 RIGHT SHOULDER PAIN, UNSPECIFIED CHRONICITY: ICD-10-CM

## 2018-11-19 DIAGNOSIS — M79.621 PAIN IN RIGHT AXILLA: Primary | ICD-10-CM

## 2018-11-19 DIAGNOSIS — R20.2 PARESTHESIA: ICD-10-CM

## 2018-11-19 DIAGNOSIS — G25.81 RESTLESS LEG SYNDROME: ICD-10-CM

## 2018-11-19 DIAGNOSIS — M79.621 PAIN IN RIGHT AXILLA: ICD-10-CM

## 2018-11-19 LAB
IRON SATN MFR SERPL: 17 % (ref 15–46)
IRON SERPL-MCNC: 59 UG/DL (ref 35–180)
TIBC SERPL-MCNC: 357 UG/DL (ref 240–430)

## 2018-11-19 PROCEDURE — 83550 IRON BINDING TEST: CPT | Mod: ZL | Performed by: FAMILY MEDICINE

## 2018-11-19 PROCEDURE — 36415 COLL VENOUS BLD VENIPUNCTURE: CPT | Mod: ZL | Performed by: FAMILY MEDICINE

## 2018-11-19 PROCEDURE — G0463 HOSPITAL OUTPT CLINIC VISIT: HCPCS

## 2018-11-19 PROCEDURE — 83540 ASSAY OF IRON: CPT | Mod: ZL | Performed by: FAMILY MEDICINE

## 2018-11-19 PROCEDURE — 99000 SPECIMEN HANDLING OFFICE-LAB: CPT | Performed by: FAMILY MEDICINE

## 2018-11-19 PROCEDURE — 73030 X-RAY EXAM OF SHOULDER: CPT | Mod: TC,RT

## 2018-11-19 PROCEDURE — 99213 OFFICE O/P EST LOW 20 MIN: CPT | Performed by: FAMILY MEDICINE

## 2018-11-19 PROCEDURE — 82607 VITAMIN B-12: CPT | Mod: ZL | Performed by: FAMILY MEDICINE

## 2018-11-19 ASSESSMENT — PAIN SCALES - GENERAL: PAINLEVEL: NO PAIN (0)

## 2018-11-19 ASSESSMENT — ANXIETY QUESTIONNAIRES
7. FEELING AFRAID AS IF SOMETHING AWFUL MIGHT HAPPEN: NOT AT ALL
2. NOT BEING ABLE TO STOP OR CONTROL WORRYING: NOT AT ALL
1. FEELING NERVOUS, ANXIOUS, OR ON EDGE: NOT AT ALL
3. WORRYING TOO MUCH ABOUT DIFFERENT THINGS: NOT AT ALL
5. BEING SO RESTLESS THAT IT IS HARD TO SIT STILL: NOT AT ALL
GAD7 TOTAL SCORE: 0
6. BECOMING EASILY ANNOYED OR IRRITABLE: NOT AT ALL

## 2018-11-19 ASSESSMENT — PATIENT HEALTH QUESTIONNAIRE - PHQ9
SUM OF ALL RESPONSES TO PHQ QUESTIONS 1-9: 0
5. POOR APPETITE OR OVEREATING: NOT AT ALL

## 2018-11-19 NOTE — PROGRESS NOTES
SUBJECTIVE:   Mihaela Jang is a 68 year old female who presents to clinic today for the following health issues:      Musculoskeletal problem/pain      Duration: two weeks    Description  Location: underneath right arm, radiates to upper arm and lateral chest wall    Intensity:  mild    Accompanying signs and symptoms: none    History  Previous similar problem: no   Previous evaluation:  none    Precipitating or alleviating factors:  Trauma or overuse: no   Aggravating factors include: lifting and certain movement    Therapies tried and outcome: rest/inactivity and had a sebaceous cyst that was drained previously    Prior cyst drained in the spring; then had consult with general surgery in May, but nothing left to biopsy/excise    No numbness, tingling, weakness    No fevers, night sweats    Appetite, weight stable    No bruising, rashes    No change in activity; no repetitive activity    No OTC medication for pain    Also, mentions tingling in feet at night on occasion.  Feels like she has to get up and move.  No swelling or pain.  No rash.    Problem list and histories reviewed & adjusted, as indicated.  Additional history: as documented    Current Outpatient Prescriptions   Medication     aspirin EC 81 MG tablet     CALCIUM CITRATE     cholecalciferol 1000 UNITS TABS     cinnamon 500 MG CAPS     fish oil-omega-3 fatty acids (FISH OIL) 1000 MG capsule     fluticasone (FLONASE) 50 MCG/ACT spray     gabapentin (NEURONTIN) 300 MG capsule     Lactobacillus (ACIDOPHILUS) TABS     Lutein 20 MG CAPS     MAGNESIUM OXIDE PO     Multiple vitamin  s TABS     Polyethylene Glycol 3350 (MIRALAX PO)     Turmeric (RA TURMERIC) 500 MG CAPS     zinc 50 MG TABS     No current facility-administered medications for this visit.        Patient Active Problem List   Diagnosis     Laryngopharyngeal reflux (LPR)     Chronic rhinitis     ETD (eustachian tube dysfunction)     Osteoporosis     Abnormal glucose     Personal history  of malignant neoplasm of breast     Neutropenia (H)     Early satiety     chronic neutropenia.     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     Past Surgical History:   Procedure Laterality Date     BONE MARROW BIOPSY      negative     COLONOSCOPY  07/2000     COLONOSCOPY  8/13/2013    DR Fu; repeat 5 years     COLONOSCOPY N/A 8/13/2018    Procedure: COLONOSCOPY;  COLONOSCOPY;  Surgeon: Ajit Uriarte MD;  Location: [facility] OR      COLONOSCOPY THRU STOMA WITH BIOPSY  08/25/2010    cancer screening, family h/o colon cancer; hyperplastic polyp     HEMORRHOIDECTOMY  1986     MASTECTOMY, BILATERAL  03/01/2004    right sided breast cancer     RELEASE TRIGGER FINGER Right 3/7/2018    Procedure: RELEASE TRIGGER FINGER;  RELEASE TRIGGER DIGIT RIGHT THUMB;  Surgeon: Jose Alfredo Brewster DO;  Location: [facility] OR     UPPER GI ENDOSCOPY         Social History   Substance Use Topics     Smoking status: Never Smoker     Smokeless tobacco: Never Used     Alcohol use 0.0 oz/week     0 Standard drinks or equivalent per week      Comment: 1 glasso wine, weekly      Family History   Problem Relation Age of Onset     Dementia Mother      (cause of death)      High cholesterol Mother      Osteoarthritis Mother      C.A.D. Father      (cause of death)      Prostate Cancer Father      Breast Cancer Maternal Aunt 72     Cerebrovascular Disease Maternal Grandmother      CVA     Diabetes Maternal Grandmother            Reviewed and updated as needed this visit by clinical staff  Tobacco  Allergies  Meds  Med Hx  Surg Hx  Fam Hx  Soc Hx      Reviewed and updated as needed this visit by Provider         ROS:  CONSTITUTIONAL:NEGATIVE for fever, chills, change in weight  INTEGUMENTARY/SKIN: NEGATIVE for worrisome rashes, bruising  RESP:NEGATIVE for  significant cough or SOB  CV: NEGATIVE for chest pain, palpitations or peripheral edema  MUSCULOSKELETAL: POSITIVE  for arthralgias as above  NEURO: NEGATIVE for weakness, dizziness or paresthesias    OBJECTIVE:     /52 (BP Location: Right arm, Patient Position: Sitting, Cuff Size: Adult Regular)  Pulse 57  Temp 98.1  F (36.7  C) (Tympanic)  Wt 110 lb 3.2 oz (50 kg)  SpO2 98%  BMI 20.15 kg/m2  Body mass index is 20.15 kg/(m^2).  GENERAL: alert, no distress and thin  NECK: no adenopathy, no asymmetry, masses, or scars and thyroid normal to palpation  RESP: lungs clear to auscultation - no rales, rhonchi or wheezes  CV: regular rate and rhythm, normal S1 S2, no S3 or S4, no murmur, click or rub, no peripheral edema and peripheral pulses strong  ABDOMEN: soft, nontender, no hepatosplenomegaly, no masses and bowel sounds normal  MS: normal muscle tone, normal range of motion, peripheral pulses normal and non-tender right shoulder and RUQ; axilla with small comedone at site of prior incision and drainage; no palpable mass, lymphadenopathy, or swelling; no tenderness along ribcage, chest wall, humerus  SKIN: no suspicious lesions or rashes  NEURO: Normal strength and tone, mentation intact and speech normal  PSYCH: mentation appears normal, affect normal/bright    Diagnostic Test Results:  Results for orders placed or performed in visit on 11/19/18 (from the past 24 hour(s))   Iron and iron binding capacity   Result Value Ref Range    Iron 59 35 - 180 ug/dL    Iron Binding Cap 357 240 - 430 ug/dL    Iron Saturation Index 17 15 - 46 %       ASSESSMENT/PLAN:     (M79.621) Pain in right axilla  (primary encounter diagnosis)  Comment: intermittent; no specific trigger; unclear etiology?  Muscular?  Radicular?    Plan: XR Shoulder Right G/E 3 Views            (R20.2) Paresthesia  Comment: intermittent, feet only  Plan: Vitamin B12, Iron and iron binding capacity            (G25.81) Restless leg syndrome  Comment:  RLS vs paresthesia  Plan: Iron and iron binding capacity            (M25.511) Right shoulder pain, unspecified chronicity   Comment: as above; axillary; xray today;consider physical therapy assessment to better delineate? Provoke?  Plan: Iron and iron binding capacity          Patient Instructions   Will call with xray results.  Consider physical therapy assessment.            Melyssa Encinas MD  [facility]

## 2018-11-19 NOTE — NURSING NOTE
"Chief Complaint   Patient presents with     Musculoskeletal Problem       Initial /52 (BP Location: Right arm, Patient Position: Sitting, Cuff Size: Adult Regular)  Pulse 57  Temp 98.1  F (36.7  C) (Tympanic)  Wt 110 lb 3.2 oz (50 kg)  SpO2 98%  BMI 20.15 kg/m2 Estimated body mass index is 20.15 kg/(m^2) as calculated from the following:    Height as of 10/18/18: 5' 2.01\" (1.575 m).    Weight as of this encounter: 110 lb 3.2 oz (50 kg).  Medication Reconciliation: complete    Sejal Moreno LPN  "

## 2018-11-19 NOTE — MR AVS SNAPSHOT
After Visit Summary   11/19/2018    Mihaela Jang    MRN: [id number]           Patient Information     Date Of Birth          1950        Visit Information        Provider Department      11/19/2018 3:00 PM Melyssa Connors MD [facility]        Today's Diagnoses     Pain in right axilla    -  1    Paresthesia        Restless leg syndrome        Right shoulder pain, unspecified chronicity           Care Instructions    Will call with xray results.  Consider physical therapy assessment.          Follow-ups after your visit        Who to contact     If you have questions or need follow up information about today's clinic visit or your schedule please contact [facility] directly at [phone].  Normal or non-critical lab and imaging results will be communicated to you by MyChart, letter or phone within 4 business days after the clinic has received the results. If you do not hear from us within 7 days, please contact the clinic through MyChart or phone. If you have a critical or abnormal lab result, we will notify you by phone as soon as possible.  Submit refill requests through [employer] or call your pharmacy and they will forward the refill request to us. Please allow 3 business days for your refill to be completed.          Additional Information About Your Visit        Care EveryWhere ID     This is your Care EveryWhere ID. This could be used by other organizations to access your [place] medical records  [id number]        Your Vitals Were     Pulse Temperature Pulse Oximetry BMI (Body Mass Index)          57 98.1  F (36.7  C) (Tympanic) 98% 20.15 kg/m2         Blood Pressure from Last 3 Encounters:   11/19/18 106/52   10/18/18 112/60   10/10/18 122/62    Weight from Last 3 Encounters:   11/19/18 110 lb 3.2 oz (50 kg)   10/18/18 113 lb 4.8 oz (51.4 kg)   10/10/18 108 lb 9.6 oz (49.3 kg)              We Performed the Following     Iron and  iron binding capacity     Vitamin B12        Primary Care Provider Office Phone # Fax #    Melyssa Connors [phone] [address]  [facility] [address]        Equal Access to Services     CORINNA LANCASTER : Kami zhang robbieo Sochayitoali, waaxda luqadaha, qaybta kaalmada adeegyada, haile shea laJessegladys wade. So [facility] [phone].    ATENCIÓN: Si habla español, tiene a quezada disposición servicios gratuitos de asistencia lingüística. Llame al [phone].    We comply with applicable federal civil rights laws and [place] laws. We do not discriminate on the basis of race, color, national origin, age, disability, sex, sexual orientation, or gender identity.            Thank you!     Thank you for choosing [facility]  for your care. Our goal is always to provide you with excellent care. Hearing back from our patients is one way we can continue to improve our services. Please take a few minutes to complete the written survey that you may receive in the mail after your visit with us. Thank you!             Your Updated Medication List - Protect others around you: Learn how to safely use, store and throw away your medicines at www.disposemymeds.org.          This list is accurate as of 11/19/18  4:28 PM.  Always use your most recent med list.                   Brand Name Dispense Instructions for use Diagnosis    Acidophilus Tabs      Take 1 tablet by mouth daily        aspirin 81 MG EC tablet     1 tablet    Take 1 tablet (81 mg) by mouth daily        CALCIUM CITRATE      1,500 mg two times daily        cholecalciferol 1000 units Tabs      Take  by mouth. 2 capsules po daily.        cinnamon 500 MG Caps      Take 2 capsules by mouth daily        fish oil-omega-3 fatty acids 1000 MG capsule      Take 1 g by mouth 2 times daily        fluticasone 50 MCG/ACT spray    FLONASE    16 g    Spray 2 sprays into both nostrils daily    Laryngopharyngeal reflux (LPR),  Post-nasal drip, Globus sensation       gabapentin 300 MG capsule    NEURONTIN    180 capsule    TAKE TWO CAPSULES BY MOUTH AT BEDTIME    Paresthesia       Lutein 20 MG Caps      Take 20 mg by mouth daily        MAGNESIUM OXIDE PO      Take 200 mg by mouth daily        MIRALAX PO      Use every other day        Multiple vitamin  s Tabs      Take 1 tablet by mouth daily.        RA TURMERIC 500 MG Caps   Generic drug:  Turmeric      Take 2 capsules by mouth daily        zinc 50 MG Tabs      Take 1 tablet by mouth daily

## 2018-11-20 LAB — VIT B12 SERPL-MCNC: 1350 PG/ML (ref 193–986)

## 2018-11-20 ASSESSMENT — ANXIETY QUESTIONNAIRES: GAD7 TOTAL SCORE: 0

## 2019-01-11 ENCOUNTER — TELEPHONE (OUTPATIENT)
Dept: FAMILY MEDICINE | Facility: OTHER | Age: 69
End: 2019-01-11

## 2019-01-11 DIAGNOSIS — N89.8 VAGINAL DISCHARGE: Primary | ICD-10-CM

## 2019-01-11 NOTE — TELEPHONE ENCOUNTER
Patient called and stated Dr. Connors would refer her to GYN - Dr. Gabriel if needed for a discharge issue.     Please advise, referral pending if appropriate.     Laura Keller LPN

## 2019-01-31 ENCOUNTER — OFFICE VISIT (OUTPATIENT)
Dept: OBGYN | Facility: OTHER | Age: 69
End: 2019-01-31
Attending: OBSTETRICS & GYNECOLOGY
Payer: MEDICARE

## 2019-01-31 VITALS
OXYGEN SATURATION: 98 % | HEIGHT: 62 IN | BODY MASS INDEX: 20.61 KG/M2 | HEART RATE: 64 BPM | WEIGHT: 112 LBS | DIASTOLIC BLOOD PRESSURE: 68 MMHG | SYSTOLIC BLOOD PRESSURE: 116 MMHG

## 2019-01-31 DIAGNOSIS — N89.8 VAGINAL DISCHARGE: ICD-10-CM

## 2019-01-31 DIAGNOSIS — N95.0 POSTMENOPAUSAL BLEEDING: ICD-10-CM

## 2019-01-31 DIAGNOSIS — Z12.4 PAPANICOLAOU SMEAR FOR CERVICAL CANCER SCREENING: Primary | ICD-10-CM

## 2019-01-31 LAB
SPECIMEN SOURCE: NORMAL
WET PREP SPEC: NORMAL

## 2019-01-31 PROCEDURE — G0476 HPV COMBO ASSAY CA SCREEN: HCPCS | Mod: ZL | Performed by: OBSTETRICS & GYNECOLOGY

## 2019-01-31 PROCEDURE — 99203 OFFICE O/P NEW LOW 30 MIN: CPT | Performed by: OBSTETRICS & GYNECOLOGY

## 2019-01-31 PROCEDURE — G0463 HOSPITAL OUTPT CLINIC VISIT: HCPCS

## 2019-01-31 PROCEDURE — 99000 SPECIMEN HANDLING OFFICE-LAB: CPT | Performed by: OBSTETRICS & GYNECOLOGY

## 2019-01-31 PROCEDURE — 88142 CYTOPATH C/V THIN LAYER: CPT | Mod: ZL | Performed by: OBSTETRICS & GYNECOLOGY

## 2019-01-31 PROCEDURE — 87624 HPV HI-RISK TYP POOLED RSLT: CPT | Performed by: OBSTETRICS & GYNECOLOGY

## 2019-01-31 PROCEDURE — 87210 SMEAR WET MOUNT SALINE/INK: CPT | Mod: ZL | Performed by: OBSTETRICS & GYNECOLOGY

## 2019-01-31 PROCEDURE — G0463 HOSPITAL OUTPT CLINIC VISIT: HCPCS | Mod: 25

## 2019-01-31 ASSESSMENT — PAIN SCALES - GENERAL: PAINLEVEL: NO PAIN (0)

## 2019-01-31 ASSESSMENT — MIFFLIN-ST. JEOR: SCORE: 981.28

## 2019-01-31 NOTE — NURSING NOTE
"Chief Complaint   Patient presents with     Consult     Consult/ Connors/discharge       Initial /68 (BP Location: Right arm, Cuff Size: Adult Regular)   Pulse 64   Ht 1.575 m (5' 2\")   Wt 50.8 kg (112 lb)   SpO2 98%   BMI 20.49 kg/m   Estimated body mass index is 20.49 kg/m  as calculated from the following:    Height as of this encounter: 1.575 m (5' 2\").    Weight as of this encounter: 50.8 kg (112 lb).  Medication Reconciliation: complete    Karina Watson LPN  "

## 2019-01-31 NOTE — PROGRESS NOTES
CC:  Consult from Dr. Encinas for postmenopausal Bleeding  HPI:  Mihaela Jang is a 68 year old female is a   P0.   Menopause was at age 48.  She describes progressive increased intermittent discharge that is red or brown.  Mild vaginitis symptoms. Previously evaluated >10yrs ago for similar problems and w/u was negative at that time.  H/o breast ca with double mastectomy 2004.     Patients records are available and reviewed at today's visit.    Past GYN history: Unremarkable   Pelvic pain: No  Abnormal Discharge: Yes  Previous work-up: Yes  Abnormal pap: No  Vaginitis symptoms;  Yes  HRT:  No         Last PAP smear:  Normal      Past Medical History:   Diagnosis Date     Atypical nevi      Chondrodermatitis nodularis helicis of left ear     Dr Shipley, [facility]     Chronic rhinitis     [facility] allergy; negative skin testing; IgE mediated allergies; ENT - DC nasal steroid and antihistamine; saline rinses     Degenerative skin disorder     solar elastosis     Hallux rigidus 06/15/2000     Hyperlipidemia, unspecified hyperlipidemia type 9/15/2016     Laryngopharyngeal reflux disease     PPI     Malignant neoplasm of breast (female), unspecified site 03/10/2004     Notalgia paresthetica      Osteoarthritis 07/20/2011     Osteoporosis, unspecified 09/10/2001    Dr Moses; intermittent reclast;  Dr. Moses; repeat dexa after full 2 years Reclast     Other abnormal glucose 12/20/2013     Paresthesia     neck; notalgiaparesthetic; dr leahy & Dr Nevarez; neurontin     Personal history of malignant neoplasm of breast 06/07/2005     Rhinitis      Schamberg's purpura        Past Surgical History:   Procedure Laterality Date     BONE MARROW BIOPSY      negative     COLONOSCOPY  07/2000     COLONOSCOPY  8/13/2013    DR Fu; repeat 5 years     COLONOSCOPY N/A 8/13/2018    Procedure: COLONOSCOPY;  COLONOSCOPY;  Surgeon: Ajit Uriarte MD;  Location: [facility]      COLONOSCOPY THRU STOMA WITH BIOPSY   08/25/2010    cancer screening, family h/o colon cancer; hyperplastic polyp     HEMORRHOIDECTOMY  1986     MASTECTOMY, BILATERAL  03/01/2004    right sided breast cancer     RELEASE TRIGGER FINGER Right 3/7/2018    Procedure: RELEASE TRIGGER FINGER;  RELEASE TRIGGER DIGIT RIGHT THUMB;  Surgeon: Jose Alfredo Brewster DO;  Location: [facility]     UPPER GI ENDOSCOPY         Family History   Problem Relation Age of Onset     Dementia Mother         (cause of death)      High cholesterol Mother      Osteoarthritis Mother      C.A.D. Father         (cause of death)      Prostate Cancer Father      Breast Cancer Maternal Aunt 72     Cerebrovascular Disease Maternal Grandmother         CVA     Diabetes Maternal Grandmother        Allergies: Chloraprep one step; Povidone iodine; and Soap    Current Outpatient Medications   Medication Sig Dispense Refill     aspirin EC 81 MG tablet Take 1 tablet (81 mg) by mouth daily 1 tablet 0     CALCIUM CITRATE 1,500 mg two times daily        cinnamon 500 MG CAPS Take 2 capsules by mouth daily        fish oil-omega-3 fatty acids (FISH OIL) 1000 MG capsule Take 1 g by mouth 2 times daily        Lactobacillus (ACIDOPHILUS) TABS Take 1 tablet by mouth daily       Lutein 20 MG CAPS Take 20 mg by mouth daily        MAGNESIUM OXIDE PO Take 200 mg by mouth daily       Multiple vitamin  s TABS Take 1 tablet by mouth daily.       Turmeric (RA TURMERIC) 500 MG CAPS Take 2 capsules by mouth daily        zinc 50 MG TABS Take 1 tablet by mouth daily        cholecalciferol 1000 UNITS TABS Take  by mouth. 2 capsules po daily.        fluticasone (FLONASE) 50 MCG/ACT spray Spray 2 sprays into both nostrils daily (Patient not taking: Reported on 1/31/2019) 16 g 5     gabapentin (NEURONTIN) 300 MG capsule TAKE TWO CAPSULES BY MOUTH AT BEDTIME (Patient not taking: Reported on 1/31/2019) 180 capsule 0     Polyethylene Glycol 3350 (MIRALAX PO) Use every other day         ROS:  CONSTITUTIONAL: NEGATIVE for fever,  "chills, change in weight  RESP: NEGATIVE for significant cough or SOB  CV: NEGATIVE for chest pain, palpitations or peripheral edema  GI: NEGATIVE for nausea, abdominal pain, heartburn, or change in bowel habits  : NEGATIVE for frequency, dysuria, hematuria, vaginal discharge  ENDOCRINE: NEGATIVE for temperature intolerance, skin/hair changes, breast discharge  PSYCHIATRIC: NEGATIVE for changes in mood or affect    EXAM:  Blood pressure 116/68, pulse 64, height 1.575 m (5' 2\"), weight 50.8 kg (112 lb), SpO2 98 %, not currently breastfeeding.   BMI= Body mass index is 20.49 kg/m .  General - pleasant female in no acute distress.  Abdomen - soft, nontender, nondistended, no hepatosplenomegaly.  Pelvic - EG: normal adult female, BUS: within normal limits, Vagina: atrophic, no discharge, narrow introitus. Cervix: no lesions or CMT, Uterus: firm, normal sized and nontender, Adnexae: no masses or tenderness.  Rectovaginal - deferred.  Musculoskeletal - no gross deformities.  Neurological - normal strength, sensation, and mental status.      ASSESSMENT/PLAN:  (Z12.4) Papanicolaou smear for cervical cancer screening  (primary encounter diagnosis)  Plan: A pap thin layer Diagnostic    (N89.8) Vaginal discharge  Plan: Wet prep         (N95.0) Postmenopausal bleeding  Plan: US Pelvis Complete without Transvaginal      Pt does not tolerate pelvic exams with very narrow introitus so TVUS or office embx impossible.  Recommend D and C/hysteroscopy for further eval in OR under anesthesia.      Reviewed goals, risks, alternatives for planned procedure.  Including risk of bleeding, infection, damage to nerves, blood vessels, bowel and bladder. Discussed recovery period and expected discomfort.. All questions were answered.  Preoperative instructions discussed.  NPO after midnight.  Pt desires to proceed.           Jovi Gabriel MD  "

## 2019-02-04 NOTE — PATIENT INSTRUCTIONS
Hold Aspirin and all vitamins/supplements from now until surgery.  Ok to use Tylenol if needed.  Avoid Ibuprofen products.  Ok to use Melatonin if needed as well.      Before Your Surgery      Call your surgeon if there is any change in your health. This includes signs of a cold or flu (such as a sore throat, runny nose, cough, rash or fever).    Do not smoke, drink alcohol or take over the counter medicine (unless your surgeon or primary care doctor tells you to) for the 24 hours before and after surgery.    If you take prescribed drugs: Follow your doctor s orders about which medicines to take and which to stop until after surgery.    Eating and drinking prior to surgery: follow the instructions from your surgeon    Take a shower or bath the night before surgery. Use the soap your surgeon gave you to gently clean your skin. If you do not have soap from your surgeon, use your regular soap. Do not shave or scrub the surgery site.  Wear clean pajamas and have clean sheets on your bed.

## 2019-02-04 NOTE — H&P (VIEW-ONLY)
[facility]  [address]  [place] [address]  [phone]  Dept: [phone]    PRE-OP EVALUATION:  Today's date: 2019    Mihaela Jang (: 1950) presents for pre-operative evaluation assessment as requested by Dr. Gabriel.  She requires evaluation and anesthesia risk assessment prior to undergoing surgery/procedure for treatment of postmenopausal bleeding with D&C Hysteroscopy .    Proposed Surgery/ Procedure: Hysteroscopy   Date of Surgery/ Procedure: 19  Time of Surgery/ Procedure: [facility]  [place] [facility] [place]  Primary Physician: Melyssa Connors  Type of Anesthesia Anticipated: to be determined    Patient has a Health Care Directive or Living Will:  YES     1. NO - Do you have a history of heart attack, stroke, stent, bypass or surgery on an artery in the head, neck, heart or legs?  2. NO - Do you ever have any pain or discomfort in your chest?  3. NO - Do you have a history of  Heart Failure?  4. NO - Are you troubled by shortness of breath when: walking on the level, up a slight hill or at night?  5. NO - Do you currently have a cold, bronchitis or other respiratory infection?  6. NO - Do you have a cough, shortness of breath or wheezing?  7. NO - Do you sometimes get pains in the calves of your legs when you walk?  8. NO - Do you or anyone in your family have previous history of blood clots?  9. NO - Do you or does anyone in your family have a serious bleeding problem such as prolonged bleeding following surgeries or cuts?  10. NO - Have you ever had problems with anemia or been told to take iron pills?  11. YES - Have you had any abnormal blood loss such as black, tarry or bloody stools, or abnormal vaginal bleeding? - Abnormal vaginal bleeding - getting hysteroscopy for issue.   12. NO - Have you ever had a blood transfusion?  13. NO - Have you or any of your relatives ever had problems with anesthesia?  14. NO - Do you have sleep apnea,  excessive snoring or daytime drowsiness?  15. NO - Do you have any prosthetic heart valves?  16. NO - Do you have prosthetic joints?  17. NO - Is there any chance that you may be pregnant?      HPI:     HPI related to upcoming procedure: Patient with postmenopausal bleeding requiring exam under anesthesia.      See problem list for active medical problems.  Problems all longstanding and stable, except as noted/documented.  See ROS for pertinent symptoms related to these conditions.                                                                                                                                                          .    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  Ensure this is not a melanoma, path pending       Hyperlipidemia, unspecified hyperlipidemia type 09/15/2016     Priority: Medium     ACP (advance care planning) 01/22/2016     Priority: Medium     Advance Care Planning 6/2/2016: Receipt of ACP document:  Received: Health Care Directive which was witnessed or notarized on 6/1/16.  Document not previously scanned.  Validation form completed and sent with document to be scanned.  Code Status reflects choices in most recent ACP document.  Confirmed/documented designated decision maker(s).  Added by Paola Johnson  Advance Care Planning 1/22/2016: Receipt of ACP document:  Received: Health Care Directive which was witnessed or notarized on 10-21-08.  Document previously scanned on 12-7-15.  Validation form completed and sent to be scanned.  Code Status needs to be updated to reflect choices in most recent ACP document.  Confirmed/documented designated decision maker(s).  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  11/16/2015     Priority: Medium     Personal history of malignant neoplasm of breast 12/23/2014     Priority: Medium     Neutropenia (H) 12/23/2014     Priority: Medium     Problem list name updated by automated process. Provider to review       Early satiety 12/23/2014     Priority: Medium     Abnormal glucose 12/20/2013     Priority: Medium     Problem list name updated by automated process. 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 Provider to review        Past Medical History:   Diagnosis Date     Atypical nevi      Chondrodermatitis nodularis helicis of left ear     Dr Shipley, [facility]     Chronic rhinitis     Dr Messina, [facility] allergy; negative skin testing; IgE mediated allergies; ENT - DC nasal steroid and antihistamine; saline rinses     Degenerative skin disorder     solar elastosis     Hallux rigidus 06/15/2000     Hyperlipidemia, unspecified hyperlipidemia type 9/15/2016     Laryngopharyngeal reflux disease     PPI     Malignant neoplasm of breast (female), unspecified site 03/10/2004     Notalgia paresthetica      Osteoarthritis 07/20/2011     Osteoporosis, unspecified 09/10/2001    Dr Moses; intermittent reclast;  Dr. Moses; repeat dexa after full 2 years Reclast     Other abnormal glucose 12/20/2013     Paresthesia     neck; notalgiaparesthetic; dr leahy & Dr Nevarez; neurontin     Personal history of malignant neoplasm of breast 06/07/2005     Rhinitis      Schamberg's purpura      Past Surgical History:   Procedure Laterality Date     BONE MARROW BIOPSY       negative     COLONOSCOPY  07/2000     COLONOSCOPY  8/13/2013    DR Fu; repeat 5 years     COLONOSCOPY N/A 8/13/2018    Procedure: COLONOSCOPY;  COLONOSCOPY;  Surgeon: Ajit Uriarte MD;  Location: [facility]      COLONOSCOPY THRU STOMA WITH BIOPSY  08/25/2010    cancer screening, family h/o colon cancer; hyperplastic polyp     HEMORRHOIDECTOMY  1986     MASTECTOMY, BILATERAL  03/01/2004    right sided breast cancer     RELEASE TRIGGER FINGER Right 3/7/2018    Procedure: RELEASE TRIGGER FINGER;  RELEASE TRIGGER DIGIT RIGHT THUMB;  Surgeon: Jose Alfredo Brewster DO;  Location: [facility]     UPPER GI ENDOSCOPY       Current Outpatient Medications   Medication Sig Dispense Refill     aspirin EC 81 MG tablet Take 1 tablet (81 mg) by mouth daily 1 tablet 0     CALCIUM CITRATE 1,500 mg two times daily        cholecalciferol 1000 UNITS TABS Take  by mouth. 2 capsules po daily.        cinnamon 500 MG CAPS Take 2 capsules by mouth daily        fish oil-omega-3 fatty acids (FISH OIL) 1000 MG capsule Take 1 g by mouth 2 times daily        fluticasone (FLONASE) 50 MCG/ACT spray Spray 2 sprays into both nostrils daily 16 g 5     gabapentin (NEURONTIN) 300 MG capsule TAKE TWO CAPSULES BY MOUTH AT BEDTIME 180 capsule 0     Lactobacillus (ACIDOPHILUS) TABS Take 1 tablet by mouth daily       Lutein 20 MG CAPS Take 20 mg by mouth daily        MAGNESIUM OXIDE PO Take 200 mg by mouth daily       Multiple vitamin  s TABS Take 1 tablet by mouth daily.       Polyethylene Glycol 3350 (MIRALAX PO) Use every other day       Turmeric (RA TURMERIC) 500 MG CAPS Take 2 capsules by mouth daily        zinc 50 MG TABS Take 1 tablet by mouth daily        OTC products: None, except as noted above    Allergies   Allergen Reactions     Chloraprep One Step Rash     Povidone Iodine Rash     Betadine      Soap Rash     Betadine       Latex Allergy: NO - BUT DOES HAVE CHLORAPREP AND BETADINE ALLERGIES    Social History     Tobacco Use     Smoking status:  Never Smoker     Smokeless tobacco: Never Used   Substance Use Topics     Alcohol use: Yes     Alcohol/week: 0.0 oz     Comment: 1 glasso wine, weekly      History   Drug Use No       REVIEW OF SYSTEMS:   Constitutional, neuro, ENT, endocrine, pulmonary, cardiac, gastrointestinal, genitourinary, musculoskeletal, integument and psychiatric systems are negative, except as otherwise noted.    EXAM:   /58 (BP Location: Right arm, Patient Position: Chair, Cuff Size: Adult Regular)   Pulse 66   Temp 98  F (36.7  C) (Tympanic)   Wt 51.1 kg (112 lb 9.6 oz)   SpO2 98%   BMI 20.59 kg/m      GENERAL APPEARANCE: alert, no distress and cooperative     EYES: EOMI, PERRL     HENT: ear canals and TM's normal and nose and mouth without ulcers or lesions     NECK: no adenopathy, no asymmetry, masses, or scars and thyroid normal to palpation     RESP: lungs clear to auscultation - no rales, rhonchi or wheezes     CV: regular rates and rhythm, normal S1 S2, no S3 or S4 and no murmur, click or rub     ABDOMEN:  soft, nontender, no HSM or masses and bowel sounds normal     MS: extremities normal- no gross deformities noted, no evidence of inflammation in joints, FROM in all extremities.     SKIN: no suspicious lesions or rashes     NEURO: Normal strength and tone, sensory exam grossly normal, mentation intact and speech normal     PSYCH: mentation appears normal. and affect normal/bright     LYMPHATICS: No cervical adenopathy    DIAGNOSTICS:   EKG: sinus bradycardia, 1st degree AV block, normal axis, no acute ST/T changes c/w ischemia, no LVH by voltage criteria, no prior EKG for comparison  Results for orders placed or performed in visit on 02/06/19 (from the past 24 hour(s))   CBC with platelets and differential   Result Value Ref Range    WBC 4.0 4.0 - 11.0 10e9/L    RBC Count 3.87 3.8 - 5.2 10e12/L    Hemoglobin 12.3 11.7 - 15.7 g/dL    Hematocrit 36.7 35.0 - 47.0 %    MCV 95 78 - 100 fl    MCH 31.8 26.5 - 33.0 pg    MCHC  33.5 31.5 - 36.5 g/dL    RDW 12.8 10.0 - 15.0 %    Platelet Count 261 150 - 450 10e9/L    Diff Method Automated Method     % Neutrophils 64.9 %    % Lymphocytes 22.2 %    % Monocytes 11.6 %    % Eosinophils 0.5 %    % Basophils 0.5 %    % Immature Granulocytes 0.3 %    Nucleated RBCs 0 0 /100    Absolute Neutrophil 2.6 1.6 - 8.3 10e9/L    Absolute Lymphocytes 0.9 0.8 - 5.3 10e9/L    Absolute Monocytes 0.5 0.0 - 1.3 10e9/L    Absolute Eosinophils 0.0 0.0 - 0.7 10e9/L    Absolute Basophils 0.0 0.0 - 0.2 10e9/L    Abs Immature Granulocytes 0.0 0 - 0.4 10e9/L    Absolute Nucleated RBC 0.0          Recent Labs   Lab Test 10/08/18  1010 02/28/18  1521 11/01/17  1043   HGB 13.0 12.2 12.7    272 285    133 138   POTASSIUM 4.1 4.4 4.3   CR 0.81 0.69 0.72   A1C 5.7*  --  5.5        IMPRESSION:   Reason for surgery/procedure: postmenopausal bleeding, D&C hysteroscopy  Diagnosis/reason for consult: cardiopulmonary clearance    The proposed surgical procedure is considered INTERMEDIATE risk.    REVISED CARDIAC RISK INDEX  The patient has the following serious cardiovascular risks for perioperative complications such as (MI, PE, VFib and 3  AV Block):  No serious cardiac risks  INTERPRETATION: 0 risks: Class I (very low risk - 0.4% complication rate)    The patient has the following additional risks for perioperative complications:  No identified additional risks      ICD-10-CM    1. Preop general physical exam Z01.818 EKG 12-lead complete w/read - (Clinic Performed)     CBC with platelets and differential       RECOMMENDATIONS:       --Patient is to take all scheduled medications on the day of surgery EXCEPT for modifications listed below.  NSAIDS, Aspirin, supplements - all on hold.    APPROVAL GIVEN to proceed with proposed procedure, without further diagnostic evaluation       Signed Electronically by: Melyssa Encinas MD    Copy of this evaluation report is provided to requesting physician.    [facility] Preop  Guidelines    Revised Cardiac Risk Index

## 2019-02-04 NOTE — PROGRESS NOTES
[facility] [place]  [address]  [place] [address]  [phone]  Dept: [phone]    PRE-OP EVALUATION:  Today's date: 2019    Mihaela Jang (: 1950) presents for pre-operative evaluation assessment as requested by Dr. Gabriel.  She requires evaluation and anesthesia risk assessment prior to undergoing surgery/procedure for treatment of postmenopausal bleeding with D&C Hysteroscopy .    Proposed Surgery/ Procedure: Hysteroscopy   Date of Surgery/ Procedure: 19  Time of Surgery/ Procedure: [facility]  Hospital/Surgical Facility: [facility]  Primary Physician: Melyssa Connors  Type of Anesthesia Anticipated: to be determined    Patient has a Health Care Directive or Living Will:  YES     1. NO - Do you have a history of heart attack, stroke, stent, bypass or surgery on an artery in the head, neck, heart or legs?  2. NO - Do you ever have any pain or discomfort in your chest?  3. NO - Do you have a history of  Heart Failure?  4. NO - Are you troubled by shortness of breath when: walking on the level, up a slight hill or at night?  5. NO - Do you currently have a cold, bronchitis or other respiratory infection?  6. NO - Do you have a cough, shortness of breath or wheezing?  7. NO - Do you sometimes get pains in the calves of your legs when you walk?  8. NO - Do you or anyone in your family have previous history of blood clots?  9. NO - Do you or does anyone in your family have a serious bleeding problem such as prolonged bleeding following surgeries or cuts?  10. NO - Have you ever had problems with anemia or been told to take iron pills?  11. YES - Have you had any abnormal blood loss such as black, tarry or bloody stools, or abnormal vaginal bleeding? - Abnormal vaginal bleeding - getting hysteroscopy for issue.   12. NO - Have you ever had a blood transfusion?  13. NO - Have you or any of your relatives ever had problems with anesthesia?  14. NO - Do you have sleep apnea,  excessive snoring or daytime drowsiness?  15. NO - Do you have any prosthetic heart valves?  16. NO - Do you have prosthetic joints?  17. NO - Is there any chance that you may be pregnant?      HPI:     HPI related to upcoming procedure: Patient with postmenopausal bleeding requiring exam under anesthesia.      See problem list for active medical problems.  Problems all longstanding and stable, except as noted/documented.  See ROS for pertinent symptoms related to these conditions.                                                                                                                                                          .    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  Ensure this is not a melanoma, path pending       Hyperlipidemia, unspecified hyperlipidemia type 09/15/2016     Priority: Medium     ACP (advance care planning) 01/22/2016     Priority: Medium     Advance Care Planning 6/2/2016: Receipt of ACP document:  Received: Health Care Directive which was witnessed or notarized on 6/1/16.  Document not previously scanned.  Validation form completed and sent with document to be scanned.  Code Status reflects choices in most recent ACP document.  Confirmed/documented designated decision maker(s).  Added by Paola Johnson  Advance Care Planning 1/22/2016: Receipt of ACP document:  Received: Health Care Directive which was witnessed or notarized on 10-21-08.  Document previously scanned on 12-7-15.  Validation form completed and sent to be scanned.  Code Status needs to be updated to reflect choices in most recent ACP document.  Confirmed/documented designated decision maker(s).  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  11/16/2015     Priority: Medium     Personal history of malignant neoplasm of breast 12/23/2014     Priority: Medium     Neutropenia (H) 12/23/2014     Priority: Medium     Problem list name updated by automated process. Provider to review       Early satiety 12/23/2014     Priority: Medium     Abnormal glucose 12/20/2013     Priority: Medium     Problem list name updated by automated process. 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 Provider to review        Past Medical History:   Diagnosis Date     Atypical nevi      Chondrodermatitis nodularis helicis of left ear     Dr Shipley, [facility]     Chronic rhinitis     Dr Messina, [facility] allergy; negative skin testing; IgE mediated allergies; ENT - DC nasal steroid and antihistamine; saline rinses     Degenerative skin disorder     solar elastosis     Hallux rigidus 06/15/2000     Hyperlipidemia, unspecified hyperlipidemia type 9/15/2016     Laryngopharyngeal reflux disease     PPI     Malignant neoplasm of breast (female), unspecified site 03/10/2004     Notalgia paresthetica      Osteoarthritis 07/20/2011     Osteoporosis, unspecified 09/10/2001    Dr Moses; intermittent reclast;  Dr. Moses; repeat dexa after full 2 years Reclast     Other abnormal glucose 12/20/2013     Paresthesia     neck; notalgiaparesthetic; dr leahy & Dr Nevarez; neurontin     Personal history of malignant neoplasm of breast 06/07/2005     Rhinitis      Schamberg's purpura      Past Surgical History:   Procedure Laterality Date     BONE MARROW BIOPSY       negative     COLONOSCOPY  07/2000     COLONOSCOPY  8/13/2013    DR Fu; repeat 5 years     COLONOSCOPY N/A 8/13/2018    Procedure: COLONOSCOPY;  COLONOSCOPY;  Surgeon: Ajit Uriarte MD;  Location: [facility] OR      COLONOSCOPY THRU STOMA WITH BIOPSY  08/25/2010    cancer screening, family h/o colon cancer; hyperplastic polyp     HEMORRHOIDECTOMY  1986     MASTECTOMY, BILATERAL  03/01/2004    right sided breast cancer     RELEASE TRIGGER FINGER Right 3/7/2018    Procedure: RELEASE TRIGGER FINGER;  RELEASE TRIGGER DIGIT RIGHT THUMB;  Surgeon: Jose Alfredo Brewster DO;  Location: [facility] OR     UPPER GI ENDOSCOPY       Current Outpatient Medications   Medication Sig Dispense Refill     aspirin EC 81 MG tablet Take 1 tablet (81 mg) by mouth daily 1 tablet 0     CALCIUM CITRATE 1,500 mg two times daily        cholecalciferol 1000 UNITS TABS Take  by mouth. 2 capsules po daily.        cinnamon 500 MG CAPS Take 2 capsules by mouth daily        fish oil-omega-3 fatty acids (FISH OIL) 1000 MG capsule Take 1 g by mouth 2 times daily        fluticasone (FLONASE) 50 MCG/ACT spray Spray 2 sprays into both nostrils daily 16 g 5     gabapentin (NEURONTIN) 300 MG capsule TAKE TWO CAPSULES BY MOUTH AT BEDTIME 180 capsule 0     Lactobacillus (ACIDOPHILUS) TABS Take 1 tablet by mouth daily       Lutein 20 MG CAPS Take 20 mg by mouth daily        MAGNESIUM OXIDE PO Take 200 mg by mouth daily       Multiple vitamin  s TABS Take 1 tablet by mouth daily.       Polyethylene Glycol 3350 (MIRALAX PO) Use every other day       Turmeric (RA TURMERIC) 500 MG CAPS Take 2 capsules by mouth daily        zinc 50 MG TABS Take 1 tablet by mouth daily        OTC products: None, except as noted above    Allergies   Allergen Reactions     Chloraprep One Step Rash     Povidone Iodine Rash     Betadine      Soap Rash     Betadine       Latex Allergy: NO - BUT DOES HAVE CHLORAPREP AND BETADINE ALLERGIES    Social History     Tobacco Use     Smoking status:  Never Smoker     Smokeless tobacco: Never Used   Substance Use Topics     Alcohol use: Yes     Alcohol/week: 0.0 oz     Comment: 1 glasso wine, weekly      History   Drug Use No       REVIEW OF SYSTEMS:   Constitutional, neuro, ENT, endocrine, pulmonary, cardiac, gastrointestinal, genitourinary, musculoskeletal, integument and psychiatric systems are negative, except as otherwise noted.    EXAM:   /58 (BP Location: Right arm, Patient Position: Chair, Cuff Size: Adult Regular)   Pulse 66   Temp 98  F (36.7  C) (Tympanic)   Wt 51.1 kg (112 lb 9.6 oz)   SpO2 98%   BMI 20.59 kg/m      GENERAL APPEARANCE: alert, no distress and cooperative     EYES: EOMI, PERRL     HENT: ear canals and TM's normal and nose and mouth without ulcers or lesions     NECK: no adenopathy, no asymmetry, masses, or scars and thyroid normal to palpation     RESP: lungs clear to auscultation - no rales, rhonchi or wheezes     CV: regular rates and rhythm, normal S1 S2, no S3 or S4 and no murmur, click or rub     ABDOMEN:  soft, nontender, no HSM or masses and bowel sounds normal     MS: extremities normal- no gross deformities noted, no evidence of inflammation in joints, FROM in all extremities.     SKIN: no suspicious lesions or rashes     NEURO: Normal strength and tone, sensory exam grossly normal, mentation intact and speech normal     PSYCH: mentation appears normal. and affect normal/bright     LYMPHATICS: No cervical adenopathy    DIAGNOSTICS:   EKG: sinus bradycardia, 1st degree AV block, normal axis, no acute ST/T changes c/w ischemia, no LVH by voltage criteria, no prior EKG for comparison  Results for orders placed or performed in visit on 02/06/19 (from the past 24 hour(s))   CBC with platelets and differential   Result Value Ref Range    WBC 4.0 4.0 - 11.0 10e9/L    RBC Count 3.87 3.8 - 5.2 10e12/L    Hemoglobin 12.3 11.7 - 15.7 g/dL    Hematocrit 36.7 35.0 - 47.0 %    MCV 95 78 - 100 fl    MCH 31.8 26.5 - 33.0 pg    MCHC  33.5 31.5 - 36.5 g/dL    RDW 12.8 10.0 - 15.0 %    Platelet Count 261 150 - 450 10e9/L    Diff Method Automated Method     % Neutrophils 64.9 %    % Lymphocytes 22.2 %    % Monocytes 11.6 %    % Eosinophils 0.5 %    % Basophils 0.5 %    % Immature Granulocytes 0.3 %    Nucleated RBCs 0 0 /100    Absolute Neutrophil 2.6 1.6 - 8.3 10e9/L    Absolute Lymphocytes 0.9 0.8 - 5.3 10e9/L    Absolute Monocytes 0.5 0.0 - 1.3 10e9/L    Absolute Eosinophils 0.0 0.0 - 0.7 10e9/L    Absolute Basophils 0.0 0.0 - 0.2 10e9/L    Abs Immature Granulocytes 0.0 0 - 0.4 10e9/L    Absolute Nucleated RBC 0.0          Recent Labs   Lab Test 10/08/18  1010 02/28/18  1521 11/01/17  1043   HGB 13.0 12.2 12.7    272 285    133 138   POTASSIUM 4.1 4.4 4.3   CR 0.81 0.69 0.72   A1C 5.7*  --  5.5        IMPRESSION:   Reason for surgery/procedure: postmenopausal bleeding, D&C hysteroscopy  Diagnosis/reason for consult: cardiopulmonary clearance    The proposed surgical procedure is considered INTERMEDIATE risk.    REVISED CARDIAC RISK INDEX  The patient has the following serious cardiovascular risks for perioperative complications such as (MI, PE, VFib and 3  AV Block):  No serious cardiac risks  INTERPRETATION: 0 risks: Class I (very low risk - 0.4% complication rate)    The patient has the following additional risks for perioperative complications:  No identified additional risks      ICD-10-CM    1. Preop general physical exam Z01.818 EKG 12-lead complete w/read - (Clinic Performed)     CBC with platelets and differential       RECOMMENDATIONS:       --Patient is to take all scheduled medications on the day of surgery EXCEPT for modifications listed below.  NSAIDS, Aspirin, supplements - all on hold.    APPROVAL GIVEN to proceed with proposed procedure, without further diagnostic evaluation       Signed Electronically by: Melyssa Encinas MD    Copy of this evaluation report is provided to requesting physician.    [place] Preop  Guidelines    Revised Cardiac Risk Index

## 2019-02-06 ENCOUNTER — HOSPITAL ENCOUNTER (OUTPATIENT)
Dept: ULTRASOUND IMAGING | Facility: HOSPITAL | Age: 69
Discharge: HOME | End: 2019-02-06
Attending: OBSTETRICS & GYNECOLOGY | Admitting: OBSTETRICS & GYNECOLOGY
Payer: MEDICARE

## 2019-02-06 ENCOUNTER — OFFICE VISIT (OUTPATIENT)
Dept: FAMILY MEDICINE | Facility: OTHER | Age: 69
End: 2019-02-06
Attending: FAMILY MEDICINE
Payer: MEDICARE

## 2019-02-06 VITALS
BODY MASS INDEX: 20.59 KG/M2 | TEMPERATURE: 98 F | WEIGHT: 112.6 LBS | DIASTOLIC BLOOD PRESSURE: 58 MMHG | SYSTOLIC BLOOD PRESSURE: 124 MMHG | OXYGEN SATURATION: 98 % | HEART RATE: 66 BPM

## 2019-02-06 DIAGNOSIS — Z01.818 PREOP GENERAL PHYSICAL EXAM: Primary | ICD-10-CM

## 2019-02-06 DIAGNOSIS — N95.0 POST-MENOPAUSAL BLEEDING: Primary | ICD-10-CM

## 2019-02-06 DIAGNOSIS — N95.0 POSTMENOPAUSAL BLEEDING: ICD-10-CM

## 2019-02-06 LAB
BASOPHILS # BLD AUTO: 0 10E9/L (ref 0–0.2)
BASOPHILS NFR BLD AUTO: 0.5 %
DIFFERENTIAL METHOD BLD: NORMAL
EOSINOPHIL # BLD AUTO: 0 10E9/L (ref 0–0.7)
EOSINOPHIL NFR BLD AUTO: 0.5 %
ERYTHROCYTE [DISTWIDTH] IN BLOOD BY AUTOMATED COUNT: 12.8 % (ref 10–15)
HCT VFR BLD AUTO: 36.7 % (ref 35–47)
HGB BLD-MCNC: 12.3 G/DL (ref 11.7–15.7)
IMM GRANULOCYTES # BLD: 0 10E9/L (ref 0–0.4)
IMM GRANULOCYTES NFR BLD: 0.3 %
LYMPHOCYTES # BLD AUTO: 0.9 10E9/L (ref 0.8–5.3)
LYMPHOCYTES NFR BLD AUTO: 22.2 %
MCH RBC QN AUTO: 31.8 PG (ref 26.5–33)
MCHC RBC AUTO-ENTMCNC: 33.5 G/DL (ref 31.5–36.5)
MCV RBC AUTO: 95 FL (ref 78–100)
MONOCYTES # BLD AUTO: 0.5 10E9/L (ref 0–1.3)
MONOCYTES NFR BLD AUTO: 11.6 %
NEUTROPHILS # BLD AUTO: 2.6 10E9/L (ref 1.6–8.3)
NEUTROPHILS NFR BLD AUTO: 64.9 %
NRBC # BLD AUTO: 0 10*3/UL
NRBC BLD AUTO-RTO: 0 /100
PLATELET # BLD AUTO: 261 10E9/L (ref 150–450)
RBC # BLD AUTO: 3.87 10E12/L (ref 3.8–5.2)
WBC # BLD AUTO: 4 10E9/L (ref 4–11)

## 2019-02-06 PROCEDURE — G0463 HOSPITAL OUTPT CLINIC VISIT: HCPCS | Mod: 25

## 2019-02-06 PROCEDURE — G0463 HOSPITAL OUTPT CLINIC VISIT: HCPCS

## 2019-02-06 PROCEDURE — 93010 ELECTROCARDIOGRAM REPORT: CPT | Performed by: INTERNAL MEDICINE

## 2019-02-06 PROCEDURE — 99214 OFFICE O/P EST MOD 30 MIN: CPT | Performed by: FAMILY MEDICINE

## 2019-02-06 PROCEDURE — 93005 ELECTROCARDIOGRAM TRACING: CPT | Performed by: INTERNAL MEDICINE

## 2019-02-06 PROCEDURE — 36415 COLL VENOUS BLD VENIPUNCTURE: CPT | Mod: ZL | Performed by: FAMILY MEDICINE

## 2019-02-06 PROCEDURE — 85025 COMPLETE CBC W/AUTO DIFF WBC: CPT | Mod: ZL | Performed by: FAMILY MEDICINE

## 2019-02-06 PROCEDURE — 76856 US EXAM PELVIC COMPLETE: CPT | Mod: TC

## 2019-02-06 ASSESSMENT — PAIN SCALES - GENERAL: PAINLEVEL: NO PAIN (0)

## 2019-02-06 NOTE — NURSING NOTE
"Chief Complaint   Patient presents with     Pre-Op Exam       Initial /58 (BP Location: Right arm, Patient Position: Chair, Cuff Size: Adult Regular)   Pulse 66   Temp 98  F (36.7  C) (Tympanic)   Wt 51.1 kg (112 lb 9.6 oz)   SpO2 98%   BMI 20.59 kg/m   Estimated body mass index is 20.59 kg/m  as calculated from the following:    Height as of 1/31/19: 1.575 m (5' 2\").    Weight as of this encounter: 51.1 kg (112 lb 9.6 oz).  Medication Reconciliation: complete    Laura Keller LPN    "

## 2019-02-07 ENCOUNTER — TELEPHONE (OUTPATIENT)
Dept: FAMILY MEDICINE | Facility: OTHER | Age: 69
End: 2019-02-07

## 2019-02-07 LAB
COPATH REPORT: NORMAL
PAP: NORMAL

## 2019-02-08 LAB
FINAL DIAGNOSIS: NORMAL
HPV HR 12 DNA CVX QL NAA+PROBE: NEGATIVE
HPV16 DNA SPEC QL NAA+PROBE: NEGATIVE
HPV18 DNA SPEC QL NAA+PROBE: NEGATIVE
SPECIMEN DESCRIPTION: NORMAL
SPECIMEN SOURCE CVX/VAG CYTO: NORMAL

## 2019-02-12 ENCOUNTER — ANESTHESIA EVENT (OUTPATIENT)
Dept: SURGERY | Facility: HOSPITAL | Age: 69
End: 2019-02-12
Payer: MEDICARE

## 2019-02-12 NOTE — ANESTHESIA PREPROCEDURE EVALUATION
Anesthesia Pre-Procedure Evaluation    Patient: Mihaela Jang   MRN: [id number] : 1950          Preoperative Diagnosis: POST MENOPAUSAL BLEEDING    Procedure(s):  DILITATION AND CURETTAGE, HYSTEROSCOPY    Past Medical History:   Diagnosis Date     Atypical nevi      Chondrodermatitis nodularis helicis of left ear     Dr Shipley, [facility]     Chronic rhinitis     Dr Messina, [facility] allergy; negative skin testing; IgE mediated allergies; ENT - DC nasal steroid and antihistamine; saline rinses     Degenerative skin disorder     solar elastosis     Hallux rigidus 06/15/2000     Hyperlipidemia, unspecified hyperlipidemia type 9/15/2016     Laryngopharyngeal reflux disease     PPI     Malignant neoplasm of breast (female), unspecified site 03/10/2004     Notalgia paresthetica      Osteoarthritis 2011     Osteoporosis, unspecified 09/10/2001    Dr Moses; intermittent reclast;  Dr. Moses; repeat dexa after full 2 years Reclast     Other abnormal glucose 2013     Paresthesia     neck; notalgiaparesthetic; dr leahy & Dr Nevarez; neurontin     Personal history of malignant neoplasm of breast 2005     Rhinitis      Schamberg's purpura      Past Surgical History:   Procedure Laterality Date     BONE MARROW BIOPSY      negative     COLONOSCOPY  2000     COLONOSCOPY  2013    DR Fu; repeat 5 years     COLONOSCOPY N/A 2018    Procedure: COLONOSCOPY;  COLONOSCOPY;  Surgeon: Ajit Uriarte MD;  Location: [facility] OR      COLONOSCOPY THRU STOMA WITH BIOPSY  2010    cancer screening, family h/o colon cancer; hyperplastic polyp     HEMORRHOIDECTOMY  1986     MASTECTOMY, BILATERAL  2004    right sided breast cancer     RELEASE TRIGGER FINGER Right 3/7/2018    Procedure: RELEASE TRIGGER FINGER;  RELEASE TRIGGER DIGIT RIGHT THUMB;  Surgeon: Jose Alfredo Brewster DO;  Location: [facility] OR     UPPER GI ENDOSCOPY         Anesthesia Evaluation     . Pt has had prior anesthetic.      No history of anesthetic complications          ROS/MED HX    ENT/Pulmonary:     (+)allergic rhinitis, hypertrophic of soft palate, , . .    Neurologic:     (+)neuropathy     Cardiovascular:     (+) Dyslipidemia, ----. : . . . :. . Previous cardiac testing date:results:date: results:ECG reviewed date:2/6/19 results:SB with first degree AVB date: results:          METS/Exercise Tolerance:     Hematologic:     (+) Other Hematologic Disorder-neutropenia      Musculoskeletal:   (+) arthritis, , , -       GI/Hepatic:     (+) Other GI/Hepatic LPR      Renal/Genitourinary:  - ROS Renal section negative       Endo:  - neg endo ROS       Psychiatric:  - neg psychiatric ROS       Infectious Disease:  - neg infectious disease ROS       Malignancy:   (+) Malignancy History of Breast          Other:    - neg other ROS                      Physical Exam  Normal systems: cardiovascular, pulmonary and dental    Airway   Mallampati: IV  TM distance: >3 FB  Neck ROM: full    Dental     Cardiovascular   Rhythm and rate: regular and normal      Pulmonary    breath sounds clear to auscultation            Lab Results   Component Value Date    WBC 4.0 02/06/2019    HGB 12.3 02/06/2019    HCT 36.7 02/06/2019     02/06/2019    SED 17 12/23/2014     10/08/2018    POTASSIUM 4.1 10/08/2018    CHLORIDE 105 10/08/2018    CO2 27 10/08/2018    BUN 15 10/08/2018    CR 0.81 10/08/2018    GLC 93 10/08/2018    LEONID 8.9 10/08/2018    ALBUMIN 3.7 10/08/2018    PROTTOTAL 7.6 10/08/2018    ALT 24 10/08/2018    AST 14 10/08/2018    ALKPHOS 65 10/08/2018    BILITOTAL 0.2 10/08/2018       Preop Vitals  BP Readings from Last 3 Encounters:   02/06/19 124/58   01/31/19 116/68   11/19/18 106/52    Pulse Readings from Last 3 Encounters:   02/06/19 66   01/31/19 64   11/19/18 57      Resp Readings from Last 3 Encounters:   10/18/18 18   10/10/18 18   08/13/18 18    SpO2 Readings from Last 3 Encounters:   02/06/19 98%   01/31/19 98%   11/19/18 98%     "  Temp Readings from Last 1 Encounters:   02/06/19 98  F (36.7  C) (Tympanic)    Ht Readings from Last 1 Encounters:   01/31/19 1.575 m (5' 2\")      Wt Readings from Last 1 Encounters:   02/06/19 51.1 kg (112 lb 9.6 oz)    Estimated body mass index is 20.59 kg/m  as calculated from the following:    Height as of 1/31/19: 1.575 m (5' 2\").    Weight as of 2/6/19: 51.1 kg (112 lb 9.6 oz).       Anesthesia Plan      History & Physical Review  History and physical reviewed and following examination; no interval change.    ASA Status:  2 .    NPO Status:  > 8 hours    Plan for MAC with Intravenous and Propofol induction. Maintenance will be TIVA.  Reason for MAC:  Chronic cardiopulmonary disease (G9) and Deep or markedly invasive procedure (G8)  PONV prophylaxis:  Ondansetron (or other 5HT-3) and Dexamethasone or Solumedrol  H and P date 2/6/19      Postoperative Care  Postoperative pain management:  IV analgesics and Oral pain medications.      Consents  Anesthetic plan, risks, benefits and alternatives discussed with:  Patient..                 MICHAEL Feliciano CRNA  "

## 2019-02-13 ENCOUNTER — HOSPITAL ENCOUNTER (OUTPATIENT)
Facility: HOSPITAL | Age: 69
Discharge: HOME | End: 2019-02-13
Attending: OBSTETRICS & GYNECOLOGY | Admitting: OBSTETRICS & GYNECOLOGY
Payer: MEDICARE

## 2019-02-13 ENCOUNTER — ANESTHESIA (OUTPATIENT)
Dept: SURGERY | Facility: HOSPITAL | Age: 69
End: 2019-02-13
Payer: MEDICARE

## 2019-02-13 VITALS
DIASTOLIC BLOOD PRESSURE: 79 MMHG | RESPIRATION RATE: 17 BRPM | TEMPERATURE: 97 F | SYSTOLIC BLOOD PRESSURE: 147 MMHG | OXYGEN SATURATION: 98 %

## 2019-02-13 PROCEDURE — 58555 HYSTEROSCOPY DX SEP PROC: CPT | Performed by: OBSTETRICS & GYNECOLOGY

## 2019-02-13 PROCEDURE — 58563 HYSTEROSCOPY ABLATION: CPT | Performed by: NURSE ANESTHETIST, CERTIFIED REGISTERED

## 2019-02-13 PROCEDURE — A9270 NON-COVERED ITEM OR SERVICE: HCPCS | Performed by: OBSTETRICS & GYNECOLOGY

## 2019-02-13 PROCEDURE — 36000056 ZZH SURGERY LEVEL 3 1ST 30 MIN: Performed by: OBSTETRICS & GYNECOLOGY

## 2019-02-13 PROCEDURE — 37000008 ZZH ANESTHESIA TECHNICAL FEE, 1ST 30 MIN: Performed by: OBSTETRICS & GYNECOLOGY

## 2019-02-13 PROCEDURE — 25000132 ZZH RX MED GY IP 250 OP 250 PS 637: Mod: GY | Performed by: OBSTETRICS & GYNECOLOGY

## 2019-02-13 PROCEDURE — 71000027 ZZH RECOVERY PHASE 2 EACH 15 MINS: Performed by: OBSTETRICS & GYNECOLOGY

## 2019-02-13 PROCEDURE — 25000125 ZZHC RX 250: Performed by: NURSE ANESTHETIST, CERTIFIED REGISTERED

## 2019-02-13 PROCEDURE — 27210794 ZZH OR GENERAL SUPPLY STERILE: Performed by: OBSTETRICS & GYNECOLOGY

## 2019-02-13 PROCEDURE — 58563 HYSTEROSCOPY ABLATION: CPT | Performed by: ANESTHESIOLOGY

## 2019-02-13 PROCEDURE — 57500 BIOPSY OF CERVIX: CPT | Performed by: OBSTETRICS & GYNECOLOGY

## 2019-02-13 PROCEDURE — 25800030 ZZH RX IP 258 OP 636: Performed by: NURSE ANESTHETIST, CERTIFIED REGISTERED

## 2019-02-13 PROCEDURE — 36000058 ZZH SURGERY LEVEL 3 EA 15 ADDTL MIN: Performed by: OBSTETRICS & GYNECOLOGY

## 2019-02-13 PROCEDURE — 25000125 ZZHC RX 250: Performed by: OBSTETRICS & GYNECOLOGY

## 2019-02-13 PROCEDURE — 25000128 H RX IP 250 OP 636: Performed by: OBSTETRICS & GYNECOLOGY

## 2019-02-13 PROCEDURE — 37000009 ZZH ANESTHESIA TECHNICAL FEE, EACH ADDTL 15 MIN: Performed by: OBSTETRICS & GYNECOLOGY

## 2019-02-13 PROCEDURE — 40000306 ZZH STATISTIC PRE PROC ASSESS II: Performed by: OBSTETRICS & GYNECOLOGY

## 2019-02-13 PROCEDURE — A9270 NON-COVERED ITEM OR SERVICE: HCPCS | Mod: GY | Performed by: OBSTETRICS & GYNECOLOGY

## 2019-02-13 PROCEDURE — 25000128 H RX IP 250 OP 636: Performed by: NURSE ANESTHETIST, CERTIFIED REGISTERED

## 2019-02-13 PROCEDURE — 88305 TISSUE EXAM BY PATHOLOGIST: CPT | Mod: TC | Performed by: OBSTETRICS & GYNECOLOGY

## 2019-02-13 RX ORDER — LIDOCAINE HYDROCHLORIDE 10 MG/ML
INJECTION, SOLUTION INFILTRATION; PERINEURAL PRN
Status: DISCONTINUED | OUTPATIENT
Start: 2019-02-13 | End: 2019-02-13 | Stop reason: HOSPADM

## 2019-02-13 RX ORDER — MISOPROSTOL 200 UG/1
200 TABLET ORAL ONCE
Status: COMPLETED | OUTPATIENT
Start: 2019-02-13 | End: 2019-02-13

## 2019-02-13 RX ORDER — SODIUM CHLORIDE, SODIUM LACTATE, POTASSIUM CHLORIDE, CALCIUM CHLORIDE 600; 310; 30; 20 MG/100ML; MG/100ML; MG/100ML; MG/100ML
INJECTION, SOLUTION INTRAVENOUS CONTINUOUS
Status: DISCONTINUED | OUTPATIENT
Start: 2019-02-13 | End: 2019-02-13 | Stop reason: HOSPADM

## 2019-02-13 RX ORDER — MEPERIDINE HYDROCHLORIDE 50 MG/ML
12.5 INJECTION INTRAMUSCULAR; INTRAVENOUS; SUBCUTANEOUS
Status: DISCONTINUED | OUTPATIENT
Start: 2019-02-13 | End: 2019-02-13 | Stop reason: HOSPADM

## 2019-02-13 RX ORDER — PROPOFOL 10 MG/ML
INJECTION, EMULSION INTRAVENOUS PRN
Status: DISCONTINUED | OUTPATIENT
Start: 2019-02-13 | End: 2019-02-13

## 2019-02-13 RX ORDER — PROPOFOL 10 MG/ML
INJECTION, EMULSION INTRAVENOUS CONTINUOUS PRN
Status: DISCONTINUED | OUTPATIENT
Start: 2019-02-13 | End: 2019-02-13

## 2019-02-13 RX ORDER — ONDANSETRON 4 MG/1
4 TABLET, ORALLY DISINTEGRATING ORAL EVERY 30 MIN PRN
Status: DISCONTINUED | OUTPATIENT
Start: 2019-02-13 | End: 2019-02-13 | Stop reason: HOSPADM

## 2019-02-13 RX ORDER — ONDANSETRON 2 MG/ML
4 INJECTION INTRAMUSCULAR; INTRAVENOUS EVERY 30 MIN PRN
Status: DISCONTINUED | OUTPATIENT
Start: 2019-02-13 | End: 2019-02-13 | Stop reason: HOSPADM

## 2019-02-13 RX ORDER — ONDANSETRON 4 MG/1
4 TABLET, ORALLY DISINTEGRATING ORAL
Status: DISCONTINUED | OUTPATIENT
Start: 2019-02-13 | End: 2019-02-13 | Stop reason: HOSPADM

## 2019-02-13 RX ORDER — LIDOCAINE 40 MG/G
CREAM TOPICAL
Status: DISCONTINUED | OUTPATIENT
Start: 2019-02-13 | End: 2019-02-13 | Stop reason: HOSPADM

## 2019-02-13 RX ORDER — KETOROLAC TROMETHAMINE 30 MG/ML
15 INJECTION, SOLUTION INTRAMUSCULAR; INTRAVENOUS ONCE
Status: COMPLETED | OUTPATIENT
Start: 2019-02-13 | End: 2019-02-13

## 2019-02-13 RX ORDER — NALOXONE HYDROCHLORIDE 0.4 MG/ML
.1-.4 INJECTION, SOLUTION INTRAMUSCULAR; INTRAVENOUS; SUBCUTANEOUS
Status: DISCONTINUED | OUTPATIENT
Start: 2019-02-13 | End: 2019-02-13 | Stop reason: HOSPADM

## 2019-02-13 RX ORDER — FENTANYL CITRATE 50 UG/ML
INJECTION, SOLUTION INTRAMUSCULAR; INTRAVENOUS PRN
Status: DISCONTINUED | OUTPATIENT
Start: 2019-02-13 | End: 2019-02-13

## 2019-02-13 RX ADMIN — PROPOFOL 20 MG: 10 INJECTION, EMULSION INTRAVENOUS at 08:45

## 2019-02-13 RX ADMIN — PROPOFOL 45 MCG/KG/MIN: 10 INJECTION, EMULSION INTRAVENOUS at 08:15

## 2019-02-13 RX ADMIN — PROPOFOL 30 MG: 10 INJECTION, EMULSION INTRAVENOUS at 08:31

## 2019-02-13 RX ADMIN — PROPOFOL 20 MG: 10 INJECTION, EMULSION INTRAVENOUS at 08:22

## 2019-02-13 RX ADMIN — PROPOFOL 20 MG: 10 INJECTION, EMULSION INTRAVENOUS at 08:34

## 2019-02-13 RX ADMIN — PROPOFOL 20 MG: 10 INJECTION, EMULSION INTRAVENOUS at 08:23

## 2019-02-13 RX ADMIN — MISOPROSTOL 200 MCG: 200 TABLET ORAL at 07:30

## 2019-02-13 RX ADMIN — PROPOFOL 20 MG: 10 INJECTION, EMULSION INTRAVENOUS at 08:24

## 2019-02-13 RX ADMIN — PROPOFOL 20 MG: 10 INJECTION, EMULSION INTRAVENOUS at 08:18

## 2019-02-13 RX ADMIN — SODIUM CHLORIDE, POTASSIUM CHLORIDE, SODIUM LACTATE AND CALCIUM CHLORIDE: 600; 310; 30; 20 INJECTION, SOLUTION INTRAVENOUS at 07:15

## 2019-02-13 RX ADMIN — PROPOFOL 10 MG: 10 INJECTION, EMULSION INTRAVENOUS at 08:20

## 2019-02-13 RX ADMIN — KETOROLAC TROMETHAMINE 15 MG: 30 INJECTION, SOLUTION INTRAMUSCULAR at 07:33

## 2019-02-13 RX ADMIN — FENTANYL CITRATE 50 MCG: 50 INJECTION, SOLUTION INTRAMUSCULAR; INTRAVENOUS at 08:14

## 2019-02-13 RX ADMIN — MIDAZOLAM 1 MG: 1 INJECTION INTRAMUSCULAR; INTRAVENOUS at 08:13

## 2019-02-13 NOTE — DISCHARGE INSTRUCTIONS
Call MD prior to DC.  No driving today or while on pain meds  Pelvic rest for 2 weeks  Call Dr. Gabriel [phone].  Schedule PO check Dr. Gabriel 1-2 weeks      POST OPERATIVE PATIENT INFORMATION  After a D&C (Dilation and Curettage)    General Information:  You will be discharged when you are fully awake and are able to be driven home.  General anesthesia may reduce judgment, reaction time, and coordination for several hours after you seemingly have recovered.  Therefore, do not operate any motorized vehicle or power tools for 24 hours after discharge.  You should also note be alone for 12 hours after surgery.  A D&C (dilation and curettage) is a procedure where the opening of the uterus (cervix) is widened and the lining of the uterus is scraped, either to control abdominal uterine bleeding or to obtain tissue to study.  The following information should be helpful with self care at home.  Activity:  After arriving home, it is suggested that you rest or do quiet activities for the rest of the day.  The next day you may be as active as you feel able.  You may shower as desired.  After your surgery, you may find that you require more rest than usual the first 3-4 days as your body heals.  When you feel able, you may then return to work.    Discomfort/bleeding:    You may experience some cramping for 2-3 days after surgery, this is normal.  You may also continue to have vaginal bleeding or spotting for a week or less.  Do not douche, use tampons, tube bath or have intercourse until the bleeding stops or until your doctor says you may do so.  If saturating a pad an hour for two hours, notify your surgeon or call/return to the Emergency Room.    Precautions:  Even though you have no incision, special care should be taken to prevent infection and/or bleeding.  In addition to no douching, use of tampons, tub bath, intercourse, you should always wipe from front to back after going to the bathroom to prevent  contamination.  Call your doctor if your temperature goes above 100 F orally, if you experience heavy bleeding (saturating 2 pads/hour), have foul vaginal odor or discharge, or experience excess pain.  Diet:  Eat small amounts frequently after arriving home.  Begin with liquids such as ginger ale, cola, lemon-lime drinks, hot tea, Jell-O, popsicles, and clear soups in small amounts.  Gradually increase your diet to include other juices, creamed soups and solids as tolerated.  Avoid foods the day of surgery which are hard to digest such as heavy, sweet, highly spiced and/or fried foods.  Do not drink any alcoholic beverages for at least six hours following the surgery, as alcohol prolongs the effects of the anesthesia and can be dangerous.  Emesis (vomiting) sometimes occurs after anesthesia.  If emesis persists more than 5-7 times after arriving home, or if you have other difficulties, call your doctor.  Other:  Your doctor will want to see you in about 2-4 weeks.  Call the office as soon as possible for the appointment.  If you are uncertain about any of the above items or have any questions, feel free to ask us.  If you have difficulty after surgery and for some unforeseen reason are unable to contact your physician at his/her clinic, call the [facility] Emergency Room at [phone].     Post-Anesthesia Patient Instructions    IMMEDIATELY FOLLOWING SURGERY:  Do not drive or operate machinery for the first twenty four hours after surgery.  Do not make any important decisions for twenty four hours after surgery or while taking narcotic pain medications or sedatives.  If you develop intractable nausea and vomiting or a severe headache please notify your doctor immediately.    FOLLOW-UP:  Please make an appointment with your surgeon as instructed. You do not need to follow up with anesthesia unless specifically instructed to do so.    WOUND CARE INSTRUCTIONS (if applicable):  Keep a dry clean dressing on the  anesthesia/puncture wound site if there is drainage.  Once the wound has quit draining you may leave it open to air.  Generally you should leave the bandage intact for twenty four hours unless there is drainage.  If the epidural site drains for more than 36-48 hours please call the anesthesia department.    QUESTIONS?:  Please feel free to call your physician or the hospital  if you have any questions, and they will be happy to assist you.

## 2019-02-13 NOTE — PROGRESS NOTES
Pt observed in PO recovery 4 hours post procedure.  During that time vitals were stable, minimal vaginal bleeding and pain noted.  No vomiting/nausea.  Pt feels well and read for discharge.   Abd soft, NT, ND.  Discussed warning signs to return to ED if heavy vaginal bleeding, increased abdomina pain, fever, N/V, dizziness/syncope etc. F/u appt 1-3 weeks.

## 2019-02-13 NOTE — ANESTHESIA POSTPROCEDURE EVALUATION
Patient: Mihaela Jang    Procedure(s):  EXAM UNDER ANESTHESIA , HYSTEROSCOPY    Diagnosis:POST MENOPAUSAL BLEEDING  Diagnosis Additional Information: No value filed.    Anesthesia Type:  MAC    Note:  Anesthesia Post Evaluation    Patient location during evaluation: Phase 2 and Bedside  Patient participation: Able to fully participate in evaluation  Level of consciousness: awake and alert  Pain management: adequate  Airway patency: patent  Cardiovascular status: acceptable  Respiratory status: acceptable  Hydration status: stable  PONV: none     Anesthetic complications: None          Last vitals:  Vitals:    02/13/19 0720 02/13/19 0905   BP: 143/75 122/73   Resp: 18    Temp: 97  F (36.1  C)    SpO2: 98% 100%         Electronically Signed By: Adolph Foster MD  February 13, 2019  9:12 AM

## 2019-02-13 NOTE — OR NURSING
Patient and responsible adult given discharge instructions with no questions regarding instructions. Ashwini score 20. Pain level 1/10.  Discharged from unit via w/c. Patient discharged to home.

## 2019-02-13 NOTE — ANESTHESIA CARE TRANSFER NOTE
Patient: Mihaela Jang    Procedure(s):  EXAM UNDER ANESTHESIA , HYSTEROSCOPY    Diagnosis: POST MENOPAUSAL BLEEDING  Diagnosis Additional Information: No value filed.    Anesthesia Type:   MAC     Note:  Airway :Nasal Cannula  Patient transferred to:Phase II  Handoff Report: Identifed the Patient, Identified the Reponsible Provider, Reviewed the pertinent medical history, Discussed the surgical course, Reviewed Intra-OP anesthesia mangement and issues during anesthesia, Set expectations for post-procedure period and Allowed opportunity for questions and acknowledgement of understanding      Vitals: (Last set prior to Anesthesia Care Transfer)    CRNA VITALS  2/13/2019 0831 - 2/13/2019 0906      2/13/2019             Resp Rate (set):  8                Electronically Signed By: MICHAEL Souza CRNA  February 13, 2019  9:06 AM

## 2019-02-13 NOTE — OP NOTE
[facility]  Operative Note    Pre-operative diagnosis: POST MENOPAUSAL BLEEDING   Post-operative diagnosis Same, Pathology Pending   Procedure: Exam under anesthesia, Cervical biopsy, Hysteroscopy   Surgeon:  Assistant: Jovi Gabriel MD  None   Anesthesia: Monitor Anesthesia Care.  Local paracervical block.       COMPLICATIONS: Uterine perforatiion  EBL:  Minimal  SPECIMENS:   Cervical biopsy 11:00  OPERATIVE FINDINGS:   Cervical stenosis with very narrow atrophic introitus.  There was slightly raised/friable/ erythematous appearance to cervix at 11:00.  Flexible hysteroscopy revealed visible bowel with evidence of uterine perforation.  No bleeding was noted at perforation site.  The endometrial cavity was not able to be adequately visualized.    OPERATIVE DICTATION: The patient was brought to the operating room and uneventfully placed under general anesthesia. She was prepped and draped in the dorsal lithotomy position and her bladder drained. The cervix was visualized with a weighted speculum and grasped anteriorly with a fine-toothed tenaculum.  Cervical biopsy was performed from ectocervix at 11:00 with a TisApptimizeer cervical biopsy forceps.   A cervical/paracervical block was performed with 1% lidocaine.   The cervix was gently dilated with Hegar dilators.  Hysteroscopy was performed using a flexible hysteroscope and normal saline distention media.  Findings were as described above.   No sharp curettage or electrocautery were performed and no bleeding was noted from perforation site so the procedure was terminated.  The instruments were removed. The  patient was transferred to the recovery room in excellent stable condition.       Jovi Gabriel MD  2/13/2019  8:17 AM

## 2019-02-14 LAB — COPATH REPORT: NORMAL

## 2019-02-28 ENCOUNTER — OFFICE VISIT (OUTPATIENT)
Dept: OBGYN | Facility: OTHER | Age: 69
End: 2019-02-28
Attending: OBSTETRICS & GYNECOLOGY
Payer: MEDICARE

## 2019-02-28 VITALS
OXYGEN SATURATION: 96 % | SYSTOLIC BLOOD PRESSURE: 122 MMHG | DIASTOLIC BLOOD PRESSURE: 63 MMHG | HEART RATE: 63 BPM | TEMPERATURE: 97.1 F | HEIGHT: 62 IN | BODY MASS INDEX: 20.61 KG/M2 | WEIGHT: 112 LBS

## 2019-02-28 DIAGNOSIS — Z98.890 POST-OPERATIVE STATE: Primary | ICD-10-CM

## 2019-02-28 PROCEDURE — 99024 POSTOP FOLLOW-UP VISIT: CPT | Performed by: OBSTETRICS & GYNECOLOGY

## 2019-02-28 PROCEDURE — G0463 HOSPITAL OUTPT CLINIC VISIT: HCPCS | Performed by: OBSTETRICS & GYNECOLOGY

## 2019-02-28 ASSESSMENT — MIFFLIN-ST. JEOR: SCORE: 981.28

## 2019-02-28 ASSESSMENT — PAIN SCALES - GENERAL: PAINLEVEL: NO PAIN (0)

## 2019-02-28 NOTE — NURSING NOTE
"Chief Complaint   Patient presents with     Post-op Visit     exam and hysteroscopy on 2/13/19       Initial /63 (BP Location: Left arm, Cuff Size: Adult Regular)   Pulse 63   Temp 97.1  F (36.2  C) (Tympanic)   Ht 1.575 m (5' 2\")   Wt 50.8 kg (112 lb)   SpO2 96%   BMI 20.49 kg/m   Estimated body mass index is 20.49 kg/m  as calculated from the following:    Height as of this encounter: 1.575 m (5' 2\").    Weight as of this encounter: 50.8 kg (112 lb).  Medication Reconciliation: complete    Karina Watson LPN  "

## 2019-02-28 NOTE — PROGRESS NOTES
"JUN Jang is a 68 year old female presents for post operative check. She is  2  week(s) status post attempted D and C/Hysteroscopy with uterine perforation.  She reports doing well and denies significant pain or bleeding.  Bowel and bladder function is satisfactory. Denies incisional problems. Significant findings atrophy and cervical pyogenic granuloma.     O.  Blood pressure 122/63, pulse 63, temperature 97.1  F (36.2  C), temperature source Tympanic, height 1.575 m (5' 2\"), weight 50.8 kg (112 lb), SpO2 96 %, not currently breastfeeding.    Abd: soft, non-tender, non-distended.   Vagina atrophic, stenotic.  Cervix with slight erythema from previous bx site but no lesions.  Silver nitrate applied.     A. /P. Satisfactory post-op check.Released from restrictions.  PMB likely secondary to cervical pyogenic granuloma and atrophy.  She did have a pelvic US showing 2mm endometrial stripe which is reassuring of  serious underlying pathology.  We discussed options of expectant management and hysterectomy.   At this point given that we have an explanation for bleeding and reassuring US assessment elects expectant management.    F/u prn problems,or recurrent PMB.     Jovi Gabriel  "

## 2019-05-17 NOTE — PROGRESS NOTES
Subjective     Mihaela Jang is a 69 year old female who presents to clinic today for the following health issues:    HPI   Derm Issue      Duration: Red spot - one month , dark spot - one month    Description (location/character/radiation): 2 spots near pelvic area. Patient describes them as one dark area and one red area. Patient states that dark spot feels like a black head. Denies flaky or dry textured.      Intensity:  None     Accompanying signs and symptoms: Denies pain / discomfort / itching with spots     History (similar episodes/previous evaluation): None    Precipitating or alleviating factors: None    Therapies tried and outcome: Aquaphor - no improvement       Recently changing to a cheaper type of panty liner.   Denies changes in soaps / detergents / medications   No recent travel.  Dr. Gabriel had removed mole in the pelvic area 7-8 years ago.   Benign.  Dr. Lau and dermatology have removed them as well - benign.  No family history of skin cancer.      {  Current Outpatient Medications   Medication     aspirin EC 81 MG tablet     CALCIUM CITRATE     cholecalciferol 1000 UNITS TABS     cinnamon 500 MG CAPS     fish oil-omega-3 fatty acids (FISH OIL) 1000 MG capsule     fluticasone (FLONASE) 50 MCG/ACT spray     gabapentin (NEURONTIN) 300 MG capsule     Lactobacillus (ACIDOPHILUS) TABS     Lutein 20 MG CAPS     MAGNESIUM OXIDE PO     Multiple vitamin  s TABS     Polyethylene Glycol 3350 (MIRALAX PO)     Turmeric (RA TURMERIC) 500 MG CAPS     zinc 50 MG TABS     No current facility-administered medications for this visit.        Patient Active Problem List   Diagnosis     Laryngopharyngeal reflux (LPR)     Chronic rhinitis     ETD (eustachian tube dysfunction)     Osteoporosis     Abnormal glucose     Personal history of malignant neoplasm of breast     Neutropenia (H)     Early satiety     chronic neutropenia.     Hypertrophic soft palate     ACP (advance care planning)     Atypical nevus      Skin sensation disturbance     Notalgia paresthetica     Hyperlipidemia, unspecified hyperlipidemia type     probably LEFT first branchial cleft cyst     Past Surgical History:   Procedure Laterality Date     BONE MARROW BIOPSY      negative     COLONOSCOPY  07/2000     COLONOSCOPY  8/13/2013    DR Fu; repeat 5 years     COLONOSCOPY N/A 8/13/2018    Procedure: COLONOSCOPY;  COLONOSCOPY;  Surgeon: Ajit Uriarte MD;  Location: [facility]     DILATION AND CURETTAGE, OPERATIVE HYSTEROSCOPY, COMBINED N/A 2/13/2019    Procedure: EXAM UNDER ANESTHESIA , HYSTEROSCOPY;  Surgeon: Jovi Gabriel MD;  Location: [facility] OR     HC COLONOSCOPY THRU STOMA WITH BIOPSY  08/25/2010    cancer screening, family h/o colon cancer; hyperplastic polyp     HEMORRHOIDECTOMY  1986     MASTECTOMY, BILATERAL  03/01/2004    right sided breast cancer     RELEASE TRIGGER FINGER Right 3/7/2018    Procedure: RELEASE TRIGGER FINGER;  RELEASE TRIGGER DIGIT RIGHT THUMB;  Surgeon: Jose Alfredo Brewster DO;  Location: [facility] OR     UPPER GI ENDOSCOPY         Social History     Tobacco Use     Smoking status: Never Smoker     Smokeless tobacco: Never Used   Substance Use Topics     Alcohol use: Yes     Alcohol/week: 0.0 oz     Comment: 1 glasso wine, weekly      Family History   Problem Relation Age of Onset     Dementia Mother         (cause of death)      High cholesterol Mother      Osteoarthritis Mother      C.A.D. Father         (cause of death)      Prostate Cancer Father      Breast Cancer Maternal Aunt 72     Cerebrovascular Disease Maternal Grandmother         CVA     Diabetes Maternal Grandmother            Reviewed and updated as needed this visit by Provider  Tobacco  Allergies  Meds  Med Hx  Surg Hx  Fam Hx  Soc Hx        Review of Systems   ROS COMP: CONSTITUTIONAL:NEGATIVE for fever, chills, change in weight  INTEGUMENTARY/SKIN: as above      Objective    /62 (BP Location: Right arm, Patient Position: Chair, Cuff Size: Adult Regular)    Pulse 65   Temp 97.6  F (36.4  C) (Tympanic)   Wt 50.3 kg (110 lb 12.8 oz)   SpO2 98%   BMI 20.27 kg/m    Body mass index is 20.27 kg/m .  Physical Exam   GENERAL: healthy, alert and no distress  MS: no gross musculoskeletal defects noted, no edema  SKIN: suprapubic region with 1 small black head, 1-2 mm in size, and 1 small faint pink papule, 1-2 mm in size  PSYCH: mentation appears normal, affect normal/bright    Diagnostic Test Results:  none         Assessment & Plan       ICD-10-CM    1. Black head L70.0       Reassurance provided.  Dark spot is not a mole, just a black head.  Warm soaks/compresses discussed.    Other area is non-specific - does not appear infectious or abnormal.  Monitor.      Patient Instructions   Warm soaks in tub or warm compress.      No follow-ups on file.    Melyssa Encinas MD  [facility] [place]

## 2019-05-23 ENCOUNTER — OFFICE VISIT (OUTPATIENT)
Dept: FAMILY MEDICINE | Facility: OTHER | Age: 69
End: 2019-05-23
Attending: FAMILY MEDICINE
Payer: COMMERCIAL

## 2019-05-23 VITALS
BODY MASS INDEX: 20.27 KG/M2 | DIASTOLIC BLOOD PRESSURE: 62 MMHG | OXYGEN SATURATION: 98 % | WEIGHT: 110.8 LBS | TEMPERATURE: 97.6 F | HEART RATE: 65 BPM | SYSTOLIC BLOOD PRESSURE: 122 MMHG

## 2019-05-23 DIAGNOSIS — L70.0 BLACK HEAD: Primary | ICD-10-CM

## 2019-05-23 PROCEDURE — G0463 HOSPITAL OUTPT CLINIC VISIT: HCPCS

## 2019-05-23 PROCEDURE — 99213 OFFICE O/P EST LOW 20 MIN: CPT | Performed by: FAMILY MEDICINE

## 2019-05-23 ASSESSMENT — ANXIETY QUESTIONNAIRES
4. TROUBLE RELAXING: NOT AT ALL
1. FEELING NERVOUS, ANXIOUS, OR ON EDGE: NOT AT ALL
3. WORRYING TOO MUCH ABOUT DIFFERENT THINGS: NOT AT ALL
7. FEELING AFRAID AS IF SOMETHING AWFUL MIGHT HAPPEN: NOT AT ALL
GAD7 TOTAL SCORE: 0
6. BECOMING EASILY ANNOYED OR IRRITABLE: NOT AT ALL
2. NOT BEING ABLE TO STOP OR CONTROL WORRYING: NOT AT ALL
5. BEING SO RESTLESS THAT IT IS HARD TO SIT STILL: NOT AT ALL

## 2019-05-23 ASSESSMENT — PATIENT HEALTH QUESTIONNAIRE - PHQ9: SUM OF ALL RESPONSES TO PHQ QUESTIONS 1-9: 0

## 2019-05-23 ASSESSMENT — PAIN SCALES - GENERAL: PAINLEVEL: NO PAIN (0)

## 2019-05-23 NOTE — NURSING NOTE
"Chief Complaint   Patient presents with     Derm Problem       Initial /62 (BP Location: Right arm, Patient Position: Chair, Cuff Size: Adult Regular)   Pulse 65   Temp 97.6  F (36.4  C) (Tympanic)   Wt 50.3 kg (110 lb 12.8 oz)   SpO2 98%   BMI 20.27 kg/m   Estimated body mass index is 20.27 kg/m  as calculated from the following:    Height as of 2/28/19: 1.575 m (5' 2\").    Weight as of this encounter: 50.3 kg (110 lb 12.8 oz).  Medication Reconciliation: complete    Laura Keller LPN    " Social work consult.

## 2019-05-24 ASSESSMENT — ANXIETY QUESTIONNAIRES: GAD7 TOTAL SCORE: 0

## 2019-07-18 ENCOUNTER — TELEPHONE (OUTPATIENT)
Dept: FAMILY MEDICINE | Facility: OTHER | Age: 69
End: 2019-07-18

## 2019-07-18 DIAGNOSIS — L60.0 INGROWN TOENAIL: Primary | ICD-10-CM

## 2019-07-18 NOTE — TELEPHONE ENCOUNTER
Patient is questioning if she could have a referral to podiatry. Patient did call podiatry to schedule an appointment on 8/02. Patient stated its for a partial toenail that she wanted to be removed. Patient can be contacted at [phone] if there are any additional questions.

## 2019-07-30 ENCOUNTER — TELEPHONE (OUTPATIENT)
Dept: PODIATRY | Facility: OTHER | Age: 69
End: 2019-07-30

## 2019-07-30 NOTE — TELEPHONE ENCOUNTER
Returned patient's call. She had called to see if the referral was placed for [facility], it was and pt has an appointment on 8/2.

## 2019-08-02 ENCOUNTER — OFFICE VISIT (OUTPATIENT)
Dept: PODIATRY | Facility: OTHER | Age: 69
End: 2019-08-02
Attending: PODIATRIST
Payer: MEDICARE

## 2019-08-02 ENCOUNTER — ANCILLARY PROCEDURE (OUTPATIENT)
Dept: GENERAL RADIOLOGY | Facility: OTHER | Age: 69
End: 2019-08-02
Attending: PODIATRIST
Payer: MEDICARE

## 2019-08-02 VITALS
BODY MASS INDEX: 20.24 KG/M2 | WEIGHT: 110 LBS | SYSTOLIC BLOOD PRESSURE: 102 MMHG | HEIGHT: 62 IN | OXYGEN SATURATION: 96 % | HEART RATE: 64 BPM | DIASTOLIC BLOOD PRESSURE: 64 MMHG

## 2019-08-02 DIAGNOSIS — M20.21 HALLUX RIGIDUS OF RIGHT FOOT: ICD-10-CM

## 2019-08-02 DIAGNOSIS — M79.672 LEFT FOOT PAIN: ICD-10-CM

## 2019-08-02 DIAGNOSIS — L60.0 INGROWN TOENAIL: Primary | ICD-10-CM

## 2019-08-02 DIAGNOSIS — M79.671 RIGHT FOOT PAIN: ICD-10-CM

## 2019-08-02 PROCEDURE — G0463 HOSPITAL OUTPT CLINIC VISIT: HCPCS

## 2019-08-02 PROCEDURE — G0463 HOSPITAL OUTPT CLINIC VISIT: HCPCS | Mod: 25

## 2019-08-02 PROCEDURE — 11750 EXCISION NAIL&NAIL MATRIX: CPT | Performed by: PODIATRIST

## 2019-08-02 PROCEDURE — 99203 OFFICE O/P NEW LOW 30 MIN: CPT | Mod: 25 | Performed by: PODIATRIST

## 2019-08-02 PROCEDURE — 73630 X-RAY EXAM OF FOOT: CPT | Mod: TC,RT

## 2019-08-02 ASSESSMENT — PAIN SCALES - GENERAL: PAINLEVEL: NO PAIN (0)

## 2019-08-02 ASSESSMENT — MIFFLIN-ST. JEOR: SCORE: 967.21

## 2019-08-02 NOTE — PROGRESS NOTES
"Chief complaint: Patient presents with:  Foot Problems: check toenail       History of Present Illness: This 69 year old female is seen at the request of Waylon for evaluation and suggestions of management of ingrown toenails that have regrown that have previously been renewed. The bilateral hallux toenails were removed before she was in high school, but the nails came back. She had the bilateral 1st - 4th digit toenails removed at [facility] around 3-4 years ago and the LEFT hallux toenail had a portion of the nail regrow. The nail has been catching on socks and it has been causing pain.     Secondly, she is complaining of pain in the RIGHT 1st MTPJ. The pain has been present for years and is most painful when she is walking. She has CMOs from [facility] from a few years ago, but they have not decreased this pain. She would like to discuss treatment options. No further pedal complaints today. No further pedal complaints today.     /64 (BP Location: Right arm, Patient Position: Chair, Cuff Size: Adult Large)   Pulse 64   Ht 1.575 m (5' 2\")   Wt 49.9 kg (110 lb)   SpO2 96%   BMI 20.12 kg/m      Patient Active Problem List   Diagnosis     Laryngopharyngeal reflux (LPR)     Chronic rhinitis     ETD (eustachian tube dysfunction)     Osteoporosis     Abnormal glucose     Personal history of malignant neoplasm of breast     Neutropenia (H)     Early satiety     chronic neutropenia.     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       Past Surgical History:   Procedure Laterality Date     BONE MARROW BIOPSY      negative     COLONOSCOPY  07/2000     COLONOSCOPY  8/13/2013    DR Fu; repeat 5 years     COLONOSCOPY N/A 8/13/2018    Procedure: COLONOSCOPY;  COLONOSCOPY;  Surgeon: Ajit Uriarte, " MD;  Location: [facility]     DILATION AND CURETTAGE, OPERATIVE HYSTEROSCOPY, COMBINED N/A 2/13/2019    Procedure: EXAM UNDER ANESTHESIA , HYSTEROSCOPY;  Surgeon: Jovi Gabriel MD;  Location: [facility]     HC COLONOSCOPY THRU STOMA WITH BIOPSY  08/25/2010    cancer screening, family h/o colon cancer; hyperplastic polyp     HEMORRHOIDECTOMY  1986     MASTECTOMY, BILATERAL  03/01/2004    right sided breast cancer     RELEASE TRIGGER FINGER Right 3/7/2018    Procedure: RELEASE TRIGGER FINGER;  RELEASE TRIGGER DIGIT RIGHT THUMB;  Surgeon: Jose Alfredo Brewster DO;  Location: [facility]     UPPER GI ENDOSCOPY         Current Outpatient Medications   Medication     aspirin EC 81 MG tablet     CALCIUM CITRATE     cholecalciferol 1000 UNITS TABS     cinnamon 500 MG CAPS     fish oil-omega-3 fatty acids (FISH OIL) 1000 MG capsule     fluticasone (FLONASE) 50 MCG/ACT spray     gabapentin (NEURONTIN) 300 MG capsule     Lactobacillus (ACIDOPHILUS) TABS     Lutein 20 MG CAPS     MAGNESIUM OXIDE PO     Multiple vitamin  s TABS     Polyethylene Glycol 3350 (MIRALAX PO)     Turmeric (RA TURMERIC) 500 MG CAPS     zinc 50 MG TABS     No current facility-administered medications for this visit.           Allergies   Allergen Reactions     Chloraprep One Step Rash     Povidone Iodine Rash     Betadine      Soap Rash     Betadine        Family History   Problem Relation Age of Onset     Dementia Mother         (cause of death)      High cholesterol Mother      Osteoarthritis Mother      C.A.D. Father         (cause of death)      Prostate Cancer Father      Breast Cancer Maternal Aunt 72     Cerebrovascular Disease Maternal Grandmother         CVA     Diabetes Maternal Grandmother        Social History     Socioeconomic History     Marital status: Single     Spouse name: None     Number of children: None     Years of education: None     Highest education level: None   Occupational History     None   Social Needs     Financial resource strain: None      Food insecurity:     Worry: None     Inability: None     Transportation needs:     Medical: None     Non-medical: None   Tobacco Use     Smoking status: Never Smoker     Smokeless tobacco: Never Used   Substance and Sexual Activity     Alcohol use: Yes     Alcohol/week: 0.0 oz     Comment: 1 glasso wine, weekly      Drug use: No     Sexual activity: Never   Lifestyle     Physical activity:     Days per week: None     Minutes per session: None     Stress: None   Relationships     Social connections:     Talks on phone: None     Gets together: None     Attends Judaism service: None     Active member of club or organization: None     Attends meetings of clubs or organizations: None     Relationship status: None     Intimate partner violence:     Fear of current or ex partner: None     Emotionally abused: None     Physically abused: None     Forced sexual activity: None   Other Topics Concern      Service No     Blood Transfusions Yes     Comment: Permits if needed     Caffeine Concern Yes     Comment: Tea, 2 cups daily      Occupational Exposure Not Asked     Hobby Hazards Not Asked     Sleep Concern Not Asked     Stress Concern Not Asked     Weight Concern Not Asked     Special Diet Not Asked     Back Care Not Asked     Exercise Yes     Comment: Walking, walks steps, daily      Bike Helmet Not Asked     Seat Belt Yes     Self-Exams Not Asked     Parent/sibling w/ CABG, MI or angioplasty before 65F 55M? No   Social History Narrative     None       ROS: 10 point ROS neg other than the symptoms noted above in the HPI.  EXAM  Constitutional: healthy, alert and no distress    Psychiatric: mentation appears normal and affect normal/bright    VASCULAR:  -Dorsalis pedis pulse +2/4 b/l  -Posterior tibial pulse +2/4 b/l  -Capillary refill time < 3 seconds to b/l hallux  NEURO:  -Light touch sensation intact to b/l plantar forefoot  DERM:  -Incurvation to the LEFT hallux toenail spicule   ---Mild erythema and edema to  the nail border  ---Mild serous drainage  ---No severe erythema, no ascending erythema, no calor, no purulence, no malodor, no other SOI.    -Skin temperature, texture and turgor WNL b/l  -Toenails elongated, thickened, dystrophic and discolored x 10  ---All lesser digit toenails curve in a DORSIFLEXION direction with only the proximal 1/2 of the nail bed attached to the underlying skin  MSK:  -Pain on palpation to LEFT hallux toenail and RIGHT 1st MTPJ  -Muscle strength of ankles +5/5 for dorsiflexion, plantarflexion, ABDUction and ADDuction b/l  -Decreased ROM of 1st MTPJ with forefoot loading, RIGHT (< 10 degrees DORSIFLEXION)    RADIOGRAPHS RIGHT FOOT 08/02/2019  IMPRESSION: Advanced arthritic changes first metatarsal-phalangeal joint.    CHAD AGUIRRE MD  ============================================================    ASSESSMENT:  (L60.0) Ingrown toenail  (primary encounter diagnosis)    (M20.21) Hallux rigidus of right foot    (M79.672) Left foot pain    (M79.671) Right foot pain      PLAN:  -Patient evaluated and examined. Treatment options discussed with no educational barriers noted.  -Matrixectomy of LEFT hallux toenail: Written and verbal consent obtained after reviewing risks and benefits of the procedure. Patient understands that although phenol is used in attempt to prevent regrowth of the ingrown toenail, the nail can still grow back. There is also a risk of post procedure infection. A severe foot infection could lead to a proximal foot or leg amputation or loss of life, so the patient is encouraged to return to podiatry or the ED immediately if the patient notices any SOI. The patient is in agreement with this plan and wishes to proceed with the procedure. A time-out was performed to identify the correct patient, limb, digit and procedure.    An alcohol prep pad was applied to  to the base of the left hallux. The digit was injected with 5.5 mL of 1:1 of 2% Lidocaine plain and 0.5% marcaine plain.  "Adequate local anesthesia was obtained. A ring tournicot was applied to the hallux and a chloroprep was applied to the hallux. A freer was used to loosen the nail from the underlying nail bed. An English Anvil and a hemostat were then used to remove the nail border. A total of three applications of phenol were applied for 30 seconds per application. The hallux was rinsed thoroughly with alcohol. The tournicot was removed from the toe and there was a prompt hyperemic response to the hallux. The wound was then dressed with an Silvadene, gauze and 1\" coban. The patient was educated on after procedure care including daily epsom salt soaks starting tomorrow followed by dressing of the toe with an antibiotic cream and a bandaid until the wound site on the toe stops draining (2-3 weeks). Provided education on how to look for signs of infection (redness, swelling, pain, purulence, fever, chills, nausea, vomiting) and the patient was instructed to return to the clinic or Emergency Department immediately if there are any signs of infection.    -Radiographs RIGHT foot (WB) ordered  ---Patient will be called with results (advanced arthritic changes of the RIGHT 1st MTPJ)  ---Discussed conservative and surgical treatment options. Will discuss treatment options in further detail after reviewing the x-ray with patient at her follow-up appointment.  -Patient in agreement with the above treatment plan and all of patient's questions were answered.      RTC 3 weeks to evaluate healing of LEFT hallux matrixectomy  ---Will also review RIGHT foot radiographs and treatment options for 1st MTPJ pain        Katerina Campos DPM  "

## 2019-08-02 NOTE — PATIENT INSTRUCTIONS
Nail procedure care:  -Start epsom salt soaks tomorrow. Soak the foot 1-2 times a day for 20 minutes.  -Apply an antibiotic cream, gauze and a bandaid over the toe. Keep the toe covered at all times until it is completely healed.  -You may develop a black scab over the nail bed--let this fall off on its own and don't pick at it.  -The toe may drain for 2-3 weeks. It is normal for it to have a clear drainage.    Watching for signs of infection:  If the toe has a thick, white pus coming from the procedure site or if the the toe becomes red, swollen, painful, or you begin to feel sick (fever/chills/nausea/vomitting), return to the podiatry clinic immediately or to the emergency room is after hours.

## 2019-08-02 NOTE — LETTER
"    8/2/2019         RE: Mihaela Jang  [address]  [address]  [facility] [place] [address]        Dear Colleague,    Thank you for referring your patient, Mihaela Jang, to the [facility]. Please see a copy of my visit note below.    Chief complaint: Patient presents with:  Foot Problems: check toenail       History of Present Illness: This 69 year old female is seen at the request of Waylon for evaluation and suggestions of management of ingrown toenails that have regrown that have previously been renewed. The bilateral hallux toenails were removed before she was in high school, but the nails came back. She had the bilateral 1st - 4th digit toenails removed at [facility] around 3-4 years ago and the LEFT hallux toenail had a portion of the nail regrow. The nail has been catching on socks and it has been causing pain.     Secondly, she is complaining of pain in the RIGHT 1st MTPJ. The pain has been present for years and is most painful when she is walking. She has CMOs from [facility] from a few years ago, but they have not decreased this pain. She would like to discuss treatment options. No further pedal complaints today. No further pedal complaints today.     /64 (BP Location: Right arm, Patient Position: Chair, Cuff Size: Adult Large)   Pulse 64   Ht 1.575 m (5' 2\")   Wt 49.9 kg (110 lb)   SpO2 96%   BMI 20.12 kg/m       Patient Active Problem List   Diagnosis     Laryngopharyngeal reflux (LPR)     Chronic rhinitis     ETD (eustachian tube dysfunction)     Osteoporosis     Abnormal glucose     Personal history of malignant neoplasm of breast     Neutropenia (H)     Early satiety     chronic neutropenia.     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 "       Past Surgical History:   Procedure Laterality Date     BONE MARROW BIOPSY      negative     COLONOSCOPY  07/2000     COLONOSCOPY  8/13/2013    DR Fu; repeat 5 years     COLONOSCOPY N/A 8/13/2018    Procedure: COLONOSCOPY;  COLONOSCOPY;  Surgeon: Ajit Uriarte MD;  Location: [facility] OR     DILATION AND CURETTAGE, OPERATIVE HYSTEROSCOPY, COMBINED N/A 2/13/2019    Procedure: EXAM UNDER ANESTHESIA , HYSTEROSCOPY;  Surgeon: Jovi Gabriel MD;  Location: [facility] OR     HC COLONOSCOPY THRU STOMA WITH BIOPSY  08/25/2010    cancer screening, family h/o colon cancer; hyperplastic polyp     HEMORRHOIDECTOMY  1986     MASTECTOMY, BILATERAL  03/01/2004    right sided breast cancer     RELEASE TRIGGER FINGER Right 3/7/2018    Procedure: RELEASE TRIGGER FINGER;  RELEASE TRIGGER DIGIT RIGHT THUMB;  Surgeon: Jose Alfredo Brewster DO;  Location: [facility] OR     UPPER GI ENDOSCOPY         Current Outpatient Medications   Medication     aspirin EC 81 MG tablet     CALCIUM CITRATE     cholecalciferol 1000 UNITS TABS     cinnamon 500 MG CAPS     fish oil-omega-3 fatty acids (FISH OIL) 1000 MG capsule     fluticasone (FLONASE) 50 MCG/ACT spray     gabapentin (NEURONTIN) 300 MG capsule     Lactobacillus (ACIDOPHILUS) TABS     Lutein 20 MG CAPS     MAGNESIUM OXIDE PO     Multiple vitamin  s TABS     Polyethylene Glycol 3350 (MIRALAX PO)     Turmeric (RA TURMERIC) 500 MG CAPS     zinc 50 MG TABS     No current facility-administered medications for this visit.           Allergies   Allergen Reactions     Chloraprep One Step Rash     Povidone Iodine Rash     Betadine      Soap Rash     Betadine        Family History   Problem Relation Age of Onset     Dementia Mother         (cause of death)      High cholesterol Mother      Osteoarthritis Mother      C.A.D. Father         (cause of death)      Prostate Cancer Father      Breast Cancer Maternal Aunt 72     Cerebrovascular Disease Maternal Grandmother         CVA     Diabetes Maternal Grandmother         Social History     Socioeconomic History     Marital status: Single     Spouse name: None     Number of children: None     Years of education: None     Highest education level: None   Occupational History     None   Social Needs     Financial resource strain: None     Food insecurity:     Worry: None     Inability: None     Transportation needs:     Medical: None     Non-medical: None   Tobacco Use     Smoking status: Never Smoker     Smokeless tobacco: Never Used   Substance and Sexual Activity     Alcohol use: Yes     Alcohol/week: 0.0 oz     Comment: 1 glasso wine, weekly      Drug use: No     Sexual activity: Never   Lifestyle     Physical activity:     Days per week: None     Minutes per session: None     Stress: None   Relationships     Social connections:     Talks on phone: None     Gets together: None     Attends [employer] service: None     Active member of club or organization: None     Attends meetings of clubs or organizations: None     Relationship status: None     Intimate partner violence:     Fear of current or ex partner: None     Emotionally abused: None     Physically abused: None     Forced sexual activity: None   Other Topics Concern      Service No     Blood Transfusions Yes     Comment: Permits if needed     Caffeine Concern Yes     Comment: Tea, 2 cups daily      Occupational Exposure Not Asked     Hobby Hazards Not Asked     Sleep Concern Not Asked     Stress Concern Not Asked     Weight Concern Not Asked     Special Diet Not Asked     Back Care Not Asked     Exercise Yes     Comment: Walking, walks steps, daily      Bike Helmet Not Asked     Seat Belt Yes     Self-Exams Not Asked     Parent/sibling w/ CABG, MI or angioplasty before 65F 55M? No   Social History Narrative     None       ROS: 10 point ROS neg other than the symptoms noted above in the HPI.  EXAM  Constitutional: healthy, alert and no distress    Psychiatric: mentation appears normal and affect  normal/bright    VASCULAR:  -Dorsalis pedis pulse +2/4 b/l  -Posterior tibial pulse +2/4 b/l  -Capillary refill time < 3 seconds to b/l hallux  NEURO:  -Light touch sensation intact to b/l plantar forefoot  DERM:  -Incurvation to the LEFT hallux toenail spicule   ---Mild erythema and edema to the nail border  ---Mild serous drainage  ---No severe erythema, no ascending erythema, no calor, no purulence, no malodor, no other SOI.    -Skin temperature, texture and turgor WNL b/l  -Toenails elongated, thickened, dystrophic and discolored x 10  ---All lesser digit toenails curve in a DORSIFLEXION direction with only the proximal 1/2 of the nail bed attached to the underlying skin  MSK:  -Pain on palpation to LEFT hallux toenail and RIGHT 1st MTPJ  -Muscle strength of ankles +5/5 for dorsiflexion, plantarflexion, ABDUction and ADDuction b/l  -Decreased ROM of 1st MTPJ with forefoot loading, RIGHT (< 10 degrees DORSIFLEXION)    RADIOGRAPHS RIGHT FOOT 08/02/2019  IMPRESSION: Advanced arthritic changes first metatarsal-phalangeal joint.    CHAD AGUIRRE MD  ============================================================    ASSESSMENT:  (L60.0) Ingrown toenail  (primary encounter diagnosis)    (M20.21) Hallux rigidus of right foot    (M79.672) Left foot pain    (M79.671) Right foot pain      PLAN:  -Patient evaluated and examined. Treatment options discussed with no educational barriers noted.  -Matrixectomy of LEFT hallux toenail: Written and verbal consent obtained after reviewing risks and benefits of the procedure. Patient understands that although phenol is used in attempt to prevent regrowth of the ingrown toenail, the nail can still grow back. There is also a risk of post procedure infection. A severe foot infection could lead to a proximal foot or leg amputation or loss of life, so the patient is encouraged to return to podiatry or the ED immediately if the patient notices any SOI. The patient is in agreement with  "this plan and wishes to proceed with the procedure. A time-out was performed to identify the correct patient, limb, digit and procedure.    An alcohol prep pad was applied to  to the base of the left hallux. The digit was injected with 5.5 mL of 1:1 of 2% Lidocaine plain and 0.5% marcaine plain. Adequate local anesthesia was obtained. A ring tournicot was applied to the hallux and a chloroprep was applied to the hallux. A freer was used to loosen the nail from the underlying nail bed. An English Anvil and a hemostat were then used to remove the nail border. A total of three applications of phenol were applied for 30 seconds per application. The hallux was rinsed thoroughly with alcohol. The tournicot was removed from the toe and there was a prompt hyperemic response to the hallux. The wound was then dressed with an Silvadene, gauze and 1\" coban. The patient was educated on after procedure care including daily epsom salt soaks starting tomorrow followed by dressing of the toe with an antibiotic cream and a bandaid until the wound site on the toe stops draining (2-3 weeks). Provided education on how to look for signs of infection (redness, swelling, pain, purulence, fever, chills, nausea, vomiting) and the patient was instructed to return to the clinic or Emergency Department immediately if there are any signs of infection.    -Radiographs RIGHT foot (WB) ordered  ---Patient will be called with results (advanced arthritic changes of the RIGHT 1st MTPJ)  -Patient in agreement with the above treatment plan and all of patient's questions were answered.      RTC 3 weeks to evaluate healing of LEFT hallux matrixectomy  ---Will also review RIGHT foot radiographs and treatment options for 1st MTPJ pain        Katerina Campos DPM    Again, thank you for allowing me to participate in the care of your patient.        Sincerely,        Katerina Campos DPM  "

## 2019-08-02 NOTE — NURSING NOTE
"Chief Complaint   Patient presents with     Foot Problems     check toenail        Initial /64 (BP Location: Right arm, Patient Position: Chair, Cuff Size: Adult Large)   Pulse 64   Ht 1.575 m (5' 2\")   Wt 49.9 kg (110 lb)   SpO2 96%   BMI 20.12 kg/m   Estimated body mass index is 20.12 kg/m  as calculated from the following:    Height as of this encounter: 1.575 m (5' 2\").    Weight as of this encounter: 49.9 kg (110 lb).  Medication Reconciliation: complete       Emperatriz Hameed LPN      "

## 2019-08-08 ENCOUNTER — TELEPHONE (OUTPATIENT)
Dept: FAMILY MEDICINE | Facility: OTHER | Age: 69
End: 2019-08-08

## 2019-08-08 DIAGNOSIS — M81.0 OSTEOPOROSIS WITHOUT CURRENT PATHOLOGICAL FRACTURE, UNSPECIFIED OSTEOPOROSIS TYPE: Primary | ICD-10-CM

## 2019-08-08 NOTE — TELEPHONE ENCOUNTER
Patient is calling regarding a dexa scan. She is wondering if she is due and if so an order can be placed. Her last scan was 1/18/17. Please call patient at [phone] or [phone].

## 2019-08-14 ENCOUNTER — HOSPITAL ENCOUNTER (OUTPATIENT)
Dept: BONE DENSITY | Facility: HOSPITAL | Age: 69
Discharge: HOME | End: 2019-08-14
Attending: FAMILY MEDICINE | Admitting: FAMILY MEDICINE
Payer: MEDICARE

## 2019-08-14 DIAGNOSIS — M81.0 OSTEOPOROSIS WITHOUT CURRENT PATHOLOGICAL FRACTURE, UNSPECIFIED OSTEOPOROSIS TYPE: ICD-10-CM

## 2019-08-14 PROCEDURE — 77080 DXA BONE DENSITY AXIAL: CPT | Mod: TC

## 2019-08-14 NOTE — LETTER
August 14, 2019      Mihaela Jang  [address]  [address]   [place] [address]      Resulted Orders   DX Hip/Pelvis/Spine    Narrative    PROCEDURE: DX HIP/PELVIS/SPINE 8/14/2019 2:03 PM    HISTORY: Osteoporosis without current pathological fracture,  unspecified osteoporosis type    COMPARISONS: 2017    TECHNIQUE: DEXA bone mineral density study of the left hip and lumbar  spine    FINDINGS: All mineral density left hip measured 0.626 g/sq cm with a T  score -2.6. There has been a 1.5% improvement from 2017 which is not  statistically significant. Bone mineral density in the femoral neck  measured 0.517 g/sq cm with a T score of -3. Bone mineral density of  the lumbar spine L1-L4 measured 0.731 g/sq cm with a T score -2.9.  There has been a 1.6% improvement from 2017 which is not statistically  significant.         Impression    IMPRESSION: The patient is osteoporotic and fracture risk is high.  There has been no statistically significant change from 2017    CHAD AGUIRRE MD       If you have any questions or concerns, please call the clinic at the number listed above.       Sincerely,        Colten Chiu [facility]

## 2019-08-28 ENCOUNTER — OFFICE VISIT (OUTPATIENT)
Dept: PODIATRY | Facility: OTHER | Age: 69
End: 2019-08-28
Attending: PODIATRIST
Payer: COMMERCIAL

## 2019-08-28 VITALS
HEART RATE: 56 BPM | SYSTOLIC BLOOD PRESSURE: 123 MMHG | WEIGHT: 110 LBS | HEIGHT: 62 IN | BODY MASS INDEX: 20.24 KG/M2 | TEMPERATURE: 98.7 F | DIASTOLIC BLOOD PRESSURE: 61 MMHG

## 2019-08-28 DIAGNOSIS — M79.672 LEFT FOOT PAIN: ICD-10-CM

## 2019-08-28 DIAGNOSIS — L60.0 INGROWN TOENAIL: Primary | ICD-10-CM

## 2019-08-28 DIAGNOSIS — M20.21 HALLUX RIGIDUS OF RIGHT FOOT: ICD-10-CM

## 2019-08-28 DIAGNOSIS — M79.671 RIGHT FOOT PAIN: ICD-10-CM

## 2019-08-28 PROCEDURE — G0463 HOSPITAL OUTPT CLINIC VISIT: HCPCS | Mod: 25

## 2019-08-28 PROCEDURE — 99213 OFFICE O/P EST LOW 20 MIN: CPT | Performed by: PODIATRIST

## 2019-08-28 PROCEDURE — G0463 HOSPITAL OUTPT CLINIC VISIT: HCPCS

## 2019-08-28 ASSESSMENT — MIFFLIN-ST. JEOR: SCORE: 967.21

## 2019-08-28 ASSESSMENT — PAIN SCALES - GENERAL: PAINLEVEL: NO PAIN (0)

## 2019-08-28 NOTE — NURSING NOTE
"Chief Complaint   Patient presents with     Musculoskeletal Problem       Initial /61 (BP Location: Left arm, Cuff Size: Adult Regular)   Pulse 56   Temp 98.7  F (37.1  C) (Tympanic)   Ht 1.575 m (5' 2\")   Wt 49.9 kg (110 lb)   BMI 20.12 kg/m   Estimated body mass index is 20.12 kg/m  as calculated from the following:    Height as of this encounter: 1.575 m (5' 2\").    Weight as of this encounter: 49.9 kg (110 lb).  Medication Reconciliation: complete  "

## 2019-08-28 NOTE — PROGRESS NOTES
"Chief complaint: Patient presents with:  Musculoskeletal Problem      History of Present Illness: This 69 year old female is seen for follow-up management of a LEFT hallux toenail matrixectomy from 08/02/2019. Patient has been soaking the foot in epsom salts every day followed by applying an antibiotic ointment, gauze and a bandage. She does not complain of pain. She would like to also discuss potential treatment options for her painful RIGHT hallux.      History of ingrown toenails: The bilateral hallux toenails were removed before she was in high school, but the nails came back. She had the bilateral 1st - 4th digit toenails removed at [facility] around 3-4 years ago and the LEFT hallux toenail had a portion of the nail regrow. The nail has been catching on socks and it has been causing pain.     Secondly, she is complaining of pain in the RIGHT 1st MTPJ. The pain has been present for years and is most painful when she is walking. She was working one day and she bent down and suddenly had extreme pain in her RIGHT great toe. She then had CMOs dispensed from [facility] from a few years ago, but they have not decreased this pain. She would like to discuss treatment options. No further pedal complaints today. No further pedal complaints today.       Ht 1.575 m (5' 2\")   Wt 49.9 kg (110 lb)   BMI 20.12 kg/m      Patient Active Problem List   Diagnosis     Laryngopharyngeal reflux (LPR)     Chronic rhinitis     ETD (eustachian tube dysfunction)     Osteoporosis     Abnormal glucose     Personal history of malignant neoplasm of breast     Neutropenia (H)     Early satiety     chronic neutropenia.     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       Past Surgical History:   Procedure Laterality Date     BONE " MARROW BIOPSY      negative     COLONOSCOPY  07/2000     COLONOSCOPY  8/13/2013    DR Fu; repeat 5 years     COLONOSCOPY N/A 8/13/2018    Procedure: COLONOSCOPY;  COLONOSCOPY;  Surgeon: Ajit Uriarte MD;  Location: [facility] OR     DILATION AND CURETTAGE, OPERATIVE HYSTEROSCOPY, COMBINED N/A 2/13/2019    Procedure: EXAM UNDER ANESTHESIA , HYSTEROSCOPY;  Surgeon: Jovi Gabriel MD;  Location: [facility] OR     HC COLONOSCOPY THRU STOMA WITH BIOPSY  08/25/2010    cancer screening, family h/o colon cancer; hyperplastic polyp     HEMORRHOIDECTOMY  1986     MASTECTOMY, BILATERAL  03/01/2004    right sided breast cancer     RELEASE TRIGGER FINGER Right 3/7/2018    Procedure: RELEASE TRIGGER FINGER;  RELEASE TRIGGER DIGIT RIGHT THUMB;  Surgeon: Jose Alfredo Brewster DO;  Location: [facility] OR     UPPER GI ENDOSCOPY         Current Outpatient Medications   Medication     aspirin EC 81 MG tablet     CALCIUM CITRATE     cholecalciferol 1000 UNITS TABS     cinnamon 500 MG CAPS     fish oil-omega-3 fatty acids (FISH OIL) 1000 MG capsule     fluticasone (FLONASE) 50 MCG/ACT spray     gabapentin (NEURONTIN) 300 MG capsule     Lactobacillus (ACIDOPHILUS) TABS     Lutein 20 MG CAPS     MAGNESIUM OXIDE PO     Multiple vitamin  s TABS     Polyethylene Glycol 3350 (MIRALAX PO)     Turmeric (RA TURMERIC) 500 MG CAPS     zinc 50 MG TABS     No current facility-administered medications for this visit.           Allergies   Allergen Reactions     Chloraprep One Step Rash     Povidone Iodine Rash     Betadine      Soap Rash     Betadine        Family History   Problem Relation Age of Onset     Dementia Mother         (cause of death)      High cholesterol Mother      Osteoarthritis Mother      C.A.D. Father         (cause of death)      Prostate Cancer Father      Breast Cancer Maternal Aunt 72     Cerebrovascular Disease Maternal Grandmother         CVA     Diabetes Maternal Grandmother        Social History     Socioeconomic History     Marital status:  Single     Spouse name: None     Number of children: None     Years of education: None     Highest education level: None   Occupational History     None   Social Needs     Financial resource strain: None     Food insecurity:     Worry: None     Inability: None     Transportation needs:     Medical: None     Non-medical: None   Tobacco Use     Smoking status: Never Smoker     Smokeless tobacco: Never Used   Substance and Sexual Activity     Alcohol use: Yes     Alcohol/week: 0.0 oz     Comment: 1 glasso wine, weekly      Drug use: No     Sexual activity: Never   Lifestyle     Physical activity:     Days per week: None     Minutes per session: None     Stress: None   Relationships     Social connections:     Talks on phone: None     Gets together: None     Attends Yazidism service: None     Active member of club or organization: None     Attends meetings of clubs or organizations: None     Relationship status: None     Intimate partner violence:     Fear of current or ex partner: None     Emotionally abused: None     Physically abused: None     Forced sexual activity: None   Other Topics Concern      Service No     Blood Transfusions Yes     Comment: Permits if needed     Caffeine Concern Yes     Comment: Tea, 2 cups daily      Occupational Exposure Not Asked     Hobby Hazards Not Asked     Sleep Concern Not Asked     Stress Concern Not Asked     Weight Concern Not Asked     Special Diet Not Asked     Back Care Not Asked     Exercise Yes     Comment: Walking, walks steps, daily      Bike Helmet Not Asked     Seat Belt Yes     Self-Exams Not Asked     Parent/sibling w/ CABG, MI or angioplasty before 65F 55M? No   Social History Narrative     None       ROS: 10 point ROS neg other than the symptoms noted above in the HPI.  EXAM  Constitutional: healthy, alert and no distress    Psychiatric: mentation appears normal and affect normal/bright    VASCULAR:  -Dorsalis pedis pulse +2/4 b/l  -Posterior tibial pulse  +2/4 b/l  -Capillary refill time < 3 seconds to b/l hallux  NEURO:  -Light touch sensation intact to b/l plantar forefoot  DERM:  -Matrixectomy site to the LEFT hallux toenail spicule   ---No erythema and no edema to the nail border  ---No serous drainage  ---No severe erythema, no ascending erythema, no calor, no purulence, no malodor, no other SOI.    -Skin temperature, texture and turgor WNL b/l  -Toenails elongated, thickened, dystrophic and discolored x 10  ---All lesser digit toenails curve in a DORSIFLEXION direction with only the proximal 1/2 of the nail bed attached to the underlying skin  MSK:  -No pain on palpation to LEFT hallux toenail and RIGHT 1st MTPJ  -Muscle strength of ankles +5/5 for dorsiflexion, plantarflexion, ABDUction and ADDuction b/l  -Decreased ROM of 1st MTPJ with forefoot loading, RIGHT (< 10 degrees DORSIFLEXION)  ---Pain with attempt to dorsiflex the toe, but toe has very minimal dorsiflexion available.    RADIOGRAPHS RIGHT FOOT 08/02/2019  IMPRESSION: Advanced arthritic changes first metatarsal-phalangeal joint.    CHAD AGUIRRE MD  ============================================================    ASSESSMENT:  (L60.0) Ingrown toenail  (primary encounter diagnosis)    (M20.21) Hallux rigidus of right foot    (M79.672) Left foot pain    (M79.671) Right foot pain      PLAN:  -Patient evaluated and examined. Treatment options discussed with no educational barriers noted.    -Dressed LEFT hallux with a bandage. Matrixectomy site is completely healed with no SOI. She no longer needs a dressing or epsom salt soaks.    -Reviewed RIGHT foot radiographs  -Discussed treatment plans for hallux rigidus. She has no joint space left. Discussed risks and benefits of a Cheilectomy vs. Fusion. She may consider her options if she starts having continuous, daily pain due to her toe.  -Patient has not tried orthotics in years and she has concerns about surgery because she lives alone. She would like to  try the insert modifications first and she will call if her pain worsens.    -Patient in agreement with the above treatment plan and all of patient's questions were answered.      RTC as needed      Katerina Campos DPM

## 2019-10-14 ENCOUNTER — TELEPHONE (OUTPATIENT)
Dept: FAMILY MEDICINE | Facility: OTHER | Age: 69
End: 2019-10-14

## 2019-10-14 DIAGNOSIS — M81.0 OSTEOPOROSIS WITHOUT CURRENT PATHOLOGICAL FRACTURE, UNSPECIFIED OSTEOPOROSIS TYPE: Primary | ICD-10-CM

## 2019-10-14 DIAGNOSIS — R73.03 PREDIABETES: ICD-10-CM

## 2019-10-14 DIAGNOSIS — E78.5 HYPERLIPIDEMIA, UNSPECIFIED HYPERLIPIDEMIA TYPE: ICD-10-CM

## 2019-10-14 DIAGNOSIS — R20.2 PARESTHESIA: ICD-10-CM

## 2019-10-14 DIAGNOSIS — Z13.220 LIPID SCREENING: ICD-10-CM

## 2019-10-14 DIAGNOSIS — D72.819 LEUKOPENIA, UNSPECIFIED TYPE: ICD-10-CM

## 2019-10-21 DIAGNOSIS — M81.0 OSTEOPOROSIS WITHOUT CURRENT PATHOLOGICAL FRACTURE, UNSPECIFIED OSTEOPOROSIS TYPE: ICD-10-CM

## 2019-10-21 DIAGNOSIS — R73.03 PREDIABETES: ICD-10-CM

## 2019-10-21 DIAGNOSIS — E78.5 HYPERLIPIDEMIA, UNSPECIFIED HYPERLIPIDEMIA TYPE: ICD-10-CM

## 2019-10-21 DIAGNOSIS — D72.819 LEUKOPENIA, UNSPECIFIED TYPE: ICD-10-CM

## 2019-10-21 DIAGNOSIS — Z13.220 LIPID SCREENING: ICD-10-CM

## 2019-10-21 DIAGNOSIS — R20.2 PARESTHESIA: ICD-10-CM

## 2019-10-21 LAB
ALBUMIN SERPL-MCNC: 3.9 G/DL (ref 3.4–5)
ALP SERPL-CCNC: 60 U/L (ref 40–150)
ALT SERPL W P-5'-P-CCNC: 19 U/L (ref 0–50)
ANION GAP SERPL CALCULATED.3IONS-SCNC: 3 MMOL/L (ref 3–14)
AST SERPL W P-5'-P-CCNC: 18 U/L (ref 0–45)
BASOPHILS # BLD AUTO: 0 10E9/L (ref 0–0.2)
BASOPHILS NFR BLD AUTO: 0.9 %
BILIRUB SERPL-MCNC: 0.3 MG/DL (ref 0.2–1.3)
BUN SERPL-MCNC: 12 MG/DL (ref 7–30)
CALCIUM SERPL-MCNC: 9.1 MG/DL (ref 8.5–10.1)
CHLORIDE SERPL-SCNC: 106 MMOL/L (ref 94–109)
CHOLEST SERPL-MCNC: 207 MG/DL
CO2 SERPL-SCNC: 27 MMOL/L (ref 20–32)
CREAT SERPL-MCNC: 0.7 MG/DL (ref 0.52–1.04)
DIFFERENTIAL METHOD BLD: NORMAL
EOSINOPHIL # BLD AUTO: 0.1 10E9/L (ref 0–0.7)
EOSINOPHIL NFR BLD AUTO: 1.2 %
ERYTHROCYTE [DISTWIDTH] IN BLOOD BY AUTOMATED COUNT: 12.7 % (ref 10–15)
EST. AVERAGE GLUCOSE BLD GHB EST-MCNC: 117 MG/DL
GFR SERPL CREATININE-BSD FRML MDRD: 86 ML/MIN/{1.73_M2}
GLUCOSE SERPL-MCNC: 95 MG/DL (ref 70–99)
HBA1C MFR BLD: 5.7 % (ref 0–5.6)
HCT VFR BLD AUTO: 36.6 % (ref 35–47)
HDLC SERPL-MCNC: 77 MG/DL
HGB BLD-MCNC: 12.5 G/DL (ref 11.7–15.7)
IMM GRANULOCYTES # BLD: 0 10E9/L (ref 0–0.4)
IMM GRANULOCYTES NFR BLD: 0.5 %
LDLC SERPL CALC-MCNC: 118 MG/DL
LYMPHOCYTES # BLD AUTO: 1.2 10E9/L (ref 0.8–5.3)
LYMPHOCYTES NFR BLD AUTO: 29 %
MCH RBC QN AUTO: 32.1 PG (ref 26.5–33)
MCHC RBC AUTO-ENTMCNC: 34.2 G/DL (ref 31.5–36.5)
MCV RBC AUTO: 94 FL (ref 78–100)
MONOCYTES # BLD AUTO: 0.5 10E9/L (ref 0–1.3)
MONOCYTES NFR BLD AUTO: 11.9 %
NEUTROPHILS # BLD AUTO: 2.4 10E9/L (ref 1.6–8.3)
NEUTROPHILS NFR BLD AUTO: 56.5 %
NONHDLC SERPL-MCNC: 130 MG/DL
NRBC # BLD AUTO: 0 10*3/UL
NRBC BLD AUTO-RTO: 0 /100
PLATELET # BLD AUTO: 311 10E9/L (ref 150–450)
POTASSIUM SERPL-SCNC: 4.3 MMOL/L (ref 3.4–5.3)
PROT SERPL-MCNC: 7.7 G/DL (ref 6.8–8.8)
RBC # BLD AUTO: 3.89 10E12/L (ref 3.8–5.2)
SODIUM SERPL-SCNC: 136 MMOL/L (ref 133–144)
T4 FREE SERPL-MCNC: 0.84 NG/DL (ref 0.76–1.46)
TRIGL SERPL-MCNC: 58 MG/DL
TSH SERPL DL<=0.005 MIU/L-ACNC: 5.87 MU/L (ref 0.4–4)
WBC # BLD AUTO: 4.3 10E9/L (ref 4–11)

## 2019-10-21 PROCEDURE — 84439 ASSAY OF FREE THYROXINE: CPT | Mod: ZL | Performed by: FAMILY MEDICINE

## 2019-10-21 PROCEDURE — 83036 HEMOGLOBIN GLYCOSYLATED A1C: CPT | Mod: ZL | Performed by: FAMILY MEDICINE

## 2019-10-21 PROCEDURE — 82607 VITAMIN B-12: CPT | Mod: ZL | Performed by: FAMILY MEDICINE

## 2019-10-21 PROCEDURE — 40000788 ZZHCL STATISTIC ESTIMATED AVERAGE GLUCOSE: Mod: ZL | Performed by: FAMILY MEDICINE

## 2019-10-21 PROCEDURE — 85025 COMPLETE CBC W/AUTO DIFF WBC: CPT | Mod: ZL | Performed by: FAMILY MEDICINE

## 2019-10-21 PROCEDURE — 84443 ASSAY THYROID STIM HORMONE: CPT | Mod: ZL | Performed by: FAMILY MEDICINE

## 2019-10-21 PROCEDURE — 80061 LIPID PANEL: CPT | Mod: ZL | Performed by: FAMILY MEDICINE

## 2019-10-21 PROCEDURE — 80053 COMPREHEN METABOLIC PANEL: CPT | Mod: ZL | Performed by: FAMILY MEDICINE

## 2019-10-21 PROCEDURE — 36415 COLL VENOUS BLD VENIPUNCTURE: CPT | Mod: ZL | Performed by: FAMILY MEDICINE

## 2019-10-21 PROCEDURE — 82306 VITAMIN D 25 HYDROXY: CPT | Mod: ZL | Performed by: FAMILY MEDICINE

## 2019-10-22 LAB — VIT B12 SERPL-MCNC: 1062 PG/ML (ref 193–986)

## 2019-10-23 LAB — DEPRECATED CALCIDIOL+CALCIFEROL SERPL-MC: 45 UG/L (ref 20–75)

## 2019-10-28 ENCOUNTER — TELEPHONE (OUTPATIENT)
Dept: FAMILY MEDICINE | Facility: OTHER | Age: 69
End: 2019-10-28

## 2019-10-28 ENCOUNTER — OFFICE VISIT (OUTPATIENT)
Dept: FAMILY MEDICINE | Facility: OTHER | Age: 69
End: 2019-10-28
Attending: FAMILY MEDICINE
Payer: COMMERCIAL

## 2019-10-28 VITALS
BODY MASS INDEX: 20.13 KG/M2 | WEIGHT: 109.4 LBS | OXYGEN SATURATION: 98 % | HEIGHT: 62 IN | TEMPERATURE: 97.5 F | HEART RATE: 58 BPM | SYSTOLIC BLOOD PRESSURE: 122 MMHG | DIASTOLIC BLOOD PRESSURE: 70 MMHG

## 2019-10-28 DIAGNOSIS — M81.0 OSTEOPOROSIS, UNSPECIFIED OSTEOPOROSIS TYPE, UNSPECIFIED PATHOLOGICAL FRACTURE PRESENCE: ICD-10-CM

## 2019-10-28 DIAGNOSIS — R79.89 ABNORMAL TSH: ICD-10-CM

## 2019-10-28 DIAGNOSIS — E78.5 HYPERLIPIDEMIA, UNSPECIFIED HYPERLIPIDEMIA TYPE: ICD-10-CM

## 2019-10-28 DIAGNOSIS — E03.8 OTHER SPECIFIED HYPOTHYROIDISM: ICD-10-CM

## 2019-10-28 DIAGNOSIS — Z23 NEED FOR PROPHYLACTIC VACCINATION AND INOCULATION AGAINST INFLUENZA: ICD-10-CM

## 2019-10-28 DIAGNOSIS — R20.2 NOTALGIA PARESTHETICA: ICD-10-CM

## 2019-10-28 DIAGNOSIS — Z00.00 ENCOUNTER FOR MEDICARE ANNUAL WELLNESS EXAM: Primary | ICD-10-CM

## 2019-10-28 PROCEDURE — 90662 IIV NO PRSV INCREASED AG IM: CPT

## 2019-10-28 PROCEDURE — 90662 IIV NO PRSV INCREASED AG IM: CPT | Performed by: FAMILY MEDICINE

## 2019-10-28 PROCEDURE — G0008 ADMIN INFLUENZA VIRUS VAC: HCPCS

## 2019-10-28 PROCEDURE — 90686 IIV4 VACC NO PRSV 0.5 ML IM: CPT | Performed by: FAMILY MEDICINE

## 2019-10-28 PROCEDURE — 99213 OFFICE O/P EST LOW 20 MIN: CPT | Mod: 25 | Performed by: FAMILY MEDICINE

## 2019-10-28 PROCEDURE — G0463 HOSPITAL OUTPT CLINIC VISIT: HCPCS | Mod: 25

## 2019-10-28 PROCEDURE — G0439 PPPS, SUBSEQ VISIT: HCPCS | Performed by: FAMILY MEDICINE

## 2019-10-28 RX ORDER — ZOLEDRONIC ACID 5 MG/100ML
5 INJECTION, SOLUTION INTRAVENOUS ONCE
Status: CANCELLED
Start: 2019-10-28

## 2019-10-28 ASSESSMENT — PAIN SCALES - GENERAL: PAINLEVEL: NO PAIN (0)

## 2019-10-28 ASSESSMENT — MIFFLIN-ST. JEOR: SCORE: 956.55

## 2019-10-28 NOTE — PROGRESS NOTES
"  SUBJECTIVE:   Mihaela Jang is a 69 year old female who presents for Preventive Visit.      Are you in the first 12 months of your Medicare Part B coverage?  No    Physical Health:    In general, how would you rate your overall physical health? good    Outside of work, how many days during the week do you exercise? 6-7 days/week    Outside of work, approximately how many minutes a day do you exercise?30-45 minutes steps daily    If you drink alcohol do you typically have >3 drinks per day or >7 drinks per week? No    Do you usually eat at least 4 servings of fruit and vegetables a day, include whole grains & fiber and avoid regularly eating high fat or \"junk\" foods? NO    Do you have any problems taking medications regularly?  No    Do you have any side effects from medications? none    Needs assistance for the following daily activities: no assistance needed    Which of the following safety concerns are present in your home?  none identified     Hearing impairment: No    In the past 6 months, have you been bothered by leaking of urine? No    neurontin - neuralgia peristhetics - HS dose only - works well and helps with sleep    Hit front of right knee on accident - 9/2019; no swelling; icing/heat prn    Prediabetes - a1c 5.7%     TSH mildly elevated, low normal T4; patient recalls borderline low thyroid years ago with endocrine, Dr. Moses    Osteoporosis - DEXA 8/2019; stable; per patient - Dr. Moses retiring - did advise continuing Reclast; last infusion 10/2018; planning 5 years of Reclast (2 done); prior orals - not tolerated - and evidence of gastritis    S/p bilateral mastectomy 2004    Colonoscopy 2018 negative (prior 2013 polyps); per report repeat 10 years    Flu shot due today    The 10-year ASCVD risk score (Norrisjudson TALBOT Jr., et al., 2013) is: 9.3%    Values used to calculate the score:      Age: 69 years      Sex: Female      Is Non- : No      Diabetic: No      Tobacco " smoker: No      Systolic Blood Pressure: 122 mmHg      Is BP treated: No      HDL Cholesterol: 77 mg/dL      Total Cholesterol: 207 mg/dL    Mental Health:    In general, how would you rate your overall mental or emotional health? good  PHQ-2 Score:      Do you feel safe in your environment? Yes    Do you have a Health Care Directive? Yes: Advance Directive has been received and scanned.     Hyperlipidemia Follow-Up    Are you having any of the following symptoms? (Select all that apply)  No complaints of shortness of breath, chest pain or pressure.  No increased sweating or nausea with activity.  No left-sided neck or arm pain.  No complaints of pain in calves when walking 1-2 blocks.    Are you regularly taking any medication or supplement to lower your cholesterol?   No    Are you having muscle aches or other side effects that you think could be caused by your cholesterol lowering medication?  No    How many servings of fruits and vegetables do you eat daily?  0-1    On average, how many sweetened beverages do you drink each day (soda, juice, sweet tea, etc)?   0    How many days per week do you miss taking your medication? 0      Additional concerns to address?  YES     Osteoporosis - as noted above.  Due for repeat dexa.    Abnormal TSH lab result.    Abnormal lipid lab.    Fall risk:  Fallen 2 or more times in the past year?: No  Any fall with injury in the past year?: No        Do you have sleep apnea, excessive snoring or daytime drowsiness?: no          Reviewed and updated as needed this visit by clinical staff  Tobacco  Allergies  Meds  Med Hx  Surg Hx  Fam Hx  Soc Hx        Reviewed and updated as needed this visit by Provider  Allergies  Meds        Social History     Tobacco Use     Smoking status: Never Smoker     Smokeless tobacco: Never Used   Substance Use Topics     Alcohol use: Yes     Alcohol/week: 0.0 standard drinks     Comment: 1 glasso wine, weekly                            Current  providers sharing in care for this patient include:   Patient Care Team:  Melyssa Connors MD as PCP - General  Melyssa Connors MD as Assigned PCP    The following health maintenance items are reviewed in Epic and correct as of today:  Health Maintenance   Topic Date Due     ZOSTER IMMUNIZATION (2 of 3) 09/19/2012     INFLUENZA VACCINE (1) 09/01/2019     MEDICARE ANNUAL WELLNESS VISIT  10/10/2019     PHQ-9  11/23/2019     JENNIFER ASSESSMENT  05/23/2020     FALL RISK ASSESSMENT  08/28/2020     DTAP/TDAP/TD IMMUNIZATION (2 - Td) 12/17/2020     ADVANCE CARE PLANNING  08/15/2021     LIPID  10/21/2024     COLONOSCOPY  08/13/2028     DEXA  Completed     HEPATITIS C SCREENING  Completed     PNEUMOCOCCAL IMMUNIZATION 65+ LOW/MEDIUM RISK  Completed     IPV IMMUNIZATION  Aged Out     MENINGITIS IMMUNIZATION  Aged Out     Current Outpatient Medications   Medication     aspirin EC 81 MG tablet     CALCIUM CITRATE     cholecalciferol 1000 UNITS TABS     cinnamon 500 MG CAPS     fish oil-omega-3 fatty acids (FISH OIL) 1000 MG capsule     fluticasone (FLONASE) 50 MCG/ACT spray     gabapentin (NEURONTIN) 300 MG capsule     Lactobacillus (ACIDOPHILUS) TABS     Lutein 20 MG CAPS     MAGNESIUM OXIDE PO     Multiple vitamin  s TABS     Polyethylene Glycol 3350 (MIRALAX PO)     Turmeric (RA TURMERIC) 500 MG CAPS     zinc 50 MG TABS     No current facility-administered medications for this visit.        Labs reviewed in EPIC  Pneumonia Vaccine:up to date  Mammogram Screening: Alternate mammogram schedule due to breast cancer history s/p mastectomy, bilateral  Last 3 Pap and HPV Results:   PAP / HPV Latest Ref Rng & Units 1/31/2019   PAP - NIL   HPV 16 DNA NEG:Negative Negative   HPV 18 DNA NEG:Negative Negative   OTHER HR HPV NEG:Negative Negative       ROS:  Constitutional, HEENT, cardiovascular, pulmonary, gi and gu systems are negative, except as otherwise noted.    OBJECTIVE:   /70 (BP Location: Right arm, Patient Position:  "Chair, Cuff Size: Adult Regular)   Pulse 58   Temp 97.5  F (36.4  C) (Tympanic)   Ht 1.562 m (5' 1.5\")   Wt 49.6 kg (109 lb 6.4 oz)   SpO2 98%   BMI 20.34 kg/m   Estimated body mass index is 20.34 kg/m  as calculated from the following:    Height as of this encounter: 1.562 m (5' 1.5\").    Weight as of this encounter: 49.6 kg (109 lb 6.4 oz).  EXAM:   GENERAL APPEARANCE: healthy, alert and no distress  EYES: Eyes grossly normal to inspection, PERRL and conjunctivae and sclerae normal  HENT: ear canals and TM's normal, nose and mouth without ulcers or lesions, oropharynx clear and oral mucous membranes moist  NECK: no adenopathy, no asymmetry, masses, or scars and thyroid normal to palpation  RESP: lungs clear to auscultation - no rales, rhonchi or wheezes  BREAST: no palpable axillary masses or adenopathy and s/p bilateral mastectomy  CV: regular rate and rhythm, normal S1 S2, no S3 or S4, no murmur, click or rub, no peripheral edema and peripheral pulses strong  ABDOMEN: soft, nontender, no hepatosplenomegaly, no masses and bowel sounds normal   (female): normal female external genitalia, normal urethral meatus, vaginal mucosal atrophy noted and tight introitus; bimanual exam with 1 finger without palpable masses  MS: no musculoskeletal defects are noted and gait is age appropriate without ataxia  SKIN: no suspicious lesions or rashes  NEURO: Normal strength and tone, sensory exam grossly normal, mentation intact and speech normal  PSYCH: mentation appears normal and affect normal/bright    Diagnostic Test Results:  Labs reviewed in Epic  Results for orders placed or performed in visit on 10/21/19   Hemoglobin A1c   Result Value Ref Range    Hemoglobin A1C 5.7 (H) 0 - 5.6 %   Vitamin B12   Result Value Ref Range    Vitamin B12 1,062 (H) 193 - 986 pg/mL   Vitamin D Deficiency   Result Value Ref Range    Vitamin D Deficiency screening 45 20 - 75 ug/L   Comprehensive metabolic panel   Result Value Ref Range    " Sodium 136 133 - 144 mmol/L    Potassium 4.3 3.4 - 5.3 mmol/L    Chloride 106 94 - 109 mmol/L    Carbon Dioxide 27 20 - 32 mmol/L    Anion Gap 3 3 - 14 mmol/L    Glucose 95 70 - 99 mg/dL    Urea Nitrogen 12 7 - 30 mg/dL    Creatinine 0.70 0.52 - 1.04 mg/dL    GFR Estimate 86 >60 mL/min/[1.73_m2]    GFR Estimate If Black >90 >60 mL/min/[1.73_m2]    Calcium 9.1 8.5 - 10.1 mg/dL    Bilirubin Total 0.3 0.2 - 1.3 mg/dL    Albumin 3.9 3.4 - 5.0 g/dL    Protein Total 7.7 6.8 - 8.8 g/dL    Alkaline Phosphatase 60 40 - 150 U/L    ALT 19 0 - 50 U/L    AST 18 0 - 45 U/L   CBC with platelets differential   Result Value Ref Range    WBC 4.3 4.0 - 11.0 10e9/L    RBC Count 3.89 3.8 - 5.2 10e12/L    Hemoglobin 12.5 11.7 - 15.7 g/dL    Hematocrit 36.6 35.0 - 47.0 %    MCV 94 78 - 100 fl    MCH 32.1 26.5 - 33.0 pg    MCHC 34.2 31.5 - 36.5 g/dL    RDW 12.7 10.0 - 15.0 %    Platelet Count 311 150 - 450 10e9/L    Diff Method Automated Method     % Neutrophils 56.5 %    % Lymphocytes 29.0 %    % Monocytes 11.9 %    % Eosinophils 1.2 %    % Basophils 0.9 %    % Immature Granulocytes 0.5 %    Nucleated RBCs 0 0 /100    Absolute Neutrophil 2.4 1.6 - 8.3 10e9/L    Absolute Lymphocytes 1.2 0.8 - 5.3 10e9/L    Absolute Monocytes 0.5 0.0 - 1.3 10e9/L    Absolute Eosinophils 0.1 0.0 - 0.7 10e9/L    Absolute Basophils 0.0 0.0 - 0.2 10e9/L    Abs Immature Granulocytes 0.0 0 - 0.4 10e9/L    Absolute Nucleated RBC 0.0    TSH with free T4 reflex   Result Value Ref Range    TSH 5.87 (H) 0.40 - 4.00 mU/L   Lipid Profile   Result Value Ref Range    Cholesterol 207 (H) <200 mg/dL    Triglycerides 58 <150 mg/dL    HDL Cholesterol 77 >49 mg/dL    LDL Cholesterol Calculated 118 (H) <100 mg/dL    Non HDL Cholesterol 130 (H) <130 mg/dL   T4 free   Result Value Ref Range    T4 Free 0.84 0.76 - 1.46 ng/dL   Estimated Average Glucose   Result Value Ref Range    Estimated Average Glucose 117 mg/dL       ASSESSMENT / PLAN:       ICD-10-CM    1. Encounter for  "Medicare annual wellness exam Z00.00    2. Abnormal TSH R79.89 TSH with free T4 reflex   3. Osteoporosis, unspecified osteoporosis type, unspecified pathological fracture presence M81.0 TSH with free T4 reflex   4. Other specified hypothyroidism  E03.8 TSH with free T4 reflex   5. 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  Practitioner is supportive of decision.    COUNSELING:  Reviewed preventive health counseling, as reflected in patient instructions       Regular exercise       Healthy diet/nutrition       Vision screening       Hearing screening       Dental care       Bladder control       Fall risk prevention       Immunizations    Vaccinated for: Influenza             Alcohol Use       Osteoporosis Prevention/Bone Health       Colon cancer screening       The 10-year ASCVD risk score (Viktor TALBOT Jr., et al., 2013) is: 9.3%    Values used to calculate the score:      Age: 69 years      Sex: Female      Is Non- : No      Diabetic: No      Tobacco smoker: No      Systolic Blood Pressure: 122 mmHg      Is BP treated: No      HDL Cholesterol: 77 mg/dL      Total Cholesterol: 207 mg/dL       Advanced Planning     Estimated body mass index is 20.34 kg/m  as calculated from the following:    Height as of this encounter: 1.562 m (5' 1.5\").    Weight as of this encounter: 49.6 kg (109 lb 6.4 oz).         reports that she has never smoked. She has never used smokeless tobacco.      Appropriate preventive services were discussed with this patient, including applicable screening as appropriate for cardiovascular disease, diabetes, osteopenia/osteoporosis, and glaucoma.  As appropriate for age/gender, discussed screening for colorectal cancer, prostate cancer, breast cancer, and cervical cancer. Checklist reviewing preventive services available has been given to the " patient.    Reviewed patients plan of care and provided an AVS. The Basic Care Plan (routine screening as documented in Health Maintenance) for Mihaela meets the Care Plan requirement. This Care Plan has been established and reviewed with the Patient.    Counseling Resources:  ATP IV Guidelines  Pooled Cohorts Equation Calculator  Breast Cancer Risk Calculator  FRAX Risk Assessment  ICSI Preventive Guidelines  Dietary Guidelines for Americans, 2010  USDA's MyPlate  ASA Prophylaxis  Lung CA Screening    Melyssa Encinas MD  [facility]

## 2019-10-28 NOTE — NURSING NOTE
"Chief Complaint   Patient presents with     Physical       Initial /70 (BP Location: Right arm, Patient Position: Chair, Cuff Size: Adult Regular)   Pulse 58   Temp 97.5  F (36.4  C) (Tympanic)   Ht 1.562 m (5' 1.5\")   Wt 49.6 kg (109 lb 6.4 oz)   SpO2 98%   BMI 20.34 kg/m   Estimated body mass index is 20.34 kg/m  as calculated from the following:    Height as of this encounter: 1.562 m (5' 1.5\").    Weight as of this encounter: 49.6 kg (109 lb 6.4 oz).  Medication Reconciliation: complete  Hany Almeida LPN  "

## 2019-10-28 NOTE — PATIENT INSTRUCTIONS
Recheck thyroid lab in 1 month - does not need to be fasting.  Consider rechecking fasting cholesterol/lipisd in 3-6 months if starting on thyroid medication.  Due for repeat Reclast  - last done 10/2018.  Infusion center to call you.  Flu shot given.  Shingrix vaccine advised.  Colonoscopy up to date 2018.    The 10-year ASCVD risk score (Viktor TALBOT Jr., et al., 2013) is: 9.3%    Values used to calculate the score:      Age: 69 years      Sex: Female      Is Non- : No      Diabetic: No      Tobacco smoker: No      Systolic Blood Pressure: 122 mmHg      Is BP treated: No      HDL Cholesterol: 77 mg/dL      Total Cholesterol: 207 mg/dL    Patient Education      Results for orders placed or performed in visit on 10/21/19   Hemoglobin A1c   Result Value Ref Range    Hemoglobin A1C 5.7 (H) 0 - 5.6 %   Vitamin B12   Result Value Ref Range    Vitamin B12 1,062 (H) 193 - 986 pg/mL   Vitamin D Deficiency   Result Value Ref Range    Vitamin D Deficiency screening 45 20 - 75 ug/L   Comprehensive metabolic panel   Result Value Ref Range    Sodium 136 133 - 144 mmol/L    Potassium 4.3 3.4 - 5.3 mmol/L    Chloride 106 94 - 109 mmol/L    Carbon Dioxide 27 20 - 32 mmol/L    Anion Gap 3 3 - 14 mmol/L    Glucose 95 70 - 99 mg/dL    Urea Nitrogen 12 7 - 30 mg/dL    Creatinine 0.70 0.52 - 1.04 mg/dL    GFR Estimate 86 >60 mL/min/[1.73_m2]    GFR Estimate If Black >90 >60 mL/min/[1.73_m2]    Calcium 9.1 8.5 - 10.1 mg/dL    Bilirubin Total 0.3 0.2 - 1.3 mg/dL    Albumin 3.9 3.4 - 5.0 g/dL    Protein Total 7.7 6.8 - 8.8 g/dL    Alkaline Phosphatase 60 40 - 150 U/L    ALT 19 0 - 50 U/L    AST 18 0 - 45 U/L   CBC with platelets differential   Result Value Ref Range    WBC 4.3 4.0 - 11.0 10e9/L    RBC Count 3.89 3.8 - 5.2 10e12/L    Hemoglobin 12.5 11.7 - 15.7 g/dL    Hematocrit 36.6 35.0 - 47.0 %    MCV 94 78 - 100 fl    MCH 32.1 26.5 - 33.0 pg    MCHC 34.2 31.5 - 36.5 g/dL    RDW 12.7 10.0 - 15.0 %    Platelet Count  311 150 - 450 10e9/L    Diff Method Automated Method     % Neutrophils 56.5 %    % Lymphocytes 29.0 %    % Monocytes 11.9 %    % Eosinophils 1.2 %    % Basophils 0.9 %    % Immature Granulocytes 0.5 %    Nucleated RBCs 0 0 /100    Absolute Neutrophil 2.4 1.6 - 8.3 10e9/L    Absolute Lymphocytes 1.2 0.8 - 5.3 10e9/L    Absolute Monocytes 0.5 0.0 - 1.3 10e9/L    Absolute Eosinophils 0.1 0.0 - 0.7 10e9/L    Absolute Basophils 0.0 0.0 - 0.2 10e9/L    Abs Immature Granulocytes 0.0 0 - 0.4 10e9/L    Absolute Nucleated RBC 0.0    TSH with free T4 reflex   Result Value Ref Range    TSH 5.87 (H) 0.40 - 4.00 mU/L   Lipid Profile   Result Value Ref Range    Cholesterol 207 (H) <200 mg/dL    Triglycerides 58 <150 mg/dL    HDL Cholesterol 77 >49 mg/dL    LDL Cholesterol Calculated 118 (H) <100 mg/dL    Non HDL Cholesterol 130 (H) <130 mg/dL   T4 free   Result Value Ref Range    T4 Free 0.84 0.76 - 1.46 ng/dL   Estimated Average Glucose   Result Value Ref Range    Estimated Average Glucose 117 mg/dL         Personalized Prevention Plan  You are due for the preventive services outlined below.  Your care team is available to assist you in scheduling these services.  If you have already completed any of these items, please share that information with your care team to update in your medical record.  Health Maintenance Due   Topic Date Due     Zoster (Shingles) Vaccine (2 of 3) 09/19/2012     Flu Vaccine (1) 09/01/2019     Annual Wellness Visit  10/10/2019     Depression Assessment  11/23/2019        Patient Education   Personalized Prevention Plan  You are due for the preventive services outlined below.  Your care team is available to assist you in scheduling these services.  If you have already completed any of these items, please share that information with your care team to update in your medical record.  Health Maintenance Due   Topic Date Due     Zoster (Shingles) Vaccine (2 of 3) 09/19/2012     Flu Vaccine (1) 09/01/2019      Annual Wellness Visit  10/10/2019     Depression Assessment  11/23/2019

## 2019-10-28 NOTE — PROGRESS NOTES
genevieve from Dr Connors's nurse for IV reclast x1. [facility]UC notified to scheduled pt. Therapy plan placed.

## 2019-10-30 ENCOUNTER — INFUSION THERAPY VISIT (OUTPATIENT)
Dept: INFUSION THERAPY | Facility: OTHER | Age: 69
End: 2019-10-30
Attending: FAMILY MEDICINE
Payer: MEDICARE

## 2019-10-30 ENCOUNTER — TELEPHONE (OUTPATIENT)
Dept: FAMILY MEDICINE | Facility: OTHER | Age: 69
End: 2019-10-30

## 2019-10-30 VITALS
DIASTOLIC BLOOD PRESSURE: 57 MMHG | TEMPERATURE: 97.7 F | HEIGHT: 62 IN | RESPIRATION RATE: 16 BRPM | BODY MASS INDEX: 19.73 KG/M2 | WEIGHT: 107.2 LBS | SYSTOLIC BLOOD PRESSURE: 121 MMHG | HEART RATE: 54 BPM

## 2019-10-30 DIAGNOSIS — M81.0 AGE-RELATED OSTEOPOROSIS WITHOUT CURRENT PATHOLOGICAL FRACTURE: Primary | ICD-10-CM

## 2019-10-30 DIAGNOSIS — Z23 NEED FOR SHINGLES VACCINE: Primary | ICD-10-CM

## 2019-10-30 PROCEDURE — 96365 THER/PROPH/DIAG IV INF INIT: CPT

## 2019-10-30 PROCEDURE — 25000128 H RX IP 250 OP 636: Performed by: FAMILY MEDICINE

## 2019-10-30 RX ORDER — ZOLEDRONIC ACID 5 MG/100ML
5 INJECTION, SOLUTION INTRAVENOUS ONCE
Status: COMPLETED | OUTPATIENT
Start: 2019-10-30 | End: 2019-10-30

## 2019-10-30 RX ORDER — ZOLEDRONIC ACID 5 MG/100ML
5 INJECTION, SOLUTION INTRAVENOUS ONCE
Status: CANCELLED
Start: 2019-10-30

## 2019-10-30 RX ADMIN — SODIUM CHLORIDE 250 ML: 9 INJECTION, SOLUTION INTRAVENOUS at 15:39

## 2019-10-30 RX ADMIN — ZOLEDRONIC ACID 5 MG: 5 INJECTION, SOLUTION INTRAVENOUS at 15:39

## 2019-10-30 ASSESSMENT — PAIN SCALES - GENERAL: PAINLEVEL: NO PAIN (0)

## 2019-10-30 ASSESSMENT — MIFFLIN-ST. JEOR: SCORE: 946.57

## 2019-10-30 NOTE — PROGRESS NOTES
Patient is a 69 year old female here accompanied by self today for infusion of Reclast per order of Dr. Connors Patient identified with two identifiers, order verified, and verbal consent for today's infusion obtained from patient.     Recent lab values: Creatinine: 0.70, Calcium: 9.1     24 gauge angio cath inserted into right hand.  Immediate blood return noted.  IV secured with sterile, transparent dressing and tape.  Patient tolerated well, denies pain or discomfort at this time.  Flushes easily without resistance, no signs or symptoms of infiltration or infection.   Patient denies questions or concerns regarding infusion and/or medication(s) being administered.    IV pump verified with dose, drug, and rate of administration.  Infusion administered per protocol.  Patient tolerated infusion well, no signs or symptoms of adverse reaction noted.  Patient denies pain nor discomfort.     IV removed, catheter intact.  Site clean, dry and intact.  No signs or symptoms of infiltration or infection.  Covered with a sterile bandage, slight pressure applied for 30 seconds.  Pt instructed to leave bandage intact for a minimum of one hour, and to call with questions or concerns.  Copy of appointments, discharge instructions, and after visit summary (AVS) provided to patient.  Patient states understanding, discharged ambulatory.

## 2019-10-30 NOTE — TELEPHONE ENCOUNTER
Patient said that she was seen by Dr Connors on 10/28/19 & it was suggested that the patient gets the shingles shot & she would like the prescription sent to [facility] (Clinic).      Patients number is (jtng) [phone].

## 2019-10-31 ENCOUNTER — ALLIED HEALTH/NURSE VISIT (OUTPATIENT)
Dept: FAMILY MEDICINE | Facility: OTHER | Age: 69
End: 2019-10-31
Attending: FAMILY MEDICINE
Payer: MEDICARE

## 2019-10-31 DIAGNOSIS — Z23 IMMUNIZATION DUE: Primary | ICD-10-CM

## 2019-10-31 PROCEDURE — 90750 HZV VACC RECOMBINANT IM: CPT

## 2019-11-04 DIAGNOSIS — R09.82 POST-NASAL DRIP: ICD-10-CM

## 2019-11-04 DIAGNOSIS — R09.A2 GLOBUS SENSATION: ICD-10-CM

## 2019-11-04 DIAGNOSIS — K21.9 LARYNGOPHARYNGEAL REFLUX (LPR): ICD-10-CM

## 2019-11-04 NOTE — TELEPHONE ENCOUNTER
Flonase  Last Written Prescription Date: 9/12/17  Last Fill Quantity: 16g # of Refills: 5  Last Office Visit: 9/28/18

## 2019-11-05 RX ORDER — FLUTICASONE PROPIONATE 50 MCG
2 SPRAY, SUSPENSION (ML) NASAL DAILY
Qty: 16 G | Refills: 5 | Status: SHIPPED | OUTPATIENT
Start: 2019-11-05 | End: 2021-01-11

## 2019-11-27 ENCOUNTER — OFFICE VISIT (OUTPATIENT)
Dept: OTOLARYNGOLOGY | Facility: OTHER | Age: 69
End: 2019-11-27
Attending: PHYSICIAN ASSISTANT
Payer: MEDICARE

## 2019-11-27 VITALS
HEIGHT: 62 IN | TEMPERATURE: 96.9 F | DIASTOLIC BLOOD PRESSURE: 60 MMHG | OXYGEN SATURATION: 98 % | HEART RATE: 63 BPM | WEIGHT: 110 LBS | BODY MASS INDEX: 20.24 KG/M2 | SYSTOLIC BLOOD PRESSURE: 120 MMHG

## 2019-11-27 DIAGNOSIS — R79.89 ABNORMAL TSH: ICD-10-CM

## 2019-11-27 DIAGNOSIS — M81.0 OSTEOPOROSIS, UNSPECIFIED OSTEOPOROSIS TYPE, UNSPECIFIED PATHOLOGICAL FRACTURE PRESENCE: ICD-10-CM

## 2019-11-27 DIAGNOSIS — E03.8 OTHER SPECIFIED HYPOTHYROIDISM: ICD-10-CM

## 2019-11-27 DIAGNOSIS — H61.23 EXCESSIVE CERUMEN IN BOTH EAR CANALS: ICD-10-CM

## 2019-11-27 DIAGNOSIS — R09.82 POST-NASAL DRIP: Primary | ICD-10-CM

## 2019-11-27 LAB
T4 FREE SERPL-MCNC: 0.83 NG/DL (ref 0.76–1.46)
TSH SERPL DL<=0.005 MIU/L-ACNC: 5.5 MU/L (ref 0.4–4)

## 2019-11-27 PROCEDURE — 36415 COLL VENOUS BLD VENIPUNCTURE: CPT | Mod: ZL | Performed by: FAMILY MEDICINE

## 2019-11-27 PROCEDURE — 99213 OFFICE O/P EST LOW 20 MIN: CPT | Mod: 25 | Performed by: PHYSICIAN ASSISTANT

## 2019-11-27 PROCEDURE — G0463 HOSPITAL OUTPT CLINIC VISIT: HCPCS

## 2019-11-27 PROCEDURE — 92504 EAR MICROSCOPY EXAMINATION: CPT | Performed by: PHYSICIAN ASSISTANT

## 2019-11-27 PROCEDURE — 84443 ASSAY THYROID STIM HORMONE: CPT | Mod: ZL | Performed by: FAMILY MEDICINE

## 2019-11-27 PROCEDURE — 84439 ASSAY OF FREE THYROXINE: CPT | Mod: ZL | Performed by: FAMILY MEDICINE

## 2019-11-27 ASSESSMENT — MIFFLIN-ST. JEOR: SCORE: 959.27

## 2019-11-27 ASSESSMENT — PAIN SCALES - GENERAL: PAINLEVEL: NO PAIN (0)

## 2019-11-27 NOTE — NURSING NOTE
"Chief Complaint   Patient presents with     Cerumen Impaction     Pt is here for an ear cleaning.       Initial /60   Pulse 63   Temp 96.9  F (36.1  C) (Tympanic)   Ht 1.562 m (5' 1.5\")   Wt 49.9 kg (110 lb)   SpO2 98%   BMI 20.45 kg/m   Estimated body mass index is 20.45 kg/m  as calculated from the following:    Height as of this encounter: 1.562 m (5' 1.5\").    Weight as of this encounter: 49.9 kg (110 lb).  Medication Reconciliation: complete  Tammi Coffey LPN  "

## 2019-11-27 NOTE — PATIENT INSTRUCTIONS
Try Simply Saline.   Use 1-2 times daily as needed   Continue with Flonase 2 sprays to each nostril as needed.    Ears were cleaned.   No fluid or infection.   Return to ENT for recheck in 1 year or as needed.       Thank you for allowing Sejal Gutierrez PA-C and our ENT team to participate in your care.  If your medications are too expensive, please give the nurse a call.  We can possibly change this medication.  If you have a scheduling or an appointment question please contact our Health Unit Coordinator at their direct line [phone].   ALL nursing questions or concerns can be directed to your ENT nurse at: [phone] Fela

## 2019-11-27 NOTE — LETTER
11/27/2019         RE: Mihaela Jang  [address]  [address]   [address]        Dear Colleague,    Thank you for referring your patient, Mihaela Jang, to the [facility] [place]. Please see a copy of my visit note below.    Chief Complaint   Patient presents with     Cerumen Impaction     Pt is here for an ear cleaning.       Patient presents for ear cleaning and ear exam. SHe has no otalgia, otorrhea.   Hearing has been good. Denies concerns with tinnitus.   She does use ear plugs and wishes to have her ears checked and cleaned.    She did have some clicking in her ears, but started Flonase again. She has less noise/ crackling in her ears.   She does use flonase PRN.     She has been using sinus rinse for drainage which has aided in throat irritation. Mihaela noted her throat irritation remains. No reflux. She does use Zeb med rinse with helps.    She has tried homemade recipe, but has now tried simply saline.       Past Medical History:   Diagnosis Date     Atypical nevi      Chondrodermatitis nodularis helicis of left ear     Dr Shipley, [facility]     Chronic rhinitis     Dr Messina, [facility] allergy; negative skin testing; IgE mediated allergies; ENT - DC nasal steroid and antihistamine; saline rinses     Degenerative skin disorder     solar elastosis     Hallux rigidus 06/15/2000     Hyperlipidemia, unspecified hyperlipidemia type 9/15/2016     Laryngopharyngeal reflux disease     PPI     Malignant neoplasm of breast (female), unspecified site 03/10/2004     Notalgia paresthetica      Osteoarthritis 07/20/2011     Osteoporosis, unspecified 09/10/2001    Dr Moses; intermittent reclast;  Dr. Msoes; repeat dexa after full 2 years Reclast     Other abnormal glucose 12/20/2013     Paresthesia     neck; notalgiaparesthetic; dr leahy & Dr Nevarez; neurontin     Personal history of malignant neoplasm of breast 06/07/2005     Rhinitis      Schamberg's purpura   "       Allergies   Allergen Reactions     Chloraprep One Step Rash     Povidone Iodine Rash     Betadine      Soap Rash     Betadine      Current Outpatient Medications   Medication     aspirin EC 81 MG tablet     CALCIUM CITRATE     cholecalciferol 1000 UNITS TABS     cinnamon 500 MG CAPS     fish oil-omega-3 fatty acids (FISH OIL) 1000 MG capsule     fluticasone (FLONASE) 50 MCG/ACT nasal spray     gabapentin (NEURONTIN) 300 MG capsule     Lactobacillus (ACIDOPHILUS) TABS     Lutein 20 MG CAPS     MAGNESIUM OXIDE PO     Multiple vitamin  s TABS     Polyethylene Glycol 3350 (MIRALAX PO)     Turmeric (RA TURMERIC) 500 MG CAPS     zinc 50 MG TABS     No current facility-administered medications for this visit.       ROS: 10 point ROS neg other than the symptoms noted above in the HPI.  /60   Pulse 63   Temp 96.9  F (36.1  C) (Tympanic)   Ht 1.562 m (5' 1.5\")   Wt 49.9 kg (110 lb)   SpO2 98%   BMI 20.45 kg/m       General - The patient is well nourished and well developed, and appears to have good nutritional status.  Alert and oriented to person and place, answers questions and cooperates with examination appropriately.   Head and Face - Normocephalic and atraumatic, with no gross asymmetry noted.  The facial nerve is intact, with strong symmetric movements.  Voice and Breathing - The patient was breathing comfortably without the use of accessory muscles. There was no wheezing, stridor, or stertor.  The patients voice was clear and strong, and had appropriate pitch and quality.  Ears -examined under microscopy bilaterally using otologic speculum. Ears were cleaned with cupped forceps.   The external auditory canals are cerumen, the tympanic membranes are intact without effusion, retraction or mass.  Bony landmarks are intact.    Mouth - Examination of the oral cavity showed pink, healthy oral mucosa. No lesions or ulcerations noted.  The tongue was mobile and midline, and the dentition were in good " condition.    Throat - The walls of the oropharynx were smooth, pink, moist, symmetric, and had no lesions or ulcerations.  The tonsillar pillars and soft palate were symmetric.  The uvula was midline on elevation.    Neck - Normal midline excursion of the laryngotracheal complex during swallowing.  Full range of motion on passive movement.  Palpation of the occipital, submental, submandibular, internal jugular chain, and supraclavicular nodes did not demonstrate any abnormal lymph nodes or masses.  Palpation of the thyroid was soft and smooth, with no nodules or goiter appreciated.  The trachea was mobile and midline.  Nose - External contour is symmetric, no gross deflection or scars.  Nasal mucosa is pink and moist with no abnormal mucus.  The septum was intact, turbinates of normal size and position.  No polyps, masses, or purulence noted on examination       ASSESSMENT:    ICD-10-CM    1. Post-nasal drip R09.82    2. Excessive cerumen in both ear canals H61.23          Try Simply Saline.   Use 1-2 times daily as needed   Continue with Flonase 2 sprays to each nostril as needed.    Ears were cleaned.   No fluid or infection.   Return to ENT for recheck in 1 year or as needed.       Sejal Gutierrez PA-C  ENT  [facility]  [phone]      Again, thank you for allowing me to participate in the care of your patient.        Sincerely,        Sejal Gutierrez PA-C

## 2019-11-27 NOTE — PROGRESS NOTES
Chief Complaint   Patient presents with     Cerumen Impaction     Pt is here for an ear cleaning.       Patient presents for ear cleaning and ear exam. SHe has no otalgia, otorrhea.   Hearing has been good. Denies concerns with tinnitus.   She does use ear plugs and wishes to have her ears checked and cleaned.    She did have some clicking in her ears, but started Flonase again. She has less noise/ crackling in her ears.   She does use flonase PRN.     She has been using sinus rinse for drainage which has aided in throat irritation. Mihaela noted her throat irritation remains. No reflux. She does use Zeb med rinse with helps.    She has tried homemade recipe, but has now tried simply saline.       Past Medical History:   Diagnosis Date     Atypical nevi      Chondrodermatitis nodularis helicis of left ear     Dr Shipley, [facility]     Chronic rhinitis     Dr Messina, [facility] allergy; negative skin testing; IgE mediated allergies; ENT - DC nasal steroid and antihistamine; saline rinses     Degenerative skin disorder     solar elastosis     Hallux rigidus 06/15/2000     Hyperlipidemia, unspecified hyperlipidemia type 9/15/2016     Laryngopharyngeal reflux disease     PPI     Malignant neoplasm of breast (female), unspecified site 03/10/2004     Notalgia paresthetica      Osteoarthritis 07/20/2011     Osteoporosis, unspecified 09/10/2001    Dr Moses; intermittent reclast;  Dr. Moses; repeat dexa after full 2 years Reclast     Other abnormal glucose 12/20/2013     Paresthesia     neck; notalgiaparesthetic; dr leahy & Dr Nevarez; neurontin     Personal history of malignant neoplasm of breast 06/07/2005     Rhinitis      Schamberg's purpura         Allergies   Allergen Reactions     Chloraprep One Step Rash     Povidone Iodine Rash     Betadine      Soap Rash     Betadine      Current Outpatient Medications   Medication     aspirin EC 81 MG tablet     CALCIUM CITRATE     cholecalciferol 1000 UNITS TABS      "cinnamon 500 MG CAPS     fish oil-omega-3 fatty acids (FISH OIL) 1000 MG capsule     fluticasone (FLONASE) 50 MCG/ACT nasal spray     gabapentin (NEURONTIN) 300 MG capsule     Lactobacillus (ACIDOPHILUS) TABS     Lutein 20 MG CAPS     MAGNESIUM OXIDE PO     Multiple vitamin  s TABS     Polyethylene Glycol 3350 (MIRALAX PO)     Turmeric (RA TURMERIC) 500 MG CAPS     zinc 50 MG TABS     No current facility-administered medications for this visit.       ROS: 10 point ROS neg other than the symptoms noted above in the HPI.  /60   Pulse 63   Temp 96.9  F (36.1  C) (Tympanic)   Ht 1.562 m (5' 1.5\")   Wt 49.9 kg (110 lb)   SpO2 98%   BMI 20.45 kg/m      General - The patient is well nourished and well developed, and appears to have good nutritional status.  Alert and oriented to person and place, answers questions and cooperates with examination appropriately.   Head and Face - Normocephalic and atraumatic, with no gross asymmetry noted.  The facial nerve is intact, with strong symmetric movements.  Voice and Breathing - The patient was breathing comfortably without the use of accessory muscles. There was no wheezing, stridor, or stertor.  The patients voice was clear and strong, and had appropriate pitch and quality.  Ears -examined under microscopy bilaterally using otologic speculum. Ears were cleaned with cupped forceps.   The external auditory canals are cerumen, the tympanic membranes are intact without effusion, retraction or mass.  Bony landmarks are intact.    Mouth - Examination of the oral cavity showed pink, healthy oral mucosa. No lesions or ulcerations noted.  The tongue was mobile and midline, and the dentition were in good condition.    Throat - The walls of the oropharynx were smooth, pink, moist, symmetric, and had no lesions or ulcerations.  The tonsillar pillars and soft palate were symmetric.  The uvula was midline on elevation.    Neck - Normal midline excursion of the laryngotracheal " complex during swallowing.  Full range of motion on passive movement.  Palpation of the occipital, submental, submandibular, internal jugular chain, and supraclavicular nodes did not demonstrate any abnormal lymph nodes or masses.  Palpation of the thyroid was soft and smooth, with no nodules or goiter appreciated.  The trachea was mobile and midline.  Nose - External contour is symmetric, no gross deflection or scars.  Nasal mucosa is pink and moist with no abnormal mucus.  The septum was intact, turbinates of normal size and position.  No polyps, masses, or purulence noted on examination       ASSESSMENT:    ICD-10-CM    1. Post-nasal drip R09.82    2. Excessive cerumen in both ear canals H61.23          Try Simply Saline.   Use 1-2 times daily as needed   Continue with Flonase 2 sprays to each nostril as needed.    Ears were cleaned.   No fluid or infection.   Return to ENT for recheck in 1 year or as needed.       Sejal Gutierrez PA-C  ENT  [facility]  [phone]

## 2019-11-29 ENCOUNTER — TELEPHONE (OUTPATIENT)
Dept: FAMILY MEDICINE | Facility: OTHER | Age: 69
End: 2019-11-29

## 2019-11-29 DIAGNOSIS — E03.9 HYPOTHYROIDISM, UNSPECIFIED TYPE: Primary | ICD-10-CM

## 2019-11-29 DIAGNOSIS — E78.5 HYPERLIPIDEMIA, UNSPECIFIED HYPERLIPIDEMIA TYPE: Primary | ICD-10-CM

## 2019-11-29 RX ORDER — SIMVASTATIN 20 MG
20 TABLET ORAL AT BEDTIME
Qty: 90 TABLET | Refills: 3 | Status: SHIPPED | OUTPATIENT
Start: 2019-11-29 | End: 2020-11-19

## 2019-11-29 RX ORDER — LEVOTHYROXINE SODIUM 25 UG/1
25 TABLET ORAL DAILY
Qty: 30 TABLET | Refills: 3 | Status: SHIPPED | OUTPATIENT
Start: 2019-11-29 | End: 2020-03-26

## 2019-11-29 NOTE — TELEPHONE ENCOUNTER
The 10-year ASCVD risk score (Viktor TALBOT Jr., et al., 2013) is: 9%    Values used to calculate the score:      Age: 69 years      Sex: Female      Is Non- : No      Diabetic: No      Tobacco smoker: No      Systolic Blood Pressure: 120 mmHg      Is BP treated: No      HDL Cholesterol: 77 mg/dL      Total Cholesterol: 207 mg/dL    Simvastatin 20 mg sent.

## 2019-11-29 NOTE — TELEPHONE ENCOUNTER
Patient calling back regarding lab and would like to start on a low dose statin that was previously discussed. Please send to [place] [facility]. Patient would like a call back when this is sent. Please call patient back [phone] and it is ok to leave a message.

## 2019-12-18 ENCOUNTER — TELEPHONE (OUTPATIENT)
Dept: FAMILY MEDICINE | Facility: OTHER | Age: 69
End: 2019-12-18

## 2019-12-18 NOTE — TELEPHONE ENCOUNTER
Pt requesting  A script for the Shingrix vaccine.  Had the 1 st injection 10-31-19  Was sent to [facility] and then scheduled with nurse to receive it.    Please call pt when sent   Ok to leave message.    Sharee Kirkpatrick LPN

## 2019-12-19 NOTE — TELEPHONE ENCOUNTER
Pt notified of script for shingrix and to make nurse only appt for injection.    Sharee Kirkpatrick LPN

## 2019-12-19 NOTE — TELEPHONE ENCOUNTER
Left msg for pt to call back. Once called back please inform pt [facility] Once picked up she can come in for Nurse Only for administration.

## 2020-01-08 DIAGNOSIS — E03.9 HYPOTHYROIDISM, UNSPECIFIED TYPE: ICD-10-CM

## 2020-01-08 LAB — TSH SERPL DL<=0.005 MIU/L-ACNC: 2.34 MU/L (ref 0.4–4)

## 2020-01-08 PROCEDURE — 84443 ASSAY THYROID STIM HORMONE: CPT | Mod: ZL | Performed by: FAMILY MEDICINE

## 2020-01-08 PROCEDURE — 36415 COLL VENOUS BLD VENIPUNCTURE: CPT | Mod: ZL | Performed by: FAMILY MEDICINE

## 2020-02-15 DIAGNOSIS — L30.9 DERMATITIS: ICD-10-CM

## 2020-02-18 RX ORDER — TRIAMCINOLONE ACETONIDE 1 MG/G
CREAM TOPICAL 3 TIMES DAILY
Qty: 30 G | Refills: 0 | Status: SHIPPED | OUTPATIENT
Start: 2020-02-18 | End: 2021-11-26

## 2020-02-18 NOTE — TELEPHONE ENCOUNTER
Kenalog       Last Written Prescription Date:  3/29/2018  Last Fill Quantity: 30g,   # refills: 1  Last Office Visit: 10/28/2019  Future Office visit:

## 2020-03-03 ENCOUNTER — TELEPHONE (OUTPATIENT)
Dept: FAMILY MEDICINE | Facility: OTHER | Age: 70
End: 2020-03-03

## 2020-03-03 DIAGNOSIS — E78.5 HYPERLIPIDEMIA, UNSPECIFIED HYPERLIPIDEMIA TYPE: Primary | ICD-10-CM

## 2020-03-03 NOTE — TELEPHONE ENCOUNTER
Patient calling in regards to her cholesterol lab and wondering if she was to have this rechecked. Per LOV notes 10/28/19, patient was to recheck fasting cholesterol and lipids in 3-6 months if starting thyroid medication. Patient did start thyroid medication. Labs ordered for patient to recheck.

## 2020-03-09 ENCOUNTER — TELEPHONE (OUTPATIENT)
Dept: FAMILY MEDICINE | Facility: OTHER | Age: 70
End: 2020-03-09

## 2020-03-09 NOTE — TELEPHONE ENCOUNTER
Pt called stating she needs a clearance letter for her membership fee at [employer]. They need to have on file stating she is cleared to work out there with/without limitations. Pt wondering if she needs any tests done like a stress test for example. Please advise. Lynnette Crain LPN

## 2020-03-12 ENCOUNTER — TELEPHONE (OUTPATIENT)
Dept: FAMILY MEDICINE | Facility: OTHER | Age: 70
End: 2020-03-12

## 2020-03-12 NOTE — TELEPHONE ENCOUNTER
[facility] clinic calling r/t pts referral for inserts/shoe for deformity on dm statement #2 letter c. But in Dr note from 2/12/20 foot exam is noted no deformity. She requested an addendum to that note stating the deformity noted so the referral for the shoes/insert can be approved, please advise. Lynnette Crain LPN

## 2020-03-13 ENCOUNTER — TELEPHONE (OUTPATIENT)
Dept: FAMILY MEDICINE | Facility: OTHER | Age: 70
End: 2020-03-13

## 2020-03-13 NOTE — TELEPHONE ENCOUNTER
Pt called stating she needs a clearance letter for her membership at [employer]. They need to have on file stating she is cleared to work out there with/without limitations. Pt wondering if she needs any tests done like a stress test for example. If no limitations and clear to go pt wondering if you would sign a letter stating that. Please advise. Lynnette Crain LPN

## 2020-03-26 DIAGNOSIS — E03.9 HYPOTHYROIDISM, UNSPECIFIED TYPE: ICD-10-CM

## 2020-03-26 RX ORDER — LEVOTHYROXINE SODIUM 25 UG/1
TABLET ORAL
Qty: 30 TABLET | Refills: 3 | Status: SHIPPED | OUTPATIENT
Start: 2020-03-26 | End: 2020-05-14

## 2020-05-01 ENCOUNTER — TELEPHONE (OUTPATIENT)
Dept: FAMILY MEDICINE | Facility: OTHER | Age: 70
End: 2020-05-01

## 2020-05-01 NOTE — TELEPHONE ENCOUNTER
For the past two night has had numbness and tingling in both of her feet occasional involuntary movement . States it is very bothersome and makes   It very hard for her to sleep. She is seeking advice on what this could be. No pain, swelling, redness, no injuries.    Please advise.    Emperatriz Hameed LPN

## 2020-05-01 NOTE — TELEPHONE ENCOUNTER
Multiple possibilities - vitamin deficiency, such as B12, thyroid, blood sugar, restless legs, other nerve condition.  Would advise virtual visit in next week or so.

## 2020-05-13 NOTE — PROGRESS NOTES
Subjective     Mihaela Jang is a 70 year old female who presents to clinic today for the following health issues:    HPI   Musculoskeletal problem      Duration: about 6 months     Description  Location: bilateral . Sometimes just one. Goes back and forth     Intensity:  moderate    Accompanying signs and symptoms: numbness and tingling just when she lays down at night. Sometimes makes her legs jerk. Happens usually between 3-5 am  Wakes her up and can not go back to sleep. States it maybe only happens about 3 times a month. Last time it happened was 4/30 and may 1st.     History  Previous similar problem: no   Previous evaluation:  none    Precipitating or alleviating factors:  Trauma or overuse: no   Aggravating factors include: laying down     Therapies tried and outcome: has to get up to walk around so itll stop.     No pain.  Can get up and walk around a bit and it subsides.  No swelling.  No redness.  No change in activity or shoes.  Tingling - like when foot falls asleep.   No symptoms in calves or knees typically.  Did have slight aching in calves the other day.  Is on Neurontin for neck symptoms - HS. Takes it fairly consistently.  TSH was adjusted 12/2019 - repeat 1/2020 normal.      Needs letter for gym to be able to exercise.    Needs refills.    Current Outpatient Medications   Medication     aspirin EC 81 MG tablet     CALCIUM CITRATE     cholecalciferol 1000 UNITS TABS     cinnamon 500 MG CAPS     fish oil-omega-3 fatty acids (FISH OIL) 1000 MG capsule     fluticasone (FLONASE) 50 MCG/ACT nasal spray     gabapentin (NEURONTIN) 300 MG capsule     Lactobacillus (ACIDOPHILUS) TABS     levothyroxine (SYNTHROID/LEVOTHROID) 25 MCG tablet     Lutein 20 MG CAPS     MAGNESIUM OXIDE PO     Multiple vitamin  s TABS     Polyethylene Glycol 3350 (MIRALAX PO)     simvastatin (ZOCOR) 20 MG tablet     triamcinolone 0.1 % EX external cream     Turmeric (RA TURMERIC) 500 MG CAPS     zinc 50 MG TABS     No  current facility-administered medications for this visit.        Patient Active Problem List   Diagnosis     Laryngopharyngeal reflux (LPR)     Chronic rhinitis     ETD (eustachian tube dysfunction)     Osteoporosis     Abnormal glucose     Personal history of malignant neoplasm of breast     Neutropenia (H)     Early satiety     chronic neutropenia.     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     Hypothyroidism, unspecified type     Past Surgical History:   Procedure Laterality Date     BONE MARROW BIOPSY      negative     COLONOSCOPY  07/2000     COLONOSCOPY  8/13/2013    DR Fu; repeat 5 years     COLONOSCOPY N/A 8/13/2018    Procedure: COLONOSCOPY;  COLONOSCOPY;  Surgeon: Ajit Uriarte MD;  Location: [facility] OR     DILATION AND CURETTAGE, OPERATIVE HYSTEROSCOPY, COMBINED N/A 2/13/2019    Procedure: EXAM UNDER ANESTHESIA , HYSTEROSCOPY;  Surgeon: Jovi Gabriel MD;  Location: [facility] OR     HC COLONOSCOPY THRU STOMA WITH BIOPSY  08/25/2010    cancer screening, family h/o colon cancer; hyperplastic polyp     HEMORRHOIDECTOMY  1986     MASTECTOMY, BILATERAL  03/01/2004    right sided breast cancer     RELEASE TRIGGER FINGER Right 3/7/2018    Procedure: RELEASE TRIGGER FINGER;  RELEASE TRIGGER DIGIT RIGHT THUMB;  Surgeon: Jose Alfredo Brewster DO;  Location: [facility] OR     UPPER GI ENDOSCOPY         Social History     Tobacco Use     Smoking status: Never Smoker     Smokeless tobacco: Never Used   Substance Use Topics     Alcohol use: Yes     Alcohol/week: 0.0 standard drinks     Comment: 1 glasso wine, weekly      Family History   Problem Relation Age of Onset     Dementia Mother         (cause of death)      High cholesterol Mother      Osteoarthritis Mother      C.A.D. Father         (cause of death)      Prostate Cancer Father       "Breast Cancer Maternal Aunt 72     Cerebrovascular Disease Maternal Grandmother         CVA     Diabetes Maternal Grandmother              Reviewed and updated as needed this visit by Provider  Allergies  Meds         Review of Systems   Constitutional, HEENT, cardiovascular, pulmonary, gi and gu systems are negative, except as otherwise noted.      Objective    /62   Pulse 67   Temp 98.2  F (36.8  C)   Ht 1.554 m (5' 1.2\")   Wt 49.9 kg (110 lb)   SpO2 99%   BMI 20.65 kg/m    Body mass index is 20.65 kg/m .  Physical Exam   GENERAL: healthy, alert and no distress  EYES: Eyes grossly normal to inspection, PERRL and conjunctivae and sclerae normal  NECK: no adenopathy, no asymmetry, masses, or scars and thyroid normal to palpation  RESP: lungs clear to auscultation - no rales, rhonchi or wheezes  CV: regular rate and rhythm, normal S1 S2, no S3 or S4, no murmur, click or rub, no peripheral edema and peripheral pulses strong  MS: normal muscle tone, peripheral pulses normal and no foot deformities; no edema; toenails are absent -surgically removed; normal monofilament exam bilaterally; pulses 2+  SKIN: no suspicious lesions or rashes  NEURO: Normal strength and tone, mentation intact and speech normal  PSYCH: mentation appears normal, affect normal/bright    Diagnostic Test Results:  Labs reviewed in Epic        Assessment & Plan       ICD-10-CM    1. Paresthesia  R20.2 Vitamin B12     Ferritin     CBC with platelets and differential     TSH with free T4 reflex     Magnesium     gabapentin (NEURONTIN) 300 MG capsule   2. Restless leg syndrome  G25.81 Vitamin B12     Ferritin     CBC with platelets and differential     TSH with free T4 reflex     Magnesium   3. Hypothyroidism, unspecified type  E03.9 TSH with free T4 reflex     levothyroxine (SYNTHROID/LEVOTHROID) 25 MCG tablet   4. Pain in joint, ankle and foot, unspecified laterality  M25.579 Ferritin          Patient Instructions   Will call with lab " results.  Take Neurontin consistently.  Then consider increasing Neurontin from 2 capsules to 3 at bedtime.  Consider trial of restless leg medication next, such as Requip or Mirapex.  Keep hydrated.  Stretch before bed.  Keep active.    See letter.    No follow-ups on file.    Melyssa Encinas MD  [facility]

## 2020-05-14 ENCOUNTER — OFFICE VISIT (OUTPATIENT)
Dept: FAMILY MEDICINE | Facility: OTHER | Age: 70
End: 2020-05-14
Attending: FAMILY MEDICINE
Payer: MEDICARE

## 2020-05-14 VITALS
SYSTOLIC BLOOD PRESSURE: 108 MMHG | DIASTOLIC BLOOD PRESSURE: 62 MMHG | WEIGHT: 110 LBS | HEART RATE: 67 BPM | OXYGEN SATURATION: 99 % | HEIGHT: 61 IN | BODY MASS INDEX: 20.77 KG/M2 | TEMPERATURE: 98.2 F

## 2020-05-14 DIAGNOSIS — M25.579 PAIN IN JOINT, ANKLE AND FOOT, UNSPECIFIED LATERALITY: ICD-10-CM

## 2020-05-14 DIAGNOSIS — E03.9 HYPOTHYROIDISM, UNSPECIFIED TYPE: ICD-10-CM

## 2020-05-14 DIAGNOSIS — E61.1 IRON DEFICIENCY: ICD-10-CM

## 2020-05-14 DIAGNOSIS — R20.2 PARESTHESIA: Primary | ICD-10-CM

## 2020-05-14 DIAGNOSIS — G25.81 RESTLESS LEG SYNDROME: ICD-10-CM

## 2020-05-14 LAB
BASOPHILS # BLD AUTO: 0.1 10E9/L (ref 0–0.2)
BASOPHILS NFR BLD AUTO: 1.1 %
DIFFERENTIAL METHOD BLD: ABNORMAL
EOSINOPHIL # BLD AUTO: 0.2 10E9/L (ref 0–0.7)
EOSINOPHIL NFR BLD AUTO: 3.6 %
ERYTHROCYTE [DISTWIDTH] IN BLOOD BY AUTOMATED COUNT: 12.8 % (ref 10–15)
FERRITIN SERPL-MCNC: 21 NG/ML (ref 8–252)
HCT VFR BLD AUTO: 35.7 % (ref 35–47)
HGB BLD-MCNC: 12 G/DL (ref 11.7–15.7)
IMM GRANULOCYTES # BLD: 0 10E9/L (ref 0–0.4)
IMM GRANULOCYTES NFR BLD: 0 %
LYMPHOCYTES # BLD AUTO: 0.9 10E9/L (ref 0.8–5.3)
LYMPHOCYTES NFR BLD AUTO: 20.4 %
MAGNESIUM SERPL-MCNC: 2.1 MG/DL (ref 1.6–2.3)
MCH RBC QN AUTO: 32.5 PG (ref 26.5–33)
MCHC RBC AUTO-ENTMCNC: 33.6 G/DL (ref 31.5–36.5)
MCV RBC AUTO: 97 FL (ref 78–100)
MONOCYTES # BLD AUTO: 0.6 10E9/L (ref 0–1.3)
MONOCYTES NFR BLD AUTO: 13.3 %
NEUTROPHILS # BLD AUTO: 2.7 10E9/L (ref 1.6–8.3)
NEUTROPHILS NFR BLD AUTO: 61.6 %
NRBC # BLD AUTO: 0 10*3/UL
NRBC BLD AUTO-RTO: 0 /100
PLATELET # BLD AUTO: 260 10E9/L (ref 150–450)
RBC # BLD AUTO: 3.69 10E12/L (ref 3.8–5.2)
TSH SERPL DL<=0.005 MIU/L-ACNC: 3.23 MU/L (ref 0.4–4)
WBC # BLD AUTO: 4.4 10E9/L (ref 4–11)

## 2020-05-14 PROCEDURE — G0463 HOSPITAL OUTPT CLINIC VISIT: HCPCS

## 2020-05-14 PROCEDURE — 36415 COLL VENOUS BLD VENIPUNCTURE: CPT | Mod: ZL | Performed by: FAMILY MEDICINE

## 2020-05-14 PROCEDURE — 85025 COMPLETE CBC W/AUTO DIFF WBC: CPT | Mod: ZL | Performed by: FAMILY MEDICINE

## 2020-05-14 PROCEDURE — 82728 ASSAY OF FERRITIN: CPT | Mod: ZL | Performed by: FAMILY MEDICINE

## 2020-05-14 PROCEDURE — 82607 VITAMIN B-12: CPT | Mod: ZL | Performed by: FAMILY MEDICINE

## 2020-05-14 PROCEDURE — 99214 OFFICE O/P EST MOD 30 MIN: CPT | Performed by: FAMILY MEDICINE

## 2020-05-14 PROCEDURE — 84443 ASSAY THYROID STIM HORMONE: CPT | Mod: ZL | Performed by: FAMILY MEDICINE

## 2020-05-14 PROCEDURE — 83735 ASSAY OF MAGNESIUM: CPT | Mod: ZL | Performed by: FAMILY MEDICINE

## 2020-05-14 RX ORDER — GABAPENTIN 300 MG/1
CAPSULE ORAL
Qty: 180 CAPSULE | Refills: 1 | Status: SHIPPED | OUTPATIENT
Start: 2020-05-14 | End: 2020-11-19

## 2020-05-14 RX ORDER — ZOSTER VACCINE RECOMBINANT, ADJUVANTED 50 MCG/0.5
KIT INTRAMUSCULAR
COMMUNITY
Start: 2020-01-08 | End: 2020-05-14

## 2020-05-14 RX ORDER — LEVOTHYROXINE SODIUM 25 UG/1
25 TABLET ORAL DAILY
Qty: 90 TABLET | Refills: 3 | Status: SHIPPED | OUTPATIENT
Start: 2020-05-14 | End: 2021-01-13

## 2020-05-14 ASSESSMENT — ANXIETY QUESTIONNAIRES
7. FEELING AFRAID AS IF SOMETHING AWFUL MIGHT HAPPEN: NOT AT ALL
6. BECOMING EASILY ANNOYED OR IRRITABLE: NOT AT ALL
5. BEING SO RESTLESS THAT IT IS HARD TO SIT STILL: NOT AT ALL
1. FEELING NERVOUS, ANXIOUS, OR ON EDGE: NOT AT ALL
3. WORRYING TOO MUCH ABOUT DIFFERENT THINGS: NOT AT ALL
2. NOT BEING ABLE TO STOP OR CONTROL WORRYING: NOT AT ALL
IF YOU CHECKED OFF ANY PROBLEMS ON THIS QUESTIONNAIRE, HOW DIFFICULT HAVE THESE PROBLEMS MADE IT FOR YOU TO DO YOUR WORK, TAKE CARE OF THINGS AT HOME, OR GET ALONG WITH OTHER PEOPLE: NOT DIFFICULT AT ALL
GAD7 TOTAL SCORE: 0

## 2020-05-14 ASSESSMENT — PAIN SCALES - GENERAL: PAINLEVEL: NO PAIN (0)

## 2020-05-14 ASSESSMENT — PATIENT HEALTH QUESTIONNAIRE - PHQ9
5. POOR APPETITE OR OVEREATING: NOT AT ALL
SUM OF ALL RESPONSES TO PHQ QUESTIONS 1-9: 0

## 2020-05-14 ASSESSMENT — MIFFLIN-ST. JEOR: SCORE: 949.51

## 2020-05-14 NOTE — NURSING NOTE
"Chief Complaint   Patient presents with     Musculoskeletal Problem       Initial /62   Pulse 67   Temp 98.2  F (36.8  C)   Ht 1.554 m (5' 1.2\")   Wt 49.9 kg (110 lb)   SpO2 99%   BMI 20.65 kg/m   Estimated body mass index is 20.65 kg/m  as calculated from the following:    Height as of this encounter: 1.554 m (5' 1.2\").    Weight as of this encounter: 49.9 kg (110 lb).  Medication Reconciliation: complete  Renate Roman  "

## 2020-05-14 NOTE — PATIENT INSTRUCTIONS
Will call with lab results.  Take Neurontin consistently.  Then consider increasing Neurontin from 2 capsules to 3 at bedtime.  Consider trial of restless leg medication next, such as Requip or Mirapex.  Keep hydrated.  Stretch before bed.  Keep active.

## 2020-05-14 NOTE — LETTER
[facility] [place]  [address] [place] [address]  Phone: [phone]    May 14, 2020      Re:  Mihaela Jang  [address]   [facility] [address]          To whom it may concern:    RE: Mihaela Jang    Patient follows regularly at our office.  Her last physical was 10/28/2019.  She is encouraged to remain active and is able to exercise without restrictions and participate in gym membership activities.    Please contact me for questions or concerns.      Sincerely,        Melyssa Encinas MD

## 2020-05-15 LAB — VIT B12 SERPL-MCNC: 1090 PG/ML (ref 193–986)

## 2020-05-15 ASSESSMENT — ANXIETY QUESTIONNAIRES: GAD7 TOTAL SCORE: 0

## 2020-05-18 ENCOUNTER — TELEPHONE (OUTPATIENT)
Dept: FAMILY MEDICINE | Facility: OTHER | Age: 70
End: 2020-05-18

## 2020-05-18 NOTE — TELEPHONE ENCOUNTER
Patient calling and would like to know what type of stretching should she do at night for tingling feet?    Please advise.      Shefali Tavera RN      Call back [phone], [phone].Ok to leave detailed message.

## 2020-05-18 NOTE — TELEPHONE ENCOUNTER
Call from patient inquiring on lab results. Results and new orders have been provided per Dr. Connors.     Patient to start on iron supplement, take with Vit C/Orange Juice. May cause constipation, stool softener as needed. Recheck iron in 2-3 months. Low iron may contribute to restless legs. Neurontin may be increased from 2 capsules to 3 capsules at bedtime per Dr. Connors. (Office Visit 5/14/20).

## 2020-05-20 ENCOUNTER — TELEPHONE (OUTPATIENT)
Dept: FAMILY MEDICINE | Facility: OTHER | Age: 70
End: 2020-05-20

## 2020-05-20 DIAGNOSIS — Z71.1 CONCERN ABOUT SKIN DISEASE WITHOUT DIAGNOSIS: Primary | ICD-10-CM

## 2020-05-20 NOTE — TELEPHONE ENCOUNTER
Patient called stating she would like a referral to see a dermatologist. Patient states she normally sees a dermatologist once per year for a skin check due to being in the sun most of her younger life.  Patient would dilcia a referral to the following:   Neha Sumner with   Contact information verified.

## 2020-06-11 DIAGNOSIS — E78.5 HYPERLIPIDEMIA, UNSPECIFIED HYPERLIPIDEMIA TYPE: ICD-10-CM

## 2020-06-11 LAB
CHOLEST SERPL-MCNC: 153 MG/DL
HDLC SERPL-MCNC: 76 MG/DL
LDLC SERPL CALC-MCNC: 68 MG/DL
NONHDLC SERPL-MCNC: 77 MG/DL
TRIGL SERPL-MCNC: 47 MG/DL

## 2020-06-11 PROCEDURE — 80061 LIPID PANEL: CPT | Mod: ZL | Performed by: FAMILY MEDICINE

## 2020-06-11 PROCEDURE — 36415 COLL VENOUS BLD VENIPUNCTURE: CPT | Mod: ZL | Performed by: FAMILY MEDICINE

## 2020-07-07 NOTE — IP AVS SNAPSHOT
MRN:[id number]                      After Visit Summary   3/7/2018    Mihaela Jang    MRN: [id number]           Thank you!     Thank you for choosing [facility] for your care. Our goal is always to provide you with excellent care. Hearing back from our patients is one way we can continue to improve our services. Please take a few minutes to complete the written survey that you may receive in the mail after you visit with us. Thank you!        Patient Information     Date Of Birth          1950        About your hospital stay     You were admitted on:  March 7, 2018 You last received care in the:  [facility] Preop/Phase II    You were discharged on:  March 7, 2018       Who to Call     For medical emergencies, please call 911.  For non-urgent questions about your medical care, please call your primary care provider or clinic, [phone]  For questions related to your surgery, please call your surgery clinic        Attending Provider     Provider Jose Alfredo Frazier DO Orthopedics       Primary Care Provider Office Phone # Fax #    Melyssa Connors [phone]      After Care Instructions     Discharge Instructions       Review outpatient procedure discharge instructions with patient as directed by Provider            Dressing Change       Change dressing on third day after surgery.            Ice to affected area       Ice pack to surgical site every 15 minutes per hour for 24 hours            Remove dressing - at 72 hours                  Your next 10 appointments already scheduled     Mar 20, 2018  4:20 PM CDT   (Arrive by 4:05 PM)   Post Op with Jose Alfredo Brewster DO    ORTHOPEDICS ([facility] )    [address] [facility] [address] [phone]              Further instructions from your care team           Post-Anesthesia Patient Instructions    IMMEDIATELY FOLLOWING SURGERY:  Do not drive or operate machinery for the first twenty  "four hours after surgery.  Do not make any important decisions for twenty four hours after surgery or while taking narcotic pain medications or sedatives.  If you develop intractable nausea and vomiting or a severe headache please notify your doctor immediately.    FOLLOW-UP:  Please make an appointment with your surgeon as instructed. You do not need to follow up with anesthesia unless specifically instructed to do so.    WOUND CARE INSTRUCTIONS (if applicable):  Keep a dry clean dressing on the anesthesia/puncture wound site if there is drainage.  Once the wound has quit draining you may leave it open to air.  Generally you should leave the bandage intact for twenty four hours unless there is drainage.  If the epidural site drains for more than 36-48 hours please call the anesthesia department.    QUESTIONS?:  Please feel free to call your physician or the hospital  if you have any questions, and they will be happy to assist you.       Pending Results     No orders found from 3/5/2018 to 3/8/2018.            Admission Information     Date & Time Provider Department Dept. Phone    3/7/2018 Jose Alfredo Brewster DO HI Preop/Phase [phone]      Your Vitals Were     Blood Pressure Pulse Temperature Respirations Height Weight    117/63 61 97.4  F (36.3  C) (Oral) 16 1.575 m (5' 2\") 50.9 kg (112 lb 3.2 oz)    Pulse Oximetry BMI (Body Mass Index)                97% 20.52 kg/m2          [facility] Information     [facility] lets you send messages to your doctor, view your test results, renew your prescriptions, schedule appointments and more. To sign up, go to [email or web address] . Click on \"Log in\" on the left side of the screen, which will take you to the Welcome page. Then click on \"Sign up Now\" on the right side of the page.     You will be asked to enter the access code listed below, as well as some personal information. Please follow the directions to create your username and password.     Your access code " is: [email or web address]  Expires: 2018 12:13 PM     Your access code will  in 90 days. If you need help or a new code, please call your [facility] clinic or [phone].        Care EveryWhere ID     This is your Care EveryWhere ID. This could be used by other organizations to access your [facility] medical records  [id number]        Equal Access to Services     [facility] Campus[facility]ALISA : Hadii patsy zhang hadeveretto Sochayitoali, waaxda luqadaha, qaybta kaalmada adedarienda, haile parra josefavicki mcginnis ashleykris fernández . So [facility] [phone].    ATENCIÓN: Si alicela dillan, tiene a quezada disposición servicios gratuitos de asistencia lingüística. Audelia al [phone].    We comply with applicable federal civil rights laws and [place] laws. We do not discriminate on the basis of race, color, national origin, age, disability, sex, sexual orientation, or gender identity.               Review of your medicines      START taking        Dose / Directions    HYDROcodone-acetaminophen 5-325 MG per tablet   Commonly known as:  NORCO   Used for:  H/O hand surgery        Dose:  1-2 tablet   Take 1-2 tablets by mouth every 4 hours as needed for other (Moderate to Severe Pain)   Quantity:  20 tablet   Refills:  0         CONTINUE these medicines which have NOT CHANGED        Dose / Directions    Acidophilus Tabs        Dose:  1 tablet   Take 1 tablet by mouth daily   Refills:  0       aspirin EC 81 MG EC tablet        Dose:  81 mg   Take 1 tablet (81 mg) by mouth daily   Quantity:  1 tablet   Refills:  0       CALCIUM CITRATE   Indication:  uncertain dose is correct        Dose:  1500 mg   1,500 mg two times daily   Refills:  0       cholecalciferol 1000 UNITS Tabs        Take  by mouth. 2 capsules po daily.   Refills:  0       cinnamon 500 MG Caps        Dose:  2 capsule   Take 2 capsules by mouth daily   Refills:  0       fish oil-omega-3 fatty acids 1000 MG capsule        Dose:  1 g   Take 1 g by mouth 2 times daily   Refills:  0       fluticasone 50  MCG/ACT spray   Commonly known as:  FLONASE   Used for:  Laryngopharyngeal reflux (LPR), Post-nasal drip, Globus sensation        Dose:  2 spray   Spray 2 sprays into both nostrils daily   Quantity:  16 g   Refills:  5       gabapentin 300 MG capsule   Commonly known as:  NEURONTIN   Used for:  Paresthesia        TAKE TWO CAPSULES BY MOUTH AT BEDTIME   Quantity:  180 capsule   Refills:  0       Lutein 20 MG Caps        Dose:  20 mg   Take 20 mg by mouth daily   Refills:  0       MAGNESIUM OXIDE PO        Dose:  200 mg   Take 200 mg by mouth daily   Refills:  0       MIRALAX PO        Use every other day   Refills:  0       Multiple vitamin  s Tabs        Dose:  1 tablet   Take 1 tablet by mouth daily.   Refills:  0       RA TURMERIC 500 MG Caps   Generic drug:  Turmeric        Dose:  2 capsule   Take 2 capsules by mouth daily   Refills:  0       zinc 50 MG Tabs        Dose:  1 tablet   Take 1 tablet by mouth daily   Refills:  0            Where to get your medicines      Some of these will need a paper prescription and others can be bought over the counter. Ask your nurse if you have questions.     Bring a paper prescription for each of these medications     HYDROcodone-acetaminophen 5-325 MG per tablet                Protect others around you: Learn how to safely use, store and throw away your medicines at www.disposemymeds.org.        Information about OPIOIDS     PRESCRIPTION OPIOIDS: WHAT YOU NEED TO KNOW    Prescription opioids can be used to help relieve moderate to severe pain and are often prescribed following a surgery or injury, or for certain health conditions. These medications can be an important part of treatment but also come with serious risks. It is important to work with your health care provider to make sure you are getting the safest, most effective care.    WHAT ARE THE RISKS AND SIDE EFFECTS OF OPIOID USE?  Prescription opioids carry serious risks of addiction and overdose, especially with  prolonged use. An opioid overdose, often marked by slowed breathing can cause sudden death. The use of prescription opioids can have a number of side effects as well, even when taken as directed:      Tolerance - meaning you might need to take more of a medication for the same pain relief    Physical dependence - meaning you have symptoms of withdrawal when a medication is stopped    Increased sensitivity to pain    Constipation    Nausea, vomiting, and dry mouth    Sleepiness and dizziness    Confusion    Depression    Low levels of testosterone that can result in lower sex drive, energy, and strength    Itching and sweating    RISKS ARE GREATER WITH:    History of drug misuse, substance use disorder, or overdose    Mental health conditions (such as depression or anxiety)    Sleep apnea    Older age (65 years or older)    Pregnancy    Avoid alcohol while taking prescription opioids.   Also, unless specifically advised by your health care provider, medications to avoid include:    Benzodiazepines (such as Xanax or Valium)    Muscle relaxants (such as Soma or Flexeril)    Hypnotics (such as Ambien or Lunesta)    Other prescription opioids    KNOW YOUR OPTIONS:  Talk to your health care provider about ways to manage your pain that do not involve prescription opioids. Some of these options may actually work better and have fewer risks and side effects:    Pain relievers such as acetaminophen, ibuprofen, and naproxen    Some medications that are also used for depression or seizures    Physical therapy and exercise    Cognitive behavioral therapy, a psychological, goal-directed approach, in which patients learn how to modify physical, behavioral, and emotional triggers of pain and stress    IF YOU ARE PRESCRIBED OPIOIDS FOR PAIN:    Never take opioids in greater amounts or more often than prescribed    Follow up with your primary health care provider and work together to create a plan on how to manage your pain.    Talk  about ways to help manage your pain that do not involve prescription opioids    Talk about all concerns and side effects    Help prevent misuse and abuse    Never sell or share prescription opioids    Never use another person's prescription opioids    Store prescription opioids in a secure place and out of reach of others (this may include visitors, children, friends, and family)    Visit www.cdc.gov/drugoverdose to learn about risks of opioid abuse and overdose    If you believe you may be struggling with addiction, tell your health care provider and ask for guidance or call [facility]'s National Helpline at 8-480-326-HELP    LEARN MORE / www.cdc.gov/drugoverdose/prescribing/guideline.html    Safely dispose of unused prescription opioids: Find your local drug take-back programs and more information about the importance of safe disposal at www.doseofreality.[place].gov             Medication List: This is a list of all your medications and when to take them. Check marks below indicate your daily home schedule. Keep this list as a reference.      Medications           Morning Afternoon Evening Bedtime As Needed    Acidophilus Tabs   Take 1 tablet by mouth daily                                aspirin EC 81 MG EC tablet   Take 1 tablet (81 mg) by mouth daily                                CALCIUM CITRATE   1,500 mg two times daily                                cholecalciferol 1000 UNITS Tabs   Take  by mouth. 2 capsules po daily.                                cinnamon 500 MG Caps   Take 2 capsules by mouth daily                                fish oil-omega-3 fatty acids 1000 MG capsule   Take 1 g by mouth 2 times daily                                fluticasone 50 MCG/ACT spray   Commonly known as:  FLONASE   Spray 2 sprays into both nostrils daily                                gabapentin 300 MG capsule   Commonly known as:  NEURONTIN   TAKE TWO CAPSULES BY MOUTH AT BEDTIME                                 HYDROcodone-acetaminophen 5-325 MG per tablet   Commonly known as:  NORCO   Take 1-2 tablets by mouth every 4 hours as needed for other (Moderate to Severe Pain)                                Lutein 20 MG Caps   Take 20 mg by mouth daily                                MAGNESIUM OXIDE PO   Take 200 mg by mouth daily                                MIRALAX PO   Use every other day                                Multiple vitamin  s Tabs   Take 1 tablet by mouth daily.                                RA TURMERIC 500 MG Caps   Take 2 capsules by mouth daily   Generic drug:  Turmeric                                zinc 50 MG Tabs   Take 1 tablet by mouth daily                                          More Information        Treating Trigger Finger     The tendon sheath is opened to release the tendon. Once the tendon can move freely again, the finger can bend and straighten more normally.     Trigger finger occurs when the tissue inside your finger or thumb becomes inflamed. Mild cases can be treated without surgery. If the problem is severe, surgery may be needed. Your doctor will discuss your options with you.  Nonsurgical treatment  For mild symptoms, your doctor may have you rest the finger or thumb. You may also be told to take anti-inflammatory medicines. These include ibuprofen or aspirin. You may be given an injection of medicine in the base of the finger or thumb. This typically is a steroid, such as cortisone.  Surgery  If nonsurgical treatments don t ease your symptoms, surgery may be recommended. A tendon is a cordlike fiber that attaches muscle to bone and allows joints to bend. The tendon is surrounded by a protective cover called a sheath. During surgery, the sheath in your finger or thumb is opened to enlarge the space and release the swollen tendon. This allows the finger or thumb to bend and straighten normally. Surgery takes about 20 minutes. It can often be done using a local anesthetic. You may be able  to go home the same day. Your hand will be wrapped in a soft bandage. You may need to wear a plaster splint for a short time to keep the finger or thumb still as it heals. The stitches will be removed in about 2 weeks. Your doctor can discuss the risks and benefits of surgery with you.  Date Last Reviewed: 9/21/2015 2000-2017 [employer]. [address] [place] [address], [place] [address]. All rights reserved. This information is not intended as a substitute for professional medical care. Always follow your healthcare professional's instructions.              No - the patient is unable to be screened due to medical condition

## 2020-08-24 ENCOUNTER — NURSE TRIAGE (OUTPATIENT)
Dept: FAMILY MEDICINE | Facility: OTHER | Age: 70
End: 2020-08-24

## 2020-08-24 NOTE — TELEPHONE ENCOUNTER
"Protocol advises patient to be seen within 2 weeks. Patient scheduled on 08/27.    Reason for Disposition    [1] Skin growth or mole AND [2] bleeds or itches or is painful    Additional Information    Negative: Sounds like a life-threatening emergency to the triager    Small growth, spot, bump, or pigmented area of skin (e.g., moles, skin tags, wart, melanoma, skin cancer)    Negative: [1] Looks infected AND [2] large red area (> 2 in. or 5 cm)    Negative: [1] Fever AND [2] bump is tender to touch    Negative: [1] Fever AND [2] bright red area or streak    Negative: [1] Looks infected (spreading redness, pus) AND [2] no fever    Negative: Looks like a boil, infected sore, or deep ulcer    Negative: Caller can't describe it clearly    Negative: [1] Skin growth or mole AND [2] two sides do not look the same (i.e., asymmetric)    Negative: [1] Skin growth or mole AND [2] border is irregular or blurry    Negative: [1] Skin growth or mole AND [2] changes color, or it has more than one color    Negative: [1] Skin growth or mole AND[2] larger than a pencil eraser, or increasing in size    Negative: [1] Skin growth or mole AND [2] sticks up out of the skin (elevated) AND [3] feels rough to the touch    Answer Assessment - Initial Assessment Questions  1. APPEARANCE of SWELLING: \"What does it look like?\" (e.g., lymph node, insect bite, mole)      Red, flat, a little bit on the raw side. Looked like a pimple and then it went flat.  2. SIZE: \"How large is the swelling?\" (inches, cm or compare to coins)      No swelling  3. LOCATION: \"Where is the swelling located?\"      Forehead above right eyebrow  4. ONSET: \"When did the swelling start?\"      A couple months ago  5. PAIN: \"Is it painful?\" If so, ask: \"How much?\"      No  6. ITCH: \"Does it itch?\" If so, ask: \"How much?\"      Yes. Everyday. Not constant, but every once in awhile it will itch.   7. CAUSE: \"What do you think caused the swelling?\"      Unsure  8. OTHER SYMPTOMS: " "\"Do you have any other symptoms?\" (e.g., fever)      No    Answer Assessment - Initial Assessment Questions  1. APPEARANCE of LESION: \"What does it look like?\"       Red, flat and raw looking.  2. SIZE: \"How big is it?\" (e.g., compare to size of pinhead, tip of pen, eraser, coin, pea, grape, ping pong ball; or size in cms or inches)       Tip of a pencil  3. COLOR: \"What color is it?\" \"Is there more than one color?\"      Red. And redness around the spot.  4. SHAPE: \"What shape is it?\" (e.g., round, irregular)      round  5. RAISED: \"Does it stick up above the skin or is it flat?\" (e.g., raised or elevated)      No  6. TENDER: \"Does it hurt when you touch it?\"  (Scale 1-10; or mild, moderate, severe)      No.   7. LOCATION: \"Where is it located?\"       Above right eye brow on forehead.  8. ONSET: \"When did it first appear?\"       A couple months ago.  9. NUMBER: \"Is there just one?\" or \"Are there others?\"      Just one  10. CAUSE: \"What do you think it is?\"        unsure  11. OTHER SYMPTOMS: \"Do you have any other symptoms?\" (e.g., fever)        No  12. PREGNANCY: \"Is there any chance you are pregnant?\" \"When was your last menstrual period?\"        No    Protocols used: SKIN LESION - MOLES OR GROWTHS-A-AH, SKIN LUMP OR LOCALIZED SWELLING-A-AH      "

## 2020-08-25 NOTE — PROGRESS NOTES
Subjective     Mihaela Jang is a 70 year old female who presents to clinic today for the following health issues:    HPI       Skin Lesion  Onset/Duration: 2-3 months  Description  Location: above eyebrow, right  Color: brown and pink  Border description: flat, red  Character: round, red  Itching: mild  Bleeding:  no  Intensity:  mild  Progression of Symptoms:  same  Accompanying signs and symptoms:   Bleeding: no  Scaling: no  Excessive sun exposure/tanning: in 20s only  Sunscreen used: no  History:           Any previous history of skin cancer: no  Any family history of melanoma: no  Previous episodes of similar lesion: no  Precipitating or alleviating factors: none  Therapies tried and outcome: vanicream, triple antibiotic - no help; hydrocortisone cream -  Pt states she cant tell if its getting better.  Helps with itching    Yearly skin check with Neha Sumner in 12/2020.      Review of Systems   Constitutional, HEENT, cardiovascular, pulmonary, gi and gu systems are negative, except as otherwise noted.      Objective    /70   Pulse 70   Temp 97.8  F (36.6  C)   Wt 52.5 kg (115 lb 12.8 oz)   SpO2 97%   BMI 21.74 kg/m    Body mass index is 21.74 kg/m .  Physical Exam   GENERAL: healthy, alert and no distress  MS: no gross musculoskeletal defects noted, no edema  SKIN: forehead, above right eyebrow, faint area of pink pigmentation; non-palpable; no scale; no vesicle  PSYCH: mentation appears normal, affect normal/bright            Assessment & Plan     Mihaela was seen today for derm problem.    Diagnoses and all orders for this visit:    Dermatitis  -     clotrimazole-betamethasone (LOTRISONE) 1-0.05 % external cream; Apply topically 2 times daily    Hyperlipidemia, unspecified hyperlipidemia type  -     CBC with platelets and differential; Future  -     Comprehensive metabolic panel (BMP + Alb, Alk Phos, ALT, AST, Total. Bili, TP); Future    Hypothyroidism, unspecified type  -     TSH with  free T4 reflex; Future    Elevated glucose  -     Hemoglobin A1c; Future         Skin - not typical of [place].    Partial response with topical steroid.  Will try topical combination with antifungal.    Discussed short term use of steroids on face.  Follows with dermatology - 12/2020 annual - and if need to refer sooner, will do so.    Lab appointment set for prior to upcoming scheduled physical.      Patient Instructions   Use topical combination steroid/antifungal cream twice daily for next week to 2 weeks.  Let me know if growing/spreading to other areas - can do dermatology referral.        No follow-ups on file.    Melyssa Encinas MD  [facility] [place]

## 2020-08-27 ENCOUNTER — OFFICE VISIT (OUTPATIENT)
Dept: FAMILY MEDICINE | Facility: OTHER | Age: 70
End: 2020-08-27
Attending: FAMILY MEDICINE
Payer: COMMERCIAL

## 2020-08-27 VITALS
HEART RATE: 70 BPM | SYSTOLIC BLOOD PRESSURE: 110 MMHG | BODY MASS INDEX: 21.74 KG/M2 | DIASTOLIC BLOOD PRESSURE: 70 MMHG | WEIGHT: 115.8 LBS | OXYGEN SATURATION: 97 % | TEMPERATURE: 97.8 F

## 2020-08-27 DIAGNOSIS — R73.09 ELEVATED GLUCOSE: ICD-10-CM

## 2020-08-27 DIAGNOSIS — L30.9 DERMATITIS: Primary | ICD-10-CM

## 2020-08-27 DIAGNOSIS — E78.5 HYPERLIPIDEMIA, UNSPECIFIED HYPERLIPIDEMIA TYPE: ICD-10-CM

## 2020-08-27 DIAGNOSIS — E03.9 HYPOTHYROIDISM, UNSPECIFIED TYPE: ICD-10-CM

## 2020-08-27 PROCEDURE — G0463 HOSPITAL OUTPT CLINIC VISIT: HCPCS

## 2020-08-27 PROCEDURE — 99213 OFFICE O/P EST LOW 20 MIN: CPT | Performed by: FAMILY MEDICINE

## 2020-08-27 RX ORDER — CLOTRIMAZOLE AND BETAMETHASONE DIPROPIONATE 10; .64 MG/G; MG/G
CREAM TOPICAL 2 TIMES DAILY
Qty: 15 G | Refills: 1 | Status: SHIPPED | OUTPATIENT
Start: 2020-08-27 | End: 2020-09-16

## 2020-08-27 ASSESSMENT — PAIN SCALES - GENERAL: PAINLEVEL: NO PAIN (0)

## 2020-08-27 NOTE — NURSING NOTE
"Chief Complaint   Patient presents with     Derm Problem       Initial /70   Pulse 70   Temp 97.8  F (36.6  C)   Wt 52.5 kg (115 lb 12.8 oz)   SpO2 97%   BMI 21.74 kg/m   Estimated body mass index is 21.74 kg/m  as calculated from the following:    Height as of 5/14/20: 1.554 m (5' 1.2\").    Weight as of this encounter: 52.5 kg (115 lb 12.8 oz).  Medication Reconciliation: complete  Karen Gardiner LPN    "

## 2020-08-27 NOTE — PATIENT INSTRUCTIONS
Use topical combination steroid/antifungal cream twice daily for next week to 2 weeks.  Let me know if growing/spreading to other areas - can do dermatology referral.

## 2020-09-01 ENCOUNTER — TELEPHONE (OUTPATIENT)
Dept: PODIATRY | Facility: OTHER | Age: 70
End: 2020-09-01

## 2020-09-01 DIAGNOSIS — M79.671 FOOT PAIN, RIGHT: Primary | ICD-10-CM

## 2020-09-01 NOTE — TELEPHONE ENCOUNTER
"Returned patient's call to address her concerns. She states she has joint pain that was \"over the top at night\" and couldn't sleep.\" But, the pain starts at the toenail down the toe. Asked patient if the toenail  was infected, red,swelling, fever or drainage. Patient denies any symptoms that were asked about. She states she does not think its infected. She asked if  would prescribe her a pain medication, I informed pt that she would not prescribe pain medication. That she would have to go to ER, UC or PCP for the pain if needed. Informed patient that she could make an appointment with  to address her concerns. She stated she wants to see her PCP to get pain meds to try them first before seeing  for a possible injection. Asked if she wanted to make a future appointment and than transferred to [facility] to make an appointment. She stated she is going to call her PCP.  "

## 2020-09-01 NOTE — TELEPHONE ENCOUNTER
Patient calling again regarding this. States the nurse told her that Dr. Campos doesn't prescribe pain medications and she would now like to know if Dr. Connors would be willing to prescribe something for the pain. States that OTC pain medications do not help at all. Please call patient back to advise. Ashley LeChevalier LPN

## 2020-09-02 RX ORDER — NAPROXEN 500 MG/1
500 TABLET ORAL 2 TIMES DAILY WITH MEALS
Qty: 60 TABLET | Refills: 0 | Status: SHIPPED | OUTPATIENT
Start: 2020-09-02 | End: 2022-04-12

## 2020-09-02 NOTE — TELEPHONE ENCOUNTER
Patient last saw Dr Campos 8/2019 - a year ago.  Arthritis of great toe.  Surgical options discussed.  Can use Tylenol OTC.  Will send anti-inflammatory Naproxen.  Take with food.  Schedule follow up with Dr Campos.

## 2020-09-11 NOTE — PATIENT INSTRUCTIONS
Do you or anyone in your household have any of the following:    Have a fever greater than 100.0? yes    Have you had a fever reducer (due to fever) within the last 12 hours? no    Do you have a new or worsening cough, cold or flu symptoms (chills/muscle pain), runny/stuffy nose, bronchitis, sore throat, headache or difficulty breathing? no    Do you have a new onset of extreme fatigue? no    Do you have new onset nausea, vomiting, and diarrhea? no    Have you had sudden onset of loss of taste or smell? no    Have you or a household member tested positive for Covid-19 in the last 14 days?  no    Have you been tested for Covid and are awaiting results? no    Does the patient on arrival have a temperature over 100.0? no       Patient Education    Zoledronic Acid Solution for injection    Zoledronic Acid Solution for injection [Hypercalcemia of Malignancy]    Zoledronic Acid Solution for injection [Pagets Disease]  Zoledronic Acid Solution for injection  What is this medicine?  ZOLEDRONIC ACID (YOLETTE le charlie ik AS id) lowers the amount of calcium loss from bone. It is used to treat Paget's disease and osteoporosis in women.  This medicine may be used for other purposes; ask your health care provider or pharmacist if you have questions.  What should I tell my health care provider before I take this medicine?  They need to know if you have any of these conditions:    aspirin-sensitive asthma    cancer, especially if you are receiving medicines used to treat cancer    dental disease or wear dentures    infection    kidney disease    low levels of calcium in the blood    past surgery on the parathyroid gland or intestines    receiving corticosteroids like dexamethasone or prednisone    an unusual or allergic reaction to zoledronic acid, other medicines, foods, dyes, or preservatives    pregnant or trying to get pregnant    breast-feeding  How should I use this medicine?  This medicine is for infusion into a vein. It is given by a health care professional in a hospital or clinic setting.  Talk to your pediatrician regarding the use of this medicine in children. This medicine is not approved for use in children.  Overdosage: If you think you have taken too much of this medicine contact a poison control center or emergency room at once.  NOTE: This medicine is only for you. Do not share this medicine with others.  What if I miss a dose?  It is important not to miss your dose. Call your doctor or health care professional if you are unable to keep an appointment.  What may interact with this medicine?    certain antibiotics given by injection    NSAIDs, medicines for pain and inflammation, like ibuprofen or naproxen    some diuretics like  bumetanide, furosemide    teriparatide  This list may not describe all possible interactions. Give your health care provider a list of all the medicines, herbs, non-prescription drugs, or dietary supplements you use. Also tell them if you smoke, drink alcohol, or use illegal drugs. Some items may interact with your medicine.  What should I watch for while using this medicine?  Visit your doctor or health care professional for regular checkups. It may be some time before you see the benefit from this medicine. Do not stop taking your medicine unless your doctor tells you to. Your doctor may order blood tests or other tests to see how you are doing.  Women should inform their doctor if they wish to become pregnant or think they might be pregnant. There is a potential for serious side effects to an unborn child. Talk to your health care professional or pharmacist for more information.  You should make sure that you get enough calcium and vitamin D while you are taking this medicine. Discuss the foods you eat and the vitamins you take with your health care professional.  Some people who take this medicine have severe bone, joint, and/or muscle pain. This medicine may also increase your risk for jaw problems or a broken thigh bone. Tell your doctor right away if you have severe pain in your jaw, bones, joints, or muscles. Tell your doctor if you have any pain that does not go away or that gets worse.  Tell your dentist and dental surgeon that you are taking this medicine. You should not have major dental surgery while on this medicine. See your dentist to have a dental exam and fix any dental problems before starting this medicine. Take good care of your teeth while on this medicine. Make sure you see your dentist for regular follow-up appointments.  What side effects may I notice from receiving this medicine?  Side effects that you should report to your doctor or health care professional as soon as possible:    allergic  reactions like skin rash, itching or hives, swelling of the face, lips, or tongue    anxiety, confusion, or depression    breathing problems    changes in vision    eye pain    feeling faint or lightheaded, falls    jaw pain, especially after dental work    mouth sores    muscle cramps, stiffness, or weakness    redness, blistering, peeling or loosening of the skin, including inside the mouth    trouble passing urine or change in the amount of urine  Side effects that usually do not require medical attention (report to your doctor or health care professional if they continue or are bothersome):    bone, joint, or muscle pain    constipation    diarrhea    fever    hair loss    irritation at site where injected    loss of appetite    nausea, vomiting    stomach upset    trouble sleeping    trouble swallowing    weak or tired  This list may not describe all possible side effects. Call your doctor for medical advice about side effects. You may report side effects to FDA at 9-489-FDA-2873.  Where should I keep my medicine?  This drug is given in a hospital or clinic and will not be stored at home.  NOTE:This sheet is a summary. It may not cover all possible information. If you have questions about this medicine, talk to your doctor, pharmacist, or health care provider. Copyright  2016 Gold Standard

## 2020-09-15 DIAGNOSIS — E78.5 HYPERLIPIDEMIA, UNSPECIFIED HYPERLIPIDEMIA TYPE: ICD-10-CM

## 2020-09-15 DIAGNOSIS — E03.9 HYPOTHYROIDISM, UNSPECIFIED TYPE: ICD-10-CM

## 2020-09-15 DIAGNOSIS — R73.09 ELEVATED GLUCOSE: ICD-10-CM

## 2020-09-15 LAB
ALBUMIN SERPL-MCNC: 3.9 G/DL (ref 3.4–5)
ALP SERPL-CCNC: 63 U/L (ref 40–150)
ALT SERPL W P-5'-P-CCNC: 25 U/L (ref 0–50)
ANION GAP SERPL CALCULATED.3IONS-SCNC: 4 MMOL/L (ref 3–14)
AST SERPL W P-5'-P-CCNC: 19 U/L (ref 0–45)
BASOPHILS # BLD AUTO: 0 10E9/L (ref 0–0.2)
BASOPHILS NFR BLD AUTO: 0.9 %
BILIRUB SERPL-MCNC: 0.3 MG/DL (ref 0.2–1.3)
BUN SERPL-MCNC: 10 MG/DL (ref 7–30)
CALCIUM SERPL-MCNC: 9.4 MG/DL (ref 8.5–10.1)
CHLORIDE SERPL-SCNC: 105 MMOL/L (ref 94–109)
CO2 SERPL-SCNC: 27 MMOL/L (ref 20–32)
CREAT SERPL-MCNC: 0.71 MG/DL (ref 0.52–1.04)
DIFFERENTIAL METHOD BLD: NORMAL
EOSINOPHIL # BLD AUTO: 0 10E9/L (ref 0–0.7)
EOSINOPHIL NFR BLD AUTO: 0.5 %
ERYTHROCYTE [DISTWIDTH] IN BLOOD BY AUTOMATED COUNT: 12.7 % (ref 10–15)
EST. AVERAGE GLUCOSE BLD GHB EST-MCNC: 114 MG/DL
GFR SERPL CREATININE-BSD FRML MDRD: 85 ML/MIN/{1.73_M2}
GLUCOSE SERPL-MCNC: 92 MG/DL (ref 70–99)
HBA1C MFR BLD: 5.6 % (ref 0–5.6)
HCT VFR BLD AUTO: 37.1 % (ref 35–47)
HGB BLD-MCNC: 12.6 G/DL (ref 11.7–15.7)
IMM GRANULOCYTES # BLD: 0 10E9/L (ref 0–0.4)
IMM GRANULOCYTES NFR BLD: 0.2 %
LYMPHOCYTES # BLD AUTO: 1.2 10E9/L (ref 0.8–5.3)
LYMPHOCYTES NFR BLD AUTO: 26.8 %
MCH RBC QN AUTO: 32.3 PG (ref 26.5–33)
MCHC RBC AUTO-ENTMCNC: 34 G/DL (ref 31.5–36.5)
MCV RBC AUTO: 95 FL (ref 78–100)
MONOCYTES # BLD AUTO: 0.6 10E9/L (ref 0–1.3)
MONOCYTES NFR BLD AUTO: 13 %
NEUTROPHILS # BLD AUTO: 2.6 10E9/L (ref 1.6–8.3)
NEUTROPHILS NFR BLD AUTO: 58.6 %
NRBC # BLD AUTO: 0 10*3/UL
NRBC BLD AUTO-RTO: 0 /100
PLATELET # BLD AUTO: 269 10E9/L (ref 150–450)
POTASSIUM SERPL-SCNC: 4.2 MMOL/L (ref 3.4–5.3)
PROT SERPL-MCNC: 7.9 G/DL (ref 6.8–8.8)
RBC # BLD AUTO: 3.9 10E12/L (ref 3.8–5.2)
SODIUM SERPL-SCNC: 136 MMOL/L (ref 133–144)
T4 FREE SERPL-MCNC: 0.99 NG/DL (ref 0.76–1.46)
TSH SERPL DL<=0.005 MIU/L-ACNC: 4.06 MU/L (ref 0.4–4)
WBC # BLD AUTO: 4.4 10E9/L (ref 4–11)

## 2020-09-15 PROCEDURE — 40000788 ZZHCL STATISTIC ESTIMATED AVERAGE GLUCOSE: Mod: ZL | Performed by: FAMILY MEDICINE

## 2020-09-15 PROCEDURE — 84439 ASSAY OF FREE THYROXINE: CPT | Mod: ZL | Performed by: FAMILY MEDICINE

## 2020-09-15 PROCEDURE — 85025 COMPLETE CBC W/AUTO DIFF WBC: CPT | Mod: ZL | Performed by: FAMILY MEDICINE

## 2020-09-15 PROCEDURE — 84443 ASSAY THYROID STIM HORMONE: CPT | Mod: ZL | Performed by: FAMILY MEDICINE

## 2020-09-15 PROCEDURE — 83036 HEMOGLOBIN GLYCOSYLATED A1C: CPT | Mod: ZL | Performed by: FAMILY MEDICINE

## 2020-09-15 PROCEDURE — 36415 COLL VENOUS BLD VENIPUNCTURE: CPT | Mod: ZL | Performed by: FAMILY MEDICINE

## 2020-09-15 PROCEDURE — 80053 COMPREHEN METABOLIC PANEL: CPT | Mod: ZL | Performed by: FAMILY MEDICINE

## 2020-09-15 NOTE — PROGRESS NOTES
"SUBJECTIVE:   Mihaela Jang is a 70 year old female who presents for Preventive Visit.      Patient has been advised of split billing requirements and indicates understanding: Yes  Are you in the first 12 months of your Medicare Part B coverage?  No    Physical Health:    In general, how would you rate your overall physical health? good    Outside of work, how many days during the week do you exercise? 6-7 days/week    Outside of work, approximately how many minutes a day do you exercise?less than 15 minutes    If you drink alcohol do you typically have >3 drinks per day or >7 drinks per week? No    Do you usually eat at least 4 servings of fruit and vegetables a day, include whole grains & fiber and avoid regularly eating high fat or \"junk\" foods? NO    Do you have any problems taking medications regularly?  No    Do you have any side effects from medications? none    Needs assistance for the following daily activities: no assistance needed    Which of the following safety concerns are present in your home?  none identified     Hearing impairment: No    In the past 6 months, have you been bothered by leaking of urine? no    Osteoporosis - dexa 4/2019 - reclast 10/2019 was last done    Due for flu vaccine    Mammogram - don't do s/p surgery    Had labs yesterday - TSH mildly elevated by 6 100ths-T4 normal    Weight 110 to 116, down to 108 - feels at baseline    Mental Health:    In general, how would you rate your overall mental or emotional health? excellent  PHQ-2 Score:      Do you feel safe in your environment? Yes    Have you ever done Advance Care Planning? (For example, a Health Directive, POLST, or a discussion with a medical provider or your loved ones about your wishes): No, advance care planning information given to patient to review.  Patient plans to discuss their wishes with loved ones or provider.  Pt will fill out and return updated form    Additional concerns to address?  No    Fall " risk:  Fallen 2 or more times in the past year?: No  Any fall with injury in the past year?: No    Cognitive Screenin) Repeat 3 items (Leader, Season, Table)    2) Clock draw: NORMAL  3) 3 item recall: Recalls 2 objects   Results: NORMAL clock, 1-2 items recalled: COGNITIVE IMPAIRMENT LESS LIKELY    Mini-CogTM Copyright SOFI Monaco. Licensed by the author for use in [facility]; reprinted with permission (dennis@[facility]). All rights reserved.      Do you have sleep apnea, excessive snoring or daytime drowsiness?: no      Hyperlipidemia Follow-Up and prediabetes      Are you regularly taking any medication or supplement to lower your cholesterol?   Yes- zocor    Are you having muscle aches or other side effects that you think could be caused by your cholesterol lowering medication?  No    Hypothyroidism Follow-up      Since last visit, patient describes the following symptoms: Weight stable, no hair loss, no skin changes, no constipation, no loose stools      Reviewed and updated as needed this visit by clinical staff  Tobacco  Allergies  Meds  Problems  Med Hx  Surg Hx  Fam Hx         Reviewed and updated as needed this visit by Provider  Allergies        Social History     Tobacco Use     Smoking status: Never Smoker     Smokeless tobacco: Never Used   Substance Use Topics     Alcohol use: Yes     Alcohol/week: 0.0 standard drinks     Comment: 1 glasso wine, weekly                            Current providers sharing in care for this patient include:   Patient Care Team:  Melyssa Connors MD as PCP - General  Melyssa Connors MD as Assigned PCP    The following health maintenance items are reviewed in Epic and correct as of today:  Health Maintenance   Topic Date Due     INFLUENZA VACCINE (1) 2020     JENNIFER ASSESSMENT  2020     PHQ-9  2020     DTAP/TDAP/TD IMMUNIZATION (2 - Td) 2020     ADVANCE CARE PLANNING  08/15/2021     FALL RISK ASSESSMENT  2021      "MEDICARE ANNUAL WELLNESS VISIT  09/16/2021     LIPID  06/11/2025     COLORECTAL CANCER SCREENING  08/13/2028     DEXA  Completed     HEPATITIS C SCREENING  Completed     PHQ-2  Completed     PNEUMOCOCCAL IMMUNIZATION 65+ LOW/MEDIUM RISK  Completed     ZOSTER IMMUNIZATION  Completed     IPV IMMUNIZATION  Aged Out     MENINGITIS IMMUNIZATION  Aged Out     HEPATITIS B IMMUNIZATION  Aged Out     Current Outpatient Medications   Medication     aspirin EC 81 MG tablet     CALCIUM CITRATE     cholecalciferol 1000 UNITS TABS     cinnamon 500 MG CAPS     ferrous sulfate (SLO-FE) 142 (45 Fe) MG CR tablet     fish oil-omega-3 fatty acids (FISH OIL) 1000 MG capsule     fluticasone (FLONASE) 50 MCG/ACT nasal spray     gabapentin (NEURONTIN) 300 MG capsule     Lactobacillus (ACIDOPHILUS) TABS     levothyroxine (SYNTHROID/LEVOTHROID) 25 MCG tablet     Lutein 20 MG CAPS     MAGNESIUM OXIDE PO     Multiple vitamin  s TABS     naproxen (NAPROSYN) 500 MG tablet     Polyethylene Glycol 3350 (MIRALAX PO)     simvastatin (ZOCOR) 20 MG tablet     triamcinolone 0.1 % EX external cream     Turmeric (RA TURMERIC) 500 MG CAPS     zinc 50 MG TABS     No current facility-administered medications for this visit.        Lab work is in process      ROS:  Constitutional, HEENT, cardiovascular, pulmonary, gi and gu systems are negative, except as otherwise noted.    OBJECTIVE:   /70 (BP Location: Left arm, Patient Position: Sitting, Cuff Size: Adult Regular)   Pulse 50   Temp 97.6  F (36.4  C) (Tympanic)   Ht 1.549 m (5' 1\")   Wt 49 kg (108 lb)   SpO2 98%   BMI 20.41 kg/m   Estimated body mass index is 20.41 kg/m  as calculated from the following:    Height as of this encounter: 1.549 m (5' 1\").    Weight as of this encounter: 49 kg (108 lb).  EXAM:   GENERAL APPEARANCE: healthy, alert and no distress  EYES: Eyes grossly normal to inspection, PERRL and conjunctivae and sclerae normal  HENT: ear canals and TM's normal, nose and mouth " without ulcers or lesions, oropharynx clear and oral mucous membranes moist  NECK: no adenopathy, no asymmetry, masses, or scars and thyroid normal to palpation  RESP: lungs clear to auscultation - no rales, rhonchi or wheezes  BREAST: normal without masses, tenderness or nipple discharge and no palpable axillary masses or adenopathy  CV: regular rate and rhythm, normal S1 S2, no S3 or S4, no murmur, click or rub, no peripheral edema and peripheral pulses strong  ABDOMEN: soft, nontender, no hepatosplenomegaly, no masses and bowel sounds normal   (female): normal female external genitalia, normal urethral meatus, vaginal mucosal atrophy noted and small introitus, tight; 1 finger for bimanual exam without masses  MS: no musculoskeletal defects are noted and gait is age appropriate without ataxia  SKIN: no suspicious lesions or rashes  NEURO: Normal strength and tone, sensory exam grossly normal, mentation intact and speech normal  PSYCH: mentation appears normal and affect normal/bright    Diagnostic Test Results:  Labs reviewed in Epic      ASSESSMENT / PLAN:       ICD-10-CM    1. Routine general medical examination at a health care facility  Z00.00 HC FLU VACCINE, INCREASED ANTIGEN, PRESV FREE     ADMIN 1st VACCINE   2. Hypothyroidism, unspecified type  E03.9    3. Hyperlipidemia, unspecified hyperlipidemia type  E78.5    4. Abnormal glucose  R73.09    5. Osteoporosis, unspecified osteoporosis type, unspecified pathological fracture presence  M81.0    6. Encounter for immunization   Z23 HC FLU VACCINE, INCREASED ANTIGEN, PRESV FREE       PaFlu shot given.  Labs reviewed.  Advanced care directive information given.    Patient has been advised of split billing requirements and indicates understanding: Yes    COUNSELING:  Reviewed preventive health counseling, as reflected in patient instructions       Regular exercise       Healthy diet/nutrition       Vision screening       Hearing screening       Dental care       " Bladder control       Fall risk prevention       Immunizations    Vaccinated for: Influenza             Osteoporosis Prevention/Bone Health       Colon cancer screening       Advanced Planning     Estimated body mass index is 20.41 kg/m  as calculated from the following:    Height as of this encounter: 1.549 m (5' 1\").    Weight as of this encounter: 49 kg (108 lb).        She reports that she has never smoked. She has never used smokeless tobacco.    Appropriate preventive services were discussed with this patient, including applicable screening as appropriate for cardiovascular disease, diabetes, osteopenia/osteoporosis, and glaucoma.  As appropriate for age/gender, discussed screening for colorectal cancer, prostate cancer, breast cancer, and cervical cancer. Checklist reviewing preventive services available has been given to the patient.    Reviewed patients plan of care and provided an AVS. The Basic Care Plan (routine screening as documented in Health Maintenance) for Mihaela meets the Care Plan requirement. This Care Plan has been established and reviewed with the Patient.    Counseling Resources:  ATP IV Guidelines  Pooled Cohorts Equation Calculator  Breast Cancer Risk Calculator  BRCA-Related Cancer Risk Assessment: FHS-7 Tool  FRAX Risk Assessment  ICSI Preventive Guidelines  Dietary Guidelines for Americans, 2010  USDA's MyPlate  ASA Prophylaxis  Lung CA Screening    Melyssa Encinas MD  [facility] [place]  "

## 2020-09-15 NOTE — PATIENT INSTRUCTIONS
Flu shot given.  Labs reviewed.  Advanced care directive information given.    Preventive Health Recommendations    See your health care provider every year to    Review health changes.     Discuss preventive care.      Review your medicines if your doctor has prescribed any.      You no longer need a yearly Pap test unless you've had an abnormal Pap test in the past 10 years. If you have vaginal symptoms, such as bleeding or discharge, be sure to talk with your provider about a Pap test.      Every 1 to 2 years, have a mammogram.  If you are over 69, talk with your health care provider about whether or not you want to continue having screening mammograms.      Every 10 years, have a colonoscopy. Or, have a yearly FIT test (stool test). These exams will check for colon cancer.       Have a cholesterol test every 5 years, or more often if your doctor advises it.       Have a diabetes test (fasting glucose) every three years. If you are at risk for diabetes, you should have this test more often.       At age 65, have a bone density scan (DEXA) to check for osteoporosis (brittle bone disease).    Shots:    Get a flu shot each year.    Get a tetanus shot every 10 years.    Talk to your doctor about your pneumonia vaccines. There are now two you should receive - Pneumovax (PPSV 23) and Prevnar (PCV 13).    Talk to your pharmacist about the shingles vaccine.    Talk to your doctor about the hepatitis B vaccine.    Nutrition:     Eat at least 5 servings of fruits and vegetables each day.      Eat whole-grain bread, whole-wheat pasta and brown rice instead of white grains and rice.      Get adequate about Calcium and Vitamin D.     Lifestyle    Exercise at least 150 minutes a week (30 minutes a day, 5 days a week). This will help you control your weight and prevent disease.      Limit alcohol to one drink per day.      No smoking.       Wear sunscreen to prevent skin cancer.       See your dentist twice a year for an exam  and cleaning.      See your eye doctor every 1 to 2 years to screen for conditions such as glaucoma, macular degeneration, cataracts, etc.    Personalized Prevention Plan  You are due for the preventive services outlined below.  Your care team is available to assist you in scheduling these services.  If you have already completed any of these items, please share that information with your care team to update in your medical record.    Health Maintenance Due   Topic Date Due     Flu Vaccine (1) 09/01/2020

## 2020-09-16 ENCOUNTER — OFFICE VISIT (OUTPATIENT)
Dept: FAMILY MEDICINE | Facility: OTHER | Age: 70
End: 2020-09-16
Attending: FAMILY MEDICINE
Payer: COMMERCIAL

## 2020-09-16 VITALS
TEMPERATURE: 97.6 F | BODY MASS INDEX: 20.39 KG/M2 | HEART RATE: 50 BPM | WEIGHT: 108 LBS | HEIGHT: 61 IN | OXYGEN SATURATION: 98 % | DIASTOLIC BLOOD PRESSURE: 70 MMHG | SYSTOLIC BLOOD PRESSURE: 128 MMHG

## 2020-09-16 DIAGNOSIS — E03.9 HYPOTHYROIDISM, UNSPECIFIED TYPE: ICD-10-CM

## 2020-09-16 DIAGNOSIS — Z23 ENCOUNTER FOR IMMUNIZATION: ICD-10-CM

## 2020-09-16 DIAGNOSIS — E78.5 HYPERLIPIDEMIA, UNSPECIFIED HYPERLIPIDEMIA TYPE: ICD-10-CM

## 2020-09-16 DIAGNOSIS — Z00.00 ROUTINE GENERAL MEDICAL EXAMINATION AT A HEALTH CARE FACILITY: Primary | ICD-10-CM

## 2020-09-16 DIAGNOSIS — R73.09 ABNORMAL GLUCOSE: ICD-10-CM

## 2020-09-16 DIAGNOSIS — M81.0 OSTEOPOROSIS, UNSPECIFIED OSTEOPOROSIS TYPE, UNSPECIFIED PATHOLOGICAL FRACTURE PRESENCE: ICD-10-CM

## 2020-09-16 PROCEDURE — G0439 PPPS, SUBSEQ VISIT: HCPCS | Performed by: FAMILY MEDICINE

## 2020-09-16 PROCEDURE — 90471 IMMUNIZATION ADMIN: CPT | Performed by: FAMILY MEDICINE

## 2020-09-16 PROCEDURE — 90662 IIV NO PRSV INCREASED AG IM: CPT

## 2020-09-16 ASSESSMENT — MIFFLIN-ST. JEOR: SCORE: 937.26

## 2020-09-16 ASSESSMENT — PAIN SCALES - GENERAL: PAINLEVEL: NO PAIN (0)

## 2020-09-16 NOTE — NURSING NOTE
"Chief Complaint   Patient presents with     Physical     Imm/Inj     flu shot     Lipids     Thyroid Problem       Initial /70 (BP Location: Left arm, Patient Position: Sitting, Cuff Size: Adult Regular)   Pulse 50   Temp 97.6  F (36.4  C) (Tympanic)   Ht 1.549 m (5' 1\")   Wt 49 kg (108 lb)   SpO2 98%   BMI 20.41 kg/m   Estimated body mass index is 20.41 kg/m  as calculated from the following:    Height as of this encounter: 1.549 m (5' 1\").    Weight as of this encounter: 49 kg (108 lb).  Medication Reconciliation: complete  Lynnette Crain LPN  "

## 2020-09-16 NOTE — NURSING NOTE
"Chief Complaint   Patient presents with     Physical     Imm/Inj     flu shot       Initial /70 (BP Location: Left arm, Patient Position: Sitting, Cuff Size: Adult Regular)   Pulse 50   Temp 97.6  F (36.4  C) (Tympanic)   Ht 1.549 m (5' 1\")   Wt 49 kg (108 lb)   SpO2 98%   BMI 20.41 kg/m   Estimated body mass index is 20.41 kg/m  as calculated from the following:    Height as of this encounter: 1.549 m (5' 1\").    Weight as of this encounter: 49 kg (108 lb).  Medication Reconciliation: complete  Lynnette Crain LPN  "

## 2020-09-17 ENCOUNTER — OFFICE VISIT (OUTPATIENT)
Dept: PODIATRY | Facility: OTHER | Age: 70
End: 2020-09-17
Attending: PODIATRIST
Payer: MEDICARE

## 2020-09-17 ENCOUNTER — OFFICE VISIT (OUTPATIENT)
Dept: OTOLARYNGOLOGY | Facility: OTHER | Age: 70
End: 2020-09-17
Attending: PHYSICIAN ASSISTANT
Payer: MEDICARE

## 2020-09-17 VITALS
TEMPERATURE: 97.9 F | BODY MASS INDEX: 20.41 KG/M2 | SYSTOLIC BLOOD PRESSURE: 124 MMHG | WEIGHT: 108 LBS | HEART RATE: 63 BPM | DIASTOLIC BLOOD PRESSURE: 80 MMHG | OXYGEN SATURATION: 99 %

## 2020-09-17 VITALS
OXYGEN SATURATION: 98 % | TEMPERATURE: 96.6 F | SYSTOLIC BLOOD PRESSURE: 124 MMHG | HEART RATE: 56 BPM | DIASTOLIC BLOOD PRESSURE: 56 MMHG

## 2020-09-17 DIAGNOSIS — R09.82 POST-NASAL DRIP: ICD-10-CM

## 2020-09-17 DIAGNOSIS — M79.672 LEFT FOOT PAIN: ICD-10-CM

## 2020-09-17 DIAGNOSIS — H61.23 EXCESSIVE CERUMEN IN BOTH EAR CANALS: Primary | ICD-10-CM

## 2020-09-17 DIAGNOSIS — M20.21 HALLUX RIGIDUS OF RIGHT FOOT: Primary | ICD-10-CM

## 2020-09-17 DIAGNOSIS — M79.671 RIGHT FOOT PAIN: ICD-10-CM

## 2020-09-17 PROCEDURE — 92504 EAR MICROSCOPY EXAMINATION: CPT | Performed by: PHYSICIAN ASSISTANT

## 2020-09-17 PROCEDURE — 99213 OFFICE O/P EST LOW 20 MIN: CPT | Mod: 25 | Performed by: PHYSICIAN ASSISTANT

## 2020-09-17 PROCEDURE — 69210 REMOVE IMPACTED EAR WAX UNI: CPT | Performed by: PHYSICIAN ASSISTANT

## 2020-09-17 PROCEDURE — G0463 HOSPITAL OUTPT CLINIC VISIT: HCPCS

## 2020-09-17 PROCEDURE — 99213 OFFICE O/P EST LOW 20 MIN: CPT | Performed by: PODIATRIST

## 2020-09-17 ASSESSMENT — PAIN SCALES - GENERAL
PAINLEVEL: MILD PAIN (2)
PAINLEVEL: NO PAIN (0)

## 2020-09-17 NOTE — PROGRESS NOTES
Chief Complaint   Patient presents with     Cerumen Impaction     Pt is here for an ear cleaning.     Patient presents for ear cleaning and ear exam. SHe has no otalgia, otorrhea.   Hearing has been good. Denies concerns with tinnitus.   She does use ear plugs and wishes to have her ears checked and cleaned.    She did have some clicking in her ears, but started Flonase again. She has less noise/ crackling in her ears.   She does use flonase PRN.     Past Medical History:   Diagnosis Date     Atypical nevi      Chondrodermatitis nodularis helicis of left ear     Dr Shipley, [facility]     Chronic rhinitis     Dr Messina, [facility] allergy; negative skin testing; IgE mediated allergies; ENT - DC nasal steroid and antihistamine; saline rinses     Degenerative skin disorder     solar elastosis     Hallux rigidus 06/15/2000     Hyperlipidemia, unspecified hyperlipidemia type 9/15/2016     Laryngopharyngeal reflux disease     PPI     Malignant neoplasm of breast (female), unspecified site 03/10/2004     Notalgia paresthetica      Osteoarthritis 07/20/2011     Osteoporosis, unspecified 09/10/2001    Dr Moses; intermittent reclast;  Dr. Moses; repeat dexa after full 2 years Reclast     Other abnormal glucose 12/20/2013     Paresthesia     neck; notalgiaparesthetic; dr leahy & Dr Nevarez; neurontin     Personal history of malignant neoplasm of breast 06/07/2005     Rhinitis      Schamberg's purpura         Allergies   Allergen Reactions     Chloraprep One Step Rash     Povidone Iodine Rash     Betadine      Soap Rash     Betadine      Current Outpatient Medications   Medication     aspirin EC 81 MG tablet     CALCIUM CITRATE     cholecalciferol 1000 UNITS TABS     cinnamon 500 MG CAPS     ferrous sulfate (SLO-FE) 142 (45 Fe) MG CR tablet     fish oil-omega-3 fatty acids (FISH OIL) 1000 MG capsule     fluticasone (FLONASE) 50 MCG/ACT nasal spray     gabapentin (NEURONTIN) 300 MG capsule     Lactobacillus (ACIDOPHILUS) TABS      levothyroxine (SYNTHROID/LEVOTHROID) 25 MCG tablet     Lutein 20 MG CAPS     MAGNESIUM OXIDE PO     Multiple vitamin  s TABS     naproxen (NAPROSYN) 500 MG tablet     Polyethylene Glycol 3350 (MIRALAX PO)     simvastatin (ZOCOR) 20 MG tablet     triamcinolone 0.1 % EX external cream     Turmeric (RA TURMERIC) 500 MG CAPS     zinc 50 MG TABS     No current facility-administered medications for this visit.       ROS: 10 point ROS neg other than the symptoms noted above in the HPI.  /56   Pulse 56   Temp 96.6  F (35.9  C) (Tympanic)   SpO2 98%   General - The patient is well nourished and well developed, and appears to have good nutritional status.  Alert and oriented to person and place, answers questions and cooperates with examination appropriately.   Head and Face - Normocephalic and atraumatic, with no gross asymmetry noted.  The facial nerve is intact, with strong symmetric movements.  Voice and Breathing - The patient was breathing comfortably without the use of accessory muscles. There was no wheezing, stridor, or stertor.  The patients voice was clear and strong, and had appropriate pitch and quality.  Ears -examined under microscopy bilaterally using otologic speculum. Ears were cleaned with cupped forceps.   The external auditory canals are cerumen, the tympanic membranes are intact without effusion, retraction or mass.  Bony landmarks are intact.    Mouth - Examination of the oral cavity showed pink, healthy oral mucosa. No lesions or ulcerations noted.  The tongue was mobile and midline, and the dentition were in good condition.    Throat - The walls of the oropharynx were smooth, pink, moist, symmetric, and had no lesions or ulcerations.  The tonsillar pillars and soft palate were symmetric.  The uvula was midline on elevation.    Neck - Normal midline excursion of the laryngotracheal complex during swallowing.  Full range of motion on passive movement.  Palpation of the occipital, submental,  submandibular, internal jugular chain, and supraclavicular nodes did not demonstrate any abnormal lymph nodes or masses.  Palpation of the thyroid was soft and smooth, with no nodules or goiter appreciated.  The trachea was mobile and midline.  Nose - External contour is symmetric, no gross deflection or scars.  Nasal mucosa is pink and moist with no abnormal mucus.  The septum was intact, turbinates of normal size and position.  No polyps, masses, or purulence noted on examination       ASSESSMENT:    ICD-10-CM    1. Excessive cerumen in both ear canals  H61.23    2. Post-nasal drip  R09.82        Try Simply Saline.   Use 1-2 times daily as needed   Continue with Flonase 2 sprays to each nostril as needed.     Ears were cleaned.   No fluid or infection.   Return to ENT for recheck in 1 year or as needed        Sejal Gutierrez PA-C  ENT  [facility], [place]  [phone]

## 2020-09-17 NOTE — PATIENT INSTRUCTIONS
Ears were cleaned.   Normal ear exam.     1 year follow up.       Thank you for allowing Sejal Gutierrez PA-C and our ENT team to participate in your care.  If your medications are too expensive, please give the nurse a call.  We can possibly change this medication.  If you have a scheduling or an appointment question please contact our Health Unit Coordinator at their direct line [phone].   ALL nursing questions or concerns can be directed to your ENT nurse at: [phone] [place]

## 2020-09-17 NOTE — PROGRESS NOTES
Chief complaint: Patient presents with:  Pain in toe and joint      History of Present Illness: This 70 year old female is seen for RIGHT great toe pain.    On 09/01/2020 in the evening, she was up all night because of pain in the RIGHT hallux. The pain was over the dorsal hallux IPJ and the pain would start and stop and would hit with sudden bouts of sharp pain. The pain is an aching, throbbing pain.     She has no pain today. The pain is often worse when walking, but occasionally it is worse when she is trying to sleep.     She has CMOs from Dr. Ramírez that she received many years ago.    Patient says she wouldn't mind having surgery if it was needed, but she lives alone so she is most concerned about the recovery process of the surgery.    She does have Naproxen that was prescribed by her PCP. She has sixty tablets, but she has only taken one because she tries not to take them.     No further pedal complaints today.       /80 (BP Location: Right arm, Patient Position: Sitting, Cuff Size: Adult Regular)   Pulse 63   Temp 97.9  F (36.6  C) (Tympanic)   Wt 49 kg (108 lb)   SpO2 99%   BMI 20.41 kg/m      Patient Active Problem List   Diagnosis     Laryngopharyngeal reflux (LPR)     Chronic rhinitis     ETD (eustachian tube dysfunction)     Osteoporosis     Abnormal glucose     Personal history of malignant neoplasm of breast     Neutropenia (H)     Early satiety     chronic neutropenia.     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     Hypothyroidism, unspecified type       Past Surgical History:   Procedure Laterality Date     BONE MARROW BIOPSY      negative     COLONOSCOPY  07/2000     COLONOSCOPY  8/13/2013    DR Fu; repeat 5 years     COLONOSCOPY N/A 8/13/2018    Procedure: COLONOSCOPY;  COLONOSCOPY;  Surgeon:  Ajit Uriarte MD;  Location: [facility]     DILATION AND CURETTAGE, OPERATIVE HYSTEROSCOPY, COMBINED N/A 2/13/2019    Procedure: EXAM UNDER ANESTHESIA , HYSTEROSCOPY;  Surgeon: Jovi Gabriel MD;  Location: [facility]     HC COLONOSCOPY THRU STOMA WITH BIOPSY  08/25/2010    cancer screening, family h/o colon cancer; hyperplastic polyp     HEMORRHOIDECTOMY  1986     MASTECTOMY, BILATERAL  03/01/2004    right sided breast cancer     RELEASE TRIGGER FINGER Right 3/7/2018    Procedure: RELEASE TRIGGER FINGER;  RELEASE TRIGGER DIGIT RIGHT THUMB;  Surgeon: [place] Alfredo Brewster DO;  Location: [facility]     UPPER GI ENDOSCOPY         Current Outpatient Medications   Medication     aspirin EC 81 MG tablet     CALCIUM CITRATE     cholecalciferol 1000 UNITS TABS     cinnamon 500 MG CAPS     ferrous sulfate (SLO-FE) 142 (45 Fe) MG CR tablet     fish oil-omega-3 fatty acids (FISH OIL) 1000 MG capsule     fluticasone (FLONASE) 50 MCG/ACT nasal spray     gabapentin (NEURONTIN) 300 MG capsule     Lactobacillus (ACIDOPHILUS) TABS     levothyroxine (SYNTHROID/LEVOTHROID) 25 MCG tablet     Lutein 20 MG CAPS     MAGNESIUM OXIDE PO     Multiple vitamin  s TABS     naproxen (NAPROSYN) 500 MG tablet     Polyethylene Glycol 3350 (MIRALAX PO)     simvastatin (ZOCOR) 20 MG tablet     triamcinolone 0.1 % EX external cream     Turmeric (RA TURMERIC) 500 MG CAPS     zinc 50 MG TABS     No current facility-administered medications for this visit.           Allergies   Allergen Reactions     Chloraprep One Step Rash     Povidone Iodine Rash     Betadine      Soap Rash     Betadine        Family History   Problem Relation Age of Onset     Dementia Mother         (cause of death)      High cholesterol Mother      Osteoarthritis Mother      C.A.D. Father         (cause of death)      Prostate Cancer Father      Breast Cancer Maternal Aunt 72     Cerebrovascular Disease Maternal Grandmother         CVA     Diabetes Maternal Grandmother        Social History      Socioeconomic History     Marital status: Single     Spouse name: None     Number of children: None     Years of education: None     Highest education level: None   Occupational History     None   Social Needs     Financial resource strain: None     Food insecurity:     Worry: None     Inability: None     Transportation needs:     Medical: None     Non-medical: None   Tobacco Use     Smoking status: Never Smoker     Smokeless tobacco: Never Used   Substance and Sexual Activity     Alcohol use: Yes     Alcohol/week: 0.0 oz     Comment: 1 glasso wine, weekly      Drug use: No     Sexual activity: Never   Lifestyle     Physical activity:     Days per week: None     Minutes per session: None     Stress: None   Relationships     Social connections:     Talks on phone: None     Gets together: None     Attends Rastafari service: None     Active member of club or organization: None     Attends meetings of clubs or organizations: None     Relationship status: None     Intimate partner violence:     Fear of current or ex partner: None     Emotionally abused: None     Physically abused: None     Forced sexual activity: None   Other Topics Concern      Service No     Blood Transfusions Yes     Comment: Permits if needed     Caffeine Concern Yes     Comment: Tea, 2 cups daily      Occupational Exposure Not Asked     Hobby Hazards Not Asked     Sleep Concern Not Asked     Stress Concern Not Asked     Weight Concern Not Asked     Special Diet Not Asked     Back Care Not Asked     Exercise Yes     Comment: Walking, walks steps, daily      Bike Helmet Not Asked     Seat Belt Yes     Self-Exams Not Asked     Parent/sibling w/ CABG, MI or angioplasty before 65F 55M? No   Social History Narrative     None       ROS: 10 point ROS neg other than the symptoms noted above in the HPI.  EXAM  Constitutional: healthy, alert and no distress    Psychiatric: mentation appears normal and affect normal/bright    VASCULAR:  -Dorsalis  pedis pulse +2/4 b/l  -Posterior tibial pulse +2/4 b/l  -Capillary refill time < 3 seconds to b/l hallux  NEURO:  -Light touch sensation intact to b/l plantar forefoot  DERM:  -Skin temperature, texture and turgor WNL b/l    MSK:  -Pain on palpation LEFT hallux dorsal lateral IPJ and dorsal central 1st metatarsal head and dorsal central 1st MTPJ of RIGHT foot  -Mild pain with end ROM with DORSIFLEXION to the hallux IPJ and 1st MTPJ, RIGHT   -Minimal Pain on palpation with end ROM with PLANTARFLEXION of RIGHT 1st MTPJ  -Muscle strength of ankles +5/5 for dorsiflexion, plantarflexion, ABDUction and ADDuction b/l  -Decreased ROM of 1st MTPJ with forefoot loading, RIGHT (< 10 degrees DORSIFLEXION)  ---Pain with attempt to dorsiflex the toe, but toe has very minimal dorsiflexion available.    RADIOGRAPHS RIGHT FOOT 08/02/2019  IMPRESSION: Advanced arthritic changes first metatarsal-phalangeal joint.    CHAD AGUIRRE MD  ============================================================    ASSESSMENT:      (M20.21) Hallux rigidus of right foot    (M79.671) Right foot pain    (M79.672) Left foot pain        PLAN:  -Patient evaluated and examined. Treatment options discussed with no educational barriers noted.  -Discussed etiology and treatment options for hallux rigidus:  ---Discussed how this deformity can progress and what can be done to treat the deformity.  ---Conservative treatment options consist of wider shoe gear and orthotics +/- padding/splinting to accommodate the painful toe. There may be pain relief from a kaba's extension in an orthotic. This will not correct the deformity, but may help decrease pain. Patient may also benefit from an injection for more acute pain. This is does not usually provide long term pain relief without also addressing the biomechanics, but it may improve his overall pain.   ---Patient's pain is more in the hallux IPJ, but it is suspected this pain is because she has more pressure at the  IPJ due to the lack of motion at the 1st MTPJ.  ---Discussed surgical treatment options including a fusion vs. An interpositional arthroplasty with a graft and a Blue. Patient says she cannot be off her foot for any extended period of time because she lives alone and she is considering a Blue arthroplasty. Reviewed risks in detail including a floating toe, floppy toe, transfer lesion, and potential for continued pain after surgery. Patient will consider this but she has not fully exhausted her conservative treatment options yet.    ---At this time, patient would like to try modfications to her orthotics. She will consider an injection if she still has IPJ pain after her orthotics are modified.  ------Patient's current CMOs evaluated -- they are currently a partial half length functional CMO. Will have the orthotics resurfaced with a kaba's extension added.  ------CMO order placed    -Reviewed RIGHT foot radiographs again and explained the surgical options if needed in the future.    -Patient in agreement with the above treatment plan and all of patient's questions were answered.      RTC 3 months to evaluate RIGHT hallux rigidus pain post CMO modifications      Katerina Campos DPM

## 2020-09-17 NOTE — NURSING NOTE
"Chief Complaint   Patient presents with     Cerumen Impaction     Pt is here for an ear cleaning.       Initial /56   Pulse 56   Temp 96.6  F (35.9  C) (Tympanic)   SpO2 98%  Estimated body mass index is 20.41 kg/m  as calculated from the following:    Height as of 9/16/20: 1.549 m (5' 1\").    Weight as of an earlier encounter on 9/17/20: 49 kg (108 lb).  Medication Reconciliation: complete  Tammi Coffey LPN  "

## 2020-09-17 NOTE — LETTER
9/17/2020         RE: Mihaela Jang  [address]  [place] [address]        Dear Colleague,    Thank you for referring your patient, Mihaela Jang, to the [facility]. Please see a copy of my visit note below.    Chief Complaint   Patient presents with     Cerumen Impaction     Pt is here for an ear cleaning.     Patient presents for ear cleaning and ear exam. SHe has no otalgia, otorrhea.   Hearing has been good. Denies concerns with tinnitus.   She does use ear plugs and wishes to have her ears checked and cleaned.    She did have some clicking in her ears, but started Flonase again. She has less noise/ crackling in her ears.   She does use flonase PRN.     Past Medical History:   Diagnosis Date     Atypical nevi      Chondrodermatitis nodularis helicis of left ear     Dr Shipley, [facility]     Chronic rhinitis     Dr Messina, [facility] allergy; negative skin testing; IgE mediated allergies; ENT - DC nasal steroid and antihistamine; saline rinses     Degenerative skin disorder     solar elastosis     Hallux rigidus 06/15/2000     Hyperlipidemia, unspecified hyperlipidemia type 9/15/2016     Laryngopharyngeal reflux disease     PPI     Malignant neoplasm of breast (female), unspecified site 03/10/2004     Notalgia paresthetica      Osteoarthritis 07/20/2011     Osteoporosis, unspecified 09/10/2001    Dr Moses; intermittent reclast;  Dr. Moses; repeat dexa after full 2 years Reclast     Other abnormal glucose 12/20/2013     Paresthesia     neck; notalgiaparesthetic; dr leahy & Dr Nevarez; neurontin     Personal history of malignant neoplasm of breast 06/07/2005     Rhinitis      Schamberg's purpura         Allergies   Allergen Reactions     Chloraprep One Step Rash     Povidone Iodine Rash     Betadine      Soap Rash     Betadine      Current Outpatient Medications   Medication     aspirin EC 81 MG tablet     CALCIUM CITRATE     cholecalciferol 1000 UNITS TABS      cinnamon 500 MG CAPS     ferrous sulfate (SLO-FE) 142 (45 Fe) MG CR tablet     fish oil-omega-3 fatty acids (FISH OIL) 1000 MG capsule     fluticasone (FLONASE) 50 MCG/ACT nasal spray     gabapentin (NEURONTIN) 300 MG capsule     Lactobacillus (ACIDOPHILUS) TABS     levothyroxine (SYNTHROID/LEVOTHROID) 25 MCG tablet     Lutein 20 MG CAPS     MAGNESIUM OXIDE PO     Multiple vitamin  s TABS     naproxen (NAPROSYN) 500 MG tablet     Polyethylene Glycol 3350 (MIRALAX PO)     simvastatin (ZOCOR) 20 MG tablet     triamcinolone 0.1 % EX external cream     Turmeric (RA TURMERIC) 500 MG CAPS     zinc 50 MG TABS     No current facility-administered medications for this visit.       ROS: 10 point ROS neg other than the symptoms noted above in the HPI.  /56   Pulse 56   Temp 96.6  F (35.9  C) (Tympanic)   SpO2 98%   General - The patient is well nourished and well developed, and appears to have good nutritional status.  Alert and oriented to person and place, answers questions and cooperates with examination appropriately.   Head and Face - Normocephalic and atraumatic, with no gross asymmetry noted.  The facial nerve is intact, with strong symmetric movements.  Voice and Breathing - The patient was breathing comfortably without the use of accessory muscles. There was no wheezing, stridor, or stertor.  The patients voice was clear and strong, and had appropriate pitch and quality.  Ears -examined under microscopy bilaterally using otologic speculum. Ears were cleaned with cupped forceps.   The external auditory canals are cerumen, the tympanic membranes are intact without effusion, retraction or mass.  Bony landmarks are intact.    Mouth - Examination of the oral cavity showed pink, healthy oral mucosa. No lesions or ulcerations noted.  The tongue was mobile and midline, and the dentition were in good condition.    Throat - The walls of the oropharynx were smooth, pink, moist, symmetric, and had no lesions or  ulcerations.  The tonsillar pillars and soft palate were symmetric.  The uvula was midline on elevation.    Neck - Normal midline excursion of the laryngotracheal complex during swallowing.  Full range of motion on passive movement.  Palpation of the occipital, submental, submandibular, internal jugular chain, and supraclavicular nodes did not demonstrate any abnormal lymph nodes or masses.  Palpation of the thyroid was soft and smooth, with no nodules or goiter appreciated.  The trachea was mobile and midline.  Nose - External contour is symmetric, no gross deflection or scars.  Nasal mucosa is pink and moist with no abnormal mucus.  The septum was intact, turbinates of normal size and position.  No polyps, masses, or purulence noted on examination       ASSESSMENT:    ICD-10-CM    1. Excessive cerumen in both ear canals  H61.23    2. Post-nasal drip  R09.82        Try Simply Saline.   Use 1-2 times daily as needed   Continue with Flonase 2 sprays to each nostril as needed.     Ears were cleaned.   No fluid or infection.   Return to ENT for recheck in 1 year or as needed        Sejal Gutierrez PA-C  ENT  [facility]  [phone]      Again, thank you for allowing me to participate in the care of your patient.        Sincerely,        Sejal Gutierrez PA-C

## 2020-10-16 ENCOUNTER — TELEPHONE (OUTPATIENT)
Dept: INFUSION THERAPY | Facility: OTHER | Age: 70
End: 2020-10-16

## 2020-10-16 DIAGNOSIS — M81.0 AGE-RELATED OSTEOPOROSIS WITHOUT CURRENT PATHOLOGICAL FRACTURE: Primary | ICD-10-CM

## 2020-10-16 NOTE — TELEPHONE ENCOUNTER
Our records indicate that Mihaela is due for her 5th reclast infusion/labs. She had her 4th reclast  infusion 10/30/2019. If you would like the patient to receive her next infusion please place Reclast therapy plan and we will call to schedule patient.

## 2020-10-19 RX ORDER — ZOLEDRONIC ACID 5 MG/100ML
5 INJECTION, SOLUTION INTRAVENOUS ONCE
Status: CANCELLED
Start: 2020-10-19 | End: 2020-10-19

## 2020-10-19 NOTE — TELEPHONE ENCOUNTER
Reclast therapy plan placed. Please sign therapy plan. Once therapy plan is signed we will call to schedule patient.

## 2020-10-19 NOTE — TELEPHONE ENCOUNTER
I wasn't sure proper order to pend to have this request met. Please see prior note.  Lynnette Crain LPN

## 2020-10-29 NOTE — TELEPHONE ENCOUNTER
Reclast orders up to date and signed by by Dr Connors.    Please call patient to schedule for reclast/labs.     Thank you

## 2020-11-18 DIAGNOSIS — R20.2 PARESTHESIA: ICD-10-CM

## 2020-11-18 DIAGNOSIS — E78.5 HYPERLIPIDEMIA, UNSPECIFIED HYPERLIPIDEMIA TYPE: ICD-10-CM

## 2020-11-19 RX ORDER — GABAPENTIN 300 MG/1
CAPSULE ORAL
Qty: 180 CAPSULE | Refills: 1 | Status: SHIPPED | OUTPATIENT
Start: 2020-11-19 | End: 2021-01-13

## 2020-11-19 RX ORDER — SIMVASTATIN 20 MG
TABLET ORAL
Qty: 90 TABLET | Refills: 1 | Status: SHIPPED | OUTPATIENT
Start: 2020-11-19 | End: 2021-01-12

## 2020-11-19 NOTE — TELEPHONE ENCOUNTER
Gabapentin       Last Written Prescription Date:  5-14-20  Last Fill Quantity: 180,   # refills: 0  Last Office Visit: 9-16-20  Future Office visit:    Next 5 appointments (look out 90 days)    Jan 05, 2021  1:30 PM  (Arrive by 1:15 PM)  Return Visit with Katerina Campos [facility] ([facility] ) [address] [place] [id number] [phone]           Zocor       Last Written Prescription Date:  11-29-19  Last Fill Quantity: 90,   # refills: 0  Last Office Visit: 9-16-20  Future Office visit:    Next 5 appointments (look out 90 days)    Jan 05, 2021  1:30 PM  (Arrive by 1:15 PM)  [facility] [address] [place] [id number] [phone]

## 2020-11-20 ENCOUNTER — INFUSION THERAPY VISIT (OUTPATIENT)
Dept: INFUSION THERAPY | Facility: OTHER | Age: 70
End: 2020-11-20
Attending: FAMILY MEDICINE
Payer: MEDICARE

## 2020-11-20 VITALS — TEMPERATURE: 97.8 F | WEIGHT: 116.4 LBS | BODY MASS INDEX: 21.99 KG/M2

## 2020-11-20 DIAGNOSIS — M81.0 AGE-RELATED OSTEOPOROSIS WITHOUT CURRENT PATHOLOGICAL FRACTURE: Primary | ICD-10-CM

## 2020-11-20 LAB
CALCIUM SERPL-MCNC: 9 MG/DL (ref 8.5–10.1)
CREAT SERPL-MCNC: 0.68 MG/DL (ref 0.52–1.04)
GFR SERPL CREATININE-BSD FRML MDRD: 87 ML/MIN/{1.73_M2}

## 2020-11-20 PROCEDURE — 258N000003 HC RX IP 258 OP 636: Performed by: FAMILY MEDICINE

## 2020-11-20 PROCEDURE — 96365 THER/PROPH/DIAG IV INF INIT: CPT

## 2020-11-20 PROCEDURE — 82310 ASSAY OF CALCIUM: CPT | Mod: ZL | Performed by: FAMILY MEDICINE

## 2020-11-20 PROCEDURE — 250N000011 HC RX IP 250 OP 636: Performed by: FAMILY MEDICINE

## 2020-11-20 PROCEDURE — 36415 COLL VENOUS BLD VENIPUNCTURE: CPT | Mod: ZL | Performed by: FAMILY MEDICINE

## 2020-11-20 PROCEDURE — 82565 ASSAY OF CREATININE: CPT | Mod: ZL | Performed by: FAMILY MEDICINE

## 2020-11-20 RX ORDER — ZOLEDRONIC ACID 5 MG/100ML
5 INJECTION, SOLUTION INTRAVENOUS ONCE
Status: COMPLETED | OUTPATIENT
Start: 2020-11-20 | End: 2020-11-20

## 2020-11-20 RX ORDER — ZOLEDRONIC ACID 5 MG/100ML
5 INJECTION, SOLUTION INTRAVENOUS ONCE
Status: CANCELLED
Start: 2020-11-20 | End: 2020-11-20

## 2020-11-20 RX ADMIN — SODIUM CHLORIDE 250 ML: 9 INJECTION, SOLUTION INTRAVENOUS at 15:01

## 2020-11-20 RX ADMIN — ZOLEDRONIC ACID 5 MG: 5 INJECTION, SOLUTION INTRAVENOUS at 15:01

## 2020-11-20 ASSESSMENT — PAIN SCALES - GENERAL: PAINLEVEL: NO PAIN (0)

## 2020-11-20 NOTE — PROGRESS NOTES
IV removed, catheter intact.  Site clean, dry and intact.  No signs or symptoms of infiltration or infection.  Covered with a sterile bandage, slight pressure applied for 30 seconds.  Pt instructed to leave bandage intact for a minimum of one hour, and to call with questions or concerns.  Copy of appointments, discharge instructions, and after visit summary (AVS) provided to patient.  Patient states understanding, discharged.      24 gauge angio cath inserted into RT arm.  Immediate blood return noted.  IV secured with sterile, transparent dressing and tape.  Patient tolerated well, denies pain or discomfort at this time.  Flushes easily without resistance, no signs or symptoms of infiltration or infection.  Zero blood wasted and 1 tube blood removed for ordered labs. Flushed with 3mL normal saline to clear line. Patient denies questions or concerns regarding infusion and/or medication(s) being administered.

## 2020-11-20 NOTE — PROGRESS NOTES
Patient is a 70 year old female here accompanied by self  today for infusion of reclast per order of Dr. Connors   Patient identified with two identifiers, order verified, and verbal consent for today's infusion obtained from patient.      Patient  lab values: Calcium 9.0 Creatinine:0.68.      Patient meets order parameters for today's treatment.     24 gauge angio cath inserted into right anti cubical.  Immediate blood return noted.  IV secured with sterile, transparent dressing and tape.  Patient tolerated well, denies pain or discomfort at this time.  Flushes easily without resistance, no signs or symptoms of infiltration or infection.   Patient denies questions or concerns regarding infusion and/or medication(s) being administered.    1501  IV pump verified with dose, drug, and rate of administration.  Infusion administered per protocol.  Patient tolerated infusion well, no signs or symptoms of adverse reaction noted.  Patient denies pain nor discomfort.

## 2021-01-05 ENCOUNTER — TELEPHONE (OUTPATIENT)
Dept: FAMILY MEDICINE | Facility: OTHER | Age: 71
End: 2021-01-05

## 2021-01-05 NOTE — TELEPHONE ENCOUNTER
Patient reports pharmacy is changing. Please order medications [facility]. Phone [phone], fax [phone].

## 2021-01-06 NOTE — TELEPHONE ENCOUNTER
Left message for patient to call clinic back. Need to know which specific medications she is needing refilled.

## 2021-01-06 NOTE — TELEPHONE ENCOUNTER
Patient called back. Does not need any medications filled at this time. Patient just wanted new pharmacy on file so that when she needed a refill it would go to correct pharmacy. Writer did update chart and put [facility] as her preferred pharmacy.

## 2021-01-11 DIAGNOSIS — R09.A2 GLOBUS SENSATION: ICD-10-CM

## 2021-01-11 DIAGNOSIS — R09.82 POST-NASAL DRIP: ICD-10-CM

## 2021-01-11 DIAGNOSIS — E78.5 HYPERLIPIDEMIA, UNSPECIFIED HYPERLIPIDEMIA TYPE: ICD-10-CM

## 2021-01-11 DIAGNOSIS — K21.9 LARYNGOPHARYNGEAL REFLUX (LPR): ICD-10-CM

## 2021-01-11 DIAGNOSIS — E03.9 HYPOTHYROIDISM, UNSPECIFIED TYPE: ICD-10-CM

## 2021-01-11 DIAGNOSIS — R20.2 PARESTHESIA: ICD-10-CM

## 2021-01-11 NOTE — TELEPHONE ENCOUNTER
Patient called is requesting prescriptions sent to new pharmacy. In addition to the ones pended she is also requesting simvastatin 20 mg.

## 2021-01-12 NOTE — TELEPHONE ENCOUNTER
Simvastatin 20 mg      Last Written Prescription Date:  11/19/20  Last Fill Quantity: 90,   # refills: 1  Last Office Visit: 9/16/20  Future Office visit:       Routing refill request to provider for review/approval because:      Flonase 50 mcg      Last Written Prescription Date:  11/5/19  Last Fill Quantity: 16 g,   # refills: 5  Last Office Visit: 9/16/20  Future Office visit:       Routing refill request to provider for review/approval because:      Gabapentin 300 mg      Last Written Prescription Date:  11/19/20  Last Fill Quantity: 180,   # refills: 1  Last Office Visit: 9/16/20  Future Office visit:       Routing refill request to provider for review/approval because:    Levothyroxine 25 mcg      Last Written Prescription Date:  5/14/20  Last Fill Quantity: 90,   # refills: 3  Last Office Visit: 9/16/20  Future Office visit:       Routing refill request to provider for review/approval because:

## 2021-01-13 RX ORDER — SIMVASTATIN 20 MG
20 TABLET ORAL AT BEDTIME
Qty: 90 TABLET | Refills: 1 | Status: SHIPPED | OUTPATIENT
Start: 2021-01-13 | End: 2021-05-12

## 2021-01-13 RX ORDER — FLUTICASONE PROPIONATE 50 MCG
2 SPRAY, SUSPENSION (ML) NASAL DAILY
Qty: 16 G | Refills: 3 | Status: SHIPPED | OUTPATIENT
Start: 2021-01-13 | End: 2021-06-15

## 2021-01-13 RX ORDER — LEVOTHYROXINE SODIUM 25 UG/1
25 TABLET ORAL DAILY
Qty: 90 TABLET | Refills: 3 | Status: SHIPPED | OUTPATIENT
Start: 2021-01-13 | End: 2021-05-14

## 2021-01-13 RX ORDER — GABAPENTIN 300 MG/1
CAPSULE ORAL
Qty: 180 CAPSULE | Refills: 1 | Status: SHIPPED | OUTPATIENT
Start: 2021-01-13 | End: 2021-05-12

## 2021-04-13 ENCOUNTER — TELEPHONE (OUTPATIENT)
Dept: FAMILY MEDICINE | Facility: OTHER | Age: 71
End: 2021-04-13

## 2021-04-13 DIAGNOSIS — G25.81 RESTLESS LEG SYNDROME: Primary | ICD-10-CM

## 2021-04-13 NOTE — TELEPHONE ENCOUNTER
Pt called, states that she was seen for RLS in May 2020. Medication was discussed at that time but pt declined. Per notes from office visit- Take Neurontin consistently.  Then consider increasing Neurontin from 2 capsules to 3 at bedtime.  Consider trial of restless leg medication next, such as Requip or Mirapex.        Pt states that she continues to have issues and is requesting med at this time. Do you want to see her again or just prescribe something? Pharmacy pended. Please advise. Thank you!

## 2021-04-14 RX ORDER — ROPINIROLE 0.25 MG/1
0.25 TABLET, FILM COATED ORAL
Qty: 60 TABLET | Refills: 0 | Status: SHIPPED | OUTPATIENT
Start: 2021-04-14 | End: 2021-05-12

## 2021-04-14 NOTE — TELEPHONE ENCOUNTER
Requip sent. 1 tab -  0.25 mg dose at bedtime.  Can increase to 2 tabs after 1 week if needed.  Follow up in clinic 1 month, sooner if needed.   Clear bilaterally, pupils equal, round and reactive to light.

## 2021-05-06 NOTE — PROGRESS NOTES
Assessment & Plan     Restless leg syndrome  Responded well to low dose Requip.  Continue.  Refills done.  Check iron levels now that on supplement.  - Ferritin; Future  - rOPINIRole (REQUIP) 0.25 MG tablet; Take 1 tablet (0.25 mg) by mouth nightly as needed (restless legs)    Hypothyroidism, unspecified type  Stable.  Update labs.  - TSH with free T4 reflex; Future    Hyperlipidemia, unspecified hyperlipidemia type  Due for fasting labs - scheduled later this week.  Continue statin.  - Lipid Profile (Chol, Trig, HDL, LDL calc); Future  - Comprehensive metabolic panel (BMP + Alb, Alk Phos, ALT, AST, Total. Bili, TP); Future  - simvastatin (ZOCOR) 20 MG tablet; Take 1 tablet (20 mg) by mouth At Bedtime    Osteoporosis, unspecified osteoporosis type, unspecified pathological fracture presence  DEXA due 9/2021.  Vit D with lab draw.  Reclast x 5 th dose this coming 11/2021.  - DX Hip/Pelvis/Spine; Future  - Vitamin D Deficiency; Future    Other symptoms and signs concerning food and fluid intake   - Ferritin; Future    Paresthesia  Stable with neurontin.  - gabapentin (NEURONTIN) 300 MG capsule; TAKE 2 CAPSULES BY MOUTH AT BEDTIME       Patient Instructions   Fasting labs this Friday 11am.  Water, black coffee, and medications ok.  Will call with results.    DEXA last done 8/2019 - schedule for 9/2021.    Reclast number 5 due in 11/2021.    Tetanus last done 2010 - due for repeat - check with insurance/pharmacy on coverage.              Melyssa Encinas MD  [facility] - ESTEPHANIE Chairez is a 71 year old who presents for the following health issues     HPI     Hyperlipidemia Follow Up    Are you regularly taking any medication or supplement to lower your cholesterol?   Yes- zocor 20mg    Are you having muscle aches or other side effects that you think could be caused by your cholesterol lowering medication?  No    Hypothyroidism Follow-up    Since last visit, patient describes the  "following symptoms: Weight stable, no hair loss, no skin changes, no constipation, no loose stools    Takes is separately    Concern - restless legs   Onset: ongoing   Description: follow up on Requip. Only needing to take one at night. Has helped tremendously  Intensity: 0/10  Progression of Symptoms:  improving  Accompanying Signs & Symptoms: none  Previous history of similar problem: no  Precipitating factors:        Worsened by: nothing   Alleviating factors:        Improved by: Requip  Therapies tried and outcome: Requip 0.25 mg  Sleeping well also  Taking iron supplement - no constipation.  Uses miralax if needed couple times per wek.  Fiber cereal.    OSTEPOROSIS - Reclast restarted it 2017.  Will be last dose 11/2021. Last DEXA 8/2019.  Did see Dr Moses.     Had Moderna 2 dose COVID vaccine.    Review of Systems   Constitutional, HEENT, cardiovascular, pulmonary, gi and gu systems are negative, except as otherwise noted.      Objective    /74   Pulse 68   Temp 97.9  F (36.6  C) (Tympanic)   Resp 19   Ht 1.549 m (5' 1\")   Wt 50.8 kg (112 lb)   SpO2 98%   BMI 21.16 kg/m    Body mass index is 21.16 kg/m .  Physical Exam   GENERAL: healthy, alert and no distress  EYES: Eyes grossly normal to inspection, PERRL and conjunctivae and sclerae normal  NECK: no adenopathy, no asymmetry, masses, or scars and thyroid normal to palpation  RESP: lungs clear to auscultation - no rales, rhonchi or wheezes  CV: regular rate and rhythm, normal S1 S2, no S3 or S4, no murmur, click or rub, no peripheral edema and peripheral pulses strong  ABDOMEN: soft, nontender, no hepatosplenomegaly, no masses and bowel sounds normal  MS: no gross musculoskeletal defects noted, no edema  NEURO: Normal strength and tone, mentation intact and speech normal  PSYCH: mentation appears normal, affect normal/bright                "

## 2021-05-12 ENCOUNTER — OFFICE VISIT (OUTPATIENT)
Dept: FAMILY MEDICINE | Facility: OTHER | Age: 71
End: 2021-05-12
Attending: FAMILY MEDICINE
Payer: COMMERCIAL

## 2021-05-12 VITALS
WEIGHT: 112 LBS | OXYGEN SATURATION: 98 % | DIASTOLIC BLOOD PRESSURE: 74 MMHG | BODY MASS INDEX: 21.14 KG/M2 | HEIGHT: 61 IN | HEART RATE: 68 BPM | RESPIRATION RATE: 19 BRPM | SYSTOLIC BLOOD PRESSURE: 118 MMHG | TEMPERATURE: 97.9 F

## 2021-05-12 DIAGNOSIS — E78.5 HYPERLIPIDEMIA, UNSPECIFIED HYPERLIPIDEMIA TYPE: ICD-10-CM

## 2021-05-12 DIAGNOSIS — E03.9 HYPOTHYROIDISM, UNSPECIFIED TYPE: ICD-10-CM

## 2021-05-12 DIAGNOSIS — R20.2 PARESTHESIA: ICD-10-CM

## 2021-05-12 DIAGNOSIS — G25.81 RESTLESS LEG SYNDROME: Primary | ICD-10-CM

## 2021-05-12 DIAGNOSIS — M81.0 OSTEOPOROSIS, UNSPECIFIED OSTEOPOROSIS TYPE, UNSPECIFIED PATHOLOGICAL FRACTURE PRESENCE: ICD-10-CM

## 2021-05-12 DIAGNOSIS — R63.8 OTHER SYMPTOMS AND SIGNS CONCERNING FOOD AND FLUID INTAKE: ICD-10-CM

## 2021-05-12 PROCEDURE — G0463 HOSPITAL OUTPT CLINIC VISIT: HCPCS | Mod: 25

## 2021-05-12 PROCEDURE — G0463 HOSPITAL OUTPT CLINIC VISIT: HCPCS

## 2021-05-12 PROCEDURE — 99214 OFFICE O/P EST MOD 30 MIN: CPT | Performed by: FAMILY MEDICINE

## 2021-05-12 RX ORDER — ROPINIROLE 0.25 MG/1
0.25 TABLET, FILM COATED ORAL
Qty: 90 TABLET | Refills: 1 | Status: SHIPPED | OUTPATIENT
Start: 2021-05-12 | End: 2021-07-14

## 2021-05-12 RX ORDER — GABAPENTIN 300 MG/1
CAPSULE ORAL
Qty: 180 CAPSULE | Refills: 1 | Status: SHIPPED | OUTPATIENT
Start: 2021-05-12 | End: 2021-07-14

## 2021-05-12 RX ORDER — SIMVASTATIN 20 MG
20 TABLET ORAL AT BEDTIME
Qty: 90 TABLET | Refills: 1 | Status: SHIPPED | OUTPATIENT
Start: 2021-05-12 | End: 2021-12-14

## 2021-05-12 ASSESSMENT — ANXIETY QUESTIONNAIRES
GAD7 TOTAL SCORE: 0
1. FEELING NERVOUS, ANXIOUS, OR ON EDGE: NOT AT ALL
7. FEELING AFRAID AS IF SOMETHING AWFUL MIGHT HAPPEN: NOT AT ALL
5. BEING SO RESTLESS THAT IT IS HARD TO SIT STILL: NOT AT ALL
2. NOT BEING ABLE TO STOP OR CONTROL WORRYING: NOT AT ALL
4. TROUBLE RELAXING: NOT AT ALL
6. BECOMING EASILY ANNOYED OR IRRITABLE: NOT AT ALL
3. WORRYING TOO MUCH ABOUT DIFFERENT THINGS: NOT AT ALL

## 2021-05-12 ASSESSMENT — PATIENT HEALTH QUESTIONNAIRE - PHQ9: SUM OF ALL RESPONSES TO PHQ QUESTIONS 1-9: 0

## 2021-05-12 ASSESSMENT — PAIN SCALES - GENERAL: PAINLEVEL: NO PAIN (0)

## 2021-05-12 ASSESSMENT — MIFFLIN-ST. JEOR: SCORE: 950.41

## 2021-05-12 NOTE — NURSING NOTE
"Chief Complaint   Patient presents with     Recheck Medication     Thyroid Problem     Lipids       Initial /74   Pulse 68   Temp 97.9  F (36.6  C) (Tympanic)   Resp 19   Ht 1.549 m (5' 1\")   Wt 50.8 kg (112 lb)   SpO2 98%   BMI 21.16 kg/m   Estimated body mass index is 21.16 kg/m  as calculated from the following:    Height as of this encounter: 1.549 m (5' 1\").    Weight as of this encounter: 50.8 kg (112 lb).  Medication Reconciliation: complete  Ragini Claudio LPN    "

## 2021-05-12 NOTE — PATIENT INSTRUCTIONS
Fasting labs this Friday 11am.  Water, black coffee, and medications ok.  Will call with results.    DEXA last done 8/2019 - schedule for 9/2021.    Reclast number 5 due in 11/2021.    Tetanus last done 2010 - due for repeat - check with insurance/pharmacy on coverage.

## 2021-05-13 ENCOUNTER — TELEPHONE (OUTPATIENT)
Dept: FAMILY MEDICINE | Facility: OTHER | Age: 71
End: 2021-05-13

## 2021-05-13 ASSESSMENT — ANXIETY QUESTIONNAIRES: GAD7 TOTAL SCORE: 0

## 2021-05-13 NOTE — TELEPHONE ENCOUNTER
Call from patient to provide an update on tetanus.     Insurance will cover tetanus, patient would like to schedule appt and provide nurse with an update on insurance company requests.     Patient can be reached at [phone]

## 2021-05-14 ENCOUNTER — ALLIED HEALTH/NURSE VISIT (OUTPATIENT)
Dept: FAMILY MEDICINE | Facility: OTHER | Age: 71
End: 2021-05-14
Attending: FAMILY MEDICINE
Payer: MEDICARE

## 2021-05-14 DIAGNOSIS — Z23 ENCOUNTER FOR IMMUNIZATION: Primary | ICD-10-CM

## 2021-05-14 DIAGNOSIS — R63.8 OTHER SYMPTOMS AND SIGNS CONCERNING FOOD AND FLUID INTAKE: ICD-10-CM

## 2021-05-14 DIAGNOSIS — G25.81 RESTLESS LEG SYNDROME: ICD-10-CM

## 2021-05-14 DIAGNOSIS — E03.9 HYPOTHYROIDISM, UNSPECIFIED TYPE: ICD-10-CM

## 2021-05-14 DIAGNOSIS — E78.5 HYPERLIPIDEMIA, UNSPECIFIED HYPERLIPIDEMIA TYPE: ICD-10-CM

## 2021-05-14 DIAGNOSIS — M81.0 OSTEOPOROSIS, UNSPECIFIED OSTEOPOROSIS TYPE, UNSPECIFIED PATHOLOGICAL FRACTURE PRESENCE: ICD-10-CM

## 2021-05-14 LAB
ALBUMIN SERPL-MCNC: 3.8 G/DL (ref 3.4–5)
ALP SERPL-CCNC: 78 U/L (ref 40–150)
ALT SERPL W P-5'-P-CCNC: 21 U/L (ref 0–50)
ANION GAP SERPL CALCULATED.3IONS-SCNC: 3 MMOL/L (ref 3–14)
AST SERPL W P-5'-P-CCNC: 18 U/L (ref 0–45)
BILIRUB SERPL-MCNC: 0.3 MG/DL (ref 0.2–1.3)
BUN SERPL-MCNC: 9 MG/DL (ref 7–30)
CALCIUM SERPL-MCNC: 9.1 MG/DL (ref 8.5–10.1)
CHLORIDE SERPL-SCNC: 104 MMOL/L (ref 94–109)
CHOLEST SERPL-MCNC: 164 MG/DL
CO2 SERPL-SCNC: 30 MMOL/L (ref 20–32)
CREAT SERPL-MCNC: 0.75 MG/DL (ref 0.52–1.04)
FERRITIN SERPL-MCNC: 60 NG/ML (ref 8–252)
GFR SERPL CREATININE-BSD FRML MDRD: 79 ML/MIN/{1.73_M2}
GLUCOSE SERPL-MCNC: 98 MG/DL (ref 70–99)
HDLC SERPL-MCNC: 68 MG/DL
LDLC SERPL CALC-MCNC: 84 MG/DL
NONHDLC SERPL-MCNC: 96 MG/DL
POTASSIUM SERPL-SCNC: 4.3 MMOL/L (ref 3.4–5.3)
PROT SERPL-MCNC: 7.9 G/DL (ref 6.8–8.8)
SODIUM SERPL-SCNC: 137 MMOL/L (ref 133–144)
T4 FREE SERPL-MCNC: 0.94 NG/DL (ref 0.76–1.46)
TRIGL SERPL-MCNC: 60 MG/DL
TSH SERPL DL<=0.005 MIU/L-ACNC: 5.13 MU/L (ref 0.4–4)

## 2021-05-14 PROCEDURE — 84443 ASSAY THYROID STIM HORMONE: CPT | Mod: ZL | Performed by: FAMILY MEDICINE

## 2021-05-14 PROCEDURE — 90471 IMMUNIZATION ADMIN: CPT | Mod: GY

## 2021-05-14 PROCEDURE — 36415 COLL VENOUS BLD VENIPUNCTURE: CPT | Mod: ZL | Performed by: FAMILY MEDICINE

## 2021-05-14 PROCEDURE — 82306 VITAMIN D 25 HYDROXY: CPT | Mod: ZL | Performed by: FAMILY MEDICINE

## 2021-05-14 PROCEDURE — 80053 COMPREHEN METABOLIC PANEL: CPT | Mod: ZL | Performed by: FAMILY MEDICINE

## 2021-05-14 PROCEDURE — 82728 ASSAY OF FERRITIN: CPT | Mod: ZL | Performed by: FAMILY MEDICINE

## 2021-05-14 PROCEDURE — 84439 ASSAY OF FREE THYROXINE: CPT | Mod: ZL | Performed by: FAMILY MEDICINE

## 2021-05-14 PROCEDURE — 80061 LIPID PANEL: CPT | Mod: ZL | Performed by: FAMILY MEDICINE

## 2021-05-14 RX ORDER — LEVOTHYROXINE SODIUM 25 UG/1
25 TABLET ORAL DAILY
Qty: 135 TABLET | Refills: 3 | Status: SHIPPED | OUTPATIENT
Start: 2021-05-14 | End: 2021-09-20

## 2021-05-14 NOTE — PROGRESS NOTES
Clinic Administered Medication Documentation  3    Injectable Medication Documentation    Patient was given tetnus. Prior to medication administration, verified patients identity using patient s name and date of birth. Please see MAR and medication order for additional information. Patient instructed to remain in clinic for 15 minutes.      Was entire vial of medication used? Yes  Vial/Syringe: Single dose vial  Expiration Date:  6-6-22  Was this medication supplied by the patient? No

## 2021-05-17 LAB — DEPRECATED CALCIDIOL+CALCIFEROL SERPL-MC: 51 UG/L (ref 20–75)

## 2021-06-01 ENCOUNTER — TELEPHONE (OUTPATIENT)
Dept: FAMILY MEDICINE | Facility: OTHER | Age: 71
End: 2021-06-01

## 2021-06-01 NOTE — TELEPHONE ENCOUNTER
Patient calling to clarify orders on her levothyroxine. Lab notes state to take 50 mcg 4x/week and 25 mcg 3x/week. Rx sig states to take 25 mcg 4x/week and 50 mcg 3x/week. Please clarify which order patient is to be doing. Thank you. Patient is taking 50 mcg 4x/week and 25 mcg 3x/week.  Will need to call [facility] and provide correct instructions. Patient is aware PCP is out today and will wait until she returns tomorrow.     Patient's phone number is [phone] and patient stated it is ok to leave a detailed message.     Melyssa Connors MD   5/14/2021  2:46 PM CDT      Call with results.  TSH running borderline low.  Would have increase her dose from 25 mcg daily to 25 mcg 3 days per week and 50 mcg 4 days per week.  New script sent.  Can recheck in 2 months.  Other labs ok.  Vit D still pending - will notify if abnormal.     levothyroxine (SYNTHROID/LEVOTHROID) 25 MCG tablet 135 tablet 3 5/14/2021  No   Sig - Route: Take 1 tablet (25 mcg) by mouth daily X 4 days per week and 50 mcg daily x 3 days per week - Oral

## 2021-06-02 NOTE — TELEPHONE ENCOUNTER
Patient started taking 50 mcg 4x/week and 25 mcg 3x/week on 05/16 after she received lab results from 05/14. Would you still like her to increase to 50 mcg daily?

## 2021-06-02 NOTE — TELEPHONE ENCOUNTER
Ok- sorry for the confusion.  Yes, her thyroid was running a bit low last month.  She was on 25 mcg daily.  Should do the 50 mcg 4 days per week and 25 mcg 3 days per week.

## 2021-06-02 NOTE — TELEPHONE ENCOUNTER
If patient taking 25 mcg 3 days per week and 50 mcg 4 days per week, and thyroid running low, please have her take 50 mcg all 7 days per week and reassess in 1-2 months.

## 2021-06-15 ENCOUNTER — OFFICE VISIT (OUTPATIENT)
Dept: OTOLARYNGOLOGY | Facility: OTHER | Age: 71
End: 2021-06-15
Attending: PHYSICIAN ASSISTANT
Payer: COMMERCIAL

## 2021-06-15 VITALS
BODY MASS INDEX: 21.14 KG/M2 | WEIGHT: 112 LBS | HEART RATE: 65 BPM | HEIGHT: 61 IN | TEMPERATURE: 98.5 F | DIASTOLIC BLOOD PRESSURE: 72 MMHG | OXYGEN SATURATION: 97 % | SYSTOLIC BLOOD PRESSURE: 116 MMHG

## 2021-06-15 DIAGNOSIS — K21.9 LARYNGOPHARYNGEAL REFLUX (LPR): ICD-10-CM

## 2021-06-15 DIAGNOSIS — H61.23 EXCESSIVE CERUMEN IN BOTH EAR CANALS: ICD-10-CM

## 2021-06-15 DIAGNOSIS — R09.82 POST-NASAL DRIP: Primary | ICD-10-CM

## 2021-06-15 DIAGNOSIS — H92.01 OTALGIA, RIGHT: ICD-10-CM

## 2021-06-15 PROCEDURE — 99213 OFFICE O/P EST LOW 20 MIN: CPT | Mod: 25 | Performed by: PHYSICIAN ASSISTANT

## 2021-06-15 PROCEDURE — 92504 EAR MICROSCOPY EXAMINATION: CPT | Performed by: PHYSICIAN ASSISTANT

## 2021-06-15 PROCEDURE — 92504 EAR MICROSCOPY EXAMINATION: CPT

## 2021-06-15 PROCEDURE — G0463 HOSPITAL OUTPT CLINIC VISIT: HCPCS

## 2021-06-15 RX ORDER — FLUTICASONE PROPIONATE 50 MCG
2 SPRAY, SUSPENSION (ML) NASAL DAILY
Qty: 16 G | Refills: 3 | Status: SHIPPED | OUTPATIENT
Start: 2021-06-15 | End: 2022-09-07

## 2021-06-15 ASSESSMENT — MIFFLIN-ST. JEOR: SCORE: 950.41

## 2021-06-15 ASSESSMENT — PAIN SCALES - GENERAL: PAINLEVEL: NO PAIN (0)

## 2021-06-15 NOTE — NURSING NOTE
"Chief Complaint   Patient presents with     Ear Problem     Pt is here for ear pain. She has c/o right ear pain starting about 3 weeks ago, states that it is feeling better.        Initial /72 (Cuff Size: Adult Regular)   Pulse 65   Temp 98.5  F (36.9  C) (Tympanic)   Ht 1.549 m (5' 1\")   Wt 50.8 kg (112 lb)   SpO2 97%   BMI 21.16 kg/m   Estimated body mass index is 21.16 kg/m  as calculated from the following:    Height as of this encounter: 1.549 m (5' 1\").    Weight as of this encounter: 50.8 kg (112 lb).  Medication Reconciliation: complete  Jasmin White LPN    "

## 2021-06-15 NOTE — LETTER
6/15/2021         RE: Mihaela Jang  [address]  [facility] [address]        Dear Colleague,    Thank you for referring your patient, Mihaela Jang, to the [facility] [place]. Please see a copy of my visit note below.    Chief Complaint   Patient presents with     Ear Problem     Pt is here for ear pain. She has c/o right ear pain starting about 3 weeks ago, states that it is feeling better.      Patient presents for ear cleaning and ear exam. Mihaela presents for recent complaints of right otalgia about 2-3 weeks ago. She described like a ear ache pain, pain that radiated to jaw line.   She has been using Flonase daily. Less fullness/ pressure.   Fullness has since resolved from her right ear.   Reports her hearing is normal and equal.     Hearing has been good. Denies concerns with tinnitus.   She does use ear plugs and wishes to have her ears checked and cleaned.    She did have some clicking in her ears, but started Flonase again. She has less noise/ crackling in her ears.   She does use flonase PRN.   No concerns with nasal congestion.     Past Medical History:   Diagnosis Date     Atypical nevi      Chondrodermatitis nodularis helicis of left ear     Dr Shipley, [facility]     Chronic rhinitis     [facility] allergy; negative skin testing; IgE mediated allergies; ENT - DC nasal steroid and antihistamine; saline rinses     Degenerative skin disorder     solar elastosis     Hallux rigidus 06/15/2000     Hyperlipidemia, unspecified hyperlipidemia type 9/15/2016     Laryngopharyngeal reflux disease     PPI     Malignant neoplasm of breast (female), unspecified site 03/10/2004     Notalgia paresthetica      Osteoarthritis 07/20/2011     Osteoporosis, unspecified 09/10/2001    Dr Moses; intermittent reclast;  Dr. Moses; repeat dexa after full 2 years Reclast     Other abnormal glucose 12/20/2013     Paresthesia     neck; notalgiaparesthetic;   "armond & Dr Nevarez; neurontin     Personal history of malignant neoplasm of breast 06/07/2005     Rhinitis      Schamberg's purpura         Allergies   Allergen Reactions     Chloraprep One Step Rash     Povidone Iodine Rash     Betadine      Soap Rash     Betadine        Current Outpatient Medications   Medication     aspirin EC 81 MG tablet     CALCIUM CITRATE     cholecalciferol 1000 UNITS TABS     cinnamon 500 MG CAPS     ferrous sulfate (SLO-FE) 142 (45 Fe) MG CR tablet     fish oil-omega-3 fatty acids (FISH OIL) 1000 MG capsule     fluticasone (FLONASE) 50 MCG/ACT nasal spray     gabapentin (NEURONTIN) 300 MG capsule     Lactobacillus (ACIDOPHILUS) TABS     levothyroxine (SYNTHROID/LEVOTHROID) 25 MCG tablet     Lutein 20 MG CAPS     MAGNESIUM OXIDE PO     Multiple vitamin  s TABS     naproxen (NAPROSYN) 500 MG tablet     Polyethylene Glycol 3350 (MIRALAX PO)     rOPINIRole (REQUIP) 0.25 MG tablet     simvastatin (ZOCOR) 20 MG tablet     triamcinolone 0.1 % EX external cream     Turmeric (RA TURMERIC) 500 MG CAPS     zinc 50 MG TABS     No current facility-administered medications for this visit.       ROS: 10 point ROS neg other than the symptoms noted above in the HPI.  /72 (Cuff Size: Adult Regular)   Pulse 65   Temp 98.5  F (36.9  C) (Tympanic)   Ht 1.549 m (5' 1\")   Wt 50.8 kg (112 lb)   SpO2 97%   BMI 21.16 kg/m      General - The patient is well nourished and well developed, and appears to have good nutritional status.  Alert and oriented to person and place, answers questions and cooperates with examination appropriately.   Head and Face - Normocephalic and atraumatic, with no gross asymmetry noted.  The facial nerve is intact, with strong symmetric movements.  Voice and Breathing - The patient was breathing comfortably without the use of accessory muscles. There was no wheezing, stridor, or stertor.  The patients voice was clear and strong, and had appropriate pitch and quality.  Ears " -examined under microscopy bilaterally using otologic speculum. Ears were cleaned with cupped forceps.   The external auditory canals are cerumen, the tympanic membranes are intact without effusion, retraction or mass.  Bony landmarks are intact.    Mouth - Examination of the oral cavity showed pink, healthy oral mucosa. No lesions or ulcerations noted.  The tongue was mobile and midline, and the dentition were in good condition.    Throat - The walls of the oropharynx were smooth, pink, moist, symmetric, and had no lesions or ulcerations.  The tonsillar pillars and soft palate were symmetric.  The uvula was midline on elevation.    Neck - Normal midline excursion of the laryngotracheal complex during swallowing.  Full range of motion on passive movement.  Palpation of the occipital, submental, submandibular, internal jugular chain, and supraclavicular nodes did not demonstrate any abnormal lymph nodes or masses.  Palpation of the thyroid was soft and smooth, with no nodules or goiter appreciated.  The trachea was mobile and midline.  Palpable TMJ tenderness to right with palpable click.   Nose - External contour is symmetric, no gross deflection or scars.  Nasal mucosa is pink and moist with no abnormal mucus.  The septum was intact, turbinates of normal size and position.  No polyps, masses, or purulence noted on examination        ASSESSMENT:    ICD-10-CM    1. Post-nasal drip  R09.82 fluticasone (FLONASE) 50 MCG/ACT nasal spray   2. Laryngopharyngeal reflux (LPR)  K21.9    3. Excessive cerumen in both ear canals  H61.23    4. Otalgia, right  H92.01        Ears look well. No fluid or infection.   Wax removed today.   Complete hearing test if any hearing concerns.   Ear pain- ? related to TMJ. Use warm compresses if pain returns.   Use Flonase PRN.     Follow up in 1 year.       Sejal Gutierrez PA-C  ENT  [facility] [place]             Again, thank you for allowing me to participate in the care of your patient.         Sincerely,        Sejal Gutierrez PA-C

## 2021-06-15 NOTE — PATIENT INSTRUCTIONS
Ears look well. No fluid or infection.   Wax removed today.   Complete hearing test if any hearing concerns.   Ear pain- could be related to TMJ. Use warm compresses if pain returns.   Follow up in 1 year.   Flonase was refilled. Use as needed.     Thank you for allowing Sejal Gutierrez PA-C and our ENT team to participate in your care.  If your medications are too expensive, please give the nurse a call.  We can possibly change this medication.  If you have a scheduling or an appointment question please contact our Health Unit Coordinator at [phone], Ext. [phone].    ALL nursing questions or concerns can be directed to your ENT nurse at: [phone] Fela

## 2021-06-15 NOTE — PROGRESS NOTES
Chief Complaint   Patient presents with     Ear Problem     Pt is here for ear pain. She has c/o right ear pain starting about 3 weeks ago, states that it is feeling better.      Patient presents for ear cleaning and ear exam. Mihaela presents for recent complaints of right otalgia about 2-3 weeks ago. She described like a ear ache pain, pain that radiated to jaw line.   She has been using Flonase daily. Less fullness/ pressure.   Fullness has since resolved from her right ear.   Reports her hearing is normal and equal.     Hearing has been good. Denies concerns with tinnitus.   She does use ear plugs and wishes to have her ears checked and cleaned.    She did have some clicking in her ears, but started Flonase again. She has less noise/ crackling in her ears.   She does use flonase PRN.   No concerns with nasal congestion.     Past Medical History:   Diagnosis Date     Atypical nevi      Chondrodermatitis nodularis helicis of left ear     Dr Shipley, [facility]     Chronic rhinitis     Dr Messina, [facility] allergy; negative skin testing; IgE mediated allergies; ENT - DC nasal steroid and antihistamine; saline rinses     Degenerative skin disorder     solar elastosis     Hallux rigidus 06/15/2000     Hyperlipidemia, unspecified hyperlipidemia type 9/15/2016     Laryngopharyngeal reflux disease     PPI     Malignant neoplasm of breast (female), unspecified site 03/10/2004     Notalgia paresthetica      Osteoarthritis 07/20/2011     Osteoporosis, unspecified 09/10/2001    Dr Moses; intermittent reclast;  Dr. Moses; repeat dexa after full 2 years Reclast     Other abnormal glucose 12/20/2013     Paresthesia     neck; notalgiaparesthetic; dr leahy & Dr Nevarez; neurontin     Personal history of malignant neoplasm of breast 06/07/2005     Rhinitis      Schamberg's purpura         Allergies   Allergen Reactions     Chloraprep One Step Rash     Povidone Iodine Rash     Betadine      Soap Rash     Betadine   "      Current Outpatient Medications   Medication     aspirin EC 81 MG tablet     CALCIUM CITRATE     cholecalciferol 1000 UNITS TABS     cinnamon 500 MG CAPS     ferrous sulfate (SLO-FE) 142 (45 Fe) MG CR tablet     fish oil-omega-3 fatty acids (FISH OIL) 1000 MG capsule     fluticasone (FLONASE) 50 MCG/ACT nasal spray     gabapentin (NEURONTIN) 300 MG capsule     Lactobacillus (ACIDOPHILUS) TABS     levothyroxine (SYNTHROID/LEVOTHROID) 25 MCG tablet     Lutein 20 MG CAPS     MAGNESIUM OXIDE PO     Multiple vitamin  s TABS     naproxen (NAPROSYN) 500 MG tablet     Polyethylene Glycol 3350 (MIRALAX PO)     rOPINIRole (REQUIP) 0.25 MG tablet     simvastatin (ZOCOR) 20 MG tablet     triamcinolone 0.1 % EX external cream     Turmeric (RA TURMERIC) 500 MG CAPS     zinc 50 MG TABS     No current facility-administered medications for this visit.       ROS: 10 point ROS neg other than the symptoms noted above in the HPI.  /72 (Cuff Size: Adult Regular)   Pulse 65   Temp 98.5  F (36.9  C) (Tympanic)   Ht 1.549 m (5' 1\")   Wt 50.8 kg (112 lb)   SpO2 97%   BMI 21.16 kg/m      General - The patient is well nourished and well developed, and appears to have good nutritional status.  Alert and oriented to person and place, answers questions and cooperates with examination appropriately.   Head and Face - Normocephalic and atraumatic, with no gross asymmetry noted.  The facial nerve is intact, with strong symmetric movements.  Voice and Breathing - The patient was breathing comfortably without the use of accessory muscles. There was no wheezing, stridor, or stertor.  The patients voice was clear and strong, and had appropriate pitch and quality.  Ears -examined under microscopy bilaterally using otologic speculum. Ears were cleaned with cupped forceps.   The external auditory canals are cerumen, the tympanic membranes are intact without effusion, retraction or mass.  Bony landmarks are intact.    Mouth - Examination of " the oral cavity showed pink, healthy oral mucosa. No lesions or ulcerations noted.  The tongue was mobile and midline, and the dentition were in good condition.    Throat - The walls of the oropharynx were smooth, pink, moist, symmetric, and had no lesions or ulcerations.  The tonsillar pillars and soft palate were symmetric.  The uvula was midline on elevation.    Neck - Normal midline excursion of the laryngotracheal complex during swallowing.  Full range of motion on passive movement.  Palpation of the occipital, submental, submandibular, internal jugular chain, and supraclavicular nodes did not demonstrate any abnormal lymph nodes or masses.  Palpation of the thyroid was soft and smooth, with no nodules or goiter appreciated.  The trachea was mobile and midline.  Palpable TMJ tenderness to right with palpable click.   Nose - External contour is symmetric, no gross deflection or scars.  Nasal mucosa is pink and moist with no abnormal mucus.  The septum was intact, turbinates of normal size and position.  No polyps, masses, or purulence noted on examination        ASSESSMENT:    ICD-10-CM    1. Post-nasal drip  R09.82 fluticasone (FLONASE) 50 MCG/ACT nasal spray   2. Laryngopharyngeal reflux (LPR)  K21.9    3. Excessive cerumen in both ear canals  H61.23    4. Otalgia, right  H92.01        Ears look well. No fluid or infection.   Wax removed today.   Complete hearing test if any hearing concerns.   Ear pain- ? related to TMJ. Use warm compresses if pain returns.   Use Flonase PRN.     Follow up in 1 year.       Sejal Gutierrez PA-C  ENT  [facility]

## 2021-06-28 ENCOUNTER — NURSE TRIAGE (OUTPATIENT)
Dept: FAMILY MEDICINE | Facility: OTHER | Age: 71
End: 2021-06-28

## 2021-06-28 NOTE — TELEPHONE ENCOUNTER
"Pt called, reports neck/upper back pain that started on 6/25. No recent injury or overuse that pt is aware of. No neuro symptoms. Does report pain radiates down R arm. Has tried OTC meds with no noted improvement. Requesting appt this week with PCP. Please advise. Thank you!      Reason for Disposition    [1] MODERATE neck pain (e.g., interferes with normal activities AND [2] present > 3 days    Additional Information    Negative: Shock suspected (e.g., cold/pale/clammy skin, too weak to stand, low BP, rapid pulse)    Negative: Difficult to awaken or acting confused (e.g., disoriented, slurred speech)    Negative: [1] Similar pain previously AND [2] it was from \"heart attack\"    Negative: [1] Similar pain previously AND [2] it was from \"angina\" AND [3] not relieved by nitroglycerin    Negative: Sounds like a life-threatening emergency to the triager    Negative: Followed a neck injury (e.g., MVA, sports, impact or collision)    Negative: Chest pain    Negative: Lymph node in the neck is swollen or painful to the touch    Negative: Sore throat is main symptom    Negative: Difficulty breathing or unusual sweating (e.g., sweating without exertion)    Negative: [1] Stiff neck (can't put chin to chest) AND [2] headache    Negative: [1] Stiff neck (can't put chin to chest) AND [2] fever    Negative: Weakness of an arm or hand    Negative: Problems with bowel or bladder control    Negative: Head is twisting to one side (or ask \"is it turning against your will?\")    Negative: Patient sounds very sick or weak to the triager    Negative: [1] SEVERE neck pain (e.g., excruciating, unable to do any normal activities) AND [2] not improved after 2 hours of pain medicine    Negative: [1] Fever > 100.0 F (37.8 C) AND [2] IVDA (intravenous drug abuse)    Negative: [1] Fever > 100.0 F (37.8 C) AND [2] diabetes mellitus or weak immune system (e.g., HIV positive, cancer chemo, splenectomy, organ transplant, chronic steroids)    " "Negative: Numbness in an arm or hand (i.e., loss of sensation)    Negative: Tenderness or swelling of front of neck over windpipe    Negative: Rash in same area as pain (may be described as \"small blisters\")    Negative: High-risk adult (e.g., history of cancer, HIV, or IV drug abuse)    Answer Assessment - Initial Assessment Questions  1. ONSET: \"When did the pain begin?\"       6/25  2. LOCATION: \"Where does it hurt?\"       Neck/upper back  3. PATTERN \"Does the pain come and go, or has it been constant since it started?\"       Constant   4. SEVERITY: \"How bad is the pain?\"  (Scale 1-10; or mild, moderate, severe)    - MILD (1-3): doesn't interfere with normal activities     - MODERATE (4-7): interferes with normal activities or awakens from sleep     - SEVERE (8-10):  excruciating pain, unable to do any normal activities       1-2/10 at best, 10/10 at worst  5. RADIATION: \"Does the pain go anywhere else, shoot into your arms?\"      Yes, shoots down into R arm  6. CORD SYMPTOMS: \"Any weakness or numbness of the arms or legs?\"      no  7. CAUSE: \"What do you think is causing the neck pain?\"      Not sure  8. NECK OVERUSE: \"Any recent activities that involved turning or twisting the neck?\"      no  9. OTHER SYMPTOMS: \"Do you have any other symptoms?\" (e.g., headache, fever, chest pain, difficulty breathing, neck swelling)      denies  10. PREGNANCY: \"Is there any chance you are pregnant?\" \"When was your last menstrual period?\"        na    Protocols used: NECK PAIN OR [id number]      "

## 2021-07-01 NOTE — PROGRESS NOTES
Assessment & Plan     Neck pain  No red flags.  Exam rather benign.  No systemic symptoms or signs.  DDD with radiculopathy vs muscle pain vs other.  Xray today.  PT referral.  Symptomatic cares.  Can add dose of Neurontin at night.  - XR Cervical Spine 2/3 Views; Future  - PHYSICAL THERAPY REFERRAL; Future     Patient Instructions   Xray today - will call with results.  Referral to physical therapy.  Rest, gentle stretching, symptomatic cares.  Update me with any progression - numbness, weakness, fever, rash, etc.          Melyssa Encinas MD  [facility] - ESTEPHANIE Chairez is a 71 year old who presents for the following health issues     HPI     Neck Pain - onset 6/25/21 - stabbing pain onset upon awakening; no inciting event/injury      Where is your back pain located? (Select all that apply) upper back both, neck both and shoulders right    How would you describe your back pain? sharp and shooting initially, now aching per pt    Where does your back pain spread? the right shoulder and the right side of neck    Since your last clinic visit for back pain, how has your pain changed? always present, but gets better and worse    Does your back pain interfere with your job? Not applicable    Since your last visit, have you tried any new treatment? Yes -  NSAIDs (Ibuprofen, Naproxen) Patient tried Naproxen - 2 on 6/25 and following 2 days;  but was not effective so stopped; Ibuprofen HS    No numbness    Unsure about weakness    No chiro, massage, PT    [employer] - Claudia Brown; has not been going due to finances    Right handed    Some pain in upper arm    No fever    No rash or skin change    No chest pain or dyspnea        How many servings of fruits and vegetables do you eat daily?  2-3    On average, how many sweetened beverages do you drink each day (Examples: soda, juice, sweet tea, etc.  Do NOT count diet or artificially sweetened beverages)?   1-org juice in  "evening    How many days per week do you exercise enough to make your heart beat faster? 7    How many minutes a day do you exercise enough to make your heart beat faster? 9 or less    How many days per week do you miss taking your medication? 0        Review of Systems   Constitutional, HEENT, cardiovascular, pulmonary, gi and gu systems are negative, except as otherwise noted.      Objective    /62 (BP Location: Left arm, Patient Position: Sitting, Cuff Size: Adult Regular)   Pulse 62   Temp 98.6  F (37  C) (Tympanic)   Ht 1.549 m (5' 1\")   Wt 51.7 kg (114 lb)   SpO2 98%   BMI 21.54 kg/m    Body mass index is 21.54 kg/m .  Physical Exam   GENERAL: alert, no distress and thin  NECK: no adenopathy, no asymmetry, masses, or scars and thyroid normal to palpation  RESP: lungs clear to auscultation - no rales, rhonchi or wheezes  CV: regular rate and rhythm, normal S1 S2, no S3 or S4, no murmur, click or rub, no peripheral edema and peripheral pulses strong  MS: normal muscle tone, normal range of motion BUE and neck and peripheral pulses normal; non-tender midline; mild tenderness just right of midline cervical spine, traps, periscapular - but minimal; negative Neer, Hawkin's, cross arm, empty can, lift off; non -tender deltoid, bursa; negative Spurling, Speed, Yergeson  SKIN: no suspicious lesions or rashes  NEURO: Normal strength and tone, mentation intact and speech normal  PSYCH: mentation appears normal, affect normal/bright    Xray done.            "

## 2021-07-02 ENCOUNTER — ANCILLARY PROCEDURE (OUTPATIENT)
Dept: GENERAL RADIOLOGY | Facility: OTHER | Age: 71
End: 2021-07-02
Attending: FAMILY MEDICINE
Payer: MEDICARE

## 2021-07-02 ENCOUNTER — OFFICE VISIT (OUTPATIENT)
Dept: FAMILY MEDICINE | Facility: OTHER | Age: 71
End: 2021-07-02
Attending: FAMILY MEDICINE
Payer: MEDICARE

## 2021-07-02 VITALS
BODY MASS INDEX: 21.52 KG/M2 | SYSTOLIC BLOOD PRESSURE: 128 MMHG | HEIGHT: 61 IN | HEART RATE: 62 BPM | OXYGEN SATURATION: 98 % | TEMPERATURE: 98.6 F | WEIGHT: 114 LBS | DIASTOLIC BLOOD PRESSURE: 62 MMHG

## 2021-07-02 DIAGNOSIS — M54.2 NECK PAIN: Primary | ICD-10-CM

## 2021-07-02 DIAGNOSIS — M54.2 NECK PAIN: ICD-10-CM

## 2021-07-02 PROCEDURE — 72040 X-RAY EXAM NECK SPINE 2-3 VW: CPT | Mod: TC

## 2021-07-02 PROCEDURE — 99213 OFFICE O/P EST LOW 20 MIN: CPT | Performed by: FAMILY MEDICINE

## 2021-07-02 PROCEDURE — G0463 HOSPITAL OUTPT CLINIC VISIT: HCPCS

## 2021-07-02 ASSESSMENT — PAIN SCALES - GENERAL: PAINLEVEL: SEVERE PAIN (7)

## 2021-07-02 ASSESSMENT — MIFFLIN-ST. JEOR: SCORE: 959.48

## 2021-07-02 NOTE — PATIENT INSTRUCTIONS
Xray today - will call with results.  Referral to physical therapy.  Rest, gentle stretching, symptomatic cares.  Update me with any progression - numbness, weakness, fever, rash, etc.  Can take extra dose of your Neurontin 300 mg at night.

## 2021-07-02 NOTE — NURSING NOTE
"Chief Complaint   Patient presents with     Back Pain       Initial /62 (BP Location: Left arm, Patient Position: Sitting, Cuff Size: Adult Regular)   Pulse 62   Temp 98.6  F (37  C) (Tympanic)   Ht 1.549 m (5' 1\")   Wt 51.7 kg (114 lb)   SpO2 98%   BMI 21.54 kg/m   Estimated body mass index is 21.54 kg/m  as calculated from the following:    Height as of this encounter: 1.549 m (5' 1\").    Weight as of this encounter: 51.7 kg (114 lb).  Medication Reconciliation: complete  Lynnette Crain LPN  "

## 2021-07-13 ENCOUNTER — NURSE TRIAGE (OUTPATIENT)
Dept: FAMILY MEDICINE | Facility: OTHER | Age: 71
End: 2021-07-13

## 2021-07-13 NOTE — TELEPHONE ENCOUNTER
Rash on right side of neck with itching x 1 week. Appear to be possibly mosquito bites.     Patient states she was doing some yard work and has noticed some itching. Has been using some cortisone inquiring if their is anything else she should be using. Patient notified she may also try calamine for itching. Monitor the bug bites and is any changes or concerns, return call to get set up for appt if patient would like to have the bug bites looked at.     Patient verbalized understanding.

## 2021-07-14 DIAGNOSIS — R20.2 PARESTHESIA: ICD-10-CM

## 2021-07-14 DIAGNOSIS — G25.81 RESTLESS LEG SYNDROME: ICD-10-CM

## 2021-07-14 NOTE — TELEPHONE ENCOUNTER
Pt called, states that PCP told her she could increase her gabapentin to 3 caps at HS. Also states that PCP told her she could take 1-2 tabs of requip PRN at HS. Needs new orders sent to pharmacy. Pended with changes. Please advise. Thank you!

## 2021-07-15 ENCOUNTER — LAB (OUTPATIENT)
Dept: LAB | Facility: OTHER | Age: 71
End: 2021-07-15
Attending: FAMILY MEDICINE
Payer: MEDICARE

## 2021-07-15 ENCOUNTER — HOSPITAL ENCOUNTER (OUTPATIENT)
Dept: PHYSICAL THERAPY | Facility: HOSPITAL | Age: 71
Setting detail: THERAPIES SERIES
End: 2021-07-15
Attending: FAMILY MEDICINE
Payer: MEDICARE

## 2021-07-15 DIAGNOSIS — M54.2 NECK PAIN: ICD-10-CM

## 2021-07-15 DIAGNOSIS — E03.9 HYPOTHYROIDISM, UNSPECIFIED TYPE: ICD-10-CM

## 2021-07-15 LAB — TSH SERPL DL<=0.005 MIU/L-ACNC: 2.39 MU/L (ref 0.4–4)

## 2021-07-15 PROCEDURE — 97110 THERAPEUTIC EXERCISES: CPT | Mod: GP

## 2021-07-15 PROCEDURE — 84443 ASSAY THYROID STIM HORMONE: CPT | Mod: ZL

## 2021-07-15 PROCEDURE — 36415 COLL VENOUS BLD VENIPUNCTURE: CPT | Mod: ZL

## 2021-07-15 PROCEDURE — 97161 PT EVAL LOW COMPLEX 20 MIN: CPT | Mod: GP

## 2021-07-15 RX ORDER — GABAPENTIN 300 MG/1
CAPSULE ORAL
Qty: 270 CAPSULE | Refills: 1 | Status: SHIPPED | OUTPATIENT
Start: 2021-07-15 | End: 2021-12-29

## 2021-07-15 RX ORDER — ROPINIROLE 0.25 MG/1
.25-.5 TABLET, FILM COATED ORAL
Qty: 180 TABLET | Refills: 1 | Status: SHIPPED | OUTPATIENT
Start: 2021-07-15 | End: 2021-12-09

## 2021-07-15 NOTE — PROGRESS NOTES
Immediate Post OP Note    PreOp Diagnosis: right carpal tunnel    PostOp Diagnosis: same    Procedure(s):  CARPAL TUNNEL RELEASE - Wound Class: Clean    Surgeon(s):  Arsh Busby M.D.    Anesthesiologist/Type of Anesthesia:  Anesthesiologist: Michael Myers M.D./General    Surgical Staff:  Circulator: Ayala Espitia R.N.  Scrub Person: Memo Correa    Specimens removed if any:  * No specimens in log *    Estimated Blood Loss: 0    Findings: 0    Complications: 0        12/7/2018 9:23 AM Arsh Busby M.D.       Initial Physical Therapy Evaluations       Name: Mihaela Jang MRN# [id number]   Age: 71 year old YOB: 1950     Date of Consultation: July 15, 2021  Primary care provider: Melyssa Connors    Referring Physician: Melyssa Connors MD  Orders: Eval and Treat  Medical Diagnosis: Neck pain  Onset of Illness/Injury: June 25th in am    Reason for PT Visit: Patient is a 72 y/o female who presents with neck pain. Reports sharp shooting pain in the morning of 6/25 that made her shout, only lasted a few minutes. Now gets pain that travels down right arm into the back of the hand but no numbness or tingling. Aching pain constantly throughout the day of varying degrees. Always slept on stomach so trying to sleep on side but this hurts arms with the pressure. Turning head side to side is also uncomfortable. No issues with driving. Can only read for short chunks and then must stop d/t pain. Is retired so spends her day doing household chores, have been restricted. Currently rates pain 3/10 and is getting more headaches than previously. Patient does not have experience with PT but very eager and willing to participate.     Prior Level of Function: Independent   Pain: 5/10  Aching 8/10 at worst    Community Support/Living Environment/Employment History: Retired     Patient/Family Goal: Return to household chores as well as reading and watching TV without pain      Past Medical History:   Past Medical History:   Diagnosis Date     Atypical nevi      Chondrodermatitis nodularis helicis of left ear     [facility]     Chronic rhinitis     [facility] allergy; negative skin testing; IgE mediated allergies; ENT - DC nasal steroid and antihistamine; saline rinses     Degenerative skin disorder     solar elastosis     Hallux rigidus 06/15/2000     Hyperlipidemia, unspecified hyperlipidemia type 9/15/2016     Laryngopharyngeal reflux disease     PPI     Malignant neoplasm of breast (female),  unspecified site 03/10/2004     Notalgia paresthetica      Osteoarthritis 07/20/2011     Osteoporosis, unspecified 09/10/2001    Dr Moses; intermittent reclast;  Dr. Moses; repeat dexa after full 2 years Reclast     Other abnormal glucose 12/20/2013     Paresthesia     neck; notalgiaparesthetic; dr leahy & Dr Nevarez; neurontin     Personal history of malignant neoplasm of breast 06/07/2005     Rhinitis      Schamberg's purpura        Past Surgical History:  Past Surgical History:   Procedure Laterality Date     BONE MARROW BIOPSY      negative     COLONOSCOPY  07/2000     COLONOSCOPY  8/13/2013    DR Fu; repeat 5 years     COLONOSCOPY N/A 8/13/2018    Procedure: COLONOSCOPY;  COLONOSCOPY;  Surgeon: Ajit Uriarte MD;  Location: [facility] OR     DILATION AND CURETTAGE, OPERATIVE HYSTEROSCOPY, COMBINED N/A 2/13/2019    Procedure: EXAM UNDER ANESTHESIA , HYSTEROSCOPY;  Surgeon: Jovi Gabriel MD;  Location: [facility] OR     HEMORRHOIDECTOMY  1986     MASTECTOMY, BILATERAL  03/01/2004    right sided breast cancer     RELEASE TRIGGER FINGER Right 3/7/2018    Procedure: RELEASE TRIGGER FINGER;  RELEASE TRIGGER DIGIT RIGHT THUMB;  Surgeon: Jose Alfredo Brewster DO;  Location: [facility] OR     UPPER GI ENDOSCOPY       [facility] COLONOSCOPY THRU STOMA WITH BIOPSY  08/25/2010    cancer screening, family h/o colon cancer; hyperplastic polyp       Medications:   Current Outpatient Medications   Medication Sig     aspirin EC 81 MG tablet Take 1 tablet (81 mg) by mouth daily     CALCIUM CITRATE 1,500 mg two times daily      cholecalciferol 1000 UNITS TABS Take  by mouth. 2 capsules po daily.      cinnamon 500 MG CAPS Take 2 capsules by mouth daily      ferrous sulfate (SLO-FE) 142 (45 Fe) MG CR tablet Take 1 tablet (142 mg) by mouth daily     fish oil-omega-3 fatty acids (FISH OIL) 1000 MG capsule Take 1 g by mouth 2 times daily      fluticasone (FLONASE) 50 MCG/ACT nasal spray Spray 2 sprays into both nostrils daily As needed     gabapentin  (NEURONTIN) 300 MG capsule TAKE 3 CAPSULES BY MOUTH AT BEDTIME     Lactobacillus (ACIDOPHILUS) TABS Take 1 tablet by mouth daily     levothyroxine (SYNTHROID/LEVOTHROID) 25 MCG tablet Take 1 tablet (25 mcg) by mouth daily X 4 days per week and 50 mcg daily x 3 days per week     Lutein 20 MG CAPS Take 20 mg by mouth daily      MAGNESIUM OXIDE PO Take 200 mg by mouth daily     Multiple vitamin  s TABS Take 1 tablet by mouth daily.     naproxen (NAPROSYN) 500 MG tablet Take 1 tablet (500 mg) by mouth 2 times daily (with meals) (Patient not taking: Reported on 7/2/2021)     Polyethylene Glycol 3350 (MIRALAX PO) Use every other day     rOPINIRole (REQUIP) 0.25 MG tablet Take 1-2 tablets (0.25-0.5 mg) by mouth nightly as needed (restless legs)     simvastatin (ZOCOR) 20 MG tablet Take 1 tablet (20 mg) by mouth At Bedtime     triamcinolone 0.1 % EX external cream Apply topically 3 times daily Apply to affected area three times daily for 14 days. Apply sparingly. (Patient taking differently: Apply topically 3 times daily Apply to affected area three times daily for 14 days. Apply sparingly.-pt taking as needed)     Turmeric (RA TURMERIC) 500 MG CAPS Take 2 capsules by mouth daily      zinc 50 MG TABS Take 1 tablet by mouth daily      No current facility-administered medications for this encounter.       Musculoskeletal Findings:     OBJECTIVE   Observation: Patient presents to department in no acute distress.     Palpation: No tenderness or increase muscle tone in upper traps or rhomboids. No tenderness in neck.      Posture: slight forward head, and protracted shoulders upon sitting    Range of Motion/Strength:   Cervical Spine Range of Motion   Active Motion: pain was only present in neck with movements  Flexion: WNL and pain 1/10   Extension: Slightly limited and pain 2/10   Side Bend Right:  Limited and 2/10 pain   Side Bend Left limited and 2/10 pain   Rotation Right WNL   Rotation Left WNL  Right upper extremity range  of motion: WNL   Left upper extremity range of motion: WNL     Strength in upper extremity: All 5/5   Exception for     Pain in R bicep region with limited functional internal shoulder rotation    Pain on lateral aspect of R bicep with ER (1/10)    Special Tests:   Cervical Spine Tests  Spurling: Negative  AO mobility: Normal  AA rotation: Normal    Outcome Measures:   Neck disability: 40%    Prognosis/Plan of Care: PT prognosis to return to activities of daily life is good.   Appropriate for Physical Therapy Intervention: Yes     GOALS:     To be achieved in 3-6 weeks:    Instruct in home program  1.) Patient will be independent and compliant with home exercise program.  2.) Patient will be able to read for greater than 30 minutes without being limited d/t neck pain.  3.) Patient will report a 50% reduction in headaches in order to improve overall functioning.  4.) Patient will be able to vacuum for 20 minutes without pain limitations in order to return to household tasks.        Treatment plan: therapeutic exercise, manual therapy, Upper trap and rhomboid stretching and STM    Clinical Impressions:  Criteria for Skilled Therapeutic Intervention Met: Yes  PT Diagnosis: Neck pain  Influenced by the following impairments: Neck pain, limited and painful neck SB   Functional limitations due to impairment: Difficulty completing household tasks, decreased reading tolerance, decreased activity tolerance, difficulty getting comfortable to sleep  Clinical presentation: Stable/Uncomplicated  Clinical presentation rationale: Therapist discretion  Clinical Decision making (complexity): Low Complexity  Predicted Duration of Therapy Intervention (days/wks): 2 days/week  Risks and Benefits of therapy have been explained: Yes  Patient, Family & other staff in agreement with plan of care: Yes  Comments: Patient presents with right arm and bilateral neck pain. Demonstrated full ROM and strength in UE bilaterally. Neck ROM was WNL  with the exception of SB, limited and causing pain. Mild pain reported with flexion and extension. Negative spurlings. Upon palpation, no increase in muscle tone to upper trabs or rhomboids. No tenderness to neck as well. PT prognosis is good given the patient's desire to improve and willingness to participate in therapy. Skilled PT services are needed in order to decrease pain, increase activity tolerance and enhance daily functioning.    Date: 7/15/2021- 10/13/21    Total Evaluation Time: 20 minutes

## 2021-07-20 ENCOUNTER — HOSPITAL ENCOUNTER (OUTPATIENT)
Dept: PHYSICAL THERAPY | Facility: HOSPITAL | Age: 71
Setting detail: THERAPIES SERIES
End: 2021-07-20
Attending: FAMILY MEDICINE
Payer: MEDICARE

## 2021-07-20 PROCEDURE — 97110 THERAPEUTIC EXERCISES: CPT | Mod: GP,CQ

## 2021-07-23 ENCOUNTER — HOSPITAL ENCOUNTER (OUTPATIENT)
Dept: PHYSICAL THERAPY | Facility: HOSPITAL | Age: 71
Setting detail: THERAPIES SERIES
End: 2021-07-23
Attending: FAMILY MEDICINE
Payer: MEDICARE

## 2021-07-23 PROCEDURE — 97110 THERAPEUTIC EXERCISES: CPT | Mod: GP

## 2021-07-27 ENCOUNTER — HOSPITAL ENCOUNTER (OUTPATIENT)
Dept: PHYSICAL THERAPY | Facility: HOSPITAL | Age: 71
Setting detail: THERAPIES SERIES
End: 2021-07-27
Attending: FAMILY MEDICINE
Payer: MEDICARE

## 2021-07-27 PROCEDURE — 97110 THERAPEUTIC EXERCISES: CPT | Mod: GP

## 2021-07-29 ENCOUNTER — HOSPITAL ENCOUNTER (OUTPATIENT)
Dept: PHYSICAL THERAPY | Facility: HOSPITAL | Age: 71
Setting detail: THERAPIES SERIES
End: 2021-07-29
Attending: FAMILY MEDICINE
Payer: MEDICARE

## 2021-07-29 PROCEDURE — 97110 THERAPEUTIC EXERCISES: CPT | Mod: GP

## 2021-08-03 ENCOUNTER — HOSPITAL ENCOUNTER (OUTPATIENT)
Dept: PHYSICAL THERAPY | Facility: HOSPITAL | Age: 71
Setting detail: THERAPIES SERIES
End: 2021-08-03
Attending: FAMILY MEDICINE
Payer: MEDICARE

## 2021-08-03 PROCEDURE — 97110 THERAPEUTIC EXERCISES: CPT | Mod: GP,CQ

## 2021-08-05 ENCOUNTER — HOSPITAL ENCOUNTER (OUTPATIENT)
Dept: PHYSICAL THERAPY | Facility: HOSPITAL | Age: 71
Setting detail: THERAPIES SERIES
End: 2021-08-05
Attending: FAMILY MEDICINE
Payer: MEDICARE

## 2021-08-05 PROCEDURE — 97140 MANUAL THERAPY 1/> REGIONS: CPT | Mod: GP

## 2021-08-05 PROCEDURE — 97110 THERAPEUTIC EXERCISES: CPT | Mod: GP

## 2021-08-23 ENCOUNTER — TELEPHONE (OUTPATIENT)
Dept: FAMILY MEDICINE | Facility: OTHER | Age: 71
End: 2021-08-23

## 2021-08-23 NOTE — TELEPHONE ENCOUNTER
Call from patient requesting lab orders and lab appt prior to the upcoming scheduled appt with Dr. Connors.     Next 5 appointments (look out 90 days)    Sep 02, 2021  2:30 PM  (Arrive by 2:15 PM)  PHYSICAL with Melyssa Connors MD  [facility] ([facility] ) [address]  [place] [address]  [phone]        Patient can be reached at [phone].    Please advise.

## 2021-08-23 NOTE — TELEPHONE ENCOUNTER
Patient had labs 5/2021 - fasting lipids, vit D, thyroid, etc - already.  What else was she looking to check?

## 2021-08-25 NOTE — TELEPHONE ENCOUNTER
Pt notified. She forgot she had already completed those. No other labs requested.  Lynnette Crain LPN

## 2021-08-31 NOTE — PATIENT INSTRUCTIONS
Preventive Health Recommendations    See your health care provider every year to    Review health changes.     Discuss preventive care.      Review your medicines if your doctor has prescribed any.      You no longer need a yearly Pap test unless you've had an abnormal Pap test in the past 10 years. If you have vaginal symptoms, such as bleeding or discharge, be sure to talk with your provider about a Pap test.      Every 1 to 2 years, have a mammogram.  If you are over 69, talk with your health care provider about whether or not you want to continue having screening mammograms.      Every 10 years, have a colonoscopy. Or, have a yearly FIT test (stool test). These exams will check for colon cancer.       Have a cholesterol test every 5 years, or more often if your doctor advises it.       Have a diabetes test (fasting glucose) every three years. If you are at risk for diabetes, you should have this test more often.       At age 65, have a bone density scan (DEXA) to check for osteoporosis (brittle bone disease).    Shots:    Get a flu shot each year.    Get a tetanus shot every 10 years.    Talk to your doctor about your pneumonia vaccines. There are now two you should receive - Pneumovax (PPSV 23) and Prevnar (PCV 13).    Talk to your pharmacist about the shingles vaccine.    Talk to your doctor about the hepatitis B vaccine.    Nutrition:     Eat at least 5 servings of fruits and vegetables each day.      Eat whole-grain bread, whole-wheat pasta and brown rice instead of white grains and rice.      Get adequate about Calcium and Vitamin D.     Lifestyle    Exercise at least 150 minutes a week (30 minutes a day, 5 days a week). This will help you control your weight and prevent disease.      Limit alcohol to one drink per day.      No smoking.       Wear sunscreen to prevent skin cancer.       See your dentist twice a year for an exam and cleaning.      See your eye doctor every 1 to 2 years to screen for  conditions such as glaucoma, macular degeneration, cataracts, etc.    Personalized Prevention Plan  You are due for the preventive services outlined below.  Your care team is available to assist you in scheduling these services.  If you have already completed any of these items, please share that information with your care team to update in your medical record.    Health Maintenance Due   Topic Date Due     Flu Vaccine (1) 09/01/2021

## 2021-08-31 NOTE — PROGRESS NOTES
SUBJECTIVE:   Mihaela Jang is a 71 year old female who presents for Preventive Visit.  Patient has been advised of split billing requirements and indicates understanding: Yes   Are you in the first 12 months of your Medicare coverage?  No    HPI  Do you feel safe in your environment? Yes    Have you ever done Advance Care Planning? (For example, a Health Directive, POLST, or a discussion with a medical provider or your loved ones about your wishes): Yes, patient states has an Advance Care Planning document and will bring a copy to the clinic.       Fall risk  Fallen 2 or more times in the past year?: No  Any fall with injury in the past year?: No    Cognitive Screening   1) Repeat 3 items (Leader, Season, Table)    2) Clock draw: NORMAL  3) 3 item recall: Recalls 3 objects  Results: 3 items recalled: COGNITIVE IMPAIRMENT LESS LIKELY    Mini-CogTM Copyright SOFI Monaco. Licensed by the author for use in [facility]; reprinted with permission (dennis@[facility]). All rights reserved.      Do you have sleep apnea, excessive snoring or daytime drowsiness?: no    Reviewed and updated as needed this visit by clinical staff  Tobacco  Allergies  Meds  Problems  Med Hx  Surg Hx  Fam Hx          Reviewed and updated as needed this visit by Provider   Allergies   Problems            Social History     Tobacco Use     Smoking status: Never Smoker     Smokeless tobacco: Never Used   Substance Use Topics     Alcohol use: Yes     Alcohol/week: 0.0 standard drinks     Comment: 1 glasso wine, weekly          Alcohol Use 11/3/2017   Prescreen: >3 drinks/day or >7 drinks/week? The patient does not drink >3 drinks per day nor >7 drinks per week.         Hyperlipidemia Follow-Up and prediabetes    Are you regularly taking any medication or supplement to lower your cholesterol?   Yes- Zocor    Are you having muscle aches or other side effects that you think could be caused by your cholesterol lowering medication?   No     Fasting lipids 5/2021    Hypothyroidism Follow-up    Since last visit, patient describes the following symptoms: Weight stable, no hair loss, no skin changes, no constipation, no loose stools    TSH normal 7/15/21     Osteoporosis - dexa scheduled 9/15/21.  2019 was stable from 2017.  Treated with Reclast - will be 5th dose this fall.  Follows with endocrinology - DR Moses, who is now retired.  Vit D 51 in 5/2021.    S/p mastectomy 2004.  Right breast cancer.    Colonoscopy 2018.  5 year interval for polyps.    RLS - requip.  Prior iron studies ok.  Requip is really helpful    Current providers sharing in care for this patient include:   Patient Care Team:  Melyssa Connors MD as PCP - General  Melyssa Connors MD as Assigned PCP  Sejal Gutierrez PA-C as Assigned Surgical Provider    The following health maintenance items are reviewed in Epic and correct as of today:  Health Maintenance Due   Topic Date Due     INFLUENZA VACCINE (1) 09/01/2021     Current Outpatient Medications   Medication     aspirin EC 81 MG tablet     CALCIUM CITRATE     cholecalciferol 1000 UNITS TABS     cinnamon 500 MG CAPS     ferrous sulfate (SLO-FE) 142 (45 Fe) MG CR tablet     fish oil-omega-3 fatty acids (FISH OIL) 1000 MG capsule     fluticasone (FLONASE) 50 MCG/ACT nasal spray     gabapentin (NEURONTIN) 300 MG capsule     Lactobacillus (ACIDOPHILUS) TABS     levothyroxine (SYNTHROID/LEVOTHROID) 25 MCG tablet     Lutein 20 MG CAPS     MAGNESIUM OXIDE PO     Multiple vitamin  s TABS     naproxen (NAPROSYN) 500 MG tablet     Polyethylene Glycol 3350 (MIRALAX PO)     rOPINIRole (REQUIP) 0.25 MG tablet     simvastatin (ZOCOR) 20 MG tablet     triamcinolone 0.1 % EX external cream     Turmeric (RA TURMERIC) 500 MG CAPS     zinc 50 MG TABS     No current facility-administered medications for this visit.       Labs reviewed in EPIC          Review of Systems  Constitutional, HEENT, cardiovascular, pulmonary, gi and gu systems are  "negative, except as otherwise noted.    OBJECTIVE:   /66   Pulse 59   Temp 97  F (36.1  C) (Tympanic)   Resp 16   Ht 1.53 m (5' 0.25\")   Wt 52.6 kg (116 lb)   SpO2 98%   BMI 22.47 kg/m   Estimated body mass index is 22.47 kg/m  as calculated from the following:    Height as of this encounter: 1.53 m (5' 0.25\").    Weight as of this encounter: 52.6 kg (116 lb).  Physical Exam  GENERAL APPEARANCE: alert, no distress and frail  EYES: Eyes grossly normal to inspection, PERRL and conjunctivae and sclerae normal  HENT: ear canals and TM's normal, nose and mouth without ulcers or lesions, oropharynx clear and oral mucous membranes moist  NECK: no adenopathy, no asymmetry, masses, or scars and thyroid normal to palpation  RESP: lungs clear to auscultation - no rales, rhonchi or wheezes  BREAST: no palpable axillary masses or adenopathy and s/p bilateral mastectomy without masses  CV: regular rate and rhythm, normal S1 S2, no S3 or S4, no murmur, click or rub, no peripheral edema and peripheral pulses strong  ABDOMEN: soft, nontender, no hepatosplenomegaly, no masses and bowel sounds normal   (female): normal female external genitalia, normal urethral meatus, vaginal mucosal atrophy noted and bimanual exam with small, tight introitus; no masses palpated  MS: no musculoskeletal defects are noted and gait is age appropriate without ataxia  SKIN: no suspicious lesions or rashes  NEURO: Normal strength and tone, sensory exam grossly normal, mentation intact and speech normal  PSYCH: mentation appears normal and affect normal/bright    Diagnostic Test Results:  Labs reviewed in Epic    ASSESSMENT / PLAN:       ICD-10-CM    1. Routine general medical examination at a health care facility  Z00.00    2. Hyperlipidemia, unspecified hyperlipidemia type  E78.5    3. Hypothyroidism, unspecified type  E03.9    4. Laryngopharyngeal reflux (LPR)  K21.9    5. Osteoporosis, unspecified osteoporosis type, unspecified " "pathological fracture presence  M81.0 Adult Endocrinology Referral   6. Abnormal glucose  R73.09    7. 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    Patient has been advised of split billing requirements and indicates understanding: Yes  COUNSELING:  Reviewed preventive health counseling, as reflected in patient instructions       Regular exercise       Healthy diet/nutrition       Vision screening       Hearing screening       Bladder control       Fall risk prevention       Osteoporosis prevention/bone health       Colon cancer screening       Hepatitis C screening       HIV screening for high risk patient       Advanced Planning     Estimated body mass index is 22.47 kg/m  as calculated from the following:    Height as of this encounter: 1.53 m (5' 0.25\").    Weight as of this encounter: 52.6 kg (116 lb).        She reports that she has never smoked. She has never used smokeless tobacco.      Appropriate preventive services were discussed with this patient, including applicable screening as appropriate for cardiovascular disease, diabetes, osteopenia/osteoporosis, and glaucoma.  As appropriate for age/gender, discussed screening for colorectal cancer, prostate cancer, breast cancer, and cervical cancer. Checklist reviewing preventive services available has been given to the patient.    Reviewed patients plan of care and provided an AVS. The Basic Care Plan (routine screening as documented in Health Maintenance) for Mihaela meets the Care Plan requirement. This Care Plan has been established and reviewed with the Patient.    Counseling Resources:  ATP IV Guidelines  Pooled Cohorts Equation Calculator  Breast Cancer Risk Calculator  Breast Cancer: Medication to Reduce Risk  FRAX Risk Assessment  ICSI Preventive Guidelines  Dietary Guidelines for Americans, 2010  USDA's MyPlate  ASA Prophylaxis  Lung CA Screening    Melyssa Encinas MD  [facility] " - [place]    Identified Health Risks:

## 2021-09-02 ENCOUNTER — OFFICE VISIT (OUTPATIENT)
Dept: FAMILY MEDICINE | Facility: OTHER | Age: 71
End: 2021-09-02
Attending: FAMILY MEDICINE
Payer: COMMERCIAL

## 2021-09-02 VITALS
HEIGHT: 60 IN | SYSTOLIC BLOOD PRESSURE: 124 MMHG | WEIGHT: 116 LBS | OXYGEN SATURATION: 98 % | HEART RATE: 59 BPM | BODY MASS INDEX: 22.78 KG/M2 | TEMPERATURE: 97 F | RESPIRATION RATE: 16 BRPM | DIASTOLIC BLOOD PRESSURE: 66 MMHG

## 2021-09-02 DIAGNOSIS — M81.0 OSTEOPOROSIS, UNSPECIFIED OSTEOPOROSIS TYPE, UNSPECIFIED PATHOLOGICAL FRACTURE PRESENCE: ICD-10-CM

## 2021-09-02 DIAGNOSIS — Z00.00 ROUTINE GENERAL MEDICAL EXAMINATION AT A HEALTH CARE FACILITY: Primary | ICD-10-CM

## 2021-09-02 DIAGNOSIS — E03.9 HYPOTHYROIDISM, UNSPECIFIED TYPE: ICD-10-CM

## 2021-09-02 DIAGNOSIS — R73.09 ABNORMAL GLUCOSE: ICD-10-CM

## 2021-09-02 DIAGNOSIS — E78.5 HYPERLIPIDEMIA, UNSPECIFIED HYPERLIPIDEMIA TYPE: ICD-10-CM

## 2021-09-02 DIAGNOSIS — K21.9 LARYNGOPHARYNGEAL REFLUX (LPR): ICD-10-CM

## 2021-09-02 DIAGNOSIS — R20.2 NOTALGIA PARESTHETICA: ICD-10-CM

## 2021-09-02 PROCEDURE — 99397 PER PM REEVAL EST PAT 65+ YR: CPT | Performed by: FAMILY MEDICINE

## 2021-09-02 PROCEDURE — G0463 HOSPITAL OUTPT CLINIC VISIT: HCPCS

## 2021-09-02 ASSESSMENT — MIFFLIN-ST. JEOR: SCORE: 956.64

## 2021-09-02 ASSESSMENT — PAIN SCALES - GENERAL: PAINLEVEL: NO PAIN (0)

## 2021-09-02 NOTE — NURSING NOTE
"Chief Complaint   Patient presents with     Physical       Initial /66   Pulse 59   Temp 97  F (36.1  C) (Tympanic)   Resp 16   Ht 1.53 m (5' 0.25\")   Wt 52.6 kg (116 lb)   SpO2 98%   BMI 22.47 kg/m   Estimated body mass index is 22.47 kg/m  as calculated from the following:    Height as of this encounter: 1.53 m (5' 0.25\").    Weight as of this encounter: 52.6 kg (116 lb).  Medication Reconciliation: complete  Anahy Hale LPN  "

## 2021-09-15 ENCOUNTER — HOSPITAL ENCOUNTER (OUTPATIENT)
Dept: BONE DENSITY | Facility: HOSPITAL | Age: 71
Discharge: HOME | End: 2021-09-15
Attending: FAMILY MEDICINE | Admitting: FAMILY MEDICINE
Payer: MEDICARE

## 2021-09-15 DIAGNOSIS — M81.0 OSTEOPOROSIS, UNSPECIFIED OSTEOPOROSIS TYPE, UNSPECIFIED PATHOLOGICAL FRACTURE PRESENCE: ICD-10-CM

## 2021-09-15 PROCEDURE — 77080 DXA BONE DENSITY AXIAL: CPT

## 2021-09-20 ENCOUNTER — TELEPHONE (OUTPATIENT)
Dept: FAMILY MEDICINE | Facility: OTHER | Age: 71
End: 2021-09-20

## 2021-09-20 DIAGNOSIS — E03.9 HYPOTHYROIDISM, UNSPECIFIED TYPE: ICD-10-CM

## 2021-09-20 RX ORDER — LEVOTHYROXINE SODIUM 25 UG/1
25 TABLET ORAL DAILY
Qty: 135 TABLET | Refills: 3 | Status: SHIPPED | OUTPATIENT
Start: 2021-09-20 | End: 2022-01-26

## 2021-09-20 NOTE — TELEPHONE ENCOUNTER
Pt called, reports that her synthroid script was sent to pharmacy with incorrect instructions. Per result notes from 5/14/21- TSH running borderline low.  Would have increase her dose from 25 mcg daily to 25 mcg 3 days per week and 50 mcg 4 days per week.  New script sent    Script was sent in as 25mcg x4 days per week and 50mcg x3 days a week.     Pended per result notes. Please advise. Thank you!

## 2021-09-20 NOTE — TELEPHONE ENCOUNTER
----- Message from Saadia Lambert LPN sent at 9/20/2021 11:09 AM CDT -----  Here it is. Thanks for catching it.  Needs copy of dexa scan for [facility] Endocrinologist.

## 2021-10-11 ENCOUNTER — TELEPHONE (OUTPATIENT)
Dept: FAMILY MEDICINE | Facility: OTHER | Age: 71
End: 2021-10-11

## 2021-10-11 NOTE — TELEPHONE ENCOUNTER
Please call the patient back she want's to verify that she does not need to restart Reclast.  [phone]

## 2021-10-14 NOTE — TELEPHONE ENCOUNTER
Please clarify with infusion staff.  She should do the Reclast for 5 years.  I believe this fall would be her 5th dose?  Also - she will be seeing endocrinology in the near future regarding this as well - and is non-urgent to get Reclast - so could wait until her consult .

## 2021-10-14 NOTE — TELEPHONE ENCOUNTER
I spoke with infusion she has had 5 doses of the Reclast. Okay to call patient back to let her know that she does not need to restart Reclast?

## 2021-10-25 ENCOUNTER — ALLIED HEALTH/NURSE VISIT (OUTPATIENT)
Dept: FAMILY MEDICINE | Facility: OTHER | Age: 71
End: 2021-10-25
Attending: FAMILY MEDICINE
Payer: MEDICARE

## 2021-10-25 DIAGNOSIS — Z23 NEED FOR INFLUENZA VACCINATION: Primary | ICD-10-CM

## 2021-10-25 PROCEDURE — G0008 ADMIN INFLUENZA VIRUS VAC: HCPCS

## 2021-10-25 PROCEDURE — 90662 IIV NO PRSV INCREASED AG IM: CPT

## 2021-11-09 ENCOUNTER — TELEPHONE (OUTPATIENT)
Dept: FAMILY MEDICINE | Facility: OTHER | Age: 71
End: 2021-11-09
Payer: COMMERCIAL

## 2021-11-24 ENCOUNTER — NURSE TRIAGE (OUTPATIENT)
Dept: FAMILY MEDICINE | Facility: OTHER | Age: 71
End: 2021-11-24
Payer: COMMERCIAL

## 2021-11-24 NOTE — TELEPHONE ENCOUNTER
Pt has a localized rash on right leg by ankle in the back.Present for over a month. Itches at times. Per care advice should be seen in 3 days. No noted openings. Asked if she would see another provider and she would like to see PCP. She states not urgent. OVERBOOK request.She would like appt after one in the afternoon.    Will call pt back with appt at [phone].    She is asking for a call back today.      Shefali Tavera RN    Reason for Disposition    Localized rash present > 7 days    Additional Information    Negative: Sounds like a life-threatening emergency to the triager    Negative: Possible contact with poison ivy or oak    Negative: Insect bite(s) suspected    Negative: Athlete's Foot suspected (i.e., itchy rash between the toes)    Negative: Jock Itch suspected (i.e., itchy rash on inner thighs near genital area)    Negative: Wound infection suspected (i.e., pain, spreading redness, or pus; in a cut, puncture, scrape or sutured wound)    Negative: Rash of external female genital area (vulva)    Negative: Rash of penis or scrotum    Negative: Small spot, skin growth, or mole    Negative: Fever and localized purple or blood-colored spots or dots that are not from injury or friction    Negative: Fever and localized rash is very painful    Negative: Patient sounds very sick or weak to the triager    Negative: Looks like a boil, infected sore, deep ulcer, or other infected rash (spreading redness, pus)    Negative: Painful rash with multiple small blisters grouped together (i.e., dermatomal distribution or 'band' or 'stripe')    Negative: Localized rash is very painful (no fever)    Negative: Localized purple or blood-colored spots or dots that are not from injury or friction (no fever)    Negative: Lyme disease suspected (e.g., bull's-eye rash or tick bite / exposure)    Negative: Patient wants to be seen    Negative: Tender bumps in armpits    Negative: Pimples (localized) and no improvement after using  "CARE ADVICE    Negative: SEVERE local itching persists after 2 days of steroid cream    Negative: Applying cream or ointment and it causes severe itch, burning, or pain    Answer Assessment - Initial Assessment Questions  1. APPEARANCE of RASH: \"Describe the rash.\"       Light pink,flat  2. LOCATION: \"Where is the rash located?\"       Right leg near above ankle on the back side of leg  3. NUMBER: \"How many spots are there?\"       5 bigger  4. SIZE: \"How big are the spots?\" (Inches, centimeters or compare to size of a coin)       Little smaller in a dime. The area rectangle shaped. The area is about a inch 1/2 big  5. ONSET: \"When did the rash start?\"       Month ago or longer  6. ITCHING: \"Does the rash itch?\" If so, ask: \"How bad is the itch?\"  (Scale 1-10; or mild, moderate, severe)      Yes off and on especially when her pants brush, moderate itching  7. PAIN: \"Does the rash hurt?\" If so, ask: \"How bad is the pain?\"  (Scale 1-10; or mild, moderate, severe)      no  8. OTHER SYMPTOMS: \"Do you have any other symptoms?\" (e.g., fever)      no  9. PREGNANCY: \"Is there any chance you are pregnant?\" \"When was your last menstrual period?\"      na    Protocols used: RASH OR REDNESS - [id number]    "

## 2021-11-25 NOTE — TELEPHONE ENCOUNTER
If rash localized and present for months - please schedule next available.  If feeling truly needs sooner - can double book at 8am 12/6/21 Monday.  If acutely changing, or other concerns, worry about infection, etc - UC/ER if needed.  Document with photos if able - if signs up for mychart - can upload sooner as well.

## 2021-11-26 ENCOUNTER — OFFICE VISIT (OUTPATIENT)
Dept: FAMILY MEDICINE | Facility: OTHER | Age: 71
End: 2021-11-26
Attending: NURSE PRACTITIONER
Payer: MEDICARE

## 2021-11-26 ENCOUNTER — ANCILLARY PROCEDURE (OUTPATIENT)
Dept: GENERAL RADIOLOGY | Facility: OTHER | Age: 71
End: 2021-11-26
Attending: NURSE PRACTITIONER
Payer: MEDICARE

## 2021-11-26 ENCOUNTER — NURSE TRIAGE (OUTPATIENT)
Dept: FAMILY MEDICINE | Facility: OTHER | Age: 71
End: 2021-11-26
Payer: COMMERCIAL

## 2021-11-26 VITALS
TEMPERATURE: 97.7 F | DIASTOLIC BLOOD PRESSURE: 60 MMHG | HEART RATE: 64 BPM | SYSTOLIC BLOOD PRESSURE: 126 MMHG | OXYGEN SATURATION: 96 % | WEIGHT: 120 LBS | BODY MASS INDEX: 23.24 KG/M2

## 2021-11-26 DIAGNOSIS — M79.671 RIGHT FOOT PAIN: Primary | ICD-10-CM

## 2021-11-26 DIAGNOSIS — M79.671 RIGHT FOOT PAIN: ICD-10-CM

## 2021-11-26 DIAGNOSIS — R21 RASH AND NONSPECIFIC SKIN ERUPTION: ICD-10-CM

## 2021-11-26 PROBLEM — L21.9 SEBORRHEIC DERMATITIS OF SCALP: Status: ACTIVE | Noted: 2021-08-17

## 2021-11-26 PROCEDURE — G0463 HOSPITAL OUTPT CLINIC VISIT: HCPCS

## 2021-11-26 PROCEDURE — 73630 X-RAY EXAM OF FOOT: CPT | Mod: TC,RT

## 2021-11-26 PROCEDURE — 99213 OFFICE O/P EST LOW 20 MIN: CPT | Performed by: NURSE PRACTITIONER

## 2021-11-26 RX ORDER — NAPROXEN 500 MG/1
500 TABLET ORAL 2 TIMES DAILY WITH MEALS
Qty: 40 TABLET | Refills: 0 | Status: SHIPPED | OUTPATIENT
Start: 2021-11-26 | End: 2022-04-12

## 2021-11-26 ASSESSMENT — ENCOUNTER SYMPTOMS
WEAKNESS: 0
PSYCHIATRIC NEGATIVE: 1
ARTHRALGIAS: 1
FEVER: 0
MYALGIAS: 1
WOUND: 0

## 2021-11-26 ASSESSMENT — PAIN SCALES - GENERAL: PAINLEVEL: MODERATE PAIN (4)

## 2021-11-26 NOTE — TELEPHONE ENCOUNTER
Call returned from patient, rash has been present for >1 month. Rash is not worsening.     appt scheduled with Dr. Connors per Dr. Connors's note below:    Next 5 appointments (look out 90 days)    Dec 06, 2021  8:15 AM  (Arrive by 8:00 AM)  SHORT with Melyssa Connors MD  [facility] ([facility] [place] ) [address] [address]  [phone]        Patient will go to UC/ED for any worsening symptoms.

## 2021-11-26 NOTE — PATIENT INSTRUCTIONS
1. Ace wrap to foot when home and not in a shoe  2. Naproxen 500mg ini AM, then can take Ibuprofen at night.    3. Cortisone 10 to rash

## 2021-11-26 NOTE — NURSING NOTE
"Chief Complaint   Patient presents with     Foot Injury       Initial /60   Pulse 64   Temp 97.7  F (36.5  C)   Wt 54.4 kg (120 lb)   SpO2 96%   BMI 23.24 kg/m   Estimated body mass index is 23.24 kg/m  as calculated from the following:    Height as of 9/2/21: 1.53 m (5' 0.25\").    Weight as of this encounter: 54.4 kg (120 lb).  Medication Reconciliation: complete  Lore Aj MA  "

## 2021-11-26 NOTE — PROGRESS NOTES
Megha Chairez is a 71 year old who presents for the following health issues     HPI     Concern - right foot pain  Onset: monday  Description: aching pain   Intensity: moderate  Progression of Symptoms:  same  Accompanying Signs & Symptoms: dropped alarm clock on foot and pain is not getting better.   Previous history of similar problem: none  Precipitating factors:        Worsened by: walking and stairs  Alleviating factors:        Improved by: ibuprofen  Therapies tried and outcome: ibuprofen        Review of Systems   Constitutional: Negative for fever.   Musculoskeletal: Positive for arthralgias and myalgias.        Right foot swollen     Skin: Positive for rash. Negative for pallor and wound.        Small red rash to back of right calf     Neurological: Negative for weakness.   Psychiatric/Behavioral: Negative.         Review of Systems   Constitutional: Negative for fever.   Musculoskeletal: Positive for arthralgias and myalgias.        Right foot swollen     Skin: Positive for rash. Negative for pallor and wound.        Small red rash to back of right calf     Neurological: Negative for weakness.   Psychiatric/Behavioral: Negative.      Objective    /60   Pulse 64   Temp 97.7  F (36.5  C)   Wt 54.4 kg (120 lb)   SpO2 96%   BMI 23.24 kg/m    Body mass index is 23.24 kg/m .  Physical Exam  Constitutional:       Appearance: Normal appearance.   Musculoskeletal:         General: Swelling, tenderness and signs of injury present. Normal range of motion.      Right lower leg: No edema.      Left lower leg: No edema.   Skin:     General: Skin is warm and dry.      Findings: Rash present.      Comments: Small reddish pimply rash to back of right calf.     Neurological:      General: No focal deficit present.      Mental Status: She is alert and oriented to person, place, and time.      Sensory: No sensory deficit.   Psychiatric:         Mood and Affect: Mood normal.         Behavior: Behavior  normal.        Results for orders placed or performed in visit on 11/26/21   XR FOOT RT G/E 3 VW (Clinic Performed)     Status: None    Narrative    PROCEDURE:  XR FOOT RIGHT G/E 3 VIEWS    HISTORY: Right foot pain.    COMPARISON:  8/2/2019    TECHNIQUE:  3 views right foot.    FINDINGS:  No fracture or dislocation is identified. Focal advanced  osteoarthritis at the first MTP joint is redemonstrated. No foreign  body is seen.       Impression    IMPRESSION: Chronic first MTP joint osteoarthritis.      TERRY EMERSON MD         SYSTEM ID:  [id number]       ASSESSMENT / PLAN:  (M79.671) Right foot pain  (primary encounter diagnosis)  Comment:   Plan:   1. Ace wrap to foot when home and not in a shoe  2. Naproxen 500mg ini AM, then can take Ibuprofen at night.      XR FOOT RT G/E 3 VW (Clinic Performed),         naproxen (NAPROSYN) 500 MG tablet          (R21) rash  Comment:    Plan:  Cortisone 10 to rash

## 2021-11-26 NOTE — TELEPHONE ENCOUNTER
"Right foot swelling-lateral aspect. Patient knocked over alarm clock and the alarm clock then hit patients foot. Denies bruising, slight redness of right ankle. Pain comes and goes, taking ibuprofen during the night. Pain is moderate.     Requesting appointment.          Next 5 appointments (look out 90 days)    Nov 26, 2021  3:30 PM  (Arrive by 3:15 PM)  SHORT with Reinier Jaramillo NP  [facility] [place] ([facility] [place] ) [address]  [place] [address]  [phone]   Dec 06, 2021  8:15 AM  (Arrive by 8:00 AM)  SHORT with Melyssa Connors MD  [facility] [place] ([facility] [place] ) [address]  [place] [address]  [phone]            Reason for Disposition    Swollen foot (Exceptions: localized bump from bunions, calluses, insect bite, sting)    Additional Information    Negative: Followed an ankle or foot injury    Negative: Ankle pain is the main symptom    Negative: Entire foot is cool or blue in comparison to other foot    Negative: Purple or black skin on foot or toe    Negative: Red area or streak and fever    Negative: Swollen foot and fever    Negative: Patient sounds very sick or weak to the triager    Answer Assessment - Initial Assessment Questions  1. ONSET: \"When did the pain start?\"       11/22/21    2. LOCATION: \"Where is the pain located?\"       Right foot-lateral     3. PAIN: \"How bad is the pain?\"    (Scale 1-10; or mild, moderate, severe)    -  MILD (1-3): doesn't interfere with normal activities     -  MODERATE (4-7): interferes with normal activities (e.g., work or school) or awakens from sleep, limping     -  SEVERE (8-10): excruciating pain, unable to do any normal activities, unable to walk      Moderate    4. WORK OR EXERCISE: \"Has there been any recent work or exercise that involved this part of the body?\"       Knocked alarm clock off night stand and alarm clock hit foot     5. CAUSE: \"What do you think is causing " "the foot pain?\"      Injury     6. OTHER SYMPTOMS: \"Do you have any other symptoms?\" (e.g., leg pain, rash, fever, numbness)      Swelling of right foot-lateral, slight redness of ankle. Denies bruising     7. PREGNANCY: \"Is there any chance you are pregnant?\" \"When was your last menstrual period?\"      No    Protocols used: FOOT PAIN-A-OH      "

## 2021-12-07 DIAGNOSIS — G25.81 RESTLESS LEG SYNDROME: ICD-10-CM

## 2021-12-09 RX ORDER — ROPINIROLE 0.25 MG/1
TABLET, FILM COATED ORAL
Qty: 90 TABLET | Refills: 1 | Status: SHIPPED | OUTPATIENT
Start: 2021-12-09 | End: 2021-12-13

## 2021-12-09 NOTE — TELEPHONE ENCOUNTER
rOPINIRole (REQUIP) 0.25 MG tablet      Last Written Prescription Date:  7-15-21  Last Fill Quantity: 180,   # refills: 1  Last Office Visit: 9-2-21  Future Office visit:       Routing refill request to provider for review/approval because:   CBC on record in past 12 months    CBC RESULTS: Recent Labs   Lab Test 09/15/20  1111   WBC 4.4   RBC 3.90   HGB 12.6   HCT 37.1   MCV 95   MCH 32.3   MCHC 34.0   RDW 12.7

## 2021-12-13 ENCOUNTER — TELEPHONE (OUTPATIENT)
Dept: FAMILY MEDICINE | Facility: OTHER | Age: 71
End: 2021-12-13
Payer: COMMERCIAL

## 2021-12-13 DIAGNOSIS — E78.5 HYPERLIPIDEMIA, UNSPECIFIED HYPERLIPIDEMIA TYPE: ICD-10-CM

## 2021-12-13 DIAGNOSIS — G25.81 RESTLESS LEG SYNDROME: ICD-10-CM

## 2021-12-13 RX ORDER — ROPINIROLE 0.25 MG/1
.5-1 TABLET, FILM COATED ORAL
Qty: 90 TABLET | Refills: 1 | COMMUNITY
Start: 2021-12-13 | End: 2022-02-18

## 2021-12-13 NOTE — TELEPHONE ENCOUNTER
Patient was prescribed ropinirole (requip) for restless legs because she was at a 10/10 originally and is now at 5-6 on a scale out of 10. Patient still feels jerks, no pain, tingling, slight numbness in right leg. It keeps her up at night and she is wondering if there is something else we can do. She is over sleeping because she isn't sleeping well at night and is now unable to finish her daily tasks. Please advise.

## 2021-12-14 RX ORDER — SIMVASTATIN 20 MG
20 TABLET ORAL AT BEDTIME
Qty: 90 TABLET | Refills: 1 | Status: SHIPPED | OUTPATIENT
Start: 2021-12-14 | End: 2022-02-25

## 2021-12-14 RX ORDER — ROPINIROLE 1 MG/1
1 TABLET, FILM COATED ORAL AT BEDTIME
Qty: 30 TABLET | Refills: 1 | Status: SHIPPED | OUTPATIENT
Start: 2021-12-14 | End: 2022-01-07

## 2021-12-14 NOTE — TELEPHONE ENCOUNTER
Patient is only on 0.25 mg tab - 1 at bedtime.  Would suggest increasing to 2 tabs (total 0.50 mg) nightly for 1 week.  If still not adequate my need to increase to 1 mg.  If needing new script - please pend.

## 2021-12-14 NOTE — TELEPHONE ENCOUNTER
I called patient back she increased the requip on her own for the last week taking 2-0.25mg at bedtime. She wakes up around  1-2 hours later and has to get up and walk around due to her restless legs. She states this interrupts her sleep and is tired in the mornings.  She is interested increasing the dose to 1mg to see if it by chance helps here. I did pend dose, while on the phone she also requested a refillon her simvastatin. Her pharmacy changes on the 1st of the year.

## 2021-12-29 DIAGNOSIS — R20.2 PARESTHESIA: ICD-10-CM

## 2021-12-29 RX ORDER — GABAPENTIN 300 MG/1
CAPSULE ORAL
Qty: 270 CAPSULE | Refills: 1 | Status: SHIPPED | OUTPATIENT
Start: 2021-12-29 | End: 2022-02-14

## 2021-12-29 NOTE — TELEPHONE ENCOUNTER
Gabapentin      Last Written Prescription Date:  7/15/21  Last Fill Quantity: 270,   # refills: 1  Last Office Visit: 11/26/21  Future Office visit:       Routing refill request to provider for review/approval because:

## 2022-01-06 ENCOUNTER — NURSE TRIAGE (OUTPATIENT)
Dept: FAMILY MEDICINE | Facility: OTHER | Age: 72
End: 2022-01-06
Payer: COMMERCIAL

## 2022-01-06 NOTE — TELEPHONE ENCOUNTER
"Patient called stating she bit the side of her mouth a couple nights ago. Patient states ulcer is smaller than pencil eraser. Patient denies fever.    Reason for Disposition    Painless ulcer or sore    Probable canker sore(s)    Additional Information    Negative: [1] Looks like fever blisters (\"cold sore\") AND [2] only on outer lip    Negative: Generalized skin rash from Chickenpox    Negative: Chemical in the mouth suspected cause of ulcers    Negative: [1] Drinking very little AND [2] dehydration suspected (e.g., no urine > 12 hours, very dry mouth, very lightheaded)    Negative: Generalized rash on body    Negative: Patient sounds very sick or weak to the triager    Negative: Large blisters in mouth (i.e., fluid filled bubbles or sacs)    Negative: Gums are red, painful and have many ulcers    Negative: Fever    Negative: Large lymph node (> 1 inch or 2.5 cm) under the jaw    Negative: Facial swelling    Negative: Weak immune system (e.g., HIV positive, cancer chemo, splenectomy, organ transplant, chronic steroids)    Negative: [1] One pimple or ulcer on the gum AND [2] near a toothache    Answer Assessment - Initial Assessment Questions  1. LOCATION: \"Where is the ulcer located?\"       Left side of the motuth  2. NUMBER: \"How many ulcers are there?\"       one  3. SIZE: \"How large is the ulcer?\"       Smaller than a pencil eraser  4. SEVERITY: \"Are they painful?\" If so, ask: \"How bad is it?\"  (Scale 1-10; or mild, moderate, severe)   - MILD - eating  and drinking normally    - MODERATE - decreased liquid intake    - SEVERE - drinking very little       mild  5. ONSET: \"When did you first notice the ulcer?\"       Couple nights ago  6. RECURRENT SYMPTOM: \"Have you had a mouth ulcer before?\" If so, ask: \"When was the last time?\" and \"What happened that time?\"       no  7. CAUSE: \"What do you think is causing the mouth ulcer?\"      Patient thinks she bit the side of her mouth during the night  8. OTHER SYMPTOMS: \"Do " "you have any other symptoms?\" (e.g., fever)      no  9. PREGNANCY: \"Is there any chance you are pregnant?\" \"When was your last menstrual period?\"      no    Protocols used: MOUTH ULCERS-A-AH      "

## 2022-01-07 DIAGNOSIS — G25.81 RESTLESS LEG SYNDROME: ICD-10-CM

## 2022-01-07 RX ORDER — ROPINIROLE 1 MG/1
1 TABLET, FILM COATED ORAL AT BEDTIME
Qty: 90 TABLET | Refills: 1 | Status: SHIPPED | OUTPATIENT
Start: 2022-01-07 | End: 2022-03-25

## 2022-01-07 NOTE — TELEPHONE ENCOUNTER
Pt calling nurse Sarina back and has a question about the salt rinses.   She is wondering about plain salt or iodized?Or is there no difference?    Please call back [phone]-can leave detailed message.    Shefali Tavera RN

## 2022-01-07 NOTE — TELEPHONE ENCOUNTER
Requip    Last Written Prescription Date:  12.14.2021  Last Fill Quantity: 30,   # refills: 1  Last Office Visit: 11.26.2021  Future Office visit:       Routing refill request to provider for review/approval because:  Pt needs a 90 day supply to [facility]. Pended.    Shefali Tavera RN

## 2022-01-25 DIAGNOSIS — E03.9 HYPOTHYROIDISM, UNSPECIFIED TYPE: ICD-10-CM

## 2022-01-26 RX ORDER — LEVOTHYROXINE SODIUM 25 UG/1
25 TABLET ORAL DAILY
Qty: 135 TABLET | Refills: 1 | Status: SHIPPED | OUTPATIENT
Start: 2022-01-26 | End: 2022-05-23

## 2022-02-07 DIAGNOSIS — E78.5 HYPERLIPIDEMIA, UNSPECIFIED HYPERLIPIDEMIA TYPE: ICD-10-CM

## 2022-02-09 RX ORDER — SIMVASTATIN 20 MG
TABLET ORAL
Qty: 90 TABLET | Refills: 3 | OUTPATIENT
Start: 2022-02-09

## 2022-02-12 DIAGNOSIS — R20.2 PARESTHESIA: ICD-10-CM

## 2022-02-14 RX ORDER — GABAPENTIN 300 MG/1
CAPSULE ORAL
Qty: 300 CAPSULE | Refills: 2 | Status: SHIPPED | OUTPATIENT
Start: 2022-02-14 | End: 2022-12-13

## 2022-02-14 NOTE — TELEPHONE ENCOUNTER
gabapentin      Last Written Prescription Date:  12/29/21  Last Fill Quantity: 270,   # refills: 1  Last Office Visit: 11/26/21  Future Office visit:

## 2022-02-17 ENCOUNTER — TELEPHONE (OUTPATIENT)
Dept: FAMILY MEDICINE | Facility: OTHER | Age: 72
End: 2022-02-17
Payer: COMMERCIAL

## 2022-02-17 DIAGNOSIS — G25.81 RESTLESS LEG SYNDROME: ICD-10-CM

## 2022-02-17 NOTE — TELEPHONE ENCOUNTER
Call from patient reporting not long after going to bed  twitching in right leg occurs. Ropinirole was increased, patient has been taking 1 mg for over a month with symptoms continuing. Increase in ropinirole helped for a short period of time, returned over the last 2 weeks.     Inquiring on advice.     Patient can be reached at [phone]- may leave a message

## 2022-02-18 RX ORDER — ROPINIROLE 0.5 MG/1
0.5 TABLET, FILM COATED ORAL
Qty: 30 TABLET | Refills: 1 | Status: SHIPPED | OUTPATIENT
Start: 2022-02-18 | End: 2022-03-25

## 2022-02-18 NOTE — TELEPHONE ENCOUNTER
Patient was wondering if she can take 2 tablets (2 mg) instead 1.5 mg. She does not have anything to cut medication in half

## 2022-02-18 NOTE — TELEPHONE ENCOUNTER
I sent 0.5 mg tabs to [facility] to take with her 1 mg tab for total of 1.5 mg per dose.  Should only go up by 0.5 mg at a time.

## 2022-02-18 NOTE — TELEPHONE ENCOUNTER
If taking 1 mg of Ropinerole at bedtime- can try increasing to 1.5 mg but would also schedule a follow up to reassess. May need to try different medication.  However, if stopping the Ropinerole, it should be tapered.

## 2022-02-22 DIAGNOSIS — E78.5 HYPERLIPIDEMIA, UNSPECIFIED HYPERLIPIDEMIA TYPE: ICD-10-CM

## 2022-02-22 RX ORDER — SIMVASTATIN 20 MG
TABLET ORAL
Qty: 90 TABLET | Refills: 3 | OUTPATIENT
Start: 2022-02-22

## 2022-03-25 DIAGNOSIS — G25.81 RESTLESS LEG SYNDROME: ICD-10-CM

## 2022-03-25 RX ORDER — ROPINIROLE 0.5 MG/1
TABLET, FILM COATED ORAL
Qty: 60 TABLET | Refills: 0 | Status: SHIPPED | OUTPATIENT
Start: 2022-03-25 | End: 2022-04-06

## 2022-03-25 RX ORDER — ROPINIROLE 1 MG/1
TABLET, FILM COATED ORAL
Qty: 80 TABLET | Refills: 0 | Status: SHIPPED | OUTPATIENT
Start: 2022-03-25 | End: 2022-04-06

## 2022-03-25 NOTE — TELEPHONE ENCOUNTER
Requip 0.5 mg      Last Written Prescription Date:  2/18/22  Last Fill Quantity: 30,   # refills: 1  Last Office Visit: 11/26/21  Future Office visit:       Routing refill request to provider for review/approval because:    Requip 1 mg      Last Written Prescription Date:  1/7/22  Last Fill Quantity: 90,   # refills: 1  Last Office Visit: 11/26/21  Future Office visit:       Routing refill request to provider for review/approval because:

## 2022-03-30 ENCOUNTER — TELEPHONE (OUTPATIENT)
Dept: FAMILY MEDICINE | Facility: OTHER | Age: 72
End: 2022-03-30
Payer: COMMERCIAL

## 2022-03-30 NOTE — TELEPHONE ENCOUNTER
Pt states the the REquip was working at 1.5mg but she is still having some trouble with the right leg.  Wondering what the next step are?  Ok to leave message if pt is not available.

## 2022-03-31 NOTE — PROGRESS NOTES
Assessment & Plan     Localized edema  Pedal edema; asymmetric; may be asymmetric due to prior injury; edema itself may be related to her neurontin; weight benefits/risks of continuing   Add compression stockings.  - Miscellaneous Order for DME - ONLY FOR DME    Restless leg syndrome  Doing overall ok since dose increased to 1.5 mg.  Consider further increase to 20 mg.  Ferritin level holding at 61.  Goal >75.   Would like her to increase iron supplement and recheck at her physical  - rOPINIRole (REQUIP) 1 MG tablet; Take 1 tablet (1 mg) by mouth At Bedtime  - rOPINIRole (REQUIP) 0.5 MG tablet; TAKE 1 TABLET BY MOUTH  NIGHTLY AS NEEDED IN  ADDITION TO 1MG TABLET  - Ferritin; Future  - Ferritin    Arthropathy, unspecified   - Ferritin; Future  - Ferritin       Patient Instructions   Will check iron stores today - goal >75.    Continue replacement.    Take Requip at least an hour prior to bedtime.  If not adequate - will then increase dose to 2 mg.    Script for compression stockings - Health line - wear daily - take off at night.    Swelling may be due to Neurontin.          No follow-ups on file.    Melyssa Encinas MD  [facility] - ESTEPHANIE Chairez is a 71 year old who presents for the following health issues     HPI     Concern - restless legs  Onset: 1 year or so   Description: restless legs  Intensity: mild, moderate  Progression of Symptoms:  improving  Accompanying Signs & Symptoms: none  Previous history of similar problem: n/a  Precipitating factors:        Worsened by: n/a  Alleviating factors:        Improved by:medication  Therapies tried and outcome: Medications, has had improvement  Tingling, twitching, does get up and walk at times.  Taking just before bedtime - not an hour prior    Slow iron - daily.  Ferritin 60 in 5/2021.  Goal >75.  Bough immediate release 65 but hasn't started.  Started replacement 5/2020 - level was 21.  No GI side effects.  No constipation.   miralax is effective.  Requip 1.5 mg at bedtime now.  Seems better at this dose which was increased mid 2/2022.  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  Mild swelling persists.  No significant pain.    Xray negative 11/2021.  Reinier Jaramillo.      Review of Systems   Constitutional, HEENT, cardiovascular, pulmonary, gi and gu systems are negative, except as otherwise noted.      Objective    /62   Pulse 60   Temp 98.6  F (37  C) (Tympanic)   Resp 18   Wt 52.2 kg (115 lb)   SpO2 97%   BMI 22.27 kg/m    Body mass index is 22.27 kg/m .  Physical Exam   GENERAL: healthy, alert and no distress  NECK: no adenopathy, no asymmetry, masses, or scars and thyroid normal to palpation  RESP: lungs clear to auscultation - no rales, rhonchi or wheezes  CV: regular rate and rhythm, normal S1 S2, no S3 or S4, no murmur, click or rub, no peripheral edema and peripheral pulses strong  MS: normal muscle tone, peripheral pulses normal and bilateral feet with edema - left trace, right 1+, but no focal tenderness; normal AROM  SKIN: no suspicious lesions or rashes  NEURO: Normal strength and tone, mentation intact and speech normal  PSYCH: mentation appears normal, affect normal/bright    Results for orders placed or performed in visit on 04/06/22 (from the past 24 hour(s))   Ferritin   Result Value Ref Range    Ferritin 61 8 - 252 ng/mL

## 2022-04-06 ENCOUNTER — OFFICE VISIT (OUTPATIENT)
Dept: FAMILY MEDICINE | Facility: OTHER | Age: 72
End: 2022-04-06
Attending: FAMILY MEDICINE
Payer: COMMERCIAL

## 2022-04-06 VITALS
HEART RATE: 60 BPM | TEMPERATURE: 98.6 F | RESPIRATION RATE: 18 BRPM | DIASTOLIC BLOOD PRESSURE: 62 MMHG | BODY MASS INDEX: 22.27 KG/M2 | SYSTOLIC BLOOD PRESSURE: 110 MMHG | OXYGEN SATURATION: 97 % | WEIGHT: 115 LBS

## 2022-04-06 DIAGNOSIS — M12.9 ARTHROPATHY, UNSPECIFIED: ICD-10-CM

## 2022-04-06 DIAGNOSIS — R60.0 LOCALIZED EDEMA: Primary | ICD-10-CM

## 2022-04-06 DIAGNOSIS — G25.81 RESTLESS LEG SYNDROME: ICD-10-CM

## 2022-04-06 LAB — FERRITIN SERPL-MCNC: 61 NG/ML (ref 8–252)

## 2022-04-06 PROCEDURE — 82728 ASSAY OF FERRITIN: CPT | Mod: ZL | Performed by: FAMILY MEDICINE

## 2022-04-06 PROCEDURE — G0463 HOSPITAL OUTPT CLINIC VISIT: HCPCS

## 2022-04-06 PROCEDURE — 99213 OFFICE O/P EST LOW 20 MIN: CPT | Performed by: FAMILY MEDICINE

## 2022-04-06 PROCEDURE — 36415 COLL VENOUS BLD VENIPUNCTURE: CPT | Mod: ZL | Performed by: FAMILY MEDICINE

## 2022-04-06 RX ORDER — ROPINIROLE 0.5 MG/1
TABLET, FILM COATED ORAL
Qty: 90 TABLET | Refills: 1 | Status: SHIPPED | OUTPATIENT
Start: 2022-04-06 | End: 2022-06-02

## 2022-04-06 RX ORDER — ROPINIROLE 1 MG/1
1 TABLET, FILM COATED ORAL AT BEDTIME
Qty: 90 TABLET | Refills: 3 | Status: SHIPPED | OUTPATIENT
Start: 2022-04-06 | End: 2022-04-12

## 2022-04-06 ASSESSMENT — ANXIETY QUESTIONNAIRES
2. NOT BEING ABLE TO STOP OR CONTROL WORRYING: NOT AT ALL
3. WORRYING TOO MUCH ABOUT DIFFERENT THINGS: NOT AT ALL
1. FEELING NERVOUS, ANXIOUS, OR ON EDGE: NOT AT ALL
GAD7 TOTAL SCORE: 0
6. BECOMING EASILY ANNOYED OR IRRITABLE: NOT AT ALL
4. TROUBLE RELAXING: NOT AT ALL
5. BEING SO RESTLESS THAT IT IS HARD TO SIT STILL: NOT AT ALL
7. FEELING AFRAID AS IF SOMETHING AWFUL MIGHT HAPPEN: NOT AT ALL

## 2022-04-06 ASSESSMENT — PATIENT HEALTH QUESTIONNAIRE - PHQ9: SUM OF ALL RESPONSES TO PHQ QUESTIONS 1-9: 0

## 2022-04-06 ASSESSMENT — PAIN SCALES - GENERAL: PAINLEVEL: NO PAIN (0)

## 2022-04-06 NOTE — PATIENT INSTRUCTIONS
Will check iron stores today - goal >75.    Continue replacement.    Take Requip at least an hour prior to bedtime.  If not adequate - will then increase dose to 2 mg.    Script for compression stockings - Health line - wear daily - take off at night.    Swelling may be due to Neurontin.

## 2022-04-06 NOTE — NURSING NOTE
"Chief Complaint   Patient presents with     Musculoskeletal Problem       Initial /62   Pulse 60   Temp 98.6  F (37  C) (Tympanic)   Resp 18   Wt 52.2 kg (115 lb)   SpO2 97%   BMI 22.27 kg/m   Estimated body mass index is 22.27 kg/m  as calculated from the following:    Height as of 9/2/21: 1.53 m (5' 0.25\").    Weight as of this encounter: 52.2 kg (115 lb).  Medication Reconciliation: complete  Nakia Mccurdy LPN    "

## 2022-04-07 ENCOUNTER — NURSE TRIAGE (OUTPATIENT)
Dept: FAMILY MEDICINE | Facility: OTHER | Age: 72
End: 2022-04-07
Payer: COMMERCIAL

## 2022-04-07 ASSESSMENT — ANXIETY QUESTIONNAIRES: GAD7 TOTAL SCORE: 0

## 2022-04-07 NOTE — TELEPHONE ENCOUNTER
"Rash on lower right leg-above ankle. Rash is red in color. Denies itching-no irritation or pain.     Patient in for Office Visit with Dr. Connors on 4/6/22 forgot to mention the rash to Dr. Connors.     Notified Dr. Connors out of office after today until 4/18/22.    Will discuss with Hazel Goodman on 4/8/22 and return call to patient.     Reason for Disposition    Patient wants to be seen    Additional Information    Negative: Sounds like a life-threatening emergency to the triager    Negative: Possible contact with poison ivy or oak    Negative: Insect bite(s) suspected    Negative: Athlete's Foot suspected (i.e., itchy rash between the toes)    Negative: Jock Itch suspected (i.e., itchy rash on inner thighs near genital area)    Negative: Wound infection suspected (i.e., pain, spreading redness, or pus; in a cut, puncture, scrape or sutured wound)    Negative: Rash of external female genital area (vulva)    Negative: Rash of penis or scrotum    Negative: Small spot, skin growth, or mole    Negative: Fever and localized purple or blood-colored spots or dots that are not from injury or friction    Negative: Fever and localized rash is very painful    Negative: Patient sounds very sick or weak to the triager    Negative: Looks like a boil, infected sore, deep ulcer, or other infected rash (spreading redness, pus)    Negative: Lyme disease suspected (e.g., bull's-eye rash or tick bite / exposure)    Negative: Localized purple or blood-colored spots or dots that are not from injury or friction (no fever)    Negative: Localized rash is very painful (no fever)    Negative: Painful rash with multiple small blisters grouped together (i.e., dermatomal distribution or 'band' or 'stripe')    Answer Assessment - Initial Assessment Questions  1. APPEARANCE of RASH: \"Describe the rash.\"       Red- flat (not raised)    2. LOCATION: \"Where is the rash located?\"       Above ankle on right lower leg     3. NUMBER: \"How many " "spots are there?\"       Several spots forming a [place]    4. SIZE: \"How big are the spots?\" (Inches, centimeters or compare to size of a coin)       2\" wide and < 1\" length     5. ONSET: \"When did the rash start?\"       1 week     6. ITCHING: \"Does the rash itch?\" If so, ask: \"How bad is the itch?\"  (Scale 1-10; or mild, moderate, severe)      No    7. PAIN: \"Does the rash hurt?\" If so, ask: \"How bad is the pain?\"  (Scale 1-10; or mild, moderate, severe)      No    8. OTHER SYMPTOMS: \"Do you have any other symptoms?\" (e.g., fever)      No    9. PREGNANCY: \"Is there any chance you are pregnant?\" \"When was your last menstrual period?\"      No    Protocols used: RASH OR REDNESS - [id number]      "

## 2022-04-08 NOTE — TELEPHONE ENCOUNTER
Pt called back and updated on below appt.    Pt c/o of scratchy throat and maybe a little sore throat that should be looked. Pt declined triage.Advised if sore throat go to UC.She declines and will have it looked at upcoming appt.    Shefali Tavera RN

## 2022-04-08 NOTE — TELEPHONE ENCOUNTER
Call placed to Hazel Goodman, discussed patient symptoms per Triage Note below.     Hazel delgado with patient coming in on 4/12/22 at 130 or 150 (Dr Only time slot) to have rash evaluated as rash is not currently bothering the patient. Denies pain or itching. (patient just wanted to have the rash looked at).    Call placed to patient with no answer. Voicemail message has been left requesting patient to return call to provide patient with appointment time. This writer has placed patient on the schedule.     Next 5 appointments (look out 90 days)    Apr 12, 2022  1:30 PM  (Arrive by 1:15 PM)  SHORT with Hazel Goodman, APRN CNP  [facility] [place] ([facility] [place] ) [address]  [place] [address]  [phone]

## 2022-04-11 ENCOUNTER — NURSE TRIAGE (OUTPATIENT)
Dept: FAMILY MEDICINE | Facility: OTHER | Age: 72
End: 2022-04-11
Payer: COMMERCIAL

## 2022-04-11 NOTE — TELEPHONE ENCOUNTER
"Patient called stating she has an appointment with Waylon for a rash and is having symptoms of a cough and sore throat and want's the provider to address it tomorrow at her appointment with Rex     Answer Assessment - Initial Assessment Questions  1. COVID-19 DIAGNOSIS: \"Who made your COVID-19 diagnosis?\" \"Was it confirmed by a positive lab test or self-test?\" If not diagnosed by a doctor (or NP/PA), ask \"Are there lots of cases (community spread) where you live?\" Note: See [facility] health department website, if unsure.      no  2. COVID-19 EXPOSURE: \"Was there any known exposure to COVID before the symptoms began?\" CDC Definition of close contact: within 6 feet (2 meters) for a total of 15 minutes or more over a 24-hour period.       no  3. ONSET: \"When did the COVID-19 symptoms start?\"       Thursday  4. WORST SYMPTOM: \"What is your worst symptom?\" (e.g., cough, fever, shortness of breath, muscle aches)      Cough runny nose   5. COUGH: \"Do you have a cough?\" If Yes, ask: \"How bad is the cough?\"        yes  6. FEVER: \"Do you have a fever?\" If Yes, ask: \"What is your temperature, how was it measured, and when did it start?\"      no  7. RESPIRATORY STATUS: \"Describe your breathing?\" (e.g., shortness of breath, wheezing, unable to speak)       no  8. BETTER-SAME-WORSE: \"Are you getting better, staying the same or getting worse compared to yesterday?\"  If getting worse, ask, \"In what way?\"      same  9. HIGH RISK DISEASE: \"Do you have any chronic medical problems?\" (e.g., asthma, heart or lung disease, weak immune system, obesity, etc.)      no  10. VACCINE: \"Have you had the COVID-19 vaccine?\" If Yes, ask: \"Which one, how many shots, when did you get it?\"        Yes Moderna  11. BOOSTER: \"Have you received your COVID-19 booster?\" If Yes, ask: \"Which one and when did you get it?\"        yes  12. PREGNANCY: \"Is there any chance you are pregnant?\" \"When was your last menstrual period?\"        no  13. OTHER " "SYMPTOMS: \"Do you have any other symptoms?\"  (e.g., chills, fatigue, headache, loss of smell or taste, muscle pain, sore throat)        no  14. O2 SATURATION MONITOR:  \"Do you use an oxygen saturation monitor (pulse oximeter) at home?\" If Yes, ask \"What is your reading (oxygen level) today?\" \"What is your usual oxygen saturation reading?\" (e.g., 95%)        no    Protocols used: CORONAVIRUS (COVID-19) DIAGNOSED OR [id number] 1.18.2022   COVID 19 Nurse Triage Plan/Patient Instructions    Please be aware that novel coronavirus (COVID-19) may be circulating in the community. If you develop symptoms such as fever, cough, or SOB or if you have concerns about the presence of another infection including coronavirus (COVID-19), please contact your health care provider or visit [email or web address].     Disposition/Instructions    Additional COVID19 information to add for patients.   How can I protect others?  If you have symptoms (fever, cough, body aches or trouble breathing): Stay home and away from others (self-isolate) until:    At least 10 days have passed since your symptoms started, And     You ve had no fever--and no medicine that reduces fever--for 1 full day (24 hours), And      Your other symptoms have resolved (gotten better).     If you don t have symptoms, but a test showed that you have COVID-19 (you tested positive):    Stay home and away from others (self-isolate). Follow the tips under \"How do I self-isolate?\" below for 10 days (20 days if you have a weak immune system).    You don't need to be retested for COVID-19 before going back to school or work. As long as you're fever-free and feeling better, you can go back to school, work and other activities after waiting the 10 or 20 days.     How do I self-isolate?    Stay in your own room, even for meals. Use your own bathroom if you can.     Stay away from others in your home. No hugging, kissing or shaking hands. No visitors.    Don t go to " work, school or anywhere else.     Clean  high touch  surfaces often (doorknobs, counters, handles, etc.). Use a household cleaning spray or wipes. You ll find a full list on the EPA website:  www.epa.gov/pesticide-registration/list-n-disinfectants-use-against-sars-cov-2.    Cover your mouth and nose with a mask, tissue or washcloth to avoid spreading germs.    Wash your hands and face often. Use soap and water.    Caregivers in these groups are at risk for severe illness due to COVID-19:  o People 65 years and older  o People who live in a nursing home or long-term care facility  o People with chronic disease (lung, heart, cancer, diabetes, kidney, liver, immunologic)  o People who have a weakened immune system, including those who:  - Are in cancer treatment  - Take medicine that weakens the immune system, such as corticosteroids  - Had a bone marrow or organ transplant  - Have an immune deficiency  - Have poorly controlled HIV or AIDS  - Are obese (body mass index of 40 or higher)  - Smoke regularly    Caregivers should wear gloves while washing dishes, handling laundry and cleaning bedrooms and bathrooms.    Use caution when washing and drying laundry: Don t shake dirty laundry, and use the warmest water setting that you can.    For more tips, go to www.cdc.gov/coronavirus/2019-ncov/downloads/10Things.pdf.    How can I take care of myself?  1. Get lots of rest. Drink extra fluids (unless a doctor has told you not to).     2. Take Tylenol (acetaminophen) for fever or pain. If you have liver or kidney problems, ask your family doctor if it s okay to take Tylenol.     Adults can take either:     650 mg (two 325 mg pills) every 4 to 6 hours, or     1,000 mg (two 500 mg pills) every 8 hours as needed.     Note: Don t take more than 3,000 mg in one day.   Acetaminophen is found in many medicines (both prescribed and over-the-counter medicines). Read all labels to be sure you don t take too much.     For children,  check the Tylenol bottle for the right dose. The dose is based on the child s age or weight.    3. If you have other health problems (like cancer, heart failure, an organ transplant or severe kidney disease): Call your specialty clinic if you don t feel better in the next 2 days.    4. Know when to call 911: Emergency warning signs include:    Trouble breathing or shortness of breath    Pain or pressure in the chest that doesn t go away    Feeling confused like you haven t felt before, or not being able to wake up    Bluish-colored lips or face    What are the symptoms of COVID-19?     The most common symptoms are cough, fever and trouble breathing.     Less common symptoms include body aches, chills, diarrhea (loose, watery poops), fatigue (feeling very tired), headache, runny nose, sore throat and loss of smell.    COVID-19 can cause severe coughing (bronchitis) and lung infection (pneumonia).    How does it spread?     The virus may spread when a person coughs or sneezes into the air. The virus can travel about 6 feet this way, and it can live on surfaces.      Common  (household disinfectants) will kill the virus.    Who is at risk?  Anyone can catch COVID-19 if they re around someone who has the virus.    How can others protect themselves?     Stay away from people who have COVID-19 (or symptoms of COVID-19).    Wash hands often with soap and water. Or, use hand  with at least 60% alcohol.    Avoid touching the eyes, nose or mouth.     Wear a face mask when you go out in public, when sick or when caring for a sick person.    Where can I get more information?     [facility]: About COVID-19: [email or web address]    CDC: What to Do If You re Sick: www.cdc.gov/coronavirus/2019-ncov/about/steps-when-sick.html    CDC: Ending Home Isolation: www.cdc.gov/coronavirus/2019-ncov/hcp/disposition-in-home-patients.html     CDC: Caring for Someone:  www.cdc.gov/coronavirus/2019-ncov/if-you-are-sick/care-for-someone.html     [facility]: Interim Guidance for Hospital Discharge to Home: www.[facility]/diseases/coronavirus/hcp/hospdischarge.pdf    [facility] clinical trials (COVID-19 research studies): clinicalaffairs.[facility]-clinical-trials     Below are the COVID-19 hotlines at the [place] Department of Health ([facility]). Interpreters are available.   o For health questions: Call 306-433-3169 or 1-619.404.7114 (7 a.m. to 7 p.m.)  o For questions about schools and childcare: Call 342-431-2097 or 1-468.586.4791 (7 a.m. to 7 p.m.)          Thank you for taking steps to prevent the spread of this virus.  o Limit your contact with others.  o Wear a simple mask to cover your cough.  o Wash your hands well and often.    Resources    [facility]: About COVID-19: [email or web address]    CDC: What to Do If You're Sick: www.cdc.gov/coronavirus/2019-ncov/about/steps-when-sick.html    CDC: Ending Home Isolation: www.cdc.gov/coronavirus/2019-ncov/hcp/disposition-in-home-patients.html     CDC: Caring for Someone: www.cdc.gov/coronavirus/2019-ncov/if-you-are-sick/care-for-someone.html     [facility]: Interim Guidance for Hospital Discharge to Home: www.[facility]./diseases/coronavirus/hcp/hospdischarge.pdf    [facility] clinical trials (COVID-19 research studies): clinicalaffairs.[facility]-clinical-trials     Below are the COVID-19 hotlines at the [place] Department of Health ([facility]). Interpreters are available.   o For health questions: Call 778-494-5055 or 1-271.121.6556 (7 a.m. to 7 p.m.)  o For questions about schools and childcare: Call 806-039-5503 or 1-158.599.6518 (7 a.m. to 7 p.m.)

## 2022-04-11 NOTE — PROGRESS NOTES
Assessment & Plan     Viral URI with cough  CBC low WBC - probably viral URI  COVID test pending - she is vaccinated on 3 doses of Moderna discussed getting 4th dose   If COVID positive she is 5 days at this time since symptoms started   - Symptomatic; Yes; 4/7/2022 COVID-19 Virus (Coronavirus) by PCR; Future  - CBC with platelets and differential; Future  - Symptomatic; Yes; 4/7/2022 COVID-19 Virus (Coronavirus) by PCR Nose  - CBC with platelets and differential    Rash of unknown etiology  Unknown reason for rash.  Discussed monitoring.  Possible viral or post mild edema related   Encouraged to follow up if no improvement or worsening symptoms        See Patient Instructions    No follow-ups on file.    MICHAEL Weaver [facility] - ESTEPHANIE Chairez is a 71 year old who presents for the following health issues     HPI     Rash  Onset/Duration: 1 week  Description  Location: lower right leg  Character: blotchy, raised  Itching: no  Intensity:  mild  Progression of Symptoms:  same  Accompanying signs and symptoms:   Fever: YES  Body aches or joint pain: no  Sore throat symptoms: YES  Recent cold symptoms: YES  History:           Previous episodes of similar rash: None  New exposures:  None  Recent travel: no  Exposure to similar rash: no  Precipitating or alleviating factors: none  Therapies tried and outcome: none  She did have some swelling into the right foot just prior to rash.  The swelling in the foot is decreasing     Acute Illness  Acute illness concerns: sore throat and cold  Onset/Duration: 1 week  Symptoms:  Fever: YES  Chills/Sweats: YES  Headache (location?): YES  Sinus Pressure: no  Conjunctivitis:  no  Ear Pain: no  Rhinorrhea: YES  Congestion: no  Sore Throat: YES  Cough: YES-non-productive  Wheeze: no  Decreased Appetite: no  Nausea: no  Vomiting: no  Diarrhea: no  Dysuria/Freq.: no  Dysuria or Hematuria: no  Fatigue/Achiness: YES  Sick/Strep Exposure:  no  Therapies tried and outcome: None    Review of Systems   CONSTITUTIONAL:chills  INTEGUMENTARY/SKIN: right inner lower leg  EYES: NEGATIVE for vision changes or irritation  ENT/MOUTH: rhinorrhea-clear and sore throat slowly improving   RESP:mild cough   CV: NEGATIVE for chest pain, palpitations or peripheral edema  GI: NEGATIVE for nausea, abdominal pain, heartburn, or change in bowel habits  : denies dysuria   MUSCULOSKELETAL: mild body aches from virus  NEURO: headaches since virus   ENDOCRINE: NEGATIVE for temperature intolerance, skin/hair changes  PSYCHIATRIC: NEGATIVE for changes in mood or affect      Objective    /50   Pulse 90   Temp 99.8  F (37.7  C) (Tympanic)   SpO2 98%   There is no height or weight on file to calculate BMI.  Physical Exam   GENERAL: alert and ill appearing   HENT: normal cephalic/atraumatic, ear canals and TM's normal, nose and mouth without ulcers or lesions, rhinorrhea clear, oropharynx clear and oral mucous membranes moist  NECK: no adenopathy, no asymmetry, masses, or scars and thyroid normal to palpation  RESP: lungs clear to auscultation - no rales, rhonchi or wheezes  CV: regular rate and rhythm, normal S1 S2, no S3 or S4, no murmur, click or rub, no peripheral edema and peripheral pulses strong  ABDOMEN: soft, nontender, no hepatosplenomegaly, no masses and bowel sounds normal  SKIN: no suspicious lesions or rashes  NEURO: Normal strength and tone, mentation intact and speech normal  PSYCH: mentation appears normal and affect normal/bright    Results for orders placed or performed in visit on 04/12/22   CBC with platelets and differential     Status: Abnormal   Result Value Ref Range    WBC Count 3.4 (L) 4.0 - 11.0 10e3/uL    RBC Count 3.87 3.80 - 5.20 10e6/uL    Hemoglobin 12.3 11.7 - 15.7 g/dL    Hematocrit 36.3 35.0 - 47.0 %    MCV 94 78 - 100 fL    MCH 31.8 26.5 - 33.0 pg    MCHC 33.9 31.5 - 36.5 g/dL    RDW 13.2 10.0 - 15.0 %    Platelet Count 268 150 - 450  10e3/uL    % Neutrophils 74 %    % Lymphocytes 15 %    % Monocytes 10 %    % Eosinophils 0 %    % Basophils 1 %    % Immature Granulocytes 0 %    NRBCs per 100 WBC 0 <1 /100    Absolute Neutrophils 2.5 1.6 - 8.3 10e3/uL    Absolute Lymphocytes 0.5 (L) 0.8 - 5.3 10e3/uL    Absolute Monocytes 0.4 0.0 - 1.3 10e3/uL    Absolute Eosinophils 0.0 0.0 - 0.7 10e3/uL    Absolute Basophils 0.0 0.0 - 0.2 10e3/uL    Absolute Immature Granulocytes 0.0 <=0.4 10e3/uL    Absolute NRBCs 0.0 10e3/uL   CBC with platelets and differential     Status: Abnormal    Narrative    The following orders were created for panel order CBC with platelets and differential.  Procedure                               Abnormality         Status                     ---------                               -----------         ------                     CBC with platelets and d...[[id number]]  Abnormal            Final result                 Please view results for these tests on the individual orders.

## 2022-04-12 ENCOUNTER — OFFICE VISIT (OUTPATIENT)
Dept: FAMILY MEDICINE | Facility: OTHER | Age: 72
End: 2022-04-12
Attending: NURSE PRACTITIONER
Payer: COMMERCIAL

## 2022-04-12 VITALS
DIASTOLIC BLOOD PRESSURE: 50 MMHG | SYSTOLIC BLOOD PRESSURE: 130 MMHG | TEMPERATURE: 99.8 F | OXYGEN SATURATION: 98 % | HEART RATE: 90 BPM

## 2022-04-12 DIAGNOSIS — J06.9 VIRAL URI WITH COUGH: Primary | ICD-10-CM

## 2022-04-12 DIAGNOSIS — R21 RASH OF UNKNOWN ETIOLOGY: ICD-10-CM

## 2022-04-12 LAB
BASOPHILS # BLD AUTO: 0 10E3/UL (ref 0–0.2)
BASOPHILS NFR BLD AUTO: 1 %
EOSINOPHIL # BLD AUTO: 0 10E3/UL (ref 0–0.7)
EOSINOPHIL NFR BLD AUTO: 0 %
ERYTHROCYTE [DISTWIDTH] IN BLOOD BY AUTOMATED COUNT: 13.2 % (ref 10–15)
HCT VFR BLD AUTO: 36.3 % (ref 35–47)
HGB BLD-MCNC: 12.3 G/DL (ref 11.7–15.7)
IMM GRANULOCYTES # BLD: 0 10E3/UL
IMM GRANULOCYTES NFR BLD: 0 %
LYMPHOCYTES # BLD AUTO: 0.5 10E3/UL (ref 0.8–5.3)
LYMPHOCYTES NFR BLD AUTO: 15 %
MCH RBC QN AUTO: 31.8 PG (ref 26.5–33)
MCHC RBC AUTO-ENTMCNC: 33.9 G/DL (ref 31.5–36.5)
MCV RBC AUTO: 94 FL (ref 78–100)
MONOCYTES # BLD AUTO: 0.4 10E3/UL (ref 0–1.3)
MONOCYTES NFR BLD AUTO: 10 %
NEUTROPHILS # BLD AUTO: 2.5 10E3/UL (ref 1.6–8.3)
NEUTROPHILS NFR BLD AUTO: 74 %
NRBC # BLD AUTO: 0 10E3/UL
NRBC BLD AUTO-RTO: 0 /100
PLATELET # BLD AUTO: 268 10E3/UL (ref 150–450)
RBC # BLD AUTO: 3.87 10E6/UL (ref 3.8–5.2)
WBC # BLD AUTO: 3.4 10E3/UL (ref 4–11)

## 2022-04-12 PROCEDURE — 99213 OFFICE O/P EST LOW 20 MIN: CPT | Mod: CS | Performed by: NURSE PRACTITIONER

## 2022-04-12 PROCEDURE — G0463 HOSPITAL OUTPT CLINIC VISIT: HCPCS

## 2022-04-12 PROCEDURE — U0003 INFECTIOUS AGENT DETECTION BY NUCLEIC ACID (DNA OR RNA); SEVERE ACUTE RESPIRATORY SYNDROME CORONAVIRUS 2 (SARS-COV-2) (CORONAVIRUS DISEASE [COVID-19]), AMPLIFIED PROBE TECHNIQUE, MAKING USE OF HIGH THROUGHPUT TECHNOLOGIES AS DESCRIBED BY CMS-2020-01-R: HCPCS | Mod: ZL | Performed by: NURSE PRACTITIONER

## 2022-04-12 PROCEDURE — 85025 COMPLETE CBC W/AUTO DIFF WBC: CPT | Mod: ZL | Performed by: NURSE PRACTITIONER

## 2022-04-12 PROCEDURE — G0463 HOSPITAL OUTPT CLINIC VISIT: HCPCS | Mod: 25

## 2022-04-12 PROCEDURE — 36415 COLL VENOUS BLD VENIPUNCTURE: CPT | Mod: ZL | Performed by: NURSE PRACTITIONER

## 2022-04-12 ASSESSMENT — PAIN SCALES - GENERAL: PAINLEVEL: NO PAIN (0)

## 2022-04-12 NOTE — NURSING NOTE
"Chief Complaint   Patient presents with     Rash       Initial /50   Pulse 90   Temp 99.8  F (37.7  C) (Tympanic)   SpO2 98%  Estimated body mass index is 22.27 kg/m  as calculated from the following:    Height as of 9/2/21: 1.53 m (5' 0.25\").    Weight as of 4/6/22: 52.2 kg (115 lb).  Medication Reconciliation: complete  Karolina Aguilar LPN  "

## 2022-04-13 LAB — SARS-COV-2 RNA RESP QL NAA+PROBE: NEGATIVE

## 2022-04-28 ENCOUNTER — TELEPHONE (OUTPATIENT)
Dept: FAMILY MEDICINE | Facility: OTHER | Age: 72
End: 2022-04-28
Payer: COMMERCIAL

## 2022-04-28 NOTE — TELEPHONE ENCOUNTER
Patient calling in follow up to Office Visit with Hazel Goodman on 4/12/22- viral URI with cough.    Patient reports symptoms continue to include: cough (productive- clear to yellowish in color), scratchy throat, patient has not checked temperature.     Patient is requesting a follow up with Dr. Connors only to be evaluated.    Call placed to Dr. Connors's nurse, ok for 1 pm appt on 4/29/22.      Next 5 appointments (look out 90 days)    Apr 29, 2022  1:00 PM  (Arrive by 12:45 PM)  SHORT with Melyssa Connors MD  [facility] [place] ([facility] [place] ) [address]  [place] [address]  [phone]

## 2022-04-29 ENCOUNTER — OFFICE VISIT (OUTPATIENT)
Dept: FAMILY MEDICINE | Facility: OTHER | Age: 72
End: 2022-04-29
Attending: FAMILY MEDICINE
Payer: COMMERCIAL

## 2022-04-29 VITALS
RESPIRATION RATE: 20 BRPM | TEMPERATURE: 98.2 F | HEART RATE: 64 BPM | OXYGEN SATURATION: 98 % | DIASTOLIC BLOOD PRESSURE: 58 MMHG | BODY MASS INDEX: 21.3 KG/M2 | SYSTOLIC BLOOD PRESSURE: 120 MMHG | WEIGHT: 110 LBS

## 2022-04-29 DIAGNOSIS — J06.9 URI, ACUTE: Primary | ICD-10-CM

## 2022-04-29 PROCEDURE — G0463 HOSPITAL OUTPT CLINIC VISIT: HCPCS | Performed by: FAMILY MEDICINE

## 2022-04-29 PROCEDURE — 99213 OFFICE O/P EST LOW 20 MIN: CPT | Performed by: FAMILY MEDICINE

## 2022-04-29 RX ORDER — BENZONATATE 200 MG/1
200 CAPSULE ORAL 3 TIMES DAILY PRN
Qty: 20 CAPSULE | Refills: 0 | Status: SHIPPED | OUTPATIENT
Start: 2022-04-29 | End: 2022-07-27

## 2022-04-29 ASSESSMENT — PAIN SCALES - GENERAL: PAINLEVEL: NO PAIN (0)

## 2022-04-29 NOTE — NURSING NOTE
"Chief Complaint   Patient presents with     URI       Initial /58   Pulse 64   Temp 98.2  F (36.8  C)   Resp 20   Wt 49.9 kg (110 lb)   SpO2 98%   BMI 21.30 kg/m   Estimated body mass index is 21.3 kg/m  as calculated from the following:    Height as of 9/2/21: 1.53 m (5' 0.25\").    Weight as of this encounter: 49.9 kg (110 lb).  Medication Reconciliation: complete  Nakia Mccurdy LPN    "

## 2022-04-29 NOTE — PROGRESS NOTES
Assessment & Plan     URI, acute  Still favor viral process with leukopenia, symptoms and time course.  Possible allergy component.  Notes spring time triggers illness.  - benzonatate (TESSALON) 200 MG capsule; Take 1 capsule (200 mg) by mouth 3 times daily as needed for cough    Patient Instructions   Continue symptomatic cares - fluids, rest, OTC cold remedies.  Soothing - lozenges, tea, lemon water with honey.  Add Tessalon tabs for cough as needed.    Can do sinus rinses - neti pot, saline.  Antihistamine use -claritin, allegra, zyrtec - etc - for seasonal allergy component.  Limit Afrin use to only 3 days in a row.          Melyssa Encinas MD  [facility] - ESTEPHANIE Chairez is a 72 year old who presents for the following health issues     HPI     Acute Illness -   Acute illness concerns: uri - seen 4/12/22 - 99.8 that day  Onset/Duration: 3 weeks ago  Symptoms:  Fever: YES -   Chills/Sweats: YES  Headache (location?): YES  Sinus Pressure: no  Conjunctivitis:  no  Ear Pain: no  Rhinorrhea: YES  Congestion: YES  Sore Throat: no  Cough: YES-non-productive, productive of clear sputum, productive of yellow sputum  Wheeze: no  Decreased Appetite: YES  Nausea: no  Vomiting: no  Diarrhea: no  Dysuria/Freq.: no  Dysuria or Hematuria: no  Fatigue/Achiness: YES  Sick/Strep Exposure: no  Therapies tried and outcome: none    COVID vaccinated.    CBC with low WBC.  covid negative.  Onset of illness 4/7/2022.      Review of Systems   Constitutional, HEENT, cardiovascular, pulmonary, gi and gu systems are negative, except as otherwise noted.      Objective    /58   Pulse 64   Temp 98.2  F (36.8  C)   Resp 20   Wt 49.9 kg (110 lb)   SpO2 98%   BMI 21.30 kg/m    Body mass index is 21.3 kg/m .  Physical Exam   GENERAL: healthy, alert and no distress  EYES: Eyes grossly normal to inspection, PERRL and conjunctivae and sclerae normal  HENT: ear canals and TM's normal, nose and mouth  without ulcers or lesions  NECK: no adenopathy, no asymmetry, masses, or scars and thyroid normal to palpation  RESP: lungs clear to auscultation - no rales, rhonchi or wheezes  CV: regular rate and rhythm, normal S1 S2, no S3 or S4, no murmur, click or rub, no peripheral edema and peripheral pulses strong  MS: no gross musculoskeletal defects noted, no edema  PSYCH: mentation appears normal, affect normal/bright

## 2022-04-29 NOTE — PATIENT INSTRUCTIONS
Continue symptomatic cares - fluids, rest, OTC cold remedies.  Soothing - lozenges, tea, lemon water with honey.  Add Tessalon tabs for cough as needed.    Can do sinus rinses - neti pot, saline.  Antihistamine use -claritin, allegra, zyrtec - etc - for seasonal allergy component.  Limit Afrin use to only 3 days in a row.

## 2022-05-21 DIAGNOSIS — E03.9 HYPOTHYROIDISM, UNSPECIFIED TYPE: ICD-10-CM

## 2022-05-23 RX ORDER — LEVOTHYROXINE SODIUM 25 UG/1
TABLET ORAL
Qty: 159 TABLET | Refills: 0 | Status: SHIPPED | OUTPATIENT
Start: 2022-05-23 | End: 2022-08-01

## 2022-05-23 NOTE — TELEPHONE ENCOUNTER
Levothyroxine       Last Written Prescription Date:  1-26-22  Last Fill Quantity: 135,   # refills: 1  Last Office Visit: 4-29-22  Future Office visit:

## 2022-05-29 DIAGNOSIS — R73.09 ABNORMAL GLUCOSE: ICD-10-CM

## 2022-05-29 DIAGNOSIS — M81.0 OSTEOPOROSIS, UNSPECIFIED OSTEOPOROSIS TYPE, UNSPECIFIED PATHOLOGICAL FRACTURE PRESENCE: ICD-10-CM

## 2022-05-29 DIAGNOSIS — R20.2 NOTALGIA PARESTHETICA: ICD-10-CM

## 2022-05-29 DIAGNOSIS — E78.5 HYPERLIPIDEMIA, UNSPECIFIED HYPERLIPIDEMIA TYPE: ICD-10-CM

## 2022-05-29 DIAGNOSIS — E03.9 HYPOTHYROIDISM, UNSPECIFIED TYPE: Primary | ICD-10-CM

## 2022-05-31 RX ORDER — SIMVASTATIN 20 MG
TABLET ORAL
Qty: 90 TABLET | Refills: 0 | Status: SHIPPED | OUTPATIENT
Start: 2022-05-31 | End: 2022-07-27

## 2022-05-31 NOTE — TELEPHONE ENCOUNTER
Zocor    Last Written Prescription Date:  2.25.2022  Last Fill Quantity: 90,   # refills: 1  Last Office Visit: 4.29.2022  Future Office visit:       Routing refill request to provider for review/approval because:  Patient is requesting a year supply.    Shefali Tavera RN

## 2022-06-01 DIAGNOSIS — G25.81 RESTLESS LEG SYNDROME: ICD-10-CM

## 2022-06-01 NOTE — TELEPHONE ENCOUNTER
Left message for patient to return call . Medication has been filled please help her schedule a lab only appointment . Needs to be fasting

## 2022-06-02 RX ORDER — ROPINIROLE 0.5 MG/1
TABLET, FILM COATED ORAL
Qty: 100 TABLET | Refills: 2 | Status: SHIPPED | OUTPATIENT
Start: 2022-06-02 | End: 2022-07-27 | Stop reason: DRUGHIGH

## 2022-06-02 NOTE — TELEPHONE ENCOUNTER
ROPINIROLE  0.5MG  TAB    Last Written Prescription Date:  4/6/22  Last Fill Quantity: 90,   # refills: 1  Last Office Visit: 4/29/22  Future Office visit:       Routing refill request to provider for review/approval because:    Protocol Failed     NO ALT on record in past 12 months      Lab Results   Component Value Date    ALT 21 05/14/2021     NO Serum Creatinine on file in past 12 months  Creatinine   Date Value Ref Range Status   05/14/2021 0.75 0.52 - 1.04 mg/dL Final

## 2022-06-08 ENCOUNTER — LAB (OUTPATIENT)
Dept: LAB | Facility: OTHER | Age: 72
End: 2022-06-08
Payer: COMMERCIAL

## 2022-06-08 DIAGNOSIS — E78.5 HYPERLIPIDEMIA, UNSPECIFIED HYPERLIPIDEMIA TYPE: ICD-10-CM

## 2022-06-08 DIAGNOSIS — E03.9 HYPOTHYROIDISM, UNSPECIFIED TYPE: ICD-10-CM

## 2022-06-08 DIAGNOSIS — M81.0 OSTEOPOROSIS, UNSPECIFIED OSTEOPOROSIS TYPE, UNSPECIFIED PATHOLOGICAL FRACTURE PRESENCE: ICD-10-CM

## 2022-06-08 DIAGNOSIS — R73.09 ABNORMAL GLUCOSE: ICD-10-CM

## 2022-06-08 LAB
ALBUMIN SERPL-MCNC: 3.3 G/DL (ref 3.4–5)
ALP SERPL-CCNC: 67 U/L (ref 40–150)
ALT SERPL W P-5'-P-CCNC: 23 U/L (ref 0–50)
ANION GAP SERPL CALCULATED.3IONS-SCNC: 3 MMOL/L (ref 3–14)
AST SERPL W P-5'-P-CCNC: 22 U/L (ref 0–45)
BASOPHILS # BLD AUTO: 0 10E3/UL (ref 0–0.2)
BASOPHILS NFR BLD AUTO: 1 %
BILIRUB SERPL-MCNC: 0.3 MG/DL (ref 0.2–1.3)
BUN SERPL-MCNC: 11 MG/DL (ref 7–30)
CALCIUM SERPL-MCNC: 8.9 MG/DL (ref 8.5–10.1)
CHLORIDE BLD-SCNC: 106 MMOL/L (ref 94–109)
CHOLEST SERPL-MCNC: 122 MG/DL
CO2 SERPL-SCNC: 27 MMOL/L (ref 20–32)
CREAT SERPL-MCNC: 0.64 MG/DL (ref 0.52–1.04)
EOSINOPHIL # BLD AUTO: 0 10E3/UL (ref 0–0.7)
EOSINOPHIL NFR BLD AUTO: 1 %
ERYTHROCYTE [DISTWIDTH] IN BLOOD BY AUTOMATED COUNT: 13.6 % (ref 10–15)
EST. AVERAGE GLUCOSE BLD GHB EST-MCNC: 114 MG/DL
FASTING STATUS PATIENT QL REPORTED: YES
GFR SERPL CREATININE-BSD FRML MDRD: >90 ML/MIN/1.73M2
GLUCOSE BLD-MCNC: 97 MG/DL (ref 70–99)
HBA1C MFR BLD: 5.6 % (ref 0–5.6)
HCT VFR BLD AUTO: 35.8 % (ref 35–47)
HDLC SERPL-MCNC: 66 MG/DL
HGB BLD-MCNC: 12.1 G/DL (ref 11.7–15.7)
IMM GRANULOCYTES # BLD: 0 10E3/UL
IMM GRANULOCYTES NFR BLD: 0 %
LDLC SERPL CALC-MCNC: 47 MG/DL
LYMPHOCYTES # BLD AUTO: 1 10E3/UL (ref 0.8–5.3)
LYMPHOCYTES NFR BLD AUTO: 26 %
MCH RBC QN AUTO: 32.4 PG (ref 26.5–33)
MCHC RBC AUTO-ENTMCNC: 33.8 G/DL (ref 31.5–36.5)
MCV RBC AUTO: 96 FL (ref 78–100)
MONOCYTES # BLD AUTO: 0.6 10E3/UL (ref 0–1.3)
MONOCYTES NFR BLD AUTO: 14 %
NEUTROPHILS # BLD AUTO: 2.4 10E3/UL (ref 1.6–8.3)
NEUTROPHILS NFR BLD AUTO: 58 %
NONHDLC SERPL-MCNC: 56 MG/DL
NRBC # BLD AUTO: 0 10E3/UL
NRBC BLD AUTO-RTO: 0 /100
PLATELET # BLD AUTO: 245 10E3/UL (ref 150–450)
POTASSIUM BLD-SCNC: 4.2 MMOL/L (ref 3.4–5.3)
PROT SERPL-MCNC: 7 G/DL (ref 6.8–8.8)
RBC # BLD AUTO: 3.73 10E6/UL (ref 3.8–5.2)
SODIUM SERPL-SCNC: 136 MMOL/L (ref 133–144)
TRIGL SERPL-MCNC: 47 MG/DL
TSH SERPL DL<=0.005 MIU/L-ACNC: 3.27 MU/L (ref 0.4–4)
WBC # BLD AUTO: 4.1 10E3/UL (ref 4–11)

## 2022-06-08 PROCEDURE — 80061 LIPID PANEL: CPT | Mod: ZL

## 2022-06-08 PROCEDURE — 82306 VITAMIN D 25 HYDROXY: CPT | Mod: ZL

## 2022-06-08 PROCEDURE — 84443 ASSAY THYROID STIM HORMONE: CPT | Mod: ZL

## 2022-06-08 PROCEDURE — 36415 COLL VENOUS BLD VENIPUNCTURE: CPT | Mod: ZL

## 2022-06-08 PROCEDURE — 83036 HEMOGLOBIN GLYCOSYLATED A1C: CPT | Mod: ZL

## 2022-06-08 PROCEDURE — 85025 COMPLETE CBC W/AUTO DIFF WBC: CPT | Mod: ZL

## 2022-06-08 PROCEDURE — 80053 COMPREHEN METABOLIC PANEL: CPT | Mod: ZL

## 2022-06-08 NOTE — LETTER
June 9, 2022      Mihaela J Laine  [address] [place] [address]   [facility] [address]        Dear ,    We are writing to inform you of your test results.    Your test results fall within the expected range(s) or remain unchanged from previous results.  Please continue with current treatment plan.    Resulted Orders   Lipid Profile (Chol, Trig, HDL, LDL calc)   Result Value Ref Range    Cholesterol 122 <200 mg/dL    Triglycerides 47 <150 mg/dL    Direct Measure HDL 66 >=50 mg/dL    LDL Cholesterol Calculated 47 <=100 mg/dL    Non HDL Cholesterol 56 <130 mg/dL    Patient Fasting > 8hrs? Yes     Narrative    Cholesterol  Desirable:  <200 mg/dL    Triglycerides  Normal:  Less than 150 mg/dL  Borderline High:  150-199 mg/dL  High:  200-499 mg/dL  Very High:  Greater than or equal to 500 mg/dL    Direct Measure HDL  Female:  Greater than or equal to 50 mg/dL   Male:  Greater than or equal to 40 mg/dL    LDL Cholesterol  Desirable:  <100mg/dL  Above Desirable:  100-129 mg/dL   Borderline High:  130-159 mg/dL   High:  160-189 mg/dL   Very High:  >= 190 mg/dL    Non HDL Cholesterol  Desirable:  130 mg/dL  Above Desirable:  130-159 mg/dL  Borderline High:  160-189 mg/dL  High:  190-219 mg/dL  Very High:  Greater than or equal to 220 mg/dL   Hemoglobin A1c   Result Value Ref Range    Estimated Average Glucose 114 mg/dL    Hemoglobin A1C 5.6 0.0 - 5.6 %      Comment:      Normal <5.7%   Prediabetes 5.7-6.4%    Diabetes 6.5% or higher     Note: Adopted from ADA consensus guidelines.   TSH with free T4 reflex   Result Value Ref Range    TSH 3.27 0.40 - 4.00 mU/L   Comprehensive metabolic panel (BMP + Alb, Alk Phos, ALT, AST, Total. Bili, TP)   Result Value Ref Range    Sodium 136 133 - 144 mmol/L    Potassium 4.2 3.4 - 5.3 mmol/L    Chloride 106 94 - 109 mmol/L    Carbon Dioxide (CO2) 27 20 - 32 mmol/L    Anion Gap 3 3 - 14 mmol/L    Urea Nitrogen 11 7 - 30 mg/dL    Creatinine 0.64 0.52 - 1.04 mg/dL    Calcium  8.9 8.5 - 10.1 mg/dL    Glucose 97 70 - 99 mg/dL    Alkaline Phosphatase 67 40 - 150 U/L    AST 22 0 - 45 U/L    ALT 23 0 - 50 U/L    Protein Total 7.0 6.8 - 8.8 g/dL    Albumin 3.3 (L) 3.4 - 5.0 g/dL    Bilirubin Total 0.3 0.2 - 1.3 mg/dL    GFR Estimate >90 >60 mL/min/1.73m2      Comment:      Effective December 21, 2021 eGFRcr in adults is calculated using the 2021 CKD-EPI creatinine equation which includes age and gender (Devon et al., NEJM, DOI: 10.1056/GUOIva7540980)   Vitamin D Deficiency   Result Value Ref Range    Vitamin D, Total (25-Hydroxy) 45 20 - 75 ug/L    Narrative    Season, race, dietary intake, and treatment affect the concentration of 25-hydroxy-Vitamin D. Values may decrease during winter months and increase during summer months. Values 20-29 ug/L may indicate Vitamin D insufficiency and values <20 ug/L may indicate Vitamin D deficiency.    Vitamin D determination is routinely performed by an immunoassay specific for 25 hydroxyvitamin D3.  If an individual is on vitamin D2(ergocalciferol) supplementation, please specify 25 OH vitamin D2 and D3 level determination by LCMSMS test VITD23.     CBC with platelets and differential   Result Value Ref Range    WBC Count 4.1 4.0 - 11.0 10e3/uL    RBC Count 3.73 (L) 3.80 - 5.20 10e6/uL    Hemoglobin 12.1 11.7 - 15.7 g/dL    Hematocrit 35.8 35.0 - 47.0 %    MCV 96 78 - 100 fL    MCH 32.4 26.5 - 33.0 pg    MCHC 33.8 31.5 - 36.5 g/dL    RDW 13.6 10.0 - 15.0 %    Platelet Count 245 150 - 450 10e3/uL    % Neutrophils 58 %    % Lymphocytes 26 %    % Monocytes 14 %    % Eosinophils 1 %    % Basophils 1 %    % Immature Granulocytes 0 %    NRBCs per 100 WBC 0 <1 /100    Absolute Neutrophils 2.4 1.6 - 8.3 10e3/uL    Absolute Lymphocytes 1.0 0.8 - 5.3 10e3/uL    Absolute Monocytes 0.6 0.0 - 1.3 10e3/uL    Absolute Eosinophils 0.0 0.0 - 0.7 10e3/uL    Absolute Basophils 0.0 0.0 - 0.2 10e3/uL    Absolute Immature Granulocytes 0.0 <=0.4 10e3/uL    Absolute NRBCs 0.0  10e3/uL       If you have any questions or concerns, please call the clinic at the number listed above.       Sincerely,      Melyssa Connors MD

## 2022-06-09 ENCOUNTER — TELEPHONE (OUTPATIENT)
Dept: FAMILY MEDICINE | Facility: OTHER | Age: 72
End: 2022-06-09
Payer: COMMERCIAL

## 2022-06-09 DIAGNOSIS — G25.81 RESTLESS LEGS SYNDROME (RLS): Primary | ICD-10-CM

## 2022-06-09 LAB — DEPRECATED CALCIDIOL+CALCIFEROL SERPL-MC: 45 UG/L (ref 20–75)

## 2022-06-09 RX ORDER — ROPINIROLE 2 MG/1
2 TABLET, FILM COATED ORAL AT BEDTIME
Qty: 90 TABLET | Refills: 3 | Status: SHIPPED | OUTPATIENT
Start: 2022-06-09 | End: 2022-07-27 | Stop reason: DRUGHIGH

## 2022-06-09 RX ORDER — ROPINIROLE 1 MG/1
TABLET, FILM COATED ORAL
COMMUNITY
Start: 2022-05-20 | End: 2022-07-27

## 2022-06-09 NOTE — TELEPHONE ENCOUNTER
Patient would like to increase her ropinirole to a total of 2mg from 1.5mg. Please send in new script to [facility].

## 2022-06-10 ENCOUNTER — OFFICE VISIT (OUTPATIENT)
Dept: FAMILY MEDICINE | Facility: OTHER | Age: 72
End: 2022-06-10
Attending: NURSE PRACTITIONER
Payer: COMMERCIAL

## 2022-06-10 ENCOUNTER — NURSE TRIAGE (OUTPATIENT)
Dept: FAMILY MEDICINE | Facility: OTHER | Age: 72
End: 2022-06-10
Payer: COMMERCIAL

## 2022-06-10 VITALS
SYSTOLIC BLOOD PRESSURE: 122 MMHG | HEART RATE: 65 BPM | BODY MASS INDEX: 21.3 KG/M2 | DIASTOLIC BLOOD PRESSURE: 64 MMHG | WEIGHT: 110 LBS | TEMPERATURE: 98.1 F | OXYGEN SATURATION: 98 %
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
## 2022-06-10 PROCEDURE — G0463 HOSPITAL OUTPT CLINIC VISIT: HCPCS

## 2022-06-10 PROCEDURE — 99212 OFFICE O/P EST SF 10 MIN: CPT | Performed by: NURSE PRACTITIONER

## 2022-06-10 ASSESSMENT — PAIN SCALES - GENERAL: PAINLEVEL: NO PAIN (0)

## 2022-06-10 NOTE — TELEPHONE ENCOUNTER
"Patient want's Dr to take a look at it.    Reason for Disposition    SEVERE local itching (i.e., interferes with work, school, activities) and not improved after 24 hours of hydrocortisone cream    Patient wants to be seen    Answer Assessment - Initial Assessment Questions  1. TYPE of INSECT: \"What type of insect was it?\"       Unknown  Maybe gnats  2. ONSET: \"When did you get bitten?\"       yesterday  3. LOCATION: \"Where is the insect bite located?\"       Back of neck  4. REDNESS: \"Is the area red or pink?\" If so, ask \"What size is area of redness?\" (inches or cm). \"When did the redness start?\"      Red kind of a rash around it.  Could be several bites  5. PAIN: \"Is there any pain?\" If so, ask: \"How bad is it?\"  (Scale 1-10; or mild, moderate, severe)      itch  6. ITCHING: \"Does it itch?\" If so, ask: \"How bad is the itch?\"     - MILD: doesn't interfere with normal activities    - MODERATE-SEVERE: interferes with work, school, sleep, or other activities       mild  7. SWELLING: \"How big is the swelling?\" (inches, cm, or compare to coins)      One bite is swollen and red  8. OTHER SYMPTOMS: \"Do you have any other symptoms?\"  (e.g., difficulty breathing, hives)      no  9. PREGNANCY: \"Is there any chance you are pregnant?\" \"When was your last menstrual period?\"      no    Protocols used: INSECT BITE-A-OH      "

## 2022-06-10 NOTE — PROGRESS NOTES
Assessment & Plan     Insect bite, unspecified site, initial encounter  Hydrocortisone ointment is okay to use but benadryl cream (DiphenhydrAMINE) may help more.   For the bite to your back, use bacitracin for the next couple days to avoid an infection. May alternate with benadryl cream.     Return if symptoms worsen or fail to improve.    Jonna Dowling, CNP  [facility] - ESTEPHANIE Chairez is a 72 year old who presents for the following health issues     HPI     Rash  Onset/Duration: wednesday  Description  Location: back and legs   Character: red  Itching: moderate  Intensity:  moderate  Progression of Symptoms:  same  Accompanying signs and symptoms:   Fever: no  Body aches or joint pain: no  Sore throat symptoms: no  Recent cold symptoms: no  History:           Previous episodes of similar rash: None  New exposures:  Possible bug bites - has not seen any ticks but did have bunch of gnats and other flying insects biting her.   Recent travel: no  Exposure to similar rash: no  Precipitating or alleviating factors: none  Therapies tried and outcome: hydrocortisone ointment -        Review of Systems   Constitutional, HEENT, cardiovascular, pulmonary, gi and gu systems are negative, except as otherwise noted.      Objective    /64 (BP Location: Right arm, Patient Position: Chair, Cuff Size: Adult Regular)   Pulse 65   Temp 98.1  F (36.7  C) (Tympanic)   Wt 49.9 kg (110 lb)   SpO2 98%   BMI 21.30 kg/m    Body mass index is 21.3 kg/m .  Physical Exam   GENERAL: healthy, alert and no distress  SKIN: multiple non-infected bug bites to ankles and 3 small bug bites to upper back consistent with where a shirt would not cover. One of these areas to left upper back shows excoriation but no drainage, erythema, edema, or heat.

## 2022-06-10 NOTE — NURSING NOTE
"Chief Complaint   Patient presents with     Derm Problem       Initial /64 (BP Location: Right arm, Patient Position: Chair, Cuff Size: Adult Regular)   Pulse 65   Temp 98.1  F (36.7  C) (Tympanic)   Wt 49.9 kg (110 lb)   SpO2 98%   BMI 21.30 kg/m   Estimated body mass index is 21.3 kg/m  as calculated from the following:    Height as of 9/2/21: 1.53 m (5' 0.25\").    Weight as of this encounter: 49.9 kg (110 lb).  Medication Reconciliation: complete  SITA ALCANTAR LPN    "

## 2022-06-10 NOTE — PATIENT INSTRUCTIONS
Hydrocortisone ointment is okay to use but benadryl cream (DiphenhydrAMINE) may help more.   For the bite to your back, use bacitracin for the next couple days to avoid an infection. May alternate with benadryl cream.

## 2022-06-16 ENCOUNTER — TELEPHONE (OUTPATIENT)
Dept: FAMILY MEDICINE | Facility: OTHER | Age: 72
End: 2022-06-16
Payer: COMMERCIAL

## 2022-06-16 NOTE — TELEPHONE ENCOUNTER
Patient is requesting to be seen again, states that the bug bites that she was seen for on 6/14 are not infected but the Benadryl is not helping with the itching.  Is there something else she can try.  Please call her back and let her know.

## 2022-06-16 NOTE — TELEPHONE ENCOUNTER
May try over the counter anti-itch cream containing Pramoxine Edd sensitive and CeraVe have this. There may be others. Otherwise follow up with primary or UC.

## 2022-06-23 ENCOUNTER — NURSE TRIAGE (OUTPATIENT)
Dept: FAMILY MEDICINE | Facility: OTHER | Age: 72
End: 2022-06-23

## 2022-06-23 ENCOUNTER — OFFICE VISIT (OUTPATIENT)
Dept: FAMILY MEDICINE | Facility: OTHER | Age: 72
End: 2022-06-23
Attending: FAMILY MEDICINE
Payer: COMMERCIAL

## 2022-06-23 VITALS
SYSTOLIC BLOOD PRESSURE: 126 MMHG | DIASTOLIC BLOOD PRESSURE: 70 MMHG | HEART RATE: 63 BPM | OXYGEN SATURATION: 98 % | TEMPERATURE: 97.7 F

## 2022-06-23 DIAGNOSIS — L30.9 DERMATITIS: Primary | ICD-10-CM

## 2022-06-23 PROCEDURE — G0463 HOSPITAL OUTPT CLINIC VISIT: HCPCS

## 2022-06-23 PROCEDURE — 99214 OFFICE O/P EST MOD 30 MIN: CPT | Performed by: FAMILY MEDICINE

## 2022-06-23 RX ORDER — METHYLPREDNISOLONE 4 MG
TABLET, DOSE PACK ORAL
Qty: 21 TABLET | Refills: 0 | Status: SHIPPED | OUTPATIENT
Start: 2022-06-23 | End: 2022-07-27

## 2022-06-23 ASSESSMENT — PAIN SCALES - GENERAL: PAINLEVEL: NO PAIN (0)

## 2022-06-23 NOTE — TELEPHONE ENCOUNTER
"Patient was seen on 6/10 by Nitesh for the bug bites.      Patient called again today stating none of the OTC remedies are helping and is requesting to be seen.  States the upper half of her back is red and itches.  She is requesting to be seen or a stronger medication prescribed.    Answer Assessment - Initial Assessment Questions  1. TYPE of INSECT: \"What type of insect was it?\"       mosquites or gnats she is not sure.  She was trimming her hedge  2. ONSET: \"When did you get bitten?\"       6/7  3. LOCATION: \"Where is the insect bite located?\"       Upper back  4. REDNESS: \"Is the area red or pink?\" If so, ask \"What size is area of redness?\" (inches or cm). \"When did the redness start?\"      Yes upper back is red  5. PAIN: \"Is there any pain?\" If so, ask: \"How bad is it?\"  (Scale 1-10; or mild, moderate, severe)      itches  6. ITCHING: \"Does it itch?\" If so, ask: \"How bad is the itch?\"     - MILD: doesn't interfere with normal activities    - MODERATE-SEVERE: interferes with work, school, sleep, or other activities       itches  7. SWELLING: \"How big is the swelling?\" (inches, cm, or compare to coins)      no  8. OTHER SYMPTOMS: \"Do you have any other symptoms?\"  (e.g., difficulty breathing, hives)      no  9. PREGNANCY: \"Is there any chance you are pregnant?\" \"When was your last menstrual period?\"      no    Protocols used: INSECT BITE-A-OH      "

## 2022-06-23 NOTE — PATIENT INSTRUCTIONS
Complete steroid taper.  Take 1st dose when you pick it up today.  Remainder of doses, take in the mornings.    Ok to still use topical agents.    Avoid heat.   Keep cool.    Can use Benadryl, but might want to try 24 hour antihistamine, such as Claritin or Zyrtec of Allegra or generic equivalent.      Wash all clothing, bedding, etc.    Update me if not improving.

## 2022-06-23 NOTE — NURSING NOTE
"Chief Complaint   Patient presents with     Insect Bites       Initial /70 (BP Location: Right arm, Patient Position: Chair, Cuff Size: Adult Regular)   Pulse 63   Temp 97.7  F (36.5  C) (Tympanic)   SpO2 98%  Estimated body mass index is 21.3 kg/m  as calculated from the following:    Height as of 9/2/21: 1.53 m (5' 0.25\").    Weight as of 6/10/22: 49.9 kg (110 lb).  Medication Reconciliation: complete  Hany Joseph LPN  "

## 2022-06-23 NOTE — PROGRESS NOTES
Assessment & Plan     Dermatitis  Appears to be a local reaction. Lasting weeks. Unclear etiology.  Was out in garden - was quite hot - sweating signifcantly.  Not responding to topical agents.  Benadryl short term relief.  Add 24 hour antihistamine OTC.  Add steroid taper.  Wash all bedding, clothing, etc.  Reassess if not resolving.  - methylPREDNISolone (MEDROL DOSEPAK) 4 MG tablet therapy pack; Follow Package Directions       Patient Instructions   Complete steroid taper.  Take 1st dose when you pick it up today.  Remainder of doses, take in the mornings.    Ok to still use topical agents.    Avoid heat.   Keep cool.    Can use Benadryl, but might want to try 24 hour antihistamine, such as Claritin or Zyrtec of Allegra or generic equivalent.      Wash all clothing, bedding, etc.    Update me if not improving.      No follow-ups on file.    Melyssa Encinas MD  [facility] - [place] Chairez is a 72 year oldpresenting for the following health issues:  Insect Bites      HPI     Rash  Onset/Duration: ongoing since the 7th  Description  Location: back  Character: blotchy, flakey, red  Itching: severe  Intensity:  moderate  Progression of Symptoms:  worsening  Accompanying signs and symptoms:   Fever: no  Body aches or joint pain: no  Sore throat symptoms: no  Recent cold symptoms: no  History:           Previous episodes of similar rash: None  New exposures:  None  Recent travel: no  Exposure to similar rash: no  Precipitating or alleviating factors: none  Therapies tried and outcome: hydrocortisone cream -  not effective and Benadryl/diphenhydramine -  not effective    Qasim Dowling 6/10/22 - treated with Benadryl po/topical and bactracin.  Tried Sarna, Cerave as well - no help.  PO Benadryl helps x 5-6 hours so can sleep.    Onset was trimming lawn, hedges, etc.  Lots of bugs.    Rash still spreading.  More redness than rash.    No other new agents/exposures.    No travel.  Home  only.    No other new medications.    No fevers, malaise, edema, etc.    Review of Systems   Constitutional, HEENT, cardiovascular, pulmonary, gi and gu systems are negative, except as otherwise noted.      Objective    /70 (BP Location: Right arm, Patient Position: Chair, Cuff Size: Adult Regular)   Pulse 63   Temp 97.7  F (36.5  C) (Tympanic)   SpO2 98%   There is no height or weight on file to calculate BMI.  Physical Exam   GENERAL: healthy, alert and no distress  NECK: no adenopathy, no asymmetry, masses, or scars and thyroid normal to palpation  MS: no gross musculoskeletal defects noted, no edema  SKIN: see photos below - symmetric, diffuse - back is erythematous, slightly raised, warm to touch; no vesicles, papules, pustules  PSYCH: mentation appears normal, affect normal/bright                            .  ..

## 2022-06-23 NOTE — TELEPHONE ENCOUNTER
Patient called again today stating none of the OTC remedies are helping and is requesting to be seen.  States the upper half of her back is red and itches.  She is requesting to be seen

## 2022-06-30 ENCOUNTER — TELEPHONE (OUTPATIENT)
Dept: FAMILY MEDICINE | Facility: OTHER | Age: 72
End: 2022-06-30

## 2022-06-30 NOTE — TELEPHONE ENCOUNTER
"Patient calling in regards to rash on back. Patient was seen on 6/10 and 6/23 for same concern. Patient reports benadryl, antihistamine and prednisone helped, but there is still \"redness across the top of the back, below the neck and just to the top of my shoulder blades. And some itching and stinging below that.\" patient requesting an appointment to be seen. PCP out tomorrow. No available appointment with covering provider in either clinics tomorrow. Advised patient to be seen in UC/ER for new or worsening symptoms. Patient verbalized understanding. Can patient be scheduled next week for follow up? Please advise, thank you.   "

## 2022-06-30 NOTE — TELEPHONE ENCOUNTER
Can use a same day if rash ongoing.  UC/ER or covering same day provider if needing to be seen before then.

## 2022-07-01 NOTE — TELEPHONE ENCOUNTER
Patient is scheduled next week with PCP. \  Next 5 appointments (look out 90 days)    Jul 06, 2022  1:30 PM  (Arrive by 1:15 PM)  SHORT with Melyssa Connors MD  [facility] [place] ([facility] [place] ) [address]  [place] [address]  [phone]   Sep 07, 2022  2:00 PM  (Arrive by 1:45 PM)  PHYSICAL with Melyssa Connors MD  [facility] [place] ([facility] [place] ) [address]  [place] [address]  [phone]

## 2022-07-06 ENCOUNTER — OFFICE VISIT (OUTPATIENT)
Dept: FAMILY MEDICINE | Facility: OTHER | Age: 72
End: 2022-07-06
Attending: FAMILY MEDICINE
Payer: COMMERCIAL

## 2022-07-06 VITALS
SYSTOLIC BLOOD PRESSURE: 105 MMHG | TEMPERATURE: 98.9 F | OXYGEN SATURATION: 98 % | DIASTOLIC BLOOD PRESSURE: 60 MMHG | HEART RATE: 60 BPM | BODY MASS INDEX: 20.61 KG/M2 | WEIGHT: 112 LBS | HEIGHT: 62 IN

## 2022-07-06 DIAGNOSIS — R21 RASH: Primary | ICD-10-CM

## 2022-07-06 PROCEDURE — G0463 HOSPITAL OUTPT CLINIC VISIT: HCPCS

## 2022-07-06 PROCEDURE — 99212 OFFICE O/P EST SF 10 MIN: CPT | Performed by: FAMILY MEDICINE

## 2022-07-06 ASSESSMENT — PAIN SCALES - GENERAL: PAINLEVEL: NO PAIN (0)

## 2022-07-06 NOTE — NURSING NOTE
"Chief Complaint   Patient presents with     Rash       Initial /60 (BP Location: Right arm, Patient Position: Chair, Cuff Size: Adult Regular)   Pulse 60   Temp 98.9  F (37.2  C) (Tympanic)   Ht 1.575 m (5' 2\")   Wt 50.8 kg (112 lb)   SpO2 98%   BMI 20.49 kg/m   Estimated body mass index is 20.49 kg/m  as calculated from the following:    Height as of this encounter: 1.575 m (5' 2\").    Weight as of this encounter: 50.8 kg (112 lb).  Medication Reconciliation: complete  Angela Arzola LPN    "

## 2022-07-06 NOTE — PATIENT INSTRUCTIONS
Continue Cerave or Sarna lotion.  Cortisone topically if itchy.  With outdoor activity - caution to not over heat.  Wear long sleeves, protective clothing, bug repellant, and wash clothing and shower afterwards.  Reassess if recurs.

## 2022-07-06 NOTE — PROGRESS NOTES
"  Assessment & Plan     Rash  Has resolved with steroid and antihistamine treatment.  Discussed probably delayed hypersensitivity from something in her environment gardening.  Discussed long clothing, bug repellant, washing clothing and showering after outdoor activity.  Photos taken to verify changes - near complete resolution.  Reassess if returns.       Patient Instructions   Continue Cerave or Sarna lotion.  Cortisone topically if itchy.  With outdoor activity - caution to not over heat.  Wear long sleeves, protective clothing, bug repellant, and wash clothing and shower afterwards.  Reassess if recurs.          Melyssa Encinas MD  [facility] - [place] Chairez is a 72 year old, presenting for the following health issues:  Rash      HPI     Rash  Onset/Duration: Prior   Description .  Red Bumpy   Location: Left shoulder   Character: red  Itching: mild  Intensity:  mild  Progression of Symptoms:  improving  Accompanying signs and symptoms:   Fever: No  Body aches or joint pain: No  Sore throat symptoms: No  Recent cold symptoms: No  History:           Previous episodes of similar rash: None  New exposures:  Bugs from hedge   Recent travel: No  Exposure to similar rash: No  Therapies tried and outcome: hydrocortisone cream -  effective, topical steroid  and oral Benadryl/diphenhydramine -  Effective; steroid taper - effective  No new symptoms.  No side effects from steroid.  No additional outdoor activity.  Hedge to be removed by professional next week.    Review of Systems   Constitutional, HEENT, cardiovascular, pulmonary, gi and gu systems are negative, except as otherwise noted.      Objective    /60 (BP Location: Right arm, Patient Position: Chair, Cuff Size: Adult Regular)   Pulse 60   Temp 98.9  F (37.2  C) (Tympanic)   Ht 1.575 m (5' 2\")   Wt 50.8 kg (112 lb)   SpO2 98%   BMI 20.49 kg/m    Body mass index is 20.49 kg/m .  Physical Exam   GENERAL: healthy, alert " and no distress  NECK: no adenopathy, no asymmetry, masses, or scars and thyroid normal to palpation  SKIN: no suspicious lesions or rashes; it has resolved; 1 area on her back of mild erythema/pink discoloration - see photo - not palpable  PSYCH: mentation appears normal, affect normal/bright                          .  ..

## 2022-07-25 ENCOUNTER — TELEPHONE (OUTPATIENT)
Dept: FAMILY MEDICINE | Facility: OTHER | Age: 72
End: 2022-07-25

## 2022-07-25 NOTE — TELEPHONE ENCOUNTER
Can she upload photos of rash?  I doubt it - it was on her back.    Unable to see today.  Out of office tomorrow.  Can double book Wednesday morning 8:15.  If rash back, will likely refer to dermatology as well at that point.

## 2022-07-25 NOTE — TELEPHONE ENCOUNTER
Pt called stating rash has come back; states you gave her prednisone for it before and feels this needs to be looked at. Pt has an appt for physical on 9/7 but feels this needs to be seen prior to that. Advised pt I would send message back and someone would reach out to her.

## 2022-07-25 NOTE — PROGRESS NOTES
Assessment & Plan     Rash  Resolved again.  Sensitivity/allergy to something?  Unlikely fungal - wouldn't come and go.  Considered topical antifungal but didn't want to muddy the picture.  Will do script for short term prednisone x 3 days if flares.  Otherwise prn antihistamine.  Good skin care -hydration, avoid perfumes, etc.  Referral to dermatology - possible skin testing?  - Adult Dermatology Referral; Future  - predniSONE (DELTASONE) 20 MG tablet; Take 1 tablet (20 mg) by mouth daily for 3 days    Restless legs syndrome (RLS)  Ok to increase to 3 mg prn at night if needed.  New script done.  - rOPINIRole (REQUIP) 1 MG tablet; Take 2-3 tablets (2-3 mg) by mouth At Bedtime     Patient Instructions   Referral to [facility].  Keep 11/2022 with DR Barakat for now.    Script for Prednisone -fill and hold.  If flare up - take 20 mg daily x 3 days.  Update me if you take the Prednisone and not improving in those 3 days.  Ok to take antihistamine/Benadryl if needed.  Ok to use topical steroid cream - Cortisone.  Ok to use anti itch cream, such as Sarna, Benadryl, Cerave, etc.    Verify Requip dosing.  Taking total of 2 mg at night?  If so, ok to take 2-3 mg at night if needed.  New script for 1 mg tabs sent.          No follow-ups on file.    Melyssa Encinas MD  [facility] - [place]    Megha Chairez is a 72 year old, presenting for the following health issues:  Rash follow up    HPI     Rash  Onset/Duration: last seen on 7/6/2022  Description  Location: upper back, down little on her spine  Character: red  Itching: mild  Intensity:  mild  Progression of Symptoms:  resolved after she was seen on 7/6 and if flared up again this past Friday - 5 days ago. Since Friday it has calmed down, and often she has a itch and sting  Accompanying signs and symptoms:   Fever: No  Body aches or joint pain: No  Sore throat symptoms: No  Recent cold symptoms: No  History:           Previous  "episodes of similar rash: Yes  New exposures:  None  Recent travel: No  Exposure to similar rash: No  Precipitating or alleviating factors:   Therapies tried and outcome: hydrocortisone cream -  effective    Onset 6/7/22.  Treated with steroid taper and antihistamine 6/23/22.  Had resolved at 7/6/22 follow up.    With recent flare - onset 5 days ago - and faded by today.  Used Benadryl.    No trigger.  Some outside activity.    No new symptoms - no fever, swelling, numbness, tingling, oral involvement.    Nov 1, 2022 - scheduled with Dr Barakat - here dermatology.      RLS - 2 mg at night.  Some nights still has issues, mostly with right leg.  No side effect of medications.  Had labs in past 3 months - including cbc, cmp, tsh, ferritin.  Takes iron supplement.    Review of Systems   Constitutional, HEENT, cardiovascular, pulmonary, gi and gu systems are negative, except as otherwise noted.      Objective    /60 (BP Location: Right arm, Patient Position: Sitting, Cuff Size: Adult Regular)   Pulse 55   Temp 97.3  F (36.3  C) (Tympanic)   Ht 1.575 m (5' 2\")   Wt 50.8 kg (112 lb)   SpO2 98%   BMI 20.49 kg/m    Body mass index is 20.49 kg/m .  Physical Exam   GENERAL: alert, no distress and thin  NECK: no adenopathy, no asymmetry, masses, or scars and thyroid normal to palpation  RESP: lungs clear to auscultation - no rales, rhonchi or wheezes  CV: regular rate and rhythm, normal S1 S2, no S3 or S4, no murmur, click or rub, no peripheral edema and peripheral pulses strong  MS: no gross musculoskeletal defects noted, no edema  SKIN: back without notable rash today - see images below  PSYCH: mentation appears normal, affect normal/bright                          .  ..  "

## 2022-07-26 DIAGNOSIS — E78.5 HYPERLIPIDEMIA, UNSPECIFIED HYPERLIPIDEMIA TYPE: ICD-10-CM

## 2022-07-27 ENCOUNTER — OFFICE VISIT (OUTPATIENT)
Dept: FAMILY MEDICINE | Facility: OTHER | Age: 72
End: 2022-07-27
Attending: FAMILY MEDICINE
Payer: COMMERCIAL

## 2022-07-27 VITALS
DIASTOLIC BLOOD PRESSURE: 60 MMHG | OXYGEN SATURATION: 98 % | BODY MASS INDEX: 20.61 KG/M2 | HEIGHT: 62 IN | HEART RATE: 55 BPM | WEIGHT: 112 LBS | TEMPERATURE: 97.3 F | SYSTOLIC BLOOD PRESSURE: 112 MMHG

## 2022-07-27 DIAGNOSIS — G25.81 RESTLESS LEGS SYNDROME (RLS): ICD-10-CM

## 2022-07-27 DIAGNOSIS — R21 RASH: Primary | ICD-10-CM

## 2022-07-27 PROCEDURE — G0463 HOSPITAL OUTPT CLINIC VISIT: HCPCS

## 2022-07-27 PROCEDURE — 99214 OFFICE O/P EST MOD 30 MIN: CPT | Performed by: FAMILY MEDICINE

## 2022-07-27 RX ORDER — PREDNISONE 20 MG/1
20 TABLET ORAL DAILY
Qty: 12 TABLET | Refills: 0 | Status: SHIPPED | OUTPATIENT
Start: 2022-07-27 | End: 2022-07-30

## 2022-07-27 RX ORDER — SIMVASTATIN 20 MG
TABLET ORAL
Qty: 90 TABLET | Refills: 3 | Status: SHIPPED | OUTPATIENT
Start: 2022-07-27 | End: 2023-05-08

## 2022-07-27 RX ORDER — ROPINIROLE 1 MG/1
2-3 TABLET, FILM COATED ORAL AT BEDTIME
Qty: 270 TABLET | Refills: 0 | Status: SHIPPED | OUTPATIENT
Start: 2022-07-27 | End: 2022-09-07

## 2022-07-27 ASSESSMENT — ANXIETY QUESTIONNAIRES
2. NOT BEING ABLE TO STOP OR CONTROL WORRYING: NOT AT ALL
GAD7 TOTAL SCORE: 0
GAD7 TOTAL SCORE: 0
4. TROUBLE RELAXING: NOT AT ALL
6. BECOMING EASILY ANNOYED OR IRRITABLE: NOT AT ALL
3. WORRYING TOO MUCH ABOUT DIFFERENT THINGS: NOT AT ALL
7. FEELING AFRAID AS IF SOMETHING AWFUL MIGHT HAPPEN: NOT AT ALL
5. BEING SO RESTLESS THAT IT IS HARD TO SIT STILL: NOT AT ALL
1. FEELING NERVOUS, ANXIOUS, OR ON EDGE: NOT AT ALL

## 2022-07-27 ASSESSMENT — PAIN SCALES - GENERAL: PAINLEVEL: NO PAIN (0)

## 2022-07-27 ASSESSMENT — PATIENT HEALTH QUESTIONNAIRE - PHQ9: SUM OF ALL RESPONSES TO PHQ QUESTIONS 1-9: 0

## 2022-07-27 NOTE — NURSING NOTE
"Chief Complaint   Patient presents with     Rash follow up       Initial /60 (BP Location: Right arm, Patient Position: Sitting, Cuff Size: Adult Regular)   Pulse 55   Temp 97.3  F (36.3  C) (Tympanic)   Ht 1.575 m (5' 2\")   Wt 50.8 kg (112 lb)   SpO2 98%   BMI 20.49 kg/m   Estimated body mass index is 20.49 kg/m  as calculated from the following:    Height as of this encounter: 1.575 m (5' 2\").    Weight as of this encounter: 50.8 kg (112 lb).  Medication Reconciliation: complete  Saadia Hollis LPN  "

## 2022-07-27 NOTE — PATIENT INSTRUCTIONS
Referral to [facility].  Keep 11/2022 with DR Barakat for now.    Script for Prednisone -fill and hold.  If flare up - take 20 mg daily x 3 days.  Update me if you take the Prednisone and not improving in those 3 days.  Ok to take antihistamine/Benadryl if needed.  Ok to use topical steroid cream - Cortisone.  Ok to use anti itch cream, such as Sarna, Benadryl, Cerave, etc.    Verify Requip dosing.  Taking total of 2 mg at night?  If so, ok to take 2-3 mg at night if needed.  New script for 1 mg tabs sent.

## 2022-07-30 DIAGNOSIS — E03.9 HYPOTHYROIDISM, UNSPECIFIED TYPE: ICD-10-CM

## 2022-08-01 RX ORDER — LEVOTHYROXINE SODIUM 25 UG/1
TABLET ORAL
Qty: 158 TABLET | Refills: 2 | Status: SHIPPED | OUTPATIENT
Start: 2022-08-01 | End: 2023-03-14

## 2022-08-01 NOTE — TELEPHONE ENCOUNTER
Synthroid       Last Written Prescription Date:  5/23/22  Last Fill Quantity: 159,   # refills: 0  Last Office Visit: 7/27/22  Future Office visit:    Next 5 appointments (look out 90 days)    Sep 07, 2022  2:00 PM  (Arrive by 1:45 PM)  PHYSICAL with Melyssa Connors MD  [facility] [place] ([facility] [place] ) [address]  [place] [address]  [phone]

## 2022-08-05 ENCOUNTER — TELEPHONE (OUTPATIENT)
Dept: FAMILY MEDICINE | Facility: OTHER | Age: 72
End: 2022-08-05

## 2022-08-05 NOTE — TELEPHONE ENCOUNTER
FYI:     Mihaela wanted Dr Connors to know that she decided to cancel her dermatology appointment at [facility] in [place] and she is keep her appointment with Dr Barakat in November and she also wanted to thank Dr Connors for the referrals.

## 2022-08-30 NOTE — PROGRESS NOTES
"SUBJECTIVE:   Mihaela Jang is a 72 year old female who presents for Preventive Visit.      Patient has been advised of split billing requirements and indicates understanding: Yes  Are you in the first 12 months of your Medicare coverage?  No    Healthy Habits:     In general, how would you rate your overall health?  Good    Frequency of exercise:  4-5 days/week    Duration of exercise:  15-30 minutes    Do you usually eat at least 4 servings of fruit and vegetables a day, include whole grains    & fiber and avoid regularly eating high fat or \"junk\" foods?  No    Taking medications regularly:  Yes    Medication side effects:  None    Ability to successfully perform activities of daily living:  No assistance needed    Home Safety:  No safety concerns identified    Hearing Impairment:  No hearing concerns    In the past 6 months, have you been bothered by leaking of urine? Yes    In general, how would you rate your overall mental or emotional health?  Excellent      PHQ-2 Total Score: 0    Additional concerns today:  No     Flu shot - given today.  COVID booster - due for 2nd booster.     Labs 6/2022.  Stable.    RLS - on requip. 3 mg.  Compression stockings - helpful, but right foot big toe with arthritis - causes discomfort there.  Would like open toe compression if an option.    Osteoporosis - DEXA 9/2021; prior Reclast; last dose 11/2020 - was her 5th dose; Dr Moses - he retired    Colonoscopy 2018 - repeat 10 years     Do you feel safe in your environment? Yes    Have you ever done Advance Care Planning? (For example, a Health Directive, POLST, or a discussion with a medical provider or your loved ones about your wishes): Yes, advance care planning is on file.       Fall risk  Fallen 2 or more times in the past year?: No  Any fall with injury in the past year?: No    Cognitive Screening   1) Repeat 3 items (Leader, Season, Table)    2) Clock draw: NORMAL  3) 3 item recall: Recalls 3 objects  Results: 3 " items recalled: COGNITIVE IMPAIRMENT LESS LIKELY    Mini-CogTM Copyright SOFI Monaco. Licensed by the author for use in [facility]; reprinted with permission (dennis@[facility]). All rights reserved.      Do you have sleep apnea, excessive snoring or daytime drowsiness?: no    Reviewed and updated as needed this visit by clinical staff   Tobacco  Allergies  Meds   Med Hx  Surg Hx  Fam Hx            Reviewed and updated as needed this visit by Provider                   Social History     Tobacco Use     Smoking status: Never Smoker     Smokeless tobacco: Never Used   Substance Use Topics     Alcohol use: Yes     Alcohol/week: 0.0 standard drinks     Comment: 1 glasso wine, weekly      If you drink alcohol do you typically have >3 drinks per day or >7 drinks per week? No    Alcohol Use 9/7/2022   Prescreen: >3 drinks/day or >7 drinks/week? No   Prescreen: >3 drinks/day or >7 drinks/week? -     Hyperlipidemia Follow-Up    Are you regularly taking any medication or supplement to lower your cholesterol?   Yes- statin    Are you having muscle aches or other side effects that you think could be caused by your cholesterol lowering medication?  No    Hypothyroidism Follow-up    Since last visit, patient describes the following symptoms: Weight stable, no hair loss, no skin changes, no constipation, no loose stools      Current providers sharing in care for this patient include:   Patient Care Team:  Melyssa Connors MD as PCP - General  Melyssa Connors MD as Assigned PCP  Sejal Gutierrez PA-C as Assigned Surgical Provider    The following health maintenance items are reviewed in Epic and correct as of today:  Health Maintenance Due   Topic Date Due     COVID-19 Vaccine (4 - Booster for Moderna series) 03/17/2022         Review of Systems   Constitutional: Negative for chills and fever.   HENT: Negative for congestion, ear pain, hearing loss and sore throat.    Eyes: Negative for pain and visual disturbance.  "  Respiratory: Negative for cough and shortness of breath.    Cardiovascular: Negative for chest pain, palpitations and peripheral edema.   Gastrointestinal: Negative for abdominal pain, constipation, diarrhea, heartburn, hematochezia and nausea.   Breasts:  Negative for tenderness, breast mass and discharge.   Genitourinary: Negative for dysuria, frequency, genital sores, hematuria, pelvic pain, urgency, vaginal bleeding and vaginal discharge.   Musculoskeletal: Negative for arthralgias, joint swelling and myalgias.   Skin: Negative for rash.   Neurological: Negative for dizziness, weakness, headaches and paresthesias.   Psychiatric/Behavioral: Negative for mood changes. The patient is not nervous/anxious.        OBJECTIVE:   /62 (BP Location: Right arm, Patient Position: Sitting, Cuff Size: Adult Regular)   Pulse 56   Temp 98.2  F (36.8  C) (Tympanic)   Ht 1.537 m (5' 0.5\")   Wt 51.2 kg (112 lb 12.8 oz)   SpO2 98%   BMI 21.67 kg/m   Estimated body mass index is 21.67 kg/m  as calculated from the following:    Height as of this encounter: 1.537 m (5' 0.5\").    Weight as of this encounter: 51.2 kg (112 lb 12.8 oz).  Physical Exam  GENERAL APPEARANCE: healthy, alert and no distress  EYES: Eyes grossly normal to inspection, PERRL and conjunctivae and sclerae normal  HENT: ear canals and TM's normal, nose and mouth without ulcers or lesions, oropharynx clear and oral mucous membranes moist  NECK: no adenopathy, no asymmetry, masses, or scars and thyroid normal to palpation  RESP: lungs clear to auscultation - no rales, rhonchi or wheezes  BREAST: normal without masses, tenderness and no palpable axillary masses or adenopathy; s/p bilateral mastectomy  CV: regular rate and rhythm, normal S1 S2, no S3 or S4, no murmur, click or rub, no peripheral edema and peripheral pulses strong  ABDOMEN: soft, nontender, no hepatosplenomegaly, no masses and bowel sounds normal   (female): normal female external " "genitalia, normal urethral meatus and vaginal mucosal atrophy noted  MS: no musculoskeletal defects are noted and gait is age appropriate without ataxia; trace ankle edema  SKIN: no suspicious lesions or rashes  NEURO: Normal strength and tone, sensory exam grossly normal, mentation intact and speech normal  PSYCH: mentation appears normal and affect normal/bright    Diagnostic Test Results:  Labs reviewed in Epic    ASSESSMENT / PLAN:       ICD-10-CM    1. Encounter for Medicare annual wellness exam  Z00.00    2. Need for prophylactic vaccination and inoculation against influenza  Z23 INFLUENZA, QUAD, HIGH DOSE, PF, 65YR + (FLUZONE HD)     ADMIN INFLUENZA (For MEDICARE Patients ONLY) []   3. Hyperlipidemia, unspecified hyperlipidemia type  E78.5    4. Hypothyroidism, unspecified type  E03.9    5. Osteoporosis, unspecified osteoporosis type, unspecified pathological fracture presence  M81.0    6. Abnormal glucose  R73.09    7. Restless legs syndrome (RLS)  G25.81 rOPINIRole (REQUIP) 1 MG tablet   8. Localized edema  R60.0 Compression Sleeve/Stocking Order for DME - ONLY FOR DME         Flu shot given.  2nd covid booster advised - regular vs bivalent.    DEXA due 9/2023.  Colonoscopy 2018- due 2028.     Labs 6/2022 - stable.    Compression stockings -open toe - [facility].  COUNSELING:  Reviewed preventive health counseling, as reflected in patient instructions       Regular exercise       Healthy diet/nutrition       Vision screening       Hearing screening       Dental care       Bladder control       Fall risk prevention       Alcohol Use        Osteoporosis prevention/bone health       Colon cancer screening    Estimated body mass index is 21.67 kg/m  as calculated from the following:    Height as of this encounter: 1.537 m (5' 0.5\").    Weight as of this encounter: 51.2 kg (112 lb 12.8 oz).        She reports that she has never smoked. She has never used smokeless tobacco.      Appropriate preventive " services were discussed with this patient, including applicable screening as appropriate for cardiovascular disease, diabetes, osteopenia/osteoporosis, and glaucoma.  As appropriate for age/gender, discussed screening for colorectal cancer, prostate cancer, breast cancer, and cervical cancer. Checklist reviewing preventive services available has been given to the patient.    Reviewed patients plan of care and provided an AVS. The Basic Care Plan (routine screening as documented in Health Maintenance) for Mihaela meets the Care Plan requirement. This Care Plan has been established and reviewed with the Patient.    Counseling Resources:  ATP IV Guidelines  Pooled Cohorts Equation Calculator  Breast Cancer Risk Calculator  Breast Cancer: Medication to Reduce Risk  FRAX Risk Assessment  ICSI Preventive Guidelines  Dietary Guidelines for Americans, 2010  USDA's MyPlate  ASA Prophylaxis  Lung CA Screening    Melyssa Encinas MD  [facility] [place]    Identified Health Risks:

## 2022-08-30 NOTE — PATIENT INSTRUCTIONS
Flu shot given.  2nd covid booster advised - regular vs bivalent.    DEXA due 9/2023.  Colonoscopy 2018- due 2028.     Labs 6/2022 - stable.    Compression stockings -open toe - [facility].        Patient Education   Personalized Prevention Plan  You are due for the preventive services outlined below.  Your care team is available to assist you in scheduling these services.  If you have already completed any of these items, please share that information with your care team to update in your medical record.  Health Maintenance Due   Topic Date Due    COVID-19 Vaccine (4 - Booster for Moderna series) 03/17/2022    Flu Vaccine (1) 09/01/2022    Annual Wellness Visit  09/02/2022

## 2022-09-07 ENCOUNTER — OFFICE VISIT (OUTPATIENT)
Dept: FAMILY MEDICINE | Facility: OTHER | Age: 72
End: 2022-09-07
Attending: FAMILY MEDICINE
Payer: COMMERCIAL

## 2022-09-07 VITALS
HEIGHT: 61 IN | BODY MASS INDEX: 21.3 KG/M2 | OXYGEN SATURATION: 98 % | HEART RATE: 56 BPM | SYSTOLIC BLOOD PRESSURE: 110 MMHG | WEIGHT: 112.8 LBS | DIASTOLIC BLOOD PRESSURE: 62 MMHG | TEMPERATURE: 98.2 F

## 2022-09-07 DIAGNOSIS — M81.0 OSTEOPOROSIS, UNSPECIFIED OSTEOPOROSIS TYPE, UNSPECIFIED PATHOLOGICAL FRACTURE PRESENCE: ICD-10-CM

## 2022-09-07 DIAGNOSIS — Z23 NEED FOR PROPHYLACTIC VACCINATION AND INOCULATION AGAINST INFLUENZA: ICD-10-CM

## 2022-09-07 DIAGNOSIS — E03.9 HYPOTHYROIDISM, UNSPECIFIED TYPE: ICD-10-CM

## 2022-09-07 DIAGNOSIS — G25.81 RESTLESS LEGS SYNDROME (RLS): ICD-10-CM

## 2022-09-07 DIAGNOSIS — Z00.00 ENCOUNTER FOR MEDICARE ANNUAL WELLNESS EXAM: Primary | ICD-10-CM

## 2022-09-07 DIAGNOSIS — R73.09 ABNORMAL GLUCOSE: ICD-10-CM

## 2022-09-07 DIAGNOSIS — R60.0 LOCALIZED EDEMA: ICD-10-CM

## 2022-09-07 DIAGNOSIS — E78.5 HYPERLIPIDEMIA, UNSPECIFIED HYPERLIPIDEMIA TYPE: ICD-10-CM

## 2022-09-07 PROCEDURE — 99213 OFFICE O/P EST LOW 20 MIN: CPT | Mod: 25 | Performed by: FAMILY MEDICINE

## 2022-09-07 PROCEDURE — G0008 ADMIN INFLUENZA VIRUS VAC: HCPCS

## 2022-09-07 PROCEDURE — G0439 PPPS, SUBSEQ VISIT: HCPCS | Performed by: FAMILY MEDICINE

## 2022-09-07 RX ORDER — ROPINIROLE 1 MG/1
3 TABLET, FILM COATED ORAL AT BEDTIME
Qty: 270 TABLET | Refills: 3 | Status: SHIPPED | OUTPATIENT
Start: 2022-09-07 | End: 2022-10-24

## 2022-09-07 ASSESSMENT — ENCOUNTER SYMPTOMS
NERVOUS/ANXIOUS: 0
HEMATURIA: 0
EYE PAIN: 0
DIARRHEA: 0
HEMATOCHEZIA: 0
CONSTIPATION: 0
ABDOMINAL PAIN: 0
WEAKNESS: 0
DYSURIA: 0
FEVER: 0
JOINT SWELLING: 0
CHILLS: 0
DIZZINESS: 0
BREAST MASS: 0
PARESTHESIAS: 0
HEARTBURN: 0
SORE THROAT: 0
PALPITATIONS: 0
COUGH: 0
FREQUENCY: 0
HEADACHES: 0
SHORTNESS OF BREATH: 0
MYALGIAS: 0
NAUSEA: 0
ARTHRALGIAS: 0

## 2022-09-07 ASSESSMENT — PATIENT HEALTH QUESTIONNAIRE - PHQ9: SUM OF ALL RESPONSES TO PHQ QUESTIONS 1-9: 0

## 2022-09-07 ASSESSMENT — ANXIETY QUESTIONNAIRES
GAD7 TOTAL SCORE: 0
1. FEELING NERVOUS, ANXIOUS, OR ON EDGE: NOT AT ALL
5. BEING SO RESTLESS THAT IT IS HARD TO SIT STILL: NOT AT ALL
6. BECOMING EASILY ANNOYED OR IRRITABLE: NOT AT ALL
7. FEELING AFRAID AS IF SOMETHING AWFUL MIGHT HAPPEN: NOT AT ALL
3. WORRYING TOO MUCH ABOUT DIFFERENT THINGS: NOT AT ALL
4. TROUBLE RELAXING: NOT AT ALL
GAD7 TOTAL SCORE: 0
2. NOT BEING ABLE TO STOP OR CONTROL WORRYING: NOT AT ALL

## 2022-09-07 ASSESSMENT — PAIN SCALES - GENERAL: PAINLEVEL: NO PAIN (0)

## 2022-09-07 ASSESSMENT — ACTIVITIES OF DAILY LIVING (ADL): CURRENT_FUNCTION: NO ASSISTANCE NEEDED

## 2022-09-07 NOTE — NURSING NOTE
"Chief Complaint   Patient presents with     Physical       Initial /62 (BP Location: Right arm, Patient Position: Sitting, Cuff Size: Adult Regular)   Pulse 56   Temp 98.2  F (36.8  C) (Tympanic)   Ht 1.537 m (5' 0.5\")   Wt 51.2 kg (112 lb 12.8 oz)   SpO2 98%   BMI 21.67 kg/m   Estimated body mass index is 21.67 kg/m  as calculated from the following:    Height as of this encounter: 1.537 m (5' 0.5\").    Weight as of this encounter: 51.2 kg (112 lb 12.8 oz).  Medication Reconciliation: complete  Saadia Hollis LPN  "

## 2022-09-14 NOTE — PROGRESS NOTES
Cerumen Impaction    Patient presents for ear cleaning and ear exam. patient was last seen in ENT on 6/15/21 for ear cleaning.   No concerns with her ears.   Denies otalgia, otorrhea  No vertigo or dizziness.   Reports her hearing is normal and equal.   Hearing has been good. Denies concerns with tinnitus.   She does use flonase PRN.   No concerns with nasal congestion.     Past Medical History:   Diagnosis Date     Atypical nevi      Chondrodermatitis nodularis helicis of left ear     Dr Shipley, [facility]     Chronic rhinitis     [facility] allergy; negative skin testing; IgE mediated allergies; ENT - DC nasal steroid and antihistamine; saline rinses     Degenerative skin disorder     solar elastosis     Hallux rigidus 06/15/2000     Hyperlipidemia, unspecified hyperlipidemia type 9/15/2016     Laryngopharyngeal reflux disease     PPI     Malignant neoplasm of breast (female), unspecified site 03/10/2004     Notalgia paresthetica      Osteoarthritis 07/20/2011     Osteoporosis, unspecified 09/10/2001    Dr Moses; intermittent reclast;  Dr. Moses; repeat dexa after full 2 years Reclast     Other abnormal glucose 12/20/2013     Paresthesia     neck; notalgiaparesthetic; dr leahy & Dr Nevarez; neurontin     Personal history of malignant neoplasm of breast 06/07/2005     Rhinitis      Schamberg's purpura         Allergies   Allergen Reactions     Chloraprep One Step Rash     Povidone Iodine Rash     Betadine      Soap Rash     Betadine      Current Outpatient Medications   Medication     aspirin EC 81 MG tablet     CALCIUM CITRATE     cholecalciferol 1000 UNITS TABS     cinnamon 500 MG CAPS     ferrous sulfate (SLO-FE) 142 (45 Fe) MG CR tablet     fish oil-omega-3 fatty acids 1000 MG capsule     gabapentin (NEURONTIN) 300 MG capsule     Lactobacillus (ACIDOPHILUS) TABS     levothyroxine (SYNTHROID/LEVOTHROID) 25 MCG tablet     MAGNESIUM OXIDE PO     Multiple vitamin  s TABS     Multiple Vitamins-Minerals  "(PRESERVISION AREDS PO)     Polyethylene Glycol 3350 (MIRALAX PO)     rOPINIRole (REQUIP) 1 MG tablet     simvastatin (ZOCOR) 20 MG tablet     Turmeric 500 MG CAPS     zinc 50 MG TABS     No current facility-administered medications for this visit.     ROS- SEE HPI    /58 (BP Location: Right arm, Cuff Size: Adult Regular)   Pulse 62   Temp 98.2  F (36.8  C) (Tympanic)   Ht 1.549 m (5' 1\")   Wt 50.8 kg (112 lb)   SpO2 98%   BMI 21.16 kg/m      General - The patient is well nourished and well developed, and appears to have good nutritional status.  Alert and oriented to person and place, answers questions and cooperates with examination appropriately.   Head and Face - Normocephalic and atraumatic, with no gross asymmetry noted.  The facial nerve is intact, with strong symmetric movements.  Voice and Breathing - The patient was breathing comfortably without the use of accessory muscles. There was no wheezing, stridor, or stertor.  The patients voice was clear and strong, and had appropriate pitch and quality.  Ears -examined under microscopy bilaterally using otologic speculum. Ears were cleaned with cupped forceps.   The external auditory canals are cerumen, the tympanic membranes are intact without effusion, retraction or mass.  Bony landmarks are intact.    Mouth - Examination of the oral cavity showed pink, healthy oral mucosa. No lesions or ulcerations noted.  The tongue was mobile and midline, and the dentition were in good condition.    Throat - The walls of the oropharynx were smooth, pink, moist, symmetric, and had no lesions or ulcerations.  The tonsillar pillars and soft palate were symmetric.  The uvula was midline on elevation.    Neck - Normal midline excursion of the laryngotracheal complex during swallowing.  Full range of motion on passive movement.  Palpation of the occipital, submental, submandibular, internal jugular chain, and supraclavicular nodes did not demonstrate any abnormal " lymph nodes or masses.  Palpation of the thyroid was soft and smooth, with no nodules or goiter appreciated.  The trachea was mobile and midline.  Palpable TMJ tenderness to right with palpable click.   Nose - External contour is symmetric, no gross deflection or scars.  Nasal mucosa is pink and moist with no abnormal mucus.  The septum was intact, turbinates of normal size and position.  No polyps, masses, or purulence noted on examination          ASSESSMENT/ PLAN:      ICD-10-CM    1. Excessive cerumen in both ear canals  H61.23      Ears were cleaned  No fluid or infection.     Return in 1 year or as needed for ear cleaning.       Sejal Gutierrez PA-C  ENT  [facility]

## 2022-09-15 ENCOUNTER — OFFICE VISIT (OUTPATIENT)
Dept: OTOLARYNGOLOGY | Facility: OTHER | Age: 72
End: 2022-09-15
Attending: PHYSICIAN ASSISTANT
Payer: COMMERCIAL

## 2022-09-15 VITALS
OXYGEN SATURATION: 98 % | HEART RATE: 62 BPM | DIASTOLIC BLOOD PRESSURE: 58 MMHG | HEIGHT: 61 IN | TEMPERATURE: 98.2 F | BODY MASS INDEX: 21.14 KG/M2 | SYSTOLIC BLOOD PRESSURE: 120 MMHG | WEIGHT: 112 LBS

## 2022-09-15 DIAGNOSIS — H61.23 EXCESSIVE CERUMEN IN BOTH EAR CANALS: Primary | ICD-10-CM

## 2022-09-15 PROCEDURE — G0463 HOSPITAL OUTPT CLINIC VISIT: HCPCS

## 2022-09-15 PROCEDURE — 99212 OFFICE O/P EST SF 10 MIN: CPT | Mod: 25 | Performed by: PHYSICIAN ASSISTANT

## 2022-09-15 PROCEDURE — 92504 EAR MICROSCOPY EXAMINATION: CPT | Performed by: PHYSICIAN ASSISTANT

## 2022-09-15 ASSESSMENT — PAIN SCALES - GENERAL: PAINLEVEL: NO PAIN (0)

## 2022-09-15 NOTE — NURSING NOTE
"Chief Complaint   Patient presents with     Cerumen Impaction     Ear cleaning       Initial /58 (BP Location: Right arm, Cuff Size: Adult Regular)   Pulse 62   Temp 98.2  F (36.8  C) (Tympanic)   Ht 1.549 m (5' 1\")   Wt 50.8 kg (112 lb)   SpO2 98%   BMI 21.16 kg/m   Estimated body mass index is 21.16 kg/m  as calculated from the following:    Height as of this encounter: 1.549 m (5' 1\").    Weight as of this encounter: 50.8 kg (112 lb).  Medication Reconciliation: complete  Fela Moses LPN    "

## 2022-09-15 NOTE — LETTER
9/15/2022         RE: Mihaela Jang  [address]  [facility] [address]        Dear Colleague,    Thank you for referring your patient, Mihaela Jang, to the [facility]. Please see a copy of my visit note below.    Cerumen Impaction    Patient presents for ear cleaning and ear exam. patient was last seen in ENT on 6/15/21 for ear cleaning.   No concerns with her ears.   Denies otalgia, otorrhea  No vertigo or dizziness.   Reports her hearing is normal and equal.   Hearing has been good. Denies concerns with tinnitus.   She does use flonase PRN.   No concerns with nasal congestion.     Past Medical History:   Diagnosis Date     Atypical nevi      Chondrodermatitis nodularis helicis of left ear     Dr Shipley, [facility]     Chronic rhinitis     Dr Messina, [facility] allergy; negative skin testing; IgE mediated allergies; ENT - DC nasal steroid and antihistamine; saline rinses     Degenerative skin disorder     solar elastosis     Hallux rigidus 06/15/2000     Hyperlipidemia, unspecified hyperlipidemia type 9/15/2016     Laryngopharyngeal reflux disease     PPI     Malignant neoplasm of breast (female), unspecified site 03/10/2004     Notalgia paresthetica      Osteoarthritis 07/20/2011     Osteoporosis, unspecified 09/10/2001    Dr Moses; intermittent reclast;  Dr. Moses; repeat dexa after full 2 years Reclast     Other abnormal glucose 12/20/2013     Paresthesia     neck; notalgiaparesthetic; dr leahy & Dr Nevarez; neurontin     Personal history of malignant neoplasm of breast 06/07/2005     Rhinitis      Schamberg's purpura         Allergies   Allergen Reactions     Chloraprep One Step Rash     Povidone Iodine Rash     Betadine      Soap Rash     Betadine      Current Outpatient Medications   Medication     aspirin EC 81 MG tablet     CALCIUM CITRATE     cholecalciferol 1000 UNITS TABS     cinnamon 500 MG CAPS     ferrous sulfate (SLO-FE) 142 (45 Fe) MG  "CR tablet     fish oil-omega-3 fatty acids 1000 MG capsule     gabapentin (NEURONTIN) 300 MG capsule     Lactobacillus (ACIDOPHILUS) TABS     levothyroxine (SYNTHROID/LEVOTHROID) 25 MCG tablet     MAGNESIUM OXIDE PO     Multiple vitamin  s TABS     Multiple Vitamins-Minerals (PRESERVISION AREDS PO)     Polyethylene Glycol 3350 (MIRALAX PO)     rOPINIRole (REQUIP) 1 MG tablet     simvastatin (ZOCOR) 20 MG tablet     Turmeric 500 MG CAPS     zinc 50 MG TABS     No current facility-administered medications for this visit.     ROS- SEE HPI    /58 (BP Location: Right arm, Cuff Size: Adult Regular)   Pulse 62   Temp 98.2  F (36.8  C) (Tympanic)   Ht 1.549 m (5' 1\")   Wt 50.8 kg (112 lb)   SpO2 98%   BMI 21.16 kg/m      General - The patient is well nourished and well developed, and appears to have good nutritional status.  Alert and oriented to person and place, answers questions and cooperates with examination appropriately.   Head and Face - Normocephalic and atraumatic, with no gross asymmetry noted.  The facial nerve is intact, with strong symmetric movements.  Voice and Breathing - The patient was breathing comfortably without the use of accessory muscles. There was no wheezing, stridor, or stertor.  The patients voice was clear and strong, and had appropriate pitch and quality.  Ears -examined under microscopy bilaterally using otologic speculum. Ears were cleaned with cupped forceps.   The external auditory canals are cerumen, the tympanic membranes are intact without effusion, retraction or mass.  Bony landmarks are intact.    Mouth - Examination of the oral cavity showed pink, healthy oral mucosa. No lesions or ulcerations noted.  The tongue was mobile and midline, and the dentition were in good condition.    Throat - The walls of the oropharynx were smooth, pink, moist, symmetric, and had no lesions or ulcerations.  The tonsillar pillars and soft palate were symmetric.  The uvula was midline on " elevation.    Neck - Normal midline excursion of the laryngotracheal complex during swallowing.  Full range of motion on passive movement.  Palpation of the occipital, submental, submandibular, internal jugular chain, and supraclavicular nodes did not demonstrate any abnormal lymph nodes or masses.  Palpation of the thyroid was soft and smooth, with no nodules or goiter appreciated.  The trachea was mobile and midline.  Palpable TMJ tenderness to right with palpable click.   Nose - External contour is symmetric, no gross deflection or scars.  Nasal mucosa is pink and moist with no abnormal mucus.  The septum was intact, turbinates of normal size and position.  No polyps, masses, or purulence noted on examination          ASSESSMENT/ PLAN:      ICD-10-CM    1. Excessive cerumen in both ear canals  H61.23      Ears were cleaned  No fluid or infection.     Return in 1 year or as needed for ear cleaning.       Sejal Gutierrez PA-C  ENT  [facility]          Again, thank you for allowing me to participate in the care of your patient.        Sincerely,        Sejal Gutierrez PA-C

## 2022-09-15 NOTE — PATIENT INSTRUCTIONS
Ears look well, cleaned.   Hearing protections.     Return in 1 year or as needed for ear cleaning.       Thank you for allowing Sejal Gutierrez PA-C and our ENT team to participate in your care.  If your medications are too expensive, please give the nurse a call.  We can possibly change this medication.  If you have a scheduling or an appointment question please contact our Health Unit Coordinator at [phone], Ext. [phone].    ALL nursing questions or concerns can be directed to your ENT nurse at: [phone] Fela

## 2022-09-19 ENCOUNTER — NURSE TRIAGE (OUTPATIENT)
Dept: FAMILY MEDICINE | Facility: OTHER | Age: 72
End: 2022-09-19

## 2022-09-19 NOTE — TELEPHONE ENCOUNTER
Patient called with symptoms of moderate diarrhea for the past 4-5 days. Patient denies any dizziness, lightheadedness, or vomiting. Patient last urinated this morning. Patient reports being able to keep liquids down. Per protocol patient is to be seen in clinic today. Due to clinic availability no appointment available until Wednesday 09/21/22. Patient advised protocol suggests patient be seen today with UC being available. Patient declined asking for appointment on 09/21/22. Patient scheduled to see covering provider.   Nurse informed patient of care advice and informed to call back or be seen in UC/ED with any new or worsening symptoms. Patient verbalized understanding.    Reason for Disposition    MODERATE diarrhea (e.g., 4-6 times / day more than normal) and present > 48 hours (2 days)    Additional Information    Negative: Shock suspected (e.g., cold/pale/clammy skin, too weak to stand, low BP, rapid pulse)    Negative: Difficult to awaken or acting confused (e.g., disoriented, slurred speech)    Negative: Sounds like a life-threatening emergency to the triager    Negative: Vomiting also present and worse than the diarrhea    Negative: SEVERE abdominal pain (e.g., excruciating) and present > 1 hour    Negative: SEVERE abdominal pain and age > 60 years    Negative: Bloody, black, or tarry bowel movements (Exception: chronic-unchanged black-grey bowel movements and is taking iron pills or Pepto-Bismol)    Negative: SEVERE diarrhea (e.g., 7 or more times / day more than normal) and age > 60 years    Negative: Constant abdominal pain lasting > 2 hours    Negative: Drinking very little and has signs of dehydration (e.g., no urine > 12 hours, very dry mouth, very lightheaded)    Negative: Patient sounds very sick or weak to the triager    Negative: SEVERE diarrhea (e.g., 7 or more times / day more than normal) and present > 24 hours (1 day)    Answer Assessment - Initial Assessment Questions  1. DIARRHEA SEVERITY:  "\"How bad is the diarrhea?\" \"How many more stools have you had in the past 24 hours than normal?\"     - NO DIARRHEA (SCALE 0)    - MILD (SCALE 1-3): Few loose or mushy BMs; increase of 1-3 stools over normal daily number of stools; mild increase in ostomy output.    -  MODERATE (SCALE 4-7): Increase of 4-6 stools daily over normal; moderate increase in ostomy output.  * SEVERE (SCALE 8-10; OR 'WORST POSSIBLE'): Increase of 7 or more stools daily over normal; moderate increase in ostomy output; incontinence.      moderate  2. ONSET: \"When did the diarrhea begin?\"       About 4-5 days ago  3. BM CONSISTENCY: \"How loose or watery is the diarrhea?\"       Loose to watery  4. VOMITING: \"Are you also vomiting?\" If Yes, ask: \"How many times in the past 24 hours?\"       no  5. ABDOMINAL PAIN: \"Are you having any abdominal pain?\" If Yes, ask: \"What does it feel like?\" (e.g., crampy, dull, intermittent, constant)       no  6. ABDOMINAL PAIN SEVERITY: If present, ask: \"How bad is the pain?\"  (e.g., Scale 1-10; mild, moderate, or severe)    - MILD (1-3): doesn't interfere with normal activities, abdomen soft and not tender to touch     - MODERATE (4-7): interferes with normal activities or awakens from sleep, abdomen tender to touch     - SEVERE (8-10): excruciating pain, doubled over, unable to do any normal activities        no  7. ORAL INTAKE: If vomiting, \"Have you been able to drink liquids?\" \"How much liquids have you had in the past 24 hours?\"      \"plenty\"  8. HYDRATION: \"Any signs of dehydration?\" (e.g., dry mouth [not just dry lips], too weak to stand, dizziness, new weight loss) \"When did you last urinate?\"      No concerns of dehydration  9. EXPOSURE: \"Have you traveled to a foreign country recently?\" \"Have you been exposed to anyone with diarrhea?\" \"Could you have eaten any food that was spoiled?\"      no  10. ANTIBIOTIC USE: \"Are you taking antibiotics now or have you taken antibiotics in the past 2 months?\"        " "no  11. OTHER SYMPTOMS: \"Do you have any other symptoms?\" (e.g., fever, blood in stool)        no  12. PREGNANCY: \"Is there any chance you are pregnant?\" \"When was your last menstrual period?\"        no    Protocols used: DIARRHEA-A-OH      "

## 2022-09-20 NOTE — PROGRESS NOTES
Assessment & Plan     Diarrhea, unspecified type  Generalized abdominal tenderness without rebound tenderness   10-12 day history of acute onset diarrhea without clear etiology.  Mild generalized abdominal cramping/discomfort but otherwise no red flag symptoms.  Vitals normal and clinically appears well today.  No acute abdomen.  Most consistent with nonspecific gastroenteritis but also considered diverticulitis, pancreatitis, cholecystitis, appendicitis, hepatitis, and partial bowel obstruction, although would anticipate a more severe clinical presentation for these diagnoses.  Lab work-up as below and do not feel empiric antibiotic coverage indicated at this time.  Discussed trial of titrating Imodium pending lab results.  Reviewed s/s that warrant urgent evaluation and will follow-up in about 1 week.  - CBC with platelets and differential; Future  - Comprehensive metabolic panel (BMP + Alb, Alk Phos, ALT, AST, Total. Bili, TP); Future  - Lipase; Future  - CRP, inflammation; Future  - TSH with free T4 reflex; Future  - Enteric Bacteria and Virus Panel by [facility]EM[facility] Stool; Future  - Ova and Parasite Screen; Future  - loperamide (IMODIUM A-D) 2 MG tablet; Take 1 tablet (2 mg) by mouth 4 times daily as needed for diarrhea    Return in about 1 week (around 9/28/2022) for f/u diarrhea.    Wilder Mcwilliams MD  [facility] - ESTEPHANIE Chairez is a 72 year old, presenting for the following health issues:  Diarrhea      HPI     Diarrhea  Onset/Duration: about 1 week ago  Description:       Consistency of stool: watery, runny and brown       Blood in stool: No       Number of loose stools past 24 hours: 10 (4-5x/day)  Progression of Symptoms: same  Accompanying signs and symptoms:       Fever: No       Nausea/Vomiting: No       Abdominal pain: YES- mild ache       Weight loss: No       Episodes of constipation: No  History   Ill contacts: No  Recent use of antibiotics: No  Recent travels: No  Recent  medication-new or changes(Rx or OTC): No  Precipitating or alleviating factors: None  Therapies tried and outcome: Increased water input    -Symptoms came on suddenly about 10 to 12 days ago  -Having approximately 5 loose stools daily  -Has not improved at all over the past week  -Stools very watery and brown, no blood, melena, coffee-ground  -Mild vague abdominal discomfort/cramping, particularly with bowel movements  -Increased gastrointestinal urgency  -No fevers, chills, flulike symptoms  -No nausea, vomiting, reflux, upper GI symptoms  -No urinary symptoms, new rashes  -No recent travel, new foods, known sick contacts, antibiotic use  -No history of infectious or inflammatory diarrhea, colonoscopy 2018 normal  -Has been trying to push fluids but has not tried any other OTC treatment      Review of Systems   CONSTITUTIONAL: NEGATIVE for fever, chills, change in weight  INTEGUMENTARY/SKIN: NEGATIVE for worrisome rashes, moles or lesions  ENT/MOUTH: NEGATIVE for ear, mouth and throat problems  RESP: NEGATIVE for significant cough or SOB  BREAST: NEGATIVE for masses, tenderness or discharge  CV: NEGATIVE for chest pain, palpitations or peripheral edema  GI: POSITIVE for abdominal pain generalized, diarrhea and gas or bloating  : NEGATIVE for frequency, dysuria, or hematuria  MUSCULOSKELETAL: NEGATIVE for significant arthralgias or myalgia  NEURO: NEGATIVE for weakness, dizziness or paresthesias  ENDOCRINE: NEGATIVE for temperature intolerance, skin/hair changes  HEME: NEGATIVE for bleeding problems  PSYCHIATRIC: NEGATIVE for changes in mood or affect      Objective    /66 (BP Location: Right arm, Patient Position: Sitting, Cuff Size: Adult Regular)   Pulse 63   Temp 97.6  F (36.4  C) (Tympanic)   Wt 50.8 kg (112 lb 1.6 oz)   SpO2 98%   BMI 21.18 kg/m    Body mass index is 21.18 kg/m .  Physical Exam   GENERAL: healthy, alert and no distress  NECK: no adenopathy, no asymmetry, masses, or scars and  thyroid normal to palpation  RESP: lungs clear to auscultation - no rales, rhonchi or wheezes  CV: regular rate and rhythm, normal S1 S2, no S3 or S4, no murmur, click or rub, no peripheral edema and peripheral pulses strong  ABDOMEN: soft, nontender, no hepatosplenomegaly, no masses and bowel sounds normal  MS: no gross musculoskeletal defects noted, no edema  SKIN: no suspicious lesions or rashes  NEURO: Normal strength and tone, mentation intact and speech normal  BACK: no CVA tenderness, no paralumbar tenderness  PSYCH: mentation appears normal, affect normal/bright    Results pending as above.

## 2022-09-21 ENCOUNTER — OFFICE VISIT (OUTPATIENT)
Dept: FAMILY MEDICINE | Facility: OTHER | Age: 72
End: 2022-09-21
Attending: STUDENT IN AN ORGANIZED HEALTH CARE EDUCATION/TRAINING PROGRAM
Payer: COMMERCIAL

## 2022-09-21 VITALS
HEART RATE: 63 BPM | TEMPERATURE: 97.6 F | OXYGEN SATURATION: 98 % | DIASTOLIC BLOOD PRESSURE: 66 MMHG | BODY MASS INDEX: 21.18 KG/M2 | SYSTOLIC BLOOD PRESSURE: 122 MMHG | WEIGHT: 112.1 LBS

## 2022-09-21 DIAGNOSIS — R10.817 GENERALIZED ABDOMINAL TENDERNESS WITHOUT REBOUND TENDERNESS: ICD-10-CM

## 2022-09-21 DIAGNOSIS — R19.7 DIARRHEA, UNSPECIFIED TYPE: Primary | ICD-10-CM

## 2022-09-21 LAB
ALBUMIN SERPL-MCNC: 3.5 G/DL (ref 3.4–5)
ALP SERPL-CCNC: 76 U/L (ref 40–150)
ALT SERPL W P-5'-P-CCNC: 21 U/L (ref 0–50)
ANION GAP SERPL CALCULATED.3IONS-SCNC: 3 MMOL/L (ref 3–14)
AST SERPL W P-5'-P-CCNC: 19 U/L (ref 0–45)
BASOPHILS # BLD AUTO: 0 10E3/UL (ref 0–0.2)
BASOPHILS NFR BLD AUTO: 0 %
BILIRUB SERPL-MCNC: 0.2 MG/DL (ref 0.2–1.3)
BUN SERPL-MCNC: 14 MG/DL (ref 7–30)
CALCIUM SERPL-MCNC: 9 MG/DL (ref 8.5–10.1)
CHLORIDE BLD-SCNC: 101 MMOL/L (ref 94–109)
CO2 SERPL-SCNC: 28 MMOL/L (ref 20–32)
CREAT SERPL-MCNC: 0.65 MG/DL (ref 0.52–1.04)
CRP SERPL-MCNC: <2.9 MG/L (ref 0–8)
EOSINOPHIL # BLD AUTO: 0 10E3/UL (ref 0–0.7)
EOSINOPHIL NFR BLD AUTO: 1 %
ERYTHROCYTE [DISTWIDTH] IN BLOOD BY AUTOMATED COUNT: 13.1 % (ref 10–15)
GFR SERPL CREATININE-BSD FRML MDRD: >90 ML/MIN/1.73M2
GLUCOSE BLD-MCNC: 129 MG/DL (ref 70–99)
HCT VFR BLD AUTO: 35.8 % (ref 35–47)
HGB BLD-MCNC: 12 G/DL (ref 11.7–15.7)
IMM GRANULOCYTES # BLD: 0 10E3/UL
IMM GRANULOCYTES NFR BLD: 0 %
LIPASE SERPL-CCNC: 208 U/L (ref 73–393)
LYMPHOCYTES # BLD AUTO: 0.9 10E3/UL (ref 0.8–5.3)
LYMPHOCYTES NFR BLD AUTO: 25 %
MCH RBC QN AUTO: 32.3 PG (ref 26.5–33)
MCHC RBC AUTO-ENTMCNC: 33.5 G/DL (ref 31.5–36.5)
MCV RBC AUTO: 97 FL (ref 78–100)
MONOCYTES # BLD AUTO: 0.5 10E3/UL (ref 0–1.3)
MONOCYTES NFR BLD AUTO: 15 %
NEUTROPHILS # BLD AUTO: 2.1 10E3/UL (ref 1.6–8.3)
NEUTROPHILS NFR BLD AUTO: 59 %
NRBC # BLD AUTO: 0 10E3/UL
NRBC BLD AUTO-RTO: 0 /100
PLATELET # BLD AUTO: 263 10E3/UL (ref 150–450)
POTASSIUM BLD-SCNC: 4.3 MMOL/L (ref 3.4–5.3)
PROT SERPL-MCNC: 7.2 G/DL (ref 6.8–8.8)
RBC # BLD AUTO: 3.71 10E6/UL (ref 3.8–5.2)
SODIUM SERPL-SCNC: 132 MMOL/L (ref 133–144)
TSH SERPL DL<=0.005 MIU/L-ACNC: 1.98 MU/L (ref 0.4–4)
WBC # BLD AUTO: 3.6 10E3/UL (ref 4–11)

## 2022-09-21 PROCEDURE — 86140 C-REACTIVE PROTEIN: CPT | Mod: ZL | Performed by: STUDENT IN AN ORGANIZED HEALTH CARE EDUCATION/TRAINING PROGRAM

## 2022-09-21 PROCEDURE — 85025 COMPLETE CBC W/AUTO DIFF WBC: CPT | Mod: ZL | Performed by: STUDENT IN AN ORGANIZED HEALTH CARE EDUCATION/TRAINING PROGRAM

## 2022-09-21 PROCEDURE — 84443 ASSAY THYROID STIM HORMONE: CPT | Mod: ZL | Performed by: STUDENT IN AN ORGANIZED HEALTH CARE EDUCATION/TRAINING PROGRAM

## 2022-09-21 PROCEDURE — 83690 ASSAY OF LIPASE: CPT | Mod: ZL | Performed by: STUDENT IN AN ORGANIZED HEALTH CARE EDUCATION/TRAINING PROGRAM

## 2022-09-21 PROCEDURE — 36415 COLL VENOUS BLD VENIPUNCTURE: CPT | Mod: ZL | Performed by: STUDENT IN AN ORGANIZED HEALTH CARE EDUCATION/TRAINING PROGRAM

## 2022-09-21 PROCEDURE — G0463 HOSPITAL OUTPT CLINIC VISIT: HCPCS

## 2022-09-21 PROCEDURE — 99213 OFFICE O/P EST LOW 20 MIN: CPT | Performed by: STUDENT IN AN ORGANIZED HEALTH CARE EDUCATION/TRAINING PROGRAM

## 2022-09-21 PROCEDURE — 80053 COMPREHEN METABOLIC PANEL: CPT | Mod: ZL | Performed by: STUDENT IN AN ORGANIZED HEALTH CARE EDUCATION/TRAINING PROGRAM

## 2022-09-21 RX ORDER — LOPERAMIDE HYDROCHLORIDE 2 MG/1
2 TABLET ORAL 4 TIMES DAILY PRN
Qty: 60 TABLET | Refills: 0 | Status: SHIPPED | OUTPATIENT
Start: 2022-09-21 | End: 2022-09-28

## 2022-09-21 NOTE — NURSING NOTE
"Chief Complaint   Patient presents with     Diarrhea       Initial /66 (BP Location: Right arm, Patient Position: Sitting, Cuff Size: Adult Regular)   Pulse 63   Temp 97.6  F (36.4  C) (Tympanic)   Wt 50.8 kg (112 lb 1.6 oz)   SpO2 98%   BMI 21.18 kg/m   Estimated body mass index is 21.18 kg/m  as calculated from the following:    Height as of 9/15/22: 1.549 m (5' 1\").    Weight as of this encounter: 50.8 kg (112 lb 1.6 oz).  Medication Reconciliation: complete  Sandrita Nieto LPN  "

## 2022-09-22 ENCOUNTER — APPOINTMENT (OUTPATIENT)
Dept: LAB | Facility: OTHER | Age: 72
End: 2022-09-22
Payer: COMMERCIAL

## 2022-09-22 PROCEDURE — 87209 SMEAR COMPLEX STAIN: CPT | Mod: ZL | Performed by: STUDENT IN AN ORGANIZED HEALTH CARE EDUCATION/TRAINING PROGRAM

## 2022-09-22 PROCEDURE — 87506 IADNA-DNA/RNA PROBE TQ 6-11: CPT | Mod: ZL | Performed by: STUDENT IN AN ORGANIZED HEALTH CARE EDUCATION/TRAINING PROGRAM

## 2022-09-26 LAB — O+P STL MICRO: NEGATIVE

## 2022-09-27 NOTE — PROGRESS NOTES
Assessment & Plan     Diarrhea, unspecified type  Now about 3-week history of diarrhea of unclear etiology.  Infectious work-up negative 1-week ago including enteric pathogen panel, ova/parasite ova/parasite.  TSH, CMP, lipase normal.  CRP unremarkable, and WBC actually mild leukopenia which is near baseline.  Colonoscopy in 2018 normal.  Started trial of Imodium after infectious work-up came back negative although likely suboptimal dosing up to this point.  Discussed differential that could include inflammatory bowel disease although unlikely for new onset disease given age.  Also discussed possible bowel obstruction with leak around diarrhea, as well as the concept of gut dysbiosis.  Once again she clinically appears well, vitals normal, and has no red flag symptoms.  Shared decision making to continue with maximizing conservative treatment prior to further work-up, but will have low threshold to screen for inflammatory bowel disease, as well as abdominal imaging.  Add Florastor probiotic and had long discussion regarding self titration of loperamide not to exceed total daily dose of 16 mg daily.  - saccharomyces boulardii (FLORASTOR) 250 MG capsule; Take 1 capsule (250 mg) by mouth 2 times daily  - loperamide (IMODIUM A-D) 2 MG tablet; Take 1-2 tablets (2-4 mg) by mouth 4 times daily as needed for diarrhea DO NOT EXCEED 16 MG PER DAY.  - Reviewed S/S that warrant urgent evaluation  - Consider screening calprotectin, TTG  - Consider abdominal imaging    Follow-up in 1-2 weeks with BMP or sooner as needed.    Wilder Mcwilliams MD  [facility] - ESTEPHANIE Chairez is a 72 year old, presenting for the following health issues:  Diarrhea    HPI   Diarrhea Follow up  Onset/Duration: about 2 week ago  Description:       Consistency of stool: watery, runny and brown       Blood in stool: No       Number of loose stools past 24 hours: 10 (4-5x/day)  Progression of Symptoms: same  Accompanying  signs and symptoms:       Fever: No       Nausea/Vomiting: No       Abdominal pain: YES- mild ache       Weight loss: No       Episodes of constipation: No  History   Ill contacts: No  Recent use of antibiotics: No  Recent travels: No  Recent medication-new or changes(Rx or OTC): No  Precipitating or alleviating factors: None  Therapies tried and outcome: Increased water input. loperamide (IMODIUM A-D) 2 MG tablet; Take 1 tablet (2 mg) by mouth 4 times daily as needed for diarrhea (Imodium does not seem to be helping)    -Seen 9/21/2022 for about 2-week history of diarrhea of unclear etiology  -Infectious work-up negative at that time  -Discussed trial of Imodium after negative work-up; suboptimal use of Imodium up to this point  -Has been using 4 to 6 mg of Imodium for the last few days  -Initially had 1 or 2 better days after starting Imodium but now the last 1 to 2 days are back to where she was at last presentation with 4-5 loose/watery bowel movements per day  -Endorses bowel urgency but denies incontinence  -No blood, mucus in the stool  -No abdominal pain, nausea, heartburn, vomiting  -Does endorse mild signs of bloating/gassiness  -No family history of IBS/IBD  -Has been taking lactobacillus most days for a number of years now, does not care for yogurt    Review of Systems   CONSTITUTIONAL: NEGATIVE for fever, chills, change in weight  RESP: NEGATIVE for significant cough or SOB  CV: NEGATIVE for chest pain, palpitations or peripheral edema  GI: POSITIVE for diarrhea and gas or bloating and NEGATIVE for heartburn or reflux, hematochezia, melena, nausea, poor appetite and vomiting      Objective    /56   Pulse 60   Temp 98.4  F (36.9  C) (Tympanic)   Resp 18   Wt 51.8 kg (114 lb 4 oz)   SpO2 97%   BMI 21.59 kg/m    Body mass index is 21.59 kg/m .  Physical Exam   GENERAL: healthy, alert and no distress  NECK: no adenopathy, no asymmetry, masses, or scars and thyroid normal to palpation  RESP:  lungs clear to auscultation - no rales, rhonchi or wheezes  CV: regular rate and rhythm, normal S1 S2, no S3 or S4, no murmur, click or rub, no peripheral edema and peripheral pulses strong  ABDOMEN: soft, nontender, no hepatosplenomegaly, no masses and bowel sounds normal  MS: no gross musculoskeletal defects noted, no edema

## 2022-09-28 ENCOUNTER — OFFICE VISIT (OUTPATIENT)
Dept: FAMILY MEDICINE | Facility: OTHER | Age: 72
End: 2022-09-28
Attending: STUDENT IN AN ORGANIZED HEALTH CARE EDUCATION/TRAINING PROGRAM
Payer: COMMERCIAL

## 2022-09-28 VITALS
BODY MASS INDEX: 21.59 KG/M2 | TEMPERATURE: 98.4 F | DIASTOLIC BLOOD PRESSURE: 56 MMHG | HEART RATE: 60 BPM | RESPIRATION RATE: 18 BRPM | WEIGHT: 114.25 LBS | SYSTOLIC BLOOD PRESSURE: 110 MMHG | OXYGEN SATURATION: 97 %

## 2022-09-28 DIAGNOSIS — R19.7 DIARRHEA, UNSPECIFIED TYPE: Primary | ICD-10-CM

## 2022-09-28 PROCEDURE — G0463 HOSPITAL OUTPT CLINIC VISIT: HCPCS

## 2022-09-28 PROCEDURE — 99213 OFFICE O/P EST LOW 20 MIN: CPT | Performed by: STUDENT IN AN ORGANIZED HEALTH CARE EDUCATION/TRAINING PROGRAM

## 2022-09-28 PROCEDURE — G0463 HOSPITAL OUTPT CLINIC VISIT: HCPCS | Mod: 25

## 2022-09-28 RX ORDER — SACCHAROMYCES BOULARDII 250 MG
250 CAPSULE ORAL 2 TIMES DAILY
Qty: 90 CAPSULE | Refills: 1 | Status: SHIPPED | OUTPATIENT
Start: 2022-09-28 | End: 2023-11-24

## 2022-09-28 RX ORDER — LOPERAMIDE HYDROCHLORIDE 2 MG/1
2-4 TABLET ORAL 4 TIMES DAILY PRN
Qty: 90 TABLET | Refills: 0 | Status: SHIPPED | OUTPATIENT
Start: 2022-09-28 | End: 2023-02-27

## 2022-09-28 NOTE — NURSING NOTE
"Chief Complaint   Patient presents with     Diarrhea       Initial /56   Pulse 60   Temp 98.4  F (36.9  C) (Tympanic)   Resp 18   Wt 51.8 kg (114 lb 4 oz)   SpO2 97%   BMI 21.59 kg/m   Estimated body mass index is 21.59 kg/m  as calculated from the following:    Height as of 9/15/22: 1.549 m (5' 1\").    Weight as of this encounter: 51.8 kg (114 lb 4 oz).  Medication Reconciliation: complete  Jo Bains LPN  "

## 2022-10-03 ENCOUNTER — TELEPHONE (OUTPATIENT)
Dept: FAMILY MEDICINE | Facility: OTHER | Age: 72
End: 2022-10-03

## 2022-10-03 NOTE — TELEPHONE ENCOUNTER
Request to schedule on same day slot 10/05/22.    S- (SITUATION) :  Patient calling for follow up for diarrhea.     B- (BACKGROUND):  Patient seen by Dr. Mcwilliams 09/28/22 for diarrhea. Patient scheduled for follow up 10/10/22 with Dr. Mcwilliams. Patient wanting to be seen sooner.    A- (ASSESSMENT):  Patient reports new symptoms of overnight loss of bowel control. Patient reports two episodes of loss of bowel control.  Patient reports three episodes of diarrhea in the past 24 hours. Patient denies any new or worsening symptoms of light headedness of dizziness. Patient reports urinating this morning and no current concerns of dehydration.     R- (RECOMMENDATIONS):  Request to book on Same Day spot 10/05/22. PCP to advise.  Nurse advised patient to call back or be seen in UC/ED with any new or worsening symptoms. Patient verbalized understanding.

## 2022-10-04 NOTE — TELEPHONE ENCOUNTER
Lvm for pt to call back; if pt calls per provider please schedule in same day spot on 10/6. Provider did advise ER/UC if needed

## 2022-10-05 NOTE — TELEPHONE ENCOUNTER
Noted.  See staff message:    Pt states she feels it is getting under control; feels imodium is helping; will keep appt on 10/10 and states does not feel she needs 10/6 appt    Message text

## 2022-10-10 ENCOUNTER — LAB (OUTPATIENT)
Dept: LAB | Facility: OTHER | Age: 72
End: 2022-10-10
Attending: FAMILY MEDICINE
Payer: COMMERCIAL

## 2022-10-10 ENCOUNTER — OFFICE VISIT (OUTPATIENT)
Dept: FAMILY MEDICINE | Facility: OTHER | Age: 72
End: 2022-10-10
Attending: FAMILY MEDICINE
Payer: COMMERCIAL

## 2022-10-10 VITALS
SYSTOLIC BLOOD PRESSURE: 110 MMHG | TEMPERATURE: 97.9 F | BODY MASS INDEX: 20.97 KG/M2 | WEIGHT: 111 LBS | HEART RATE: 62 BPM | OXYGEN SATURATION: 98 % | DIASTOLIC BLOOD PRESSURE: 60 MMHG

## 2022-10-10 DIAGNOSIS — R19.7 DIARRHEA, UNSPECIFIED TYPE: Primary | ICD-10-CM

## 2022-10-10 DIAGNOSIS — R10.817 GENERALIZED ABDOMINAL TENDERNESS WITHOUT REBOUND TENDERNESS: ICD-10-CM

## 2022-10-10 LAB
ANION GAP SERPL CALCULATED.3IONS-SCNC: 5 MMOL/L (ref 3–14)
BUN SERPL-MCNC: 10 MG/DL (ref 7–30)
CALCIUM SERPL-MCNC: 9 MG/DL (ref 8.5–10.1)
CHLORIDE BLD-SCNC: 104 MMOL/L (ref 94–109)
CO2 SERPL-SCNC: 27 MMOL/L (ref 20–32)
CREAT SERPL-MCNC: 0.61 MG/DL (ref 0.52–1.04)
GFR SERPL CREATININE-BSD FRML MDRD: >90 ML/MIN/1.73M2
GLUCOSE BLD-MCNC: 113 MG/DL (ref 70–99)
POTASSIUM BLD-SCNC: 4.2 MMOL/L (ref 3.4–5.3)
SODIUM SERPL-SCNC: 136 MMOL/L (ref 133–144)

## 2022-10-10 PROCEDURE — 36415 COLL VENOUS BLD VENIPUNCTURE: CPT | Mod: ZL

## 2022-10-10 PROCEDURE — 82374 ASSAY BLOOD CARBON DIOXIDE: CPT | Mod: ZL

## 2022-10-10 PROCEDURE — G0463 HOSPITAL OUTPT CLINIC VISIT: HCPCS

## 2022-10-10 PROCEDURE — G0463 HOSPITAL OUTPT CLINIC VISIT: HCPCS | Mod: 25

## 2022-10-10 PROCEDURE — 99213 OFFICE O/P EST LOW 20 MIN: CPT | Performed by: STUDENT IN AN ORGANIZED HEALTH CARE EDUCATION/TRAINING PROGRAM

## 2022-10-10 PROCEDURE — 91312 COVID-19,PF,PFIZER BOOSTER BIVALENT: CPT

## 2022-10-10 ASSESSMENT — PAIN SCALES - GENERAL: PAINLEVEL: NO PAIN (0)

## 2022-10-10 NOTE — NURSING NOTE
"Chief Complaint   Patient presents with     Diarrhea       Initial /60 (BP Location: Right arm, Patient Position: Chair, Cuff Size: Adult Regular)   Pulse 62   Temp 97.9  F (36.6  C) (Tympanic)   Wt 50.3 kg (111 lb)   SpO2 98%   BMI 20.97 kg/m   Estimated body mass index is 20.97 kg/m  as calculated from the following:    Height as of 9/15/22: 1.549 m (5' 1\").    Weight as of this encounter: 50.3 kg (111 lb).  Medication Reconciliation: complete  SITA ALCANTAR LPN    "

## 2022-10-10 NOTE — PROGRESS NOTES
Assessment & Plan     Diarrhea, unspecified type  Generalized abdominal tenderness without rebound tenderness   About 5-6 week history of diarrhea of unclear etiology.  Infectious work-up negative, no other systemic symptoms.  Has been doing trial of Imodium for the past several weeks with suboptimal results.  Differential includes inflammatory bowel disease, SIBO, C. difficile, partial obstruction, malignancy.  Colonoscopy normal in 2018, no current red flag symptoms but also no progress.  Surveillance BMP reassuring.  Discussed that ultimately she may need advanced imaging versus repeat colonoscopy if symptoms persist.  Hesitant to empirically treat for bacterial infection when suspicion is so low.  Patient agrees with GI referral for further assistance and will continue supportive cares in the meantime.  - Adult GI  Referral - Consult Only; Future  - Calprotectin (feces)  - Basic metabolic panel  - May continue to titrate Imodium to TDD not to exceed 60 mg  - Continue probiotic  - Low threshold for abdominal imaging if symptoms were to worsen or persist  - Reviewed S/S that warrant urgent evaluation    Return in about 4 weeks (around 11/7/2022) for f/u diarrhea.    Wilder Mcwilliams MD  [facility] - ESTEPHANIE Chairez is a 72 year old female with history of hypothyroidism, hyperlipidemia, and breast cancer, presenting for the following health issues:  Diarrhea    HPI   Diarrhea Follow-Up  Onset/Duration: Follow up  Description:       Consistency of stool: watery, runny and loose       Blood in stool: No       Number of loose stools past 24 hours: 3  Progression of Symptoms: same  Accompanying signs and symptoms:       Fever: No       Nausea/Vomiting: No       Abdominal pain: YES- tender       Weight loss: No       Episodes of constipation: No  History   Ill contacts: No  Recent use of antibiotics: No  Recent travels: No  Recent medication-new or changes(Rx or OTC):  No  Precipitating or alleviating factors: None  Therapies tried and outcome: Imodium    -For seen on 9/21 for 2-week history of diarrhea of unclear etiology; CBC, CMP, lipase, CRP, TSH, enteric pathogen panel, O&P all negative at that time  -Tried symptomatic treatment with Imodium and seen for follow-up on 9/28.  Minimal change in symptoms, added Florastor probiotic  -Returns today and reports minimal change in symptoms; still having several loose watery stools every day  -Actually called into clinic about a week ago for 2 episodes of bowel incontinence as well; no longer having incontinence although still has significant urgency  -Has had to start wearing Depends  -Has been trying to self titrate the Imodium, typically using anywhere from 8 to 12 mg daily  -Imodium does seem to make a difference although she is quite hesitant about becoming constipated  -Endorses some mild cramping with bowel movements as well as some intermittent mild nonspecific achiness of the abdomen throughout the day  -Otherwise feels very well; no fevers, chills, night sweats, unintentional weight loss.  No nausea, vomiting, hematemesis, melena, hematochezia, dysuria.  -No personal or family history of inflammatory bowel disease, diverticulosis  -There was no antibiotic use or hospitalization prior to onset of diarrhea   -Still tolerating near baseline p.o. intake although admits there is room for improvement in hydration habits    Review of Systems   Constitutional, HEENT, cardiovascular, pulmonary, gi and gu systems are negative, except as otherwise noted.      Objective    /60 (BP Location: Right arm, Patient Position: Chair, Cuff Size: Adult Regular)   Pulse 62   Temp 97.9  F (36.6  C) (Tympanic)   Wt 50.3 kg (111 lb)   SpO2 98%   BMI 20.97 kg/m    Body mass index is 20.97 kg/m .  Physical Exam   GENERAL: healthy, alert and no distress  NECK: no adenopathy, no asymmetry, masses, or scars and thyroid normal to palpation  RESP:  lungs clear to auscultation - no rales, rhonchi or wheezes  CV: regular rate and rhythm, normal S1 S2, no S3 or S4, no murmur, click or rub, no peripheral edema and peripheral pulses strong  ABDOMEN: soft, nontender to deep palpation globally, no hepatosplenomegaly, no masses and bowel sounds normal  MS: no gross musculoskeletal defects noted, no edema    Results for orders placed or performed in visit on 10/10/22 (from the past 24 hour(s))   Basic metabolic panel   Result Value Ref Range    Sodium 136 133 - 144 mmol/L    Potassium 4.2 3.4 - 5.3 mmol/L    Chloride 104 94 - 109 mmol/L    Carbon Dioxide (CO2) 27 20 - 32 mmol/L    Anion Gap 5 3 - 14 mmol/L    Urea Nitrogen 10 7 - 30 mg/dL    Creatinine 0.61 0.52 - 1.04 mg/dL    Calcium 9.0 8.5 - 10.1 mg/dL    Glucose 113 (H) 70 - 99 mg/dL    GFR Estimate >90 >60 mL/min/1.73m2

## 2022-10-13 ENCOUNTER — OFFICE VISIT (OUTPATIENT)
Dept: SURGERY | Facility: OTHER | Age: 72
End: 2022-10-13
Attending: STUDENT IN AN ORGANIZED HEALTH CARE EDUCATION/TRAINING PROGRAM
Payer: COMMERCIAL

## 2022-10-13 ENCOUNTER — PREP FOR PROCEDURE (OUTPATIENT)
Dept: SURGERY | Facility: OTHER | Age: 72
End: 2022-10-13

## 2022-10-13 ENCOUNTER — APPOINTMENT (OUTPATIENT)
Dept: LAB | Facility: OTHER | Age: 72
End: 2022-10-13
Attending: STUDENT IN AN ORGANIZED HEALTH CARE EDUCATION/TRAINING PROGRAM
Payer: COMMERCIAL

## 2022-10-13 VITALS
HEIGHT: 61 IN | WEIGHT: 111 LBS | HEART RATE: 64 BPM | BODY MASS INDEX: 20.96 KG/M2 | DIASTOLIC BLOOD PRESSURE: 58 MMHG | OXYGEN SATURATION: 97 % | SYSTOLIC BLOOD PRESSURE: 112 MMHG | TEMPERATURE: 97.9 F

## 2022-10-13 DIAGNOSIS — Z12.11 SCREENING FOR COLON CANCER: Primary | ICD-10-CM

## 2022-10-13 DIAGNOSIS — R19.7 DIARRHEA, UNSPECIFIED TYPE: ICD-10-CM

## 2022-10-13 PROCEDURE — 99203 OFFICE O/P NEW LOW 30 MIN: CPT | Performed by: SURGERY

## 2022-10-13 PROCEDURE — G0463 HOSPITAL OUTPT CLINIC VISIT: HCPCS

## 2022-10-13 PROCEDURE — 83993 ASSAY FOR CALPROTECTIN FECAL: CPT | Mod: ZL

## 2022-10-13 ASSESSMENT — PAIN SCALES - GENERAL: PAINLEVEL: MILD PAIN (3)

## 2022-10-13 NOTE — PATIENT INSTRUCTIONS
Thank you for allowing Dr. Jimenez and our surgical team to participate in your care. Please call our health unit coordinator at [phone] with scheduling questions or the nurse at [phone] with any other questions or concerns.      You have been scheduled for a colonoscopy with  on December 2, 2022.   You will use Gatorade bowel prep.  Please see handout for additional instruction.  You do not need a pre-operative appointment with your primary care provider.  You may call [phone] or [phone] with any questions.     COVID-19 test is needed prior to procedure, even if you've been vaccinated.   If you are going home on the same day as your procedure (surgery):    1-2 days before your procedure, take an at-home, rapid antigen test. You can buy these at many stores. If you can't find an at-home antigen test, please schedule a PCR test with [employer] by calling [phone], or visiting [email or web address]. We can't accept tests that are more than 4 days old.    If your test is NEGATIVE, please take a photo of the test. Show the photo to the nurse when you come in for your procedure (surgery)    If your test is POSITIVE, please contact the pre-Admission office at [phone]. If you are calling after 5 PM on weekdays, and on the weekend, please call [phone] and ask to speak with the House Supervisor.   If you are staying overnight in the hospital you will need to get a PCR covid test 2-4 days prior to procedure (surgery).

## 2022-10-13 NOTE — NURSING NOTE
"Chief Complaint   Patient presents with     Consult     Persistent diarrhea, negative work up       Initial /58   Pulse 64   Temp 97.9  F (36.6  C)   Ht 1.549 m (5' 1\")   Wt 50.3 kg (111 lb)   SpO2 97%   BMI 20.97 kg/m   Estimated body mass index is 20.97 kg/m  as calculated from the following:    Height as of this encounter: 1.549 m (5' 1\").    Weight as of this encounter: 50.3 kg (111 lb).  Medication Reconciliation: complete  Marilee Quinteros LPN  "

## 2022-10-14 LAB — CALPROTECTIN STL-MCNT: 301 MG/KG (ref 0–49.9)

## 2022-10-17 ENCOUNTER — NURSE TRIAGE (OUTPATIENT)
Dept: FAMILY MEDICINE | Facility: OTHER | Age: 72
End: 2022-10-17

## 2022-10-17 NOTE — TELEPHONE ENCOUNTER
Patient states that she has a red spot on her lower back right side.  States it is the size of a pimple.  It has gotten darker and red.  States it itches.     Was seen last week by Dr Jimenez for diarrhea and forgot to mention it to him.  She has a routine Derm appointment on 11/1/22 but is wondering if she should be seen by provider before that since it itches and has changed color.  Please call her back and let her know.

## 2022-10-17 NOTE — PROGRESS NOTES
[facility] Consultation    CC:  Frequent loose stools     HPI:  This 72 year old year old female is seen at the request of Dr. Mcwilliams for evaluation of frequent loose stools. She reports this has been going on a little over a month. She reports 3-4 bouts per day. She reports no blood. No cramping. She has had infectious work-up with her PCP which is normal to date. She did have a colonoscopy in 2018 which was normal. She does have history of breast cancer. No recent medication changes. Feeling well otherwise, no cough cold, nausea or vomiting shortness of breath or chest pain.     Past Medical History:   Diagnosis Date     Atypical nevi      Chondrodermatitis nodularis helicis of left ear     [facility]     Chronic rhinitis     [facility] allergy; negative skin testing; IgE mediated allergies; ENT - DC nasal steroid and antihistamine; saline rinses     Degenerative skin disorder     solar elastosis     Hallux rigidus 06/15/2000     Hyperlipidemia, unspecified hyperlipidemia type 9/15/2016     Laryngopharyngeal reflux disease     PPI     Malignant neoplasm of breast (female), unspecified site 03/10/2004     Notalgia paresthetica      Osteoarthritis 07/20/2011     Osteoporosis, unspecified 09/10/2001    Dr Moses; intermittent reclast;  Dr. Moses; repeat dexa after full 2 years Reclast     Other abnormal glucose 12/20/2013     Paresthesia     neck; notalgiaparesthetic; dr leahy & Dr Nevarez; neurontin     Personal history of malignant neoplasm of breast 06/07/2005     Rhinitis      Schamberg's purpura        Past Surgical History:   Procedure Laterality Date     BONE MARROW BIOPSY      negative     COLONOSCOPY  07/2000     COLONOSCOPY  8/13/2013    DR Fu; repeat 5 years     COLONOSCOPY N/A 8/13/2018    Procedure: COLONOSCOPY;  COLONOSCOPY;  Surgeon: Ajit Uriarte MD;  Location: [facility] OR     DILATION AND CURETTAGE, OPERATIVE HYSTEROSCOPY, COMBINED N/A 2/13/2019    Procedure:  EXAM UNDER ANESTHESIA , HYSTEROSCOPY;  Surgeon: Jovi Gabriel MD;  Location: [facility] OR     HEMORRHOIDECTOMY  1986     MASTECTOMY, BILATERAL  03/01/2004    right sided breast cancer     RELEASE TRIGGER FINGER Right 3/7/2018    Procedure: RELEASE TRIGGER FINGER;  RELEASE TRIGGER DIGIT RIGHT THUMB;  Surgeon: Jose Alfredo Brewster DO;  Location: [facility] OR     UPPER GI ENDOSCOPY       [facility] COLONOSCOPY THRU STOMA WITH BIOPSY  08/25/2010    cancer screening, family h/o colon cancer; hyperplastic polyp       Allergies   Allergen Reactions     Chloraprep One Step Rash     Povidone Iodine Rash     Betadine      Soap Rash     Betadine        Current Outpatient Medications   Medication     aspirin EC 81 MG tablet     CALCIUM CITRATE     cholecalciferol 1000 UNITS TABS     cinnamon 500 MG CAPS     ferrous sulfate (SLO-FE) 142 (45 Fe) MG CR tablet     fish oil-omega-3 fatty acids 1000 MG capsule     gabapentin (NEURONTIN) 300 MG capsule     Lactobacillus (ACIDOPHILUS) TABS     levothyroxine (SYNTHROID/LEVOTHROID) 25 MCG tablet     loperamide (IMODIUM A-D) 2 MG tablet     MAGNESIUM OXIDE PO     Multiple vitamin  s TABS     Multiple Vitamins-Minerals (PRESERVISION AREDS PO)     Polyethylene Glycol 3350 (MIRALAX PO)     rOPINIRole (REQUIP) 1 MG tablet     saccharomyces boulardii (FLORASTOR) 250 MG capsule     simvastatin (ZOCOR) 20 MG tablet     Turmeric 500 MG CAPS     zinc 50 MG TABS     No current facility-administered medications for this visit.       HABITS:    Social History     Tobacco Use     Smoking status: Never     Smokeless tobacco: Never   Substance Use Topics     Alcohol use: Yes     Alcohol/week: 0.0 standard drinks     Comment: 1 glasso wine, weekly      Drug use: No       Family History   Problem Relation Age of Onset     Dementia Mother         (cause of death)      High cholesterol Mother      Osteoarthritis Mother      C.A.D. Father         (cause of death)      Prostate Cancer Father      Breast Cancer Maternal Aunt 72      "Cerebrovascular Disease Maternal Grandmother         CVA     Diabetes Maternal Grandmother        REVIEW OF SYSTEMS:  Ten point review of systems negative except those mentioned in the HPI.     OBJECTIVE:    /58   Pulse 64   Temp 97.9  F (36.6  C)   Ht 1.549 m (5' 1\")   Wt 50.3 kg (111 lb)   SpO2 97%   BMI 20.97 kg/m      GENERAL: Generally appears well, in no distress with appropriate affect.  HEENT:   Sclerae anicteric - normocephalic atraumatic   Respiratory:  No acute distress, no splinting, CTAB   Cardiovascular:  Regular Rate and Rhythm, no murmur   Abdomen: non-distedned   :  deferred  Extremities:  Extremities normal. No deformities, edema, or skin discoloration.  Skin:  no suspicious lesions or rashes  Neurological: grossly intact  Psych:  Alert, oriented, affect appropriate with normal decision making ability.    IMPRESSION:    72 y.o. female with frequent loose loose stools daily, infectious work up negative. She is currently up to date on her colon cancer screening but did discuss can repeat early in order to take biopsies to look for collagenase colitis. This does not appear to be a chronic issue for her.  It appears she has not tried fiber supplementation either so did recommend psyllium powder to her as well.    PLAN:    Colonoscopy with biopsy.     Marcellus Jimenez MD,     10/17/2022  10:52 AM      "

## 2022-10-21 ENCOUNTER — TELEPHONE (OUTPATIENT)
Dept: FAMILY MEDICINE | Facility: OTHER | Age: 72
End: 2022-10-21

## 2022-10-21 DIAGNOSIS — G25.81 RESTLESS LEGS SYNDROME (RLS): ICD-10-CM

## 2022-10-21 NOTE — TELEPHONE ENCOUNTER
Please call patient back regarding the Ropinirole when provider get's back in office on Monday.      She is currently taking 3 mg at bedtime and is requesting to take 4 mg at bedtime.  States that provider told her that would be ok at her last visit.

## 2022-10-21 NOTE — TELEPHONE ENCOUNTER
Called patient to see if she has been contacted regarding the note below.  She states that the red spot has not changed and she will wait to see the dermatologist on 11/1/22

## 2022-10-24 RX ORDER — ROPINIROLE 1 MG/1
4 TABLET, FILM COATED ORAL AT BEDTIME
Qty: 270 TABLET | Refills: 3 | COMMUNITY
Start: 2022-10-24 | End: 2023-02-27

## 2022-10-24 NOTE — TELEPHONE ENCOUNTER
I spoke with patient, on average 4 nights a week she experices  restless legs. Can she increase the Requip to the 4mg night and see if this helps. I tried to look in your notes and don't see anything mentioned if she can increase if she wants, she thought you said she could.

## 2022-11-01 ENCOUNTER — OFFICE VISIT (OUTPATIENT)
Dept: DERMATOLOGY | Facility: OTHER | Age: 72
End: 2022-11-01
Attending: FAMILY MEDICINE
Payer: COMMERCIAL

## 2022-11-01 VITALS
SYSTOLIC BLOOD PRESSURE: 102 MMHG | DIASTOLIC BLOOD PRESSURE: 62 MMHG | WEIGHT: 112 LBS | RESPIRATION RATE: 14 BRPM | HEIGHT: 61 IN | OXYGEN SATURATION: 99 % | HEART RATE: 64 BPM | BODY MASS INDEX: 21.14 KG/M2

## 2022-11-01 DIAGNOSIS — Z71.1 CONCERN ABOUT SKIN DISEASE WITHOUT DIAGNOSIS: ICD-10-CM

## 2022-11-01 PROCEDURE — G0463 HOSPITAL OUTPT CLINIC VISIT: HCPCS

## 2022-11-01 PROCEDURE — 99202 OFFICE O/P NEW SF 15 MIN: CPT | Performed by: DERMATOLOGY

## 2022-11-01 ASSESSMENT — PAIN SCALES - GENERAL: PAINLEVEL: NO PAIN (0)

## 2022-11-01 NOTE — LETTER
2022       RE: Mihaela Jang  [address]  [facility] [id number]     Dear Colleague,    Thank you for referring your patient, Mihaela Jang, to the [facility]. Please see a copy of my visit note below.    Visit Date: 2022    SUBJECTIVE:  First visit for Mihaela, who comes in requesting  a complete skin check.  She has had bilateral mastectomy.    OBJECTIVE:  Shows a very healthy and pleasant lady in no distress.  We checked her face, neck, chest, back, arms, legs, hands and feet, numerous cherry angiomas, nothing to suggest skin cancer.  No pigmented lesions of any concern.  Overall, very excellent skin quality and lack of sun damage.    PLAN:  Reassured.  Return again p.r.n. or 1 year if she wishes.    FAAHD Barakat MD        D: 2022   T: 2022   MT: FRANKIE    Name:     MIHAELA JANG  MRN:      [id number]        Account:    [id number]   :      1950           Visit Date: 2022     Document: [id number]      Again, thank you for allowing me to participate in the care of your patient.      Sincerely,    FAHAD Barakat MD

## 2022-11-01 NOTE — NURSING NOTE
"Chief Complaint   Patient presents with     Skin Check     Annual skin check       Initial /62 (BP Location: Right arm, Cuff Size: Adult Regular)   Pulse 64   Resp 14   Ht 1.549 m (5' 1\")   Wt 50.8 kg (112 lb)   SpO2 99%   BMI 21.16 kg/m   Estimated body mass index is 21.16 kg/m  as calculated from the following:    Height as of this encounter: 1.549 m (5' 1\").    Weight as of this encounter: 50.8 kg (112 lb).  Medication Reconciliation: complete  Yasemin Christiansen LPN  "

## 2022-11-01 NOTE — PROGRESS NOTES
Visit Date: 2022    SUBJECTIVE:  First visit for Mihaela, who comes in requesting  a complete skin check.  She has had bilateral mastectomy.    OBJECTIVE:  Shows a very healthy and pleasant lady in no distress.  We checked her face, neck, chest, back, arms, legs, hands and feet, numerous cherry angiomas, nothing to suggest skin cancer.  No pigmented lesions of any concern.  Overall, very excellent skin quality and lack of sun damage.    PLAN:  Reassured.  Return again p.r.n. or 1 year if she wishes.    FAHAD Barakat MD        D: 2022   T: 2022   MT: FRANKIE    Name:     MIHAELA JENKINS  MRN:      [id number]        Account:    [id number]   :      1950           Visit Date: 2022     Document: [id number]

## 2022-11-08 ENCOUNTER — OFFICE VISIT (OUTPATIENT)
Dept: FAMILY MEDICINE | Facility: OTHER | Age: 72
End: 2022-11-08
Attending: STUDENT IN AN ORGANIZED HEALTH CARE EDUCATION/TRAINING PROGRAM
Payer: COMMERCIAL

## 2022-11-08 VITALS
OXYGEN SATURATION: 98 % | WEIGHT: 110 LBS | SYSTOLIC BLOOD PRESSURE: 122 MMHG | BODY MASS INDEX: 20.77 KG/M2 | HEIGHT: 61 IN | TEMPERATURE: 97 F | HEART RATE: 62 BPM | DIASTOLIC BLOOD PRESSURE: 60 MMHG

## 2022-11-08 DIAGNOSIS — R19.7 DIARRHEA, UNSPECIFIED TYPE: Primary | ICD-10-CM

## 2022-11-08 DIAGNOSIS — R10.817 GENERALIZED ABDOMINAL TENDERNESS WITHOUT REBOUND TENDERNESS: ICD-10-CM

## 2022-11-08 PROCEDURE — G0463 HOSPITAL OUTPT CLINIC VISIT: HCPCS

## 2022-11-08 PROCEDURE — 99213 OFFICE O/P EST LOW 20 MIN: CPT | Performed by: STUDENT IN AN ORGANIZED HEALTH CARE EDUCATION/TRAINING PROGRAM

## 2022-11-08 ASSESSMENT — PAIN SCALES - GENERAL: PAINLEVEL: NO PAIN (0)

## 2022-11-08 NOTE — NURSING NOTE
"Chief Complaint   Patient presents with     Diarrhea              Initial /60   Pulse 62   Temp 97  F (36.1  C)   Ht 1.549 m (5' 1\")   Wt 49.9 kg (110 lb)   SpO2 98%   BMI 20.78 kg/m   Estimated body mass index is 20.78 kg/m  as calculated from the following:    Height as of this encounter: 1.549 m (5' 1\").    Weight as of this encounter: 49.9 kg (110 lb).  Medication Reconciliation: complete  Halle Ortiz LPN  "

## 2022-11-08 NOTE — PROGRESS NOTES
Assessment & Plan     Diarrhea, unspecified type  Generalized abdominal tenderness without rebound tenderness   About 2-month history of diarrhea of unclear etiology.  Infectious work-up negative, no systemic symptoms.  Calprotectin elevated at 301. Slight interval improvement in the frequency of loose stools but remains symptomatic.  Vitals normal, exam reassuring, supportive cares until colonoscopy.  - Imodium max TDD 16 mg  - Continue probiotic, psyllium powder  - Colonoscopy 12/2  - Reviewed S/S that warrant urgent evaluation    26 minutes spent on the date of the encounter doing chart review, history and exam, documentation and further activities per the note       Return if symptoms worsen or fail to improve.    Wilder Mcwilliams MD  [facility] - ESTEPHANIE Chairez is a 72 year old, presenting for the following health issues:  Diarrhea (/)      HPI       Diarrhea Follow-Up  Onset/Duration: Follow up- slight improvement  Description: Still watery, frequency slightly decreased       Consistency of stool: watery, runny and loose, some small formed       Blood in stool: No       Number of loose stools past 24 hours: 3  Progression of Symptoms: same  Accompanying signs and symptoms:       Fever: No       Nausea/Vomiting: No       Abdominal pain: YES- tender       Weight loss: No       Episodes of constipation: No  History   Ill contacts: No  Recent use of antibiotics: No  Recent travels: No  Recent medication-new or changes(Rx or OTC): No  Precipitating or alleviating factors: None  Therapies tried and outcome: Imodium- as needed, has improved symptoms.  -Slight improvement since last visit  -Still loose, watery, urgent bowel movements  -Frequency slightly improved to 2-3 times per day  -Still mild intermittent abdominal discomfort/cramping  -Still using Imodium as needed  -No fevers, chills, night sweats, headache  -No blood/mucus in stool, melena, hematochezia  -Saw Dr. Jimenez on 10/13;  "scheduled for colonoscopy 12/2/2022    Review of Systems   Constitutional, HEENT, cardiovascular, pulmonary, gi and gu systems are negative, except as otherwise noted.      Objective    /60   Pulse 62   Temp 97  F (36.1  C)   Ht 1.549 m (5' 1\")   Wt 49.9 kg (110 lb)   SpO2 98%   BMI 20.78 kg/m    Body mass index is 20.78 kg/m .  Physical Exam   GENERAL: healthy, alert and no distress  RESP: lungs clear to auscultation - no rales, rhonchi or wheezes  BREAST: normal without masses, tenderness or nipple discharge and no palpable axillary masses or adenopathy  CV: regular rate and rhythm, normal S1 S2, no S3 or S4, no murmur, click or rub, no peripheral edema and peripheral pulses strong  ABDOMEN: soft, nontender, no hepatosplenomegaly, no masses and hyperactive bowel sounds  MS: no gross musculoskeletal defects noted, no edema  SKIN: no suspicious lesions or rashes  NEURO: Normal strength and tone, mentation intact and speech normal  PSYCH: mentation appears normal, affect normal/bright    No results found for this or any previous visit (from the past 24 hour(s)).      "

## 2022-11-23 ENCOUNTER — ANESTHESIA EVENT (OUTPATIENT)
Dept: SURGERY | Facility: HOSPITAL | Age: 72
End: 2022-11-23
Payer: COMMERCIAL

## 2022-11-28 NOTE — ANESTHESIA PREPROCEDURE EVALUATION
Anesthesia Pre-Procedure Evaluation    Patient: Mihaela Jang   MRN: [id number] : 1950        Procedure : Procedure(s):  colonoscopy possible biopsy possible polypectomy          Past Medical History:   Diagnosis Date     Atypical nevi      Chondrodermatitis nodularis helicis of left ear     Dr Shipley, [facility]     Chronic rhinitis     Dr Messina, [facility] allergy; negative skin testing; IgE mediated allergies; ENT - DC nasal steroid and antihistamine; saline rinses     Degenerative skin disorder     solar elastosis     Hallux rigidus 06/15/2000     Hyperlipidemia, unspecified hyperlipidemia type 9/15/2016     Laryngopharyngeal reflux disease     PPI     Malignant neoplasm of breast (female), unspecified site 03/10/2004     Notalgia paresthetica      Osteoarthritis 2011     Osteoporosis, unspecified 09/10/2001    Dr Moses; intermittent reclast;  Dr. Moses; repeat dexa after full 2 years Reclast     Other abnormal glucose 2013     Paresthesia     neck; notalgiaparesthetic; dr leahy & Dr Nevarez; neurontin     Personal history of malignant neoplasm of breast 2005     Rhinitis      Schamberg's purpura       Past Surgical History:   Procedure Laterality Date     BONE MARROW BIOPSY      negative     COLONOSCOPY  2000     COLONOSCOPY  2013    DR Fu; repeat 5 years     COLONOSCOPY N/A 2018    Procedure: COLONOSCOPY;  COLONOSCOPY;  Surgeon: Ajit Uriarte MD;  Location: [facility]     DILATION AND CURETTAGE, OPERATIVE HYSTEROSCOPY, COMBINED N/A 2019    Procedure: EXAM UNDER ANESTHESIA , HYSTEROSCOPY;  Surgeon: Jovi Gabriel MD;  Location: [facility]     HEMORRHOIDECTOMY  1986     MASTECTOMY, BILATERAL  2004    right sided breast cancer     RELEASE TRIGGER FINGER Right 3/7/2018    Procedure: RELEASE TRIGGER FINGER;  RELEASE TRIGGER DIGIT RIGHT THUMB;  Surgeon: Jose Alfredo Brewster DO;  Location: [facility] OR     [facility]PP[facility] ENDOSCOPY       [facility]  BI[facility]SY  08/25/2010    cancer screening, family h/o colon cancer; hyperplastic polyp      Allergies   Allergen Reactions     Chloraprep One Step Rash     Povidone Iodine Rash     Betadine      Soap Rash     Betadine       Social History     Tobacco Use     Smoking status: Never     Smokeless tobacco: Never   Substance Use Topics     Alcohol use: Yes     Alcohol/week: 0.0 standard drinks     Comment: 1 glasso wine, weekly       Wt Readings from Last 1 Encounters:   11/08/22 49.9 kg (110 lb)        Anesthesia Evaluation   Pt has had prior anesthetic. Type: General and MAC.    No history of anesthetic complications       ROS/MED HX  ENT/Pulmonary:     (+) allergic rhinitis,     Neurologic:     (+) peripheral neuropathy, - Paresthesia.     Cardiovascular:     (+) Dyslipidemia -----    METS/Exercise Tolerance: >4 METS    Hematologic: Comments: Neutropenia       Musculoskeletal:   (+) arthritis,     GI/Hepatic:     (+) GERD (occasional, resolves spontaneously), Other, bowel prep,     Renal/Genitourinary:       Endo:     (+) thyroid problem, hypothyroidism synthroid,     Psychiatric/Substance Use:  - neg psychiatric ROS     Infectious Disease:  - neg infectious disease ROS     Malignancy:   (+) Malignancy, History of Breast.Breast CA Remission status post Surgery.        Other:  - neg other ROS          Physical Exam    Airway        Mallampati: III   TM distance: > 3 FB   Neck ROM: full   Mouth opening: > 3 cm    Respiratory Devices and Support         Dental  no notable dental history         Cardiovascular   cardiovascular exam normal       Rhythm and rate: regular and normal     Pulmonary   pulmonary exam normal        breath sounds clear to auscultation           OUTSIDE LABS:  CBC:   Lab Results   Component Value Date    WBC 3.6 (L) 09/21/2022    WBC 4.1 06/08/2022    HGB 12.0 09/21/2022    HGB 12.1 06/08/2022    HCT 35.8 09/21/2022    HCT 35.8 06/08/2022     09/21/2022     06/08/2022     BMP:   Lab  Results   Component Value Date     10/10/2022     (L) 09/21/2022    POTASSIUM 4.2 10/10/2022    POTASSIUM 4.3 09/21/2022    CHLORIDE 104 10/10/2022    CHLORIDE 101 09/21/2022    CO2 27 10/10/2022    CO2 28 09/21/2022    BUN 10 10/10/2022    BUN 14 09/21/2022    CR 0.61 10/10/2022    CR 0.65 09/21/2022     (H) 10/10/2022     (H) 09/21/2022     COAGS: No results found for: PTT, INR, FIBR  POC: No results found for: BGM, HCG, HCGS  HEPATIC:   Lab Results   Component Value Date    ALBUMIN 3.5 09/21/2022    PROTTOTAL 7.2 09/21/2022    ALT 21 09/21/2022    AST 19 09/21/2022    ALKPHOS 76 09/21/2022    BILITOTAL 0.2 09/21/2022     OTHER:   Lab Results   Component Value Date    A1C 5.6 06/08/2022    LEONID 9.0 10/10/2022    MAG 2.1 05/14/2020    LIPASE 208 09/21/2022    TSH 1.98 09/21/2022    T4 0.94 05/14/2021    CRP <2.9 09/21/2022    SED 17 12/23/2014       Anesthesia Plan    ASA Status:  2   NPO Status:  NPO Appropriate    Anesthesia Type: MAC.     - Reason for MAC: straight local not clinically adequate              Consents    Anesthesia Plan(s) and associated risks, benefits, and realistic alternatives discussed. Questions answered and patient/representative(s) expressed understanding.     - Discussed: Risks, Benefits and Alternatives for BOTH SEDATION and the PROCEDURE were discussed     - Discussed with:  Patient      - Extended Intubation/Ventilatory Support Discussed: No.      - Patient is DNR/DNI Status: No    Use of blood products discussed: No .     Postoperative Care            Comments:    Other Comments: Oj 11/8, no interval changes    Risks and benefits of MAC anesthetic discussed including dental damage, aspiration, loss of airway, conversion to general anesthetic, CV complications, MI, stroke, death. Pt wishes to proceed.             MICHAEL Wilkinson CRNA

## 2022-11-30 ENCOUNTER — ALLIED HEALTH/NURSE VISIT (OUTPATIENT)
Dept: FAMILY MEDICINE | Facility: OTHER | Age: 72
End: 2022-11-30
Attending: SURGERY
Payer: COMMERCIAL

## 2022-11-30 DIAGNOSIS — R19.7 DIARRHEA, UNSPECIFIED TYPE: ICD-10-CM

## 2022-11-30 LAB — SARS-COV-2 RNA RESP QL NAA+PROBE: NEGATIVE

## 2022-11-30 PROCEDURE — U0003 INFECTIOUS AGENT DETECTION BY NUCLEIC ACID (DNA OR RNA); SEVERE ACUTE RESPIRATORY SYNDROME CORONAVIRUS 2 (SARS-COV-2) (CORONAVIRUS DISEASE [COVID-19]), AMPLIFIED PROBE TECHNIQUE, MAKING USE OF HIGH THROUGHPUT TECHNOLOGIES AS DESCRIBED BY CMS-2020-01-R: HCPCS | Mod: ZL

## 2022-12-02 ENCOUNTER — HOSPITAL ENCOUNTER (OUTPATIENT)
Facility: HOSPITAL | Age: 72
Discharge: HOME | End: 2022-12-02
Attending: SURGERY | Admitting: SURGERY
Payer: COMMERCIAL

## 2022-12-02 ENCOUNTER — ANESTHESIA (OUTPATIENT)
Dept: SURGERY | Facility: HOSPITAL | Age: 72
End: 2022-12-02
Payer: COMMERCIAL

## 2022-12-02 VITALS
TEMPERATURE: 96.8 F | HEART RATE: 55 BPM | HEIGHT: 61 IN | OXYGEN SATURATION: 98 % | SYSTOLIC BLOOD PRESSURE: 131 MMHG | RESPIRATION RATE: 16 BRPM | BODY MASS INDEX: 20.77 KG/M2 | DIASTOLIC BLOOD PRESSURE: 72 MMHG | WEIGHT: 110 LBS

## 2022-12-02 PROCEDURE — 999N000141 HC STATISTIC PRE-PROCEDURE NURSING ASSESSMENT: Performed by: SURGERY

## 2022-12-02 PROCEDURE — 258N000003 HC RX IP 258 OP 636: Performed by: NURSE ANESTHETIST, CERTIFIED REGISTERED

## 2022-12-02 PROCEDURE — 250N000009 HC RX 250: Performed by: NURSE ANESTHETIST, CERTIFIED REGISTERED

## 2022-12-02 PROCEDURE — 99100 ANES PT EXTEME AGE<1 YR&>70: CPT | Performed by: NURSE ANESTHETIST, CERTIFIED REGISTERED

## 2022-12-02 PROCEDURE — 45380 COLONOSCOPY AND BIOPSY: CPT | Performed by: NURSE ANESTHETIST, CERTIFIED REGISTERED

## 2022-12-02 PROCEDURE — 250N000011 HC RX IP 250 OP 636: Performed by: NURSE ANESTHETIST, CERTIFIED REGISTERED

## 2022-12-02 PROCEDURE — 88305 TISSUE EXAM BY PATHOLOGIST: CPT | Mod: TC | Performed by: SURGERY

## 2022-12-02 PROCEDURE — 45380 COLONOSCOPY AND BIOPSY: CPT | Performed by: SURGERY

## 2022-12-02 PROCEDURE — 710N000012 HC RECOVERY PHASE 2, PER MINUTE: Performed by: SURGERY

## 2022-12-02 PROCEDURE — 360N000075 HC SURGERY LEVEL 2, PER MIN: Performed by: SURGERY

## 2022-12-02 PROCEDURE — 272N000001 HC OR GENERAL SUPPLY STERILE: Performed by: SURGERY

## 2022-12-02 PROCEDURE — 370N000017 HC ANESTHESIA TECHNICAL FEE, PER MIN: Performed by: SURGERY

## 2022-12-02 RX ORDER — SODIUM CHLORIDE, SODIUM LACTATE, POTASSIUM CHLORIDE, CALCIUM CHLORIDE 600; 310; 30; 20 MG/100ML; MG/100ML; MG/100ML; MG/100ML
INJECTION, SOLUTION INTRAVENOUS CONTINUOUS
Status: DISCONTINUED | OUTPATIENT
Start: 2022-12-02 | End: 2022-12-02 | Stop reason: HOSPADM

## 2022-12-02 RX ORDER — ONDANSETRON 4 MG/1
4 TABLET, ORALLY DISINTEGRATING ORAL EVERY 30 MIN PRN
Status: DISCONTINUED | OUTPATIENT
Start: 2022-12-02 | End: 2022-12-02 | Stop reason: HOSPADM

## 2022-12-02 RX ORDER — FLUMAZENIL 0.1 MG/ML
0.2 INJECTION, SOLUTION INTRAVENOUS
Status: DISCONTINUED | OUTPATIENT
Start: 2022-12-02 | End: 2022-12-02 | Stop reason: HOSPADM

## 2022-12-02 RX ORDER — NALOXONE HYDROCHLORIDE 0.4 MG/ML
0.4 INJECTION, SOLUTION INTRAMUSCULAR; INTRAVENOUS; SUBCUTANEOUS
Status: DISCONTINUED | OUTPATIENT
Start: 2022-12-02 | End: 2022-12-02 | Stop reason: HOSPADM

## 2022-12-02 RX ORDER — PROPOFOL 10 MG/ML
INJECTION, EMULSION INTRAVENOUS PRN
Status: DISCONTINUED | OUTPATIENT
Start: 2022-12-02 | End: 2022-12-02

## 2022-12-02 RX ORDER — ONDANSETRON 2 MG/ML
4 INJECTION INTRAMUSCULAR; INTRAVENOUS EVERY 30 MIN PRN
Status: DISCONTINUED | OUTPATIENT
Start: 2022-12-02 | End: 2022-12-02 | Stop reason: HOSPADM

## 2022-12-02 RX ORDER — NALOXONE HYDROCHLORIDE 0.4 MG/ML
0.2 INJECTION, SOLUTION INTRAMUSCULAR; INTRAVENOUS; SUBCUTANEOUS
Status: DISCONTINUED | OUTPATIENT
Start: 2022-12-02 | End: 2022-12-02 | Stop reason: HOSPADM

## 2022-12-02 RX ORDER — LIDOCAINE 40 MG/G
CREAM TOPICAL
Status: DISCONTINUED | OUTPATIENT
Start: 2022-12-02 | End: 2022-12-02 | Stop reason: HOSPADM

## 2022-12-02 RX ORDER — LIDOCAINE HYDROCHLORIDE 20 MG/ML
INJECTION, SOLUTION INFILTRATION; PERINEURAL PRN
Status: DISCONTINUED | OUTPATIENT
Start: 2022-12-02 | End: 2022-12-02

## 2022-12-02 RX ORDER — HYDRALAZINE HYDROCHLORIDE 20 MG/ML
2.5-5 INJECTION INTRAMUSCULAR; INTRAVENOUS EVERY 10 MIN PRN
Status: DISCONTINUED | OUTPATIENT
Start: 2022-12-02 | End: 2022-12-02 | Stop reason: HOSPADM

## 2022-12-02 RX ADMIN — LIDOCAINE HYDROCHLORIDE 40 MG: 20 INJECTION, SOLUTION INFILTRATION; PERINEURAL at 11:52

## 2022-12-02 RX ADMIN — PROPOFOL 50 MG: 10 INJECTION, EMULSION INTRAVENOUS at 11:54

## 2022-12-02 RX ADMIN — PROPOFOL 50 MG: 10 INJECTION, EMULSION INTRAVENOUS at 11:52

## 2022-12-02 RX ADMIN — PROPOFOL 50 MG: 10 INJECTION, EMULSION INTRAVENOUS at 12:04

## 2022-12-02 RX ADMIN — PROPOFOL 50 MG: 10 INJECTION, EMULSION INTRAVENOUS at 12:08

## 2022-12-02 RX ADMIN — SODIUM CHLORIDE, POTASSIUM CHLORIDE, SODIUM LACTATE AND CALCIUM CHLORIDE: 600; 310; 30; 20 INJECTION, SOLUTION INTRAVENOUS at 10:34

## 2022-12-02 RX ADMIN — PROPOFOL 30 MG: 10 INJECTION, EMULSION INTRAVENOUS at 12:12

## 2022-12-02 RX ADMIN — PROPOFOL 50 MG: 10 INJECTION, EMULSION INTRAVENOUS at 12:00

## 2022-12-02 ASSESSMENT — ACTIVITIES OF DAILY LIVING (ADL)
ADLS_ACUITY_SCORE: 35
ADLS_ACUITY_SCORE: 35

## 2022-12-02 NOTE — H&P
Surgery Consult Clinic Note      RE: Mihaela Jang  : 1950      Chief Complaint:  Frequent, loose stools    History of Present Illness:  I am seeing Mihaela Jang at the request of Dr. Mcwilliams for evaluation regarding meet and greet screening colonoscopy.  She has had frequent loose stools since 2022, 3-4 bouts per day.  She occasionally uses psyllium supplements and the loose stools have improved recently.  She had an infectious work up per her primary provider which was negative.  She had a normal colonoscopy in 2018.    She denies family history of colon or rectal cancer, blood in stool, weight loss, abdominal pain.  No previous abdominal surgeries.   She has no questions regarding  bowel prep.  Reports passing clear liquid stools today.     She specifically denies fevers, chills, nausea, vomiting, chest pain, shortness of breath, palpitations, sore throat, cough, or generalized feeling ill.       Medical history:  Past Medical History:   Diagnosis Date     Atypical nevi      Chondrodermatitis nodularis helicis of left ear     [facility]     Chronic rhinitis     [facility] allergy; negative skin testing; IgE mediated allergies; ENT - DC nasal steroid and antihistamine; saline rinses     Degenerative skin disorder     solar elastosis     Hallux rigidus 06/15/2000     Hyperlipidemia, unspecified hyperlipidemia type 9/15/2016     Laryngopharyngeal reflux disease     PPI     Malignant neoplasm of breast (female), unspecified site 03/10/2004     Notalgia paresthetica      Osteoarthritis 2011     Osteoporosis, unspecified 09/10/2001    Dr Moses; intermittent reclast;  Dr. Moses; repeat dexa after full 2 years Reclast     Other abnormal glucose 2013     Paresthesia     neck; notalgiaparesthetic; dr leahy & Dr Nevarez; neurontin     Personal history of malignant neoplasm of breast 2005     Rhinitis      Schamberg's purpura        Surgical  history:  Past Surgical History:   Procedure Laterality Date     BONE MARROW BIOPSY      negative     COLONOSCOPY  07/2000     COLONOSCOPY  8/13/2013    DR Fu; repeat 5 years     COLONOSCOPY N/A 8/13/2018    Procedure: COLONOSCOPY;  COLONOSCOPY;  Surgeon: Ajit Uriarte MD;  [facility]     DILATION AND CURETTAGE, OPERATIVE HYSTEROSCOPY, COMBINED N/A 2/13/2019    Procedure: EXAM UNDER ANESTHESIA , HYSTEROSCOPY;  Surgeon: Jovi Gabriel MD;  Location: [facility]     HEMORRHOIDECTOMY  1986     MASTECTOMY, BILATERAL  03/01/2004    right sided breast cancer     RELEASE TRIGGER FINGER Right 3/7/2018    Procedure: RELEASE TRIGGER FINGER;  RELEASE TRIGGER DIGIT RIGHT THUMB;  Surgeon: Jose Alfredo Brewster DO;  Location: [facility]     [facility]PP[facility] ENDOSCOPY       [facility]  [facility]    cancer screening, family h/o colon cancer; hyperplastic polyp       Family history:  Family History   Problem Relation Age of Onset     Dementia Mother         (cause of death)      High cholesterol Mother      Osteoarthritis Mother      C.A.D. Father         (cause of death)      Prostate Cancer Father      Breast Cancer Maternal Aunt 72     Cerebrovascular Disease Maternal Grandmother         CVA     Diabetes Maternal Grandmother        Medications:  Prior to Admission medications    Medication Sig Start Date End Date Taking? Authorizing Provider   aspirin EC 81 MG tablet Take 1 tablet (81 mg) by mouth daily 5/19/15  Yes Tamera Protcor, NP   levothyroxine (SYNTHROID/LEVOTHROID) 25 MCG tablet TAKE 1 TABLET BY MOUTH  DAILY 3 DAYS WEEKLY AND 2  TABLETS BY MOUTH DAILY FOR  4 DAYS PER WEEK 8/1/22  Yes Melyssa Connors MD   Polyethylene Glycol 3350 (MIRALAX PO) Use every other day   Yes Reported, Patient   rOPINIRole (REQUIP) 1 MG tablet Take 4 tablets (4 mg) by mouth At Bedtime 10/24/22  Yes Melyssa Connors MD   CALCIUM CITRATE 1,500 mg two times daily     Reported, Patient   cholecalciferol 1000 UNITS  TABS 2 capsules po daily    Reported, Patient   cinnamon 500 MG CAPS Take 2 capsules by mouth daily     Reported, Patient   ferrous sulfate (SLO-FE) 142 (45 Fe) MG CR tablet Take 1 tablet (142 mg) by mouth daily 5/18/20   Melyssa Connors MD   fish oil-omega-3 fatty acids 1000 MG capsule Take 1 g by mouth 2 times daily     Reported, Patient   gabapentin (NEURONTIN) 300 MG capsule TAKE 3 CAPSULES BY MOUTH AT BEDTIME 2/14/22   Melyssa Connors MD   Lactobacillus (ACIDOPHILUS) TABS Take 1 tablet by mouth daily    Reported, Patient   loperamide (IMODIUM A-D) 2 MG tablet Take 1-2 tablets (2-4 mg) by mouth 4 times daily as needed for diarrhea DO NOT EXCEED 16 MG PER DAY. 9/28/22   Wilder Mcwilliams MD   MAGNESIUM OXIDE PO Take 200 mg by mouth daily    Reported, Patient   Multiple vitamin  s TABS Take 1 tablet by mouth daily.    Reported, Patient   Multiple Vitamins-Minerals (PRESERVISION AREDS PO)     Reported, Patient   saccharomyces boulardii (FLORASTOR) 250 MG capsule Take 1 capsule (250 mg) by mouth 2 times daily 9/28/22   Wilder Mcwilliams MD   simvastatin (ZOCOR) 20 MG tablet TAKE 1 TABLET BY MOUTH AT  BEDTIME 7/27/22   Melyssa Connors MD   Turmeric 500 MG CAPS Take 2 capsules by mouth daily     Reported, Patient   zinc 50 MG TABS Take 1 tablet by mouth daily     Reported, Patient       Allergies:  The patient is allergic to chloraprep one step, povidone iodine, and soap.  .  Social history:  Social History     Tobacco Use     Smoking status: Never     Smokeless tobacco: Never   Substance Use Topics     Alcohol use: Yes     Alcohol/week: 0.0 standard drinks     Comment: 1 glasso wine, weekly      Marital status: single.    Review of Systems:    Constitutional: Negative for fever, chills.   HENT: Negative for ear pain, congestion, sore throat, and ear discharge.    Eyes: Negative for blurred vision, double vision.   Respiratory: Negative for cough, hemoptysis, shortness of breath, wheezing and stridor.   "  Cardiovascular: Negative for chest pain, palpitations and orthopnea.   Gastrointestinal: Negative for heartburn, nausea, vomiting, abdominal pain and blood in stool.   Genitourinary: Negative for urgency, frequency   Musculoskeletal: Negative for myalgias, back pain and joint pain.   Neurological: Negative for tingling, speech change and headaches.   Endo/Heme/Allergies: Does not bruise/bleed easily.   Psychiatric/Behavioral: Negative for depression, suicidal ideas and hallucinations. The patient is not nervous/anxious.    Physical Examination:  /74   Pulse 61   Temp 98  F (36.7  C) (Tympanic)   Resp 16   Ht 1.549 m (5' 1\")   Wt 49.9 kg (110 lb)   SpO2 99%   BMI 20.78 kg/m    General: Alert and orientedx4, no acute distress, well-developed/well-nourished, ambulating without assistance  HEENT: normocephalic atraumatic, extraocular movements intact, sclerae anicteric, Trachea mideline  Chest:   Clear to auscultation bilaterally.  Cardiac: S1S2 , regular rate and rhythm without additional sounds  Abdomen: Soft, non-tender, non-distended  Extremities: Cursory exam unremarkable.  No peripheral edema noted.  Skin: Warm, dry, less than 2 sec cap refill  Neuro: CN 2-12 grossly intact, no focal deficit, GCS 15  Psych: Pleasant, calm, asks appropriate questions      Assessment/Plan:  #1 Colonoscopy  #2 Frequent loose stools      Mihaela Jang and I had a discussion about colonoscopies.  The indications, risks, benefits, althernatives and technical aspects of whole colon colonoscopy were outlined with risks including, but not limited to, perforation, bleeding and inability to visualize entire colon.  Management of each was reviewed including the risk for life saving surgery and possible admittance to the hospital.  The need of mechanical preparation of the colon was reviewed along with the use of monitored anesthetic care.  Her questions were asked and answered.  We will proceed with colonoscopy with Dr." Tony as scheduled.  [facility] and [address]    [id number]    Referring Provider:  No referring provider defined for this encounter.     Primary Care Provider:  Melyssa Connors

## 2022-12-02 NOTE — DISCHARGE INSTRUCTIONS
You had biopsies taken in your colon today.  Dr. Jimenez's office will call you with the pathology results when they become available.       INSTRUCTIONS AFTER COLONOSCOPY    WHEN YOU ARE BACK HOME:  Plan to rest for an hour or two after you get home.  You may have some cramping or pressure until you pass gas.  You may resume your regular medications.  Eat a small, light meal at first, and then gradually return to normal meal sizes.  You will be contacted the next day to see how you are doing.  If you had a polyp removed:  Slight bleeding may occur.  You may have a slight blood stain on the toilet paper after a bowel movement.  To lessen the chance of bleeding, avoid heavy exercise for ONE WEEK.  This includes heavy lifting, vigorous sport activities, and heavy physical labor.  You may resume your normal sexual activity.    Avoid aspirin or aspirin products if instructed by your doctor.  If there is a polyp or biopsy, you will be contacted with results within one week.     WHAT TO WATCH FOR:  Problems rarely occur after the exam; however, it is important for you to watch for early signs of possible problems.  If you have   Unusual pain in your abdomen  Nausea and vomiting that persists  Excessive bleeding  Black or bloody bowel movements  Fever or temperature above 100.6 F  Please call your doctor ([facility] [phone]) or go to the nearest hospital emergency room.    Post-Anesthesia Patient Instructions    IMMEDIATELY FOLLOWING SURGERY:  Do not drive or operate machinery for the first twenty four hours after surgery.  Do not make any important decisions for twenty four hours after surgery or while taking narcotic pain medications or sedatives.  If you develop intractable nausea and vomiting or a severe headache please notify your doctor immediately.    FOLLOW-UP:  Please make an appointment with your surgeon as instructed. You do not need to follow up with anesthesia unless specifically instructed to do  so.    WOUND CARE INSTRUCTIONS (if applicable):  Keep a dry clean dressing on the anesthesia/puncture wound site if there is drainage.  Once the wound has quit draining you may leave it open to air.  Generally you should leave the bandage intact for twenty four hours unless there is drainage.  If the epidural site drains for more than 36-48 hours please call the anesthesia department.    QUESTIONS?:  Please feel free to call your physician or the hospital  if you have any questions, and they will be happy to assist you.

## 2022-12-02 NOTE — ANESTHESIA POSTPROCEDURE EVALUATION
Patient: Mihaela Jang    Procedure: Procedure(s):  COLONOSCOPY, WITH POLYPECTOMY AND BIOPSY       Anesthesia Type:  MAC    Note:  Disposition: Outpatient   Postop Pain Control: Uneventful            Sign Out: Well controlled pain   PONV: No   Neuro/Psych: Uneventful            Sign Out: Acceptable/Baseline neuro status   Airway/Respiratory: Uneventful            Sign Out: Acceptable/Baseline resp. status   CV/Hemodynamics: Uneventful            Sign Out: Acceptable CV status; No obvious hypovolemia; No obvious fluid overload   Other NRE: NONE   DID A NON-ROUTINE EVENT OCCUR? No           Last vitals:  Vitals Value Taken Time   /72 12/02/22 1255   Temp     Pulse 55 12/02/22 1255   Resp 16 12/02/22 1255   SpO2 98 % 12/02/22 1300       Electronically Signed By: MICHAEL Mohan CRNA  December 2, 2022  1:07 PM

## 2022-12-02 NOTE — OR NURSING
Discharge instructions given to patient and patient's spouse. No questions. Ambulated out of unit. Denies pain, nausea or dizziness  Ashwini 18  IV dced after eating and drinking without nausea or vomiting.

## 2022-12-02 NOTE — OP NOTE
Mihaela Jang MRN# [id number]   YOB: 1950 Age: 72 year old      Date of Admission:  12/2/2022  Date of Service:   12/02/22    Primary care provider: Melyssa Connors    PREOPERATIVE DIAGNOSIS:  Colon Cancer Screening        POSTOPERATIVE DIAGNOSIS:  Normal colon          PROCEDURE:  Colonoscopy           INDICATIONS:  Screening colonoscopy.      Specimen:   ID Type Source Tests Collected by Time Destination   1 : random colon biopsies Biopsy Large Intestine, Colon SURGICAL PATHOLOGY EXAM Marcellus Jimenez MD 12/2/2022 12:04 PM        SURGEON: Marcellus Jimenez MD    DESCRIPTION OF PROCEDURE: Mihaela Jang was brought into the endoscopy suite and placed in the left lateral decubitus position. After preprocedural pause and attended monitored anesthesia was administered, the external anus was inspected and was noted to have some skin tags and nodules. . Digital rectal exam was normal. The colonoscope was inserted and advanced under direct visualization to the level of the cecum which was identified by the appendiceal orifice and the ileocecal valve. The prep was excellent.. Upon slow withdrawal of the colonoscope, approximately 95% of the mucosa was directly visualized. Random biopsies were taken on the way out to rule out collagenase colitis.  The colon was without mucosal abnormality. There was no evidence of  polyps, inflammation, bleeding or AVMs. Retroflexion of the rectum was normal. The extra air was removed from the colon, and the colonoscope withdrawn. The patient tolerated the procedure well and was taken to postanesthesia care unit.     We invite the patient to return in 10 years for follow up screening evaluation.    Marcellus Jimenez MD

## 2022-12-02 NOTE — LETTER
December 9, 2022      Mihaela J Laine  [address] [place] [address]   [place] [address]        Dear ,    We are writing to inform you of your test results.    Test results indicate you may require additional follow up, see comment below.    Biopsies consistent with collagenase colitis very likely reason for loose stools would like to try on Entocort to see if helps with symptoms please have follow up in one month     Resulted Orders   Surgical Pathology Exam   Result Value Ref Range    Case Report       Surgical Pathology Report                         Case: [id number]                                  Authorizing Provider:  Marcellus Jimenez MD        Collected:           12/02/2022 12:04 PM          Ordering Location:     [facility] Operating Room     Received:            12/02/2022 12:23 PM          Pathologist:           Stuart Godinez DO                                                         Specimen:    Large Intestine, Colon, random colon biopsies                                              Final Diagnosis       A.  Colon, random biopsies:  -Colonic mucosa with features of microscopic colitis, most consistent with lymphocytic colitis.        Clinical Information       Procedure:  COLONOSCOPY, WITH POLYPECTOMY AND BIOPSY  Pre-op Diagnosis: Screening for colon cancer [Z12.11]  Post-op Diagnosis: Z12.11 - Screening for colon cancer [ICD-10-CM]      Gross Description       A(1). Large Intestine, Colon, random colon biopsies:  Specimen is received in formalin labeled with patient's name, medical record number and designated random colon biopsies.  It consists of several fragments of tan soft tissue ranging in size from 1 x 1 mm up to 3 x 2 mm.  All in 1 cassette      Microscopic Description       H&E-stained sections demonstrate colonic mucosa with expansion of the lamina propria by a chronic inflammatory that consists of numerous plasma cells, some lymphocytes and occasional eosinophils.  A  significant acute or active inflammatory component is not appreciated.  A patchy increase in the number of surface intraepithelial lymphocytes is present.  The surface epithelium is mostly intact.  A trichrome stain was performed with a reactive positive control.  It highlights a fairly thin uniform subepithelial collagen layer without extension into the underlying lamina propria.      Case Images         If you have any questions or concerns, please call the clinic at the number listed above.       Sincerely,      Marcellus Jimenez MD

## 2022-12-02 NOTE — ANESTHESIA CARE TRANSFER NOTE
Patient: Mihaela Jang    Procedure: Procedure(s):  COLONOSCOPY, WITH POLYPECTOMY AND BIOPSY       Diagnosis: Screening for colon cancer [Z12.11]  Diagnosis Additional Information: No value filed.    Anesthesia Type:   MAC     Note:    Oropharynx: oropharynx clear of all foreign objects and spontaneously breathing  Level of Consciousness: drowsy  Oxygen Supplementation: nasal cannula  Level of Supplemental Oxygen (L/min / FiO2): 2  Independent Airway: airway patency satisfactory and stable  Dentition: dentition unchanged  Vital Signs Stable: post-procedure vital signs reviewed and stable  Report to RN Given: handoff report given  Patient transferred to: Phase II    Handoff Report: Identifed the Patient, Identified the Reponsible Provider, Reviewed the pertinent medical history, Discussed the surgical course, Reviewed Intra-OP anesthesia mangement and issues during anesthesia, Set expectations for post-procedure period and Allowed opportunity for questions and acknowledgement of understanding      Vitals:  Vitals Value Taken Time   BP     Temp     Pulse     Resp     SpO2         Electronically Signed By: MICHAEL Mohan CRNA  December 2, 2022  12:18 PM

## 2022-12-08 LAB
PATH REPORT.COMMENTS IMP SPEC: NORMAL
PATH REPORT.FINAL DX SPEC: NORMAL
PATH REPORT.GROSS SPEC: NORMAL
PATH REPORT.MICROSCOPIC SPEC OTHER STN: NORMAL
PATH REPORT.RELEVANT HX SPEC: NORMAL
PHOTO IMAGE: NORMAL

## 2022-12-08 PROCEDURE — 88305 TISSUE EXAM BY PATHOLOGIST: CPT | Mod: 26 | Performed by: PATHOLOGY

## 2022-12-08 PROCEDURE — 88313 SPECIAL STAINS GROUP 2: CPT | Mod: 26 | Performed by: PATHOLOGY

## 2022-12-09 DIAGNOSIS — K52.831 CC (COLLAGENOUS COLITIS): Primary | ICD-10-CM

## 2022-12-09 RX ORDER — BUDESONIDE 3 MG/1
9 CAPSULE, COATED PELLETS ORAL EVERY MORNING
Qty: 90 CAPSULE | Refills: 0 | Status: SHIPPED | OUTPATIENT
Start: 2022-12-09 | End: 2023-01-11

## 2022-12-11 DIAGNOSIS — R20.2 PARESTHESIA: ICD-10-CM

## 2022-12-13 RX ORDER — GABAPENTIN 300 MG/1
CAPSULE ORAL
Qty: 300 CAPSULE | Refills: 2 | Status: SHIPPED | OUTPATIENT
Start: 2022-12-13 | End: 2023-11-01

## 2022-12-13 NOTE — TELEPHONE ENCOUNTER
Gabapentin       Last Written Prescription Date:  2/14/22  Last Fill Quantity: 300,   # refills: 2  Last Office Visit: 11/8/22  Future Office visit:    Next 5 appointments (look out 90 days)    Jan 11, 2023 11:30 AM  (Arrive by 11:15 AM)  Return Visit with Marcellus Jimenez MD  [facility] ([facility] ) [address] [place] [address]  [phone]

## 2022-12-14 DIAGNOSIS — R20.2 PARESTHESIA: ICD-10-CM

## 2022-12-14 RX ORDER — GABAPENTIN 300 MG/1
CAPSULE ORAL
Qty: 300 CAPSULE | Refills: 2 | OUTPATIENT
Start: 2022-12-14

## 2023-01-04 ENCOUNTER — TRANSFERRED RECORDS (OUTPATIENT)
Dept: HEALTH INFORMATION MANAGEMENT | Facility: CLINIC | Age: 73
End: 2023-01-04

## 2023-01-11 ENCOUNTER — OFFICE VISIT (OUTPATIENT)
Dept: SURGERY | Facility: OTHER | Age: 73
End: 2023-01-11
Attending: SURGERY
Payer: COMMERCIAL

## 2023-01-11 VITALS
TEMPERATURE: 97.6 F | HEIGHT: 61 IN | WEIGHT: 110 LBS | BODY MASS INDEX: 20.77 KG/M2 | OXYGEN SATURATION: 98 % | DIASTOLIC BLOOD PRESSURE: 58 MMHG | HEART RATE: 63 BPM | SYSTOLIC BLOOD PRESSURE: 112 MMHG

## 2023-01-11 DIAGNOSIS — K52.831 CC (COLLAGENOUS COLITIS): ICD-10-CM

## 2023-01-11 PROCEDURE — G0463 HOSPITAL OUTPT CLINIC VISIT: HCPCS

## 2023-01-11 PROCEDURE — 99213 OFFICE O/P EST LOW 20 MIN: CPT | Performed by: SURGERY

## 2023-01-11 RX ORDER — BUDESONIDE 3 MG/1
9 CAPSULE, COATED PELLETS ORAL EVERY MORNING
Qty: 90 CAPSULE | Refills: 0 | Status: SHIPPED | OUTPATIENT
Start: 2023-01-11 | End: 2023-01-18

## 2023-01-11 ASSESSMENT — PAIN SCALES - GENERAL: PAINLEVEL: NO PAIN (0)

## 2023-01-11 NOTE — PATIENT INSTRUCTIONS
Thank you for allowing Dr. Jimenez and our surgical team to participate in your care. Please call our health unit coordinator at [phone] with scheduling questions or the nurse at [phone] with any other questions or concerns.

## 2023-01-11 NOTE — PROGRESS NOTES
"[facility] Progress Note    HPI: RTC for follow up of diarrhea noted to have microscopic colitis on biopsies from colonoscopy.       S: She never did  her prescription for budesonide, she reports one to two soft stools per day.     O:   Vitals:  /58   Pulse 63   Temp 97.6  F (36.4  C)   Ht 1.549 m (5' 1\")   Wt 49.9 kg (110 lb)   SpO2 98%   BMI 20.78 kg/m        Physical Exam:  G: alert oriented, no acute distress   No exam performed.     Assessment/Plan:  Discussed trial of the budesonide with a taper. Will see back in one month following to see if symptoms improve. Will re-write for prescription as she never picked up the last and has since . Her symptoms are on the milder end of things but she would like to trial medication as it will be for a short term prescription.     Marcellus Jimenez MD   "

## 2023-01-17 ENCOUNTER — TRANSFERRED RECORDS (OUTPATIENT)
Dept: HEALTH INFORMATION MANAGEMENT | Facility: CLINIC | Age: 73
End: 2023-01-17

## 2023-01-18 ENCOUNTER — TELEPHONE (OUTPATIENT)
Dept: SURGERY | Facility: OTHER | Age: 73
End: 2023-01-18

## 2023-01-18 DIAGNOSIS — K52.831 CC (COLLAGENOUS COLITIS): ICD-10-CM

## 2023-01-18 RX ORDER — BUDESONIDE 3 MG/1
9 CAPSULE, COATED PELLETS ORAL EVERY MORNING
Qty: 90 CAPSULE | Refills: 0 | Status: ON HOLD | OUTPATIENT
Start: 2023-01-18 | End: 2023-03-15

## 2023-01-18 NOTE — TELEPHONE ENCOUNTER
The rx sent into [facility] is $100 for a month's supply. She would like it sent to [facility].  It is $20 there.  Their phone number is [phone].

## 2023-02-08 ENCOUNTER — OFFICE VISIT (OUTPATIENT)
Dept: SURGERY | Facility: OTHER | Age: 73
End: 2023-02-08
Attending: SURGERY
Payer: COMMERCIAL

## 2023-02-08 VITALS
WEIGHT: 110 LBS | SYSTOLIC BLOOD PRESSURE: 129 MMHG | BODY MASS INDEX: 20.77 KG/M2 | HEIGHT: 61 IN | OXYGEN SATURATION: 98 % | HEART RATE: 65 BPM | DIASTOLIC BLOOD PRESSURE: 50 MMHG

## 2023-02-08 DIAGNOSIS — K52.831 COLITIS, COLLAGENOUS: Primary | ICD-10-CM

## 2023-02-08 PROCEDURE — G0463 HOSPITAL OUTPT CLINIC VISIT: HCPCS

## 2023-02-08 PROCEDURE — 99212 OFFICE O/P EST SF 10 MIN: CPT | Performed by: SURGERY

## 2023-02-08 ASSESSMENT — PAIN SCALES - GENERAL: PAINLEVEL: NO PAIN (0)

## 2023-02-08 NOTE — PATIENT INSTRUCTIONS
Thank you for allowing Dr. Jimenez and our surgical team to participate in your care. Please call our health unit coordinator at [phone] with scheduling questions or the nurse at [phone] with any other questions or concerns.

## 2023-02-09 NOTE — PROGRESS NOTES
"[facility] Progress Note    HPI: RTC for follow up of collagenase colitis       S: She reported some balance issues while on full dose of budesonide so is now on 3 mg a day with return to normal bowel function. She also has a skin tag on her mons she would like looked at.     O:   Vitals:  /50   Pulse 65   Ht 1.549 m (5' 1\")   Wt 49.9 kg (110 lb)   SpO2 98%   BMI 20.78 kg/m        Physical Exam:  G: alert oriented, no acute distress   ENT: sclera non-icteric   Pulm: no respiratory distress   CVS: RRR  ABD: soft non-tender non-distended, there is 0.5 cm skin tag   Ext: WWP     Assessment/Plan:  Discussed staying on 3mg Budesonide for another month and then coming off     Discussed that not concerned about skin tag, will have follow up with PCP next months     Marcellus Jimenez MD     "

## 2023-02-13 ENCOUNTER — OFFICE VISIT (OUTPATIENT)
Dept: FAMILY MEDICINE | Facility: OTHER | Age: 73
End: 2023-02-13
Attending: STUDENT IN AN ORGANIZED HEALTH CARE EDUCATION/TRAINING PROGRAM
Payer: COMMERCIAL

## 2023-02-13 VITALS
SYSTOLIC BLOOD PRESSURE: 138 MMHG | TEMPERATURE: 98 F | BODY MASS INDEX: 22.03 KG/M2 | HEART RATE: 60 BPM | OXYGEN SATURATION: 98 % | DIASTOLIC BLOOD PRESSURE: 72 MMHG | WEIGHT: 116.6 LBS | RESPIRATION RATE: 18 BRPM

## 2023-02-13 DIAGNOSIS — R09.82 POSTNASAL DRIP: ICD-10-CM

## 2023-02-13 DIAGNOSIS — J34.89 RHINORRHEA: ICD-10-CM

## 2023-02-13 DIAGNOSIS — J02.9 PHARYNGITIS, UNSPECIFIED ETIOLOGY: Primary | ICD-10-CM

## 2023-02-13 PROBLEM — E03.9 HYPOTHYROIDISM, UNSPECIFIED TYPE: Chronic | Status: ACTIVE | Noted: 2019-11-29

## 2023-02-13 LAB
GROUP A STREP BY PCR: NOT DETECTED
SARS-COV-2 RNA RESP QL NAA+PROBE: NEGATIVE

## 2023-02-13 PROCEDURE — 87651 STREP A DNA AMP PROBE: CPT | Mod: ZL | Performed by: STUDENT IN AN ORGANIZED HEALTH CARE EDUCATION/TRAINING PROGRAM

## 2023-02-13 PROCEDURE — U0005 INFEC AGEN DETEC AMPLI PROBE: HCPCS | Mod: ZL | Performed by: STUDENT IN AN ORGANIZED HEALTH CARE EDUCATION/TRAINING PROGRAM

## 2023-02-13 PROCEDURE — 99213 OFFICE O/P EST LOW 20 MIN: CPT | Mod: CS | Performed by: STUDENT IN AN ORGANIZED HEALTH CARE EDUCATION/TRAINING PROGRAM

## 2023-02-13 PROCEDURE — G0463 HOSPITAL OUTPT CLINIC VISIT: HCPCS | Performed by: STUDENT IN AN ORGANIZED HEALTH CARE EDUCATION/TRAINING PROGRAM

## 2023-02-13 RX ORDER — FLUTICASONE PROPIONATE 50 MCG
1 SPRAY, SUSPENSION (ML) NASAL DAILY
Qty: 18.2 ML | Refills: 0 | Status: SHIPPED | OUTPATIENT
Start: 2023-02-13 | End: 2023-06-08

## 2023-02-13 RX ORDER — BENZOCAINE AND MENTHOL, UNSPECIFIED FORM 15; 2.3 MG/1; MG/1
16 LOZENGE ORAL
Qty: 16 LOZENGE | Refills: 1 | Status: ON HOLD | OUTPATIENT
Start: 2023-02-13 | End: 2023-03-15

## 2023-02-13 ASSESSMENT — PAIN SCALES - GENERAL: PAINLEVEL: EXTREME PAIN (8)

## 2023-02-13 NOTE — PATIENT INSTRUCTIONS
Start gargling with salt water and warm water   Push fluids - stay hydrated.   Start flonase 1 spray daily in each nostril.   Use cepacol lozenges for sore throat  Strep test and covid test today.

## 2023-02-13 NOTE — PROGRESS NOTES
Assessment & Plan     Pharyngitis, unspecified etiology  Rhinorrhea  1 day of sore throat, worse in the morning, with rhinorrhea without other systemic symptoms.  Exam benign, some cobblestoning of the posterior pharynx and what sounds like chronic postnasal drainage, with no exudate, pus, or swelling.  Suspect it is viral, and will progress with symptoms.  Strep swab obtained, although suspicion low.  Also swabbed for COVID-19.  Discussed supportive care with hydration, gargling with salt and warm water, lozenges/cough drops.  Reviewed signs and symptoms, including worsening, that would indicate need for urgent follow-up and she was in agreement with the plan.  - Group A Streptococcus PCR Throat Swab ([place] ONLY)  - Symptomatic COVID-19 Virus (Coronavirus) by PCR Nose; Future  - Benzocaine-Menthol (CEPACOL) 15-2.3 MG LOZG; Take 16 each by mouth every 2 hours as needed (sore throat)  - Symptomatic COVID-19 Virus (Coronavirus) by PCR Nose    Postnasal drip  Will trial short course of flonase.   - fluticasone (FLONASE) 50 MCG/ACT nasal spray; Spray 1 spray into both nostrils daily      Return if symptoms worsen or fail to improve.    Juliette Toledo MD  [facility] Chairez is a 72 year old, presenting for the following health issues:  Pharyngitis      HPI   Acute Illness   Acute illness concerns: Unknown   Onset: Yesterday, 2/13/2023     Fever: no     Chills/Sweats: no     Headache (location?): no     Sinus Pressure:no    Conjunctivitis:  no    Ear Pain: no    Rhinorrhea: YES- Mild    Congestion: no     Sore Throat: YES- Moderate      Cough: YES - Rarely     Wheeze: no     Decreased Appetite: no     Nausea: no    Vomiting: no    Diarrhea:  no    Dysuria/Freq.: no    Fatigue/Achiness: no     Sick/Strep Exposure: no      Therapies Tried and outcome: Soft diet, Ibuprofen, warm salt water gargle     Does get a lot of sinus drainage. No allergies. Not using flonase.   Throat  was worse this AM. Has had worse sore throat in the past.   No respiratory distress. No neck pain. No fevers. No hoarseness or stridor. No difficulty speaking.   No one else sick.         Objective    /72   Pulse 60   Temp 98  F (36.7  C)   Resp 18   Wt 52.9 kg (116 lb 9.6 oz)   SpO2 98%   BMI 22.03 kg/m    Body mass index is 22.03 kg/m .     Physical Exam  Constitutional:       General: She is not in acute distress.     Appearance: Normal appearance. She is not ill-appearing, toxic-appearing or diaphoretic.   HENT:      Right Ear: Tympanic membrane and external ear normal.      Left Ear: Tympanic membrane and external ear normal.      Nose: Congestion and rhinorrhea present. No mucosal edema.      Right Sinus: No maxillary sinus tenderness or frontal sinus tenderness.      Left Sinus: No maxillary sinus tenderness or frontal sinus tenderness.      Mouth/Throat:      Mouth: Mucous membranes are moist.      Pharynx: Oropharynx is clear. No oropharyngeal exudate or posterior oropharyngeal erythema.      Tonsils: No tonsillar exudate or tonsillar abscesses.      Comments: Signs of cobblestoning of posterior pharynx  Eyes:      General: No scleral icterus.        Right eye: No discharge.         Left eye: No discharge.      Extraocular Movements: Extraocular movements intact.      Conjunctiva/sclera: Conjunctivae normal.      Pupils: Pupils are equal, round, and reactive to light.   Cardiovascular:      Rate and Rhythm: Normal rate and regular rhythm.      Heart sounds: No murmur heard.  Pulmonary:      Effort: Pulmonary effort is normal.      Breath sounds: Normal breath sounds. No wheezing, rhonchi or rales.   Musculoskeletal:      Cervical back: Normal range of motion and neck supple. No rigidity or tenderness.      Right lower leg: No edema.      Left lower leg: No edema.   Lymphadenopathy:      Cervical: No cervical adenopathy.   Skin:     General: Skin is warm and dry.      Capillary Refill: Capillary  refill takes less than 2 seconds.   Neurological:      General: No focal deficit present.      Mental Status: She is alert and oriented to person, place, and time.   Psychiatric:         Mood and Affect: Mood normal.         Behavior: Behavior normal.            No results found for this or any previous visit (from the past 24 hour(s)).

## 2023-02-17 ENCOUNTER — TELEPHONE (OUTPATIENT)
Dept: FAMILY MEDICINE | Facility: OTHER | Age: 73
End: 2023-02-17

## 2023-02-17 DIAGNOSIS — R05.1 ACUTE COUGH: Primary | ICD-10-CM

## 2023-02-17 RX ORDER — DEXTROMETHORPHAN POLISTIREX 30 MG/5ML
60 SUSPENSION ORAL 2 TIMES DAILY PRN
Qty: 148 ML | Refills: 0 | Status: SHIPPED | OUTPATIENT
Start: 2023-02-17 | End: 2023-02-20

## 2023-02-17 NOTE — TELEPHONE ENCOUNTER
Patient was seen for a sore throat and cough on 2/13.  States that the cough is lingering and is requesting a cough syrup script be sent to [facility] if possible.

## 2023-02-20 DIAGNOSIS — R05.1 ACUTE COUGH: ICD-10-CM

## 2023-02-20 RX ORDER — DEXTROMETHORPHAN POLISTIREX 30 MG/5ML
60 SUSPENSION ORAL 2 TIMES DAILY PRN
Qty: 148 ML | Refills: 0 | Status: ON HOLD | OUTPATIENT
Start: 2023-02-20 | End: 2023-03-15

## 2023-02-20 NOTE — TELEPHONE ENCOUNTER
Pt calling on the RX for cough syrup. Noted was sent on 2.17.2023.    She asks this go to [facility] in [place].    Pended.    Shefali Tavera RN

## 2023-02-23 NOTE — PROGRESS NOTES
"[facility] [place]  [address]  [place] [address]  Phone: [phone]  Primary Provider: Melyssa Connors  Pre-op Performing Provider: MELYSSA CONNORS    {Provider  Link to PREOP SmartSet  Use this to apply standard patient instructions to AVS; includes medication directions, common orders, guidelines for anemia, warfarin, additional testing   :603867}  PREOPERATIVE EVALUATION:  Today's date: 2/27/2023    Mihaela Jang is a 72 year old female who presents for a preoperative evaluation.    Surgical Information:  Surgery/Procedure: Left Thumb Trigger Finger Release  Surgery Location: [facility]  Surgeon: Dr. Olivera  Surgery Date: 3/15/2023  Time of Surgery: TBD  Where patient plans to recover: {Preop post recovery plans :591890}  Fax number for surgical facility: Note does not need to be faxed, will be available electronically in Epic.    Type of Anesthesia Anticipated: to be determined    {2021 Provider Charting Preference for Preop :078357}    Subjective     HPI related to upcoming procedure: ***    {Click here to pull in Questionnaire Data after Qnr completed :474504}  Health Care Directive:  Patient has a Health Care Directive on file      Preoperative Review of :   reviewed - controlled substances reflected in medication list.    Gabapentin - for neuralgia peristhetica.     Status of Chronic Conditions:  See problem list for active medical problems.  Problems all longstanding and stable, except as noted/documented.  See ROS for pertinent symptoms related to these conditions.    HYPERLIPIDEMIA - Patient has a long history of significant Hyperlipidemia requiring medication for treatment with recent {G/F/P:112636::\"good\"} control. Patient reports { :061131} problems or side effects with the medication.     HYPOTHYROIDISM - Patient has a longstanding history of chronic Hypothyroidism. Patient has been doing well, noting no tremor, insomnia, hair loss or changes in skin texture. " Continues to take medications as directed, without adverse reactions or side effects. Last TSH   Lab Results   Component Value Date    TSH 1.98 09/21/2022   .      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  Ensure this is not a melanoma, path pending       Hyperlipidemia, unspecified hyperlipidemia type 09/15/2016     Priority: Medium     ACP (advance care planning) 01/22/2016     Priority: Medium     Advance Care Planning 6/2/2016: Receipt of ACP document:  Received: Health Care Directive which was witnessed or notarized on 6/1/16.  Document not previously scanned.  Validation form completed and sent with document to be scanned.  Code Status reflects choices in most recent ACP document.  Confirmed/documented designated decision maker(s).  Added by Paola Johnson  Advance Care Planning 1/22/2016: Receipt of ACP document:  Received: Health Care Directive which was witnessed or notarized on 10-21-08.  Document previously scanned on 12-7-15.  Validation form completed and sent to be scanned.  Code Status needs to be updated to reflect choices in most recent ACP document.  Confirmed/documented designated decision maker(s).  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 11/16/2015     Priority: Medium     Personal history of malignant neoplasm of breast 12/23/2014     Priority: Medium     Neutropenia (H) 12/23/2014     Priority: Medium     Problem list name updated by automated process. Provider to review       Early satiety 12/23/2014     Priority: Medium     Abnormal  glucose 12/20/2013     Priority: Medium     Problem list name updated by automated process. 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 Provider to review        Past Medical History:   Diagnosis Date     Atypical nevi      Chondrodermatitis nodularis helicis of left ear     Dr Shipley,  [facility]     Chronic rhinitis     Dr Messina,  [facility]; negative skin testing; IgE mediated allergies; ENT - DC nasal steroid and antihistamine; saline rinses     Degenerative skin disorder     solar elastosis     Hallux rigidus 06/15/2000     Hyperlipidemia, unspecified hyperlipidemia type 9/15/2016     Laryngopharyngeal reflux disease     PPI     Malignant neoplasm of breast (female), unspecified site 03/10/2004     Notalgia paresthetica      Osteoarthritis 07/20/2011     Osteoporosis, unspecified 09/10/2001    Dr Moses; intermittent reclast;  Dr. Moses; repeat dexa after full 2 years Reclast     Other abnormal glucose 12/20/2013     Paresthesia     neck; notalgiaparesthetic; dr leahy & Dr Nevarez; neurontin     Personal history of malignant neoplasm of breast 06/07/2005     Rhinitis      Schamberg's purpura      Past Surgical History:   Procedure Laterality Date     BONE MARROW BIOPSY      negative     COLONOSCOPY  07/2000     COLONOSCOPY  8/13/2013    DR Fu; repeat 5 years     COLONOSCOPY N/A 8/13/2018    Procedure: COLONOSCOPY;  COLONOSCOPY;  Surgeon: Ajit Uriarte MD;  Location: [facility]     COLONOSCOPY N/A 12/2/2022    Procedure: COLONOSCOPY, WITH POLYPECTOMY AND BIOPSY;   Surgeon: Marcellus Jimenez MD;  Location: [facility] OR     DILATION AND CURETTAGE, OPERATIVE HYSTEROSCOPY, COMBINED N/A 2/13/2019    Procedure: EXAM UNDER ANESTHESIA , HYSTEROSCOPY;  Surgeon: Jovi Gabriel MD;  Location: [facility] OR     HEMORRHOIDECTOMY  1986     MASTECTOMY, BILATERAL  03/01/2004    right sided breast cancer     RELEASE TRIGGER FINGER Right 3/7/2018    Procedure: RELEASE TRIGGER FINGER;  RELEASE TRIGGER DIGIT RIGHT THUMB;  Surgeon: Jose Alfredo Brewster DO;  Location: [facility] OR     UPPER GI ENDOSCOPY       [facility] COLONOSCOPY THRU STOMA WITH BIOPSY  08/25/2010    cancer screening, family h/o colon cancer; hyperplastic polyp     Current Outpatient Medications   Medication Sig Dispense Refill     aspirin EC 81 MG tablet Take 1 tablet (81 mg) by mouth daily 1 tablet 0     Benzocaine-Menthol (CEPACOL) 15-2.3 MG LOZG Take 16 each by mouth every 2 hours as needed (sore throat) 16 lozenge 1     budesonide (ENTOCORT EC) 3 MG EC capsule Take 3 capsules (9 mg) by mouth every morning 90 capsule 0     CALCIUM CITRATE 1,500 mg two times daily        cholecalciferol 1000 UNITS TABS 2 capsules po daily       cinnamon 500 MG CAPS Take 2 capsules by mouth daily        dextromethorphan (DELSYM) 30 MG/5ML liquid Take 10 mLs (60 mg) by mouth 2 times daily as needed for cough 148 mL 0     ferrous sulfate (SLO-FE) 142 (45 Fe) MG CR tablet Take 1 tablet (142 mg) by mouth daily 90 tablet 1     fish oil-omega-3 fatty acids 1000 MG capsule Take 1 g by mouth 2 times daily        fluticasone (FLONASE) 50 MCG/ACT nasal spray Spray 1 spray into both nostrils daily 18.2 mL 0     gabapentin (NEURONTIN) 300 MG capsule TAKE 3 CAPSULES BY MOUTH AT BEDTIME 300 capsule 2     Lactobacillus (ACIDOPHILUS) TABS Take 1 tablet by mouth daily       levothyroxine (SYNTHROID/LEVOTHROID) 25 MCG tablet TAKE 1 TABLET BY MOUTH  DAILY 3 DAYS WEEKLY AND 2  TABLETS BY MOUTH DAILY FOR  4 DAYS PER WEEK 158 tablet 2     loperamide (IMODIUM A-D) 2 MG tablet Take 1-2 tablets (2-4  "mg) by mouth 4 times daily as needed for diarrhea DO NOT EXCEED 16 MG PER DAY. 90 tablet 0     MAGNESIUM OXIDE PO Take 200 mg by mouth daily       Multiple vitamin  s TABS Take 1 tablet by mouth daily.       Multiple Vitamins-Minerals (PRESERVISION AREDS PO)        Polyethylene Glycol 3350 (MIRALAX PO) Use every other day       rOPINIRole (REQUIP) 1 MG tablet Take 4 tablets (4 mg) by mouth At Bedtime 270 tablet 3     saccharomyces boulardii (FLORASTOR) 250 MG capsule Take 1 capsule (250 mg) by mouth 2 times daily 90 capsule 1     simvastatin (ZOCOR) 20 MG tablet TAKE 1 TABLET BY MOUTH AT  BEDTIME 90 tablet 3     Turmeric 500 MG CAPS Take 2 capsules by mouth daily        zinc 50 MG TABS Take 1 tablet by mouth daily          Allergies   Allergen Reactions     Chloraprep One Step Rash     Povidone Iodine Rash     Betadine      Soap Rash     Betadine         Social History     Tobacco Use     Smoking status: Never     Smokeless tobacco: Never   Substance Use Topics     Alcohol use: Yes     Alcohol/week: 0.0 standard drinks     Comment: 1 glasso wine, weekly      {FAMILY HISTORY (Optional):344386287}  History   Drug Use No         Objective     There were no vitals taken for this visit.    Physical Exam  {EXAM Preop Choices:711147}    Recent Labs   Lab Test 10/10/22  1403 22  1413 22  0908   HGB  --  12.0 12.1   PLT  --  263 245    132* 136   POTASSIUM 4.2 4.3 4.2   CR 0.61 0.65 0.64   A1C  --   --  5.6        Diagnostics:  {LABS:135952}   {EK}    Revised Cardiac Risk Index (RCRI):  The patient has the following serious cardiovascular risks for perioperative complications:  {PREOP REVISED CARDIAC RISK INDEX (RCRI) :887675::\" - No serious cardiac risks = 0 points\"}     RCRI Interpretation: {REVISED CARDIAC RISK INTERPRETATION :390180}         Signed Electronically by: Melyssa Encinas MD  Copy of this evaluation report is provided to requesting physician.    {Provider Resources  Preop " [facility] Preop Guidelines  Revised Cardiac Risk Index :523224}

## 2023-02-27 ENCOUNTER — OFFICE VISIT (OUTPATIENT)
Dept: FAMILY MEDICINE | Facility: OTHER | Age: 73
End: 2023-02-27
Attending: FAMILY MEDICINE
Payer: COMMERCIAL

## 2023-02-27 VITALS
HEART RATE: 63 BPM | SYSTOLIC BLOOD PRESSURE: 138 MMHG | OXYGEN SATURATION: 97 % | WEIGHT: 110 LBS | BODY MASS INDEX: 20.77 KG/M2 | TEMPERATURE: 98.3 F | HEIGHT: 61 IN | DIASTOLIC BLOOD PRESSURE: 70 MMHG

## 2023-02-27 DIAGNOSIS — M65.312 TRIGGER FINGER OF LEFT THUMB: ICD-10-CM

## 2023-02-27 DIAGNOSIS — R20.2 NOTALGIA PARESTHETICA: ICD-10-CM

## 2023-02-27 DIAGNOSIS — R73.03 PREDIABETES: ICD-10-CM

## 2023-02-27 DIAGNOSIS — E78.5 HYPERLIPIDEMIA, UNSPECIFIED HYPERLIPIDEMIA TYPE: ICD-10-CM

## 2023-02-27 DIAGNOSIS — M81.0 OSTEOPOROSIS, UNSPECIFIED OSTEOPOROSIS TYPE, UNSPECIFIED PATHOLOGICAL FRACTURE PRESENCE: Chronic | ICD-10-CM

## 2023-02-27 DIAGNOSIS — E03.9 HYPOTHYROIDISM, UNSPECIFIED TYPE: Chronic | ICD-10-CM

## 2023-02-27 DIAGNOSIS — L91.8 SKIN TAG: ICD-10-CM

## 2023-02-27 DIAGNOSIS — Z01.818 PREOP GENERAL PHYSICAL EXAM: Primary | ICD-10-CM

## 2023-02-27 DIAGNOSIS — G25.81 RESTLESS LEGS SYNDROME (RLS): ICD-10-CM

## 2023-02-27 PROCEDURE — 17110 DESTRUCTION B9 LES UP TO 14: CPT | Performed by: FAMILY MEDICINE

## 2023-02-27 PROCEDURE — G0463 HOSPITAL OUTPT CLINIC VISIT: HCPCS | Mod: 25

## 2023-02-27 PROCEDURE — 99214 OFFICE O/P EST MOD 30 MIN: CPT | Mod: 25 | Performed by: FAMILY MEDICINE

## 2023-02-27 PROCEDURE — G0463 HOSPITAL OUTPT CLINIC VISIT: HCPCS

## 2023-02-27 RX ORDER — ROPINIROLE 1 MG/1
4 TABLET, FILM COATED ORAL AT BEDTIME
Qty: 270 TABLET | Refills: 3 | Status: SHIPPED | OUTPATIENT
Start: 2023-02-27 | End: 2023-08-10

## 2023-02-27 ASSESSMENT — PAIN SCALES - GENERAL: PAINLEVEL: NO PAIN (0)

## 2023-02-27 NOTE — PATIENT INSTRUCTIONS
Hold vitamins and supplements for 2 weeks prior to surgery.  Hold Aspirin for 7-10 days prior to surgery.  Hold Ibuprofen for 3 days prior surgery.    Ok to use Tylenol.    Trial of mucinex to loosen phlegm for cough.    For informational purposes only. Not to replace the advice of your health care provider. Copyright   2003,  [facility]. All rights reserved. Clinically reviewed by Hannah Ash MD. [employer] [id number] - REV .  Preparing for Your Surgery  Getting started  A nurse will call you to review your health history and instructions. They will give you an arrival time based on your scheduled surgery time. Please be ready to share:  Your doctor's clinic name and phone number  Your medical, surgical, and anesthesia history  A list of allergies and sensitivities  A list of medicines, including herbal treatments and over-the-counter drugs  Whether the patient has a legal guardian (ask how to send us the papers in advance)  Please tell us if you're pregnant--or if there's any chance you might be pregnant. Some surgeries may injure a fetus (unborn baby), so they require a pregnancy test. Surgeries that are safe for a fetus don't always need a test, and you can choose whether to have one.   If you have a child who's having surgery, please ask for a copy of Preparing for Your Child's Surgery.    Preparing for surgery  Within 10 to 30 days of surgery: Have a pre-op exam (sometimes called an H&P, or History and Physical). This can be done at a clinic or pre-operative center.  If you're having a , you may not need this exam. Talk to your care team.  At your pre-op exam, talk to your care team about all medicines you take. If you need to stop any medicines before surgery, ask when to start taking them again.  We do this for your safety. Many medicines can make you bleed too much during surgery. Some change how well surgery (anesthesia) drugs work.  Call your insurance company to let them know  you're having surgery. (If you don't have insurance, call [phone].)  Call your clinic if there's any change in your health. This includes signs of a cold or flu (sore throat, runny nose, cough, rash, fever). It also includes a scrape or scratch near the surgery site.  If you have questions on the day of surgery, call your hospital or surgery center.  Eating and drinking guidelines  For your safety: Unless your surgeon tells you otherwise, follow the guidelines below.  Eat and drink as usual until 8 hours before you arrive for surgery. After that, no food or milk.  Drink clear liquids until 2 hours before you arrive. These are liquids you can see through, like water, Gatorade, and Propel Water. They also include plain black coffee and tea (no cream or milk), candy, and breath mints. You can spit out gum when you arrive.  If you drink alcohol: Stop drinking it the night before surgery.  If your care team tells you to take medicine on the morning of surgery, it's okay to take it with a sip of water.  Preventing infection  Shower or bathe the night before and morning of your surgery. Follow the instructions your clinic gave you. (If no instructions, use regular soap.)  Don't shave or clip hair near your surgery site. We'll remove the hair if needed.  Don't smoke or vape the morning of surgery. You may chew nicotine gum up to 2 hours before surgery. A nicotine patch is okay.  Note: Some surgeries require you to completely quit smoking and nicotine. Check with your surgeon.  Your care team will make every effort to keep you safe from infection. We will:  Clean our hands often with soap and water (or an alcohol-based hand rub).  Clean the skin at your surgery site with a special soap that kills germs.  Give you a special gown to keep you warm. (Cold raises the risk of infection.)  Wear special hair covers, masks, gowns and gloves during surgery.  Give antibiotic medicine, if prescribed. Not all surgeries need  antibiotics.  What to bring on the day of surgery  Photo ID and insurance card  Copy of your health care directive, if you have one  Glasses and hearing aids (bring cases)  You can't wear contacts during surgery  Inhaler and eye drops, if you use them (tell us about these when you arrive)  CPAP machine or breathing device, if you use them  A few personal items, if spending the night  If you have . . .  A pacemaker, ICD (cardiac defibrillator) or other implant: Bring the ID card.  An implanted stimulator: Bring the remote control.  A legal guardian: Bring a copy of the certified (court-stamped) guardianship papers.  Please remove any jewelry, including body piercings. Leave jewelry and other valuables at home.  If you're going home the day of surgery  You must have a responsible adult drive you home. They should stay with you overnight as well.  If you don't have someone to stay with you, and you aren't safe to go home alone, we may keep you overnight. Insurance often won't pay for this.  After surgery  If it's hard to control your pain or you need more pain medicine, please call your surgeon's office.  Questions?   If you have any questions for your care team, list them here: _________________________________________________________________________________________________________________________________________________________________________ ____________________________________ ____________________________________ ____________________________________

## 2023-02-27 NOTE — PROGRESS NOTES
[facility] [place]  [address]  [facility] [place] [address]  Phone: [phone]  Primary Provider: Melyssa Connors  Pre-op Performing Provider: MELYSSA CONNORS     :[id number]}  PREOPERATIVE EVALUATION:  Today's date: 2/27/2023    Mihaela Jang is a 72 year old female who presents for a preoperative evaluation.    Surgical Information:  Surgery/Procedure: Left Thumb Trigger Finger Release  [place] [facility] [place]: Dr. Olivera   Surgery Date: 3/15/2023  Time of Surgery: TBD  Where patient plans to recover: At home with family  Fax number for surgical facility: UNKNOWN    Type of Anesthesia Anticipated: to be determined    Assessment & Plan     The proposed surgical procedure is considered INTERMEDIATE risk.    Preop general physical exam  Proceed as planned.    Trigger finger of left thumb      Hyperlipidemia, unspecified hyperlipidemia type  Stable - continue zocor.    Hypothyroidism, unspecified type  Stable - continue replacement.    Prediabetes  Stable - annual a1c.    Osteoporosis, unspecified osteoporosis type, unspecified pathological fracture presence  Prior endocrinology consult - Dr Moses.  Prior Reclast 2015-11/2020.    Notalgia paresthetica  Stable.    Restless legs syndrome (RLS)  Stable.  - rOPINIRole (REQUIP) 1 MG tablet; Take 4 tablets (4 mg) by mouth At Bedtime    Skin tag  Treated today with 3 cycles cryotherapy with liquid nitrogen - tolerated well - aftercare instructions given.  - DESTRUCT BENIGN LESION, UP TO 14           Risks and Recommendations:  The patient has the following additional risks and recommendations for perioperative complications:   - No identified additional risk factors other than previously addressed    Medication Instructions:  Patient is to take all scheduled medications on the day of surgery EXCEPT for modifications listed below:  vitamins/supplements x 2 weeks   - aspirin: Discontinue aspirin 7-10 days prior to procedure to reduce bleeding  risk. It should be resumed postoperatively.    - ibuprofen (Advil, Motrin): HOLD 1 day before surgery.     RECOMMENDATION:    APPROVAL GIVEN to proceed with proposed procedure, without further diagnostic evaluation.            Subjective     HPI related to upcoming procedure: Patient with symptomatic trigger finger, left thumb.    Preop Questions 2/27/2023   1. Have you ever had a heart attack or stroke? No   2. Have you ever had surgery on your heart or blood vessels, such as a stent placement, a coronary artery bypass, or surgery on an artery in your head, neck, heart, or legs? No   3. Do you have chest pain with activity? No   4. Do you have a history of  heart failure? No   5. Do you currently have a cold, bronchitis or symptoms of other infection? YES - sore throat 2 weeks ago   6. Do you have a cough, shortness of breath, or wheezing? YES - had recent URI; no fever; residual cough   7. Do you or anyone in your family have previous history of blood clots? No   8. Do you or does anyone in your family have a serious bleeding problem such as prolonged bleeding following surgeries or cuts? No   9. Have you ever had problems with anemia or been told to take iron pills? YES - iron deficiency - on supplement   10. Have you had any abnormal blood loss such as black, tarry or bloody stools, or abnormal vaginal bleeding? No   11. Have you ever had a blood transfusion? No   12. Are you willing to have a blood transfusion if it is medically needed before, during, or after your surgery? Yes   13. Have you or any of your relatives ever had problems with anesthesia? No   14. Do you have sleep apnea, excessive snoring or daytime drowsiness? No   15. Do you have any artifical heart valves or other implanted medical devices like a pacemaker, defibrillator, or continuous glucose monitor? No   16. Do you have artificial joints? No   17. Are you allergic to latex? No       Health Care Directive:  Patient has a Health Care Directive  on file      Preoperative Review of :   reviewed - no record of controlled substances prescribed.    Neurontin - for neuralgia paresthetica.    Status of Chronic Conditions:  See problem list for active medical problems.  Problems all longstanding and stable, except as noted/documented.  See ROS for pertinent symptoms related to these conditions.    HYPERLIPIDEMIA - Patient has a long history of significant Hyperlipidemia requiring medication for treatment with recent good control. Patient reports no problems or side effects with the medication.     HYPOTHYROIDISM - Patient has a longstanding history of chronic Hypothyroidism. Patient has been doing well, noting no tremor, insomnia, hair loss or changes in skin texture. Continues to take medications as directed, without adverse reactions or side effects. Last TSH   Lab Results   Component Value Date    TSH 1.98 09/21/2022   .      PREDIABETES - stable a1c.      Review of Systems  Constitutional, neuro, ENT, endocrine, pulmonary, cardiac, gastrointestinal, genitourinary, musculoskeletal, integument and psychiatric systems are negative, except as otherwise noted.    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  Ensure this is not a melanoma, path pending       Hyperlipidemia, unspecified hyperlipidemia type 09/15/2016     Priority: Medium     ACP (advance care planning) 01/22/2016     Priority: Medium     Advance Care Planning 6/2/2016: Receipt of ACP document:  Received: Health Care Directive which was witnessed or notarized on 6/1/16.  Document not previously scanned.  Validation form completed and sent with document to be scanned.  Code Status reflects choices in most recent  ACP document.  Confirmed/documented designated decision maker(s).  Added by Paola Johnson  Advance Care Planning 1/22/2016: Receipt of ACP document:  Received: Health Care Directive which was witnessed or notarized on 10-21-08.  Document previously scanned on 12-7-15.  Validation form completed and sent to be scanned.  Code Status needs to be updated to reflect choices in most recent ACP document.  Confirmed/documented designated decision maker(s).  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 11/16/2015     Priority: Medium     Personal history of malignant neoplasm of breast 12/23/2014     Priority: Medium     Neutropenia (H) 12/23/2014     Priority: Medium     Problem list name updated by automated process. Provider to review       Early satiety 12/23/2014     Priority: Medium     Abnormal glucose 12/20/2013     Priority: Medium     Problem list name updated by automated process. 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 Provider to review        Past Medical History:   Diagnosis Date     Atypical nevi      Chondrodermatitis nodularis helicis of left ear     [facility]     Chronic rhinitis     [facility] allergy; negative skin testing; IgE mediated allergies; ENT - DC nasal steroid and antihistamine; saline rinses     Degenerative skin disorder     solar elastosis     Hallux rigidus 06/15/2000     Hyperlipidemia, unspecified hyperlipidemia type  9/15/2016     Laryngopharyngeal reflux disease     PPI     Malignant neoplasm of breast (female), unspecified site 03/10/2004     Notalgia paresthetica      Osteoarthritis 07/20/2011     Osteoporosis, unspecified 09/10/2001    Dr Moses; intermittent reclast;  Dr. Moses; repeat dexa after full 2 years Reclast     Other abnormal glucose 12/20/2013     Paresthesia     neck; notalgiaparesthetic; dr leahy & Dr Nevarez; neurontin     Personal history of malignant neoplasm of breast 06/07/2005     Rhinitis      Schamberg's purpura      Past Surgical History:   Procedure Laterality Date     BONE MARROW BIOPSY      negative     COLONOSCOPY  07/2000     COLONOSCOPY  8/13/2013    DR Fu; repeat 5 years     COLONOSCOPY N/A 8/13/2018    Procedure: COLONOSCOPY;  COLONOSCOPY;  Surgeon: Ajit Uriarte MD;  Location: [facility] OR     COLONOSCOPY N/A 12/2/2022    Procedure: COLONOSCOPY, WITH POLYPECTOMY AND BIOPSY;  Surgeon: Marcellus Jimenez MD;  Location: [facility] OR     DILATION AND CURETTAGE, OPERATIVE HYSTEROSCOPY, COMBINED N/A 2/13/2019    Procedure: EXAM UNDER ANESTHESIA , HYSTEROSCOPY;  Surgeon: Jovi Gabriel MD;  Location: [facility] OR     HEMORRHOIDECTOMY  1986     MASTECTOMY, BILATERAL  03/01/2004    right sided breast cancer     RELEASE TRIGGER FINGER Right 3/7/2018    Procedure: RELEASE TRIGGER FINGER;  RELEASE TRIGGER DIGIT RIGHT THUMB;  Surgeon: Jose Alfredo Brewster DO;  Location: [facility] OR     UPPER GI ENDOSCOPY       [facility] COLONOSCOPY THRU STOMA WITH BIOPSY  08/25/2010    cancer screening, family h/o colon cancer; hyperplastic polyp     Current Outpatient Medications   Medication Sig Dispense Refill     aspirin EC 81 MG tablet Take 1 tablet (81 mg) by mouth daily 1 tablet 0     Benzocaine-Menthol (CEPACOL) 15-2.3 MG LOZG Take 16 each by mouth every 2 hours as needed (sore throat) 16 lozenge 1     budesonide (ENTOCORT EC) 3 MG EC capsule Take 3 capsules (9 mg) by mouth every morning 90 capsule 0     CALCIUM CITRATE 1,500 mg two times  daily        cholecalciferol 1000 UNITS TABS 2 capsules po daily       cinnamon 500 MG CAPS Take 2 capsules by mouth daily        dextromethorphan (DELSYM) 30 MG/5ML liquid Take 10 mLs (60 mg) by mouth 2 times daily as needed for cough 148 mL 0     ferrous sulfate (SLO-FE) 142 (45 Fe) MG CR tablet Take 1 tablet (142 mg) by mouth daily 90 tablet 1     fish oil-omega-3 fatty acids 1000 MG capsule Take 1 g by mouth 2 times daily        fluticasone (FLONASE) 50 MCG/ACT nasal spray Spray 1 spray into both nostrils daily 18.2 mL 0     gabapentin (NEURONTIN) 300 MG capsule TAKE 3 CAPSULES BY MOUTH AT BEDTIME 300 capsule 2     Lactobacillus (ACIDOPHILUS) TABS Take 1 tablet by mouth daily       levothyroxine (SYNTHROID/LEVOTHROID) 25 MCG tablet TAKE 1 TABLET BY MOUTH  DAILY 3 DAYS WEEKLY AND 2  TABLETS BY MOUTH DAILY FOR  4 DAYS PER WEEK 158 tablet 2     loperamide (IMODIUM A-D) 2 MG tablet Take 1-2 tablets (2-4 mg) by mouth 4 times daily as needed for diarrhea DO NOT EXCEED 16 MG PER DAY. 90 tablet 0     MAGNESIUM OXIDE PO Take 200 mg by mouth daily       Multiple vitamin  s TABS Take 1 tablet by mouth daily.       Multiple Vitamins-Minerals (PRESERVISION AREDS PO)        Polyethylene Glycol 3350 (MIRALAX PO) Use every other day       rOPINIRole (REQUIP) 1 MG tablet Take 4 tablets (4 mg) by mouth At Bedtime 270 tablet 3     simvastatin (ZOCOR) 20 MG tablet TAKE 1 TABLET BY MOUTH AT  BEDTIME 90 tablet 3     Turmeric 500 MG CAPS Take 2 capsules by mouth daily        zinc 50 MG TABS Take 1 tablet by mouth daily        saccharomyces boulardii (FLORASTOR) 250 MG capsule Take 1 capsule (250 mg) by mouth 2 times daily (Patient not taking: Reported on 2/27/2023) 90 capsule 1       Allergies   Allergen Reactions     Chloraprep One Step Rash     Povidone Iodine Rash     Betadine      Soap Rash     Betadine         Social History     Tobacco Use     Smoking status: Never     Smokeless tobacco: Never   Substance Use Topics     Alcohol  "use: Yes     Alcohol/week: 0.0 standard drinks     Comment: 1 glasso wine, weekly      Family History   Problem Relation Age of Onset     Dementia Mother         (cause of death)      High cholesterol Mother      Osteoarthritis Mother      C.A.D. Father         (cause of death)      Prostate Cancer Father      Breast Cancer Maternal Aunt 72     Cerebrovascular Disease Maternal Grandmother         CVA     Diabetes Maternal Grandmother      History   Drug Use No         Objective     /70 (BP Location: Right arm, Patient Position: Sitting, Cuff Size: Adult Regular)   Pulse 63   Temp 98.3  F (36.8  C) (Tympanic)   Ht 1.549 m (5' 1\")   Wt 49.9 kg (110 lb)   SpO2 97%   BMI 20.78 kg/m      Physical Exam    GENERAL APPEARANCE: healthy, alert, cooperative and slim     EYES: EOMI, PERRL     HENT: ear canals and TM's normal and nose and mouth without ulcers or lesions     NECK: no adenopathy, no asymmetry, masses, or scars and thyroid normal to palpation     RESP: lungs clear to auscultation - no rales, rhonchi or wheezes     CV: regular rates and rhythm, normal S1 S2, no S3 or S4 and no murmur, click or rub     ABDOMEN:  soft, nontender, no HSM or masses and bowel sounds normal     MS: extremities normal- no gross deformities noted, no evidence of inflammation in joints, FROM in all extremities.     SKIN: skin tag present suprapubic region - 0.5 cm or less; skinny stalk; cryotherapy applied     NEURO: Normal strength and tone, sensory exam grossly normal, mentation intact and speech normal     PSYCH: mentation appears normal. and affect normal/bright     LYMPHATICS: No cervical adenopathy    Recent Labs   Lab Test 10/10/22  1403 09/21/22  1413 06/08/22  0908   HGB  --  12.0 12.1   PLT  --  263 245    132* 136   POTASSIUM 4.2 4.3 4.2   CR 0.61 0.65 0.64   A1C  --   --  5.6        Diagnostics:  No labs were ordered during this visit.   No EKG required, no history of coronary heart disease, significant " arrhythmia, peripheral arterial disease or other structural heart disease.    Revised Cardiac Risk Index (RCRI):  The patient has the following serious cardiovascular risks for perioperative complications:   - No serious cardiac risks = 0 points     RCRI Interpretation: 0 points: Class I (very low risk - 0.4% complication rate)           Signed Electronically by: Melyssa Encinas MD  Copy of this evaluation report is provided to requesting physician.

## 2023-03-02 ENCOUNTER — TELEPHONE (OUTPATIENT)
Dept: FAMILY MEDICINE | Facility: OTHER | Age: 73
End: 2023-03-02

## 2023-03-02 NOTE — TELEPHONE ENCOUNTER
Patient states since starting requip that restless leg is well controlled.  Asking if in case it wears off & because she is at the max dose, is there an alternative therapy?    Writer reassured patient to no worry about these things unless medication does start to decrease in effectiveness  Encouraged patient to enjoy current positive effects  Instructed patient to call back if any negative or adverse effects start  Patient relayed increase in comfort  No further concerns at calls end.

## 2023-03-06 ENCOUNTER — TELEPHONE (OUTPATIENT)
Dept: FAMILY MEDICINE | Facility: OTHER | Age: 73
End: 2023-03-06

## 2023-03-06 NOTE — TELEPHONE ENCOUNTER
2:24 PM    Reason for Call: Phone Call    Description: Mihaela saw Dr Connors on February 27 for a skin tag and it hasn't come off wondering what is the next step?     Preferred method for responding to this message: Telephone Call  What is your phone number ? [phone]    If we cannot reach you directly, may we leave a detailed response at the number you provided? Yes    Can this message wait until your PCP/provider returns, if available today? YES, No    Florence Benton

## 2023-03-07 NOTE — TELEPHONE ENCOUNTER
Lvm for pt to call back- per dr tillman please schedule next available for procedure-skin tag removal

## 2023-03-08 ENCOUNTER — ANESTHESIA EVENT (OUTPATIENT)
Dept: SURGERY | Facility: HOSPITAL | Age: 73
End: 2023-03-08
Payer: COMMERCIAL

## 2023-03-08 RX ORDER — HYDRALAZINE HYDROCHLORIDE 20 MG/ML
2.5-5 INJECTION INTRAMUSCULAR; INTRAVENOUS EVERY 10 MIN PRN
Status: CANCELLED | OUTPATIENT
Start: 2023-03-08

## 2023-03-08 RX ORDER — ONDANSETRON 2 MG/ML
4 INJECTION INTRAMUSCULAR; INTRAVENOUS EVERY 30 MIN PRN
Status: CANCELLED | OUTPATIENT
Start: 2023-03-08

## 2023-03-08 RX ORDER — SODIUM CHLORIDE, SODIUM LACTATE, POTASSIUM CHLORIDE, CALCIUM CHLORIDE 600; 310; 30; 20 MG/100ML; MG/100ML; MG/100ML; MG/100ML
INJECTION, SOLUTION INTRAVENOUS CONTINUOUS
Status: CANCELLED | OUTPATIENT
Start: 2023-03-08

## 2023-03-08 RX ORDER — LABETALOL 20 MG/4 ML (5 MG/ML) INTRAVENOUS SYRINGE
10
Status: CANCELLED | OUTPATIENT
Start: 2023-03-08

## 2023-03-08 RX ORDER — HYDROMORPHONE HYDROCHLORIDE 1 MG/ML
0.5 INJECTION, SOLUTION INTRAMUSCULAR; INTRAVENOUS; SUBCUTANEOUS EVERY 5 MIN PRN
Status: CANCELLED | OUTPATIENT
Start: 2023-03-08

## 2023-03-08 RX ORDER — ONDANSETRON 4 MG/1
4 TABLET, ORALLY DISINTEGRATING ORAL EVERY 30 MIN PRN
Status: CANCELLED | OUTPATIENT
Start: 2023-03-08

## 2023-03-08 RX ORDER — FENTANYL CITRATE 50 UG/ML
50 INJECTION, SOLUTION INTRAMUSCULAR; INTRAVENOUS EVERY 5 MIN PRN
Status: CANCELLED | OUTPATIENT
Start: 2023-03-08

## 2023-03-08 NOTE — ANESTHESIA PREPROCEDURE EVALUATION
Anesthesia Pre-Procedure Evaluation    Patient: Mihaela Jang   MRN: [id number] : 1950        Procedure : Procedure(s):  Left Thumb Trigger Finger Release          Past Medical History:   Diagnosis Date     Atypical nevi      Chondrodermatitis nodularis helicis of left ear     Dr Shipley, [facility]     Chronic rhinitis     Dr Messina, [facility]'s allergy; negative skin testing; IgE mediated allergies; ENT - DC nasal steroid and antihistamine; saline rinses     Degenerative skin disorder     solar elastosis     Hallux rigidus 06/15/2000     Hyperlipidemia, unspecified hyperlipidemia type 9/15/2016     Laryngopharyngeal reflux disease     PPI     Malignant neoplasm of breast (female), unspecified site 03/10/2004     Notalgia paresthetica      Osteoarthritis 2011     Osteoporosis, unspecified 09/10/2001    Dr Moses; intermittent reclast;  Dr. Moses; repeat dexa after full 2 years Reclast     Other abnormal glucose 2013     Paresthesia     neck; notalgiaparesthetic; dr leahy & Dr Nevarez; neurontin     Personal history of malignant neoplasm of breast 2005     Rhinitis      Schamberg's purpura       Past Surgical History:   Procedure Laterality Date     BONE MARROW BIOPSY      negative     COLONOSCOPY  2000     COLONOSCOPY  2013    DR Fu; repeat 5 years     COLONOSCOPY N/A 2018    Procedure: COLONOSCOPY;  COLONOSCOPY;  Surgeon: Ajit Uriarte MD;  Location: [facility]     COLONOSCOPY N/A 2022    Procedure: COLONOSCOPY, WITH POLYPECTOMY AND BIOPSY;  Surgeon: Marcellus Jimenez MD;  Location: [facility]     DILATION AND CURETTAGE, OPERATIVE HYSTEROSCOPY, COMBINED N/A 2019    Procedure: EXAM UNDER ANESTHESIA , HYSTEROSCOPY;  Surgeon: Jovi Gabriel MD;  Location: [facility]     HEMORRHOIDECTOMY  1986     MASTECTOMY, BILATERAL  2004    right sided breast cancer     RELEASE TRIGGER FINGER Right 3/7/2018    Procedure: RELEASE TRIGGER FINGER;  RELEASE TRIGGER DIGIT RIGHT  THUMB;  Surgeon: Jose Alfredo Brewster DO;  Location: [facility]     UPPER GI ENDOSCOPY       [facility] COLONOSCOPY THRU STOMA WITH BIOPSY  08/25/2010    cancer screening, family h/o colon cancer; hyperplastic polyp      Allergies   Allergen Reactions     Chloraprep One Step Rash     Povidone Iodine Rash     Betadine      Soap Rash     Betadine       Social History     Tobacco Use     Smoking status: Never     Smokeless tobacco: Never   Substance Use Topics     Alcohol use: Yes     Alcohol/week: 0.0 standard drinks     Comment: 1 glasso wine, weekly       Wt Readings from Last 1 Encounters:   02/27/23 49.9 kg (110 lb)        Anesthesia Evaluation   Pt has had prior anesthetic. Type: General and MAC.    No history of anesthetic complications       ROS/MED HX  ENT/Pulmonary:     (+) allergic rhinitis, recent URI, resolved,     Neurologic:     (+) peripheral neuropathy, - Paresthesia.     Cardiovascular:     (+) Dyslipidemia -----    METS/Exercise Tolerance: >4 METS    Hematologic: Comments: Neutropenia     (+) anemia,     Musculoskeletal:   (+) arthritis,     GI/Hepatic:  - neg GI/hepatic ROS  GERD: occasional, resolves spontaneously.   Renal/Genitourinary:  - neg Renal ROS     Endo: Comment: Has been on prednisone for skin rash     (+) thyroid problem, hypothyroidism synthroid,     Psychiatric/Substance Use:  - neg psychiatric ROS     Infectious Disease:  - neg infectious disease ROS     Malignancy:   (+) Malignancy, History of Breast.Breast CA Remission status post Surgery.        Other: Comment: Notalgia paresthetica           Physical Exam    Airway        Mallampati: IV   TM distance: < 3 FB   Neck ROM: full   Mouth opening: < 3 cm    Respiratory Devices and Support         Dental       (+) Minor Abnormalities - some fillings, tiny chips      Cardiovascular   cardiovascular exam normal          Pulmonary   pulmonary exam normal                OUTSIDE LABS:  CBC:   Lab Results   Component Value Date    WBC 3.6 (L) 09/21/2022     WBC 4.1 06/08/2022    HGB 12.0 09/21/2022    HGB 12.1 06/08/2022    HCT 35.8 09/21/2022    HCT 35.8 06/08/2022     09/21/2022     06/08/2022     BMP:   Lab Results   Component Value Date     10/10/2022     (L) 09/21/2022    POTASSIUM 4.2 10/10/2022    POTASSIUM 4.3 09/21/2022    CHLORIDE 104 10/10/2022    CHLORIDE 101 09/21/2022    CO2 27 10/10/2022    CO2 28 09/21/2022    BUN 10 10/10/2022    BUN 14 09/21/2022    CR 0.61 10/10/2022    CR 0.65 09/21/2022     (H) 10/10/2022     (H) 09/21/2022     COAGS: No results found for: PTT, INR, FIBR  POC: No results found for: BGM, HCG, HCGS  HEPATIC:   Lab Results   Component Value Date    ALBUMIN 3.5 09/21/2022    PROTTOTAL 7.2 09/21/2022    ALT 21 09/21/2022    AST 19 09/21/2022    ALKPHOS 76 09/21/2022    BILITOTAL 0.2 09/21/2022     OTHER:   Lab Results   Component Value Date    A1C 5.6 06/08/2022    LEONID 9.0 10/10/2022    MAG 2.1 05/14/2020    LIPASE 208 09/21/2022    TSH 1.98 09/21/2022    T4 0.94 05/14/2021    CRP <2.9 09/21/2022    SED 17 12/23/2014       Anesthesia Plan    ASA Status:  3   NPO Status:  NPO Appropriate    Anesthesia Type: MAC.     - Reason for MAC: straight local not clinically adequate   Induction: Propofol.   Maintenance: Balanced.        Consents    Anesthesia Plan(s) and associated risks, benefits, and realistic alternatives discussed. Questions answered and patient/representative(s) expressed understanding.     - Discussed: Risks, Benefits and Alternatives for BOTH SEDATION and the PROCEDURE were discussed     - Discussed with:  Patient      - Extended Intubation/Ventilatory Support Discussed: No.      - Patient is DNR/DNI Status: No    Use of blood products discussed: No .     Postoperative Care            Comments:    Other Comments: Risks and benefits of MAC anesthetic discussed including dental damage, aspiration, loss of airway, conversion to general anesthetic, CV complications, MI, stroke, death. Pt  wishes to proceed.  2/27/23            Dieter Quintana, MICHAEL CRNA

## 2023-03-09 ENCOUNTER — OFFICE VISIT (OUTPATIENT)
Dept: FAMILY MEDICINE | Facility: OTHER | Age: 73
End: 2023-03-09
Attending: FAMILY MEDICINE
Payer: COMMERCIAL

## 2023-03-09 VITALS
RESPIRATION RATE: 18 BRPM | HEART RATE: 60 BPM | WEIGHT: 110.56 LBS | BODY MASS INDEX: 20.89 KG/M2 | SYSTOLIC BLOOD PRESSURE: 132 MMHG | DIASTOLIC BLOOD PRESSURE: 64 MMHG | OXYGEN SATURATION: 98 % | TEMPERATURE: 98.5 F

## 2023-03-09 DIAGNOSIS — R21 RASH: Primary | ICD-10-CM

## 2023-03-09 PROCEDURE — G0463 HOSPITAL OUTPT CLINIC VISIT: HCPCS | Mod: 25

## 2023-03-09 PROCEDURE — G0463 HOSPITAL OUTPT CLINIC VISIT: HCPCS

## 2023-03-09 PROCEDURE — 99213 OFFICE O/P EST LOW 20 MIN: CPT | Performed by: FAMILY MEDICINE

## 2023-03-09 RX ORDER — PREDNISONE 20 MG/1
20 TABLET ORAL DAILY
Qty: 12 TABLET | Refills: 0 | Status: SHIPPED | OUTPATIENT
Start: 2023-03-09 | End: 2023-03-12

## 2023-03-09 ASSESSMENT — ANXIETY QUESTIONNAIRES
IF YOU CHECKED OFF ANY PROBLEMS ON THIS QUESTIONNAIRE, HOW DIFFICULT HAVE THESE PROBLEMS MADE IT FOR YOU TO DO YOUR WORK, TAKE CARE OF THINGS AT HOME, OR GET ALONG WITH OTHER PEOPLE: NOT DIFFICULT AT ALL
GAD7 TOTAL SCORE: 0
3. WORRYING TOO MUCH ABOUT DIFFERENT THINGS: NOT AT ALL
6. BECOMING EASILY ANNOYED OR IRRITABLE: NOT AT ALL
5. BEING SO RESTLESS THAT IT IS HARD TO SIT STILL: NOT AT ALL
2. NOT BEING ABLE TO STOP OR CONTROL WORRYING: NOT AT ALL
GAD7 TOTAL SCORE: 0
7. FEELING AFRAID AS IF SOMETHING AWFUL MIGHT HAPPEN: NOT AT ALL
1. FEELING NERVOUS, ANXIOUS, OR ON EDGE: NOT AT ALL

## 2023-03-09 ASSESSMENT — ENCOUNTER SYMPTOMS
ABDOMINAL PAIN: 0
FEVER: 0
SHORTNESS OF BREATH: 0
PALPITATIONS: 0
CHILLS: 0

## 2023-03-09 ASSESSMENT — PAIN SCALES - GENERAL: PAINLEVEL: NO PAIN (0)

## 2023-03-09 ASSESSMENT — PATIENT HEALTH QUESTIONNAIRE - PHQ9
SUM OF ALL RESPONSES TO PHQ QUESTIONS 1-9: 0
5. POOR APPETITE OR OVEREATING: NOT AT ALL

## 2023-03-09 NOTE — PROGRESS NOTES
Assessment & Plan     Rash  Contact dermatitis? Stress rash d/t recent illness? Group A strep negative  Reviewed Dr. Connors's note regarding rash history   - predniSONE (DELTASONE) 20 MG tablet; Take 1 tablet (20 mg) by mouth daily for 3 days       See Patient Instructions    Return if symptoms worsen or fail to improve.    Sharee Gonzales MD  [facility] - ESTEPHANIE Chairez is a 72 year old, presenting for the following health issues:  Rash      HPI     Rash  Onset/Duration: 2-3 days ago  Description  Location: Back  Character: blotchy, burning, red  Itching: moderate  Intensity:  moderate  Progression of Symptoms:  worsening and constant  Accompanying signs and symptoms:   Fever: No  Body aches or joint pain: No  Sore throat symptoms: YES- 02/12/2023  Recent cold symptoms: YES- after sore throat had went away. Negative for group A strep  History:           Previous episodes of similar rash: None  New exposures:  None  Recent travel: No  Exposure to similar rash: No  Precipitating or alleviating factors: na  Therapies tried and outcome: Cerave, Hydrocortisone ointment which did not help    - benadryl x1 did not help   - h/o rash last July 2022 which resolved with prednisone      Review of Systems   Constitutional: Negative for chills and fever.   HENT: Negative for congestion.    Respiratory: Negative for shortness of breath.    Cardiovascular: Negative for chest pain and palpitations.   Gastrointestinal: Negative for abdominal pain.   Skin: Positive for rash.          Objective    /64 (BP Location: Right arm, Patient Position: Chair, Cuff Size: Adult Regular)   Pulse 60   Temp 98.5  F (36.9  C) (Tympanic)   Resp 18   Wt 50.2 kg (110 lb 9 oz)   SpO2 98%   BMI 20.89 kg/m    Body mass index is 20.89 kg/m .  Physical Exam  Constitutional:       General: She is not in acute distress.     Appearance: She is not ill-appearing.   Cardiovascular:      Rate and Rhythm:  Normal rate and regular rhythm.      Heart sounds: No murmur heard.  Pulmonary:      Effort: Pulmonary effort is normal. No respiratory distress.      Breath sounds: No wheezing or rales.   Skin:     Comments: See picture   Neurological:      Mental Status: She is alert.

## 2023-03-09 NOTE — PATIENT INSTRUCTIONS
Okay to use benadryl 25mg at night as needed    Start prednisone 20mg every day for the 3 days.

## 2023-03-11 DIAGNOSIS — E03.9 HYPOTHYROIDISM, UNSPECIFIED TYPE: ICD-10-CM

## 2023-03-13 ENCOUNTER — OFFICE VISIT (OUTPATIENT)
Dept: FAMILY MEDICINE | Facility: OTHER | Age: 73
End: 2023-03-13
Attending: FAMILY MEDICINE
Payer: COMMERCIAL

## 2023-03-13 VITALS
HEIGHT: 61 IN | DIASTOLIC BLOOD PRESSURE: 60 MMHG | OXYGEN SATURATION: 97 % | TEMPERATURE: 98.2 F | SYSTOLIC BLOOD PRESSURE: 128 MMHG | HEART RATE: 55 BPM | BODY MASS INDEX: 20.77 KG/M2 | WEIGHT: 110 LBS

## 2023-03-13 DIAGNOSIS — R21 RASH: ICD-10-CM

## 2023-03-13 DIAGNOSIS — L91.8 SKIN TAG: Primary | ICD-10-CM

## 2023-03-13 PROCEDURE — 99212 OFFICE O/P EST SF 10 MIN: CPT | Performed by: FAMILY MEDICINE

## 2023-03-13 PROCEDURE — G0463 HOSPITAL OUTPT CLINIC VISIT: HCPCS

## 2023-03-13 ASSESSMENT — PAIN SCALES - GENERAL: PAINLEVEL: NO PAIN (0)

## 2023-03-13 NOTE — PATIENT INSTRUCTIONS
Allow scab to fall off.  Gentle soap.    Topical antibiotic ointment if needed.    Complete steroid/prednisone course.  Benadryl or other antihistamine.    Consider repeat allergy testing - [facility].

## 2023-03-13 NOTE — TELEPHONE ENCOUNTER
Synthroid       Last Written Prescription Date:  8/1/22  Last Fill Quantity: 158,   # refills: 2  Last Office Visit: 3/13/23  Future Office visit:

## 2023-03-13 NOTE — PROGRESS NOTES
"      Assessment & Plan     Skin tag  Skin tag scabbed over - will fall off, mild erythema, superficial scab surrounding base.  Did not treat/excise as initially planned based on history.  Discussed symptomatic cares - local cares - gentle cleanse and protect with barrier/antibiotic.    Rash  Improving.  Complete antihistamine and steroid course.  Consider allergy consult in [place] - skin testing.             See Patient Instructions    Return if symptoms worsen or fail to improve.    Melyssa Encinas MD  [facility] - ESTEPHANIE Chairez is a 72 year old, presenting for the following health issues:  skin tag removal      HPI     -Rash on back, was here last week and saw Dr. Gonzales.  Photo taken.  Treated with Prednisone, Benadryl.  Similar prior reactions - once from gardening/hedge.    Remote allergy testing -inconclusive.    Skin Tag Removal      Duration: ongoing    Description (location/character/radiation): left side pelvic area    Intensity:  mild    Accompanying signs and symptoms: getting caught in clothes     History (similar episodes/previous evaluation): None    Precipitating or alleviating factors: None    Therapies tried and outcome: None        Review of Systems   Constitutional, HEENT, cardiovascular, pulmonary, gi and gu systems are negative, except as otherwise noted.      Objective    /60 (BP Location: Right arm, Patient Position: Sitting, Cuff Size: Adult Regular)   Pulse 55   Temp 98.2  F (36.8  C) (Tympanic)   Ht 1.549 m (5' 1\")   Wt 49.9 kg (110 lb)   SpO2 97%   BMI 20.78 kg/m    Body mass index is 20.78 kg/m .  Physical Exam   GENERAL: healthy, alert and no distress  MS: no gross musculoskeletal defects noted, no edema  SKIN: see photos; back with mild erythema, mid low back; suprapubic - superficial ulceration and scabbed skin tag  PSYCH: mentation appears normal, affect normal/bright                              "

## 2023-03-13 NOTE — PROGRESS NOTES
"  {PROVIDER CHARTING PREFERENCE:262357}    Megha Chairez is a 72 year old{ACCOMPANIED BY STATEMENT (Optional):652795}, presenting for the following health issues:  No chief complaint on file.      HPI     Concern -Skin tag removal   Onset: ***  Description: ***  Intensity: {.:260439}  Progression of Symptoms:  {.:792626}  Accompanying Signs & Symptoms: ***  Previous history of similar problem: ***  Precipitating factors:        Worsened by: ***  Alleviating factors:        Improved by: ***  Therapies tried and outcome: {NONE DEFAULTED:176499::\" none \"}    {additonal problems for provider to add (Optional):886799}    Review of Systems   {ROS COMP (Optional):379492}      Objective    There were no vitals taken for this visit.  There is no height or weight on file to calculate BMI.  Physical Exam   {Exam List (Optional):951472}    {Diagnostic Test Results (Optional):426192}    {AMBULATORY ATTESTATION (Optional):295973}            "

## 2023-03-13 NOTE — H&P (VIEW-ONLY)
"      Assessment & Plan     Skin tag  Skin tag scabbed over - will fall off, mild erythema, superficial scab surrounding base.  Did not treat/excise as initially planned based on history.  Discussed symptomatic cares - local cares - gentle cleanse and protect with barrier/antibiotic.    Rash  Improving.  Complete antihistamine and steroid course.  Consider allergy consult in [place] - skin testing.             See Patient Instructions    Return if symptoms worsen or fail to improve.    Melyssa Encinas MD  [facility] - ESTEPHANIE Chairez is a 72 year old, presenting for the following health issues:  skin tag removal      HPI     -Rash on back, was here last week and saw Dr. Gonzales.  Photo taken.  Treated with Prednisone, Benadryl.  Similar prior reactions - once from gardening/hedge.    Remote allergy testing -inconclusive.    Skin Tag Removal      Duration: ongoing    Description (location/character/radiation): left side pelvic area    Intensity:  mild    Accompanying signs and symptoms: getting caught in clothes     History (similar episodes/previous evaluation): None    Precipitating or alleviating factors: None    Therapies tried and outcome: None        Review of Systems   Constitutional, HEENT, cardiovascular, pulmonary, gi and gu systems are negative, except as otherwise noted.      Objective    /60 (BP Location: Right arm, Patient Position: Sitting, Cuff Size: Adult Regular)   Pulse 55   Temp 98.2  F (36.8  C) (Tympanic)   Ht 1.549 m (5' 1\")   Wt 49.9 kg (110 lb)   SpO2 97%   BMI 20.78 kg/m    Body mass index is 20.78 kg/m .  Physical Exam   GENERAL: healthy, alert and no distress  MS: no gross musculoskeletal defects noted, no edema  SKIN: see photos; back with mild erythema, mid low back; suprapubic - superficial ulceration and scabbed skin tag  PSYCH: mentation appears normal, affect normal/bright                              "

## 2023-03-14 RX ORDER — LEVOTHYROXINE SODIUM 25 UG/1
TABLET ORAL
Qty: 156 TABLET | Refills: 1 | Status: SHIPPED | OUTPATIENT
Start: 2023-03-14 | End: 2023-09-29

## 2023-03-15 ENCOUNTER — HOSPITAL ENCOUNTER (OUTPATIENT)
Facility: HOSPITAL | Age: 73
Discharge: HOME | End: 2023-03-15
Attending: ORTHOPAEDIC SURGERY | Admitting: ORTHOPAEDIC SURGERY
Payer: COMMERCIAL

## 2023-03-15 ENCOUNTER — ANESTHESIA (OUTPATIENT)
Dept: SURGERY | Facility: HOSPITAL | Age: 73
End: 2023-03-15
Payer: COMMERCIAL

## 2023-03-15 VITALS
SYSTOLIC BLOOD PRESSURE: 164 MMHG | BODY MASS INDEX: 21.71 KG/M2 | DIASTOLIC BLOOD PRESSURE: 70 MMHG | TEMPERATURE: 97 F | RESPIRATION RATE: 16 BRPM | OXYGEN SATURATION: 99 % | WEIGHT: 115 LBS | HEIGHT: 61 IN | HEART RATE: 56 BPM

## 2023-03-15 DIAGNOSIS — M65.312 TRIGGER FINGER OF LEFT THUMB: Primary | ICD-10-CM

## 2023-03-15 PROCEDURE — 710N000012 HC RECOVERY PHASE 2, PER MINUTE: Performed by: ORTHOPAEDIC SURGERY

## 2023-03-15 PROCEDURE — 26055 INCISE FINGER TENDON SHEATH: CPT | Performed by: NURSE ANESTHETIST, CERTIFIED REGISTERED

## 2023-03-15 PROCEDURE — 250N000009 HC RX 250: Performed by: ORTHOPAEDIC SURGERY

## 2023-03-15 PROCEDURE — 250N000011 HC RX IP 250 OP 636

## 2023-03-15 PROCEDURE — 26055 INCISE FINGER TENDON SHEATH: CPT | Mod: FA | Performed by: ORTHOPAEDIC SURGERY

## 2023-03-15 PROCEDURE — 99100 ANES PT EXTEME AGE<1 YR&>70: CPT | Performed by: NURSE ANESTHETIST, CERTIFIED REGISTERED

## 2023-03-15 PROCEDURE — 999N000141 HC STATISTIC PRE-PROCEDURE NURSING ASSESSMENT: Performed by: ORTHOPAEDIC SURGERY

## 2023-03-15 PROCEDURE — 360N000075 HC SURGERY LEVEL 2, PER MIN: Performed by: ORTHOPAEDIC SURGERY

## 2023-03-15 PROCEDURE — 258N000003 HC RX IP 258 OP 636: Performed by: NURSE ANESTHETIST, CERTIFIED REGISTERED

## 2023-03-15 PROCEDURE — 370N000017 HC ANESTHESIA TECHNICAL FEE, PER MIN: Performed by: ORTHOPAEDIC SURGERY

## 2023-03-15 PROCEDURE — 272N000001 HC OR GENERAL SUPPLY STERILE: Performed by: ORTHOPAEDIC SURGERY

## 2023-03-15 RX ORDER — FENTANYL CITRATE 50 UG/ML
INJECTION, SOLUTION INTRAMUSCULAR; INTRAVENOUS PRN
Status: DISCONTINUED | OUTPATIENT
Start: 2023-03-15 | End: 2023-03-15

## 2023-03-15 RX ORDER — LIDOCAINE 40 MG/G
CREAM TOPICAL
Status: DISCONTINUED | OUTPATIENT
Start: 2023-03-15 | End: 2023-03-15 | Stop reason: HOSPADM

## 2023-03-15 RX ORDER — SODIUM CHLORIDE, SODIUM LACTATE, POTASSIUM CHLORIDE, CALCIUM CHLORIDE 600; 310; 30; 20 MG/100ML; MG/100ML; MG/100ML; MG/100ML
INJECTION, SOLUTION INTRAVENOUS CONTINUOUS
Status: DISCONTINUED | OUTPATIENT
Start: 2023-03-15 | End: 2023-03-15 | Stop reason: HOSPADM

## 2023-03-15 RX ORDER — BUPIVACAINE HYDROCHLORIDE 5 MG/ML
INJECTION, SOLUTION EPIDURAL; INTRACAUDAL
Status: DISCONTINUED
Start: 2023-03-15 | End: 2023-03-15 | Stop reason: HOSPADM

## 2023-03-15 RX ORDER — IBUPROFEN 600 MG/1
600 TABLET, FILM COATED ORAL
Status: CANCELLED | OUTPATIENT
Start: 2023-03-15

## 2023-03-15 RX ORDER — ONDANSETRON 2 MG/ML
INJECTION INTRAMUSCULAR; INTRAVENOUS PRN
Status: DISCONTINUED | OUTPATIENT
Start: 2023-03-15 | End: 2023-03-15

## 2023-03-15 RX ORDER — HYDROCODONE BITARTRATE AND ACETAMINOPHEN 5; 325 MG/1; MG/1
1 TABLET ORAL
Status: CANCELLED | OUTPATIENT
Start: 2023-03-15

## 2023-03-15 RX ORDER — PROPOFOL 10 MG/ML
INJECTION, EMULSION INTRAVENOUS PRN
Status: DISCONTINUED | OUTPATIENT
Start: 2023-03-15 | End: 2023-03-15

## 2023-03-15 RX ORDER — ACETAMINOPHEN 325 MG/1
650 TABLET ORAL EVERY 4 HOURS PRN
Qty: 50 TABLET | Refills: 0 | Status: ON HOLD | OUTPATIENT
Start: 2023-03-15 | End: 2023-06-16

## 2023-03-15 RX ORDER — LIDOCAINE HYDROCHLORIDE 10 MG/ML
INJECTION, SOLUTION EPIDURAL; INFILTRATION; INTRACAUDAL; PERINEURAL
Status: DISCONTINUED
Start: 2023-03-15 | End: 2023-03-15 | Stop reason: HOSPADM

## 2023-03-15 RX ORDER — PROPOFOL 10 MG/ML
INJECTION, EMULSION INTRAVENOUS CONTINUOUS PRN
Status: DISCONTINUED | OUTPATIENT
Start: 2023-03-15 | End: 2023-03-15

## 2023-03-15 RX ORDER — HYDROCODONE BITARTRATE AND ACETAMINOPHEN 5; 325 MG/1; MG/1
1-2 TABLET ORAL EVERY 4 HOURS PRN
Qty: 10 TABLET | Refills: 0 | Status: SHIPPED | OUTPATIENT
Start: 2023-03-15 | End: 2023-05-09

## 2023-03-15 RX ADMIN — PROPOFOL 100 MCG/KG/MIN: 10 INJECTION, EMULSION INTRAVENOUS at 11:05

## 2023-03-15 RX ADMIN — PROPOFOL 20 MG: 10 INJECTION, EMULSION INTRAVENOUS at 11:07

## 2023-03-15 RX ADMIN — ONDANSETRON 4 MG: 2 INJECTION INTRAMUSCULAR; INTRAVENOUS at 11:14

## 2023-03-15 RX ADMIN — PROPOFOL 20 MG: 10 INJECTION, EMULSION INTRAVENOUS at 11:05

## 2023-03-15 RX ADMIN — FENTANYL CITRATE 25 MCG: 50 INJECTION, SOLUTION INTRAMUSCULAR; INTRAVENOUS at 11:05

## 2023-03-15 RX ADMIN — SODIUM CHLORIDE, POTASSIUM CHLORIDE, SODIUM LACTATE AND CALCIUM CHLORIDE: 600; 310; 30; 20 INJECTION, SOLUTION INTRAVENOUS at 10:15

## 2023-03-15 ASSESSMENT — ACTIVITIES OF DAILY LIVING (ADL)
ADLS_ACUITY_SCORE: 35
ADLS_ACUITY_SCORE: 35

## 2023-03-15 NOTE — ANESTHESIA POSTPROCEDURE EVALUATION
Patient: Mihaela Jang    Procedure: Procedure(s):  Left Thumb Trigger Finger Release       Anesthesia Type:  MAC    Note:  Disposition: Outpatient   Postop Pain Control: Uneventful            Sign Out: Well controlled pain   PONV: No   Neuro/Psych: Uneventful            Sign Out: Acceptable/Baseline neuro status   Airway/Respiratory: Uneventful            Sign Out: Acceptable/Baseline resp. status   CV/Hemodynamics: Uneventful            Sign Out: Acceptable CV status; No obvious hypovolemia; No obvious fluid overload   Other NRE: NONE   DID A NON-ROUTINE EVENT OCCUR? No           Last vitals:  Vitals Value Taken Time   /70 03/15/23 1200   Temp 97  F (36.1  C) 03/15/23 1200   Pulse 56 03/15/23 1200   Resp 16 03/15/23 1200   SpO2 100 % 03/15/23 1202   Vitals shown include unvalidated device data.    Electronically Signed By: MICHAEL Mohan CRNA  March 15, 2023  12:30 PM

## 2023-03-15 NOTE — BRIEF OP NOTE
[facility]    Brief Operative Note    Pre-operative diagnosis: Thumb pain, left [M79.645]  Post-operative diagnosis Same as pre-operative diagnosis    Procedure: Procedure(s):  Left Thumb Trigger Finger Release  Surgeon: Surgeon(s) and Role:     * Rigoberto Olivera MD - Primary     * Giles Argueta PA - Assisting  Anesthesia: MAC with Local   Estimated Blood Loss: None    Drains: None  Specimens: * No specimens in log *  Findings:   None.  Complications: None.  Implants: * No implants in log *

## 2023-03-15 NOTE — OR NURSING
Patient and responsible adult given discharge instructions with no questions regarding instructions. Ashwini score 20/20. Pain level 0/10.  Discharged from unit via wheelchair. Patient discharged to home with friend ana.

## 2023-03-15 NOTE — ANESTHESIA CARE TRANSFER NOTE
Patient: Mihaela Jang    Procedure: Procedure(s):  Left Thumb Trigger Finger Release       Diagnosis: Thumb pain, left [M79.645]  Diagnosis Additional Information: No value filed.    Anesthesia Type:   MAC     Note:    Oropharynx: oropharynx clear of all foreign objects and spontaneously breathing  Level of Consciousness: awake  Oxygen Supplementation: room air    Independent Airway: airway patency satisfactory and stable  Dentition: dentition unchanged  Vital Signs Stable: post-procedure vital signs reviewed and stable  Report to RN Given: handoff report given  Patient transferred to: Phase II    Handoff Report: Identifed the Patient, Identified the Reponsible Provider, Reviewed the pertinent medical history, Discussed the surgical course, Reviewed Intra-OP anesthesia mangement and issues during anesthesia, Set expectations for post-procedure period and Allowed opportunity for questions and acknowledgement of understanding      Vitals:  Vitals Value Taken Time   BP     Temp     Pulse     Resp     SpO2 98 % 03/15/23 1127   Vitals shown include unvalidated device data.    Electronically Signed By: Latrice Tan  March 15, 2023  11:29 AM

## 2023-03-15 NOTE — OP NOTE
Procedure Date: 03/15/2023    PREOPERATIVE DIAGNOSIS:  Left trigger thumb.    POSTOPERATIVE DIAGNOSIS:  Left trigger thumb.    OPERATION:  Left trigger thumb release.    SURGEON:  Rigoberto Olivera M.D.     ASSISTANT:  KIM Florian/RN.    ANESTHESIA:  Local with MAC.    INDICATIONS FOR PROCEDURE:  Mihaela Jang is a 72-year-old female with a trigger thumb of her left thumb.  She can no longer tolerate her thumb in this fashion, and given that it was nearly locked, she wished to have this definitively treated.  All the risks and benefits of surgical intervention were discussed with her, and she wished to proceed.    DESCRIPTION OF PROCEDURE:  The patient was taken to the operating room.  After adequate induction of anesthesia, she was positioned, prepped and draped in usual sterile fashion on the operative table.  Universal protocol timeout was initiated.  Once all in the room were in agreement, a Chevron incision was made overlying the MCP joint of the left thumb on the volar aspect.  This was carried down through the subcutaneous tissues bluntly until the tendon sheath was encountered.  It was noted to be quite swollen and irritated.  This was incised in line with the limb, and there was an audible and palpable release of the tendons as we released the A1 pulley.  Adequate extrusion of the tendons was appreciated, and the wound was copiously irrigated.  A 3-0 nylon was used to close the wound in a horizontal mattress suture fashion.  The patient tolerated the procedure well.  Counts were correct at the end of the procedure.  She was taken in stable condition to postanesthesia care unit after application of a sterile dressing.    Rigoberto Olivera MD        D: 03/15/2023   T: 03/15/2023   MT: SHADMT    Name:     MIHAELA JANG  MRN:      [id number]        Account:        [id number]   :      1950           Procedure Date: 03/15/2023     Document: [id number]

## 2023-03-15 NOTE — DISCHARGE INSTRUCTIONS
Post-Anesthesia Patient Instructions    IMMEDIATELY FOLLOWING SURGERY:  Do not drive or operate machinery for the first twenty four hours after surgery.  Do not make any important decisions for twenty four hours after surgery or while taking narcotic pain medications or sedatives.  If you develop intractable nausea and vomiting or a severe headache please notify your doctor immediately.    FOLLOW-UP:  Please make an appointment with your surgeon as instructed. You do not need to follow up with anesthesia unless specifically instructed to do so.    WOUND CARE INSTRUCTIONS (if applicable):  Keep a dry clean dressing on the anesthesia/puncture wound site if there is drainage.  Once the wound has quit draining you may leave it open to air.  Generally you should leave the bandage intact for twenty four hours unless there is drainage.  If the epidural site drains for more than 36-48 hours please call the anesthesia department.    QUESTIONS?:  Please feel free to call your physician or the hospital  if you have any questions, and they will be happy to assist you.         FOLLOW UP APPOINTMENT WITH DR. SHAHID MARCH 28TH AT 9:45am AT [facility]

## 2023-03-16 ENCOUNTER — TELEPHONE (OUTPATIENT)
Dept: FAMILY MEDICINE | Facility: OTHER | Age: 73
End: 2023-03-16

## 2023-03-16 DIAGNOSIS — L29.9 ITCHING: ICD-10-CM

## 2023-03-16 DIAGNOSIS — R21 RASH: Primary | ICD-10-CM

## 2023-03-16 NOTE — TELEPHONE ENCOUNTER
3:11 PM    Reason for Call: Phone Call    Description: Patient states she was put on prednisone for a rash and was told that if it didn't help that Dr Connors could put in a referral to an allergist in [place] for her. She says she has 5 days left of the medication and so far the rash is still there. She is wondering if the referral can be placed so she can work on getting scheduled with them. Please call patient regarding this     Was an appointment offered for this call? No    Preferred method for responding to this message: Telephone Call  What is your phone number ?[phone]    If we cannot reach you directly, may we leave a detailed response at the number you provided? Yes    Can this message wait until your PCP/provider returns, if available today? YES    Radha Hill

## 2023-03-20 NOTE — TELEPHONE ENCOUNTER
Pt called back about referral for dermatology and allergy testing both in [place] from Dr. Connors, pt would like a referral to possibly [place], Pt states rash is very itchy, prednisone is not working. Please call pt [phone]

## 2023-03-22 NOTE — TELEPHONE ENCOUNTER
Patient called back to go over dermatology and allergy referrals.   Patient informed of message below from provider.   Patient verbalized understanding and had no further questions.

## 2023-03-22 NOTE — TELEPHONE ENCOUNTER
I called and left message for patient to call back, so we can go over her referrals that the PCP had placed.

## 2023-04-12 ENCOUNTER — TRANSFERRED RECORDS (OUTPATIENT)
Dept: HEALTH INFORMATION MANAGEMENT | Facility: CLINIC | Age: 73
End: 2023-04-12

## 2023-04-14 DIAGNOSIS — G25.81 RESTLESS LEGS SYNDROME (RLS): ICD-10-CM

## 2023-04-14 RX ORDER — ROPINIROLE 0.5 MG/1
0.5 TABLET, FILM COATED ORAL 3 TIMES DAILY
Qty: 90 TABLET | Refills: 0 | Status: SHIPPED | OUTPATIENT
Start: 2023-04-14 | End: 2023-05-09

## 2023-04-14 NOTE — TELEPHONE ENCOUNTER
Ropinirole      Last Written Prescription Date:  6/2/22  Last Fill Quantity: 100,   # refills: 2  Last Office Visit: 3/13/23  Future Office visit:    Next 5 appointments (look out 90 days)    Jun 05, 2023 11:00 AM  (Arrive by 10:45 AM)  Return Visit with FAHAD Barakat MD  [facility] ([facility] ) [address] [place] [address] [phone]           Routing refill request to provider for review/approval because:  Drug not active on patient's medication list

## 2023-04-28 ENCOUNTER — TRANSFERRED RECORDS (OUTPATIENT)
Dept: HEALTH INFORMATION MANAGEMENT | Facility: CLINIC | Age: 73
End: 2023-04-28

## 2023-05-01 ENCOUNTER — TELEPHONE (OUTPATIENT)
Dept: FAMILY MEDICINE | Facility: OTHER | Age: 73
End: 2023-05-01

## 2023-05-01 ENCOUNTER — OFFICE VISIT (OUTPATIENT)
Dept: PODIATRY | Facility: OTHER | Age: 73
End: 2023-05-01
Attending: PODIATRIST
Payer: COMMERCIAL

## 2023-05-01 VITALS
OXYGEN SATURATION: 97 % | DIASTOLIC BLOOD PRESSURE: 71 MMHG | HEART RATE: 60 BPM | TEMPERATURE: 97.6 F | SYSTOLIC BLOOD PRESSURE: 115 MMHG

## 2023-05-01 DIAGNOSIS — M20.11 HALLUX VALGUS (ACQUIRED), RIGHT FOOT: Primary | ICD-10-CM

## 2023-05-01 DIAGNOSIS — M79.671 RIGHT FOOT PAIN: ICD-10-CM

## 2023-05-01 PROCEDURE — 99215 OFFICE O/P EST HI 40 MIN: CPT | Performed by: PODIATRIST

## 2023-05-01 PROCEDURE — G0463 HOSPITAL OUTPT CLINIC VISIT: HCPCS

## 2023-05-01 PROCEDURE — G0463 HOSPITAL OUTPT CLINIC VISIT: HCPCS | Mod: 25

## 2023-05-01 ASSESSMENT — PAIN SCALES - GENERAL: PAINLEVEL: NO PAIN (0)

## 2023-05-01 NOTE — PROGRESS NOTES
Chief complaint: Patient presents with:  Consult: Great toe      History of Present Illness: This 73 year old female is seen at the request of No ref. provider found for evaluation and suggestions of management of a second opinion for RIGHT 1st MTPJ pain. She had a steroid injectin by Dr. Parker on 04/28/2023. It reduced her pain a little, but she still has pain. She says Dr. Parker reviewed surgical options with her and she wanted to discuss her options. Any time she discusses surgical options, she likes to get a second opinion to see if she should proceed with a proposed procedure.] She is currently scheduled for surgery on 06/16/2023. She is not sure what surgery was discussed for her foot. After asking if the procedure under discussion was a fusion of the joint space, she said she thinks that may be pthe procedure. She also wants to discuss post-op recovery.    No further pedal complaints today.       /71 (BP Location: Left arm, Patient Position: Sitting, Cuff Size: Adult Regular)   Pulse 60   Temp 97.6  F (36.4  C) (Tympanic)   SpO2 97%     Patient Active Problem List   Diagnosis     Laryngopharyngeal reflux (LPR)     Chronic rhinitis     ETD (eustachian tube dysfunction)     Osteoporosis     Abnormal glucose     Personal history of malignant neoplasm of breast     Neutropenia (H)     Early satiety     chronic neutropenia.     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     Hypothyroidism, unspecified type     Seborrheic dermatitis of scalp       Past Surgical History:   Procedure Laterality Date     BONE MARROW BIOPSY      negative     COLONOSCOPY  07/2000     COLONOSCOPY  8/13/2013    DR Fu; repeat 5 years     COLONOSCOPY N/A 8/13/2018    Procedure: COLONOSCOPY;  COLONOSCOPY;  Surgeon: Ajit Uriarte MD;   [facility]     COLONOSCOPY N/A 12/2/2022    Procedure: COLONOSCOPY, WITH POLYPECTOMY AND BIOPSY;  Surgeon: Marcellus Jimenez MD;  [facility]: [facility]     DILATION AND CURETTAGE, OPERATIVE HYSTEROSCOPY, COMBINED N/A 2/13/2019    Procedure: EXAM UNDER ANESTHESIA , HYSTEROSCOPY;  Surgeon: Jovi Gabriel MD;  [facility]: [facility]     HEMORRHOIDECTOMY  1986     MASTECTOMY, BILATERAL  03/01/2004    right sided breast cancer     RELEASE TRIGGER FINGER Right 3/7/2018    Procedure: RELEASE TRIGGER FINGER;  RELEASE TRIGGER DIGIT RIGHT THUMB;  Surgeon: Jose Alfredo Brewster DO;  Location: [facility]     RELEASE TRIGGER FINGER Left 3/15/2023    Procedure: Left Thumb Trigger Finger Release;  Surgeon: Rigoberto Olivera MD;  Location: [facility]     [facility]       [facility]    cancer screening, family h/o colon cancer; hyperplastic polyp       Current Outpatient Medications   Medication     acetaminophen (TYLENOL) 325 MG tablet     aspirin EC 81 MG tablet     CALCIUM CITRATE     cholecalciferol 1000 UNITS TABS     cinnamon 500 MG CAPS     ferrous sulfate (SLO-FE) 142 (45 Fe) MG CR tablet     fish oil-omega-3 fatty acids 1000 MG capsule     fluticasone (FLONASE) 50 MCG/ACT nasal spray     gabapentin (NEURONTIN) 300 MG capsule     HYDROcodone-acetaminophen (NORCO) 5-325 MG tablet     Lactobacillus (ACIDOPHILUS) TABS     levothyroxine (SYNTHROID/LEVOTHROID) 25 MCG tablet     MAGNESIUM OXIDE PO     Multiple vitamin  s TABS     Multiple Vitamins-Minerals (PRESERVISION AREDS PO)     Polyethylene Glycol 3350 (MIRALAX PO)     rOPINIRole (REQUIP) 0.5 MG tablet     rOPINIRole (REQUIP) 1 MG tablet     saccharomyces boulardii (FLORASTOR) 250 MG capsule     simvastatin (ZOCOR) 20 MG tablet     Turmeric 500 MG CAPS     zinc 50 MG TABS     No current facility-administered medications for this visit.          Allergies   Allergen Reactions     Chlorhexidine Gluconate Rash     Povidone Iodine Rash     Betadine       Soap Rash     Betadine        Family History   Problem Relation Age of Onset     Dementia Mother         (cause of death)      High cholesterol Mother      Osteoarthritis Mother      C.A.D. Father         (cause of death)      Prostate Cancer Father      Breast Cancer Maternal Aunt 72     Cerebrovascular Disease Maternal Grandmother         CVA     Diabetes Maternal Grandmother        Social History     Socioeconomic History     Marital status: Single     Spouse name: None     Number of children: None     Years of education: None     Highest education level: None   Tobacco Use     Smoking status: Never     Smokeless tobacco: Never   Substance and Sexual Activity     Alcohol use: Yes     Alcohol/week: 0.0 standard drinks of alcohol     Comment: 1 glasso wine, weekly      Drug use: No     Sexual activity: Never   Other Topics Concern      Service No     Blood Transfusions Yes     Comment: Permits if needed     Caffeine Concern Yes     Comment: Tea, 2 cups daily      Exercise Yes     Comment: Walking, walks steps, daily      Seat Belt Yes     Parent/sibling w/ CABG, MI or angioplasty before 65F 55M? No       ROS: 10 point ROS neg other than the symptoms noted above in the HPI.  EXAM  Constitutional: healthy, alert and no distress    Psychiatric: mentation appears normal and affect normal/bright    VASCULAR:  -Dorsalis pedis pulse +2/4 b/l  -Posterior tibial pulse +2/4 b/l  NEURO:  -Light touch sensation intact to b/l plantar forefoot  DERM:  -Skin temperature, texture and turgor WNL b/l  MSK:  -Pain on palpation RIGHT dorsal and medial 1st MTPJ  -Moderate pain with all DORSIFLEXION motion (active and passive motion) at the dorsal RIGHT 1st MTPJ  -Decreased ROM of 1st MTPJ with forefoot loading, RIGHT (< 10 degrees DORSIFLEXION)  -Bony prominence on the RIGHT dorsal 1st MTPJ  ============================================================    ASSESSMENT:  (M20.11) Hallux valgus (acquired), right foot  (primary  "encounter diagnosis)    (M79.675) Right foot pain        PLAN:  -Patient evaluated and examined. Treatment options discussed with no educational barriers noted.  -Discussed etiology and treatment options for hallux rigidus:  ---Discussed how this deformity can progress and what can be done to treat the deformity.  ---Conservative treatment options consist of wider shoe gear and orthotics +/- padding/splinting to accommodate the painful toe. There may be pain relief from a kaba's extension in an orthotic. This will not correct the deformity, but may help decrease pain. Patient may also benefit from an injection for more acute pain. This is does not usually provide long term pain relief without also addressing the biomechanics, but it may improve overall pain.   ---Discussed surgical treatment options including risks and benefits and post-op periods. The surgical procedure of choice is dependent on amount of joint space remaining and radiographic angles.  ---Patient may consider a steroid injection. Dr. Parker provided this on 04/28/2023 and she says it did not help a lot with her pain. Injections are not designed to \"fix\" the hallux rigidus, but they can sometimes decrease inflammation and pain. If she did not have pain relief from the injection, then she will unlikely have pain relief from a repeat injection.  ---Discussed surgical options in detail. Patient's most updated radiographs were not available to review, but reviewed radiographs of her foot from 2021. 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 The arthritic changes are likely worse at this point and the 1st MTPJ is causing her pain than it was in 2021. A \"clean up\" procedure would unlikely provide her with pain relief since this would not address the bone-on-bone rubbing of the joint space. Some providers offer implant options for the 1st MTPJ, but they sometimes need to be later removed. A fusion of the 1st MTPJ is likely a good " option for her. This has a more predictable outcome and can sometimes eliminate the need for further surgeries if she fully fuses and heals after surgery. There are risks of non-union, post-op infections, and other surgical complications, but the surgery itself may provide her with the best post-op pain relief.  ---Reviewed post-op recovery in detail. Each surgeon has their own preference. She is advised to ask Dr. Parker when he would like her walking on the foot after surgery. She says he told her she could walk in a boot after surgery for 4-56 weeks. She is advised to clarify this with him. Average time until walking without a boot is six weeks.   ---Patient asked how long she needs to elevate her foot (all day? For several weeks? Is it okay to have the leg in a dependent position at times?). Again, she is advised to ask Dr. Parker this question. However, after most major foot surgeries such as a 1st MTPJ fusion, then elevation may help with pain control and healing of the sutures to decrease edema in the foot. Elevation can be beneficial as much as possible for the first couple weeks of recovery. She may certainly put her foot in a dependent position as needed for making a meal and using the restroom, but she is advised to elevate the foot as much as she can.  ---Patient had multiple detailed questions about the procedure including if she needs new orthotics. She has CMOs she received years ago from Dr. Ramírez. If they are comfortable (including post surgery), then she will not need new orthotics. The CMO is a half length orthotic with a rigid shell. If the orthotics are not comfortable post surgery, then she is advised to look into options to modify her current orthotic or consider new ones.  ---Patient was grateful for the information provided and she said that the answers to her questions were consistent with what she heard from Dr. Parker. She plans on proceeding with surgery with him on 06/16/2023. She  is still advised to call Dr. Parker with detailed questions about her WB restrictions and other surgical questions since he will be doing her surgery and each surgeon has their own preference on WB restrictions post surgery. She is in agreement with this plan. Her questions were answered in detail.    -Patient's 1st MTPJ has been painful for several years and has worsened in the past year. This is a chronic illness with exacerbation of side effects. Major elective surgery was discussed.    Total time spent preparing to see the patient, review of chart, obtaining history and physical examination, review of x-rays from 2021, treatment options, education and post-op discussion in detail and documenting in Epic / EMR was 42 minutes. All time involved was spent on the day of service for the patient (the same day as the patient's appointment).      -Patient in agreement with the above treatment plan and all of patient's questions were answered.      Return to clinic as needed        Katerina Campos DPM

## 2023-05-07 DIAGNOSIS — E78.5 HYPERLIPIDEMIA, UNSPECIFIED HYPERLIPIDEMIA TYPE: ICD-10-CM

## 2023-05-08 RX ORDER — SIMVASTATIN 20 MG
TABLET ORAL
Qty: 100 TABLET | Refills: 0 | Status: SHIPPED | OUTPATIENT
Start: 2023-05-08 | End: 2023-08-10

## 2023-05-08 NOTE — TELEPHONE ENCOUNTER
simvastatin (ZOCOR) 20 MG tablet     Last Written Prescription Date:  7/27/2022  Last Fill Quantity: 90,   # refills: 3  Last Office Visit: 3/13/2023  Future Office visit:    Next 5 appointments (look out 90 days)    May 10, 2023  1:30 PM  (Arrive by 1:15 PM)  Pre-Op physical with Melyssa Connors MD  [facility] [place] ([facility] [place] ) [address]  [place] [address]  [phone]   Jun 08, 2023  2:30 PM  (Arrive by 2:15 PM)  Pre-Op physical with Melyssa Connors MD  [facility] [place] ([facility] [place] ) [address]  [place] [address]  [phone]

## 2023-05-09 NOTE — OR NURSING
Instructed No solids/ No dairy after midnight, clear liquids up to 2 hrs before arrival, no gum , no candy, no breath mints & may brush teeth - patient verbally acknowledged.  Confirmed she knows where to check in on day of surgery (Admitting) & informed that someone will call the afternoon before with confirmed time of arrival - Patient verbally acknowledged. Direct line phone # provided with instructions to call for any changes, questions or concerns.

## 2023-05-09 NOTE — OR NURSING
Patient confirmed  & aftercare person - Aleena (Friend).. Instructed to call to reschedule if she becomes sick from now until date of surgery - patient verbally acknowledged.  Reviewed all allergies & Current medication - Patient verbally confirmed.   Instructed to hold NSAIDs, ASA, fish oil, tumeric, all vitamins supplements & herbals starting 5/10, Continue simvastatin, acetaminophen, levothyroxine, ropinirole, gabapentin & mg Oxide up to night before surgery. Take only levothyroxine day of surgery - Patient verbally acknowledged.    Instructed to shower night before & morning of surgery with Dial antibacterial soap, No lotion, no makeup no nail polish, no hairspray.

## 2023-05-10 ENCOUNTER — ANESTHESIA EVENT (OUTPATIENT)
Dept: SURGERY | Facility: HOSPITAL | Age: 73
End: 2023-05-10
Payer: COMMERCIAL

## 2023-05-10 ENCOUNTER — OFFICE VISIT (OUTPATIENT)
Dept: FAMILY MEDICINE | Facility: OTHER | Age: 73
End: 2023-05-10
Attending: FAMILY MEDICINE
Payer: COMMERCIAL

## 2023-05-10 VITALS
SYSTOLIC BLOOD PRESSURE: 102 MMHG | TEMPERATURE: 97.4 F | OXYGEN SATURATION: 96 % | DIASTOLIC BLOOD PRESSURE: 64 MMHG | WEIGHT: 117.5 LBS | HEART RATE: 69 BPM | BODY MASS INDEX: 22.2 KG/M2

## 2023-05-10 DIAGNOSIS — D70.9 NEUTROPENIA, UNSPECIFIED TYPE (H): ICD-10-CM

## 2023-05-10 DIAGNOSIS — Z85.3 PERSONAL HISTORY OF MALIGNANT NEOPLASM OF BREAST: ICD-10-CM

## 2023-05-10 DIAGNOSIS — M81.0 OSTEOPOROSIS, UNSPECIFIED OSTEOPOROSIS TYPE, UNSPECIFIED PATHOLOGICAL FRACTURE PRESENCE: Chronic | ICD-10-CM

## 2023-05-10 DIAGNOSIS — E03.9 HYPOTHYROIDISM, UNSPECIFIED TYPE: Chronic | ICD-10-CM

## 2023-05-10 DIAGNOSIS — M65.311 TRIGGER FINGER OF RIGHT THUMB: ICD-10-CM

## 2023-05-10 DIAGNOSIS — E78.5 HYPERLIPIDEMIA, UNSPECIFIED HYPERLIPIDEMIA TYPE: ICD-10-CM

## 2023-05-10 DIAGNOSIS — R73.09 ABNORMAL GLUCOSE: ICD-10-CM

## 2023-05-10 DIAGNOSIS — Z01.818 PREOP GENERAL PHYSICAL EXAM: Primary | ICD-10-CM

## 2023-05-10 PROCEDURE — G0463 HOSPITAL OUTPT CLINIC VISIT: HCPCS

## 2023-05-10 PROCEDURE — 99214 OFFICE O/P EST MOD 30 MIN: CPT | Mod: 24 | Performed by: FAMILY MEDICINE

## 2023-05-10 ASSESSMENT — PAIN SCALES - GENERAL: PAINLEVEL: NO PAIN (0)

## 2023-05-10 NOTE — ANESTHESIA PREPROCEDURE EVALUATION
Anesthesia Pre-Procedure Evaluation    Patient: Mihaela Jang   MRN: [id number] : 1950        Procedure : Procedure(s):  Revision Right Thumb Trigger Release          Past Medical History:   Diagnosis Date     Atypical nevi      Chondrodermatitis nodularis helicis of left ear     Dr Shipley, [facility]     Chronic rhinitis     Dr Messina, [facility]'s allergy; negative skin testing; IgE mediated allergies; ENT - DC nasal steroid and antihistamine; saline rinses     Degenerative skin disorder     solar elastosis     Hallux rigidus 06/15/2000     Hyperlipidemia, unspecified hyperlipidemia type 9/15/2016     Laryngopharyngeal reflux disease     PPI     Malignant neoplasm of breast (female), unspecified site 03/10/2004     Notalgia paresthetica      Osteoarthritis 2011     Osteoporosis, unspecified 09/10/2001    Dr Moses; intermittent reclast;  Dr. Moses; repeat dexa after full 2 years Reclast     Other abnormal glucose 2013     Paresthesia     neck; notalgiaparesthetic; dr leahy & Dr Nevarez; neurontin     Personal history of malignant neoplasm of breast 2005     Rhinitis      Schamberg's purpura       Past Surgical History:   Procedure Laterality Date     BONE MARROW BIOPSY      negative     COLONOSCOPY  2000     COLONOSCOPY  2013    DR Fu; repeat 5 years     COLONOSCOPY N/A 2018    Procedure: COLONOSCOPY;  COLONOSCOPY;  Surgeon: Ajit Uriarte MD;  [facility]     COLONOSCOPY N/A 2022    Procedure: COLONOSCOPY, WITH POLYPECTOMY AND BIOPSY;  Surgeon: Marcellus Jimenez MD;  [facility]     DILATION AND CURETTAGE, OPERATIVE HYSTEROSCOPY, COMBINED N/A 2019    Procedure: EXAM UNDER ANESTHESIA , HYSTEROSCOPY;  Surgeon: Jovi Gabriel MD;  [facility]     HEMORRHOIDECTOMY  1986     MASTECTOMY, BILATERAL  2004    right sided breast cancer     RELEASE TRIGGER FINGER Right 3/7/2018    Procedure: RELEASE TRIGGER FINGER;  RELEASE TRIGGER DIGIT  RIGHT THUMB;  Surgeon: Jose Alfredo Brewster DO;  [facility]: [facility]     RELEASE TRIGGER FINGER Left 3/15/2023    Procedure: Left Thumb Trigger Finger Release;  Surgeon: Rigoberto Olivera MD;  [facility]: [facility]     [facility]       [facility]  [facility]    cancer screening, family h/o colon cancer; hyperplastic polyp      Allergies   Allergen Reactions     Chlorhexidine Gluconate Rash     Povidone Iodine Rash     Betadine      Soap Rash     Betadine       Social History     Tobacco Use     Smoking status: Never     Smokeless tobacco: Never   Vaping Use     Vaping status: Not on file   Substance Use Topics     Alcohol use: Yes     Alcohol/week: 0.0 standard drinks of alcohol     Comment: 1 glasso wine, weekly       Wt Readings from Last 1 Encounters:   03/15/23 52.2 kg (115 lb)        Anesthesia Evaluation   Pt has had prior anesthetic. Type: General and MAC.    No history of anesthetic complications       ROS/MED HX  ENT/Pulmonary:     (+) allergic rhinitis, recent URI, resolved,     Neurologic:     (+) peripheral neuropathy, - Paresthesia.     Cardiovascular:     (+) Dyslipidemia -----    METS/Exercise Tolerance: >4 METS    Hematologic: Comments: Neutropenia     (+) anemia,     Musculoskeletal:   (+) arthritis,     GI/Hepatic:  - neg GI/hepatic ROS  GERD: occasional, resolves spontaneously.   Renal/Genitourinary:  - neg Renal ROS     Endo: Comment: Has been on prednisone for skin rash   PREDIABETIC    (+) thyroid problem, hypothyroidism synthroid,     Psychiatric/Substance Use:  - neg psychiatric ROS     Infectious Disease:  - neg infectious disease ROS     Malignancy:   (+) Malignancy, History of Breast.Breast CA Remission status post Surgery.        Other: Comment: Notalgia paresthetica           Physical Exam    Airway  airway exam normal      Mallampati: II   TM distance: > 3 FB   Neck ROM: full   Mouth opening: > 3 cm    Respiratory Devices and Support         Dental    unable to  assess    (+) Completely normal teeth      Cardiovascular   cardiovascular exam normal       Rhythm and rate: regular and normal     Pulmonary   pulmonary exam normal        breath sounds clear to auscultation           OUTSIDE LABS:  CBC:   Lab Results   Component Value Date    WBC 3.6 (L) 09/21/2022    WBC 4.1 06/08/2022    HGB 12.0 09/21/2022    HGB 12.1 06/08/2022    HCT 35.8 09/21/2022    HCT 35.8 06/08/2022     09/21/2022     06/08/2022     BMP:   Lab Results   Component Value Date     10/10/2022     (L) 09/21/2022    POTASSIUM 4.2 10/10/2022    POTASSIUM 4.3 09/21/2022    CHLORIDE 104 10/10/2022    CHLORIDE 101 09/21/2022    CO2 27 10/10/2022    CO2 28 09/21/2022    BUN 10 10/10/2022    BUN 14 09/21/2022    CR 0.61 10/10/2022    CR 0.65 09/21/2022     (H) 10/10/2022     (H) 09/21/2022     COAGS: No results found for: PTT, INR, FIBR  POC: No results found for: BGM, HCG, HCGS  HEPATIC:   Lab Results   Component Value Date    ALBUMIN 3.5 09/21/2022    PROTTOTAL 7.2 09/21/2022    ALT 21 09/21/2022    AST 19 09/21/2022    ALKPHOS 76 09/21/2022    BILITOTAL 0.2 09/21/2022     OTHER:   Lab Results   Component Value Date    A1C 5.6 06/08/2022    LEONID 9.0 10/10/2022    MAG 2.1 05/14/2020    LIPASE 208 09/21/2022    TSH 1.98 09/21/2022    T4 0.94 05/14/2021    CRP <2.9 09/21/2022    SED 17 12/23/2014       Anesthesia Plan    ASA Status:  3   NPO Status:  NPO Appropriate    Anesthesia Type: MAC.     - Reason for MAC: straight local not clinically adequate   Induction: Propofol, Intravenous.           Consents    Anesthesia Plan(s) and associated risks, benefits, and realistic alternatives discussed. Questions answered and patient/representative(s) expressed understanding.     - Discussed: Risks, Benefits and Alternatives for BOTH SEDATION and the PROCEDURE were discussed     - Discussed with:  Patient      - Extended Intubation/Ventilatory Support Discussed: No.      - Patient is  DNR/DNI Status: No    Use of blood products discussed: No .     Postoperative Care            Comments:    Other Comments: Waylon 5/10/23            MICHAEL Mohan CRNA

## 2023-05-10 NOTE — PROGRESS NOTES
[facility] [place]  [address]  [place] [address]  Phone: [phone]  Primary Provider: Melyssa Connors  Pre-op Performing Provider: MELYSSA CONNORS    :[id number]}  PREOPERATIVE EVALUATION:  Today's date: 5/10/2023    Mihaela Jang is a 73 year old female who presents for a preoperative evaluation.        Surgical Information:  Surgery/Procedure: Revision right trigger trigger finger release   Surgery Location: [facility]  Surgeon: Dr. Olivera  Surgery Date: 5/17/2023  Time of Surgery: TBD  Where patient plans to recover: At home alone  Fax number for surgical facility: Note does not need to be faxed, will be available electronically in Epic.    Assessment & Plan     The proposed surgical procedure is considered LOW risk.    Preop general physical exam  Proceed as planned    Trigger finger of right thumb  As above - planning revision - relief.    Hypothyroidism, unspecified type  Stable.  Continue Synthroid.  Due for lab next month.    Hyperlipidemia, unspecified hyperlipidemia type  Stable.  Continue statin Zocor - fasting labs next month.    Osteoporosis, unspecified osteoporosis type, unspecified pathological fracture presence  DEXA due 9/2023.  Prior Reclast x 5 years.  - DX Hip/Pelvis/Spine; Future    Abnormal glucose  Stable prediabets.    Personal history of malignant neoplasm of breast    Neutropenia, unspecified type (H)  Chronic.              - No identified additional risk factors other than previously addressed    Antiplatelet or Anticoagulation Medication Instructions:   - aspirin: Discontinue aspirin 7-10 days prior to procedure to reduce bleeding risk. It should be resumed postoperatively.     Additional Medication Instructions:  Patient is to take all scheduled medications on the day of surgery EXCEPT for modifications listed below:  vitamins/suppelemnts - on hold x 2 weeks prior    RECOMMENDATION:  APPROVAL GIVEN to proceed with proposed procedure, without further  diagnostic evaluation.      Subjective     HPI related to upcoming procedure: Right thumb trigger finger symptomatic - revision.          5/10/2023     1:08 PM   Preop Questions   1. Have you ever had a heart attack or stroke? No   2. Have you ever had surgery on your heart or blood vessels, such as a stent placement, a coronary artery bypass, or surgery on an artery in your head, neck, heart, or legs? No   3. Do you have chest pain with activity? No   4. Do you have a history of  heart failure? No   5. Do you currently have a cold, bronchitis or symptoms of other infection? No   6. Do you have a cough, shortness of breath, or wheezing? No   7. Do you or anyone in your family have previous history of blood clots? No   8. Do you or does anyone in your family have a serious bleeding problem such as prolonged bleeding following surgeries or cuts? No   9. Have you ever had problems with anemia or been told to take iron pills? YES - Takes 2 iron pills daily. One in the morning and one in the evening    10. Have you had any abnormal blood loss such as black, tarry or bloody stools, or abnormal vaginal bleeding? No   11. Have you ever had a blood transfusion? No   12. Are you willing to have a blood transfusion if it is medically needed before, during, or after your surgery? Yes   13. Have you or any of your relatives ever had problems with anesthesia? No   14. Do you have sleep apnea, excessive snoring or daytime drowsiness? No   15. Do you have any artifical heart valves or other implanted medical devices like a pacemaker, defibrillator, or continuous glucose monitor? No   16. Do you have artificial joints? No   17. Are you allergic to latex? No     Health Care Directive:  Patient has a Health Care Directive on file      Preoperative Review of :   reviewed - controlled substances reflected in medication list.    Neurontin only    Status of Chronic Conditions:  See problem list for active medical problems.  Problems  all longstanding and stable, except as noted/documented.  See ROS for pertinent symptoms related to these conditions.    HYPERLIPIDEMIA - Patient has a long history of significant Hyperlipidemia requiring medication for treatment with recent good control. Patient reports no problems or side effects with the medication.     HYPOTHYROIDISM - Patient has a longstanding history of chronic Hypothyroidism. Patient has been doing well, noting no tremor, insomnia, hair loss or changes in skin texture. Continues to take medications as directed, without adverse reactions or side effects. Last TSH   Lab Results   Component Value Date    TSH 1.98 09/21/2022   .      OSTEOPOROSIS -  DEXA due 9/2023; Reclast x 5 years - last dose 2020.  Prior Dr Moses.    PREDIABETES -   Lab Results   Component Value Date    A1C 5.6 06/08/2022    A1C 5.6 09/15/2020    A1C 5.7 10/21/2019    A1C 5.7 10/08/2018    A1C 5.5 11/01/2017    A1C 5.6 10/28/2016         Review of Systems  Constitutional, neuro, ENT, endocrine, pulmonary, cardiac, gastrointestinal, genitourinary, musculoskeletal, integument and psychiatric systems are negative, except as otherwise noted.    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  Ensure this is not a melanoma, path pending       Hyperlipidemia, unspecified hyperlipidemia type 09/15/2016     Priority: Medium     ACP (advance care planning) 01/22/2016     Priority: Medium     Advance Care Planning 6/2/2016: Receipt of ACP document:  Received: Health Care Directive which was witnessed or notarized on 6/1/16.  Document not previously scanned.  Validation form completed and sent with document to be scanned.  Code Status reflects choices in  most recent ACP document.  Confirmed/documented designated decision maker(s).  Added by Paola Johnson  Advance Care Planning 1/22/2016: Receipt of ACP document:  Received: Health Care Directive which was witnessed or notarized on 10-21-08.  Document previously scanned on 12-7-15.  Validation form completed and sent to be scanned.  Code Status needs to be updated to reflect choices in most recent ACP document.  Confirmed/documented designated decision maker(s).  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 11/16/2015     Priority: Medium     Personal history of malignant neoplasm of breast 12/23/2014     Priority: Medium     Neutropenia (H) 12/23/2014     Priority: Medium     Problem list name updated by automated process. Provider to review       Early satiety 12/23/2014     Priority: Medium     Abnormal glucose 12/20/2013     Priority: Medium     Problem list name updated by automated process. 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 Provider to review        Past Medical History:   Diagnosis Date     Atypical nevi      Chondrodermatitis nodularis helicis of left ear     [facility]     Chronic rhinitis     [facility] allergy; negative skin testing; IgE mediated allergies; ENT - DC nasal steroid and antihistamine; saline rinses     Degenerative skin disorder     solar elastosis     Hallux rigidus 06/15/2000     Hyperlipidemia, unspecified  hyperlipidemia type 9/15/2016     Laryngopharyngeal reflux disease     PPI     Malignant neoplasm of breast (female), unspecified site 03/10/2004     Notalgia paresthetica      Osteoarthritis 07/20/2011     Osteoporosis, unspecified 09/10/2001    Dr Moses; intermittent reclast;  Dr. Moses; repeat dexa after full 2 years Reclast     Other abnormal glucose 12/20/2013     Paresthesia     neck; notalgiaparesthetic; dr leahy & Dr Nevarez; neurontin     Personal history of malignant neoplasm of breast 06/07/2005     Rhinitis      Schamberg's purpura      Past Surgical History:   Procedure Laterality Date     BONE MARROW BIOPSY      negative     COLONOSCOPY  07/2000     COLONOSCOPY  8/13/2013    DR Fu; repeat 5 years     COLONOSCOPY N/A 8/13/2018    Procedure: COLONOSCOPY;  COLONOSCOPY;  Surgeon: Ajit Uriarte MD;  Location: [facility]     COLONOSCOPY N/A 12/2/2022    Procedure: COLONOSCOPY, WITH POLYPECTOMY AND BIOPSY;  Surgeon: Marcellus Jimenez MD;  Location: [facility]     DILATION AND CURETTAGE, OPERATIVE HYSTEROSCOPY, COMBINED N/A 2/13/2019    Procedure: EXAM UNDER ANESTHESIA , HYSTEROSCOPY;  Surgeon: Jovi Gabriel MD;  Location: [facility]     HEMORRHOIDECTOMY  1986     MASTECTOMY, BILATERAL  03/01/2004    right sided breast cancer     RELEASE TRIGGER FINGER Right 3/7/2018    Procedure: RELEASE TRIGGER FINGER;  RELEASE TRIGGER DIGIT RIGHT THUMB;  Surgeon: Jose Alfredo Brewster DO;  Location: [facility]     RELEASE TRIGGER FINGER Left 3/15/2023    Procedure: Left Thumb Trigger Finger Release;  Surgeon: Rigoberto Olivera MD;  Location: [facility]     UPPER GI ENDOSCOPY       [facility] COLONOSCOPY THRU STOMA WITH BIOPSY  08/25/2010    cancer screening, family h/o colon cancer; hyperplastic polyp     Current Outpatient Medications   Medication Sig Dispense Refill     acetaminophen (TYLENOL) 325 MG tablet Take 2 tablets (650 mg) by mouth every 4 hours as needed for mild pain 50 tablet 0     aspirin EC 81 MG tablet Take 1 tablet (81 mg) by  mouth daily 1 tablet 0     CALCIUM CITRATE 1,500 mg two times daily        cholecalciferol 1000 UNITS TABS 2 capsules po daily       cinnamon 500 MG CAPS Take 2 capsules by mouth daily        ferrous sulfate (SLO-FE) 142 (45 Fe) MG CR tablet Take 1 tablet (142 mg) by mouth daily 90 tablet 1     fish oil-omega-3 fatty acids 1000 MG capsule Take 1 g by mouth 2 times daily        gabapentin (NEURONTIN) 300 MG capsule TAKE 3 CAPSULES BY MOUTH AT BEDTIME 300 capsule 2     Lactobacillus (ACIDOPHILUS) TABS Take 1 tablet by mouth daily       levothyroxine (SYNTHROID/LEVOTHROID) 25 MCG tablet TAKE 1 TABLET BY MOUTH  DAILY 3 DAYS WEEKLY AND 2  TABLETS BY MOUTH DAILY FOR  4 DAYS PER WEEK 156 tablet 1     MAGNESIUM OXIDE PO Take 200 mg by mouth daily       Multiple vitamin  s TABS Take 1 tablet by mouth daily.       Multiple Vitamins-Minerals (PRESERVISION AREDS PO)        Polyethylene Glycol 3350 (MIRALAX PO) Use every other day       rOPINIRole (REQUIP) 1 MG tablet Take 4 tablets (4 mg) by mouth At Bedtime 270 tablet 3     saccharomyces boulardii (FLORASTOR) 250 MG capsule Take 1 capsule (250 mg) by mouth 2 times daily 90 capsule 1     simvastatin (ZOCOR) 20 MG tablet TAKE 1 TABLET BY MOUTH AT  BEDTIME 100 tablet 0     Turmeric 500 MG CAPS Take 2 capsules by mouth daily        zinc 50 MG TABS Take 1 tablet by mouth daily        fluticasone (FLONASE) 50 MCG/ACT nasal spray Spray 1 spray into both nostrils daily (Patient not taking: Reported on 5/9/2023) 18.2 mL 0       Allergies   Allergen Reactions     Chlorhexidine Gluconate Rash     Povidone Iodine Rash     Betadine      Soap Rash     Betadine         Social History     Tobacco Use     Smoking status: Never     Passive exposure: Never     Smokeless tobacco: Never   Vaping Use     Vaping status: Never Used   Substance Use Topics     Alcohol use: Yes     Alcohol/week: 0.0 standard drinks of alcohol     Comment: 1 glasso wine, weekly      Family History   Problem Relation Age  of Onset     Dementia Mother         (cause of death)      High cholesterol Mother      Osteoarthritis Mother      C.A.D. Father         (cause of death)      Prostate Cancer Father      Breast Cancer Maternal Aunt 72     Cerebrovascular Disease Maternal Grandmother         CVA     Diabetes Maternal Grandmother      History   Drug Use No         Objective     /64 (BP Location: Right arm, Patient Position: Sitting, Cuff Size: Adult Regular)   Pulse 69   Temp 97.4  F (36.3  C) (Tympanic)   Wt 53.3 kg (117 lb 8 oz)   SpO2 96%   BMI 22.20 kg/m      Physical Exam    GENERAL APPEARANCE: alert, no distress, cooperative and slim     EYES: EOMI, PERRL     HENT: ear canals and TM's normal and nose and mouth without ulcers or lesions     NECK: no adenopathy, no asymmetry, masses, or scars and thyroid normal to palpation     RESP: lungs clear to auscultation - no rales, rhonchi or wheezes     CV: regular rates and rhythm, normal S1 S2, no S3 or S4 and no murmur, click or rub     ABDOMEN:  soft, nontender, no HSM or masses and bowel sounds normal     MS: gait normal, peripheral pulses normal and no edema     SKIN: no suspicious lesions or rashes     NEURO: Normal strength and tone, sensory exam grossly normal, mentation intact and speech normal     PSYCH: mentation appears normal. and affect normal/bright     LYMPHATICS: No cervical adenopathy    Recent Labs   Lab Test 10/10/22  1403 09/21/22  1413 06/08/22  0908   HGB  --  12.0 12.1   PLT  --  263 245    132* 136   POTASSIUM 4.2 4.3 4.2   CR 0.61 0.65 0.64   A1C  --   --  5.6        Diagnostics:  No labs were ordered during this visit.   No EKG required, no history of coronary heart disease, significant arrhythmia, peripheral arterial disease or other structural heart disease.    Revised Cardiac Risk Index (RCRI):  The patient has the following serious cardiovascular risks for perioperative complications:   - No serious cardiac risks = 0 points     RCRI  Interpretation: 0 points: Class I (very low risk - 0.4% complication rate)           Signed Electronically by: Melyssa Encinas MD  Copy of this evaluation report is provided to requesting physician.

## 2023-05-10 NOTE — PATIENT INSTRUCTIONS
Come fasting for next preop - due for annual fasting labs.  Water and medication is just fine.    Dexa bone density scan due 2023 - order placed.    Stop all vitamins/supplements and Aspirin until after surgery.    Ok to take other prescription medications.        For informational purposes only. Not to replace the advice of your health care provider. Copyright   ,  [facility]. All rights reserved. Clinically reviewed by Hannah Ash MD. [facility] [id number] - REV .  Preparing for Your Surgery  Getting started  A nurse will call you to review your health history and instructions. They will give you an arrival time based on your scheduled surgery time. Please be ready to share:  Your doctor's clinic name and phone number  Your medical, surgical, and anesthesia history  A list of allergies and sensitivities  A list of medicines, including herbal treatments and over-the-counter drugs  Whether the patient has a legal guardian (ask how to send us the papers in advance)  Please tell us if you're pregnant--or if there's any chance you might be pregnant. Some surgeries may injure a fetus (unborn baby), so they require a pregnancy test. Surgeries that are safe for a fetus don't always need a test, and you can choose whether to have one.   If you have a child who's having surgery, please ask for a copy of Preparing for Your Child's Surgery.    Preparing for surgery  Within 10 to 30 days of surgery: Have a pre-op exam (sometimes called an H&P, or History and Physical). This can be done at a clinic or pre-operative center.  If you're having a , you may not need this exam. Talk to your care team.  At your pre-op exam, talk to your care team about all medicines you take. If you need to stop any medicines before surgery, ask when to start taking them again.  We do this for your safety. Many medicines can make you bleed too much during surgery. Some change how well surgery (anesthesia) drugs  work.  Call your insurance company to let them know you're having surgery. (If you don't have insurance, call [phone].)  Call your clinic if there's any change in your health. This includes signs of a cold or flu (sore throat, runny nose, cough, rash, fever). It also includes a scrape or scratch near the surgery site.  If you have questions on the day of surgery, call your hospital or surgery center.  Eating and drinking guidelines  For your safety: Unless your surgeon tells you otherwise, follow the guidelines below.  Eat and drink as usual until 8 hours before you arrive for surgery. After that, no food or milk.  Drink clear liquids until 2 hours before you arrive. These are liquids you can see through, like water, Gatorade, and Propel Water. They also include plain black coffee and tea (no cream or milk), candy, and breath mints. You can spit out gum when you arrive.  If you drink alcohol: Stop drinking it the night before surgery.  If your care team tells you to take medicine on the morning of surgery, it's okay to take it with a sip of water.  Preventing infection  Shower or bathe the night before and morning of your surgery. Follow the instructions your clinic gave you. (If no instructions, use regular soap.)  Don't shave or clip hair near your surgery site. We'll remove the hair if needed.  Don't smoke or vape the morning of surgery. You may chew nicotine gum up to 2 hours before surgery. A nicotine patch is okay.  Note: Some surgeries require you to completely quit smoking and nicotine. Check with your surgeon.  Your care team will make every effort to keep you safe from infection. We will:  Clean our hands often with soap and water (or an alcohol-based hand rub).  Clean the skin at your surgery site with a special soap that kills germs.  Give you a special gown to keep you warm. (Cold raises the risk of infection.)  Wear special hair covers, masks, gowns and gloves during surgery.  Give antibiotic  medicine, if prescribed. Not all surgeries need antibiotics.  What to bring on the day of surgery  Photo ID and insurance card  Copy of your health care directive, if you have one  Glasses and hearing aids (bring cases)  You can't wear contacts during surgery  Inhaler and eye drops, if you use them (tell us about these when you arrive)  CPAP machine or breathing device, if you use them  A few personal items, if spending the night  If you have . . .  A pacemaker, ICD (cardiac defibrillator) or other implant: Bring the ID card.  An implanted stimulator: Bring the remote control.  A legal guardian: Bring a copy of the certified (court-stamped) guardianship papers.  Please remove any jewelry, including body piercings. Leave jewelry and other valuables at home.  If you're going home the day of surgery  You must have a responsible adult drive you home. They should stay with you overnight as well.  If you don't have someone to stay with you, and you aren't safe to go home alone, we may keep you overnight. Insurance often won't pay for this.  After surgery  If it's hard to control your pain or you need more pain medicine, please call your surgeon's office.  Questions?   If you have any questions for your care team, list them here: _________________________________________________________________________________________________________________________________________________________________________ ____________________________________ ____________________________________ ____________________________________    How to Take Your Medication Before Surgery  - HOLD (do not take) Aspirin for 7 days  - STOP taking all vitamins and herbal supplements 14 days before surgery.

## 2023-05-10 NOTE — H&P (VIEW-ONLY)
[facility] [place]  [address]  [place] [address]  Phone: [phone]  Primary Provider: Melyssa Connors  Pre-op Performing Provider: MELYSSA CONNORS    :[id number]}  PREOPERATIVE EVALUATION:  Today's date: 5/10/2023    Mihaela Jang is a 73 year old female who presents for a preoperative evaluation.        Surgical Information:  Surgery/Procedure: Revision right trigger trigger finger release   Surgery Location: [facility] [place]  Surgeon: Dr. Olivera  Surgery Date: 5/17/2023  Time of Surgery: TBD  Where patient plans to recover: At home alone  Fax number for surgical facility: Note does not need to be faxed, will be available electronically in Epic.    Assessment & Plan     The proposed surgical procedure is considered LOW risk.    Preop general physical exam  Proceed as planned    Trigger finger of right thumb  As above - planning revision - relief.    Hypothyroidism, unspecified type  Stable.  Continue Synthroid.  Due for lab next month.    Hyperlipidemia, unspecified hyperlipidemia type  Stable.  Continue statin Zocor - fasting labs next month.    Osteoporosis, unspecified osteoporosis type, unspecified pathological fracture presence  DEXA due 9/2023.  Prior Reclast x 5 years.  - DX Hip/Pelvis/Spine; Future    Abnormal glucose  Stable prediabets.    Personal history of malignant neoplasm of breast    Neutropenia, unspecified type (H)  Chronic.              - No identified additional risk factors other than previously addressed    Antiplatelet or Anticoagulation Medication Instructions:   - aspirin: Discontinue aspirin 7-10 days prior to procedure to reduce bleeding risk. It should be resumed postoperatively.     Additional Medication Instructions:  Patient is to take all scheduled medications on the day of surgery EXCEPT for modifications listed below:  vitamins/suppelemnts - on hold x 2 weeks prior    RECOMMENDATION:  APPROVAL GIVEN to proceed with proposed procedure, without further  diagnostic evaluation.      Subjective     HPI related to upcoming procedure: Right thumb trigger finger symptomatic - revision.          5/10/2023     1:08 PM   Preop Questions   1. Have you ever had a heart attack or stroke? No   2. Have you ever had surgery on your heart or blood vessels, such as a stent placement, a coronary artery bypass, or surgery on an artery in your head, neck, heart, or legs? No   3. Do you have chest pain with activity? No   4. Do you have a history of  heart failure? No   5. Do you currently have a cold, bronchitis or symptoms of other infection? No   6. Do you have a cough, shortness of breath, or wheezing? No   7. Do you or anyone in your family have previous history of blood clots? No   8. Do you or does anyone in your family have a serious bleeding problem such as prolonged bleeding following surgeries or cuts? No   9. Have you ever had problems with anemia or been told to take iron pills? YES - Takes 2 iron pills daily. One in the morning and one in the evening    10. Have you had any abnormal blood loss such as black, tarry or bloody stools, or abnormal vaginal bleeding? No   11. Have you ever had a blood transfusion? No   12. Are you willing to have a blood transfusion if it is medically needed before, during, or after your surgery? Yes   13. Have you or any of your relatives ever had problems with anesthesia? No   14. Do you have sleep apnea, excessive snoring or daytime drowsiness? No   15. Do you have any artifical heart valves or other implanted medical devices like a pacemaker, defibrillator, or continuous glucose monitor? No   16. Do you have artificial joints? No   17. Are you allergic to latex? No     Health Care Directive:  Patient has a Health Care Directive on file      Preoperative Review of :   reviewed - controlled substances reflected in medication list.    Neurontin only    Status of Chronic Conditions:  See problem list for active medical problems.  Problems  all longstanding and stable, except as noted/documented.  See ROS for pertinent symptoms related to these conditions.    HYPERLIPIDEMIA - Patient has a long history of significant Hyperlipidemia requiring medication for treatment with recent good control. Patient reports no problems or side effects with the medication.     HYPOTHYROIDISM - Patient has a longstanding history of chronic Hypothyroidism. Patient has been doing well, noting no tremor, insomnia, hair loss or changes in skin texture. Continues to take medications as directed, without adverse reactions or side effects. Last TSH   Lab Results   Component Value Date    TSH 1.98 09/21/2022   .      OSTEOPOROSIS -  DEXA due 9/2023; Reclast x 5 years - last dose 2020.  Prior Dr Moses.    PREDIABETES -   Lab Results   Component Value Date    A1C 5.6 06/08/2022    A1C 5.6 09/15/2020    A1C 5.7 10/21/2019    A1C 5.7 10/08/2018    A1C 5.5 11/01/2017    A1C 5.6 10/28/2016         Review of Systems  Constitutional, neuro, ENT, endocrine, pulmonary, cardiac, gastrointestinal, genitourinary, musculoskeletal, integument and psychiatric systems are negative, except as otherwise noted.    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  Ensure this is not a melanoma, path pending       Hyperlipidemia, unspecified hyperlipidemia type 09/15/2016     Priority: Medium     ACP (advance care planning) 01/22/2016     Priority: Medium     Advance Care Planning 6/2/2016: Receipt of ACP document:  Received: Health Care Directive which was witnessed or notarized on 6/1/16.  Document not previously scanned.  Validation form completed and sent with document to be scanned.  Code Status reflects choices in  most recent ACP document.  Confirmed/documented designated decision maker(s).  Added by Paola Johnson  Advance Care Planning 1/22/2016: Receipt of ACP document:  Received: Health Care Directive which was witnessed or notarized on 10-21-08.  Document previously scanned on 12-7-15.  Validation form completed and sent to be scanned.  Code Status needs to be updated to reflect choices in most recent ACP document.  Confirmed/documented designated decision maker(s).  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 11/16/2015     Priority: Medium     Personal history of malignant neoplasm of breast 12/23/2014     Priority: Medium     Neutropenia (H) 12/23/2014     Priority: Medium     Problem list name updated by automated process. Provider to review       Early satiety 12/23/2014     Priority: Medium     Abnormal glucose 12/20/2013     Priority: Medium     Problem list name updated by automated process. 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 Provider to review        Past Medical History:   Diagnosis Date     Atypical nevi      Chondrodermatitis nodularis helicis of left ear     [facility]     Chronic rhinitis     [facility] allergy; negative skin testing; IgE mediated allergies; ENT - DC nasal steroid and antihistamine; saline rinses     Degenerative skin disorder     solar elastosis     Hallux rigidus 06/15/2000     Hyperlipidemia, unspecified  hyperlipidemia type 9/15/2016     Laryngopharyngeal reflux disease     PPI     Malignant neoplasm of breast (female), unspecified site 03/10/2004     Notalgia paresthetica      Osteoarthritis 07/20/2011     Osteoporosis, unspecified 09/10/2001    Dr Moses; intermittent reclast;  Dr. Moses; repeat dexa after full 2 years Reclast     Other abnormal glucose 12/20/2013     Paresthesia     neck; notalgiaparesthetic; dr leahy & Dr Nevarez; neurontin     Personal history of malignant neoplasm of breast 06/07/2005     Rhinitis      Schamberg's purpura      Past Surgical History:   Procedure Laterality Date     BONE MARROW BIOPSY      negative     COLONOSCOPY  07/2000     COLONOSCOPY  8/13/2013    DR Fu; repeat 5 years     COLONOSCOPY N/A 8/13/2018    Procedure: COLONOSCOPY;  COLONOSCOPY;  Surgeon: Ajit Uriarte MD;  Location: [facility]     COLONOSCOPY N/A 12/2/2022    Procedure: COLONOSCOPY, WITH POLYPECTOMY AND BIOPSY;  Surgeon: Marcellus Jimenez MD;  Location: [facility] OR     DILATION AND CURETTAGE, OPERATIVE HYSTEROSCOPY, COMBINED N/A 2/13/2019    Procedure: EXAM UNDER ANESTHESIA , HYSTEROSCOPY;  Surgeon: Jovi Gabriel MD;  Location: [facility] OR     HEMORRHOIDECTOMY  1986     MASTECTOMY, BILATERAL  03/01/2004    right sided breast cancer     RELEASE TRIGGER FINGER Right 3/7/2018    Procedure: RELEASE TRIGGER FINGER;  RELEASE TRIGGER DIGIT RIGHT THUMB;  Surgeon: Jose Alfredo Brewster DO;  Location: [facility] OR     RELEASE TRIGGER FINGER Left 3/15/2023    Procedure: Left Thumb Trigger Finger Release;  Surgeon: Rigoberto Olivera MD;  Location: [facility]     UPPER GI ENDOSCOPY       [facility] COLONOSCOPY THRU STOMA WITH BIOPSY  08/25/2010    cancer screening, family h/o colon cancer; hyperplastic polyp     Current Outpatient Medications   Medication Sig Dispense Refill     acetaminophen (TYLENOL) 325 MG tablet Take 2 tablets (650 mg) by mouth every 4 hours as needed for mild pain 50 tablet 0     aspirin EC 81 MG tablet Take 1 tablet (81 mg) by  mouth daily 1 tablet 0     CALCIUM CITRATE 1,500 mg two times daily        cholecalciferol 1000 UNITS TABS 2 capsules po daily       cinnamon 500 MG CAPS Take 2 capsules by mouth daily        ferrous sulfate (SLO-FE) 142 (45 Fe) MG CR tablet Take 1 tablet (142 mg) by mouth daily 90 tablet 1     fish oil-omega-3 fatty acids 1000 MG capsule Take 1 g by mouth 2 times daily        gabapentin (NEURONTIN) 300 MG capsule TAKE 3 CAPSULES BY MOUTH AT BEDTIME 300 capsule 2     Lactobacillus (ACIDOPHILUS) TABS Take 1 tablet by mouth daily       levothyroxine (SYNTHROID/LEVOTHROID) 25 MCG tablet TAKE 1 TABLET BY MOUTH  DAILY 3 DAYS WEEKLY AND 2  TABLETS BY MOUTH DAILY FOR  4 DAYS PER WEEK 156 tablet 1     MAGNESIUM OXIDE PO Take 200 mg by mouth daily       Multiple vitamin  s TABS Take 1 tablet by mouth daily.       Multiple Vitamins-Minerals (PRESERVISION AREDS PO)        Polyethylene Glycol 3350 (MIRALAX PO) Use every other day       rOPINIRole (REQUIP) 1 MG tablet Take 4 tablets (4 mg) by mouth At Bedtime 270 tablet 3     saccharomyces boulardii (FLORASTOR) 250 MG capsule Take 1 capsule (250 mg) by mouth 2 times daily 90 capsule 1     simvastatin (ZOCOR) 20 MG tablet TAKE 1 TABLET BY MOUTH AT  BEDTIME 100 tablet 0     Turmeric 500 MG CAPS Take 2 capsules by mouth daily        zinc 50 MG TABS Take 1 tablet by mouth daily        fluticasone (FLONASE) 50 MCG/ACT nasal spray Spray 1 spray into both nostrils daily (Patient not taking: Reported on 5/9/2023) 18.2 mL 0       Allergies   Allergen Reactions     Chlorhexidine Gluconate Rash     Povidone Iodine Rash     Betadine      Soap Rash     Betadine         Social History     Tobacco Use     Smoking status: Never     Passive exposure: Never     Smokeless tobacco: Never   Vaping Use     Vaping status: Never Used   Substance Use Topics     Alcohol use: Yes     Alcohol/week: 0.0 standard drinks of alcohol     Comment: 1 glasso wine, weekly      Family History   Problem Relation Age  of Onset     Dementia Mother         (cause of death)      High cholesterol Mother      Osteoarthritis Mother      C.A.D. Father         (cause of death)      Prostate Cancer Father      Breast Cancer Maternal Aunt 72     Cerebrovascular Disease Maternal Grandmother         CVA     Diabetes Maternal Grandmother      History   Drug Use No         Objective     /64 (BP Location: Right arm, Patient Position: Sitting, Cuff Size: Adult Regular)   Pulse 69   Temp 97.4  F (36.3  C) (Tympanic)   Wt 53.3 kg (117 lb 8 oz)   SpO2 96%   BMI 22.20 kg/m      Physical Exam    GENERAL APPEARANCE: alert, no distress, cooperative and slim     EYES: EOMI, PERRL     HENT: ear canals and TM's normal and nose and mouth without ulcers or lesions     NECK: no adenopathy, no asymmetry, masses, or scars and thyroid normal to palpation     RESP: lungs clear to auscultation - no rales, rhonchi or wheezes     CV: regular rates and rhythm, normal S1 S2, no S3 or S4 and no murmur, click or rub     ABDOMEN:  soft, nontender, no HSM or masses and bowel sounds normal     MS: gait normal, peripheral pulses normal and no edema     SKIN: no suspicious lesions or rashes     NEURO: Normal strength and tone, sensory exam grossly normal, mentation intact and speech normal     PSYCH: mentation appears normal. and affect normal/bright     LYMPHATICS: No cervical adenopathy    Recent Labs   Lab Test 10/10/22  1403 09/21/22  1413 06/08/22  0908   HGB  --  12.0 12.1   PLT  --  263 245    132* 136   POTASSIUM 4.2 4.3 4.2   CR 0.61 0.65 0.64   A1C  --   --  5.6        Diagnostics:  No labs were ordered during this visit.   No EKG required, no history of coronary heart disease, significant arrhythmia, peripheral arterial disease or other structural heart disease.    Revised Cardiac Risk Index (RCRI):  The patient has the following serious cardiovascular risks for perioperative complications:   - No serious cardiac risks = 0 points     RCRI  Interpretation: 0 points: Class I (very low risk - 0.4% complication rate)           Signed Electronically by: Melyssa Encinas MD  Copy of this evaluation report is provided to requesting physician.

## 2023-05-17 ENCOUNTER — ANESTHESIA (OUTPATIENT)
Dept: SURGERY | Facility: HOSPITAL | Age: 73
End: 2023-05-17
Payer: COMMERCIAL

## 2023-05-17 ENCOUNTER — HOSPITAL ENCOUNTER (OUTPATIENT)
Facility: HOSPITAL | Age: 73
Discharge: HOME | End: 2023-05-17
Attending: ORTHOPAEDIC SURGERY | Admitting: ORTHOPAEDIC SURGERY
Payer: COMMERCIAL

## 2023-05-17 VITALS
HEIGHT: 61 IN | TEMPERATURE: 97.6 F | OXYGEN SATURATION: 95 % | WEIGHT: 115.8 LBS | BODY MASS INDEX: 21.86 KG/M2 | HEART RATE: 56 BPM | RESPIRATION RATE: 16 BRPM | DIASTOLIC BLOOD PRESSURE: 68 MMHG | SYSTOLIC BLOOD PRESSURE: 134 MMHG

## 2023-05-17 DIAGNOSIS — M65.311 TRIGGER THUMB OF RIGHT HAND: Primary | ICD-10-CM

## 2023-05-17 PROCEDURE — 999N000141 HC STATISTIC PRE-PROCEDURE NURSING ASSESSMENT: Performed by: ORTHOPAEDIC SURGERY

## 2023-05-17 PROCEDURE — 26055 INCISE FINGER TENDON SHEATH: CPT | Performed by: NURSE ANESTHETIST, CERTIFIED REGISTERED

## 2023-05-17 PROCEDURE — 250N000011 HC RX IP 250 OP 636: Performed by: NURSE ANESTHETIST, CERTIFIED REGISTERED

## 2023-05-17 PROCEDURE — 26055 INCISE FINGER TENDON SHEATH: CPT | Mod: 58 | Performed by: ORTHOPAEDIC SURGERY

## 2023-05-17 PROCEDURE — 272N000001 HC OR GENERAL SUPPLY STERILE: Performed by: ORTHOPAEDIC SURGERY

## 2023-05-17 PROCEDURE — 99100 ANES PT EXTEME AGE<1 YR&>70: CPT | Performed by: NURSE ANESTHETIST, CERTIFIED REGISTERED

## 2023-05-17 PROCEDURE — 370N000017 HC ANESTHESIA TECHNICAL FEE, PER MIN: Performed by: ORTHOPAEDIC SURGERY

## 2023-05-17 PROCEDURE — 710N000012 HC RECOVERY PHASE 2, PER MINUTE: Performed by: ORTHOPAEDIC SURGERY

## 2023-05-17 PROCEDURE — 250N000009 HC RX 250: Performed by: NURSE ANESTHETIST, CERTIFIED REGISTERED

## 2023-05-17 PROCEDURE — 360N000075 HC SURGERY LEVEL 2, PER MIN: Performed by: ORTHOPAEDIC SURGERY

## 2023-05-17 PROCEDURE — 258N000003 HC RX IP 258 OP 636: Performed by: NURSE ANESTHETIST, CERTIFIED REGISTERED

## 2023-05-17 RX ORDER — LIDOCAINE HYDROCHLORIDE 20 MG/ML
INJECTION, SOLUTION INFILTRATION; PERINEURAL PRN
Status: DISCONTINUED | OUTPATIENT
Start: 2023-05-17 | End: 2023-05-17

## 2023-05-17 RX ORDER — ONDANSETRON 2 MG/ML
4 INJECTION INTRAMUSCULAR; INTRAVENOUS EVERY 30 MIN PRN
Status: DISCONTINUED | OUTPATIENT
Start: 2023-05-17 | End: 2023-05-17 | Stop reason: HOSPADM

## 2023-05-17 RX ORDER — IBUPROFEN 200 MG
600 TABLET ORAL
Status: DISCONTINUED | OUTPATIENT
Start: 2023-05-17 | End: 2023-05-17 | Stop reason: HOSPADM

## 2023-05-17 RX ORDER — FENTANYL CITRATE 50 UG/ML
50 INJECTION, SOLUTION INTRAMUSCULAR; INTRAVENOUS EVERY 5 MIN PRN
Status: DISCONTINUED | OUTPATIENT
Start: 2023-05-17 | End: 2023-05-17 | Stop reason: HOSPADM

## 2023-05-17 RX ORDER — NALOXONE HYDROCHLORIDE 0.4 MG/ML
0.2 INJECTION, SOLUTION INTRAMUSCULAR; INTRAVENOUS; SUBCUTANEOUS
Status: DISCONTINUED | OUTPATIENT
Start: 2023-05-17 | End: 2023-05-17 | Stop reason: HOSPADM

## 2023-05-17 RX ORDER — NALOXONE HYDROCHLORIDE 0.4 MG/ML
0.4 INJECTION, SOLUTION INTRAMUSCULAR; INTRAVENOUS; SUBCUTANEOUS
Status: DISCONTINUED | OUTPATIENT
Start: 2023-05-17 | End: 2023-05-17 | Stop reason: HOSPADM

## 2023-05-17 RX ORDER — SODIUM CHLORIDE, SODIUM LACTATE, POTASSIUM CHLORIDE, CALCIUM CHLORIDE 600; 310; 30; 20 MG/100ML; MG/100ML; MG/100ML; MG/100ML
INJECTION, SOLUTION INTRAVENOUS CONTINUOUS
Status: DISCONTINUED | OUTPATIENT
Start: 2023-05-17 | End: 2023-05-17 | Stop reason: HOSPADM

## 2023-05-17 RX ORDER — HYDROMORPHONE HYDROCHLORIDE 1 MG/ML
0.5 INJECTION, SOLUTION INTRAMUSCULAR; INTRAVENOUS; SUBCUTANEOUS EVERY 5 MIN PRN
Status: DISCONTINUED | OUTPATIENT
Start: 2023-05-17 | End: 2023-05-17 | Stop reason: HOSPADM

## 2023-05-17 RX ORDER — LIDOCAINE 40 MG/G
CREAM TOPICAL
Status: DISCONTINUED | OUTPATIENT
Start: 2023-05-17 | End: 2023-05-17 | Stop reason: HOSPADM

## 2023-05-17 RX ORDER — HYDROCODONE BITARTRATE AND ACETAMINOPHEN 5; 325 MG/1; MG/1
1 TABLET ORAL
Status: DISCONTINUED | OUTPATIENT
Start: 2023-05-17 | End: 2023-05-17 | Stop reason: HOSPADM

## 2023-05-17 RX ORDER — HYDRALAZINE HYDROCHLORIDE 20 MG/ML
2.5-5 INJECTION INTRAMUSCULAR; INTRAVENOUS EVERY 10 MIN PRN
Status: DISCONTINUED | OUTPATIENT
Start: 2023-05-17 | End: 2023-05-17 | Stop reason: HOSPADM

## 2023-05-17 RX ORDER — HYDROCODONE BITARTRATE AND ACETAMINOPHEN 5; 325 MG/1; MG/1
1 TABLET ORAL EVERY 6 HOURS PRN
Qty: 10 TABLET | Refills: 0 | Status: SHIPPED | OUTPATIENT
Start: 2023-05-17 | End: 2023-06-08

## 2023-05-17 RX ORDER — ONDANSETRON 4 MG/1
4 TABLET, ORALLY DISINTEGRATING ORAL EVERY 30 MIN PRN
Status: DISCONTINUED | OUTPATIENT
Start: 2023-05-17 | End: 2023-05-17 | Stop reason: HOSPADM

## 2023-05-17 RX ORDER — LABETALOL 20 MG/4 ML (5 MG/ML) INTRAVENOUS SYRINGE
10
Status: DISCONTINUED | OUTPATIENT
Start: 2023-05-17 | End: 2023-05-17 | Stop reason: HOSPADM

## 2023-05-17 RX ORDER — FENTANYL CITRATE 50 UG/ML
INJECTION, SOLUTION INTRAMUSCULAR; INTRAVENOUS PRN
Status: DISCONTINUED | OUTPATIENT
Start: 2023-05-17 | End: 2023-05-17

## 2023-05-17 RX ORDER — PROPOFOL 10 MG/ML
INJECTION, EMULSION INTRAVENOUS PRN
Status: DISCONTINUED | OUTPATIENT
Start: 2023-05-17 | End: 2023-05-17

## 2023-05-17 RX ADMIN — PROPOFOL 10 MG: 10 INJECTION, EMULSION INTRAVENOUS at 07:38

## 2023-05-17 RX ADMIN — MIDAZOLAM 0.5 MG: 1 INJECTION INTRAMUSCULAR; INTRAVENOUS at 07:27

## 2023-05-17 RX ADMIN — LIDOCAINE HYDROCHLORIDE 40 MG: 20 INJECTION, SOLUTION INFILTRATION; PERINEURAL at 07:31

## 2023-05-17 RX ADMIN — SODIUM CHLORIDE, POTASSIUM CHLORIDE, SODIUM LACTATE AND CALCIUM CHLORIDE: 600; 310; 30; 20 INJECTION, SOLUTION INTRAVENOUS at 07:23

## 2023-05-17 RX ADMIN — PROPOFOL 20 MG: 10 INJECTION, EMULSION INTRAVENOUS at 07:43

## 2023-05-17 RX ADMIN — PROPOFOL 10 MG: 10 INJECTION, EMULSION INTRAVENOUS at 07:31

## 2023-05-17 RX ADMIN — FENTANYL CITRATE 25 MCG: 50 INJECTION, SOLUTION INTRAMUSCULAR; INTRAVENOUS at 07:38

## 2023-05-17 ASSESSMENT — ACTIVITIES OF DAILY LIVING (ADL): ADLS_ACUITY_SCORE: 33

## 2023-05-17 NOTE — OP NOTE
Preop Dx: Right trigger thumb    Postop Dx: Right trigger thumb    Procedure: Right trigger thumb release    Surgeon: Rigoberto Olivera MD    Assistant: Wali Argueta RN/PA    Anesthesia: Local with MAC    Indications: Patient is a 73-year-old female with bilateral trigger thumbs.  Her left thumb is already been released and she wished to have the right one done.  All risks and benefits of surgical intervention were discussed with her and she wished to proceed.    Description: Patient taken to the operating room for adequate of anesthesia position, prepped and draped in usual sterile fashion the operative table.  Universal protocol was initiated once on the room in agreement and incision was made overlying the A1 pulley of the right thumb.  This was a chevron in nature.  The tendon sheath was encountered and soft tissue was stripped off of it.  It was then incised in line with the tendons and the tendons were ensured to be released.  Wound was copiously irrigated and closed with 4-0 nylon.  Sterile dressing was applied and she was taken stable to the postanesthesia care unit.

## 2023-05-17 NOTE — DISCHARGE INSTRUCTIONS
FOLLOW UP APPOINTMENT Wednesday MAY 31ST, 2023 AT 12:30 PM AT [facility] [place].    Post-Anesthesia Patient Instructions    IMMEDIATELY FOLLOWING SURGERY:  Do not drive or operate machinery for the first twenty four hours after surgery.  Do not make any important decisions for twenty four hours after surgery or while taking narcotic pain medications or sedatives.  If you develop intractable nausea and vomiting or a severe headache please notify your doctor immediately.    FOLLOW-UP:  Please make an appointment with your surgeon as instructed. You do not need to follow up with anesthesia unless specifically instructed to do so.    WOUND CARE INSTRUCTIONS (if applicable):  Keep a dry clean dressing on the anesthesia/puncture wound site if there is drainage.  Once the wound has quit draining you may leave it open to air.  Generally you should leave the bandage intact for twenty four hours unless there is drainage.  If the epidural site drains for more than 36-48 hours please call the anesthesia department.    QUESTIONS?:  Please feel free to call your physician or the hospital  if you have any questions, and they will be happy to assist you.

## 2023-05-17 NOTE — BRIEF OP NOTE
[facility]    Brief Operative Note    Pre-operative diagnosis: Trigger thumb of right hand [M65.311]  Post-operative diagnosis Same as pre-operative diagnosis    Procedure: Procedure(s):  Revision Right Thumb Trigger Release  Surgeon: Surgeon(s) and Role:     * Rigoberto Olivera MD - Primary  Anesthesia: MAC with Local   Estimated Blood Loss: None    Drains: None  Specimens: * No specimens in log *  Findings:   None.  Complications: None.  Implants: * No implants in log *

## 2023-05-17 NOTE — ANESTHESIA POSTPROCEDURE EVALUATION
Patient: Mihaela Jang    Procedure: Procedure(s):  Revision Right Thumb Trigger Release       Anesthesia Type:  MAC    Note:  Disposition: Outpatient   Postop Pain Control: Uneventful            Sign Out: Well controlled pain   PONV: No   Neuro/Psych: Uneventful            Sign Out: Acceptable/Baseline neuro status   Airway/Respiratory: Uneventful            Sign Out: Acceptable/Baseline resp. status   CV/Hemodynamics: Uneventful            Sign Out: Acceptable CV status; No obvious hypovolemia; No obvious fluid overload   Other NRE: NONE   DID A NON-ROUTINE EVENT OCCUR? No           Last vitals:  Vitals Value Taken Time   /68 05/17/23 0827   Temp 97.6  F (36.4  C) 05/17/23 0827   Pulse 56 05/17/23 0827   Resp 16 05/17/23 0827   SpO2 100 % 05/17/23 0828   Vitals shown include unvalidated device data.    Electronically Signed By: MICHAEL Souza CRNA  May 17, 2023  9:32 AM

## 2023-05-17 NOTE — ANESTHESIA CARE TRANSFER NOTE
Patient: Mihaela Jang    Procedure: Procedure(s):  Revision Right Thumb Trigger Release       Diagnosis: Trigger thumb of right hand [M65.311]  Diagnosis Additional Information: No value filed.    Anesthesia Type:   MAC     Note:    Oropharynx: oropharynx clear of all foreign objects and spontaneously breathing  Level of Consciousness: drowsy  Oxygen Supplementation: nasal cannula  Level of Supplemental Oxygen (L/min / FiO2): 2  Independent Airway: airway patency satisfactory and stable  Dentition: dentition unchanged  Vital Signs Stable: post-procedure vital signs reviewed and stable  Report to RN Given: handoff report given  Patient transferred to: Phase II    Handoff Report: Identifed the Patient, Identified the Reponsible Provider, Reviewed the pertinent medical history, Discussed the surgical course, Reviewed Intra-OP anesthesia mangement and issues during anesthesia, Set expectations for post-procedure period and Allowed opportunity for questions and acknowledgement of understanding      Vitals:  Vitals Value Taken Time   BP     Temp     Pulse     Resp     SpO2         Electronically Signed By: MICHAEL Souza CRNA  May 17, 2023  8:02 AM

## 2023-05-17 NOTE — BRIEF OP NOTE
[facility] [address]    Brief Operative Note    Pre-operative diagnosis: Trigger thumb of right hand [M65.311]  Post-operative diagnosis Same as pre-operative diagnosis    Procedure: Procedure(s):  Revision Right Thumb Trigger Release  Surgeon: Surgeon(s) and Role:     * Rigoberto Olivera MD - Primary  Anesthesia: MAC with Local   Estimated Blood Loss: None    Drains: None  Specimens: * No specimens in log *  Findings:   None.  Complications: None.  Implants: * No implants in log *

## 2023-05-31 ENCOUNTER — TRANSFERRED RECORDS (OUTPATIENT)
Dept: HEALTH INFORMATION MANAGEMENT | Facility: CLINIC | Age: 73
End: 2023-05-31

## 2023-06-06 NOTE — H&P (VIEW-ONLY)
[facility] - [place]  [address]  [place] [address]  Phone: [phone]  Primary Provider: Snehal Connors  Pre-op Performing Provider: SNEHAL CONNORS      PREOPERATIVE EVALUATION:  Today's date: 6/8/2023    Mihaela Jang is a 73 year old female who presents for a preoperative evaluation.      5/10/2023     1:06 PM   Additional Questions   Roomed by Hany Kirkpatrick   Accompanied by None     Surgical Information:  Surgery/Procedure: Right first metatarsophalangeal Joint Fusion  [place] [facility] [place]  [place]geon: Dr. Parker  Surgery Date: 6/16/2023  Time of Surgery: TBD  Where patient plans to recover: At home alone  Fax number for surgical facility: Note does not need to be faxed, will be available electronically in Epic.    Assessment & Plan     The proposed surgical procedure is considered INTERMEDIATE risk.    Preop general physical exam  Proceed as planned  Suggest patient show Dr Parker left foot as well - residual ingrown toenail piece - perhaps can remove at same time    Right foot pain  Planning 1st MTP fusion    Hypothyroidism, unspecified type  Continue Synthroid.  - TSH with free T4 reflex; Future  - TSH with free T4 reflex    Hyperlipidemia, unspecified hyperlipidemia type  Continue statin  - Lipid Profile (Chol, Trig, HDL, LDL calc); Future  - Comprehensive metabolic panel (BMP + Alb, Alk Phos, ALT, AST, Total. Bili, TP); Future  - Lipid Profile (Chol, Trig, HDL, LDL calc)  - Comprehensive metabolic panel (BMP + Alb, Alk Phos, ALT, AST, Total. Bili, TP)    Abnormal glucose  - Hemoglobin A1c; Future  - Comprehensive metabolic panel (BMP + Alb, Alk Phos, ALT, AST, Total. Bili, TP); Future  - Hemoglobin A1c  - Comprehensive metabolic panel (BMP + Alb, Alk Phos, ALT, AST, Total. Bili, TP)    Restless legs syndrome (RLS)  Stable with Requip and iron replacement  - CBC with platelets and differential; Future  - Ferritin; Future  - Iron and iron binding capacity; Future  -  CBC with platelets and differential  - Ferritin  - Iron and iron binding capacity    Osteoporosis, unspecified osteoporosis type, unspecified pathological fracture presence  Due for DEXA 9/2023  - Vitamin D Deficiency; Future  - DX Hip/Pelvis/Spine; Future  - Vitamin D Deficiency    Iron deficiency  - Ferritin; Future  - Iron and iron binding capacity; Future  - Ferritin  - Iron and iron binding capacity            - No identified additional risk factors other than previously addressed    Antiplatelet or Anticoagulation Medication Instructions:   - aspirin: Discontinue aspirin 7-10 days prior to procedure to reduce bleeding risk. It should be resumed postoperatively.     Additional Medication Instructions:  Patient is to take all scheduled medications on the day of surgery   - ibuprofen (Advil, Motrin): HOLD 1 day before surgery.     RECOMMENDATION:  APPROVAL GIVEN to proceed with proposed procedure, without further diagnostic evaluation.      Subjective       HPI related to upcoming procedure: Right foot pain, planning 1st MPT fusion.          6/8/2023     2:07 PM   Preop Questions   1. Have you ever had a heart attack or stroke? No   2. Have you ever had surgery on your heart or blood vessels, such as a stent placement, a coronary artery bypass, or surgery on an artery in your head, neck, heart, or legs? No   3. Do you have chest pain with activity? No   4. Do you have a history of  heart failure? No   5. Do you currently have a cold, bronchitis or symptoms of other infection? No   6. Do you have a cough, shortness of breath, or wheezing? No   7. Do you or anyone in your family have previous history of blood clots? No   8. Do you or does anyone in your family have a serious bleeding problem such as prolonged bleeding following surgeries or cuts? No   9. Have you ever had problems with anemia or been told to take iron pills? YES - Per Dr. Connors because iron level was low   10. Have you had any abnormal blood  loss such as black, tarry or bloody stools, or abnormal vaginal bleeding? No   11. Have you ever had a blood transfusion? No   12. Are you willing to have a blood transfusion if it is medically needed before, during, or after your surgery? Yes   13. Have you or any of your relatives ever had problems with anesthesia? No   14. Do you have sleep apnea, excessive snoring or daytime drowsiness? No   15. Do you have any artifical heart valves or other implanted medical devices like a pacemaker, defibrillator, or continuous glucose monitor? No   16. Do you have artificial joints? No   17. Are you allergic to latex? No     Health Care Directive:  Patient has a Health Care Directive on file    Preoperative Review of :   reviewed - controlled substances reflected in medication list.   Dr Olivera - opiate.  Dr Connors - chronic Neurontin.    Status of Chronic Conditions:  See problem list for active medical problems.  Problems all longstanding and stable, except as noted/documented.  See ROS for pertinent symptoms related to these conditions.    HYPERLIPIDEMIA - Patient has a long history of significant Hyperlipidemia requiring medication for treatment with recent good control. Patient reports no problems or side effects with the medication.     HYPOTHYROIDISM - Patient has a longstanding history of chronic Hypothyroidism. Patient has been doing well, noting no tremor, insomnia, hair loss or changes in skin texture. Continues to take medications as directed, without adverse reactions or side effects. Last TSH   Lab Results   Component Value Date    TSH 1.98 09/21/2022   .      PREDIABETES - due for a1c    OSTEOPOROSIS -  DEXA due - 9/2023; 5th dose reclast fall 2020; prior Dr Moses    RLS - on Requip and iron    Review of Systems  Constitutional, neuro, ENT, endocrine, pulmonary, cardiac, gastrointestinal, genitourinary, musculoskeletal, integument and psychiatric systems are negative, except as otherwise  noted.    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  Ensure this is not a melanoma, path pending       Hyperlipidemia, unspecified hyperlipidemia type 09/15/2016     Priority: Medium     ACP (advance care planning) 01/22/2016     Priority: Medium     Advance Care Planning 6/2/2016: Receipt of ACP document:  Received: Health Care Directive which was witnessed or notarized on 6/1/16.  Document not previously scanned.  Validation form completed and sent with document to be scanned.  Code Status reflects choices in most recent ACP document.  Confirmed/documented designated decision maker(s).  Added by Paola Johnson  Advance Care Planning 1/22/2016: Receipt of ACP document:  Received: Health Care Directive which was witnessed or notarized on 10-21-08.  Document previously scanned on 12-7-15.  Validation form completed and sent to be scanned.  Code Status needs to be updated to reflect choices in most recent ACP document.  Confirmed/documented designated decision maker(s).  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 11/16/2015     Priority: Medium     Personal history of malignant neoplasm of breast 12/23/2014     Priority: Medium     Neutropenia (H) 12/23/2014     Priority: Medium     Problem list name updated by automated process. Provider to review       Early satiety 12/23/2014     Priority: Medium     Abnormal glucose 12/20/2013     Priority: Medium     Problem list name updated by automated process. 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 Provider to review        Past Medical History:   Diagnosis Date     Atypical nevi      Chondrodermatitis nodularis helicis of left ear     Dr Shipley,  [facility]     Chronic rhinitis     Dr Messina, [facility]'s allergy; negative skin testing; IgE mediated allergies; ENT - DC nasal steroid and antihistamine; saline rinses     Degenerative skin disorder     solar elastosis     Hallux rigidus 06/15/2000     Hyperlipidemia, unspecified hyperlipidemia type 9/15/2016     Laryngopharyngeal reflux disease     PPI     Malignant neoplasm of breast (female), unspecified site 03/10/2004     Notalgia paresthetica      Osteoarthritis 07/20/2011     Osteoporosis, unspecified 09/10/2001    Dr Moses; intermittent reclast;  Dr. Moses; repeat dexa after full 2 years Reclast     Other abnormal glucose 12/20/2013     Paresthesia     neck; notalgiaparesthetic; dr leahy & Dr Nevarez; neurontin     Personal history of malignant neoplasm of breast 06/07/2005     Rhinitis      Schamberg's purpura      Past Surgical History:   Procedure Laterality Date     BONE MARROW BIOPSY      negative     COLONOSCOPY  07/2000     COLONOSCOPY  8/13/2013    DR Fu; repeat 5 years     COLONOSCOPY N/A 8/13/2018    Procedure: COLONOSCOPY;  COLONOSCOPY;  Surgeon: Ajit Uriarte MD;  Location: [facility] OR     COLONOSCOPY N/A 12/2/2022    Procedure: COLONOSCOPY, WITH POLYPECTOMY AND BIOPSY;  Surgeon: Marcellus Jimenez MD;  Location: [facility] OR     DILATION AND CURETTAGE, OPERATIVE HYSTEROSCOPY, COMBINED N/A 2/13/2019    Procedure: EXAM UNDER ANESTHESIA , HYSTEROSCOPY;  Surgeon: Jovi Gabriel MD;  Location: [facility]  OR     HEMORRHOIDECTOMY  1986     MASTECTOMY, BILATERAL  03/01/2004    right sided breast cancer     RELEASE TRIGGER FINGER Right 3/7/2018    Procedure: RELEASE TRIGGER FINGER;  RELEASE TRIGGER DIGIT RIGHT THUMB;  Surgeon: Jose Alfredo Brewster DO;  Location: [facility] OR     RELEASE TRIGGER FINGER Left 3/15/2023    Procedure: Left Thumb Trigger Finger Release;  Surgeon: Rigoberto Olivera MD;  Location: [facility] OR     RELEASE TRIGGER FINGER Right 5/17/2023    Procedure: Revision Right Thumb Trigger Release;  Surgeon: Rigoberto Olivera MD;  Location: [facility] OR     UPPER GI ENDOSCOPY       [facility] COLONOSCOPY THRU STOMA WITH BIOPSY  08/25/2010    cancer screening, family h/o colon cancer; hyperplastic polyp     Current Outpatient Medications   Medication Sig Dispense Refill     acetaminophen (TYLENOL) 325 MG tablet Take 2 tablets (650 mg) by mouth every 4 hours as needed for mild pain 50 tablet 0     aspirin EC 81 MG tablet Take 1 tablet (81 mg) by mouth daily 1 tablet 0     CALCIUM CITRATE 1,500 mg two times daily        cholecalciferol 1000 UNITS TABS 2 capsules po daily       cinnamon 500 MG CAPS Take 2 capsules by mouth daily        ferrous sulfate (SLO-FE) 142 (45 Fe) MG CR tablet Take 1 tablet (142 mg) by mouth daily 90 tablet 1     fish oil-omega-3 fatty acids 1000 MG capsule Take 1 g by mouth 2 times daily        gabapentin (NEURONTIN) 300 MG capsule TAKE 3 CAPSULES BY MOUTH AT BEDTIME 300 capsule 2     levothyroxine (SYNTHROID/LEVOTHROID) 25 MCG tablet TAKE 1 TABLET BY MOUTH  DAILY 3 DAYS WEEKLY AND 2  TABLETS BY MOUTH DAILY FOR  4 DAYS PER WEEK 156 tablet 1     MAGNESIUM OXIDE PO Take 200 mg by mouth daily       Multiple vitamin  s TABS Take 1 tablet by mouth daily.       Multiple Vitamins-Minerals (PRESERVISION AREDS PO)        Polyethylene Glycol 3350 (MIRALAX PO) Use every other day       rOPINIRole (REQUIP) 1 MG tablet Take 4 tablets (4 mg) by mouth At Bedtime 270 tablet 3     saccharomyces boulardii (FLORASTOR) 250 MG capsule  Take 1 capsule (250 mg) by mouth 2 times daily 90 capsule 1     simvastatin (ZOCOR) 20 MG tablet TAKE 1 TABLET BY MOUTH AT  BEDTIME 100 tablet 0     Turmeric 500 MG CAPS Take 2 capsules by mouth daily        zinc 50 MG TABS Take 1 tablet by mouth daily          Allergies   Allergen Reactions     Chlorhexidine Gluconate Rash     Povidone Iodine Rash     Betadine      Soap Rash     Betadine         Social History     Tobacco Use     Smoking status: Never     Passive exposure: Never     Smokeless tobacco: Never   Vaping Use     Vaping status: Never Used   Substance Use Topics     Alcohol use: Yes     Alcohol/week: 0.0 standard drinks of alcohol     Comment: 1 glasso wine, weekly      Family History   Problem Relation Age of Onset     Dementia Mother         (cause of death)      High cholesterol Mother      Osteoarthritis Mother      C.A.D. Father         (cause of death)      Prostate Cancer Father      Breast Cancer Maternal Aunt 72     Cerebrovascular Disease Maternal Grandmother         CVA     Diabetes Maternal Grandmother      History   Drug Use No         Objective     /70 (BP Location: Left arm, Patient Position: Sitting, Cuff Size: Adult Regular)   Pulse 54   Temp 97.3  F (36.3  C) (Tympanic)   Wt 50.8 kg (112 lb)   SpO2 99%   BMI 21.16 kg/m      Physical Exam    GENERAL APPEARANCE: healthy, alert and no distress     EYES: EOMI, PERRL     HENT: ear canals and TM's normal and nose and mouth without ulcers or lesions     NECK: no adenopathy, no asymmetry, masses, or scars and thyroid normal to palpation     RESP: lungs clear to auscultation - no rales, rhonchi or wheezes     CV: regular rates and rhythm, normal S1 S2, no S3 or S4 and no murmur, click or rub     ABDOMEN:  soft, nontender, no HSM or masses and bowel sounds normal     MS: extremities normal- no gross deformities noted, no evidence of inflammation in joints, FROM in all extremities.     MS: right foot 1st MTP tender to palpation; left  great toe with remnant of nail - few mm in size, sharp, tender     SKIN: no suspicious lesions or rashes     NEURO: Normal strength and tone, sensory exam grossly normal, mentation intact and speech normal     PSYCH: mentation appears normal. and affect normal/bright     LYMPHATICS: No cervical adenopathy    Recent Labs   Lab Test 10/10/22  1403 09/21/22  1413 06/08/22  0908   HGB  --  12.0 12.1   PLT  --  263 245    132* 136   POTASSIUM 4.2 4.3 4.2   CR 0.61 0.65 0.64   A1C  --   --  5.6        Diagnostics:  Recent Results (from the past 24 hour(s))   CBC with platelets and differential    Collection Time: 06/08/23  3:08 PM   Result Value Ref Range    WBC Count 4.3 4.0 - 11.0 10e3/uL    RBC Count 3.89 3.80 - 5.20 10e6/uL    Hemoglobin 12.6 11.7 - 15.7 g/dL    Hematocrit 36.8 35.0 - 47.0 %    MCV 95 78 - 100 fL    MCH 32.4 26.5 - 33.0 pg    MCHC 34.2 31.5 - 36.5 g/dL    RDW 12.9 10.0 - 15.0 %    Platelet Count 263 150 - 450 10e3/uL    % Neutrophils 64 %    % Lymphocytes 23 %    % Monocytes 11 %    % Eosinophils 1 %    % Basophils 1 %    % Immature Granulocytes 0 %    NRBCs per 100 WBC 0 <1 /100    Absolute Neutrophils 2.8 1.6 - 8.3 10e3/uL    Absolute Lymphocytes 1.0 0.8 - 5.3 10e3/uL    Absolute Monocytes 0.5 0.0 - 1.3 10e3/uL    Absolute Eosinophils 0.0 0.0 - 0.7 10e3/uL    Absolute Basophils 0.0 0.0 - 0.2 10e3/uL    Absolute Immature Granulocytes 0.0 <=0.4 10e3/uL    Absolute NRBCs 0.0 10e3/uL      No EKG required for low risk surgery (cataract, skin procedure, breast biopsy, etc).  No EKG required, no history of coronary heart disease, significant arrhythmia, peripheral arterial disease or other structural heart disease.    Revised Cardiac Risk Index (RCRI):  The patient has the following serious cardiovascular risks for perioperative complications:   - No serious cardiac risks = 0 points     RCRI Interpretation: 0 points: Class I (very low risk - 0.4% complication rate)           Signed Electronically by:  Melyssa Encinas MD  Copy of this evaluation report is provided to requesting physician.

## 2023-06-06 NOTE — PROGRESS NOTES
[facility] - [place]  [address]  [place] [address]  Phone: [phone]  Primary Provider: Snehal Connors  Pre-op Performing Provider: SNEHAL CONNORS      PREOPERATIVE EVALUATION:  Today's date: 6/8/2023    Mihaela Jang is a 73 year old female who presents for a preoperative evaluation.      5/10/2023     1:06 PM   Additional Questions   Roomed by Hany Kirkpatrick   Accompanied by None     Surgical Information:  Surgery/Procedure: Right first metatarsophalangeal Joint Fusion  Surgery Location: [facility] [place]  Surgeon: Dr. Parker  Surgery Date: 6/16/2023  Time of Surgery: TBD  Where patient plans to recover: At home alone  Fax number for surgical facility: Note does not need to be faxed, will be available electronically in Epic.    Assessment & Plan     The proposed surgical procedure is considered INTERMEDIATE risk.    Preop general physical exam  Proceed as planned  Suggest patient show Dr Parker left foot as well - residual ingrown toenail piece - perhaps can remove at same time    Right foot pain  Planning 1st MTP fusion    Hypothyroidism, unspecified type  Continue Synthroid.  - TSH with free T4 reflex; Future  - TSH with free T4 reflex    Hyperlipidemia, unspecified hyperlipidemia type  Continue statin  - Lipid Profile (Chol, Trig, HDL, LDL calc); Future  - Comprehensive metabolic panel (BMP + Alb, Alk Phos, ALT, AST, Total. Bili, TP); Future  - Lipid Profile (Chol, Trig, HDL, LDL calc)  - Comprehensive metabolic panel (BMP + Alb, Alk Phos, ALT, AST, Total. Bili, TP)    Abnormal glucose  - Hemoglobin A1c; Future  - Comprehensive metabolic panel (BMP + Alb, Alk Phos, ALT, AST, Total. Bili, TP); Future  - Hemoglobin A1c  - Comprehensive metabolic panel (BMP + Alb, Alk Phos, ALT, AST, Total. Bili, TP)    Restless legs syndrome (RLS)  Stable with Requip and iron replacement  - CBC with platelets and differential; Future  - Ferritin; Future  - Iron and iron binding capacity; Future  -  CBC with platelets and differential  - Ferritin  - Iron and iron binding capacity    Osteoporosis, unspecified osteoporosis type, unspecified pathological fracture presence  Due for DEXA 9/2023  - Vitamin D Deficiency; Future  - DX Hip/Pelvis/Spine; Future  - Vitamin D Deficiency    Iron deficiency  - Ferritin; Future  - Iron and iron binding capacity; Future  - Ferritin  - Iron and iron binding capacity            - No identified additional risk factors other than previously addressed    Antiplatelet or Anticoagulation Medication Instructions:   - aspirin: Discontinue aspirin 7-10 days prior to procedure to reduce bleeding risk. It should be resumed postoperatively.     Additional Medication Instructions:  Patient is to take all scheduled medications on the day of surgery   - ibuprofen (Advil, Motrin): HOLD 1 day before surgery.     RECOMMENDATION:  APPROVAL GIVEN to proceed with proposed procedure, without further diagnostic evaluation.      Subjective       HPI related to upcoming procedure: Right foot pain, planning 1st MPT fusion.          6/8/2023     2:07 PM   Preop Questions   1. Have you ever had a heart attack or stroke? No   2. Have you ever had surgery on your heart or blood vessels, such as a stent placement, a coronary artery bypass, or surgery on an artery in your head, neck, heart, or legs? No   3. Do you have chest pain with activity? No   4. Do you have a history of  heart failure? No   5. Do you currently have a cold, bronchitis or symptoms of other infection? No   6. Do you have a cough, shortness of breath, or wheezing? No   7. Do you or anyone in your family have previous history of blood clots? No   8. Do you or does anyone in your family have a serious bleeding problem such as prolonged bleeding following surgeries or cuts? No   9. Have you ever had problems with anemia or been told to take iron pills? YES - Per Dr. Connors because iron level was low   10. Have you had any abnormal blood  loss such as black, tarry or bloody stools, or abnormal vaginal bleeding? No   11. Have you ever had a blood transfusion? No   12. Are you willing to have a blood transfusion if it is medically needed before, during, or after your surgery? Yes   13. Have you or any of your relatives ever had problems with anesthesia? No   14. Do you have sleep apnea, excessive snoring or daytime drowsiness? No   15. Do you have any artifical heart valves or other implanted medical devices like a pacemaker, defibrillator, or continuous glucose monitor? No   16. Do you have artificial joints? No   17. Are you allergic to latex? No     Health Care Directive:  Patient has a Health Care Directive on file    Preoperative Review of :   reviewed - controlled substances reflected in medication list.   Dr Olivera - opiate.  Dr Connors - chronic Neurontin.    Status of Chronic Conditions:  See problem list for active medical problems.  Problems all longstanding and stable, except as noted/documented.  See ROS for pertinent symptoms related to these conditions.    HYPERLIPIDEMIA - Patient has a long history of significant Hyperlipidemia requiring medication for treatment with recent good control. Patient reports no problems or side effects with the medication.     HYPOTHYROIDISM - Patient has a longstanding history of chronic Hypothyroidism. Patient has been doing well, noting no tremor, insomnia, hair loss or changes in skin texture. Continues to take medications as directed, without adverse reactions or side effects. Last TSH   Lab Results   Component Value Date    TSH 1.98 09/21/2022   .      PREDIABETES - due for a1c    OSTEOPOROSIS -  DEXA due - 9/2023; 5th dose reclast fall 2020; prior Dr Moses    RLS - on Requip and iron    Review of Systems  Constitutional, neuro, ENT, endocrine, pulmonary, cardiac, gastrointestinal, genitourinary, musculoskeletal, integument and psychiatric systems are negative, except as otherwise  noted.    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  Ensure this is not a melanoma, path pending       Hyperlipidemia, unspecified hyperlipidemia type 09/15/2016     Priority: Medium     ACP (advance care planning) 01/22/2016     Priority: Medium     Advance Care Planning 6/2/2016: Receipt of ACP document:  Received: Health Care Directive which was witnessed or notarized on 6/1/16.  Document not previously scanned.  Validation form completed and sent with document to be scanned.  Code Status reflects choices in most recent ACP document.  Confirmed/documented designated decision maker(s).  Added by Paola Johnson  Advance Care Planning 1/22/2016: Receipt of ACP document:  Received: Health Care Directive which was witnessed or notarized on 10-21-08.  Document previously scanned on 12-7-15.  Validation form completed and sent to be scanned.  Code Status needs to be updated to reflect choices in most recent ACP document.  Confirmed/documented designated decision maker(s).  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 11/16/2015     Priority: Medium     Personal history of malignant neoplasm of breast 12/23/2014     Priority: Medium     Neutropenia (H) 12/23/2014     Priority: Medium     Problem list name updated by automated process. Provider to review       Early satiety 12/23/2014     Priority: Medium     Abnormal glucose 12/20/2013     Priority: Medium     Problem list name updated by automated process. 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 Provider to review        Past Medical History:   Diagnosis Date     Atypical nevi      Chondrodermatitis nodularis helicis of left ear     Dr Shipley,  [facility]     Chronic rhinitis     Dr Messina, [facility]'s allergy; negative skin testing; IgE mediated allergies; ENT - DC nasal steroid and antihistamine; saline rinses     Degenerative skin disorder     solar elastosis     Hallux rigidus 06/15/2000     Hyperlipidemia, unspecified hyperlipidemia type 9/15/2016     Laryngopharyngeal reflux disease     PPI     Malignant neoplasm of breast (female), unspecified site 03/10/2004     Notalgia paresthetica      Osteoarthritis 07/20/2011     Osteoporosis, unspecified 09/10/2001    Dr Moses; intermittent reclast;  Dr. Moses; repeat dexa after full 2 years Reclast     Other abnormal glucose 12/20/2013     Paresthesia     neck; notalgiaparesthetic; dr leahy & Dr Nevarez; neurontin     Personal history of malignant neoplasm of breast 06/07/2005     Rhinitis      Schamberg's purpura      Past Surgical History:   Procedure Laterality Date     BONE MARROW BIOPSY      negative     COLONOSCOPY  07/2000     COLONOSCOPY  8/13/2013    DR Fu; repeat 5 years     COLONOSCOPY N/A 8/13/2018    Procedure: COLONOSCOPY;  COLONOSCOPY;  Surgeon: Ajit Uriarte MD;  Location: [facility] OR     COLONOSCOPY N/A 12/2/2022    Procedure: COLONOSCOPY, WITH POLYPECTOMY AND BIOPSY;  Surgeon: Marcellus Jimenez MD;  Location: [facility] OR     DILATION AND CURETTAGE, OPERATIVE HYSTEROSCOPY, COMBINED N/A 2/13/2019    Procedure: EXAM UNDER ANESTHESIA , HYSTEROSCOPY;  Surgeon: Jovi Gabriel MD;  Location: [facility]  OR     HEMORRHOIDECTOMY  1986     MASTECTOMY, BILATERAL  03/01/2004    right sided breast cancer     RELEASE TRIGGER FINGER Right 3/7/2018    Procedure: RELEASE TRIGGER FINGER;  RELEASE TRIGGER DIGIT RIGHT THUMB;  Surgeon: Jose Alfredo Brewster DO;  Location: [facility] OR     RELEASE TRIGGER FINGER Left 3/15/2023    Procedure: Left Thumb Trigger Finger Release;  Surgeon: Rigoberto Olivera MD;  Location: [facility] OR     RELEASE TRIGGER FINGER Right 5/17/2023    Procedure: Revision Right Thumb Trigger Release;  Surgeon: Rigoberto Olivera MD;  Location: [facility] OR     UPPER GI ENDOSCOPY       [facility] COLONOSCOPY THRU STOMA WITH BIOPSY  08/25/2010    cancer screening, family h/o colon cancer; hyperplastic polyp     Current Outpatient Medications   Medication Sig Dispense Refill     acetaminophen (TYLENOL) 325 MG tablet Take 2 tablets (650 mg) by mouth every 4 hours as needed for mild pain 50 tablet 0     aspirin EC 81 MG tablet Take 1 tablet (81 mg) by mouth daily 1 tablet 0     CALCIUM CITRATE 1,500 mg two times daily        cholecalciferol 1000 UNITS TABS 2 capsules po daily       cinnamon 500 MG CAPS Take 2 capsules by mouth daily        ferrous sulfate (SLO-FE) 142 (45 Fe) MG CR tablet Take 1 tablet (142 mg) by mouth daily 90 tablet 1     fish oil-omega-3 fatty acids 1000 MG capsule Take 1 g by mouth 2 times daily        gabapentin (NEURONTIN) 300 MG capsule TAKE 3 CAPSULES BY MOUTH AT BEDTIME 300 capsule 2     levothyroxine (SYNTHROID/LEVOTHROID) 25 MCG tablet TAKE 1 TABLET BY MOUTH  DAILY 3 DAYS WEEKLY AND 2  TABLETS BY MOUTH DAILY FOR  4 DAYS PER WEEK 156 tablet 1     MAGNESIUM OXIDE PO Take 200 mg by mouth daily       Multiple vitamin  s TABS Take 1 tablet by mouth daily.       Multiple Vitamins-Minerals (PRESERVISION AREDS PO)        Polyethylene Glycol 3350 (MIRALAX PO) Use every other day       rOPINIRole (REQUIP) 1 MG tablet Take 4 tablets (4 mg) by mouth At Bedtime 270 tablet 3     saccharomyces boulardii (FLORASTOR) 250 MG capsule  Take 1 capsule (250 mg) by mouth 2 times daily 90 capsule 1     simvastatin (ZOCOR) 20 MG tablet TAKE 1 TABLET BY MOUTH AT  BEDTIME 100 tablet 0     Turmeric 500 MG CAPS Take 2 capsules by mouth daily        zinc 50 MG TABS Take 1 tablet by mouth daily          Allergies   Allergen Reactions     Chlorhexidine Gluconate Rash     Povidone Iodine Rash     Betadine      Soap Rash     Betadine         Social History     Tobacco Use     Smoking status: Never     Passive exposure: Never     Smokeless tobacco: Never   Vaping Use     Vaping status: Never Used   Substance Use Topics     Alcohol use: Yes     Alcohol/week: 0.0 standard drinks of alcohol     Comment: 1 glasso wine, weekly      Family History   Problem Relation Age of Onset     Dementia Mother         (cause of death)      High cholesterol Mother      Osteoarthritis Mother      C.A.D. Father         (cause of death)      Prostate Cancer Father      Breast Cancer Maternal Aunt 72     Cerebrovascular Disease Maternal Grandmother         CVA     Diabetes Maternal Grandmother      History   Drug Use No         Objective     /70 (BP Location: Left arm, Patient Position: Sitting, Cuff Size: Adult Regular)   Pulse 54   Temp 97.3  F (36.3  C) (Tympanic)   Wt 50.8 kg (112 lb)   SpO2 99%   BMI 21.16 kg/m      Physical Exam    GENERAL APPEARANCE: healthy, alert and no distress     EYES: EOMI, PERRL     HENT: ear canals and TM's normal and nose and mouth without ulcers or lesions     NECK: no adenopathy, no asymmetry, masses, or scars and thyroid normal to palpation     RESP: lungs clear to auscultation - no rales, rhonchi or wheezes     CV: regular rates and rhythm, normal S1 S2, no S3 or S4 and no murmur, click or rub     ABDOMEN:  soft, nontender, no HSM or masses and bowel sounds normal     MS: extremities normal- no gross deformities noted, no evidence of inflammation in joints, FROM in all extremities.     MS: right foot 1st MTP tender to palpation; left  great toe with remnant of nail - few mm in size, sharp, tender     SKIN: no suspicious lesions or rashes     NEURO: Normal strength and tone, sensory exam grossly normal, mentation intact and speech normal     PSYCH: mentation appears normal. and affect normal/bright     LYMPHATICS: No cervical adenopathy    Recent Labs   Lab Test 10/10/22  1403 09/21/22  1413 06/08/22  0908   HGB  --  12.0 12.1   PLT  --  263 245    132* 136   POTASSIUM 4.2 4.3 4.2   CR 0.61 0.65 0.64   A1C  --   --  5.6        Diagnostics:  Recent Results (from the past 24 hour(s))   CBC with platelets and differential    Collection Time: 06/08/23  3:08 PM   Result Value Ref Range    WBC Count 4.3 4.0 - 11.0 10e3/uL    RBC Count 3.89 3.80 - 5.20 10e6/uL    Hemoglobin 12.6 11.7 - 15.7 g/dL    Hematocrit 36.8 35.0 - 47.0 %    MCV 95 78 - 100 fL    MCH 32.4 26.5 - 33.0 pg    MCHC 34.2 31.5 - 36.5 g/dL    RDW 12.9 10.0 - 15.0 %    Platelet Count 263 150 - 450 10e3/uL    % Neutrophils 64 %    % Lymphocytes 23 %    % Monocytes 11 %    % Eosinophils 1 %    % Basophils 1 %    % Immature Granulocytes 0 %    NRBCs per 100 WBC 0 <1 /100    Absolute Neutrophils 2.8 1.6 - 8.3 10e3/uL    Absolute Lymphocytes 1.0 0.8 - 5.3 10e3/uL    Absolute Monocytes 0.5 0.0 - 1.3 10e3/uL    Absolute Eosinophils 0.0 0.0 - 0.7 10e3/uL    Absolute Basophils 0.0 0.0 - 0.2 10e3/uL    Absolute Immature Granulocytes 0.0 <=0.4 10e3/uL    Absolute NRBCs 0.0 10e3/uL      No EKG required for low risk surgery (cataract, skin procedure, breast biopsy, etc).  No EKG required, no history of coronary heart disease, significant arrhythmia, peripheral arterial disease or other structural heart disease.    Revised Cardiac Risk Index (RCRI):  The patient has the following serious cardiovascular risks for perioperative complications:   - No serious cardiac risks = 0 points     RCRI Interpretation: 0 points: Class I (very low risk - 0.4% complication rate)           Signed Electronically by:  Melyssa Encinas MD  Copy of this evaluation report is provided to requesting physician.

## 2023-06-08 ENCOUNTER — OFFICE VISIT (OUTPATIENT)
Dept: FAMILY MEDICINE | Facility: OTHER | Age: 73
End: 2023-06-08
Attending: FAMILY MEDICINE
Payer: COMMERCIAL

## 2023-06-08 VITALS
DIASTOLIC BLOOD PRESSURE: 70 MMHG | OXYGEN SATURATION: 99 % | BODY MASS INDEX: 21.16 KG/M2 | SYSTOLIC BLOOD PRESSURE: 117 MMHG | WEIGHT: 112 LBS | TEMPERATURE: 97.3 F | HEART RATE: 54 BPM

## 2023-06-08 DIAGNOSIS — E61.1 IRON DEFICIENCY: ICD-10-CM

## 2023-06-08 DIAGNOSIS — G25.81 RESTLESS LEGS SYNDROME (RLS): ICD-10-CM

## 2023-06-08 DIAGNOSIS — E78.5 HYPERLIPIDEMIA, UNSPECIFIED HYPERLIPIDEMIA TYPE: ICD-10-CM

## 2023-06-08 DIAGNOSIS — M81.0 OSTEOPOROSIS, UNSPECIFIED OSTEOPOROSIS TYPE, UNSPECIFIED PATHOLOGICAL FRACTURE PRESENCE: Chronic | ICD-10-CM

## 2023-06-08 DIAGNOSIS — Z01.818 PREOP GENERAL PHYSICAL EXAM: Primary | ICD-10-CM

## 2023-06-08 DIAGNOSIS — R73.09 ABNORMAL GLUCOSE: ICD-10-CM

## 2023-06-08 DIAGNOSIS — M79.671 RIGHT FOOT PAIN: ICD-10-CM

## 2023-06-08 DIAGNOSIS — E03.9 HYPOTHYROIDISM, UNSPECIFIED TYPE: Chronic | ICD-10-CM

## 2023-06-08 LAB
ALBUMIN SERPL BCG-MCNC: 4 G/DL (ref 3.5–5.2)
ALP SERPL-CCNC: 71 U/L (ref 35–104)
ALT SERPL W P-5'-P-CCNC: 18 U/L (ref 10–35)
ANION GAP SERPL CALCULATED.3IONS-SCNC: 9 MMOL/L (ref 7–15)
AST SERPL W P-5'-P-CCNC: 24 U/L (ref 10–35)
BASOPHILS # BLD AUTO: 0 10E3/UL (ref 0–0.2)
BASOPHILS NFR BLD AUTO: 1 %
BILIRUB SERPL-MCNC: 0.2 MG/DL
BUN SERPL-MCNC: 9.2 MG/DL (ref 8–23)
CALCIUM SERPL-MCNC: 9.6 MG/DL (ref 8.8–10.2)
CHLORIDE SERPL-SCNC: 98 MMOL/L (ref 98–107)
CHOLEST SERPL-MCNC: 139 MG/DL
CREAT SERPL-MCNC: 0.65 MG/DL (ref 0.51–0.95)
DEPRECATED HCO3 PLAS-SCNC: 27 MMOL/L (ref 22–29)
EOSINOPHIL # BLD AUTO: 0 10E3/UL (ref 0–0.7)
EOSINOPHIL NFR BLD AUTO: 1 %
ERYTHROCYTE [DISTWIDTH] IN BLOOD BY AUTOMATED COUNT: 12.9 % (ref 10–15)
EST. AVERAGE GLUCOSE BLD GHB EST-MCNC: 126 MG/DL
FERRITIN SERPL-MCNC: 148 NG/ML (ref 11–328)
GFR SERPL CREATININE-BSD FRML MDRD: >90 ML/MIN/1.73M2
GLUCOSE SERPL-MCNC: 96 MG/DL (ref 70–99)
HBA1C MFR BLD: 6 %
HCT VFR BLD AUTO: 36.8 % (ref 35–47)
HDLC SERPL-MCNC: 70 MG/DL
HGB BLD-MCNC: 12.6 G/DL (ref 11.7–15.7)
IMM GRANULOCYTES # BLD: 0 10E3/UL
IMM GRANULOCYTES NFR BLD: 0 %
IRON BINDING CAPACITY (ROCHE): 272 UG/DL (ref 240–430)
IRON SATN MFR SERPL: 40 % (ref 15–46)
IRON SERPL-MCNC: 108 UG/DL (ref 37–145)
LDLC SERPL CALC-MCNC: 57 MG/DL
LYMPHOCYTES # BLD AUTO: 1 10E3/UL (ref 0.8–5.3)
LYMPHOCYTES NFR BLD AUTO: 23 %
MCH RBC QN AUTO: 32.4 PG (ref 26.5–33)
MCHC RBC AUTO-ENTMCNC: 34.2 G/DL (ref 31.5–36.5)
MCV RBC AUTO: 95 FL (ref 78–100)
MONOCYTES # BLD AUTO: 0.5 10E3/UL (ref 0–1.3)
MONOCYTES NFR BLD AUTO: 11 %
NEUTROPHILS # BLD AUTO: 2.8 10E3/UL (ref 1.6–8.3)
NEUTROPHILS NFR BLD AUTO: 64 %
NONHDLC SERPL-MCNC: 69 MG/DL
NRBC # BLD AUTO: 0 10E3/UL
NRBC BLD AUTO-RTO: 0 /100
PLATELET # BLD AUTO: 263 10E3/UL (ref 150–450)
POTASSIUM SERPL-SCNC: 4.5 MMOL/L (ref 3.4–5.3)
PROT SERPL-MCNC: 6.5 G/DL (ref 6.4–8.3)
RBC # BLD AUTO: 3.89 10E6/UL (ref 3.8–5.2)
SODIUM SERPL-SCNC: 134 MMOL/L (ref 136–145)
TRIGL SERPL-MCNC: 59 MG/DL
TSH SERPL DL<=0.005 MIU/L-ACNC: 1.61 UIU/ML (ref 0.3–4.2)
WBC # BLD AUTO: 4.3 10E3/UL (ref 4–11)

## 2023-06-08 PROCEDURE — 80053 COMPREHEN METABOLIC PANEL: CPT | Mod: ZL | Performed by: FAMILY MEDICINE

## 2023-06-08 PROCEDURE — 83036 HEMOGLOBIN GLYCOSYLATED A1C: CPT | Mod: ZL | Performed by: FAMILY MEDICINE

## 2023-06-08 PROCEDURE — G0463 HOSPITAL OUTPT CLINIC VISIT: HCPCS | Performed by: FAMILY MEDICINE

## 2023-06-08 PROCEDURE — 36415 COLL VENOUS BLD VENIPUNCTURE: CPT | Mod: ZL | Performed by: FAMILY MEDICINE

## 2023-06-08 PROCEDURE — 85025 COMPLETE CBC W/AUTO DIFF WBC: CPT | Mod: ZL | Performed by: FAMILY MEDICINE

## 2023-06-08 PROCEDURE — 82306 VITAMIN D 25 HYDROXY: CPT | Mod: ZL | Performed by: FAMILY MEDICINE

## 2023-06-08 PROCEDURE — 83550 IRON BINDING TEST: CPT | Mod: ZL | Performed by: FAMILY MEDICINE

## 2023-06-08 PROCEDURE — 99214 OFFICE O/P EST MOD 30 MIN: CPT | Mod: 24 | Performed by: FAMILY MEDICINE

## 2023-06-08 PROCEDURE — 80061 LIPID PANEL: CPT | Mod: ZL | Performed by: FAMILY MEDICINE

## 2023-06-08 PROCEDURE — 84443 ASSAY THYROID STIM HORMONE: CPT | Mod: ZL | Performed by: FAMILY MEDICINE

## 2023-06-08 PROCEDURE — 82728 ASSAY OF FERRITIN: CPT | Mod: ZL | Performed by: FAMILY MEDICINE

## 2023-06-08 ASSESSMENT — PAIN SCALES - GENERAL: PAINLEVEL: NO PAIN (0)

## 2023-06-08 NOTE — PATIENT INSTRUCTIONS
For informational purposes only. Not to replace the advice of your health care provider. Copyright   2003,  [facility]. All rights reserved. Clinically reviewed by Hannah Ash MD. Candid io [id number] - REV .  Preparing for Your Surgery  Getting started  A nurse will call you to review your health history and instructions. They will give you an arrival time based on your scheduled surgery time. Please be ready to share:  Your doctor's clinic name and phone number  Your medical, surgical, and anesthesia history  A list of allergies and sensitivities  A list of medicines, including herbal treatments and over-the-counter drugs  Whether the patient has a legal guardian (ask how to send us the papers in advance)  Please tell us if you're pregnant--or if there's any chance you might be pregnant. Some surgeries may injure a fetus (unborn baby), so they require a pregnancy test. Surgeries that are safe for a fetus don't always need a test, and you can choose whether to have one.   If you have a child who's having surgery, please ask for a copy of Preparing for Your Child's Surgery.    Preparing for surgery  Within 10 to 30 days of surgery: Have a pre-op exam (sometimes called an H&P, or History and Physical). This can be done at a clinic or pre-operative center.  If you're having a , you may not need this exam. Talk to your care team.  At your pre-op exam, talk to your care team about all medicines you take. If you need to stop any medicines before surgery, ask when to start taking them again.  We do this for your safety. Many medicines can make you bleed too much during surgery. Some change how well surgery (anesthesia) drugs work.  Call your insurance company to let them know you're having surgery. (If you don't have insurance, call [phone].)  Call your clinic if there's any change in your health. This includes signs of a cold or flu (sore throat, runny nose, cough, rash, fever). It  also includes a scrape or scratch near the surgery site.  If you have questions on the day of surgery, call your hospital or surgery center.  Eating and drinking guidelines  For your safety: Unless your surgeon tells you otherwise, follow the guidelines below.  Eat and drink as usual until 8 hours before you arrive for surgery. After that, no food or milk.  Drink clear liquids until 2 hours before you arrive. These are liquids you can see through, like water, Gatorade, and Propel Water. They also include plain black coffee and tea (no cream or milk), candy, and breath mints. You can spit out gum when you arrive.  If you drink alcohol: Stop drinking it the night before surgery.  If your care team tells you to take medicine on the morning of surgery, it's okay to take it with a sip of water.  Preventing infection  Shower or bathe the night before and morning of your surgery. Follow the instructions your clinic gave you. (If no instructions, use regular soap.)  Don't shave or clip hair near your surgery site. We'll remove the hair if needed.  Don't smoke or vape the morning of surgery. You may chew nicotine gum up to 2 hours before surgery. A nicotine patch is okay.  Note: Some surgeries require you to completely quit smoking and nicotine. Check with your surgeon.  Your care team will make every effort to keep you safe from infection. We will:  Clean our hands often with soap and water (or an alcohol-based hand rub).  Clean the skin at your surgery site with a special soap that kills germs.  Give you a special gown to keep you warm. (Cold raises the risk of infection.)  Wear special hair covers, masks, gowns and gloves during surgery.  Give antibiotic medicine, if prescribed. Not all surgeries need antibiotics.  What to bring on the day of surgery  Photo ID and insurance card  Copy of your health care directive, if you have one  Glasses and hearing aids (bring cases)  You can't wear contacts during surgery  Inhaler and  eye drops, if you use them (tell us about these when you arrive)  CPAP machine or breathing device, if you use them  A few personal items, if spending the night  If you have . . .  A pacemaker, ICD (cardiac defibrillator) or other implant: Bring the ID card.  An implanted stimulator: Bring the remote control.  A legal guardian: Bring a copy of the certified (court-stamped) guardianship papers.  Please remove any jewelry, including body piercings. Leave jewelry and other valuables at home.  If you're going home the day of surgery  You must have a responsible adult drive you home. They should stay with you overnight as well.  If you don't have someone to stay with you, and you aren't safe to go home alone, we may keep you overnight. Insurance often won't pay for this.  After surgery  If it's hard to control your pain or you need more pain medicine, please call your surgeon's office.  Questions?   If you have any questions for your care team, list them here: _________________________________________________________________________________________________________________________________________________________________________ ____________________________________ ____________________________________ ____________________________________    How to Take Your Medication Before Surgery  - Take all of your medications before surgery except as noted below  - HOLD (do not take) Aspirin for 7 days  - HOLD (do not take) Ibuprofen products at least 1 day prior.  - STOP taking all vitamins and herbal supplements 14 days before surgery.    DEXA due 9/2023 - ordered.  Labs today - will call with results.    Inquire with DR Parker - left great toe residual toenail piece - can it be removed at time of surger.

## 2023-06-09 ENCOUNTER — TELEPHONE (OUTPATIENT)
Dept: FAMILY MEDICINE | Facility: OTHER | Age: 73
End: 2023-06-09

## 2023-06-09 NOTE — TELEPHONE ENCOUNTER
2:49 PM    Reason for Call: Phone Call    Description: Mihaela saw Dr Connors on Thursday 6/8/23 she would like her lab report printed out and mailed to her at   [place] [address]

## 2023-06-10 LAB — DEPRECATED CALCIDIOL+CALCIFEROL SERPL-MC: 72 UG/L (ref 20–75)

## 2023-06-13 ENCOUNTER — ANESTHESIA EVENT (OUTPATIENT)
Dept: SURGERY | Facility: HOSPITAL | Age: 73
End: 2023-06-13
Payer: COMMERCIAL

## 2023-06-13 NOTE — ANESTHESIA PREPROCEDURE EVALUATION
Anesthesia Pre-Procedure Evaluation    Patient: Mihaela Jang   MRN: [id number] : 1950        Procedure : Procedure(s):  Right First Metatarsophalangeal Joint Fusion          Past Medical History:   Diagnosis Date     Atypical nevi      Chondrodermatitis nodularis helicis of left ear     Dr Shipley, [facility]     Chronic rhinitis     Dr Messina, [facility]'s allergy; negative skin testing; IgE mediated allergies; ENT - DC nasal steroid and antihistamine; saline rinses     Degenerative skin disorder     solar elastosis     Hallux rigidus 06/15/2000     Hyperlipidemia, unspecified hyperlipidemia type 9/15/2016     Laryngopharyngeal reflux disease     PPI     Malignant neoplasm of breast (female), unspecified site 03/10/2004     Notalgia paresthetica      Osteoarthritis 2011     Osteoporosis, unspecified 09/10/2001    Dr Moses; intermittent reclast;  Dr. Moses; repeat dexa after full 2 years Reclast     Other abnormal glucose 2013     Paresthesia     neck; notalgiaparesthetic; dr leahy & Dr Nevarez; neurontin     Personal history of malignant neoplasm of breast 2005     Rhinitis      Schamberg's purpura       Past Surgical History:   Procedure Laterality Date     BONE MARROW BIOPSY      negative     COLONOSCOPY  2000     COLONOSCOPY  2013    DR Fu; repeat 5 years     COLONOSCOPY N/A 2018    Procedure: COLONOSCOPY;  COLONOSCOPY;  Surgeon: Ajit Uriarte MD;  Location: [facility]     COLONOSCOPY N/A 2022    Procedure: COLONOSCOPY, WITH POLYPECTOMY AND BIOPSY;  Surgeon: Marcellus Jimenez MD;  Location: [facility]     DILATION AND CURETTAGE, OPERATIVE HYSTEROSCOPY, COMBINED N/A 2019    Procedure: EXAM UNDER ANESTHESIA , HYSTEROSCOPY;  Surgeon: Jovi Gabriel MD;  Location: [facility]     HEMORRHOIDECTOMY  1986     MASTECTOMY, BILATERAL  2004    right sided breast cancer     RELEASE TRIGGER FINGER Right 3/7/2018    Procedure: RELEASE TRIGGER FINGER;  RELEASE TRIGGER  DIGIT RIGHT THUMB;  Surgeon: Jose Alfredo Brewster DO;  Location: [facility]     RELEASE TRIGGER FINGER Left 3/15/2023    Procedure: Left Thumb Trigger Finger Release;  Surgeon: Rigoberto Olivera MD;  Location: [facility]     RELEASE TRIGGER FINGER Right 5/17/2023    Procedure: Revision Right Thumb Trigger Release;  Surgeon: Rigoberto Olivera MD;  Location: [facility]     UPPER GI ENDOSCOPY       ZZHC COLONOSCOPY THRU STOMA WITH BIOPSY  08/25/2010    cancer screening, family h/o colon cancer; hyperplastic polyp      Allergies   Allergen Reactions     Chlorhexidine Gluconate Rash     Povidone Iodine Rash     Betadine      Soap Rash     Betadine       Social History     Tobacco Use     Smoking status: Never     Passive exposure: Never     Smokeless tobacco: Never   Vaping Use     Vaping status: Never Used   Substance Use Topics     Alcohol use: Yes     Alcohol/week: 0.0 standard drinks of alcohol     Comment: 1 glasso wine, weekly       Wt Readings from Last 1 Encounters:   06/08/23 50.8 kg (112 lb)        Anesthesia Evaluation   Pt has had prior anesthetic. Type: General and MAC.    No history of anesthetic complications       ROS/MED HX  ENT/Pulmonary:     (+) allergic rhinitis, recent URI, resolved,     Neurologic:     (+) peripheral neuropathy, - Paresthesia.     Cardiovascular:     (+) Dyslipidemia -----    METS/Exercise Tolerance: >4 METS    Hematologic: Comments: Neutropenia     (+) anemia,     Musculoskeletal:   (+) arthritis,     GI/Hepatic:  - neg GI/hepatic ROS  GERD: occasional, resolves spontaneously.   Renal/Genitourinary:  - neg Renal ROS     Endo: Comment: Has been on prednisone for skin rash   PREDIABETIC    (+) thyroid problem, hypothyroidism synthroid,     Psychiatric/Substance Use:  - neg psychiatric ROS     Infectious Disease:  - neg infectious disease ROS     Malignancy:   (+) Malignancy, History of Breast.Breast CA Remission status post Surgery.        Other: Comment: Notalgia paresthetica - neg other ROS           Physical Exam    Airway  airway exam normal      Mallampati: II   TM distance: > 3 FB   Neck ROM: full   Mouth opening: > 3 cm    Respiratory Devices and Support         Dental       (+) Completely normal teeth      Cardiovascular   cardiovascular exam normal       Rhythm and rate: regular and normal     Pulmonary   pulmonary exam normal        breath sounds clear to auscultation           OUTSIDE LABS:  CBC:   Lab Results   Component Value Date    WBC 4.3 06/08/2023    WBC 3.6 (L) 09/21/2022    HGB 12.6 06/08/2023    HGB 12.0 09/21/2022    HCT 36.8 06/08/2023    HCT 35.8 09/21/2022     06/08/2023     09/21/2022     BMP:   Lab Results   Component Value Date     (L) 06/08/2023     10/10/2022    POTASSIUM 4.5 06/08/2023    POTASSIUM 4.2 10/10/2022    CHLORIDE 98 06/08/2023    CHLORIDE 104 10/10/2022    CO2 27 06/08/2023    CO2 27 10/10/2022    BUN 9.2 06/08/2023    BUN 10 10/10/2022    CR 0.65 06/08/2023    CR 0.61 10/10/2022    GLC 96 06/08/2023     (H) 10/10/2022     COAGS: No results found for: PTT, INR, FIBR  POC: No results found for: BGM, HCG, HCGS  HEPATIC:   Lab Results   Component Value Date    ALBUMIN 4.0 06/08/2023    PROTTOTAL 6.5 06/08/2023    ALT 18 06/08/2023    AST 24 06/08/2023    ALKPHOS 71 06/08/2023    BILITOTAL 0.2 06/08/2023     OTHER:   Lab Results   Component Value Date    A1C 6.0 (H) 06/08/2023    LEONID 9.6 06/08/2023    MAG 2.1 05/14/2020    LIPASE 208 09/21/2022    TSH 1.61 06/08/2023    T4 0.94 05/14/2021    CRP <2.9 09/21/2022    SED 17 12/23/2014       Anesthesia Plan    ASA Status:  2   NPO Status:  NPO Appropriate    Anesthesia Type: MAC.     - Reason for MAC: straight local not clinically adequate              Consents    Anesthesia Plan(s) and associated risks, benefits, and realistic alternatives discussed. Questions answered and patient/representative(s) expressed understanding.     - Discussed: Risks, Benefits and Alternatives for BOTH SEDATION  and the PROCEDURE were discussed     - Discussed with:  Patient      - Extended Intubation/Ventilatory Support Discussed: No.      - Patient is DNR/DNI Status: No    Use of blood products discussed: No .     Postoperative Care            Comments:    Other Comments:  6/8/23            MICHAEL Mohan CRNA

## 2023-06-16 ENCOUNTER — ANESTHESIA (OUTPATIENT)
Dept: SURGERY | Facility: HOSPITAL | Age: 73
End: 2023-06-16
Payer: COMMERCIAL

## 2023-06-16 ENCOUNTER — HOSPITAL ENCOUNTER (OUTPATIENT)
Facility: HOSPITAL | Age: 73
Discharge: HOME | End: 2023-06-16
Attending: PODIATRIST | Admitting: PODIATRIST
Payer: COMMERCIAL

## 2023-06-16 ENCOUNTER — APPOINTMENT (OUTPATIENT)
Dept: GENERAL RADIOLOGY | Facility: HOSPITAL | Age: 73
End: 2023-06-16
Attending: PODIATRIST
Payer: COMMERCIAL

## 2023-06-16 VITALS
SYSTOLIC BLOOD PRESSURE: 153 MMHG | HEIGHT: 61 IN | HEART RATE: 50 BPM | RESPIRATION RATE: 16 BRPM | WEIGHT: 114.4 LBS | OXYGEN SATURATION: 99 % | BODY MASS INDEX: 21.6 KG/M2 | DIASTOLIC BLOOD PRESSURE: 79 MMHG | TEMPERATURE: 97.2 F

## 2023-06-16 DIAGNOSIS — G89.18 POST-OP PAIN: Primary | ICD-10-CM

## 2023-06-16 PROCEDURE — 250N000011 HC RX IP 250 OP 636: Performed by: PODIATRIST

## 2023-06-16 PROCEDURE — 250N000011 HC RX IP 250 OP 636: Performed by: NURSE ANESTHETIST, CERTIFIED REGISTERED

## 2023-06-16 PROCEDURE — 28750 FUSION OF BIG TOE JOINT: CPT | Performed by: NURSE ANESTHETIST, CERTIFIED REGISTERED

## 2023-06-16 PROCEDURE — 99100 ANES PT EXTEME AGE<1 YR&>70: CPT | Performed by: NURSE ANESTHETIST, CERTIFIED REGISTERED

## 2023-06-16 PROCEDURE — 370N000017 HC ANESTHESIA TECHNICAL FEE, PER MIN: Performed by: PODIATRIST

## 2023-06-16 PROCEDURE — 250N000009 HC RX 250: Performed by: NURSE ANESTHETIST, CERTIFIED REGISTERED

## 2023-06-16 PROCEDURE — 272N000001 HC OR GENERAL SUPPLY STERILE: Performed by: PODIATRIST

## 2023-06-16 PROCEDURE — 999N000141 HC STATISTIC PRE-PROCEDURE NURSING ASSESSMENT: Performed by: PODIATRIST

## 2023-06-16 PROCEDURE — C1713 ANCHOR/SCREW BN/BN,TIS/BN: HCPCS | Performed by: PODIATRIST

## 2023-06-16 PROCEDURE — 28750 FUSION OF BIG TOE JOINT: CPT | Mod: 79 | Performed by: PODIATRIST

## 2023-06-16 PROCEDURE — 360N000084 HC SURGERY LEVEL 4 W/ FLUORO, PER MIN: Performed by: PODIATRIST

## 2023-06-16 PROCEDURE — 250N000009 HC RX 250: Performed by: PODIATRIST

## 2023-06-16 PROCEDURE — 258N000003 HC RX IP 258 OP 636: Performed by: NURSE ANESTHETIST, CERTIFIED REGISTERED

## 2023-06-16 PROCEDURE — 710N000012 HC RECOVERY PHASE 2, PER MINUTE: Performed by: PODIATRIST

## 2023-06-16 PROCEDURE — 999N000179 XR SURGERY CARM FLUORO LESS THAN 5 MIN W STILLS

## 2023-06-16 DEVICE — IMP SCR ARTHREX QUICKFIX CANC PT 3.0X28MM TI AR-8730-28PT
Type: IMPLANTABLE DEVICE | Site: ANKLE | Status: NON-FUNCTIONAL
Removed: 2024-08-16

## 2023-06-16 DEVICE — SCREW VAL KREULOCK TI 3.0 X 16 AR-8933VCL-16
Type: IMPLANTABLE DEVICE | Site: ANKLE | Status: NON-FUNCTIONAL
Removed: 2024-08-16

## 2023-06-16 DEVICE — IMP SCR ARTHREX CORTICAL LP 3X18MM TI AR-8933-18
Type: IMPLANTABLE DEVICE | Site: ANKLE | Status: NON-FUNCTIONAL
Removed: 2024-08-16

## 2023-06-16 DEVICE — IMP PLATE ARTHREX STR LOW PRO 3.0MM 4H TI AR-8952TS-04
Type: IMPLANTABLE DEVICE | Site: ANKLE | Status: NON-FUNCTIONAL
Removed: 2024-08-16

## 2023-06-16 DEVICE — SCREW BONE KREULOCK TITANIUM 14X3MM AR-8933VCL-14
Type: IMPLANTABLE DEVICE | Site: ANKLE | Status: NON-FUNCTIONAL
Removed: 2024-08-16

## 2023-06-16 RX ORDER — CEFAZOLIN SODIUM/WATER 2 G/20 ML
2 SYRINGE (ML) INTRAVENOUS SEE ADMIN INSTRUCTIONS
Status: DISCONTINUED | OUTPATIENT
Start: 2023-06-16 | End: 2023-06-16 | Stop reason: HOSPADM

## 2023-06-16 RX ORDER — ONDANSETRON 4 MG/1
4 TABLET, ORALLY DISINTEGRATING ORAL EVERY 30 MIN PRN
Status: DISCONTINUED | OUTPATIENT
Start: 2023-06-16 | End: 2023-06-16 | Stop reason: HOSPADM

## 2023-06-16 RX ORDER — BUPIVACAINE HYDROCHLORIDE 5 MG/ML
INJECTION, SOLUTION EPIDURAL; INTRACAUDAL
Status: DISCONTINUED
Start: 2023-06-16 | End: 2023-06-16 | Stop reason: HOSPADM

## 2023-06-16 RX ORDER — CEFAZOLIN SODIUM/WATER 2 G/20 ML
2 SYRINGE (ML) INTRAVENOUS
Status: COMPLETED | OUTPATIENT
Start: 2023-06-16 | End: 2023-06-16

## 2023-06-16 RX ORDER — OXYCODONE HYDROCHLORIDE 5 MG/1
5 TABLET ORAL
Status: DISCONTINUED | OUTPATIENT
Start: 2023-06-16 | End: 2023-06-16 | Stop reason: HOSPADM

## 2023-06-16 RX ORDER — IBUPROFEN 600 MG/1
600 TABLET, FILM COATED ORAL EVERY 6 HOURS
Qty: 12 TABLET | Refills: 0 | Status: SHIPPED | OUTPATIENT
Start: 2023-06-16 | End: 2023-06-19

## 2023-06-16 RX ORDER — ONDANSETRON 2 MG/ML
4 INJECTION INTRAMUSCULAR; INTRAVENOUS EVERY 30 MIN PRN
Status: DISCONTINUED | OUTPATIENT
Start: 2023-06-16 | End: 2023-06-16 | Stop reason: HOSPADM

## 2023-06-16 RX ORDER — SODIUM CHLORIDE, SODIUM LACTATE, POTASSIUM CHLORIDE, CALCIUM CHLORIDE 600; 310; 30; 20 MG/100ML; MG/100ML; MG/100ML; MG/100ML
INJECTION, SOLUTION INTRAVENOUS CONTINUOUS
Status: DISCONTINUED | OUTPATIENT
Start: 2023-06-16 | End: 2023-06-16 | Stop reason: HOSPADM

## 2023-06-16 RX ORDER — HYDROMORPHONE HYDROCHLORIDE 1 MG/ML
0.5 INJECTION, SOLUTION INTRAMUSCULAR; INTRAVENOUS; SUBCUTANEOUS EVERY 5 MIN PRN
Status: DISCONTINUED | OUTPATIENT
Start: 2023-06-16 | End: 2023-06-16 | Stop reason: HOSPADM

## 2023-06-16 RX ORDER — ASPIRIN 81 MG/1
81 TABLET ORAL 2 TIMES DAILY
Qty: 60 TABLET | Refills: 0 | Status: SHIPPED | OUTPATIENT
Start: 2023-06-16

## 2023-06-16 RX ORDER — LIDOCAINE HYDROCHLORIDE 10 MG/ML
INJECTION, SOLUTION EPIDURAL; INFILTRATION; INTRACAUDAL; PERINEURAL
Status: DISCONTINUED
Start: 2023-06-16 | End: 2023-06-16 | Stop reason: HOSPADM

## 2023-06-16 RX ORDER — LABETALOL 20 MG/4 ML (5 MG/ML) INTRAVENOUS SYRINGE
10
Status: DISCONTINUED | OUTPATIENT
Start: 2023-06-16 | End: 2023-06-16 | Stop reason: HOSPADM

## 2023-06-16 RX ORDER — NALOXONE HYDROCHLORIDE 0.4 MG/ML
0.4 INJECTION, SOLUTION INTRAMUSCULAR; INTRAVENOUS; SUBCUTANEOUS
Status: DISCONTINUED | OUTPATIENT
Start: 2023-06-16 | End: 2023-06-16 | Stop reason: HOSPADM

## 2023-06-16 RX ORDER — LIDOCAINE 40 MG/G
CREAM TOPICAL
Status: DISCONTINUED | OUTPATIENT
Start: 2023-06-16 | End: 2023-06-16 | Stop reason: HOSPADM

## 2023-06-16 RX ORDER — ACETAMINOPHEN 500 MG
1000 TABLET ORAL EVERY 8 HOURS
Qty: 30 TABLET | Refills: 0 | Status: SHIPPED | OUTPATIENT
Start: 2023-06-16 | End: 2023-06-21

## 2023-06-16 RX ORDER — NALOXONE HYDROCHLORIDE 0.4 MG/ML
0.2 INJECTION, SOLUTION INTRAMUSCULAR; INTRAVENOUS; SUBCUTANEOUS
Status: DISCONTINUED | OUTPATIENT
Start: 2023-06-16 | End: 2023-06-16 | Stop reason: HOSPADM

## 2023-06-16 RX ORDER — LIDOCAINE HYDROCHLORIDE 20 MG/ML
INJECTION, SOLUTION INFILTRATION; PERINEURAL PRN
Status: DISCONTINUED | OUTPATIENT
Start: 2023-06-16 | End: 2023-06-16

## 2023-06-16 RX ORDER — HYDRALAZINE HYDROCHLORIDE 20 MG/ML
2.5-5 INJECTION INTRAMUSCULAR; INTRAVENOUS EVERY 10 MIN PRN
Status: DISCONTINUED | OUTPATIENT
Start: 2023-06-16 | End: 2023-06-16 | Stop reason: HOSPADM

## 2023-06-16 RX ORDER — PROPOFOL 10 MG/ML
INJECTION, EMULSION INTRAVENOUS PRN
Status: DISCONTINUED | OUTPATIENT
Start: 2023-06-16 | End: 2023-06-16

## 2023-06-16 RX ORDER — FENTANYL CITRATE 50 UG/ML
50 INJECTION, SOLUTION INTRAMUSCULAR; INTRAVENOUS EVERY 5 MIN PRN
Status: DISCONTINUED | OUTPATIENT
Start: 2023-06-16 | End: 2023-06-16 | Stop reason: HOSPADM

## 2023-06-16 RX ORDER — OXYCODONE HYDROCHLORIDE 5 MG/1
5 TABLET ORAL EVERY 6 HOURS PRN
Qty: 12 TABLET | Refills: 0 | Status: SHIPPED | OUTPATIENT
Start: 2023-06-16 | End: 2023-09-06

## 2023-06-16 RX ORDER — PROPOFOL 10 MG/ML
INJECTION, EMULSION INTRAVENOUS CONTINUOUS PRN
Status: DISCONTINUED | OUTPATIENT
Start: 2023-06-16 | End: 2023-06-16

## 2023-06-16 RX ADMIN — Medication 2 G: at 09:17

## 2023-06-16 RX ADMIN — SODIUM CHLORIDE, POTASSIUM CHLORIDE, SODIUM LACTATE AND CALCIUM CHLORIDE: 600; 310; 30; 20 INJECTION, SOLUTION INTRAVENOUS at 08:55

## 2023-06-16 RX ADMIN — LIDOCAINE HYDROCHLORIDE 40 MG: 20 INJECTION, SOLUTION INFILTRATION; PERINEURAL at 09:37

## 2023-06-16 RX ADMIN — PROPOFOL 30 MG: 10 INJECTION, EMULSION INTRAVENOUS at 09:36

## 2023-06-16 RX ADMIN — PROPOFOL 75 MCG/KG/MIN: 10 INJECTION, EMULSION INTRAVENOUS at 09:37

## 2023-06-16 ASSESSMENT — ACTIVITIES OF DAILY LIVING (ADL)
ADLS_ACUITY_SCORE: 35
ADLS_ACUITY_SCORE: 35

## 2023-06-16 NOTE — ANESTHESIA POSTPROCEDURE EVALUATION
Patient: Mihaela Jang    Procedure: Procedure(s):  Right First Metatarsophalangeal Joint Fusion       Anesthesia Type:  MAC    Note:  Disposition: Outpatient   Postop Pain Control: Uneventful            Sign Out: Well controlled pain   PONV: No   Neuro/Psych: Uneventful            Sign Out: Acceptable/Baseline neuro status   Airway/Respiratory: Uneventful            Sign Out: Acceptable/Baseline resp. status   CV/Hemodynamics: Uneventful            Sign Out: Acceptable CV status; No obvious hypovolemia; No obvious fluid overload   Other NRE: NONE   DID A NON-ROUTINE EVENT OCCUR? No           Last vitals:  Vitals Value Taken Time   /79 06/16/23 1100   Temp 97.2  F (36.2  C) 06/16/23 1100   Pulse 50 06/16/23 1100   Resp 16 06/16/23 1100   SpO2 99 % 06/16/23 1100   Vitals shown include unvalidated device data.    Electronically Signed By: MICHAEL Mohan CRNA  June 16, 2023  11:51 AM

## 2023-06-16 NOTE — INTERVAL H&P NOTE
I have reviewed the surgical (or preoperative) H&P that is linked to this encounter, and examined the patient. There are no significant changes    Clinical Conditions Present on Arrival:  Clinically Significant Risk Factors Present on Admission         # Hyponatremia: Lowest Na = 134 mmol/L in last 30 days, will monitor as appropriate         # Drug Induced Platelet Defect: home medication list includes an antiplatelet medication

## 2023-06-16 NOTE — DISCHARGE INSTRUCTIONS
"    Post-Anesthesia Patient Instructions    IMMEDIATELY FOLLOWING SURGERY:  Do not drive or operate machinery for the first twenty four hours after surgery.  Do not make any important decisions for twenty four hours after surgery or while taking narcotic pain medications or sedatives.  If you develop intractable nausea and vomiting or a severe headache please notify your doctor immediately.    FOLLOW-UP:  Please make an appointment with your surgeon as instructed. You do not need to follow up with anesthesia unless specifically instructed to do so.    WOUND CARE INSTRUCTIONS (if applicable):  Keep a dry clean dressing on the anesthesia/puncture wound site if there is drainage.  Once the wound has quit draining you may leave it open to air.  Generally you should leave the bandage intact for twenty four hours unless there is drainage.  If the epidural site drains for more than 36-48 hours please call the anesthesia department.    QUESTIONS?:  Please feel free to call your physician or the hospital  if you have any questions, and they will be happy to assist you.       IMPORTANT OBSERVATIONS  Check C-M-S of operative extremity every 4 hours while you are awake for the first 48 hours:    * C: color - should appear normal/pink   * M: motion - able to move fingers and toes.   * S: sensation - able to \"feel\": no numbness, tingling  If you experience changes in C-M-S and/or in severe pain, you may remove the elastic bandages and reapply ot - tight enough to provide support for the gauze dressing but loose enough to improve C-M-S. The gauze dressing should NOT be removed when rewrapping the elastic bandage.      Follow up appointment on June 21st at 0930 with Dr. Parker [facility]   "

## 2023-06-16 NOTE — ANESTHESIA CARE TRANSFER NOTE
Patient: Mihaela Jang    Procedure: Procedure(s):  Right First Metatarsophalangeal Joint Fusion       Diagnosis: Arthrosis of foot, right [M19.071]  Diagnosis Additional Information: No value filed.    Anesthesia Type:   MAC     Note:    Oropharynx: spontaneously breathing  Level of Consciousness: drowsy  Oxygen Supplementation: room air    Independent Airway: airway patency satisfactory and stable  Dentition: dentition unchanged  Vital Signs Stable: post-procedure vital signs reviewed and stable  Report to RN Given: handoff report given  Patient transferred to: Phase II    Handoff Report: Identifed the Patient, Identified the Reponsible Provider, Reviewed the pertinent medical history, Discussed the surgical course, Reviewed Intra-OP anesthesia mangement and issues during anesthesia, Set expectations for post-procedure period and Allowed opportunity for questions and acknowledgement of understanding      Vitals:  Vitals Value Taken Time   BP     Temp     Pulse     Resp     SpO2         Electronically Signed By: MICHAEL Mohan CRNA  June 16, 2023  10:18 AM

## 2023-06-16 NOTE — BRIEF OP NOTE
[facility]    Brief Operative Note    Pre-operative diagnosis: Arthrosis of foot, right [M19.071]  Post-operative diagnosis Same as pre-operative diagnosis    Procedure: Procedure(s):  Right First Metatarsophalangeal Joint Fusion  Surgeon: Surgeon(s) and Role:     * Ac Parker DPM - Primary     * Ekta Mulligan PA-C - Assisting  Anesthesia: MAC with Local   Estimated Blood Loss: Minimal    Drains: None  Specimens: * No specimens in log *  Findings:   None.  Complications: None.  Implants:   Implant Name Type Inv. Item Serial No.  Lot No. LRB No. Used Action   SCREW BONE KREULOCK TITANIUM 14X3MM [id number] - [id number] Metallic Hardware/[employer] SCREW BONE KREULOCK TITANIUM 14X3MM [id number]  [employer]X  Right 1 Implanted

## 2023-06-16 NOTE — OP NOTE
Procedure Date: 06/16/2023    SURGEON:  Ac Parker DPM    ASSISTANT:  Ekta Mulligan PA-C.   A skilled first assistant was necessary due to technical complexity of the procedure and for the patient's safety.  The assistant helped with positioning, retraction, visualization of the operative field and moreover helped to complete the procedure in a technically safe and efficient manner.      PREOPERATIVE DIAGNOSIS:  Right 1st joint metatarsophalangeal joint arthrosis.    POSTOPERATIVE DIAGNOSIS:  Right 1st metatarsophalangeal joint arthrosis.    PROCEDURE:  Right 1st metatarsophalangeal joint fusion.    ANESTHESIA:  MAC with local.    HEMOSTASIS:  Ankle tourniquet, electrocautery.    ESTIMATED BLOOD LOSS:  Minimal.    MATERIALS:  Arthrex 3.0 cannulated screw, Arthrex 1st MPJ fusion plate with 3.0 locking and nonlocking screws.    INDICATIONS FOR PROCEDURE:  The patient has longstanding pain associated with arhtrosis of 1st MPJ arthritic changes.  She elected to proceed with surgery after failure of nonoperative management.  Detailed discussion of surgery, recovery, risks, potential complications, alternatives and benefits.    DESCRIPTION OF PROCEDURE:  In the supine position, utilized MAC anesthesia, local block performed.  Right lower extremity prepped and draped in the usual fashion.  Ankle tourniquet utilized for duration of the procedure.    Attention was directed to the right dorsal medial foot.  Incision was made medial to the EHL tendon, deepened using blunt and sharp dissection. Joint carefully mobilized, articular surface using flat cuts with a power saw. Joint fenestrated with a 2.0 drill.  Residual bone removed.  Hallux positioned in appropriate position, fixed with a distal, medial, proximal and lateral 3.0 cannulated screw augmented with a dorsal plate with multiple locking and nonlocking 3.0 screws.  Excellent alignment and good fixation, which both were confirmed with intraoperative fluoroscopy.   Flushed with saline.  Deep tissue repaired with 2-0 Vicryl and skin with nonabsorbable suture.  Placed in a postop shoe, weight bear as tolerated and follow up as scheduled.    Ac Parker DPM        D: 2023   T: 2023   MT: Marina    Name:     GLORIA JENKINSShamika  MRN:      [id number]        Account:        [id number]   :      1950           Procedure Date: 2023     Document: [id number]

## 2023-07-14 ENCOUNTER — TRANSFERRED RECORDS (OUTPATIENT)
Dept: HEALTH INFORMATION MANAGEMENT | Facility: CLINIC | Age: 73
End: 2023-07-14

## 2023-08-08 DIAGNOSIS — G25.81 RESTLESS LEGS SYNDROME (RLS): ICD-10-CM

## 2023-08-08 DIAGNOSIS — E78.5 HYPERLIPIDEMIA, UNSPECIFIED HYPERLIPIDEMIA TYPE: ICD-10-CM

## 2023-08-10 RX ORDER — SIMVASTATIN 20 MG
TABLET ORAL
Qty: 100 TABLET | Refills: 2 | Status: SHIPPED | OUTPATIENT
Start: 2023-08-10 | End: 2024-06-13

## 2023-08-10 RX ORDER — ROPINIROLE 1 MG/1
4 TABLET, FILM COATED ORAL AT BEDTIME
Qty: 400 TABLET | Refills: 3 | Status: SHIPPED | OUTPATIENT
Start: 2023-08-10 | End: 2024-08-05

## 2023-08-10 NOTE — TELEPHONE ENCOUNTER
[employer]      Last Written Prescription Date:  6.4.23  Last Fill Quantity: #400,   # refills: 0  Last Office Visit: 6.8.23  Future Office visit:       Routing refill request to provider for review/approval because:

## 2023-08-23 ENCOUNTER — TRANSFERRED RECORDS (OUTPATIENT)
Dept: HEALTH INFORMATION MANAGEMENT | Facility: CLINIC | Age: 73
End: 2023-08-23

## 2023-08-31 NOTE — PATIENT INSTRUCTIONS
Ears were cleaned. Normal ear exam. No fluid or infection.   Hearing protection.   Return in 1 year or as needed.           Thank you for allowing KIM Cooney and our ENT team to participate in your care.  If your medications are too expensive, please give the nurse a call.  We can possibly change this medication.  If you have a scheduling or an appointment question please contact our Health Unit Coordinator at their direct line [phone].   ALL nursing questions or concerns can be directed to your ENT nurse, Alexis, at: [phone]

## 2023-09-05 ENCOUNTER — OFFICE VISIT (OUTPATIENT)
Dept: OTOLARYNGOLOGY | Facility: OTHER | Age: 73
End: 2023-09-05
Attending: PHYSICIAN ASSISTANT
Payer: COMMERCIAL

## 2023-09-05 VITALS
DIASTOLIC BLOOD PRESSURE: 60 MMHG | OXYGEN SATURATION: 98 % | HEART RATE: 67 BPM | BODY MASS INDEX: 20.77 KG/M2 | SYSTOLIC BLOOD PRESSURE: 100 MMHG | TEMPERATURE: 96.9 F | HEIGHT: 61 IN | WEIGHT: 110 LBS

## 2023-09-05 DIAGNOSIS — H61.23 EXCESSIVE CERUMEN IN BOTH EAR CANALS: Primary | ICD-10-CM

## 2023-09-05 PROCEDURE — G0463 HOSPITAL OUTPT CLINIC VISIT: HCPCS | Performed by: PHYSICIAN ASSISTANT

## 2023-09-05 PROCEDURE — 92504 EAR MICROSCOPY EXAMINATION: CPT | Performed by: PHYSICIAN ASSISTANT

## 2023-09-05 PROCEDURE — 99212 OFFICE O/P EST SF 10 MIN: CPT | Mod: 25 | Performed by: PHYSICIAN ASSISTANT

## 2023-09-05 NOTE — LETTER
9/5/2023         RE: Mihalea Jang  [address]  [facility] [address]        Dear Colleague,    Thank you for referring your patient, Mihaela Jang, to the [facility]. Please see a copy of my visit note below.    Chief Complaint   Patient presents with     Ent Problem     Annual ear cleaning       Patient presents for ear cleaning and ear exam. patient was last seen in ENT on 9/15/22 for ear cleaning.   No concerns with her ears.   Denies otalgia, otorrhea  No vertigo or dizziness.     Reports her hearing is normal and equal.   Hearing has been good. Denies concerns with tinnitus.   She does use flonase PRN.   No concerns with nasal congestion.     Past Medical History:   Diagnosis Date     Atypical nevi      Chondrodermatitis nodularis helicis of left ear     Dr Shipley, [facility]     Chronic rhinitis     [facility] Messina[facility]; negative skin testing; IgE mediated allergies; ENT - DC nasal steroid and antihistamine; saline rinses     Degenerative skin disorder     solar elastosis     Hallux rigidus 06/15/2000     Hyperlipidemia, unspecified hyperlipidemia type 9/15/2016     Laryngopharyngeal reflux disease     PPI     Malignant neoplasm of breast (female), unspecified site 03/10/2004     Notalgia paresthetica      Osteoarthritis 07/20/2011     Osteoporosis, unspecified 09/10/2001    Dr Moses; intermittent reclast;  Dr. Moses; repeat dexa after full 2 years Reclast     Other abnormal glucose 12/20/2013     Paresthesia     neck; notalgiaparesthetic; dr leahy & Dr Nevarez; neurontin     Personal history of malignant neoplasm of breast 06/07/2005     Rhinitis      Schamberg's purpura         Allergies   Allergen Reactions     Chlorhexidine Gluconate Rash     Povidone Iodine Rash     Betadine      Soap Rash     Betadine        Current Outpatient Medications   Medication     aspirin 81 MG EC tablet     CALCIUM CITRATE     cholecalciferol 1000 UNITS  "TABS     cinnamon 500 MG CAPS     ferrous sulfate (SLO-FE) 142 (45 Fe) MG CR tablet     fish oil-omega-3 fatty acids 1000 MG capsule     gabapentin (NEURONTIN) 300 MG capsule     levothyroxine (SYNTHROID/LEVOTHROID) 25 MCG tablet     MAGNESIUM OXIDE PO     Multiple vitamin  s TABS     Multiple Vitamins-Minerals (PRESERVISION AREDS PO)     oxyCODONE (ROXICODONE) 5 MG tablet     Polyethylene Glycol 3350 (MIRALAX PO)     rOPINIRole (REQUIP) 1 MG tablet     saccharomyces boulardii (FLORASTOR) 250 MG capsule     simvastatin (ZOCOR) 20 MG tablet     Turmeric 500 MG CAPS     zinc 50 MG TABS     No current facility-administered medications for this visit.     ROS- SEE HPI  /60   Pulse 67   Temp 96.9  F (36.1  C) (Tympanic)   Ht 1.549 m (5' 1\")   Wt 49.9 kg (110 lb)   SpO2 98%   BMI 20.78 kg/m      General - The patient is well nourished and well developed, and appears to have good nutritional status.  Alert and oriented to person and place, answers questions and cooperates with examination appropriately.   Head and Face - Normocephalic and atraumatic, with no gross asymmetry noted.  The facial nerve is intact, with strong symmetric movements.  Voice and Breathing - The patient was breathing comfortably without the use of accessory muscles. There was no wheezing, stridor, or stertor.  The patients voice was clear and strong, and had appropriate pitch and quality.  Ears -examined under microscopy bilaterally using otologic speculum. Ears were cleaned with cupped forceps.   The external auditory canals are cerumen, the tympanic membranes are intact without effusion, retraction or mass.  Bony landmarks are intact.    Mouth - Examination of the oral cavity showed pink, healthy oral mucosa. No lesions or ulcerations noted.  The tongue was mobile and midline, and the dentition were in good condition.    Throat - The walls of the oropharynx were smooth, pink, moist, symmetric, and had no lesions or ulcerations.  The " tonsillar pillars and soft palate were symmetric.  The uvula was midline on elevation.    Neck - Normal midline excursion of the laryngotracheal complex during swallowing.  Full range of motion on passive movement.  Palpation of the occipital, submental, submandibular, internal jugular chain, and supraclavicular nodes did not demonstrate any abnormal lymph nodes or masses.  Palpation of the thyroid was soft and smooth, with no nodules or goiter appreciated.  The trachea was mobile and midline.  Palpable TMJ tenderness to right with palpable click.   Nose - External contour is symmetric, no gross deflection or scars.  Nasal mucosa is pink and moist with no abnormal mucus.  The septum was intact, turbinates of normal size and position.  No polyps, masses, or purulence noted on examination           ASSESSMENT/ PLAN:    ICD-10-CM    1. Excessive cerumen in both ear canals  H61.23         Ears were cleaned  Reassured normal ear exam  Return in 1 year or PRN.     Hearing protection       Sejal Gutierrez PA-C  ENT  [facility], [place]      Again, thank you for allowing me to participate in the care of your patient.        Sincerely,        Sejal Gutierrez PA-C

## 2023-09-05 NOTE — PROGRESS NOTES
Chief Complaint   Patient presents with    Ent Problem     Annual ear cleaning       Patient presents for ear cleaning and ear exam. patient was last seen in ENT on 9/15/22 for ear cleaning.   No concerns with her ears.   Denies otalgia, otorrhea  No vertigo or dizziness.     Reports her hearing is normal and equal.   Hearing has been good. Denies concerns with tinnitus.   She does use flonase PRN.   No concerns with nasal congestion.     Past Medical History:   Diagnosis Date    Atypical nevi     Chondrodermatitis nodularis helicis of left ear     Dr Shipley, [facility]    Chronic rhinitis     [facility] allergy; negative skin testing; IgE mediated allergies; ENT - DC nasal steroid and antihistamine; saline rinses    Degenerative skin disorder     solar elastosis    Hallux rigidus 06/15/2000    Hyperlipidemia, unspecified hyperlipidemia type 9/15/2016    Laryngopharyngeal reflux disease     PPI    Malignant neoplasm of breast (female), unspecified site 03/10/2004    Notalgia paresthetica     Osteoarthritis 07/20/2011    Osteoporosis, unspecified 09/10/2001    Dr Moses; intermittent reclast;  Dr. Moses; repeat dexa after full 2 years Reclast    Other abnormal glucose 12/20/2013    Paresthesia     neck; notalgiaparesthetic; dr leahy & Dr Nevarez; neurontin    Personal history of malignant neoplasm of breast 06/07/2005    Rhinitis     Schamberg's purpura         Allergies   Allergen Reactions    Chlorhexidine Gluconate Rash    Povidone Iodine Rash     Betadine     Soap Rash     Betadine        Current Outpatient Medications   Medication    aspirin 81 MG EC tablet    CALCIUM CITRATE    cholecalciferol 1000 UNITS TABS    cinnamon 500 MG CAPS    ferrous sulfate (SLO-FE) 142 (45 Fe) MG CR tablet    fish oil-omega-3 fatty acids 1000 MG capsule    gabapentin (NEURONTIN) 300 MG capsule    levothyroxine (SYNTHROID/LEVOTHROID) 25 MCG tablet    MAGNESIUM OXIDE PO    Multiple vitamin  s TABS    Multiple  "Vitamins-Minerals (PRESERVISION AREDS PO)    oxyCODONE (ROXICODONE) 5 MG tablet    Polyethylene Glycol 3350 (MIRALAX PO)    rOPINIRole (REQUIP) 1 MG tablet    saccharomyces boulardii (FLORASTOR) 250 MG capsule    simvastatin (ZOCOR) 20 MG tablet    Turmeric 500 MG CAPS    zinc 50 MG TABS     No current facility-administered medications for this visit.     ROS- SEE HPI  /60   Pulse 67   Temp 96.9  F (36.1  C) (Tympanic)   Ht 1.549 m (5' 1\")   Wt 49.9 kg (110 lb)   SpO2 98%   BMI 20.78 kg/m      General - The patient is well nourished and well developed, and appears to have good nutritional status.  Alert and oriented to person and place, answers questions and cooperates with examination appropriately.   Head and Face - Normocephalic and atraumatic, with no gross asymmetry noted.  The facial nerve is intact, with strong symmetric movements.  Voice and Breathing - The patient was breathing comfortably without the use of accessory muscles. There was no wheezing, stridor, or stertor.  The patients voice was clear and strong, and had appropriate pitch and quality.  Ears -examined under microscopy bilaterally using otologic speculum. Ears were cleaned with cupped forceps.   The external auditory canals are cerumen, the tympanic membranes are intact without effusion, retraction or mass.  Bony landmarks are intact.    Mouth - Examination of the oral cavity showed pink, healthy oral mucosa. No lesions or ulcerations noted.  The tongue was mobile and midline, and the dentition were in good condition.    Throat - The walls of the oropharynx were smooth, pink, moist, symmetric, and had no lesions or ulcerations.  The tonsillar pillars and soft palate were symmetric.  The uvula was midline on elevation.    Neck - Normal midline excursion of the laryngotracheal complex during swallowing.  Full range of motion on passive movement.  Palpation of the occipital, submental, submandibular, internal jugular chain, and " supraclavicular nodes did not demonstrate any abnormal lymph nodes or masses.  Palpation of the thyroid was soft and smooth, with no nodules or goiter appreciated.  The trachea was mobile and midline.  Palpable TMJ tenderness to right with palpable click.   Nose - External contour is symmetric, no gross deflection or scars.  Nasal mucosa is pink and moist with no abnormal mucus.  The septum was intact, turbinates of normal size and position.  No polyps, masses, or purulence noted on examination           ASSESSMENT/ PLAN:    ICD-10-CM    1. Excessive cerumen in both ear canals  H61.23         Ears were cleaned  Reassured normal ear exam  Return in 1 year or PRN.     Hearing protection       Sejal Gutierrez PA-C  ENT  [facility]

## 2023-09-06 ENCOUNTER — OFFICE VISIT (OUTPATIENT)
Dept: DERMATOLOGY | Facility: OTHER | Age: 73
End: 2023-09-06
Attending: FAMILY MEDICINE
Payer: COMMERCIAL

## 2023-09-06 VITALS
OXYGEN SATURATION: 95 % | DIASTOLIC BLOOD PRESSURE: 60 MMHG | SYSTOLIC BLOOD PRESSURE: 130 MMHG | HEART RATE: 56 BPM | WEIGHT: 110 LBS | BODY MASS INDEX: 20.78 KG/M2

## 2023-09-06 DIAGNOSIS — L29.89 XEROTIC ECZEMA: Primary | ICD-10-CM

## 2023-09-06 PROCEDURE — G0463 HOSPITAL OUTPT CLINIC VISIT: HCPCS

## 2023-09-06 PROCEDURE — 99213 OFFICE O/P EST LOW 20 MIN: CPT | Performed by: DERMATOLOGY

## 2023-09-06 RX ORDER — CLOBETASOL PROPIONATE 0.5 MG/G
OINTMENT TOPICAL
COMMUNITY
Start: 2023-08-14 | End: 2023-11-24

## 2023-09-06 RX ORDER — TRIAMCINOLONE ACETONIDE 0.25 MG/G
CREAM TOPICAL
Qty: 454 G | Refills: 1 | Status: SHIPPED | OUTPATIENT
Start: 2023-09-06 | End: 2024-09-09

## 2023-09-06 ASSESSMENT — PAIN SCALES - GENERAL: PAINLEVEL: NO PAIN (0)

## 2023-09-06 NOTE — LETTER
9/6/2023       RE: Mihaela Jang  [address]  [facility] [address]     Dear Colleague,    Thank you for referring your patient, Mihaela Jang, to the [facility]. Please see a copy of my visit note below.    Subjective: Mihaela  returns for a full body check.  She was seen 6 months ago.  She states that she was seen at [facility]  for a lesion on her back and a biopsy was performed but the result was negative or harmless.  She complains of itching on her back but otherwise has no issues other than she appears to wish to be reassured that she has no worrisome lesions.    Objective: Healthy 73-year-old lady in no distress.  We checked your back face neck chest abdomen buttocks arms legs fingers and toes.  Only a few tiny in the nevi were noted.  No sun damage noted.    Assessment: Completely healthy skin    Plan: Triamcinolone cream for her back as needed.  0.025 strength.  Uses intermittently with her moisturizers.    Meds and allergies reviewed.    Return in 1 year for reassurance mainly since her skin is completely healthy.    Again, thank you for allowing me to participate in the care of your patient.      Sincerely,    FAHAD Barakat MD

## 2023-09-06 NOTE — PROGRESS NOTES
Subjective: Mihaela  returns for a full body check.  She was seen 6 months ago.  She states that she was seen at [place] [facility]  for a lesion on her back and a biopsy was performed but the result was negative or harmless.  She complains of itching on her back but otherwise has no issues other than she appears to wish to be reassured that she has no worrisome lesions.    Objective: Healthy 73-year-old lady in no distress.  We checked your back face neck chest abdomen buttocks arms legs fingers and toes.  Only a few tiny in the nevi were noted.  No sun damage noted.    Assessment: Completely healthy skin    Plan: Triamcinolone cream for her back as needed.  0.025 strength.  Uses intermittently with her moisturizers.    Meds and allergies reviewed.    Return in 1 year for reassurance mainly since her skin is completely healthy.

## 2023-09-27 ENCOUNTER — HOSPITAL ENCOUNTER (OUTPATIENT)
Dept: BONE DENSITY | Facility: HOSPITAL | Age: 73
Discharge: HOME | End: 2023-09-27
Attending: FAMILY MEDICINE | Admitting: FAMILY MEDICINE
Payer: COMMERCIAL

## 2023-09-27 DIAGNOSIS — E03.9 HYPOTHYROIDISM, UNSPECIFIED TYPE: ICD-10-CM

## 2023-09-27 DIAGNOSIS — M81.0 OSTEOPOROSIS, UNSPECIFIED OSTEOPOROSIS TYPE, UNSPECIFIED PATHOLOGICAL FRACTURE PRESENCE: Chronic | ICD-10-CM

## 2023-09-27 PROCEDURE — 77080 DXA BONE DENSITY AXIAL: CPT

## 2023-09-29 RX ORDER — LEVOTHYROXINE SODIUM 25 UG/1
TABLET ORAL
Qty: 153 TABLET | Refills: 2 | Status: SHIPPED | OUTPATIENT
Start: 2023-09-29 | End: 2024-08-21

## 2023-09-29 NOTE — TELEPHONE ENCOUNTER
SYNTHROID      Last Written Prescription Date:  3-14-23  Last Fill Quantity: 156-,   # refills: 1  Last Office Visit: 6-8-23  Future Office visit:    Next 5 appointments (look out 90 days)      Dec 13, 2023  2:00 PM  (Arrive by 1:45 PM)  PHYSICAL with Melyssa Connors MD  [facility] [place] ([facility] [place] ) [address]  [place] [address]  [phone]

## 2023-10-06 ENCOUNTER — TELEPHONE (OUTPATIENT)
Dept: OTOLARYNGOLOGY | Facility: OTHER | Age: 73
End: 2023-10-06

## 2023-10-13 ENCOUNTER — MEDICAL CORRESPONDENCE (OUTPATIENT)
Dept: MRI IMAGING | Facility: HOSPITAL | Age: 73
End: 2023-10-13

## 2023-10-20 DIAGNOSIS — M79.659 PAIN OF THIGH, UNSPECIFIED LATERALITY: Primary | ICD-10-CM

## 2023-10-20 NOTE — TELEPHONE ENCOUNTER
Received a call from patient. She wants to try using diclofenac ointment for her thigh pain, and wants your opionion on this/or if you recommend her trying it. She can not get into see you until 12/2023. Would you like her to come in sooner so she can discuss this medication she is interested in starting?

## 2023-10-23 NOTE — TELEPHONE ENCOUNTER
Spoke with patient, she was wanting the Volteran gel to be sent to [facility] instead. Stating that it would not cost anything out of pocket. I see that this was sent to [facility], should I call and have it cancelled?   Also, she has a upcoming MRI on Wednesday and is requesting something to help with claustrophobia she would like a prescription be sent to [facility].

## 2023-10-25 ENCOUNTER — HOSPITAL ENCOUNTER (OUTPATIENT)
Dept: MRI IMAGING | Facility: HOSPITAL | Age: 73
Discharge: HOME | End: 2023-10-25
Attending: SPECIALIST | Admitting: SPECIALIST
Payer: COMMERCIAL

## 2023-10-25 ENCOUNTER — TELEPHONE (OUTPATIENT)
Dept: FAMILY MEDICINE | Facility: OTHER | Age: 73
End: 2023-10-25

## 2023-10-25 DIAGNOSIS — M79.652 LEFT THIGH PAIN: ICD-10-CM

## 2023-10-25 DIAGNOSIS — F41.8 SITUATIONAL ANXIETY: Primary | ICD-10-CM

## 2023-10-25 PROCEDURE — 73718 MRI LOWER EXTREMITY W/O DYE: CPT | Mod: LT

## 2023-10-25 RX ORDER — DIAZEPAM 5 MG
5 TABLET ORAL
Qty: 2 TABLET | Refills: 0 | Status: SHIPPED | OUTPATIENT
Start: 2023-10-25 | End: 2023-11-24

## 2023-10-25 NOTE — TELEPHONE ENCOUNTER
Received a call from patient. She is scheduled for a MRI today and is wondering if she could get a prescription to help with the clastraphobia. She uses [facility].

## 2023-11-01 DIAGNOSIS — R20.2 PARESTHESIA: ICD-10-CM

## 2023-11-01 RX ORDER — GABAPENTIN 300 MG/1
CAPSULE ORAL
Qty: 300 CAPSULE | Refills: 0 | Status: SHIPPED | OUTPATIENT
Start: 2023-11-01 | End: 2023-11-30

## 2023-11-01 NOTE — TELEPHONE ENCOUNTER
Gabapentin      Last Written Prescription Date:  9.22.23  Last Fill Quantity: #300,   # refills: 0  Last Office Visit: 6.8.23  Future Office visit:    Next 5 appointments (look out 90 days)      Nov 08, 2023  1:00 PM  (Arrive by 12:45 PM)  SHORT with Melyssa Conn[facility] MD  [facility] ([facility] [place] ) [address]  [place] [address]  [phone]     Dec 13, 2023  2:00 PM  (Arrive by 1:45 PM)  PHYSICAL with Melyssa Connors MD  [facility] [place] ([facility] [place] ) [address]  [facility] [address]  [phone]             Routing refill request to provider for review/approval because:  Drug not on the [facility], [facility] or [facility] refill protocol or controlled substance

## 2023-11-07 ENCOUNTER — OFFICE VISIT (OUTPATIENT)
Dept: PODIATRY | Facility: OTHER | Age: 73
End: 2023-11-07
Attending: PODIATRIST
Payer: COMMERCIAL

## 2023-11-07 VITALS
DIASTOLIC BLOOD PRESSURE: 66 MMHG | TEMPERATURE: 96.4 F | OXYGEN SATURATION: 98 % | SYSTOLIC BLOOD PRESSURE: 116 MMHG | HEART RATE: 66 BPM

## 2023-11-07 DIAGNOSIS — Z97.8 ORTHOPEDIC HARDWARE PRESENT: ICD-10-CM

## 2023-11-07 DIAGNOSIS — L60.3 ONYCHODYSTROPHY: Primary | ICD-10-CM

## 2023-11-07 PROCEDURE — 99212 OFFICE O/P EST SF 10 MIN: CPT | Mod: 25 | Performed by: PODIATRIST

## 2023-11-07 PROCEDURE — 11750 EXCISION NAIL&NAIL MATRIX: CPT | Performed by: PODIATRIST

## 2023-11-07 PROCEDURE — G0463 HOSPITAL OUTPT CLINIC VISIT: HCPCS | Mod: 25

## 2023-11-07 ASSESSMENT — PAIN SCALES - GENERAL: PAINLEVEL: NO PAIN (0)

## 2023-11-07 NOTE — PROGRESS NOTES
Chief complaint: Patient presents with:  Toenail: Thick toenail      History of Present Illness: This 73 year old female is seen for management of a change int he appearance of her RIGHT second toe. She says the nail started to look thicker and different a month ago. She has no pain from the toe, but it is looking thicker. She denies any trauma to the toe. She also denies any change in shoe gear or activities. The thickness of the toenail bothers her and it is catching on her socks so she'd like to discuss procedure options for the toenail.    She had a fusion of the RIGHT 1st MTPJ by Dr. [facility] in June, 2023. She'd like to discuss removal of the hardware and if it needs to be removed. She thought the hardware was supposed to be removed    No further pedal complaints today.       /66 (BP Location: Left arm, Patient Position: Sitting, Cuff Size: Adult Regular)   Pulse 66   Temp (!) 96.4  F (35.8  C) (Tympanic)   SpO2 98%     Patient Active Problem List   Diagnosis    Laryngopharyngeal reflux (LPR)    Chronic rhinitis    ETD (eustachian tube dysfunction)    Osteoporosis    Abnormal glucose    Personal history of malignant neoplasm of breast    Neutropenia (H24)    Early satiety    chronic neutropenia.    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    Hypothyroidism, unspecified type    Seborrheic dermatitis of scalp       Past Surgical History:   Procedure Laterality Date    BONE MARROW BIOPSY      negative    COLONOSCOPY  07/2000    COLONOSCOPY  8/13/2013    DR Fu; repeat 5 years    COLONOSCOPY N/A 8/13/2018    Procedure: COLONOSCOPY;  COLONOSCOPY;  Surgeon: Ajit Uriarte MD;  Location: [facility]    COLONOSCOPY N/A 12/2/2022    Procedure: COLONOSCOPY, WITH POLYPECTOMY AND BIOPSY;  Surgeon: Marcellus Jimenez MD;  Location:  [facility]    DILATION AND CURETTAGE, OPERATIVE HYSTEROSCOPY, COMBINED N/A 2/13/2019    Procedure: EXAM UNDER ANESTHESIA , HYSTEROSCOPY;  Surgeon: Jovi Gabriel MD;  Location: [facility] OR    HEMORRHOIDECTOMY  1986    MASTECTOMY, BILATERAL  03/01/2004    right sided breast cancer    RECONSTRUCT FOREFOOT WITH METATARSOPHALANGEAL (MTP) FUSION Right 6/16/2023    Procedure: Right First Metatarsophalangeal Joint Fusion;  Surgeon: Ac [facility] DPM;  Location: [facility] OR    RELEASE TRIGGER FINGER Right 3/7/2018    Procedure: RELEASE TRIGGER FINGER;  RELEASE TRIGGER DIGIT RIGHT THUMB;  Surgeon: Jose Alfredo Brewster DO;  Location: [facility] OR    RELEASE TRIGGER FINGER Left 3/15/2023    Procedure: Left Thumb Trigger Finger Release;  Surgeon: Rigoberto Olivera MD;  Location: [facility]    RELEASE TRIGGER FINGER Right 5/17/2023    Procedure: Revision Right Thumb Trigger Release;  Surgeon: Rigoberto Olivera MD;  Location: [facility] OR    UPPER GI ENDOSCOPY      ZZHC COLONOSCOPY THRU STOMA WITH BIOPSY  08/25/2010    cancer screening, family h/o colon cancer; hyperplastic polyp       Current Outpatient Medications   Medication    aspirin 81 MG EC tablet    CALCIUM CITRATE    cholecalciferol 1000 UNITS TABS    cinnamon 500 MG CAPS    clobetasol (TEMOVATE) 0.05 % external ointment    diazepam (VALIUM) 5 MG tablet    diclofenac (VOLTAREN) 1 % topical gel    ferrous sulfate (SLO-FE) 142 (45 Fe) MG CR tablet    fish oil-omega-3 fatty acids 1000 MG capsule    gabapentin (NEURONTIN) 300 MG capsule    levothyroxine (SYNTHROID/LEVOTHROID) 25 MCG tablet    MAGNESIUM OXIDE PO    Multiple vitamin  s TABS    Multiple Vitamins-Minerals (PRESERVISION AREDS PO)    Polyethylene Glycol 3350 (MIRALAX PO)    rOPINIRole (REQUIP) 1 MG tablet    saccharomyces boulardii (FLORASTOR) 250 MG capsule    simvastatin (ZOCOR) 20 MG tablet    triamcinolone (KENALOG) 0.025 % cream    Turmeric 500 MG CAPS    zinc 50 MG TABS     No current facility-administered medications for this visit.           Allergies   Allergen Reactions    Chlorhexidine Gluconate Rash    Povidone Iodine Rash     Betadine     Soap Rash     Betadine        Family History   Problem Relation Age of Onset    Dementia Mother         (cause of death)     High cholesterol Mother     Osteoarthritis Mother     C.A.D. Father         (cause of death)     Prostate Cancer Father     Breast Cancer Maternal Aunt 72    Cerebrovascular Disease Maternal Grandmother         CVA    Diabetes Maternal Grandmother        Social History     Socioeconomic History    Marital status: Single     Spouse name: None    Number of children: None    Years of education: None    Highest education level: None   Tobacco Use    Smoking status: Never    Smokeless tobacco: Never   Substance and Sexual Activity    Alcohol use: Yes     Alcohol/week: 0.0 standard drinks of alcohol     Comment: 1 glasso wine, weekly     Drug use: No    Sexual activity: Never   Other Topics Concern     Service No    Blood Transfusions Yes     Comment: Permits if needed    Caffeine Concern Yes     Comment: Tea, 2 cups daily     Exercise Yes     Comment: Walking, walks steps, daily     Seat Belt Yes    Parent/sibling w/ CABG, MI or angioplasty before 65F 55M? No       ROS: 10 point ROS neg other than the symptoms noted above in the HPI.  EXAM  Constitutional: healthy, alert and no distress    Psychiatric: mentation appears normal and affect normal/bright    VASCULAR:  -Dorsalis pedis pulse +2/4 b/l  -Posterior tibial pulse +2/4 b/l  NEURO:  -Light touch sensation intact to b/l plantar forefoot  DERM:  -Skin temperature, texture and turgor WNL b/l  -Toenail moderately thickened, discolored and dystrophic on the RIGHT second toenail  MSK:  -Plate on the dorsal RIGHT 1st MTPJ is mildly palpable -- no Pain on palpation   -Moderate pain with all DORSIFLEXION motion (active and passive motion) at the dorsal RIGHT 1st MTPJ  -Decreased ROM of 1st MTPJ with forefoot loading, RIGHT (< 10 degrees  DORSIFLEXION)  -Bony prominence on the RIGHT dorsal 1st MTPJ  ============================================================    ASSESSMENT:  (L60.3) Onychodystrophy  (primary encounter diagnosis)    (Z97.8) Orthopedic hardware present      PLAN:  -Patient evaluated and examined. Treatment options discussed with no educational barriers noted.    -Orthopaedic hardware in patient's RIGHT foot: Discussed orthopaedic hardware with patient. Removal of hardware is not required. If she has no pain from the hardware, the hardware can remain in her foot without concern. If she develops an allergic reaction to the hardware or develops pain from the hardware rubbing in her shoes, then she may discuss hardware removal. The plate is mildly palpable, but only because the skin is thin on the dorsum of her foot. She does not have any obviously prominent screws or areas of concern from the hardware. Patient asked for advise if she should remove the hardware. She confirmed that she had zero pain from the hardware. To repeat another surgery is still another surgery including additional anesthesia, another incision, another risk for infection, etc. Although offloading is minimal following a hardware removal case, she'd still need to partially offload for 10-14 days until the sutures were removed. Since she has no pain from the hardware, then she is not advised to remove it. Should she develop any sudden pain from the hardware, then she is advised to call Dr. [facility] to let him know about this concern. She is in agreement with this plan and she will leave the hardware intact at this time.  -This is an acute, uncomplicated illness/injury with OTC treatment options reviewed.    -------------------------------------------------    Ingrown toenail(s):  -Discussed nail procedure options and etiologies and treatments for ingrown toenails. Conservatively, patient could opt for a slant back today and keep monitoring the toe since there are no  "SOI. Discussed risks and benefits and healing course of a nail border avulsion vs. Matrixectomy including post procedure infection or a non-healing wound, both of which could lead to a life threatening infection or amputation of the foot or leg or a proximal amputation. Patient understands the risks and benefits and has decided to proceed with the following:    -Matrixectomy of right second toenail: Written and verbal consent obtained after reviewing risks and benefits of the procedure. Patient understands that although phenol is used in attempt to prevent regrowth of the ingrown toenail, the nail can still grow back. There is also a risk of post procedure infection. A severe foot infection could lead to a proximal foot or leg amputation or loss of life, so the patient is advised to return to podiatry or the ED immediately if the patient notices any SOI. The patient is in agreement with this plan and wishes to proceed with the procedure. A time-out was performed to identify the correct patient, limb, digit and procedure.    An alcohol prep pad was applied to  to the base of the right second toenail. The toe was injected with 3 mL of 1:1 of 2% Lidocaine plain and 0.5% marcaine plain in a ring block fashion at the base of the toe(s). Adequate local anesthesia was obtained. A ring tournicot was applied to the digit and a chloroprep was applied to the hallux. A freer was used to loosen the nail from the underlying nail bed. An English Anvil and a hemostat were then used to remove the nail in total. A total of three applications of phenol were applied for 30 seconds per application. The digit was rinsed thoroughly with alcohol. The tournicot was removed from the toe and there was a prompt hyperemic response to the RIGHT second toenail. The wound was then dressed with an Silvadene, gauze and 1\" coban. The patient was educated on after procedure care including daily epsom salt soaks starting tomorrow followed by dressing " of the toe with an antibiotic cream and a bandaid until the wound site on the toe stops draining (2-3 weeks). Provided education on how to look for signs of infection (redness, swelling, pain, purulence, fever, chills, nausea, vomiting) and the patient was instructed to return to the clinic or Emergency Department immediately if there are any signs of infection.      -Patient in agreement with the above treatment plan and all of patient's questions were answered.      Return to clinic as needed        Katerina Campos DPM

## 2023-11-07 NOTE — PATIENT INSTRUCTIONS
Nail procedure care:  -Start epsom salt soaks tomorrow. Soak the foot 1-2 times a day for 20 minutes.  -Apply an antibiotic cream, gauze and a bandaid over the toe.   -Do not apply a band aid directly over the nail procedure site without gauze or it will trap too much moisture beneath the band aid.  Note: Continue the epsom salt soaks and dressings every day until the wound is fully healed. You should not see drainage on the bandage for at least two days in a row. Call the clinic if the wound is still moderately draining two weeks after the procedure.    Keep the toe covered at all times until it is completely healed.  -You may develop a black scab over the nail bed--let this fall off on its own and don't pick at it.  -The toe may drain for 2-3 weeks. It is normal for it to have a clear drainage.    Watching for signs of infection:  If the toe has a thick, white pus coming from the procedure site or if the the toe becomes red, swollen, painful, or you begin to feel sick (fever/chills/nausea/vomitting), return to the podiatry clinic immediately or to the Emergency Department room if after hours.      Podiatry can be reached directly at [phone]. Leave a voicemail if there is not an answer.

## 2023-11-24 ENCOUNTER — OFFICE VISIT (OUTPATIENT)
Dept: FAMILY MEDICINE | Facility: OTHER | Age: 73
End: 2023-11-24
Attending: FAMILY MEDICINE
Payer: COMMERCIAL

## 2023-11-24 ENCOUNTER — ANCILLARY PROCEDURE (OUTPATIENT)
Dept: GENERAL RADIOLOGY | Facility: OTHER | Age: 73
End: 2023-11-24
Attending: FAMILY MEDICINE
Payer: COMMERCIAL

## 2023-11-24 VITALS
HEART RATE: 63 BPM | OXYGEN SATURATION: 98 % | TEMPERATURE: 97.3 F | WEIGHT: 115.5 LBS | DIASTOLIC BLOOD PRESSURE: 65 MMHG | BODY MASS INDEX: 21.82 KG/M2 | SYSTOLIC BLOOD PRESSURE: 111 MMHG

## 2023-11-24 DIAGNOSIS — M79.605 PAIN OF LEFT LOWER EXTREMITY: ICD-10-CM

## 2023-11-24 DIAGNOSIS — M79.605 PAIN OF LEFT LOWER EXTREMITY: Primary | ICD-10-CM

## 2023-11-24 LAB
CK SERPL-CCNC: 79 U/L (ref 26–192)
CRP SERPL-MCNC: <3 MG/L
ERYTHROCYTE [SEDIMENTATION RATE] IN BLOOD BY WESTERGREN METHOD: 17 MM/HR (ref 0–30)

## 2023-11-24 PROCEDURE — G0463 HOSPITAL OUTPT CLINIC VISIT: HCPCS

## 2023-11-24 PROCEDURE — 72100 X-RAY EXAM L-S SPINE 2/3 VWS: CPT | Mod: TC

## 2023-11-24 PROCEDURE — 99213 OFFICE O/P EST LOW 20 MIN: CPT | Performed by: FAMILY MEDICINE

## 2023-11-24 PROCEDURE — 36415 COLL VENOUS BLD VENIPUNCTURE: CPT | Mod: ZL | Performed by: FAMILY MEDICINE

## 2023-11-24 PROCEDURE — 82550 ASSAY OF CK (CPK): CPT | Mod: ZL | Performed by: FAMILY MEDICINE

## 2023-11-24 PROCEDURE — 85652 RBC SED RATE AUTOMATED: CPT | Mod: ZL | Performed by: FAMILY MEDICINE

## 2023-11-24 PROCEDURE — 86140 C-REACTIVE PROTEIN: CPT | Mod: ZL | Performed by: FAMILY MEDICINE

## 2023-11-24 PROCEDURE — 73562 X-RAY EXAM OF KNEE 3: CPT | Mod: TC,LT

## 2023-11-24 RX ORDER — CELECOXIB 200 MG/1
200 CAPSULE ORAL DAILY
COMMUNITY
Start: 2023-10-13 | End: 2023-11-24

## 2023-11-24 ASSESSMENT — ANXIETY QUESTIONNAIRES
1. FEELING NERVOUS, ANXIOUS, OR ON EDGE: NOT AT ALL
GAD7 TOTAL SCORE: 0
IF YOU CHECKED OFF ANY PROBLEMS ON THIS QUESTIONNAIRE, HOW DIFFICULT HAVE THESE PROBLEMS MADE IT FOR YOU TO DO YOUR WORK, TAKE CARE OF THINGS AT HOME, OR GET ALONG WITH OTHER PEOPLE: NOT DIFFICULT AT ALL
3. WORRYING TOO MUCH ABOUT DIFFERENT THINGS: NOT AT ALL
4. TROUBLE RELAXING: NOT AT ALL
7. FEELING AFRAID AS IF SOMETHING AWFUL MIGHT HAPPEN: NOT AT ALL
5. BEING SO RESTLESS THAT IT IS HARD TO SIT STILL: NOT AT ALL
6. BECOMING EASILY ANNOYED OR IRRITABLE: NOT AT ALL
GAD7 TOTAL SCORE: 0
2. NOT BEING ABLE TO STOP OR CONTROL WORRYING: NOT AT ALL

## 2023-11-24 ASSESSMENT — PAIN SCALES - GENERAL: PAINLEVEL: SEVERE PAIN (6)

## 2023-11-24 ASSESSMENT — PATIENT HEALTH QUESTIONNAIRE - PHQ9: SUM OF ALL RESPONSES TO PHQ QUESTIONS 1-9: 0

## 2023-11-24 NOTE — PROGRESS NOTES
Assessment & Plan     Pain of left lower extremity  Unclear etiology.  Already did 2 months of PT.  Consulted with ortho.  MR femur showed - possible biceps femoris injury - mild myotendinous.  Now states ortho has no other suggestions?  Moved from posterior to anterior thigh and knee.  Consider injury above below?  Xray knee, spine.  Def hip.  No groin or hip pain.  Check labs.  Consider cortisone injection knee?  - XR Lumbar Spine 2/3 Views; Future  - XR Knee Left 3 Views; Future  - CK total; Future  - ESR: Erythrocyte sedimentation rate; Future  - CRP, inflammation; Future  - CK total  - ESR: Erythrocyte sedimentation rate  - CRP, inflammation      24 minutes spent by me on the date of the encounter doing chart review, history and exam, documentation and further activities per the note       See Patient Instructions    No follow-ups on file.    Melyssa Encinas MD  [facility] - ESTEPHANIE Chairez is a 73 year old, presenting for the following health issues:  Leg Pain        11/24/2023    12:50 PM   Additional Questions   Roomed by Hany Kirkpatrick   Accompanied by None         11/24/2023    12:50 PM   Patient Reported Additional Medications   Patient reports taking the following new medications None       HPI     Pain History:  When did you first notice your pain? Chronic  - 2-3 months  Have you seen this provider for your pain in the past? Yes   Where in your body do you have pain? Left leg   Are you seeing anyone else for your pain? Yes - [facility] Dr. Escobedo  MRI 10/25/23 -   IMPRESSION: Soft tissue edema of the posterior aspect of the lower  thigh laterally, possibly sequela of mild myotendinous injury of the  biceps femoris.    7/2023 - soft knee brace - not helpful.    [facility] - PT - from 8/2023 to 10/2023 - 2 times per week.  Therapist Corbin.    That prompted MRI.  Then did Celebrex script - did not help - so stopped.    No night pain.  More so when up at night.    No groin  pain.    Neurontin HS from separate issue.  Prior right foot surgery.        9/7/2022     1:00 PM 3/9/2023    12:00 PM 11/24/2023    12:52 PM   PHQ-9 SCORE   PHQ-9 Total Score 0 0 0           9/7/2022     1:00 PM 3/9/2023    12:00 PM 11/24/2023    12:52 PM   JENNIFER-7 SCORE   Total Score 0 0 0         Chronic Pain Follow Up:  Location of pain: Left leg   Analgesia/pain control:    - Recent changes:  Gradually getting worse     - Overall control: Inadequate pain control    - Current treatments: Ibuprofen, tylenol and gabapentin    Adherence:     - Do you ever take more pain medicine than prescribed? No    - When did you take your last dose of pain medicine?  11/23/2023 at bedtime    Adverse effects: No     Now - points knee, quads, left only.  Not posterior anymore.   No calf or foot.   No rash, bruise swelling.    PDMP Review         Value Time User    State PDMP site checked  Yes 11/24/2023  1:12 PM Melyssa Connors MD          Last CSA Agreement:   CSA -- Patient Level:    CSA: None found at the patient level.         Review of Systems   Constitutional, HEENT, cardiovascular, pulmonary, gi and gu systems are negative, except as otherwise noted.      Objective    /65 (BP Location: Right arm, Patient Position: Sitting, Cuff Size: Adult Regular)   Pulse 63   Temp 97.3  F (36.3  C) (Tympanic)   Wt 52.4 kg (115 lb 8 oz)   SpO2 98%   BMI 21.82 kg/m    Body mass index is 21.82 kg/m .  Physical Exam   GENERAL: healthy, alert and no distress  MS: normal muscle tone, no edema, gait normal, no ataxia, and left leg without gross deformities; no skin changes; no swelling; normal AROM left knee, hip, ankle; non tender throughout palpation of left leg; no pain with hip rotation; negative SLR  SKIN: no suspicious lesions or rashes  NEURO: Normal strength and tone, mentation intact and speech normal  PSYCH: mentation appears normal, affect normal/bright    Xray left knee and lumbar spine obtained

## 2023-11-24 NOTE — PATIENT INSTRUCTIONS
Xray of left knee and lumbar spine - and labs - will notify of results.    Try adding morning dose and afternoon dose of Neurontin 300 mg dose if needed.  300 mg/300 mg/900 mg    Update me.

## 2023-11-27 ENCOUNTER — TELEPHONE (OUTPATIENT)
Dept: FAMILY MEDICINE | Facility: OTHER | Age: 73
End: 2023-11-27

## 2023-11-27 DIAGNOSIS — M47.816 LUMBAR SPONDYLOSIS: ICD-10-CM

## 2023-11-27 DIAGNOSIS — M54.16 LUMBAR RADICULOPATHY: ICD-10-CM

## 2023-11-27 DIAGNOSIS — M48.061 SPINAL STENOSIS OF LUMBAR REGION, UNSPECIFIED WHETHER NEUROGENIC CLAUDICATION PRESENT: ICD-10-CM

## 2023-11-27 DIAGNOSIS — M51.369 DDD (DEGENERATIVE DISC DISEASE), LUMBAR: Primary | ICD-10-CM

## 2023-11-27 NOTE — TELEPHONE ENCOUNTER
----- Message from Melyssa Connors MD sent at 11/24/2023  2:42 PM CST -----  Notify of normal knee xray.  Spine xray shows degenerative changes, especially at L3/4 and L5/S1.  Could consider MRI lumbar spine to further evaluate disc cause of left leg pain.  If agreeable, can pend MRI.

## 2023-11-27 NOTE — TELEPHONE ENCOUNTER
9:04 AM    Reason for Call: Phone Call    Description: pt called she is returning a call from a nurse about her results from having her xray done. Pt stated that she was called on friday    Was an appointment offered for this call? No  If yes : Appointment type              Date    Preferred method for responding to this message: Telephone Call  What is your phone number ?[phone]    If we cannot reach you directly, may we leave a detailed response at the number you provided? Yes    Can this message wait until your PCP/provider returns, if available today? Not applicable    Tamera Saab

## 2023-11-30 DIAGNOSIS — R20.2 PARESTHESIA: ICD-10-CM

## 2023-11-30 RX ORDER — GABAPENTIN 300 MG/1
CAPSULE ORAL
Qty: 300 CAPSULE | Refills: 0 | Status: SHIPPED | OUTPATIENT
Start: 2023-11-30 | End: 2024-01-16

## 2023-12-04 ENCOUNTER — TELEPHONE (OUTPATIENT)
Dept: INTERVENTIONAL RADIOLOGY/VASCULAR | Facility: HOSPITAL | Age: 73
End: 2023-12-04

## 2023-12-04 ENCOUNTER — HOSPITAL ENCOUNTER (OUTPATIENT)
Dept: MRI IMAGING | Facility: HOSPITAL | Age: 73
Discharge: HOME | End: 2023-12-04
Attending: FAMILY MEDICINE | Admitting: FAMILY MEDICINE
Payer: COMMERCIAL

## 2023-12-04 DIAGNOSIS — M54.16 LUMBAR RADICULOPATHY: ICD-10-CM

## 2023-12-04 DIAGNOSIS — M51.369 DDD (DEGENERATIVE DISC DISEASE), LUMBAR: ICD-10-CM

## 2023-12-04 PROBLEM — M48.061 SPINAL STENOSIS OF LUMBAR REGION: Status: ACTIVE | Noted: 2023-12-04

## 2023-12-04 PROCEDURE — 72148 MRI LUMBAR SPINE W/O DYE: CPT

## 2023-12-05 ENCOUNTER — HOSPITAL ENCOUNTER (OUTPATIENT)
Dept: GENERAL RADIOLOGY | Facility: HOSPITAL | Age: 73
Discharge: HOME | End: 2023-12-05
Attending: FAMILY MEDICINE | Admitting: FAMILY MEDICINE
Payer: COMMERCIAL

## 2023-12-05 DIAGNOSIS — M51.369 DDD (DEGENERATIVE DISC DISEASE), LUMBAR: ICD-10-CM

## 2023-12-05 DIAGNOSIS — M54.16 LUMBAR RADICULOPATHY: ICD-10-CM

## 2023-12-05 DIAGNOSIS — M48.061 SPINAL STENOSIS OF LUMBAR REGION, UNSPECIFIED WHETHER NEUROGENIC CLAUDICATION PRESENT: ICD-10-CM

## 2023-12-05 PROCEDURE — G0463 HOSPITAL OUTPT CLINIC VISIT: HCPCS

## 2023-12-08 ENCOUNTER — TRANSFERRED RECORDS (OUTPATIENT)
Dept: HEALTH INFORMATION MANAGEMENT | Facility: HOSPITAL | Age: 73
End: 2023-12-08

## 2023-12-09 DIAGNOSIS — R20.2 PARESTHESIA: ICD-10-CM

## 2023-12-11 NOTE — TELEPHONE ENCOUNTER
NEURONTIN      Last Written Prescription Date:  11-  Last Fill Quantity: 300,   # refills: 0  Last Office Visit: 11-24-23  Future Office visit:    Next 5 appointments (look out 90 days)      Dec 13, 2023  2:00 PM  (Arrive by 1:45 PM)  PHYSICAL with Melyssa Connors MD  [facility] ([facility] ) [address]  [place] [address]  [phone]             Routing refill request to provider for review/approval because:  Drug not on the [facility], [facility] or [facility] refill protocol or controlled substance

## 2023-12-11 NOTE — PROGRESS NOTES
"SUBJECTIVE:   Mhiaela is a 73 year old, presenting for the following:  Physical        Are you in the first 12 months of your Medicare coverage?  No    Healthy Habits:     In general, how would you rate your overall health?  Good    Frequency of exercise:  6-7 days/week    Duration of exercise:  15-30 minutes    Do you usually eat at least 4 servings of fruit and vegetables a day, include whole grains    & fiber and avoid regularly eating high fat or \"junk\" foods?  No    Taking medications regularly:  Yes    Medication side effects:  None    Ability to successfully perform activities of daily living:  No assistance needed    Home Safety:  No safety concerns identified    Hearing Impairment:  No hearing concerns    In the past 6 months, have you been bothered by leaking of urine?  No    In general, how would you rate your overall mental or emotional health?  Excellent    Additional concerns today:  Yes      Vaccines - flu, covid, rsv - due - but can't have with upcoming back injection    Osteoporosis - DEXA 9/2023 - showed improvement.  Completed 5 years Reclast.  Holiday.  Recheck 2 years.    Colonoscopy 2022 - due 2032.    History of breast cancer s/p bilateral mastectomy 2004.    Spinal stenosis - does not bother at night - sleeps ok.  Pain in legs.  Left leg quads.  Both hamstrings.   Aching pain.  ASYA - scheduled in 2 days.    Right finger - tender around nail bed; no drainage.    Have you ever done Advance Care Planning? (For example, a Health Directive, POLST, or a discussion with a medical provider or your loved ones about your wishes): Yes, advance care planning is on file.       Fall risk  Fallen 2 or more times in the past year?: No  Any fall with injury in the past year?: No    Cognitive Screening   1) Repeat 3 items (Leader, Season, Table)    2) Clock draw: NORMAL  3) 3 item recall: Recalls 3 objects  Results: 3 items recalled: COGNITIVE IMPAIRMENT LESS LIKELY    Mini-CogTM Copyright S Carlos Enrique. Licensed " by the author for use in [facility]; reprinted with permission (dennis@[facility]). All rights reserved.      Do you have sleep apnea, excessive snoring or daytime drowsiness? : no    Reviewed and updated as needed this visit by clinical staff   Tobacco  Allergies  Meds  Problems             Reviewed and updated as needed this visit by Provider      Problems            Social History     Tobacco Use     Smoking status: Never     Passive exposure: Never     Smokeless tobacco: Never   Substance Use Topics     Alcohol use: Yes     Alcohol/week: 0.0 standard drinks of alcohol     Comment: 1 glasso wine, weekly              12/13/2023     1:42 PM   Alcohol Use   Prescreen: >3 drinks/day or >7 drinks/week? No     Do you have a current opioid prescription? No  Do you use any other controlled substances or medications that are not prescribed by a provider? Prescription Drugs      Hyperlipidemia Follow-Up  Are you regularly taking any medication or supplement to lower your cholesterol?   Yes- zocor 20mg  Are you having muscle aches or other side effects that you think could be caused by your cholesterol lowering medication?  Yes- thighs    Hypothyroidism Follow-up  Since last visit, patient describes the following symptoms: Weight stable, no hair loss, no skin changes, no constipation, no loose stools    Current providers sharing in care for this patient include:   Patient Care Team:  Melyssa Connors MD as PCP - General  Melyssa Connors MD as Assigned PCP  Marcellus Jimenez MD as Assigned Surgical Provider  Katerina Campos DPM as Assigned Musculoskeletal Provider    The following health maintenance items are reviewed in Epic and correct as of today:  Health Maintenance   Topic Date Due     RSV VACCINE (Pregnancy & 60+) (1 - 1-dose 60+ series) Never done     INFLUENZA VACCINE (1) 09/01/2023     COVID-19 Vaccine (5 - 2023-24 season) 09/01/2023     JENNIFER ASSESSMENT  05/24/2024     PHQ-9  05/24/2024      TSH W/FREE T4 REFLEX  06/08/2024     MEDICARE ANNUAL WELLNESS VISIT  12/13/2024     FALL RISK ASSESSMENT  12/13/2024     LIPID  06/08/2028     ADVANCE CARE PLANNING  12/13/2028     DTAP/TDAP/TD IMMUNIZATION (3 - Td or Tdap) 05/14/2031     COLORECTAL CANCER SCREENING  12/02/2032     DEXA  09/27/2038     HEPATITIS C SCREENING  Completed     PHQ-2 (once per calendar year)  Completed     Pneumococcal Vaccine: 65+ Years  Completed     ZOSTER IMMUNIZATION  Completed     IPV IMMUNIZATION  Aged Out     HPV IMMUNIZATION  Aged Out     MENINGITIS IMMUNIZATION  Aged Out     RSV MONOCLONAL ANTIBODY  Aged Out     Current Outpatient Medications   Medication     aspirin 81 MG EC tablet     CALCIUM CITRATE     cholecalciferol 1000 UNITS TABS     cinnamon 500 MG CAPS     ferrous sulfate (SLO-FE) 142 (45 Fe) MG CR tablet     fish oil-omega-3 fatty acids 1000 MG capsule     gabapentin (NEURONTIN) 300 MG capsule     levothyroxine (SYNTHROID/LEVOTHROID) 25 MCG tablet     MAGNESIUM OXIDE PO     Multiple Vitamins-Minerals (PRESERVISION AREDS PO)     Polyethylene Glycol 3350 (MIRALAX PO)     rOPINIRole (REQUIP) 1 MG tablet     simvastatin (ZOCOR) 20 MG tablet     Turmeric 500 MG CAPS     zinc 50 MG TABS     methylPREDNISolone (MEDROL) 32 MG tablet     triamcinolone (KENALOG) 0.025 % cream     No current facility-administered medications for this visit.       Lab work is in process  Labs reviewed in EPIC      Mammogram Screening - Mammography discussed, not appropriate due to bilateral mastectomy  Pertinent mammograms are reviewed under the imaging tab.    Review of Systems   Constitutional:  Negative for chills and fever.   HENT:  Negative for congestion, ear pain, hearing loss and sore throat.    Eyes:  Negative for pain and visual disturbance.   Respiratory:  Negative for cough and shortness of breath.    Cardiovascular:  Negative for chest pain, palpitations and peripheral edema.   Gastrointestinal:  Negative for abdominal pain,  "constipation, diarrhea, heartburn, hematochezia and nausea.   Breasts:  Negative for tenderness, breast mass and discharge.   Genitourinary:  Negative for dysuria, frequency, genital sores, hematuria, pelvic pain, urgency, vaginal bleeding and vaginal discharge.   Musculoskeletal:  Positive for myalgias. Negative for arthralgias and joint swelling.   Skin:  Negative for rash.   Neurological:  Negative for dizziness, weakness, headaches and paresthesias.   Psychiatric/Behavioral:  Negative for mood changes. The patient is not nervous/anxious.        OBJECTIVE:   /60 (BP Location: Left arm, Patient Position: Sitting, Cuff Size: Adult Regular)   Pulse 62   Temp 98.2  F (36.8  C) (Tympanic)   Ht 1.549 m (5' 1\")   Wt 52.8 kg (116 lb 4.8 oz)   SpO2 98%   BMI 21.97 kg/m   Estimated body mass index is 21.97 kg/m  as calculated from the following:    Height as of this encounter: 1.549 m (5' 1\").    Weight as of this encounter: 52.8 kg (116 lb 4.8 oz).  Physical Exam  GENERAL APPEARANCE: healthy, alert, no distress, and frail  EYES: Eyes grossly normal to inspection, PERRL and conjunctivae and sclerae normal  HENT: ear canals and TM's normal, nose and mouth without ulcers or lesions, oropharynx clear and oral mucous membranes moist  NECK: no adenopathy, no asymmetry, masses, or scars and thyroid normal to palpation  RESP: lungs clear to auscultation - no rales, rhonchi or wheezes  BREAST: no palpable axillary masses or adenopathy and s/p bilateral mastectomy  CV: regular rate and rhythm, normal S1 S2, no S3 or S4, no murmur, click or rub, no peripheral edema and peripheral pulses strong  ABDOMEN: soft, nontender, no hepatosplenomegaly, no masses and bowel sounds normal  MS: no musculoskeletal defects are noted and gait is age appropriate without ataxia  SKIN: no suspicious rashes; right index finger with surrounding mild erythema, swelling; no drainage  NEURO: Normal strength and tone, sensory exam grossly normal, " mentation intact and speech normal  PSYCH: mentation appears normal and affect normal/bright    Diagnostic Test Results:  Labs reviewed in Epic    ASSESSMENT / PLAN:       ICD-10-CM    1. Encounter for Medicare annual wellness exam  Z00.00       2. Paronychia of finger of right hand  L03.011       3. Hypothyroidism, unspecified type  E03.9       4. Hyperlipidemia, unspecified hyperlipidemia type  E78.5       5. Osteoporosis, unspecified osteoporosis type, unspecified pathological fracture presence  M81.0         Paronychia - discussed soaking BID, topical antibiotic; see patient instruction.  Hypothyroid - continue Synthroid  Hyperlipidemia - continue zocor 20 mg  Osteoporosis - on holiday from treatment; repeat dexa 2 years; calcium, D, weight bearing exercise.        COUNSELING:  Reviewed preventive health counseling, as reflected in patient instructions       Regular exercise       Healthy diet/nutrition       Vision screening       Hearing screening       Dental care       Bladder control       Fall risk prevention       Osteoporosis prevention/bone health       Colon cancer screening        She reports that she has never smoked. She has never been exposed to tobacco smoke. She has never used smokeless tobacco.      Appropriate preventive services were discussed with this patient, including applicable screening as appropriate for fall prevention, nutrition, physical activity, Tobacco-use cessation, weight loss and cognition.  Checklist reviewing preventive services available has been given to the patient.    Reviewed patients plan of care and provided an AVS. The Basic Care Plan (routine screening as documented in Health Maintenance) for Mihaela meets the Care Plan requirement. This Care Plan has been established and reviewed with the Patient.          Melyssa Encinas MD  [facility] - [place]    Identified Health Risks:  I have reviewed Opioid Use Disorder and Substance Use Disorder risk  factors and made any needed referrals.

## 2023-12-11 NOTE — PATIENT INSTRUCTIONS
Vaccines - flu, covid, RSV - wait until at least 2 weeks before or after back injection.    DEXA 9/2023 - due 9/2025.  Colonosocpy 2022 -due 2032.    Labs - fasting - due 6/2024.    Back injection 12/15/23.   Consult with neurosurgeon if not improving.  Dr Pacheco Graf.          Patient Education   Personalized Prevention Plan  You are due for the preventive services outlined below.  Your care team is available to assist you in scheduling these services.  If you have already completed any of these items, please share that information with your care team to update in your medical record.  Health Maintenance Due   Topic Date Due    RSV VACCINE (Pregnancy & 60+) (1 - 1-dose 60+ series) Never done    Flu Vaccine (1) 09/01/2023    COVID-19 Vaccine (5 - 2023-24 season) 09/01/2023    Annual Wellness Visit  09/07/2023    FALL RISK ASSESSMENT  11/08/2023

## 2023-12-12 ENCOUNTER — HOSPITAL ENCOUNTER (OUTPATIENT)
Facility: HOSPITAL | Age: 73
End: 2023-12-12
Attending: PODIATRIST | Admitting: PODIATRIST
Payer: MEDICARE

## 2023-12-12 ENCOUNTER — TELEPHONE (OUTPATIENT)
Dept: INTERVENTIONAL RADIOLOGY/VASCULAR | Facility: HOSPITAL | Age: 73
End: 2023-12-12

## 2023-12-12 DIAGNOSIS — T50.995D ALLERGIC REACTION TO DYE, SUBSEQUENT ENCOUNTER: Primary | ICD-10-CM

## 2023-12-12 RX ORDER — METHYLPREDNISOLONE 4 MG
TABLET, DOSE PACK ORAL
Refills: 0 | Status: CANCELLED | OUTPATIENT
Start: 2023-12-12

## 2023-12-12 RX ORDER — GABAPENTIN 300 MG/1
CAPSULE ORAL
Qty: 300 CAPSULE | Refills: 5 | OUTPATIENT
Start: 2023-12-12

## 2023-12-12 RX ORDER — METHYLPREDNISOLONE 32 MG/1
TABLET ORAL
Qty: 2 TABLET | Refills: 0 | Status: SHIPPED | OUTPATIENT
Start: 2023-12-12 | End: 2024-06-13

## 2023-12-12 NOTE — TELEPHONE ENCOUNTER
Received call from [facility]. Patient is allergic to everything they use for prepping the skin for her upcoming injection. Per DI instructions it is recommended for any contrast allergy to give medrol dose pack 32 mg 12 prior to CT and 2 hours prior to injection. [facility] has not been able to speak with patient yet, they will continue to call.

## 2023-12-12 NOTE — TELEPHONE ENCOUNTER
Left a message to remind patient of their liset on 12/15. Also reminded patient to not take any antibiotics, steroids, or immunizations two weeks before and after this appt. And they need a .     GLORIA KELSEY

## 2023-12-12 NOTE — TELEPHONE ENCOUNTER
Patient has allergies to preps used to clean the procedure site before injection. The Radiologist recommends a prep before coming if she wants to proceed with the Lumbar injection. Left message for patient to call back . Sent message to patients provider regarding injection/allergies.

## 2023-12-12 NOTE — TELEPHONE ENCOUNTER
Spoke with Dr. Ramírez nurse regarding allergies to skin preps for injection. They will order medrol allergy prep . Patient also called after I spoke with nurse and is aware. She will get further instructions from Dr. Connors and her nurse tomorrow at her appt.

## 2023-12-13 ENCOUNTER — OFFICE VISIT (OUTPATIENT)
Dept: FAMILY MEDICINE | Facility: OTHER | Age: 73
End: 2023-12-13
Attending: FAMILY MEDICINE
Payer: COMMERCIAL

## 2023-12-13 VITALS
WEIGHT: 116.3 LBS | SYSTOLIC BLOOD PRESSURE: 110 MMHG | OXYGEN SATURATION: 98 % | TEMPERATURE: 98.2 F | HEIGHT: 61 IN | DIASTOLIC BLOOD PRESSURE: 60 MMHG | HEART RATE: 62 BPM | BODY MASS INDEX: 21.96 KG/M2

## 2023-12-13 DIAGNOSIS — L03.011 PARONYCHIA OF FINGER OF RIGHT HAND: ICD-10-CM

## 2023-12-13 DIAGNOSIS — E78.5 HYPERLIPIDEMIA, UNSPECIFIED HYPERLIPIDEMIA TYPE: ICD-10-CM

## 2023-12-13 DIAGNOSIS — E03.9 HYPOTHYROIDISM, UNSPECIFIED TYPE: Chronic | ICD-10-CM

## 2023-12-13 DIAGNOSIS — M81.0 OSTEOPOROSIS, UNSPECIFIED OSTEOPOROSIS TYPE, UNSPECIFIED PATHOLOGICAL FRACTURE PRESENCE: Chronic | ICD-10-CM

## 2023-12-13 DIAGNOSIS — Z00.00 ENCOUNTER FOR MEDICARE ANNUAL WELLNESS EXAM: Primary | ICD-10-CM

## 2023-12-13 PROCEDURE — G0439 PPPS, SUBSEQ VISIT: HCPCS | Performed by: FAMILY MEDICINE

## 2023-12-13 PROCEDURE — G0463 HOSPITAL OUTPT CLINIC VISIT: HCPCS

## 2023-12-13 PROCEDURE — 99213 OFFICE O/P EST LOW 20 MIN: CPT | Mod: 25 | Performed by: FAMILY MEDICINE

## 2023-12-13 ASSESSMENT — ENCOUNTER SYMPTOMS
DIZZINESS: 0
ABDOMINAL PAIN: 0
HEARTBURN: 0
HEADACHES: 0
WEAKNESS: 0
SORE THROAT: 0
NAUSEA: 0
PARESTHESIAS: 0
PALPITATIONS: 0
SHORTNESS OF BREATH: 0
MYALGIAS: 1
NERVOUS/ANXIOUS: 0
DIARRHEA: 0
BREAST MASS: 0
DYSURIA: 0
EYE PAIN: 0
HEMATURIA: 0
CHILLS: 0
JOINT SWELLING: 0
FEVER: 0
FREQUENCY: 0
CONSTIPATION: 0
ARTHRALGIAS: 0
HEMATOCHEZIA: 0
COUGH: 0

## 2023-12-13 ASSESSMENT — ACTIVITIES OF DAILY LIVING (ADL): CURRENT_FUNCTION: NO ASSISTANCE NEEDED

## 2023-12-15 ENCOUNTER — HOSPITAL ENCOUNTER (OUTPATIENT)
Facility: HOSPITAL | Age: 73
Discharge: HOME | End: 2023-12-15
Attending: RADIOLOGY | Admitting: RADIOLOGY
Payer: COMMERCIAL

## 2023-12-15 ENCOUNTER — HOSPITAL ENCOUNTER (OUTPATIENT)
Dept: GENERAL RADIOLOGY | Facility: HOSPITAL | Age: 73
Discharge: HOME | End: 2023-12-15
Attending: FAMILY MEDICINE | Admitting: RADIOLOGY
Payer: COMMERCIAL

## 2023-12-15 DIAGNOSIS — M54.16 LUMBAR RADICULOPATHY: ICD-10-CM

## 2023-12-15 DIAGNOSIS — M48.061 SPINAL STENOSIS OF LUMBAR REGION, UNSPECIFIED WHETHER NEUROGENIC CLAUDICATION PRESENT: ICD-10-CM

## 2023-12-15 DIAGNOSIS — M51.369 DDD (DEGENERATIVE DISC DISEASE), LUMBAR: ICD-10-CM

## 2023-12-15 DIAGNOSIS — M47.816 LUMBAR SPONDYLOSIS: ICD-10-CM

## 2023-12-15 PROCEDURE — 272N000472 XR LUMBAR TRANSLAMINAR INJ INCL IMAGING

## 2023-12-15 PROCEDURE — 250N000011 HC RX IP 250 OP 636: Mod: JZ | Performed by: RADIOLOGY

## 2023-12-15 RX ORDER — IOPAMIDOL 612 MG/ML
15 INJECTION, SOLUTION INTRATHECAL ONCE
Status: COMPLETED | OUTPATIENT
Start: 2023-12-15 | End: 2023-12-15

## 2023-12-15 RX ORDER — DEXAMETHASONE SODIUM PHOSPHATE 10 MG/ML
10 INJECTION, SOLUTION INTRAMUSCULAR; INTRAVENOUS ONCE
Status: COMPLETED | OUTPATIENT
Start: 2023-12-15 | End: 2023-12-15

## 2023-12-15 RX ADMIN — IOPAMIDOL 3 ML: 612 INJECTION, SOLUTION INTRATHECAL at 14:05

## 2023-12-15 RX ADMIN — DEXAMETHASONE SODIUM PHOSPHATE 10 MG: 10 INJECTION, SOLUTION INTRAMUSCULAR; INTRAVENOUS at 14:05

## 2023-12-15 NOTE — DISCHARGE INSTRUCTIONS
Cell number on file:    Telephone Information:   Mobile [phone]     Is it ok to leave a message at this number(s)? Yes    MICHAEL completed your procedure on 12/15/2023.    Current Pain Level (0-10 Scale): 4/10  Post Pain Level (0-10):  0/10    Radiology Discharge instructions for Steroid Injection    Activity Level:     Do not do any heavy activity or exercise for 24 hours.   Do not drive for 4 hours after your injection.  Diet:   Return to your normal diet.  Medications:   If you have stopped taking your Aspirin, Coumadin/Warfarin, Ibuprofen, or any   other blood thinner for this procedure you may resume in the morning unless   your primary care provider has given you other instructions.    Diabetics may see an increase in blood sugar after steroid injections. If you are concerned about your blood sugar, please contact your family doctor.    Site Care:  Remove the bandage and bathe or shower the morning after the procedure.      This is a Pain Management procedure.  You will be contacted in two weeks for follow up.    Call your Primary Care Provider if you have the following (if your primary care provider is not available please seek emergency care):   Nausea with vomiting   Severe headache   Drowsiness or confusion   Redness or drainage at the injection or puncture site   Temperature over 101 degrees F   Other concerns   Worsening back pain   Stiff neck

## 2023-12-28 ENCOUNTER — TELEPHONE (OUTPATIENT)
Dept: INTERVENTIONAL RADIOLOGY/VASCULAR | Facility: HOSPITAL | Age: 73
End: 2023-12-28

## 2023-12-28 DIAGNOSIS — M48.061 SPINAL STENOSIS OF LUMBAR REGION, UNSPECIFIED WHETHER NEUROGENIC CLAUDICATION PRESENT: Primary | ICD-10-CM

## 2023-12-28 NOTE — TELEPHONE ENCOUNTER
CONSULT PATIENT  PAIN INJECTION POST CALL    Procedure: Epidural TL L5-S1  Radiologist(s): DR. PHILIP RODRIGUEZ  Date of Procedure: 12/15/23    I responded to the patient's questions/concerns.    Relief of pain from this injection    A = 90%  A- = 85%  B = 80%  B- =75%  C = 70%  C- = 65%  D = 60%  D- = 50%  F = less than 50%       Did you get any relief from this injection in the past 2 weeks? Patient only received relief for about 1 or 2 days at 60-70% of relief.     If yes, how long did the relief last? Only 1 or 2 days    Are you still feeling relief? (2 weeks out)  No at the two week f/up patient is receiving 0% of relief.  Would you say this injection has been beneficial? Not really      Was there one injection that worked better than the other? First injection in the lumbar area.    Where is the pain? The pain is mostly on her legs. On the left side it is in the front above the knee, and on both legs left and right it is in the back more in the hamstring areas.  Can you describe the pain? Achy, burning, stinging  Does the pain radiate anywhere? Yes  If yes, where does it radiate and where does the pain stop? It travels down both legs on the back to just above the ankles.    Is this new pain? No    Patient would like to pursue another injection.      Mihaela Chung

## 2023-12-29 NOTE — TELEPHONE ENCOUNTER
If first injection gave zero relief, not sure repeating it would be of help.  I will place referral to ortho spine specialist - Dr Graf.  Notify patient.

## 2024-01-04 ENCOUNTER — TELEPHONE (OUTPATIENT)
Dept: FAMILY MEDICINE | Facility: OTHER | Age: 74
End: 2024-01-04

## 2024-01-04 DIAGNOSIS — M48.061 SPINAL STENOSIS OF LUMBAR REGION, UNSPECIFIED WHETHER NEUROGENIC CLAUDICATION PRESENT: Primary | ICD-10-CM

## 2024-01-04 NOTE — TELEPHONE ENCOUNTER
Verified with [facility] that consult could be scheduled within the month of January.    Current referral into OA  First available for consult is mid-March    Patient agreeable to any facility with availability sooner than March    Pended to PCP to review

## 2024-01-04 NOTE — TELEPHONE ENCOUNTER
Spoke with patient to update on referral request to   Gave phone number to  Neurosurgery to call next Wed. 1/10 to schedule consult IF they have not reached out to her by then    Patient also requested for records to be sent to [facility].  Has an appointment 1/9/23 in [place]  Writer transferred to [place] to assist with ALFRED    Lastly, patient has new insurance for this year  Recommended to call-in & request to speak with Registration staff to update Ins via phone  Or suggested to stop into the clinic to update    No further concerns at calls end.  Phone call ended pleasantly.

## 2024-01-05 ENCOUNTER — ALLIED HEALTH/NURSE VISIT (OUTPATIENT)
Dept: FAMILY MEDICINE | Facility: OTHER | Age: 74
End: 2024-01-05
Payer: MEDICARE

## 2024-01-05 DIAGNOSIS — Z09 NEED FOR IMMUNIZATION FOLLOW-UP: Primary | ICD-10-CM

## 2024-01-05 PROCEDURE — 90662 IIV NO PRSV INCREASED AG IM: CPT

## 2024-01-05 PROCEDURE — 90480 ADMN SARSCOV2 VAC 1/ONLY CMP: CPT

## 2024-01-05 NOTE — TELEPHONE ENCOUNTER
Writer faxed neurosurgery referral to  Neurosurgery  T: [phone]  F: [phone]    Gave patient phone number & suggested to call  next week Wednesday to schedule consult    Patient in agreement  No further concerns at calls end.  Phone call ended pleasantly.

## 2024-01-11 ENCOUNTER — TELEPHONE (OUTPATIENT)
Dept: FAMILY MEDICINE | Facility: OTHER | Age: 74
End: 2024-01-11

## 2024-01-11 NOTE — TELEPHONE ENCOUNTER
Received a call from the [facility] Neurosurgrey department. They are requesting a updated MRI of the lumbar spine this was completed on 12/4, can this along with the notes be forward to [facility] Neurosurgery.

## 2024-01-11 NOTE — TELEPHONE ENCOUNTER
DEMOGRAPHICS + PROGRESS NOTES + MRI (PUSHED IN PACS) FAXED TO [facility] NEUROSURGERY @ [phone]

## 2024-01-16 DIAGNOSIS — R20.2 PARESTHESIA: ICD-10-CM

## 2024-01-16 RX ORDER — GABAPENTIN 300 MG/1
CAPSULE ORAL
Qty: 300 CAPSULE | Refills: 0 | Status: SHIPPED | OUTPATIENT
Start: 2024-01-16 | End: 2024-04-15

## 2024-01-16 NOTE — TELEPHONE ENCOUNTER
Called patient for condition update & to inquire if [facility] Neuro has reached out to schedule  Per patient, [facility] has not reached out    Pt states she has been seeing Dr. Guillen @ [facility]  Treatments have been successful but still feeling moderate pain  Has long term treatment plan in place    Also, scheduled with Dr. Graf, [facility] for 3/14 for Neuro consult    Will consider scheduling Neuro with  if they can offer sooner than March    Instructed patient to please call back to this clinic if anything further is needed or with questions/concerns.  Patient relayed understanding.  No further concerns at calls end.    Call ended pleasantly

## 2024-01-16 NOTE — TELEPHONE ENCOUNTER
Neurontin      Last Written Prescription Date:  11/30/23  Last Fill Quantity: 300,   # refills: 0  Last Office Visit: 12/13/23  Future Office visit:    Next 5 appointments (look out 90 days)      Feb 05, 2024  2:30 PM  (Arrive by 2:15 PM)  Pre-Op physical with Melyssa Connors MD  [facility] [place] ([facility] [place] ) [address]  [place] [address]  [phone]

## 2024-01-24 ENCOUNTER — TRANSFERRED RECORDS (OUTPATIENT)
Dept: HEALTH INFORMATION MANAGEMENT | Facility: CLINIC | Age: 74
End: 2024-01-24

## 2024-01-30 ENCOUNTER — TELEPHONE (OUTPATIENT)
Dept: FAMILY MEDICINE | Facility: OTHER | Age: 74
End: 2024-01-30

## 2024-01-30 NOTE — TELEPHONE ENCOUNTER
Patient is requesting a referral for the pain clinic.      Lower spine and left hip and into the back of thighs pain

## 2024-01-30 NOTE — TELEPHONE ENCOUNTER
She has an appointment with Dr Graf on 3/14/24.  She will discuss with him.      She was under the impression that there was a pain clinic in the area.  States that someone mentioned to her that maybe she should try a pain clinic for her pain.      She is seeing a chiropractor.    Thanks Dr Connors for her help.

## 2024-01-30 NOTE — TELEPHONE ENCOUNTER
Pt called clinic and was transferred to this CC phone.  Mihaela donato  requesting a return phone call from Dr. Cnonors's office.  Telephone number [phone].

## 2024-02-02 NOTE — PROGRESS NOTES
Preoperative Evaluation  [facility] [place]  [address]  [place] [address]  Phone: [phone]  Primary Provider: Melyssa Connors  Pre-op Performing Provider: MELYSSA CONNORS  Feb 5, 2024       Mihaela is a 73 year old, presenting for the following:  Pre-Op Exam      Surgical Information  Surgery/Procedure: removal of hardware R foot   Surgery Location: [facility]  Surgeon: Reinking   Surgery Date: 2/16/24  Time of Surgery: TBD  Where patient plans to recover: At home with family  Fax number for surgical facility: Note does not need to be faxed, will be available electronically in Epic.    Assessment & Plan     The proposed surgical procedure is considered INTERMEDIATE risk.    Preop general physical exam  Proceed as planned.  - CBC with platelets and differential; Future  - Basic metabolic panel; Future  - CBC with platelets and differential  - Basic metabolic panel    Right foot pain  As above - removing hardware.  - CBC with platelets and differential; Future  - Basic metabolic panel; Future  - CBC with platelets and differential  - Basic metabolic panel    Hyperlipidemia, unspecified hyperlipidemia type  On statin.    Hypothyroidism, unspecified type  Stable with replacement.    Osteoporosis, unspecified osteoporosis type, unspecified pathological fracture presence  DEXA up to date 2023  [facility]    Abnormal glucose  Lab Results   Component Value Date    A1C 6.0 06/08/2023    A1C 5.6 06/08/2022    A1C 5.6 09/15/2020    A1C 5.7 10/21/2019    A1C 5.7 10/08/2018    A1C 5.5 11/01/2017    A1C 5.6 10/28/2016         Prediabetes  As above    Spinal stenosis of lumbar region, unspecified whether neurogenic claudication present  Consult with Dr Graf upcoming within next month.    Restless legs syndrome (RLS)  Stable with requip.           Risks and Recommendations  The patient has the following additional risks and recommendations for perioperative  complications:   - No identified additional risk factors other than previously addressed    Antiplatelet or Anticoagulation Medication Instructions   - aspirin: Discontinue aspirin 7-10 days prior to procedure to reduce bleeding risk. It should be resumed postoperatively.     Additional Medication Instructions  Patient is to take all scheduled medications on the day of surgery EXCEPT for modifications listed below:   - Statins: Continue taking on the day of surgery.    - ibuprofen (Advil, Motrin): HOLD 1 day before surgery.    - Herbal medications and vitamins: HOLD 14 days prior to surgery.    Recommendation  APPROVAL GIVEN to proceed with proposed procedure, without further diagnostic evaluation.          Subjective       HPI related to upcoming procedure: Patient with hardware from prior fracture, planning removal.          2/5/2024     2:15 PM   Preop Questions   1. Have you ever had a heart attack or stroke? No   2. Have you ever had surgery on your heart or blood vessels, such as a stent placement, a coronary artery bypass, or surgery on an artery in your head, neck, heart, or legs? No   3. Do you have chest pain with activity? No   4. Do you have a history of  heart failure? No   5. Do you currently have a cold, bronchitis or symptoms of other infection? No   6. Do you have a cough, shortness of breath, or wheezing? No   7. Do you or anyone in your family have previous history of blood clots? No   8. Do you or does anyone in your family have a serious bleeding problem such as prolonged bleeding following surgeries or cuts? No   9. Have you ever had problems with anemia or been told to take iron pills? YES - currently taking Ferrous Sulfate 142mg daily   10. Have you had any abnormal blood loss such as black, tarry or bloody stools, or abnormal vaginal bleeding? No   11. Have you ever had a blood transfusion? No   12. Are you willing to have a blood transfusion if it is medically needed before, during, or  after your surgery? Yes   13. Have you or any of your relatives ever had problems with anesthesia? No   14. Do you have sleep apnea, excessive snoring or daytime drowsiness? No   15. Do you have any artifical heart valves or other implanted medical devices like a pacemaker, defibrillator, or continuous glucose monitor? No   16. Do you have artificial joints? No   17. Are you allergic to latex? No     Health Care Directive  Patient has a Health Care Directive on file      Preoperative Review of    reviewed - controlled substances reflected in medication list.    Neurontin only    Status of Chronic Conditions:  See problem list for active medical problems.  Problems all longstanding and stable, except as noted/documented.  See ROS for pertinent symptoms related to these conditions.    HYPERLIPIDEMIA - Patient has a long history of significant Hyperlipidemia requiring medication for treatment with recent good control. Patient reports no problems or side effects with the medication.     HYPOTHYROIDISM - Patient has a longstanding history of chronic Hypothyroidism. Patient has been doing well, noting no tremor, insomnia, hair loss or changes in skin texture. Continues to take medications as directed, without adverse reactions or side effects. Last TSH   Lab Results   Component Value Date    TSH 1.61 06/08/2023   .      PREDIABETES - stable.  RLS - on requip.    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  Ensure this is not a melanoma, path pending      Hyperlipidemia, unspecified hyperlipidemia type 09/15/2016     Priority: Medium    ACP (advance care planning)  01/22/2016     Priority: Medium     Advance Care Planning 6/2/2016: Receipt of ACP document:  Received: Health Care Directive which was witnessed or notarized on 6/1/16.  Document not previously scanned.  Validation form completed and sent with document to be scanned.  Code Status reflects choices in most recent ACP document.  Confirmed/documented designated decision maker(s).  Added by Paola Johnson  Advance Care Planning 1/22/2016: Receipt of ACP document:  Received: Health Care Directive which was witnessed or notarized on 10-21-08.  Document previously scanned on 12-7-15.  Validation form completed and sent to be scanned.  Code Status needs to be updated to reflect choices in most recent ACP document.  Confirmed/documented designated decision maker(s).  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 11/16/2015     Priority: Medium    Personal history of malignant neoplasm of breast 12/23/2014     Priority: Medium    Neutropenia (H24) 12/23/2014     Priority: Medium     Problem list name updated by automated process. Provider to review      Early satiety 12/23/2014     Priority: Medium    Abnormal glucose 12/20/2013     Priority: Medium     Problem list name updated by automated process. 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 Provider to review        Past Medical History:   Diagnosis Date    Atypical nevi     Chondrodermatitis nodularis helicis of left ear     Dr Shipley,   [facility]    Chronic rhinitis     Dr Messina [facility]'s allergy; negative skin testing; IgE mediated allergies; ENT - DC nasal steroid and antihistamine; saline rinses    Degenerative skin disorder     solar elastosis    Hallux rigidus 06/15/2000    Hyperlipidemia, unspecified hyperlipidemia type 09/15/2016    Laryngopharyngeal reflux disease     PPI    Malignant neoplasm of breast (female), unspecified site 03/10/2004    Notalgia paresthetica     Osteoarthritis 07/20/2011    Osteoporosis, unspecified 09/10/2001    Dr Moses; intermittent reclast;  Dr. Moses; repeat dexa after full 2 years Reclast    Other abnormal glucose 12/20/2013    Paresthesia     neck; notalgiaparesthetic; dr elahy & Dr Nevarez; neurontin    Personal history of malignant neoplasm of breast 06/07/2005    Rhinitis     Schamberg's purpura     Spinal stenosis of lumbar region      Past Surgical History:   Procedure Laterality Date    BONE MARROW BIOPSY      negative    COLONOSCOPY  07/2000    COLONOSCOPY  8/13/2013    DR Fu; repeat 5 years    COLONOSCOPY N/A 8/13/2018    Procedure: COLONOSCOPY;  COLONOSCOPY;  Surgeon: Ajit Uriarte MD;  Location: [facility]    COLONOSCOPY N/A 12/2/2022    Procedure: COLONOSCOPY, WITH POLYPECTOMY AND BIOPSY;  Surgeon: Marcellus Jimenez MD;  Location: [facility]    DILATION AND CURETTAGE, OPERATIVE HYSTEROSCOPY, COMBINED N/A 2/13/2019    Procedure: EXAM UNDER ANESTHESIA , HYSTEROSCOPY;  Surgeon: Jovi Gabriel MD;  Location: [facility] OR    HEMORRHOIDECTOMY  1986    IR CONSULTATION FOR IR EXAM  12/5/2023    MASTECTOMY, BILATERAL  03/01/2004    right sided breast cancer    RECONSTRUCT FOREFOOT WITH METATARSOPHALANGEAL (MTP) FUSION Right 6/16/2023    Procedure: Right First Metatarsophalangeal Joint Fusion;  Surgeon: Ac Parker DPM;  Location: [facility] OR    RELEASE TRIGGER FINGER Right 3/7/2018    Procedure: RELEASE TRIGGER FINGER;  RELEASE TRIGGER DIGIT RIGHT THUMB;  Surgeon: Jose Alfredo Brewster DO;  Location: [facility]    RELEASE  TRIGGER FINGER Left 3/15/2023    Procedure: Left Thumb Trigger Finger Release;  Surgeon: Rigoberto Olivera MD;  Location: [facility]    RELEASE TRIGGER FINGER Right 5/17/2023    Procedure: Revision Right Thumb Trigger Release;  Surgeon: Rigoberto Olivera MD;  Location: [facility]    UPPER GI ENDOSCOPY      [facility] COLONOSCOPY THRU STOMA WITH BIOPSY  08/25/2010    cancer screening, family h/o colon cancer; hyperplastic polyp     Current Outpatient Medications   Medication Sig Dispense Refill    CALCIUM CITRATE 1,500 mg two times daily       cholecalciferol 1000 UNITS TABS 2 capsules po daily      cinnamon 500 MG CAPS Take 1 capsule by mouth 2 times daily      ferrous sulfate (SLO-FE) 142 (45 Fe) MG CR tablet Take 1 tablet (142 mg) by mouth daily 90 tablet 1    fish oil-omega-3 fatty acids 1000 MG capsule Take 1 g by mouth 2 times daily       levothyroxine (SYNTHROID/LEVOTHROID) 25 MCG tablet TAKE 1 TABLET BY MOUTH DAILY 3  DAYS WEEKLY AND 2 TABLETS BY  MOUTH DAILY FOR 4 DAYS PER WEEK 153 tablet 2    MAGNESIUM OXIDE PO Take 200 mg by mouth daily      Multiple Vitamins-Minerals (PRESERVISION AREDS PO) Take 1 tablet by mouth 2 times daily      Polyethylene Glycol 3350 (MIRALAX PO) Use every other day      rOPINIRole (REQUIP) 1 MG tablet TAKE 4 TABLETS BY MOUTH AT  BEDTIME 400 tablet 3    simvastatin (ZOCOR) 20 MG tablet TAKE 1 TABLET BY MOUTH AT  BEDTIME 100 tablet 2    Turmeric 500 MG CAPS Take 2 capsules by mouth daily       zinc 50 MG TABS Take 1 tablet by mouth daily       aspirin 81 MG EC tablet Take 1 tablet (81 mg) by mouth 2 times daily (Patient taking differently: Take 81 mg by mouth daily) 60 tablet 0    gabapentin (NEURONTIN) 300 MG capsule TAKE 1 CAPSULE BY MOUTH IN THE  MORNING AND IN THE AFTERNOON  TAKE 3 CAPSULES BY MOUTH AT  BEDTIME (Patient taking differently: Take 3-300mg daily at bedtime) 300 capsule 0    methylPREDNISolone (MEDROL) 32 MG tablet Take 32 mg po 12 hours prior to procedure and again 2 hours prior to  "procedure (Patient not taking: Reported on 2/5/2024) 2 tablet 0    triamcinolone (KENALOG) 0.025 % cream Apply to back once a day if the back is itchy. (Patient not taking: Reported on 12/13/2023) 454 g 1       Allergies   Allergen Reactions    Chlorhexidine Gluconate Rash    Povidone Iodine Rash     Betadine     Soap Rash     Betadine         Social History     Tobacco Use    Smoking status: Never     Passive exposure: Never    Smokeless tobacco: Never   Substance Use Topics    Alcohol use: Yes     Alcohol/week: 0.0 standard drinks of alcohol     Comment: 1 glasso wine, weekly      Family History   Problem Relation Age of Onset    Dementia Mother         (cause of death)     High cholesterol Mother     Osteoarthritis Mother     C.A.D. Father         (cause of death)     Prostate Cancer Father     Breast Cancer Maternal Aunt 72    Cerebrovascular Disease Maternal Grandmother         CVA    Diabetes Maternal Grandmother      History   Drug Use No         Review of Systems    Review of Systems  Constitutional, neuro, ENT, endocrine, pulmonary, cardiac, gastrointestinal, genitourinary, musculoskeletal, integument and psychiatric systems are negative, except as otherwise noted.  Objective    /60 (BP Location: Right arm, Patient Position: Sitting, Cuff Size: Adult Regular)   Pulse 61   Temp 98.1  F (36.7  C) (Tympanic)   Ht 1.549 m (5' 1\")   Wt 53.3 kg (117 lb 8 oz)   SpO2 98%   BMI 22.20 kg/m     Estimated body mass index is 22.2 kg/m  as calculated from the following:    Height as of this encounter: 1.549 m (5' 1\").    Weight as of this encounter: 53.3 kg (117 lb 8 oz).  Physical Exam  GENERAL: alert and no distress  EYES: Eyes grossly normal to inspection, PERRL and conjunctivae and sclerae normal  HENT: ear canals and TM's normal, nose and mouth without ulcers or lesions  NECK: no adenopathy, no asymmetry, masses, or scars  RESP: lungs clear to auscultation - no rales, rhonchi or wheezes  CV: regular rate " and rhythm, normal S1 S2, no S3 or S4, no murmur, click or rub, no peripheral edema  ABDOMEN: soft, nontender, no hepatosplenomegaly, no masses and bowel sounds normal  MS: no gross musculoskeletal defects noted, no edema  SKIN: no suspicious lesions or rashes  NEURO: Normal strength and tone, mentation intact and speech normal  PSYCH: mentation appears normal, affect normal/bright    Recent Labs   Lab Test 06/08/23  1508 10/10/22  1403 09/21/22  1413 06/08/22  0908   HGB 12.6  --  12.0 12.1     --  263 245   * 136 132* 136   POTASSIUM 4.5 4.2 4.3 4.2   CR 0.65 0.61 0.65 0.64   A1C 6.0*  --   --  5.6        Diagnostics  Labs pending at this time.  Results will be reviewed when available.   No EKG required, no history of coronary heart disease, significant arrhythmia, peripheral arterial disease or other structural heart disease.    Revised Cardiac Risk Index (RCRI)  The patient has the following serious cardiovascular risks for perioperative complications:   - No serious cardiac risks = 0 points     RCRI Interpretation: 0 points: Class I (very low risk - 0.4% complication rate)         Signed Electronically by: Melyssa Encinas MD  Copy of this evaluation report is provided to requesting physician.

## 2024-02-05 ENCOUNTER — OFFICE VISIT (OUTPATIENT)
Dept: FAMILY MEDICINE | Facility: OTHER | Age: 74
End: 2024-02-05
Attending: FAMILY MEDICINE
Payer: COMMERCIAL

## 2024-02-05 VITALS
BODY MASS INDEX: 22.19 KG/M2 | TEMPERATURE: 98.1 F | SYSTOLIC BLOOD PRESSURE: 110 MMHG | WEIGHT: 117.5 LBS | HEART RATE: 61 BPM | HEIGHT: 61 IN | DIASTOLIC BLOOD PRESSURE: 60 MMHG | OXYGEN SATURATION: 98 %

## 2024-02-05 DIAGNOSIS — G25.81 RESTLESS LEGS SYNDROME (RLS): ICD-10-CM

## 2024-02-05 DIAGNOSIS — E03.9 HYPOTHYROIDISM, UNSPECIFIED TYPE: Chronic | ICD-10-CM

## 2024-02-05 DIAGNOSIS — Z01.818 PREOP GENERAL PHYSICAL EXAM: Primary | ICD-10-CM

## 2024-02-05 DIAGNOSIS — E78.5 HYPERLIPIDEMIA, UNSPECIFIED HYPERLIPIDEMIA TYPE: ICD-10-CM

## 2024-02-05 DIAGNOSIS — M81.0 OSTEOPOROSIS, UNSPECIFIED OSTEOPOROSIS TYPE, UNSPECIFIED PATHOLOGICAL FRACTURE PRESENCE: Chronic | ICD-10-CM

## 2024-02-05 DIAGNOSIS — M79.671 RIGHT FOOT PAIN: ICD-10-CM

## 2024-02-05 DIAGNOSIS — R73.09 ABNORMAL GLUCOSE: ICD-10-CM

## 2024-02-05 DIAGNOSIS — R73.03 PREDIABETES: ICD-10-CM

## 2024-02-05 DIAGNOSIS — M48.061 SPINAL STENOSIS OF LUMBAR REGION, UNSPECIFIED WHETHER NEUROGENIC CLAUDICATION PRESENT: ICD-10-CM

## 2024-02-05 LAB
ANION GAP SERPL CALCULATED.3IONS-SCNC: 9 MMOL/L (ref 7–15)
BASOPHILS # BLD AUTO: 0 10E3/UL (ref 0–0.2)
BASOPHILS NFR BLD AUTO: 1 %
BUN SERPL-MCNC: 15.4 MG/DL (ref 8–23)
CALCIUM SERPL-MCNC: 9.2 MG/DL (ref 8.8–10.2)
CHLORIDE SERPL-SCNC: 97 MMOL/L (ref 98–107)
CREAT SERPL-MCNC: 0.66 MG/DL (ref 0.51–0.95)
DEPRECATED HCO3 PLAS-SCNC: 26 MMOL/L (ref 22–29)
EGFRCR SERPLBLD CKD-EPI 2021: >90 ML/MIN/1.73M2
EOSINOPHIL # BLD AUTO: 0.1 10E3/UL (ref 0–0.7)
EOSINOPHIL NFR BLD AUTO: 3 %
ERYTHROCYTE [DISTWIDTH] IN BLOOD BY AUTOMATED COUNT: 13 % (ref 10–15)
GLUCOSE SERPL-MCNC: 95 MG/DL (ref 70–99)
HCT VFR BLD AUTO: 34.8 % (ref 35–47)
HGB BLD-MCNC: 11.7 G/DL (ref 11.7–15.7)
IMM GRANULOCYTES # BLD: 0 10E3/UL
IMM GRANULOCYTES NFR BLD: 0 %
LYMPHOCYTES # BLD AUTO: 0.9 10E3/UL (ref 0.8–5.3)
LYMPHOCYTES NFR BLD AUTO: 20 %
MCH RBC QN AUTO: 32.8 PG (ref 26.5–33)
MCHC RBC AUTO-ENTMCNC: 33.6 G/DL (ref 31.5–36.5)
MCV RBC AUTO: 98 FL (ref 78–100)
MONOCYTES # BLD AUTO: 0.5 10E3/UL (ref 0–1.3)
MONOCYTES NFR BLD AUTO: 11 %
NEUTROPHILS # BLD AUTO: 2.9 10E3/UL (ref 1.6–8.3)
NEUTROPHILS NFR BLD AUTO: 65 %
NRBC # BLD AUTO: 0 10E3/UL
NRBC BLD AUTO-RTO: 0 /100
PLATELET # BLD AUTO: 286 10E3/UL (ref 150–450)
POTASSIUM SERPL-SCNC: 4.7 MMOL/L (ref 3.4–5.3)
RBC # BLD AUTO: 3.57 10E6/UL (ref 3.8–5.2)
SODIUM SERPL-SCNC: 132 MMOL/L (ref 135–145)
WBC # BLD AUTO: 4.4 10E3/UL (ref 4–11)

## 2024-02-05 PROCEDURE — 99214 OFFICE O/P EST MOD 30 MIN: CPT | Performed by: FAMILY MEDICINE

## 2024-02-05 PROCEDURE — 80048 BASIC METABOLIC PNL TOTAL CA: CPT | Mod: ZL | Performed by: FAMILY MEDICINE

## 2024-02-05 PROCEDURE — 85025 COMPLETE CBC W/AUTO DIFF WBC: CPT | Mod: ZL | Performed by: FAMILY MEDICINE

## 2024-02-05 PROCEDURE — 36415 COLL VENOUS BLD VENIPUNCTURE: CPT | Mod: ZL | Performed by: FAMILY MEDICINE

## 2024-02-05 PROCEDURE — G0463 HOSPITAL OUTPT CLINIC VISIT: HCPCS

## 2024-02-05 ASSESSMENT — PAIN SCALES - GENERAL: PAINLEVEL: NO PAIN (0)

## 2024-02-05 NOTE — PATIENT INSTRUCTIONS
Preparing for Your Surgery  Getting started  A nurse will call you to review your health history and instructions. They will give you an arrival time based on your scheduled surgery time. Please be ready to share:  Your doctor's clinic name and phone number  Your medical, surgical, and anesthesia history  A list of allergies and sensitivities  A list of medicines, including herbal treatments and over-the-counter drugs  Whether the patient has a legal guardian (ask how to send us the papers in advance)  Please tell us if you're pregnant--or if there's any chance you might be pregnant. Some surgeries may injure a fetus (unborn baby), so they require a pregnancy test. Surgeries that are safe for a fetus don't always need a test, and you can choose whether to have one.   If you have a child who's having surgery, please ask for a copy of Preparing for Your Child's Surgery.    Preparing for surgery  Within 10 to 30 days of surgery: Have a pre-op exam (sometimes called an H&P, or History and Physical). This can be done at a clinic or pre-operative center.  If you're having a , you may not need this exam. Talk to your care team.  At your pre-op exam, talk to your care team about all medicines you take. If you need to stop any medicines before surgery, ask when to start taking them again.  We do this for your safety. Many medicines can make you bleed too much during surgery. Some change how well surgery (anesthesia) drugs work.  Call your insurance company to let them know you're having surgery. (If you don't have insurance, call [phone].)  Call your clinic if there's any change in your health. This includes signs of a cold or flu (sore throat, runny nose, cough, rash, fever). It also includes a scrape or scratch near the surgery site.  If you have questions on the day of surgery, call your hospital or surgery center.  Eating and drinking guidelines  For your safety: Unless your surgeon tells you otherwise,  follow the guidelines below.  Eat and drink as usual until 8 hours before you arrive for surgery. After that, no food or milk.  Drink clear liquids until 2 hours before you arrive. These are liquids you can see through, like water, Gatorade, and Propel Water. They also include plain black coffee and tea (no cream or milk), candy, and breath mints. You can spit out gum when you arrive.  If you drink alcohol: Stop drinking it the night before surgery.  If your care team tells you to take medicine on the morning of surgery, it's okay to take it with a sip of water.  Preventing infection  Shower or bathe the night before and morning of your surgery. Follow the instructions your clinic gave you. (If no instructions, use regular soap.)  Don't shave or clip hair near your surgery site. We'll remove the hair if needed.  Don't smoke or vape the morning of surgery. You may chew nicotine gum up to 2 hours before surgery. A nicotine patch is okay.  Note: Some surgeries require you to completely quit smoking and nicotine. Check with your surgeon.  Your care team will make every effort to keep you safe from infection. We will:  Clean our hands often with soap and water (or an alcohol-based hand rub).  Clean the skin at your surgery site with a special soap that kills germs.  Give you a special gown to keep you warm. (Cold raises the risk of infection.)  Wear special hair covers, masks, gowns and gloves during surgery.  Give antibiotic medicine, if prescribed. Not all surgeries need antibiotics.  What to bring on the day of surgery  Photo ID and insurance card  Copy of your health care directive, if you have one  Glasses and hearing aids (bring cases)  You can't wear contacts during surgery  Inhaler and eye drops, if you use them (tell us about these when you arrive)  CPAP machine or breathing device, if you use them  A few personal items, if spending the night  If you have . . .  A pacemaker, ICD (cardiac defibrillator) or other  implant: Bring the ID card.  An implanted stimulator: Bring the remote control.  A legal guardian: Bring a copy of the certified (court-stamped) guardianship papers.  Please remove any jewelry, including body piercings. Leave jewelry and other valuables at home.  If you're going home the day of surgery  You must have a responsible adult drive you home. They should stay with you overnight as well.  If you don't have someone to stay with you, and you aren't safe to go home alone, we may keep you overnight. Insurance often won't pay for this.  After surgery  If it's hard to control your pain or you need more pain medicine, please call your surgeon's office.  Questions?   If you have any questions for your care team, list them here: _________________________________________________________________________________________________________________________________________________________________________ ____________________________________ ____________________________________ ____________________________________  For informational purposes only. Not to replace the advice of your health care provider. Copyright   2003, 2019 [facility]. All rights reserved. Clinically reviewed by Hannah Ash MD. [employer] 949725 - REV 12/22.    How to Take Your Medication Before Surgery  - Take all of your medications before surgery except as noted below  - HOLD (do not take) Aspirin for 7 days  - STOP taking all vitamins and herbal supplements 14 days before surgery.    Stop Ibuprofen products week prior if able - motrin specifically stop at least 2 days prior.  Ok to use Tylenol.     Will call with lab results.

## 2024-02-05 NOTE — LETTER
February 8, 2024      Mihaela J Laine  [address] [place] [address]   [facility] [id number]        Dear ,    We are writing to inform you of your test results.    Labs came back stable overall.    Resulted Orders   Basic metabolic panel   Result Value Ref Range    Sodium 132 (L) 135 - 145 mmol/L      Comment:      Reference intervals for this test were updated on 09/26/2023 to more accurately reflect our healthy population. There may be differences in the flagging of prior results with similar values performed with this method. Interpretation of those prior results can be made in the context of the updated reference intervals.     Potassium 4.7 3.4 - 5.3 mmol/L    Chloride 97 (L) 98 - 107 mmol/L    Carbon Dioxide (CO2) 26 22 - 29 mmol/L    Anion Gap 9 7 - 15 mmol/L    Urea Nitrogen 15.4 8.0 - 23.0 mg/dL    Creatinine 0.66 0.51 - 0.95 mg/dL    GFR Estimate >90 >60 mL/min/1.73m2    Calcium 9.2 8.8 - 10.2 mg/dL    Glucose 95 70 - 99 mg/dL   CBC with platelets and differential   Result Value Ref Range    WBC Count 4.4 4.0 - 11.0 10e3/uL    RBC Count 3.57 (L) 3.80 - 5.20 10e6/uL    Hemoglobin 11.7 11.7 - 15.7 g/dL    Hematocrit 34.8 (L) 35.0 - 47.0 %    MCV 98 78 - 100 fL    MCH 32.8 26.5 - 33.0 pg    MCHC 33.6 31.5 - 36.5 g/dL    RDW 13.0 10.0 - 15.0 %    Platelet Count 286 150 - 450 10e3/uL    % Neutrophils 65 %    % Lymphocytes 20 %    % Monocytes 11 %    % Eosinophils 3 %    % Basophils 1 %    % Immature Granulocytes 0 %    NRBCs per 100 WBC 0 <1 /100    Absolute Neutrophils 2.9 1.6 - 8.3 10e3/uL    Absolute Lymphocytes 0.9 0.8 - 5.3 10e3/uL    Absolute Monocytes 0.5 0.0 - 1.3 10e3/uL    Absolute Eosinophils 0.1 0.0 - 0.7 10e3/uL    Absolute Basophils 0.0 0.0 - 0.2 10e3/uL    Absolute Immature Granulocytes 0.0 <=0.4 10e3/uL    Absolute NRBCs 0.0 10e3/uL       If you have any questions or concerns, please call the clinic at the number listed above.       Sincerely,      Melyssa Connors,  MD

## 2024-02-06 ENCOUNTER — TELEPHONE (OUTPATIENT)
Dept: FAMILY MEDICINE | Facility: OTHER | Age: 74
End: 2024-02-06

## 2024-02-06 NOTE — TELEPHONE ENCOUNTER
Results requested: pt is return JAYME Rodriguez call about her lab results,she was unable to answer at the time of the call      Best number to reach patient at: [phone]     Best time to call patient: pt stated that before 2:30 is good since she has an appt at that time or anytime ronn    Send to care team in basket.

## 2024-02-07 NOTE — TELEPHONE ENCOUNTER
Attempted to call patient left message we would call back with lab results.       Melyssa Connors MD  2/5/2024  3:26 PM CST       Notify of lab results -stable overall.

## 2024-02-08 ENCOUNTER — TELEPHONE (OUTPATIENT)
Dept: FAMILY MEDICINE | Facility: OTHER | Age: 74
End: 2024-02-08

## 2024-02-08 NOTE — TELEPHONE ENCOUNTER
Results requested: pt is calling back about lab results on monday     Best number to reach patient at: [phone]     Best time to call patient: anytime- pt is leaving at 2:20 today, if unable to reach please call pt    Send to care team in basket.

## 2024-02-09 RX ORDER — SODIUM CHLORIDE, SODIUM LACTATE, POTASSIUM CHLORIDE, CALCIUM CHLORIDE 600; 310; 30; 20 MG/100ML; MG/100ML; MG/100ML; MG/100ML
INJECTION, SOLUTION INTRAVENOUS CONTINUOUS
Status: CANCELLED | OUTPATIENT
Start: 2024-02-09

## 2024-02-09 RX ORDER — FENTANYL CITRATE 50 UG/ML
50 INJECTION, SOLUTION INTRAMUSCULAR; INTRAVENOUS EVERY 5 MIN PRN
Status: CANCELLED | OUTPATIENT
Start: 2024-02-09

## 2024-02-09 RX ORDER — ONDANSETRON 2 MG/ML
4 INJECTION INTRAMUSCULAR; INTRAVENOUS EVERY 30 MIN PRN
Status: CANCELLED | OUTPATIENT
Start: 2024-02-09

## 2024-02-09 RX ORDER — HYDROMORPHONE HYDROCHLORIDE 1 MG/ML
0.5 INJECTION, SOLUTION INTRAMUSCULAR; INTRAVENOUS; SUBCUTANEOUS EVERY 5 MIN PRN
Status: CANCELLED | OUTPATIENT
Start: 2024-02-09

## 2024-02-09 RX ORDER — LIDOCAINE 40 MG/G
CREAM TOPICAL
Status: CANCELLED | OUTPATIENT
Start: 2024-02-09

## 2024-02-09 RX ORDER — LABETALOL 20 MG/4 ML (5 MG/ML) INTRAVENOUS SYRINGE
10
Status: CANCELLED | OUTPATIENT
Start: 2024-02-09

## 2024-02-09 RX ORDER — HYDRALAZINE HYDROCHLORIDE 20 MG/ML
2.5-5 INJECTION INTRAMUSCULAR; INTRAVENOUS EVERY 10 MIN PRN
Status: CANCELLED | OUTPATIENT
Start: 2024-02-09

## 2024-02-09 RX ORDER — ONDANSETRON 4 MG/1
4 TABLET, ORALLY DISINTEGRATING ORAL EVERY 30 MIN PRN
Status: CANCELLED | OUTPATIENT
Start: 2024-02-09

## 2024-02-09 NOTE — OR NURSING
Confirms stopping NSAIDs, ASA, Fish oil, vitamins & supplements, continue remaining medications as prescribed up to night before & take levothyroxine morning of surgery.

## 2024-02-14 NOTE — DISCHARGE INSTRUCTIONS
After Anesthesia (Sleep Medicine)  What should I do after anesthesia?  You should rest and relax for the next 24 hours. Avoid risky or difficult (strenuous) activity. A responsible adult should stay with you overnight.  Don't drive or use any heavy equipment for 24 hours. Even if you feel normal, your reactions may be affected by the sleep medicine given to you.  Don't drink alcohol or make any important decisions for 24 hours.  Slowly get back to your regular diet, as you feel able.  How should I expect to feel?  It's normal to feel dizzy, light-headed, or faint for up to a full day after anesthesia or while taking pain medicine. If this happens:   Sit down for a few minutes before standing.  Have someone help you when you get up to walk or use the bathroom.  If you have nausea (feel sick to your stomach) or vomit (throw up):   Drink clear liquids (such as apple juice, ginger ale, broth, or 7UP) until you feel better.  If you feel sick to your stomach, or you keep vomiting for 24 hours, please call the doctor.  What else should I know?  You might have a dry mouth, sore throat, muscle aches, or trouble sleeping. These should go away after 24 hours.  Please contact your doctor if you have any other symptoms that concern you, such as fever, pain, bleeding, fluid drainage, swelling, or headache, or if it's been over 8 to 10 hours and you still aren't able to pee (urinate).  If you have a history of sleep apnea, it's very important to use your CPAP machine for the next 24 hours when you nap or sleep.   For informational purposes only. Not to replace the advice of your health care provider. Copyright   2023 [employer]. All rights reserved. Clinically reviewed by Yovanny Fu MD. [employer] [id number] - REV 09/23.

## 2024-02-21 ENCOUNTER — TRANSFERRED RECORDS (OUTPATIENT)
Dept: HEALTH INFORMATION MANAGEMENT | Facility: CLINIC | Age: 74
End: 2024-02-21

## 2024-03-14 ENCOUNTER — TRANSFERRED RECORDS (OUTPATIENT)
Dept: HEALTH INFORMATION MANAGEMENT | Facility: CLINIC | Age: 74
End: 2024-03-14

## 2024-03-29 ENCOUNTER — TELEPHONE (OUTPATIENT)
Dept: FAMILY MEDICINE | Facility: OTHER | Age: 74
End: 2024-03-29

## 2024-03-29 ENCOUNTER — TRANSFERRED RECORDS (OUTPATIENT)
Dept: HEALTH INFORMATION MANAGEMENT | Facility: CLINIC | Age: 74
End: 2024-03-29

## 2024-03-29 NOTE — TELEPHONE ENCOUNTER
8:33 AM    Reason for Call: OVERBOOK    Patient is preop / [facility] / DOS 5-1 / lower back surgery / Dr. Graf     The patient is requesting an appointment for between 4-1 and 4-17 with Dr. Connors.    Was an appointment offered for this call? No  If yes : Appointment type              Date    Preferred method for responding to this message: Telephone Call  What is your phone number ? [phone]     If we cannot reach you directly, may we leave a detailed response at the number you provided? Yes    Can this message wait until your PCP/provider returns, if unavailable today? Elli Perez

## 2024-04-05 ENCOUNTER — TRANSFERRED RECORDS (OUTPATIENT)
Dept: HEALTH INFORMATION MANAGEMENT | Facility: CLINIC | Age: 74
End: 2024-04-05

## 2024-04-09 ENCOUNTER — MEDICAL CORRESPONDENCE (OUTPATIENT)
Dept: MRI IMAGING | Facility: HOSPITAL | Age: 74
End: 2024-04-09

## 2024-04-11 NOTE — PROGRESS NOTES
Preoperative Evaluation  [facility] [place]  [address]  [place] [address]  Phone: [phone]  Primary Provider: Snehal Connors  Pre-op Performing Provider: SNEHAL CONNORS  Apr 15, 2024       Mihaela is a 73 year old, presenting for the following:  Pre-Op Exam      Surgical Information  Surgery/Procedure: lumbar laminectomy/micro dissectomy L4/5, left  Surgery Location: [facility]   Surgeon: Dr. Pacheco Graf  Surgery Date: 5/1/2024  Time of Surgery: TBD  Where patient plans to recover: At home with family  Fax number for surgical facility: [phone] and [phone]    Assessment & Plan     The proposed surgical procedure is considered INTERMEDIATE risk.    Preop general physical exam  Proceed as planned.  - CBC with platelets and differential; Future  - Basic metabolic panel; Future  - EKG 12-lead complete w/read - (Clinic Performed)    Spinal stenosis of lumbar region, unspecified whether neurogenic claudication present  As above  - CBC with platelets and differential; Future  - Basic metabolic panel; Future  - EKG 12-lead complete w/read - (Clinic Performed)    DDD (degenerative disc disease), lumbar  As above  - CBC with platelets and differential; Future  - Basic metabolic panel; Future  - EKG 12-lead complete w/read - (Clinic Performed)    Hyperlipidemia, unspecified hyperlipidemia type  Stable with statin.    Hypothyroidism, unspecified type  Stable with synthroid.    Prediabetes  Stable.    Osteoporosis, unspecified osteoporosis type, unspecified pathological fracture presence  Up to date on dexa.  Prior reclast.    Restless legs syndrome (RLS)  Stable with Requip.    Paresthesia  And radiculopathy. Taking Neurontin HS only.  - gabapentin (NEURONTIN) 300 MG capsule; Take 3 capsules (900 mg) by mouth at bedtime          Risks and Recommendations  The patient has the following additional risks and recommendations for perioperative complications:   - No identified  additional risk factors other than previously addressed    Antiplatelet or Anticoagulation Medication Instructions   - aspirin: Discontinue aspirin 7-10 days prior to procedure to reduce bleeding risk. It should be resumed postoperatively.     Additional Medication Instructions  Patient is to take all scheduled medications on the day of surgery EXCEPT for modifications listed below:   - Statins: Continue taking on the day of surgery.    - pregabalin, gabapentin: Continue without modification.   - ibuprofen (Advil, Motrin): HOLD 1 day before surgery.    - Herbal medications and vitamins: HOLD 14 days prior to surgery.    Recommendation  APPROVAL GIVEN to proceed with proposed procedure, without further diagnostic evaluation.    Review of prior external note(s) from - Outside records from Dr Graf [facility]      Subjective       HPI related to upcoming procedure: Patient with large left L4/5 disc with radiculopathy, s/p ASYA in 12/2023 and physical therapy, with ongoing symptoms.           4/15/2024     1:19 PM   Preop Questions   1. Have you ever had a heart attack or stroke? No   2. Have you ever had surgery on your heart or blood vessels, such as a stent placement, a coronary artery bypass, or surgery on an artery in your head, neck, heart, or legs? No   3. Do you have chest pain with activity? No   4. Do you have a history of  heart failure? No   5. Do you currently have a cold, bronchitis or symptoms of other infection? No   6. Do you have a cough, shortness of breath, or wheezing? No   7. Do you or anyone in your family have previous history of blood clots? No   8. Do you or does anyone in your family have a serious bleeding problem such as prolonged bleeding following surgeries or cuts? No   9. Have you ever had problems with anemia or been told to take iron pills? YES - takes a iron tablet daily    10. Have you had any abnormal blood loss such as black, tarry or bloody stools, or abnormal vaginal  bleeding? No   11. Have you ever had a blood transfusion? No   12. Are you willing to have a blood transfusion if it is medically needed before, during, or after your surgery? Yes   13. Have you or any of your relatives ever had problems with anesthesia? No   14. Do you have sleep apnea, excessive snoring or daytime drowsiness? No   15. Do you have any artifical heart valves or other implanted medical devices like a pacemaker, defibrillator, or continuous glucose monitor? No   16. Do you have artificial joints? No   17. Are you allergic to latex? No     Health Care Directive  Patient has a Health Care Directive on file      Preoperative Review of    reviewed - controlled substances reflected in medication list.    Gabapentin only.    Status of Chronic Conditions:  See problem list for active medical problems.  Problems all longstanding and stable, except as noted/documented.  See ROS for pertinent symptoms related to these conditions.    HYPERLIPIDEMIA - Patient has a long history of significant Hyperlipidemia requiring medication for treatment with recent good control. Patient reports no problems or side effects with the medication, Simvastatin.    PREDIABETES -   Lab Results   Component Value Date    A1C 6.0 06/08/2023    A1C 5.6 06/08/2022    A1C 5.6 09/15/2020    A1C 5.7 10/21/2019     OSTEOPOROSIS - last dexa 9/2023; treated with Reclast - on holiday    HYPOTHYROID - stable with synthroid.     RLS - stable with Requip.    Ibuprofen at night.    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  Ensure this is not a melanoma, path pending      Hyperlipidemia, unspecified hyperlipidemia type 09/15/2016     Priority: Medium    ACP (advance care planning) 01/22/2016     Priority: Medium     Advance Care Planning 6/2/2016: Receipt of ACP document:  Received: Health Care Directive which was witnessed or notarized on 6/1/16.  Document not previously scanned.  Validation form completed and sent with document to be scanned.  Code Status reflects choices in most recent ACP document.  Confirmed/documented designated decision maker(s).  Added by Paola Johnson  Advance Care Planning 1/22/2016: Receipt of ACP document:  Received: Health Care Directive which was witnessed or notarized on 10-21-08.  Document previously scanned on 12-7-15.  Validation form completed and sent to be scanned.  Code Status needs to be updated to reflect choices in most recent ACP document.  Confirmed/documented designated decision maker(s).  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 11/16/2015     Priority: Medium    Personal history of malignant neoplasm of breast 12/23/2014     Priority: Medium    Neutropenia (H24) 12/23/2014     Priority: Medium     Problem list name updated by automated process. Provider to review      Early satiety 12/23/2014     Priority: Medium    Abnormal glucose 12/20/2013     Priority: Medium     Problem list name updated by automated process. Provider to review      Laryngopharyngeal reflux (LPR) 04/30/2013     Priority: Medium    Chronic rhinitis 04/30/2013     Priority: Medium    ETD (eustachian tube dysfunction) 04/30/2013     Priority: Medium    Atypical nevus 07/25/2012     Priority: Medium    Skin sensation disturbance 07/25/2012     Priority: Medium    Notalgia paresthetica 07/25/2012     Priority: Medium    Osteoporosis 09/10/2001     Priority: Medium      Problem list name updated by automated process. Provider to review        Past Medical History:   Diagnosis Date    Atypical nevi     Chondrodermatitis nodularis helicis of left ear     Dr Shipley, [facility]    Chronic rhinitis     [facility] allergy; negative skin testing; IgE mediated allergies; ENT - DC nasal steroid and antihistamine; saline rinses    Degenerative skin disorder     solar elastosis    Hallux rigidus 06/15/2000    Hyperlipidemia, unspecified hyperlipidemia type 09/15/2016    Laryngopharyngeal reflux disease     PPI    Malignant neoplasm of breast (female), unspecified site 03/10/2004    Notalgia paresthetica     Osteoarthritis 07/20/2011    Osteoporosis, unspecified 09/10/2001    Dr Moses; intermittent reclast;  Dr. Moses; repeat dexa after full 2 years Reclast    Other abnormal glucose 12/20/2013    Paresthesia     neck; notalgiaparesthetic; dr leahy & Dr Nevarez; neurontin    Personal history of malignant neoplasm of breast 06/07/2005    Rhinitis     Schamberg's purpura     Spinal stenosis of lumbar region      Past Surgical History:   Procedure Laterality Date    BONE MARROW BIOPSY      negative    COLONOSCOPY  07/2000    COLONOSCOPY  8/13/2013    DR Fu; repeat 5 years    COLONOSCOPY N/A 8/13/2018    Procedure: COLONOSCOPY;  COLONOSCOPY;  Surgeon: Ajit Uriarte MD;  Location: [facility]    COLONOSCOPY N/A 12/2/2022    Procedure: COLONOSCOPY, WITH POLYPECTOMY AND BIOPSY;  Surgeon: Marcellus Jimenez MD;  Location: [facility]    DILATION AND CURETTAGE, OPERATIVE HYSTEROSCOPY, COMBINED N/A 2/13/2019    Procedure: EXAM UNDER ANESTHESIA , HYSTEROSCOPY;  Surgeon: Jovi Gabriel MD;  Location: [facility]    HEMORRHOIDECTOMY  1986    IR CONSULTATION FOR IR EXAM  12/5/2023    MASTECTOMY, BILATERAL  03/01/2004    right sided breast cancer    RECONSTRUCT FOREFOOT WITH METATARSOPHALANGEAL (MTP) FUSION Right 6/16/2023    Procedure: Right First Metatarsophalangeal Joint Fusion;  Surgeon: Elena  MARIA GUADALUPE Shen;  Location: [facility]    RELEASE TRIGGER FINGER Right 3/7/2018    Procedure: RELEASE TRIGGER FINGER;  RELEASE TRIGGER DIGIT RIGHT THUMB;  Surgeon: Jose Alfredo Brewster DO;  Location: [facility]    RELEASE TRIGGER FINGER Left 3/15/2023    Procedure: Left Thumb Trigger Finger Release;  Surgeon: Rigoberto Olivera MD;  Location: [facility]    RELEASE TRIGGER FINGER Right 5/17/2023    Procedure: Revision Right Thumb Trigger Release;  Surgeon: Rigoberto Olivera MD;  Location: [facility]    UPPER GI ENDOSCOPY      ZZHC COLONOSCOPY THRU STOMA WITH BIOPSY  08/25/2010    cancer screening, family h/o colon cancer; hyperplastic polyp     Current Outpatient Medications   Medication Sig Dispense Refill    aspirin 81 MG EC tablet Take 1 tablet (81 mg) by mouth 2 times daily (Patient taking differently: Take 81 mg by mouth daily) 60 tablet 0    CALCIUM CITRATE 1,500 mg two times daily       cholecalciferol 1000 UNITS TABS 2 capsules po daily      cinnamon 500 MG CAPS Take 1 capsule by mouth 2 times daily      ferrous sulfate (SLO-FE) 142 (45 Fe) MG CR tablet Take 1 tablet (142 mg) by mouth daily 90 tablet 1    fish oil-omega-3 fatty acids 1000 MG capsule Take 1 g by mouth 2 times daily       gabapentin (NEURONTIN) 300 MG capsule Take 3 capsules (900 mg) by mouth at bedtime Take 3-300mg daily at bedtime 270 capsule 1    levothyroxine (SYNTHROID/LEVOTHROID) 25 MCG tablet TAKE 1 TABLET BY MOUTH DAILY 3  DAYS WEEKLY AND 2 TABLETS BY  MOUTH DAILY FOR 4 DAYS PER WEEK (Patient taking differently: Take 2 tablets 4 days a week and 1 tablet 3 days a week.) 153 tablet 2    MAGNESIUM OXIDE PO Take 200 mg by mouth daily      Multiple Vitamins-Minerals (PRESERVISION AREDS PO) Take 1 tablet by mouth 2 times daily      Polyethylene Glycol 3350 (MIRALAX PO) Use every other day      rOPINIRole (REQUIP) 1 MG tablet TAKE 4 TABLETS BY MOUTH AT  BEDTIME 400 tablet 3    simvastatin (ZOCOR) 20 MG tablet TAKE 1 TABLET BY MOUTH AT  BEDTIME 100 tablet 2    triamcinolone  "(KENALOG) 0.025 % cream Apply to back once a day if the back is itchy. 454 g 1    Turmeric 500 MG CAPS Take 2 capsules by mouth daily       zinc 50 MG TABS Take 1 tablet by mouth daily       methylPREDNISolone (MEDROL) 32 MG tablet Take 32 mg po 12 hours prior to procedure and again 2 hours prior to procedure (Patient not taking: Reported on 2/5/2024) 2 tablet 0       Allergies   Allergen Reactions    Chlorhexidine Gluconate Rash    Povidone Iodine Rash     Betadine     Soap Rash     Betadine         Social History     Tobacco Use    Smoking status: Never     Passive exposure: Never    Smokeless tobacco: Never   Substance Use Topics    Alcohol use: Yes     Alcohol/week: 0.0 standard drinks of alcohol     Comment: 1 glasso wine, weekly      Family History   Problem Relation Age of Onset    Dementia Mother         (cause of death)     High cholesterol Mother     Osteoarthritis Mother     C.A.D. Father         (cause of death)     Prostate Cancer Father     Breast Cancer Maternal Aunt 72    Cerebrovascular Disease Maternal Grandmother         CVA    Diabetes Maternal Grandmother      History   Drug Use No         Review of Systems    Review of Systems  Constitutional, neuro, ENT, endocrine, pulmonary, cardiac, gastrointestinal, genitourinary, musculoskeletal, integument and psychiatric systems are negative, except as otherwise noted.    Objective    /60 (BP Location: Left arm, Patient Position: Sitting, Cuff Size: Adult Regular)   Pulse 62   Temp 98  F (36.7  C) (Tympanic)   Ht 1.549 m (5' 1\")   Wt 53.7 kg (118 lb 4.8 oz)   SpO2 97%   BMI 22.35 kg/m     Estimated body mass index is 22.35 kg/m  as calculated from the following:    Height as of this encounter: 1.549 m (5' 1\").    Weight as of this encounter: 53.7 kg (118 lb 4.8 oz).  Physical Exam  GENERAL: alert and no distress  EYES: Eyes grossly normal to inspection, PERRL and conjunctivae and sclerae normal  HENT: ear canals and TM's normal, nose and " mouth without ulcers or lesions  NECK: no adenopathy, no asymmetry, masses, or scars  RESP: lungs clear to auscultation - no rales, rhonchi or wheezes  CV: regular rate and rhythm, normal S1 S2, no S3 or S4, no murmur, click or rub, no peripheral edema  ABDOMEN: soft, nontender, no hepatosplenomegaly, no masses and bowel sounds normal  MS: no gross musculoskeletal defects noted, no edema  SKIN: no suspicious lesions or rashes  NEURO: Normal strength and tone, mentation intact and speech normal  PSYCH: mentation appears normal, affect normal/bright    Recent Labs   Lab Test 02/05/24  1445 06/08/23  1508 09/21/22  1413 06/08/22  0908   HGB 11.7 12.6   < > 12.1    263   < > 245   * 134*   < > 136   POTASSIUM 4.7 4.5   < > 4.2   CR 0.66 0.65   < > 0.64   A1C  --  6.0*  --  5.6    < > = values in this interval not displayed.        Diagnostics  Labs pending at this time.  Results will be reviewed when available.  Recent Results (from the past 24 hour(s))   EKG 12-lead complete w/read - (Clinic Performed)    Collection Time: 04/15/24  1:35 PM   Result Value Ref Range    Systolic Blood Pressure  mmHg    Diastolic Blood Pressure  mmHg    Ventricular Rate 59 BPM    Atrial Rate 59 BPM    NY Interval 206 ms    QRS Duration 98 ms     ms    QTc 378 ms    P Axis 74 degrees    R AXIS 70 degrees    T Axis 68 degrees    Interpretation ECG       Sinus bradycardia  Possible Anterior infarct , age undetermined  Abnormal ECG  No previous ECGs available     Basic metabolic panel    Collection Time: 04/15/24  1:58 PM   Result Value Ref Range    Sodium 135 135 - 145 mmol/L    Potassium 4.8 3.4 - 5.3 mmol/L    Chloride 99 98 - 107 mmol/L    Carbon Dioxide (CO2) 27 22 - 29 mmol/L    Anion Gap 9 7 - 15 mmol/L    Urea Nitrogen 18.3 8.0 - 23.0 mg/dL    Creatinine 0.73 0.51 - 0.95 mg/dL    GFR Estimate 85 >60 mL/min/1.73m2    Calcium 9.7 8.8 - 10.2 mg/dL    Glucose 104 (H) 70 - 99 mg/dL   CBC with platelets and differential     Collection Time: 04/15/24  1:58 PM   Result Value Ref Range    WBC Count 5.2 4.0 - 11.0 10e3/uL    RBC Count 3.69 (L) 3.80 - 5.20 10e6/uL    Hemoglobin 12.3 11.7 - 15.7 g/dL    Hematocrit 35.6 35.0 - 47.0 %    MCV 97 78 - 100 fL    MCH 33.3 (H) 26.5 - 33.0 pg    MCHC 34.6 31.5 - 36.5 g/dL    RDW 12.8 10.0 - 15.0 %    Platelet Count 289 150 - 450 10e3/uL    % Neutrophils 65 %    % Lymphocytes 21 %    % Monocytes 13 %    % Eosinophils 1 %    % Basophils 1 %    % Immature Granulocytes 0 %    NRBCs per 100 WBC 0 <1 /100    Absolute Neutrophils 3.4 1.6 - 8.3 10e3/uL    Absolute Lymphocytes 1.1 0.8 - 5.3 10e3/uL    Absolute Monocytes 0.7 0.0 - 1.3 10e3/uL    Absolute Eosinophils 0.1 0.0 - 0.7 10e3/uL    Absolute Basophils 0.0 0.0 - 0.2 10e3/uL    Absolute Immature Granulocytes 0.0 <=0.4 10e3/uL    Absolute NRBCs 0.0 10e3/uL      EKG: sinus bradycardia, rate 59, normal axis, normal intervals, no acute ST/T changes c/w ischemia, no LVH by voltage criteria, unchanged from previous tracings    Revised Cardiac Risk Index (RCRI)  The patient has the following serious cardiovascular risks for perioperative complications:   - No serious cardiac risks = 0 points     RCRI Interpretation: 0 points: Class I (very low risk - 0.4% complication rate)         Signed Electronically by: Melyssa Encinas MD  Copy of this evaluation report is provided to requesting physician.

## 2024-04-15 ENCOUNTER — TELEPHONE (OUTPATIENT)
Dept: FAMILY MEDICINE | Facility: OTHER | Age: 74
End: 2024-04-15

## 2024-04-15 ENCOUNTER — OFFICE VISIT (OUTPATIENT)
Dept: FAMILY MEDICINE | Facility: OTHER | Age: 74
End: 2024-04-15
Attending: FAMILY MEDICINE
Payer: COMMERCIAL

## 2024-04-15 VITALS
OXYGEN SATURATION: 97 % | HEIGHT: 61 IN | TEMPERATURE: 98 F | DIASTOLIC BLOOD PRESSURE: 60 MMHG | WEIGHT: 118.3 LBS | HEART RATE: 62 BPM | SYSTOLIC BLOOD PRESSURE: 108 MMHG | BODY MASS INDEX: 22.34 KG/M2

## 2024-04-15 DIAGNOSIS — R73.03 PREDIABETES: ICD-10-CM

## 2024-04-15 DIAGNOSIS — M51.369 DDD (DEGENERATIVE DISC DISEASE), LUMBAR: ICD-10-CM

## 2024-04-15 DIAGNOSIS — E03.9 HYPOTHYROIDISM, UNSPECIFIED TYPE: Chronic | ICD-10-CM

## 2024-04-15 DIAGNOSIS — M81.0 OSTEOPOROSIS, UNSPECIFIED OSTEOPOROSIS TYPE, UNSPECIFIED PATHOLOGICAL FRACTURE PRESENCE: Chronic | ICD-10-CM

## 2024-04-15 DIAGNOSIS — R20.2 PARESTHESIA: ICD-10-CM

## 2024-04-15 DIAGNOSIS — M48.061 SPINAL STENOSIS OF LUMBAR REGION, UNSPECIFIED WHETHER NEUROGENIC CLAUDICATION PRESENT: ICD-10-CM

## 2024-04-15 DIAGNOSIS — G25.81 RESTLESS LEGS SYNDROME (RLS): ICD-10-CM

## 2024-04-15 DIAGNOSIS — Z01.818 PREOP GENERAL PHYSICAL EXAM: Primary | ICD-10-CM

## 2024-04-15 DIAGNOSIS — E78.5 HYPERLIPIDEMIA, UNSPECIFIED HYPERLIPIDEMIA TYPE: ICD-10-CM

## 2024-04-15 LAB
ANION GAP SERPL CALCULATED.3IONS-SCNC: 9 MMOL/L (ref 7–15)
ATRIAL RATE - MUSE: 59 BPM
BASOPHILS # BLD AUTO: 0 10E3/UL (ref 0–0.2)
BASOPHILS NFR BLD AUTO: 1 %
BUN SERPL-MCNC: 18.3 MG/DL (ref 8–23)
CALCIUM SERPL-MCNC: 9.7 MG/DL (ref 8.8–10.2)
CHLORIDE SERPL-SCNC: 99 MMOL/L (ref 98–107)
CREAT SERPL-MCNC: 0.73 MG/DL (ref 0.51–0.95)
DEPRECATED HCO3 PLAS-SCNC: 27 MMOL/L (ref 22–29)
DIASTOLIC BLOOD PRESSURE - MUSE: NORMAL MMHG
EGFRCR SERPLBLD CKD-EPI 2021: 85 ML/MIN/1.73M2
EOSINOPHIL # BLD AUTO: 0.1 10E3/UL (ref 0–0.7)
EOSINOPHIL NFR BLD AUTO: 1 %
ERYTHROCYTE [DISTWIDTH] IN BLOOD BY AUTOMATED COUNT: 12.8 % (ref 10–15)
GLUCOSE SERPL-MCNC: 104 MG/DL (ref 70–99)
HCT VFR BLD AUTO: 35.6 % (ref 35–47)
HGB BLD-MCNC: 12.3 G/DL (ref 11.7–15.7)
IMM GRANULOCYTES # BLD: 0 10E3/UL
IMM GRANULOCYTES NFR BLD: 0 %
INTERPRETATION ECG - MUSE: NORMAL
LYMPHOCYTES # BLD AUTO: 1.1 10E3/UL (ref 0.8–5.3)
LYMPHOCYTES NFR BLD AUTO: 21 %
MCH RBC QN AUTO: 33.3 PG (ref 26.5–33)
MCHC RBC AUTO-ENTMCNC: 34.6 G/DL (ref 31.5–36.5)
MCV RBC AUTO: 97 FL (ref 78–100)
MONOCYTES # BLD AUTO: 0.7 10E3/UL (ref 0–1.3)
MONOCYTES NFR BLD AUTO: 13 %
NEUTROPHILS # BLD AUTO: 3.4 10E3/UL (ref 1.6–8.3)
NEUTROPHILS NFR BLD AUTO: 65 %
NRBC # BLD AUTO: 0 10E3/UL
NRBC BLD AUTO-RTO: 0 /100
P AXIS - MUSE: 74 DEGREES
PLATELET # BLD AUTO: 289 10E3/UL (ref 150–450)
POTASSIUM SERPL-SCNC: 4.8 MMOL/L (ref 3.4–5.3)
PR INTERVAL - MUSE: 206 MS
QRS DURATION - MUSE: 98 MS
QT - MUSE: 382 MS
QTC - MUSE: 378 MS
R AXIS - MUSE: 70 DEGREES
RBC # BLD AUTO: 3.69 10E6/UL (ref 3.8–5.2)
SODIUM SERPL-SCNC: 135 MMOL/L (ref 135–145)
SYSTOLIC BLOOD PRESSURE - MUSE: NORMAL MMHG
T AXIS - MUSE: 68 DEGREES
VENTRICULAR RATE- MUSE: 59 BPM
WBC # BLD AUTO: 5.2 10E3/UL (ref 4–11)

## 2024-04-15 PROCEDURE — 85049 AUTOMATED PLATELET COUNT: CPT | Mod: ZL | Performed by: FAMILY MEDICINE

## 2024-04-15 PROCEDURE — 93010 ELECTROCARDIOGRAM REPORT: CPT | Performed by: INTERNAL MEDICINE

## 2024-04-15 PROCEDURE — G0463 HOSPITAL OUTPT CLINIC VISIT: HCPCS

## 2024-04-15 PROCEDURE — 93005 ELECTROCARDIOGRAM TRACING: CPT | Performed by: FAMILY MEDICINE

## 2024-04-15 PROCEDURE — 36415 COLL VENOUS BLD VENIPUNCTURE: CPT | Mod: ZL | Performed by: FAMILY MEDICINE

## 2024-04-15 PROCEDURE — 99214 OFFICE O/P EST MOD 30 MIN: CPT | Performed by: FAMILY MEDICINE

## 2024-04-15 PROCEDURE — 80048 BASIC METABOLIC PNL TOTAL CA: CPT | Mod: ZL | Performed by: FAMILY MEDICINE

## 2024-04-15 RX ORDER — GABAPENTIN 300 MG/1
900 CAPSULE ORAL AT BEDTIME
Qty: 270 CAPSULE | Refills: 1 | Status: SHIPPED | OUTPATIENT
Start: 2024-04-15

## 2024-04-15 NOTE — PATIENT INSTRUCTIONS
Preparing for Your Surgery  Getting started  A nurse will call you to review your health history and instructions. They will give you an arrival time based on your scheduled surgery time. Please be ready to share:  Your doctor's clinic name and phone number  Your medical, surgical, and anesthesia history  A list of allergies and sensitivities  A list of medicines, including herbal treatments and over-the-counter drugs  Whether the patient has a legal guardian (ask how to send us the papers in advance)  Please tell us if you're pregnant--or if there's any chance you might be pregnant. Some surgeries may injure a fetus (unborn baby), so they require a pregnancy test. Surgeries that are safe for a fetus don't always need a test, and you can choose whether to have one.   If you have a child who's having surgery, please ask for a copy of Preparing for Your Child's Surgery.    Preparing for surgery  Within 10 to 30 days of surgery: Have a pre-op exam (sometimes called an H&P, or History and Physical). This can be done at a clinic or pre-operative center.  If you're having a , you may not need this exam. Talk to your care team.  At your pre-op exam, talk to your care team about all medicines you take. If you need to stop any medicines before surgery, ask when to start taking them again.  We do this for your safety. Many medicines can make you bleed too much during surgery. Some change how well surgery (anesthesia) drugs work.  Call your insurance company to let them know you're having surgery. (If you don't have insurance, call [phone].)  Call your clinic if there's any change in your health. This includes signs of a cold or flu (sore throat, runny nose, cough, rash, fever). It also includes a scrape or scratch near the surgery site.  If you have questions on the day of surgery, call your hospital or surgery center.  Eating and drinking guidelines  For your safety: Unless your surgeon tells you otherwise,  follow the guidelines below.  Eat and drink as usual until 8 hours before you arrive for surgery. After that, no food or milk.  Drink clear liquids until 2 hours before you arrive. These are liquids you can see through, like water, Gatorade, and Propel Water. They also include plain black coffee and tea (no cream or milk), candy, and breath mints. You can spit out gum when you arrive.  If you drink alcohol: Stop drinking it the night before surgery.  If your care team tells you to take medicine on the morning of surgery, it's okay to take it with a sip of water.  Preventing infection  Shower or bathe the night before and morning of your surgery. Follow the instructions your clinic gave you. (If no instructions, use regular soap.)  Don't shave or clip hair near your surgery site. We'll remove the hair if needed.  Don't smoke or vape the morning of surgery. You may chew nicotine gum up to 2 hours before surgery. A nicotine patch is okay.  Note: Some surgeries require you to completely quit smoking and nicotine. Check with your surgeon.  Your care team will make every effort to keep you safe from infection. We will:  Clean our hands often with soap and water (or an alcohol-based hand rub).  Clean the skin at your surgery site with a special soap that kills germs.  Give you a special gown to keep you warm. (Cold raises the risk of infection.)  Wear special hair covers, masks, gowns and gloves during surgery.  Give antibiotic medicine, if prescribed. Not all surgeries need antibiotics.  What to bring on the day of surgery  Photo ID and insurance card  Copy of your health care directive, if you have one  Glasses and hearing aids (bring cases)  You can't wear contacts during surgery  Inhaler and eye drops, if you use them (tell us about these when you arrive)  CPAP machine or breathing device, if you use them  A few personal items, if spending the night  If you have . . .  A pacemaker, ICD (cardiac defibrillator) or other  implant: Bring the ID card.  An implanted stimulator: Bring the remote control.  A legal guardian: Bring a copy of the certified (court-stamped) guardianship papers.  Please remove any jewelry, including body piercings. Leave jewelry and other valuables at home.  If you're going home the day of surgery  You must have a responsible adult drive you home. They should stay with you overnight as well.  If you don't have someone to stay with you, and you aren't safe to go home alone, we may keep you overnight. Insurance often won't pay for this.  After surgery  If it's hard to control your pain or you need more pain medicine, please call your surgeon's office.  Questions?   If you have any questions for your care team, list them here: _________________________________________________________________________________________________________________________________________________________________________ ____________________________________ ____________________________________ ____________________________________  For informational purposes only. Not to replace the advice of your health care provider. Copyright   2003, 2019 [employer] [facility]. All rights reserved. Clinically reviewed by Hannah Ash MD. [employer] 802267 - REV 12/22.    How to Take Your Medication Before Surgery  - Take all of your medications before surgery except as noted below  - HOLD (do not take) Aspirin for 7 days  - HOLD (do not take) Ibuprofen for 1 day prior to surgery.  - STOP taking all vitamins and herbal supplements 14 days before surgery.  Tylenol is ok.

## 2024-04-15 NOTE — TELEPHONE ENCOUNTER
Pre-op notes, demographics, med list, EKG faxed to: [facility] for procedure on 5/1 with Dr. Graf / Fax: [phone]/ Phone: [phone]

## 2024-04-23 ENCOUNTER — HOSPITAL ENCOUNTER (OUTPATIENT)
Dept: MRI IMAGING | Facility: HOSPITAL | Age: 74
Discharge: HOME | End: 2024-04-23
Attending: ORTHOPAEDIC SURGERY
Payer: MEDICARE

## 2024-04-23 DIAGNOSIS — M25.512 BILATERAL SHOULDER PAIN: ICD-10-CM

## 2024-04-23 DIAGNOSIS — M25.511 BILATERAL SHOULDER PAIN: ICD-10-CM

## 2024-04-23 PROCEDURE — 73221 MRI JOINT UPR EXTREM W/O DYE: CPT | Mod: RT

## 2024-05-01 ENCOUNTER — TRANSFERRED RECORDS (OUTPATIENT)
Dept: HEALTH INFORMATION MANAGEMENT | Facility: CLINIC | Age: 74
End: 2024-05-01

## 2024-05-24 ENCOUNTER — TELEPHONE (OUTPATIENT)
Dept: FAMILY MEDICINE | Facility: OTHER | Age: 74
End: 2024-05-24

## 2024-05-24 NOTE — TELEPHONE ENCOUNTER
1:17 PM    Reason for Call: Phone Call    Description: Patient was told to set up her fasting labs before her appointment with Dr Connors on 6/13/24 and there is no orders placed patient wants to speak to the provider nurse regarding this.     Was an appointment offered for this call? No  If yes : Appointment type              Date    Preferred method for responding to this message: Telephone Call  What is your phone number ? [phone]    If we cannot reach you directly, may we leave a detailed response at the number you provided? Yes    Can this message wait until your PCP/provider returns, if available today? YES, No

## 2024-06-11 NOTE — PROGRESS NOTES
Assessment & Plan     Hyperlipidemia, unspecified hyperlipidemia type  Continue zocor 20 mg - labs next week.  - Comprehensive metabolic panel (BMP + Alb, Alk Phos, ALT, AST, Total. Bili, TP); Future  - Lipid Profile (Chol, Trig, HDL, LDL calc); Future  - simvastatin (ZOCOR) 20 MG tablet; Take 1 tablet (20 mg) by mouth at bedtime    Prediabetes  Annual a1c  - Comprehensive metabolic panel (BMP + Alb, Alk Phos, ALT, AST, Total. Bili, TP); Future  - Hemoglobin A1c; Future    Hypothyroidism, unspecified type  Stable  Update TSH.  Continue synthroid.  - TSH with free T4 reflex; Future    Restless legs syndrome (RLS)  Stable with Requip - continue.  - CBC with platelets and differential; Future    Osteoporosis, unspecified osteoporosis type, unspecified pathological fracture presence  Dexa due 9/2025 - consider repeat course of bisphosphonate.  Weight bearing exercise, D and calcium.  - Vitamin D Deficiency; Future    Heart murmur  New? Echo ordered.  - Echocardiogram Complete; Future        See Patient Instructions    Return for lab fasting 8:30 wednesday.    Megha Chairez is a 74 year old, presenting for the following health issues:  Recheck Medication        6/13/2024     2:31 PM   Additional Questions   Roomed by Hany Kirkpatrick   Accompanied by None         6/13/2024     2:31 PM   Patient Reported Additional Medications   Patient reports taking the following new medications None     HPI     Hyperlipidemia Follow-Up and prediabetes  Are you regularly taking any medication or supplement to lower your cholesterol?   Yes- Simvastatin   Are you having muscle aches or other side effects that you think could be caused by your cholesterol lowering medication?  No    Hypothyroidism Follow-up  Since last visit, patient describes the following symptoms: Weight stable, no hair loss, no skin changes, no constipation, no loose stools      RLS - stable    Osteoporosis - DEXA 9/2023; 5 years Reclast; on drug holidy;  repeat dexa 9/2024    [facility]  - consult tomorrow in [place].  For upper back/neck - planning injection.    Wednesday - lab - 8:30    Review of Systems  Constitutional, HEENT, cardiovascular, pulmonary, gi and gu systems are negative, except as otherwise noted.      Objective    /71 (BP Location: Right arm, Patient Position: Sitting, Cuff Size: Adult Small)   Pulse 60   Temp 97.4  F (36.3  C) (Tympanic)   Resp 16   Wt 53.1 kg (117 lb)   SpO2 96%   BMI 22.11 kg/m    Body mass index is 22.11 kg/m .  Physical Exam   GENERAL: alert, no distress, and thin  NECK: no adenopathy, no asymmetry, masses, or scars  RESP: lungs clear to auscultation - no rales, rhonchi or wheezes  CV: regular rates and rhythm, normal S1 S2, no S3 or S4, grade 1-2/6  murmur, peripheral pulses strong, and no peripheral edema  ABDOMEN: soft, nontender, no hepatosplenomegaly, no masses and bowel sounds normal  MS: no gross musculoskeletal defects noted, no edema  NEURO: Normal strength and tone, mentation intact and speech normal  PSYCH: mentation appears normal, affect normal/bright    Future labs ordered        Signed Electronically by: Melyssa Encinas MD    Answers submitted by the patient for this visit:  Patient Health Questionnaire (Submitted on 6/13/2024)  If you checked off any problems, how difficult have these problems made it for you to do your work, take care of things at home, or get along with other people?: Not difficult at all  PHQ9 TOTAL SCORE: 0  JENNIFER-7 (Submitted on 6/13/2024)  JENNIFER 7 TOTAL SCORE: 0

## 2024-06-13 ENCOUNTER — OFFICE VISIT (OUTPATIENT)
Dept: FAMILY MEDICINE | Facility: OTHER | Age: 74
End: 2024-06-13
Attending: FAMILY MEDICINE
Payer: COMMERCIAL

## 2024-06-13 VITALS
HEART RATE: 60 BPM | SYSTOLIC BLOOD PRESSURE: 124 MMHG | BODY MASS INDEX: 22.11 KG/M2 | RESPIRATION RATE: 16 BRPM | WEIGHT: 117 LBS | DIASTOLIC BLOOD PRESSURE: 71 MMHG | TEMPERATURE: 97.4 F | OXYGEN SATURATION: 96 %

## 2024-06-13 DIAGNOSIS — E03.9 HYPOTHYROIDISM, UNSPECIFIED TYPE: Primary | ICD-10-CM

## 2024-06-13 DIAGNOSIS — E78.5 HYPERLIPIDEMIA, UNSPECIFIED HYPERLIPIDEMIA TYPE: ICD-10-CM

## 2024-06-13 DIAGNOSIS — G25.81 RESTLESS LEGS SYNDROME (RLS): ICD-10-CM

## 2024-06-13 DIAGNOSIS — M81.0 OSTEOPOROSIS, UNSPECIFIED OSTEOPOROSIS TYPE, UNSPECIFIED PATHOLOGICAL FRACTURE PRESENCE: ICD-10-CM

## 2024-06-13 DIAGNOSIS — R73.03 PREDIABETES: ICD-10-CM

## 2024-06-13 DIAGNOSIS — R01.1 HEART MURMUR: ICD-10-CM

## 2024-06-13 PROCEDURE — 99214 OFFICE O/P EST MOD 30 MIN: CPT | Performed by: FAMILY MEDICINE

## 2024-06-13 PROCEDURE — G0463 HOSPITAL OUTPT CLINIC VISIT: HCPCS

## 2024-06-13 RX ORDER — OXYCODONE HYDROCHLORIDE 5 MG/1
5 TABLET ORAL EVERY 6 HOURS PRN
COMMUNITY
Start: 2024-05-01 | End: 2024-06-13

## 2024-06-13 RX ORDER — METHOCARBAMOL 750 MG/1
750 TABLET, FILM COATED ORAL 4 TIMES DAILY PRN
COMMUNITY
Start: 2024-05-01 | End: 2024-08-05

## 2024-06-13 RX ORDER — SIMVASTATIN 20 MG
20 TABLET ORAL AT BEDTIME
Qty: 90 TABLET | Refills: 3 | Status: SHIPPED | OUTPATIENT
Start: 2024-06-13

## 2024-06-13 ASSESSMENT — PATIENT HEALTH QUESTIONNAIRE - PHQ9
SUM OF ALL RESPONSES TO PHQ QUESTIONS 1-9: 0
10. IF YOU CHECKED OFF ANY PROBLEMS, HOW DIFFICULT HAVE THESE PROBLEMS MADE IT FOR YOU TO DO YOUR WORK, TAKE CARE OF THINGS AT HOME, OR GET ALONG WITH OTHER PEOPLE: NOT DIFFICULT AT ALL
SUM OF ALL RESPONSES TO PHQ QUESTIONS 1-9: 0

## 2024-06-13 ASSESSMENT — ANXIETY QUESTIONNAIRES
GAD7 TOTAL SCORE: 0
2. NOT BEING ABLE TO STOP OR CONTROL WORRYING: NOT AT ALL
GAD7 TOTAL SCORE: 0
5. BEING SO RESTLESS THAT IT IS HARD TO SIT STILL: NOT AT ALL
7. FEELING AFRAID AS IF SOMETHING AWFUL MIGHT HAPPEN: NOT AT ALL
4. TROUBLE RELAXING: NOT AT ALL
IF YOU CHECKED OFF ANY PROBLEMS ON THIS QUESTIONNAIRE, HOW DIFFICULT HAVE THESE PROBLEMS MADE IT FOR YOU TO DO YOUR WORK, TAKE CARE OF THINGS AT HOME, OR GET ALONG WITH OTHER PEOPLE: NOT DIFFICULT AT ALL
7. FEELING AFRAID AS IF SOMETHING AWFUL MIGHT HAPPEN: NOT AT ALL
3. WORRYING TOO MUCH ABOUT DIFFERENT THINGS: NOT AT ALL
GAD7 TOTAL SCORE: 0
1. FEELING NERVOUS, ANXIOUS, OR ON EDGE: NOT AT ALL
8. IF YOU CHECKED OFF ANY PROBLEMS, HOW DIFFICULT HAVE THESE MADE IT FOR YOU TO DO YOUR WORK, TAKE CARE OF THINGS AT HOME, OR GET ALONG WITH OTHER PEOPLE?: NOT DIFFICULT AT ALL
6. BECOMING EASILY ANNOYED OR IRRITABLE: NOT AT ALL

## 2024-06-13 ASSESSMENT — PAIN SCALES - GENERAL: PAINLEVEL: MODERATE PAIN (5)

## 2024-06-13 NOTE — PATIENT INSTRUCTIONS
Reminder - annual flu vaccine in the fall - consider COVID booster at that time as well.    Echo ordered to evaluate murmur - radiology will call you to scheduled.    Continue current medications.    Return for fasting labs.

## 2024-06-14 ENCOUNTER — TRANSFERRED RECORDS (OUTPATIENT)
Dept: HEALTH INFORMATION MANAGEMENT | Facility: CLINIC | Age: 74
End: 2024-06-14

## 2024-06-19 ENCOUNTER — LAB (OUTPATIENT)
Dept: LAB | Facility: OTHER | Age: 74
End: 2024-06-19
Payer: MEDICARE

## 2024-06-19 DIAGNOSIS — E78.5 HYPERLIPIDEMIA, UNSPECIFIED HYPERLIPIDEMIA TYPE: ICD-10-CM

## 2024-06-19 DIAGNOSIS — G25.81 RESTLESS LEGS SYNDROME (RLS): ICD-10-CM

## 2024-06-19 DIAGNOSIS — R73.03 PREDIABETES: ICD-10-CM

## 2024-06-19 DIAGNOSIS — E03.9 HYPOTHYROIDISM, UNSPECIFIED TYPE: ICD-10-CM

## 2024-06-19 DIAGNOSIS — M81.0 OSTEOPOROSIS, UNSPECIFIED OSTEOPOROSIS TYPE, UNSPECIFIED PATHOLOGICAL FRACTURE PRESENCE: ICD-10-CM

## 2024-06-19 LAB
ALBUMIN SERPL BCG-MCNC: 4.3 G/DL (ref 3.5–5.2)
ALP SERPL-CCNC: 81 U/L (ref 40–150)
ALT SERPL W P-5'-P-CCNC: 17 U/L (ref 0–50)
ANION GAP SERPL CALCULATED.3IONS-SCNC: 9 MMOL/L (ref 7–15)
AST SERPL W P-5'-P-CCNC: 23 U/L (ref 0–45)
BASOPHILS # BLD AUTO: 0.1 10E3/UL (ref 0–0.2)
BASOPHILS NFR BLD AUTO: 1 %
BILIRUB SERPL-MCNC: 0.3 MG/DL
BUN SERPL-MCNC: 15.6 MG/DL (ref 8–23)
CALCIUM SERPL-MCNC: 9.6 MG/DL (ref 8.8–10.2)
CHLORIDE SERPL-SCNC: 99 MMOL/L (ref 98–107)
CHOLEST SERPL-MCNC: 168 MG/DL
CREAT SERPL-MCNC: 0.67 MG/DL (ref 0.51–0.95)
DEPRECATED HCO3 PLAS-SCNC: 24 MMOL/L (ref 22–29)
EGFRCR SERPLBLD CKD-EPI 2021: 90 ML/MIN/1.73M2
EOSINOPHIL # BLD AUTO: 0 10E3/UL (ref 0–0.7)
EOSINOPHIL NFR BLD AUTO: 0 %
ERYTHROCYTE [DISTWIDTH] IN BLOOD BY AUTOMATED COUNT: 12.6 % (ref 10–15)
EST. AVERAGE GLUCOSE BLD GHB EST-MCNC: 120 MG/DL
FASTING STATUS PATIENT QL REPORTED: YES
FASTING STATUS PATIENT QL REPORTED: YES
GLUCOSE SERPL-MCNC: 103 MG/DL (ref 70–99)
HBA1C MFR BLD: 5.8 %
HCT VFR BLD AUTO: 36.3 % (ref 35–47)
HDLC SERPL-MCNC: 68 MG/DL
HGB BLD-MCNC: 12.3 G/DL (ref 11.7–15.7)
IMM GRANULOCYTES # BLD: 0 10E3/UL
IMM GRANULOCYTES NFR BLD: 0 %
LDLC SERPL CALC-MCNC: 90 MG/DL
LYMPHOCYTES # BLD AUTO: 1.2 10E3/UL (ref 0.8–5.3)
LYMPHOCYTES NFR BLD AUTO: 25 %
MCH RBC QN AUTO: 32.4 PG (ref 26.5–33)
MCHC RBC AUTO-ENTMCNC: 33.9 G/DL (ref 31.5–36.5)
MCV RBC AUTO: 96 FL (ref 78–100)
MONOCYTES # BLD AUTO: 0.6 10E3/UL (ref 0–1.3)
MONOCYTES NFR BLD AUTO: 12 %
NEUTROPHILS # BLD AUTO: 3 10E3/UL (ref 1.6–8.3)
NEUTROPHILS NFR BLD AUTO: 61 %
NONHDLC SERPL-MCNC: 100 MG/DL
NRBC # BLD AUTO: 0 10E3/UL
NRBC BLD AUTO-RTO: 0 /100
PLATELET # BLD AUTO: 292 10E3/UL (ref 150–450)
POTASSIUM SERPL-SCNC: 4.8 MMOL/L (ref 3.4–5.3)
PROT SERPL-MCNC: 7.4 G/DL (ref 6.4–8.3)
RBC # BLD AUTO: 3.8 10E6/UL (ref 3.8–5.2)
SODIUM SERPL-SCNC: 132 MMOL/L (ref 135–145)
TRIGL SERPL-MCNC: 49 MG/DL
TSH SERPL DL<=0.005 MIU/L-ACNC: 3.33 UIU/ML (ref 0.3–4.2)
WBC # BLD AUTO: 4.8 10E3/UL (ref 4–11)

## 2024-06-19 PROCEDURE — 80061 LIPID PANEL: CPT | Mod: ZL

## 2024-06-19 PROCEDURE — 84443 ASSAY THYROID STIM HORMONE: CPT | Mod: ZL

## 2024-06-19 PROCEDURE — 83036 HEMOGLOBIN GLYCOSYLATED A1C: CPT | Mod: ZL

## 2024-06-19 PROCEDURE — 82306 VITAMIN D 25 HYDROXY: CPT | Mod: ZL

## 2024-06-19 PROCEDURE — 80053 COMPREHEN METABOLIC PANEL: CPT | Mod: ZL

## 2024-06-19 PROCEDURE — 36415 COLL VENOUS BLD VENIPUNCTURE: CPT | Mod: ZL

## 2024-06-19 PROCEDURE — 85004 AUTOMATED DIFF WBC COUNT: CPT | Mod: ZL

## 2024-06-20 LAB — VIT D+METAB SERPL-MCNC: 58 NG/ML (ref 20–50)

## 2024-06-21 ENCOUNTER — HOSPITAL ENCOUNTER (OUTPATIENT)
Dept: CARDIOLOGY | Facility: HOSPITAL | Age: 74
Discharge: HOME | End: 2024-06-21
Attending: FAMILY MEDICINE | Admitting: INTERNAL MEDICINE
Payer: MEDICARE

## 2024-06-21 DIAGNOSIS — R01.1 HEART MURMUR: ICD-10-CM

## 2024-06-21 LAB — LVEF ECHO: NORMAL

## 2024-06-21 PROCEDURE — 93306 TTE W/DOPPLER COMPLETE: CPT | Mod: 26 | Performed by: INTERNAL MEDICINE

## 2024-06-21 PROCEDURE — 93306 TTE W/DOPPLER COMPLETE: CPT

## 2024-06-28 ENCOUNTER — TRANSFERRED RECORDS (OUTPATIENT)
Dept: HEALTH INFORMATION MANAGEMENT | Facility: CLINIC | Age: 74
End: 2024-06-28

## 2024-07-02 ENCOUNTER — OFFICE VISIT (OUTPATIENT)
Dept: DERMATOLOGY | Facility: OTHER | Age: 74
End: 2024-07-02
Attending: DERMATOLOGY
Payer: COMMERCIAL

## 2024-07-02 VITALS
OXYGEN SATURATION: 97 % | SYSTOLIC BLOOD PRESSURE: 117 MMHG | RESPIRATION RATE: 16 BRPM | HEART RATE: 62 BPM | DIASTOLIC BLOOD PRESSURE: 61 MMHG

## 2024-07-02 DIAGNOSIS — L24.89 IRRITANT CONTACT DERMATITIS DUE TO OTHER AGENTS: Primary | ICD-10-CM

## 2024-07-02 PROCEDURE — G0463 HOSPITAL OUTPT CLINIC VISIT: HCPCS

## 2024-07-02 PROCEDURE — 99213 OFFICE O/P EST LOW 20 MIN: CPT | Performed by: DERMATOLOGY

## 2024-07-02 ASSESSMENT — PAIN SCALES - GENERAL: PAINLEVEL: MILD PAIN (3)

## 2024-07-02 NOTE — PROGRESS NOTES
S: Recheck visit for Mihaela whom I saw about a year ago for general skin check.  She has developed very red hands in the last few months.  She also has some itching of the scalp and wonders what she can do for that    O: Both hands show diffuse redness and irritant type inflammation on the palms and dorsum.  On questioning she admits to wash her hands frequently.  We did not examine her scalp but apparently does have a mild seborrheic dermatitis there    OA: Irritant hand dermatitis likely related to cleanliness and frequent handwashing.    P: Advised that she try to reduce the number of handwashing's and also use a mild soap and occasionally wash or rinse her hands with water only.  I suggested also that she apply some of the triamcinolone which we gave her last year to use on her hands at bedtime.  Could also use the triam on the scalp after removing it with a little bit of water creating a lotion.    Meds and allergies reviewed.  20 minutes spent in chart review discussion of the issues examination and charting

## 2024-07-02 NOTE — LETTER
7/2/2024       RE: Mihaela Jang  [address]  [address]  [facility] [address]     Dear Colleague,    Thank you for referring your patient, Mihaela Jang, to the [facility]. Please see a copy of my visit note below.    S: Recheck visit for Mihaela, I saw about a year ago for general skin check.  She has developed very red hands in the last few months.  She also has some itching of the scalp and wonders what she can do for that    O: Both hands show diffuse redness and irritant type inflammation on the palms and dorsum.  On questioning she admits to wash her hands frequently.  We did not examine her scalp but apparently does have a mild seborrheic dermatitis there    OA: Irritant hand dermatitis likely related to her cleanliness and frequent handwashing.    P: Advised that she try to reduce the number of handwashing's and also use a mild soap and often if not necessarily wash or rinse her hands with water only.  I suggest they point out to 5 triamcinolone which we gave her last year to use on her hands at bedtime.  Could also use on the scalp after removing it with a little bit of water creating a lotion.    Meds and allergies reviewed.  20 minutes spent in chart review discussion of the issues examination and charting    Again, thank you for allowing me to participate in the care of your patient.      Sincerely,    FAHAD Barakat MD

## 2024-07-09 ENCOUNTER — CARE COORDINATION (OUTPATIENT)
Dept: CARE COORDINATION | Facility: OTHER | Age: 74
End: 2024-07-09

## 2024-07-09 NOTE — PROGRESS NOTES
RN CC received a message from pt in regards to ankle swelling. RN CC attempted to call pt to get more information. Pt did not answer at time of call. RN CC left message to have pt return call.       Florence Xiao RN on 7/9/2024 at 2:31 PM     Special Stains Stage 10 - Results: Base On Clearance Noted Above

## 2024-07-10 ENCOUNTER — CARE COORDINATION (OUTPATIENT)
Dept: WOUND CARE | Facility: OTHER | Age: 74
End: 2024-07-10

## 2024-07-10 ENCOUNTER — TELEPHONE (OUTPATIENT)
Dept: FAMILY MEDICINE | Facility: OTHER | Age: 74
End: 2024-07-10

## 2024-07-10 NOTE — TELEPHONE ENCOUNTER
3:04 PM    Reason for Call: OVERBOOK    Patient is having the following symptoms: Patient needs to be seen for preop/[facility]/08/16/24/hard ware removal r foot/reinking  days.    The patient is requesting an appointment for [facility] with .    Was an appointment offered for this call? No  If yes : Appointment type              Date    Preferred method for responding to this message: Telephone Call  What is your phone number ?  [phone]    If we cannot reach you directly, may we leave a detailed response at the number you provided? Yes    Can this message wait until your PCP/provider returns, if unavailable today? Provider is in    Tayler Jim

## 2024-07-10 NOTE — PROGRESS NOTES
Attempted to call pt back. Pt did not answer at time of call. RN CC left message to call department to get an appointment scheduled.       Florence Xiao RN on 7/10/2024 at 8:36 AM

## 2024-07-15 ENCOUNTER — ANCILLARY PROCEDURE (OUTPATIENT)
Dept: GENERAL RADIOLOGY | Facility: OTHER | Age: 74
End: 2024-07-15
Attending: PODIATRIST
Payer: MEDICARE

## 2024-07-15 ENCOUNTER — OFFICE VISIT (OUTPATIENT)
Dept: PODIATRY | Facility: OTHER | Age: 74
End: 2024-07-15
Attending: PODIATRIST
Payer: MEDICARE

## 2024-07-15 VITALS
HEIGHT: 61 IN | DIASTOLIC BLOOD PRESSURE: 68 MMHG | SYSTOLIC BLOOD PRESSURE: 128 MMHG | WEIGHT: 115 LBS | OXYGEN SATURATION: 98 % | BODY MASS INDEX: 21.71 KG/M2 | RESPIRATION RATE: 16 BRPM | HEART RATE: 67 BPM

## 2024-07-15 DIAGNOSIS — M25.571 ACUTE RIGHT ANKLE PAIN: ICD-10-CM

## 2024-07-15 DIAGNOSIS — M25.571 ACUTE RIGHT ANKLE PAIN: Primary | ICD-10-CM

## 2024-07-15 DIAGNOSIS — M77.42 METATARSALGIA OF LEFT FOOT: ICD-10-CM

## 2024-07-15 DIAGNOSIS — M25.571 SINUS TARSI SYNDROME OF RIGHT ANKLE: ICD-10-CM

## 2024-07-15 DIAGNOSIS — S92.355A CLOSED NONDISPLACED FRACTURE OF FIFTH METATARSAL BONE OF LEFT FOOT, INITIAL ENCOUNTER: ICD-10-CM

## 2024-07-15 PROCEDURE — 20605 DRAIN/INJ JOINT/BURSA W/O US: CPT | Performed by: PODIATRIST

## 2024-07-15 PROCEDURE — 73610 X-RAY EXAM OF ANKLE: CPT | Mod: TC,RT

## 2024-07-15 PROCEDURE — 20605 DRAIN/INJ JOINT/BURSA W/O US: CPT | Mod: RT | Performed by: PODIATRIST

## 2024-07-15 PROCEDURE — 73630 X-RAY EXAM OF FOOT: CPT | Mod: TC,LT

## 2024-07-15 PROCEDURE — G0463 HOSPITAL OUTPT CLINIC VISIT: HCPCS | Mod: 25

## 2024-07-15 PROCEDURE — 20605 DRAIN/INJ JOINT/BURSA W/O US: CPT | Mod: RT,XS | Performed by: PODIATRIST

## 2024-07-15 PROCEDURE — 250N000011 HC RX IP 250 OP 636: Mod: JZ | Performed by: PODIATRIST

## 2024-07-15 PROCEDURE — 99213 OFFICE O/P EST LOW 20 MIN: CPT | Mod: 25 | Performed by: PODIATRIST

## 2024-07-15 RX ORDER — DEXAMETHASONE SODIUM PHOSPHATE 4 MG/ML
4 INJECTION, SOLUTION INTRA-ARTICULAR; INTRALESIONAL; INTRAMUSCULAR; INTRAVENOUS; SOFT TISSUE ONCE
Status: COMPLETED | OUTPATIENT
Start: 2024-07-15 | End: 2024-07-15

## 2024-07-15 RX ORDER — TRIAMCINOLONE ACETONIDE 40 MG/ML
40 INJECTION, SUSPENSION INTRA-ARTICULAR; INTRAMUSCULAR ONCE
Status: COMPLETED | OUTPATIENT
Start: 2024-07-15 | End: 2024-07-15

## 2024-07-15 RX ADMIN — TRIAMCINOLONE ACETONIDE 40 MG: 40 INJECTION, SUSPENSION INTRA-ARTICULAR; INTRAMUSCULAR at 12:58

## 2024-07-15 RX ADMIN — DEXAMETHASONE SODIUM PHOSPHATE 4 MG: 4 INJECTION, SOLUTION INTRAMUSCULAR; INTRAVENOUS at 12:57

## 2024-07-15 ASSESSMENT — PAIN SCALES - GENERAL: PAINLEVEL: MILD PAIN (3)

## 2024-07-15 NOTE — PROGRESS NOTES
"Chief complaint: Patient presents with:  Musculoskeletal Problem: Right ankle pain      History of Present Illness: This 74 year old female is seen for management of RIGHT ankle swelling. She says her bilateral lateral ankle became painful on 07/07/2024, but the RIGHT was more painful and more swollen. She does not think she was on her feet more than usual for the Fourth of July. She does not recall any trauma. She does not remember this kind of pain in the past. She elevated the foot which reduced the swelling.    She says she also fractured her LEFT lateral foot this year. She braced her foot between the floor and a ledge on her shower. She says she saw Dr. Parker and he told her she fractured the foot. She says a boot was mentioned but she thought she was told it may not help. She still has some pain from the LEFT foot fracture site and she is wondering how she can treat this.     She had a fusion of the RIGHT 1st MTPJ by Dr. Parker in June, 2023. She is scheduled to have the hardware removed in the RIGHT 1st MTPJ following a fusion from June, 2023.    No further pedal complaints today.       /68 (BP Location: Left arm, Cuff Size: Adult Regular)   Pulse 67   Resp 16   Ht 1.549 m (5' 1\")   Wt 52.2 kg (115 lb)   SpO2 98%   BMI 21.73 kg/m      Patient Active Problem List   Diagnosis    Laryngopharyngeal reflux (LPR)    Chronic rhinitis    ETD (eustachian tube dysfunction)    Osteoporosis    Abnormal glucose    Personal history of malignant neoplasm of breast    Neutropenia (H24)    Early satiety    chronic neutropenia.    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    Hypothyroidism, unspecified type    Seborrheic dermatitis of scalp    Spinal stenosis of lumbar region    Restless legs syndrome (RLS)    Prediabetes "       Past Surgical History:   Procedure Laterality Date    BONE MARROW BIOPSY      negative    COLONOSCOPY  07/2000    COLONOSCOPY  8/13/2013    DR Fu; repeat 5 years    COLONOSCOPY N/A 8/13/2018    Procedure: COLONOSCOPY;  COLONOSCOPY;  Surgeon: Ajit Uriarte MD;  Location: [facility]    COLONOSCOPY N/A 12/2/2022    Procedure: COLONOSCOPY, WITH POLYPECTOMY AND BIOPSY;  Surgeon: Marcellus Jimenez MD;  Location: [facility]    DILATION AND CURETTAGE, OPERATIVE HYSTEROSCOPY, COMBINED N/A 2/13/2019    Procedure: EXAM UNDER ANESTHESIA , HYSTEROSCOPY;  Surgeon: Jovi Gabirel MD;  Location: [facility]    HEMORRHOIDECTOMY  1986    IR CONSULTATION FOR IR EXAM  12/5/2023    MASTECTOMY, BILATERAL  03/01/2004    right sided breast cancer    RECONSTRUCT FOREFOOT WITH METATARSOPHALANGEAL (MTP) FUSION Right 6/16/2023    Procedure: Right First Metatarsophalangeal Joint Fusion;  Surgeon: Ac Parker DPM;  Location: [facility]    RELEASE TRIGGER FINGER Right 3/7/2018    Procedure: RELEASE TRIGGER FINGER;  RELEASE TRIGGER DIGIT RIGHT THUMB;  Surgeon: Jose Alfredo Brewster DO;  Location: [facility]    RELEASE TRIGGER FINGER Left 3/15/2023    Procedure: Left Thumb Trigger Finger Release;  Surgeon: Rigoberto Olivera MD;  Location: [facility]    RELEASE TRIGGER FINGER Right 5/17/2023    Procedure: Revision Right Thumb Trigger Release;  Surgeon: Rigoberto Olivera MD;  Location: [facility]    UPPER GI ENDOSCOPY      [facility] COLONOSCOPY THRU STOMA WITH BIOPSY  08/25/2010    cancer screening, family h/o colon cancer; hyperplastic polyp       Current Outpatient Medications   Medication Sig Dispense Refill    aspirin 81 MG EC tablet Take 1 tablet (81 mg) by mouth 2 times daily (Patient taking differently: Take 81 mg by mouth daily) 60 tablet 0    CALCIUM CITRATE 1,500 mg two times daily       cinnamon 500 MG CAPS Take 1 capsule by mouth 2 times daily      ferrous sulfate (SLO-FE) 142 (45 Fe) MG CR tablet Take 1 tablet (142 mg) by mouth daily 90 tablet 1    fish oil-omega-3  fatty acids 1000 MG capsule Take 1 g by mouth 2 times daily       gabapentin (NEURONTIN) 300 MG capsule Take 3 capsules (900 mg) by mouth at bedtime Take 3-300mg daily at bedtime 270 capsule 1    levothyroxine (SYNTHROID/LEVOTHROID) 25 MCG tablet TAKE 1 TABLET BY MOUTH DAILY 3  DAYS WEEKLY AND 2 TABLETS BY  MOUTH DAILY FOR 4 DAYS PER WEEK (Patient taking differently: Take 2 tablets 4 days a week and 1 tablet 3 days a week.) 153 tablet 2    MAGNESIUM OXIDE PO Take 200 mg by mouth daily      Multiple Vitamins-Minerals (PRESERVISION AREDS PO) Take 1 tablet by mouth 2 times daily      Polyethylene Glycol 3350 (MIRALAX PO) Use every other day      rOPINIRole (REQUIP) 1 MG tablet TAKE 4 TABLETS BY MOUTH AT  BEDTIME 400 tablet 3    simvastatin (ZOCOR) 20 MG tablet Take 1 tablet (20 mg) by mouth at bedtime 90 tablet 3    triamcinolone (KENALOG) 0.025 % cream Apply to back once a day if the back is itchy. 454 g 1    Turmeric 500 MG CAPS Take 2 capsules by mouth daily       zinc 50 MG TABS Take 1 tablet by mouth daily       cholecalciferol 1000 UNITS TABS 2 capsules po daily (Patient not taking: Reported on 7/15/2024)      methocarbamol (ROBAXIN) 750 MG tablet Take 750 mg by mouth 4 times daily as needed for muscle spasms (Patient not taking: Reported on 7/2/2024)       No current facility-administered medications for this visit.          Allergies   Allergen Reactions    Chlorhexidine Gluconate Rash    Povidone Iodine Rash     Betadine     Soap Rash     Betadine        Family History   Problem Relation Age of Onset    Dementia Mother         (cause of death)     High cholesterol Mother     Osteoarthritis Mother     C.A.D. Father         (cause of death)     Prostate Cancer Father     Breast Cancer Maternal Aunt 72    Cerebrovascular Disease Maternal Grandmother         CVA    Diabetes Maternal Grandmother        Social History     Socioeconomic History    Marital status: Single     Spouse name: None    Number of children:  None    Years of education: None    Highest education level: None   Tobacco Use    Smoking status: Never    Smokeless tobacco: Never   Substance and Sexual Activity    Alcohol use: Yes     Alcohol/week: 0.0 standard drinks of alcohol     Comment: 1 glasso wine, weekly     Drug use: No    Sexual activity: Never   Other Topics Concern     Service No    Blood Transfusions Yes     Comment: Permits if needed    Caffeine Concern Yes     Comment: Tea, 2 cups daily     Exercise Yes     Comment: Walking, walks steps, daily     Seat Belt Yes    Parent/sibling w/ CABG, MI or angioplasty before 65F 55M? No       ROS: 10 point ROS neg other than the symptoms noted above in the HPI.  EXAM  Constitutional: healthy, alert and no distress    Psychiatric: mentation appears normal and affect normal/bright    VASCULAR:  -Dorsalis pedis pulse +2/4 b/l  -Posterior tibial pulse +2/4 b/l  NEURO:  -Light touch sensation intact to b/l plantar forefoot  DERM:  -Skin temperature, texture and turgor WNL b/l  -Toenail moderately thickened, discolored and dystrophic on the RIGHT second toenail  MSK:  -Plate on palpation to the RIGHT sinus tarsi and the RIGHT lateral ankle gutter  -Pain on palpation to the LEFT dorsal and lateral proximal 1/3 of the fifth metatarsal     -RIGHT 1st MTPJ fused with no ROM    LEFT FOOT RADIOGRAPHS 07/15/2024  IMPRESSION:   Healing Fifth metatarsal fracture in anatomic alignment    CHAD AGUIRRE MD   RIGHT ANKLE RADIOGRAPHS 07/15/2024  IMPRESSION: Calcaneal spur    CHAD AGUIRRE MD   ============================================================    ASSESSMENT:  (M25.571) Acute right ankle pain  (primary encounter diagnosis)    (M25.571) Sinus tarsi syndrome of right ankle    (S92.355A) Closed nondisplaced fracture of fifth metatarsal bone of left foot, initial encounter    (M77.42) Metatarsalgia of left foot      PLAN:  -Patient evaluated and examined. Treatment options discussed with no educational  barriers noted.    Sinus tarsi pain and lateral ankle gutter pain on the RIGHT ankle:  -Ordered RIGHT ankle radiographs on 07/15/2024: No concernign fractures at this time. Reviewed results with patient.  -Discussed etiology and treatment options for sinus tarsi pain and lateral ankle gutter pain. Patient has had a fracture in her LEFT lateral foot. She says she thinks she she fractured her LEFT foot a couple months ago. It still causes her pain so she would be compensating and increasing pressure on her RIGHT foot. She is also having hardware removed from her RIGHT 1st MTPJ next month by Dr. Parker because of pain. This would also increased a varus tilt to the ankle to reduce pain to the 1st MTPJ which will increase pain in the ankle and sinus tarsi. Patient may consider compression, rest, elevating and icing the ankle. She may also consider a steroid injection. She would like to try this today because pain has been prominent.    -Steroid injection x 2 to the RIGHT sinus tarsi and RIGHT lateral ankle gutter:   -Obtained written and verbal consent from patient for a steroid injection into the above location(s). Reviewed risks and benefits of the injection. Informed patient that the injection may decrease pain long-term, short-term, or not at all. A steroid injection can potentially weaken the surrounding structures of the injected site such as weaken the insertion of nearby tendons and cartilage or thin cartilage. The goal of the injection, however, is to reduce the inflammation to stop the chronic inflammatory cycle. The injection in combination with other treatment options may help reduce pain. Some people develop an increased flare of pain within a few days of the injection which usually resolves. Patient understands risks and benefits of the injection and is in agreement with an injection today in an effort to slow or reverse the chronic inflammatory process and pain in the foot.   ---Patient signed informed  consent and a time-out was performed and there was verification of correct patient, procedure and laterality.  ---Prepped the injection site with an alcohol swab.  ---Injected a total of 3 mL of 1:1:1 of 4mg Dexamethasone, 40mg Kenalog, and 1mL of 2% Lidocaine plain. Patient tolerated the injection(s) well.    --------------------------------------    LEFT fifth metatarsal fracture:  -LEFT foot radiographs ordered on 07/15/2024: There is a healing LEFT fifth distal metatarsal. The fracture is healing. Patient says she has been consistently wearing tennis shoes all day for the past two months. Her fracture is healing and has not displaced. She is advised to continue wearing her tennis shoes consistently when walking. She had rigid orthotics at home that she has not worn in a long time. This will add more rigidity to her shoe. However, she should slowly transition into the orthotics and stop wearing them if she makes pain worse in her feet or other joint spaces. She is in agreement with this plan.    -Stability Shoe Gear: This involves wearing a solid tennis shoe that bends at the toe, but has a solid midfoot portion of the shoe that does not bend or twist in half, and a rigid heel contour.   -----Ram[employer]rez, Asics, and New Balance are a few brands that have several types of stability tennis shoes. However, these brands also carry lightweight shoes that do not meet the above criteria, so look for a stability tennis shoe.  -----Ram[employer]rez tend to have a wider toe box if you have difficulty finding wide enough shoes for your feet.   -----Any brand of can be worn as long as it meets the above three criteria.  -Compression socks: Advised to wear compression socks all day (especially when working) to decrease LOWER EXTREMITY swelling in both feet and legs. Apply the socks first thing in the morning before getting out of bed and remove them at night when the feet are elevated in bed. An ankle compression sleeve may also be  considered.  -Icing: Ice the painful area of the foot minimally once a day for ten to twenty minutes per foot (each area of pain) (can be a frozen water bottle if pain is on the bottom surface of the foot). Ice after any extended amount of time on your feet and/or 2-3 times a day over a sock so there is no direct contact with the ice on the skin.  -Custom inserts -- patient has CMOs. She has not worn them in a long time. She is advised to slowly re-introduce them to the feet. Call the clinic for modifications if they are not comfortable.    -This is an acute, uncomplicated illness/injury with OTC treatment options reviewed.    -Patient in agreement with the above treatment plan and all of patient's questions were answered.      Return to clinic two months to evaluate RIGHT ankle and LEFT lateral foot pain        Katerina Campos DPM

## 2024-07-15 NOTE — PATIENT INSTRUCTIONS
-Stability Shoe Gear: This involves wearing a solid tennis shoe that bends at the toe, but has a solid midfoot portion of the shoe that does not bend or twist in half, and a rigid heel contour.   -----Ram[employer]rez, Asics, and New Balance are a few brands that have several types of stability tennis shoes. However, these brands also carry lightweight shoes that do not meet the above criteria, so look for a stability tennis shoe.  -----Ram[employer]rez tend to have a wider toe box if you have difficulty finding wide enough shoes for your feet.   -----Any brand of can be worn as long as it meets the above three criteria.  -Compression socks: Advised to wear compression socks all day (especially when working) to decrease LOWER EXTREMITY swelling in both feet and legs. Apply the socks first thing in the morning before getting out of bed and remove them at night when the feet are elevated in bed. An ankle compression sleeve may also be considered.  -Icing: Ice the painful area of the foot minimally once a day for ten to twenty minutes per foot (each area of pain) (can be a frozen water bottle if pain is on the bottom surface of the foot). Ice after any extended amount of time on your feet and/or 2-3 times a day over a sock so there is no direct contact with the ice on the skin.  -Custom inserts -- You have custom orthotics. Slowly re-introduce them to the feet. Call the clinic for modifications if they are not comfortable.

## 2024-07-18 DIAGNOSIS — M25.571 SINUS TARSI SYNDROME OF RIGHT ANKLE: ICD-10-CM

## 2024-07-18 DIAGNOSIS — S92.355A CLOSED NONDISPLACED FRACTURE OF FIFTH METATARSAL BONE OF LEFT FOOT, INITIAL ENCOUNTER: ICD-10-CM

## 2024-07-18 DIAGNOSIS — M25.571 ACUTE RIGHT ANKLE PAIN: Primary | ICD-10-CM

## 2024-07-18 DIAGNOSIS — M77.42 METATARSALGIA OF LEFT FOOT: ICD-10-CM

## 2024-07-18 NOTE — PROGRESS NOTES
Patient has RIGHT sinus tarsi and RIGHT lateral ankle pain. She has CMOs from the orthotist, Rupinder Rodriguez and they are not comfortable. She would like a referral for new CMOs. This was placed on 07/18/2024.

## 2024-07-22 ENCOUNTER — TELEPHONE (OUTPATIENT)
Dept: FAMILY MEDICINE | Facility: OTHER | Age: 74
End: 2024-07-22

## 2024-07-22 NOTE — LETTER
July 22, 2024      Mihaela Jang  [address] [place] [address]   [facility] [id number]        To Whom It May Concern,     I am writing this letter with regard to my patient, Mihaela Jang, and her summons for jury duty.  She has been a patient of mine for several years.  She carries the diagnosis of severe spinal stenosis in her lumbar spine, which causes back and leg pain, and limits her mobility as well as ability to remain seated for prolonged periods.  In addition, she is following with orthopedics for a recent foot fracture, which will require surgery and is scheduled in 8/2024.  For these reasons, I request that she be excused from jury duty at this time.          Sincerely,        Melyssa Encinas MD

## 2024-07-22 NOTE — TELEPHONE ENCOUNTER
8:11 AM    Reason for Call: Phone Call    Description: Patient requesting a call back from the nurse to discuss her situation. Patient received a letter for jury summons. Patient requesting an appointment to write a letter to describe her health issues to be excused from jury duty because of her fractured foot and leg and back pain.    Was an appointment offered for this call?  NA  If yes : Appointment type              Date    Preferred method for responding to this message: Telephone Call  What is your phone number ?  [phone]    If we cannot reach you directly, may we leave a detailed response at the number you provided? Yes    Can this message wait until your PCP/provider returns, if available today? Not applicable    Lyndsey Knox

## 2024-07-23 NOTE — TELEPHONE ENCOUNTER
Patient called back to report that she no longer needs a letter for dismissal of jury duty because she received a call back stating that she doesn't need to serve because she is over 68.

## 2024-07-26 ENCOUNTER — TRANSFERRED RECORDS (OUTPATIENT)
Dept: HEALTH INFORMATION MANAGEMENT | Facility: CLINIC | Age: 74
End: 2024-07-26

## 2024-08-01 ENCOUNTER — TELEPHONE (OUTPATIENT)
Dept: FAMILY MEDICINE | Facility: OTHER | Age: 74
End: 2024-08-01

## 2024-08-01 DIAGNOSIS — G25.81 RESTLESS LEGS SYNDROME (RLS): ICD-10-CM

## 2024-08-01 NOTE — TELEPHONE ENCOUNTER
Reason for call:  Medication      Have you contacted your pharmacy?  Patient stated she switched insurances and will not longer be receiving by mail order.  Please fill to [facility]    If patient has contacted Pharmacy and it has been over 72hrs, continue to #2  Medication [place]  What Pharmacy do you use? [facility]      (Please note that the turn-around-time for prescriptions is 72 business hours; I am sending your request at this time. SEND TO appropriate Care Team Pool )

## 2024-08-05 ENCOUNTER — OFFICE VISIT (OUTPATIENT)
Dept: FAMILY MEDICINE | Facility: OTHER | Age: 74
End: 2024-08-05
Attending: FAMILY MEDICINE
Payer: COMMERCIAL

## 2024-08-05 VITALS
TEMPERATURE: 97.9 F | BODY MASS INDEX: 21.22 KG/M2 | WEIGHT: 112.3 LBS | DIASTOLIC BLOOD PRESSURE: 66 MMHG | SYSTOLIC BLOOD PRESSURE: 108 MMHG | HEART RATE: 65 BPM | RESPIRATION RATE: 16 BRPM | OXYGEN SATURATION: 97 %

## 2024-08-05 DIAGNOSIS — M79.671 RIGHT FOOT PAIN: ICD-10-CM

## 2024-08-05 DIAGNOSIS — Z01.818 PREOP GENERAL PHYSICAL EXAM: Primary | ICD-10-CM

## 2024-08-05 DIAGNOSIS — E03.9 HYPOTHYROIDISM, UNSPECIFIED TYPE: Chronic | ICD-10-CM

## 2024-08-05 DIAGNOSIS — E78.5 HYPERLIPIDEMIA, UNSPECIFIED HYPERLIPIDEMIA TYPE: ICD-10-CM

## 2024-08-05 DIAGNOSIS — G25.81 RESTLESS LEGS SYNDROME (RLS): ICD-10-CM

## 2024-08-05 DIAGNOSIS — M81.0 OSTEOPOROSIS, UNSPECIFIED OSTEOPOROSIS TYPE, UNSPECIFIED PATHOLOGICAL FRACTURE PRESENCE: Chronic | ICD-10-CM

## 2024-08-05 DIAGNOSIS — D70.9 NEUTROPENIA, UNSPECIFIED TYPE (H): ICD-10-CM

## 2024-08-05 DIAGNOSIS — R73.03 PREDIABETES: ICD-10-CM

## 2024-08-05 LAB
ANION GAP SERPL CALCULATED.3IONS-SCNC: 9 MMOL/L (ref 7–15)
BASOPHILS # BLD AUTO: 0 10E3/UL (ref 0–0.2)
BASOPHILS NFR BLD AUTO: 1 %
BUN SERPL-MCNC: 20.3 MG/DL (ref 8–23)
CALCIUM SERPL-MCNC: 9.6 MG/DL (ref 8.8–10.4)
CHLORIDE SERPL-SCNC: 98 MMOL/L (ref 98–107)
CREAT SERPL-MCNC: 0.68 MG/DL (ref 0.51–0.95)
EGFRCR SERPLBLD CKD-EPI 2021: 90 ML/MIN/1.73M2
EOSINOPHIL # BLD AUTO: 0 10E3/UL (ref 0–0.7)
EOSINOPHIL NFR BLD AUTO: 1 %
ERYTHROCYTE [DISTWIDTH] IN BLOOD BY AUTOMATED COUNT: 13.2 % (ref 10–15)
GLUCOSE SERPL-MCNC: 93 MG/DL (ref 70–99)
HCO3 SERPL-SCNC: 26 MMOL/L (ref 22–29)
HCT VFR BLD AUTO: 38.5 % (ref 35–47)
HGB BLD-MCNC: 13.2 G/DL (ref 11.7–15.7)
IMM GRANULOCYTES # BLD: 0 10E3/UL
IMM GRANULOCYTES NFR BLD: 0 %
LYMPHOCYTES # BLD AUTO: 1 10E3/UL (ref 0.8–5.3)
LYMPHOCYTES NFR BLD AUTO: 19 %
MCH RBC QN AUTO: 33.1 PG (ref 26.5–33)
MCHC RBC AUTO-ENTMCNC: 34.3 G/DL (ref 31.5–36.5)
MCV RBC AUTO: 97 FL (ref 78–100)
MONOCYTES # BLD AUTO: 0.5 10E3/UL (ref 0–1.3)
MONOCYTES NFR BLD AUTO: 11 %
NEUTROPHILS # BLD AUTO: 3.4 10E3/UL (ref 1.6–8.3)
NEUTROPHILS NFR BLD AUTO: 68 %
NRBC # BLD AUTO: 0 10E3/UL
NRBC BLD AUTO-RTO: 0 /100
PLATELET # BLD AUTO: 257 10E3/UL (ref 150–450)
POTASSIUM SERPL-SCNC: 4.6 MMOL/L (ref 3.4–5.3)
RBC # BLD AUTO: 3.99 10E6/UL (ref 3.8–5.2)
SODIUM SERPL-SCNC: 133 MMOL/L (ref 135–145)
WBC # BLD AUTO: 4.9 10E3/UL (ref 4–11)

## 2024-08-05 PROCEDURE — 36415 COLL VENOUS BLD VENIPUNCTURE: CPT | Mod: ZL | Performed by: FAMILY MEDICINE

## 2024-08-05 PROCEDURE — 99214 OFFICE O/P EST MOD 30 MIN: CPT | Performed by: FAMILY MEDICINE

## 2024-08-05 PROCEDURE — 85025 COMPLETE CBC W/AUTO DIFF WBC: CPT | Mod: ZL | Performed by: FAMILY MEDICINE

## 2024-08-05 PROCEDURE — 93010 ELECTROCARDIOGRAM REPORT: CPT | Performed by: INTERNAL MEDICINE

## 2024-08-05 PROCEDURE — 80048 BASIC METABOLIC PNL TOTAL CA: CPT | Mod: ZL | Performed by: FAMILY MEDICINE

## 2024-08-05 PROCEDURE — G2211 COMPLEX E/M VISIT ADD ON: HCPCS | Performed by: FAMILY MEDICINE

## 2024-08-05 PROCEDURE — 93005 ELECTROCARDIOGRAM TRACING: CPT | Performed by: FAMILY MEDICINE

## 2024-08-05 PROCEDURE — G0463 HOSPITAL OUTPT CLINIC VISIT: HCPCS | Performed by: FAMILY MEDICINE

## 2024-08-05 RX ORDER — ROPINIROLE 1 MG/1
4 TABLET, FILM COATED ORAL AT BEDTIME
Qty: 400 TABLET | Refills: 3 | Status: SHIPPED | OUTPATIENT
Start: 2024-08-05

## 2024-08-05 ASSESSMENT — PAIN SCALES - GENERAL: PAINLEVEL: MODERATE PAIN (4)

## 2024-08-05 NOTE — PROGRESS NOTES
Preoperative Evaluation  [facility] - [place]  [address]  [place] [address]  Phone: [phone]  Primary Provider: Melyssa Encinas MD  Pre-op Performing Provider: Melyssa Encinas MD  Aug 5, 2024             8/5/2024   Surgical Information   What procedure is being done? Foot Surgery - right   Facility or Hospital where procedure/surgery will be performed: [facility]   Who is doing the procedure / surgery? Dr Parker   Date of surgery / procedure: 08/16/2024   Time of surgery / procedure: TBD   Where do you plan to recover after surgery? at home with family        Fax number for surgical facility: unknown    Assessment & Plan     The proposed surgical procedure is considered INTERMEDIATE risk.    Preop general physical exam  Proceed as planned.  - CBC with platelets and differential; Future  - Basic metabolic panel; Future  - EKG 12-lead complete w/read - (Clinic Performed)  - CBC with platelets and differential  - Basic metabolic panel    Right foot pain  Right 1st and 2nd toes.  As above.  - CBC with platelets and differential; Future  - Basic metabolic panel; Future  - EKG 12-lead complete w/read - (Clinic Performed)  - CBC with platelets and differential  - Basic metabolic panel    Prediabetes  Stable.    Hyperlipidemia, unspecified hyperlipidemia type  Stable.    Hypothyroidism, unspecified type  Stable.    Restless legs syndrome (RLS)  Stable.    Osteoporosis, unspecified osteoporosis type, unspecified pathological fracture presence  Stable.  DEXA due 9/2025    Neutropenia, unspecified type (H24)  Prior intermittent.  Normal today.           Risks and Recommendations  The patient has the following additional risks and recommendations for perioperative complications:   - No identified additional risk factors other than previously addressed    Antiplatelet or Anticoagulation Medication Instructions   - aspirin: Discontinue aspirin 7-10 days prior to procedure to reduce bleeding risk.  It should be resumed postoperatively.     Additional Medication Instructions  Take all scheduled medications on the day of surgery EXCEPT for modifications listed below:   - Statins: Continue taking on the day of surgery.    - Herbal medications and vitamins: DO NOT TAKE 14 days prior to surgery.   - pregabalin, gabapentin: Continue without modification.   - ibuprofen (Advil, Motrin): DO NOT TAKE 1 day before surgery.     Recommendation  Approval given to proceed with proposed procedure, without further diagnostic evaluation.    Megha Chairez is a 74 year old, presenting for the following:  Pre-Op Exam          8/5/2024     1:07 PM   Additional Questions   Roomed by Hany Kirkpatrick   Accompanied by None         8/5/2024     1:07 PM   Patient Reported Additional Medications   Patient reports taking the following new medications None     HPI related to upcoming procedure: Patient with right foot 1st MTP fusion.  With ongoing pain.  Planning on hardware removal. Also, 2nd toe IP joint capsulotomy.        8/5/2024   Pre-Op Questionnaire   Have you ever had a heart attack or stroke? No   Have you ever had surgery on your heart or blood vessels, such as a stent placement, a coronary artery bypass, or surgery on an artery in your head, neck, heart, or legs? No   Do you have chest pain with activity? No   Do you have a history of heart failure? No   Do you currently have a cold, bronchitis or symptoms of other infection? No   Do you have a cough, shortness of breath, or wheezing? No   Do you or anyone in your family have previous history of blood clots? No   Do you or does anyone in your family have a serious bleeding problem such as prolonged bleeding following surgeries or cuts? No   Have you ever had problems with anemia or been told to take iron pills? No   Have you had any abnormal blood loss such as black, tarry or bloody stools, or abnormal vaginal bleeding? No   Have you ever had a blood transfusion? No    Are you willing to have a blood transfusion if it is medically needed before, during, or after your surgery? Yes   Have you or any of your relatives ever had problems with anesthesia? No   Do you have sleep apnea, excessive snoring or daytime drowsiness? No   Do you have any artifical heart valves or other implanted medical devices like a pacemaker, defibrillator, or continuous glucose monitor? No   Do you have artificial joints? No   Are you allergic to latex? No        Health Care Directive  Patient has a Health Care Directive on file      Preoperative Review of    reviewed - controlled substances reflected in medication list.      Status of Chronic Conditions:  See problem list for active medical problems.  Problems all longstanding and stable, except as noted/documented.  See ROS for pertinent symptoms related to these conditions.    HYPERLIPIDEMIA - Patient has a long history of significant Hyperlipidemia requiring medication for treatment with recent good control. Patient reports no problems or side effects with the medication.  Zocor 20 mg.    HYPOTHYROIDISM - Patient has a longstanding history of chronic Hypothyroidism. Patient has been doing well, noting no tremor, insomnia, hair loss or changes in skin texture. Continues to take medications as directed, without adverse reactions or side effects. Last TSH   Lab Results   Component Value Date    TSH 3.33 06/19/2024   .      PREDIABETES-   Lab Results   Component Value Date    A1C 5.8 06/19/2024    A1C 6.0 06/08/2023    A1C 5.6 06/08/2022    A1C 5.6 09/15/2020    A1C 5.7 10/21/2019     Osteoporosis - due for dexa 2025; had 5 doses Reclast    Neutropenia - chronic, stable.    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  Ensure this is not a melanoma, path pending      Hyperlipidemia, unspecified hyperlipidemia type 09/15/2016     Priority: Medium    ACP (advance care planning) 01/22/2016     Priority: Medium     Advance Care Planning 6/2/2016: Receipt of ACP document:  Received: Health Care Directive which was witnessed or notarized on 6/1/16.  Document not previously scanned.  Validation form completed and sent with document to be scanned.  Code Status reflects choices in most recent ACP document.  Confirmed/documented designated decision maker(s).  Added by Paola Johnson  Advance Care Planning 1/22/2016: Receipt of ACP document:  Received: Health Care Directive which was witnessed or notarized on 10-21-08.  Document previously scanned on 12-7-15.  Validation form completed and sent to be scanned.  Code Status needs to be updated to reflect choices in most recent ACP document.  Confirmed/documented designated decision maker(s).  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 11/16/2015     Priority: Medium    Personal history of malignant neoplasm of breast 12/23/2014     Priority: Medium    Early satiety 12/23/2014     Priority: Medium    Laryngopharyngeal reflux (LPR) 04/30/2013     Priority: Medium    Chronic rhinitis 04/30/2013     Priority: Medium    ETD (eustachian tube dysfunction) 04/30/2013     Priority: Medium    Atypical nevus 07/25/2012     Priority: Medium    Skin sensation disturbance 07/25/2012     Priority: Medium    Notalgia paresthetica 07/25/2012     Priority: Medium    Osteoporosis  09/10/2001     Priority: Medium     Problem list name updated by automated process. Provider to review        Past Medical History:   Diagnosis Date    Atypical nevi     Chondrodermatitis nodularis helicis of left ear     Dr Shipley, [facility]    Chronic rhinitis     [facility] allergy; negative skin testing; IgE mediated allergies; ENT - DC nasal steroid and antihistamine; saline rinses    Degenerative skin disorder     solar elastosis    Hallux rigidus 06/15/2000    Hyperlipidemia, unspecified hyperlipidemia type 09/15/2016    Laryngopharyngeal reflux disease     PPI    Malignant neoplasm of breast (female), unspecified site 03/10/2004    Notalgia paresthetica     Osteoarthritis 07/20/2011    Osteoporosis, unspecified 09/10/2001    Dr Moses; intermittent reclast;  Dr. Moses; repeat dexa after full 2 years Reclast    Other abnormal glucose 12/20/2013    Paresthesia     neck; notalgiaparesthetic; dr leahy & Dr Nevarez; neurontin    Personal history of malignant neoplasm of breast 06/07/2005    Rhinitis     Schamberg's purpura     Spinal stenosis of lumbar region      Past Surgical History:   Procedure Laterality Date    BONE MARROW BIOPSY      negative    COLONOSCOPY  07/2000    COLONOSCOPY  8/13/2013    DR Fu; repeat 5 years    COLONOSCOPY N/A 8/13/2018    Procedure: COLONOSCOPY;  COLONOSCOPY;  Surgeon: Ajit Uriarte MD;  Location: [facility]    COLONOSCOPY N/A 12/2/2022    Procedure: COLONOSCOPY, WITH POLYPECTOMY AND BIOPSY;  Surgeon: Marcellus Jimenez MD;  Location: [facility]    DILATION AND CURETTAGE, OPERATIVE HYSTEROSCOPY, COMBINED N/A 2/13/2019    Procedure: EXAM UNDER ANESTHESIA , HYSTEROSCOPY;  Surgeon: Jovi Gabriel MD;  Location: [facility] OR    HEMORRHOIDECTOMY  1986    IR CONSULTATION FOR IR EXAM  12/5/2023    MASTECTOMY, BILATERAL  03/01/2004    right sided breast cancer    RECONSTRUCT FOREFOOT WITH METATARSOPHALANGEAL (MTP) FUSION Right 6/16/2023    Procedure: Right First Metatarsophalangeal  Joint Fusion;  Surgeon: Ac Parker DPM;  Location: [facility] OR    RELEASE TRIGGER FINGER Right 3/7/2018    Procedure: RELEASE TRIGGER FINGER;  RELEASE TRIGGER DIGIT RIGHT THUMB;  Surgeon: Jose Alfredo Brewster DO;  Location: [facility]    RELEASE TRIGGER FINGER Left 3/15/2023    Procedure: Left Thumb Trigger Finger Release;  Surgeon: Rigoberto Olivera MD;  Location: [facility]    RELEASE TRIGGER FINGER Right 5/17/2023    Procedure: Revision Right Thumb Trigger Release;  Surgeon: Rigoberto Olivera MD;  Location: [facility]    UPPER GI ENDOSCOPY      RUST COLONOSCOPY THRU STOMA WITH BIOPSY  08/25/2010    cancer screening, family h/o colon cancer; hyperplastic polyp     Current Outpatient Medications   Medication Sig Dispense Refill    aspirin 81 MG EC tablet Take 1 tablet (81 mg) by mouth 2 times daily (Patient taking differently: Take 81 mg by mouth daily) 60 tablet 0    CALCIUM CITRATE 1,500 mg two times daily       cinnamon 500 MG CAPS Take 1 capsule by mouth 2 times daily      ferrous sulfate (SLO-FE) 142 (45 Fe) MG CR tablet Take 1 tablet (142 mg) by mouth daily 90 tablet 1    fish oil-omega-3 fatty acids 1000 MG capsule Take 1 g by mouth 2 times daily       gabapentin (NEURONTIN) 300 MG capsule Take 3 capsules (900 mg) by mouth at bedtime Take 3-300mg daily at bedtime 270 capsule 1    levothyroxine (SYNTHROID/LEVOTHROID) 25 MCG tablet TAKE 1 TABLET BY MOUTH DAILY 3  DAYS WEEKLY AND 2 TABLETS BY  MOUTH DAILY FOR 4 DAYS PER WEEK (Patient taking differently: Take 2 tablets 4 days a week and 1 tablet 3 days a week.) 153 tablet 2    MAGNESIUM OXIDE PO Take 200 mg by mouth daily      Multiple Vitamins-Minerals (PRESERVISION AREDS PO) Take 1 tablet by mouth 2 times daily      Polyethylene Glycol 3350 (MIRALAX PO) Use every other day      rOPINIRole (REQUIP) 1 MG tablet Take 4 tablets (4 mg) by mouth at bedtime 400 tablet 3    simvastatin (ZOCOR) 20 MG tablet Take 1 tablet (20 mg) by mouth at bedtime 90 tablet 3    triamcinolone (KENALOG)  "0.025 % cream Apply to back once a day if the back is itchy. 454 g 1    Turmeric 500 MG CAPS Take 2 capsules by mouth daily       zinc 50 MG TABS Take 1 tablet by mouth daily          Allergies   Allergen Reactions    Chlorhexidine Gluconate Rash    Povidone Iodine Rash     Betadine     Soap Rash     Betadine         Social History     Tobacco Use    Smoking status: Never     Passive exposure: Never    Smokeless tobacco: Never   Substance Use Topics    Alcohol use: Yes     Alcohol/week: 0.0 standard drinks of alcohol     Comment: 1 glasso wine, weekly      Family History   Problem Relation Age of Onset    Dementia Mother         (cause of death)     High cholesterol Mother     Osteoarthritis Mother     C.A.D. Father         (cause of death)     Prostate Cancer Father     Breast Cancer Maternal Aunt 72    Cerebrovascular Disease Maternal Grandmother         CVA    Diabetes Maternal Grandmother      History   Drug Use No             Review of Systems  Constitutional, HEENT, cardiovascular, pulmonary, gi and gu systems are negative, except as otherwise noted.    Objective    /66 (BP Location: Right arm, Patient Position: Sitting, Cuff Size: Adult Small)   Pulse 65   Temp 97.9  F (36.6  C) (Tympanic)   Resp 16   Wt 50.9 kg (112 lb 4.8 oz)   SpO2 97%   BMI 21.22 kg/m     Estimated body mass index is 21.22 kg/m  as calculated from the following:    Height as of 7/15/24: 1.549 m (5' 1\").    Weight as of this encounter: 50.9 kg (112 lb 4.8 oz).  Physical Exam  GENERAL: alert and no distress  EYES: Eyes grossly normal to inspection, PERRL and conjunctivae and sclerae normal  HENT: ear canals and TM's normal, nose and mouth without ulcers or lesions  NECK: no adenopathy, no asymmetry, masses, or scars  RESP: lungs clear to auscultation - no rales, rhonchi or wheezes  CV: regular rate and rhythm, normal S1 S2, no S3 or S4, I-II/VI systolic murmur, click or rub, no peripheral edema  ABDOMEN: soft, nontender, no " hepatosplenomegaly, no masses and bowel sounds normal  MS: no gross musculoskeletal defects noted, no edema  SKIN: no suspicious lesions or rashes  NEURO: Normal strength and tone, mentation intact and speech normal  PSYCH: mentation appears normal, affect normal/bright    Recent Labs   Lab Test 06/19/24  0828 04/15/24  1358   HGB 12.3 12.3    289   * 135   POTASSIUM 4.8 4.8   CR 0.67 0.73   A1C 5.8*  --         Diagnostics  Recent Results (from the past 24 hour(s))   Basic metabolic panel    Collection Time: 08/05/24  1:15 PM   Result Value Ref Range    Sodium 133 (L) 135 - 145 mmol/L    Potassium 4.6 3.4 - 5.3 mmol/L    Chloride 98 98 - 107 mmol/L    Carbon Dioxide (CO2) 26 22 - 29 mmol/L    Anion Gap 9 7 - 15 mmol/L    Urea Nitrogen 20.3 8.0 - 23.0 mg/dL    Creatinine 0.68 0.51 - 0.95 mg/dL    GFR Estimate 90 >60 mL/min/1.73m2    Calcium 9.6 8.8 - 10.4 mg/dL    Glucose 93 70 - 99 mg/dL   CBC with platelets and differential    Collection Time: 08/05/24  1:15 PM   Result Value Ref Range    WBC Count 4.9 4.0 - 11.0 10e3/uL    RBC Count 3.99 3.80 - 5.20 10e6/uL    Hemoglobin 13.2 11.7 - 15.7 g/dL    Hematocrit 38.5 35.0 - 47.0 %    MCV 97 78 - 100 fL    MCH 33.1 (H) 26.5 - 33.0 pg    MCHC 34.3 31.5 - 36.5 g/dL    RDW 13.2 10.0 - 15.0 %    Platelet Count 257 150 - 450 10e3/uL    % Neutrophils 68 %    % Lymphocytes 19 %    % Monocytes 11 %    % Eosinophils 1 %    % Basophils 1 %    % Immature Granulocytes 0 %    NRBCs per 100 WBC 0 <1 /100    Absolute Neutrophils 3.4 1.6 - 8.3 10e3/uL    Absolute Lymphocytes 1.0 0.8 - 5.3 10e3/uL    Absolute Monocytes 0.5 0.0 - 1.3 10e3/uL    Absolute Eosinophils 0.0 0.0 - 0.7 10e3/uL    Absolute Basophils 0.0 0.0 - 0.2 10e3/uL    Absolute Immature Granulocytes 0.0 <=0.4 10e3/uL    Absolute NRBCs 0.0 10e3/uL   EKG 12-lead complete w/read - (Clinic Performed)    Collection Time: 08/05/24  1:23 PM   Result Value Ref Range    Systolic Blood Pressure  mmHg    Diastolic Blood  Pressure  mmHg    Ventricular Rate 60 BPM    Atrial Rate 60 BPM    AL Interval 200 ms    QRS Duration 104 ms     ms    QTc 380 ms    P Axis 73 degrees    R AXIS 72 degrees    T Axis 79 degrees    Interpretation ECG       Sinus rhythm  Nonspecific ST abnormality  Abnormal ECG  When compared with ECG of 15-Apr-2024 13:35,  No significant change was found        EKG: Normal Sinus Rhythm, normal axis, normal intervals, no acute ST/T changes c/w ischemia, no LVH by voltage criteria, nonspecific ST-T changes, unchanged from previous tracings    Revised Cardiac Risk Index (RCRI)  The patient has the following serious cardiovascular risks for perioperative complications:   - No serious cardiac risks = 0 points     RCRI Interpretation: 0 points: Class I (very low risk - 0.4% complication rate)         Signed Electronically by: Melyssa Encinas MD  A copy of this evaluation report is provided to the requesting physician.

## 2024-08-05 NOTE — PATIENT INSTRUCTIONS
How to Take Your Medication Before Surgery  Preoperative Medication Instructions   Antiplatelet or Anticoagulation Medication Instructions   - aspirin: Discontinue aspirin 7-10 days prior to procedure to reduce bleeding risk. It should be resumed postoperatively.     Additional Medication Instructions  Take all scheduled medications on the day of surgery EXCEPT for modifications listed below:   - Statins: Continue taking on the day of surgery.    - Synthroid: Continue without modification.    - Herbal medications and vitamins: DO NOT TAKE 14 days prior to surgery.   - pregabalin, gabapentin: Continue without modification.   - ibuprofen (Advil, Motrin): DO NOT TAKE 1 day before surgery.    - Topicals: DO NOT TAKE day of surgery.       Patient Education   Preparing for Your Surgery  Getting started  A nurse will call you to review your health history and instructions. They will give you an arrival time based on your scheduled surgery time. Please be ready to share:  Your doctor's clinic name and phone number  Your medical, surgical, and anesthesia history  A list of allergies and sensitivities  A list of medicines, including herbal treatments and over-the-counter drugs  Whether the patient has a legal guardian (ask how to send us the papers in advance)  Please tell us if you're pregnant--or if there's any chance you might be pregnant. Some surgeries may injure a fetus (unborn baby), so they require a pregnancy test. Surgeries that are safe for a fetus don't always need a test, and you can choose whether to have one.   If you have a child who's having surgery, please ask for a copy of Preparing for Your Child's Surgery.    Preparing for surgery  Within 10 to 30 days of surgery: Have a pre-op exam (sometimes called an H&P, or History and Physical). This can be done at a clinic or pre-operative center.  If you're having a , you may not need this exam. Talk to your care team.  At your pre-op exam, talk to your  care team about all medicines you take. If you need to stop any medicines before surgery, ask when to start taking them again.  We do this for your safety. Many medicines can make you bleed too much during surgery. Some change how well surgery (anesthesia) drugs work.  Call your insurance company to let them know you're having surgery. (If you don't have insurance, call [phone].)  Call your clinic if there's any change in your health. This includes signs of a cold or flu (sore throat, runny nose, cough, rash, fever). It also includes a scrape or scratch near the surgery site.  If you have questions on the day of surgery, call your hospital or surgery center.  Eating and drinking guidelines  For your safety: Unless your surgeon tells you otherwise, follow the guidelines below.  Eat and drink as usual until 8 hours before you arrive for surgery. After that, no food or milk.  Drink clear liquids until 2 hours before you arrive. These are liquids you can see through, like water, Gatorade, and Propel Water. They also include plain black coffee and tea (no cream or milk), candy, and breath mints. You can spit out gum when you arrive.  If you drink alcohol: Stop drinking it the night before surgery.  If your care team tells you to take medicine on the morning of surgery, it's okay to take it with a sip of water.  Preventing infection  Shower or bathe the night before and morning of your surgery. Follow the instructions your clinic gave you. (If no instructions, use regular soap.)  Don't shave or clip hair near your surgery site. We'll remove the hair if needed.  Don't smoke or vape the morning of surgery. You may chew nicotine gum up to 2 hours before surgery. A nicotine patch is okay.  Note: Some surgeries require you to completely quit smoking and nicotine. Check with your surgeon.  Your care team will make every effort to keep you safe from infection. We will:  Clean our hands often with soap and water (or an  alcohol-based hand rub).  Clean the skin at your surgery site with a special soap that kills germs.  Give you a special gown to keep you warm. (Cold raises the risk of infection.)  Wear special hair covers, masks, gowns and gloves during surgery.  Give antibiotic medicine, if prescribed. Not all surgeries need antibiotics.  What to bring on the day of surgery  Photo ID and insurance card  Copy of your health care directive, if you have one  Glasses and hearing aids (bring cases)  You can't wear contacts during surgery  Inhaler and eye drops, if you use them (tell us about these when you arrive)  CPAP machine or breathing device, if you use them  A few personal items, if spending the night  If you have . . .  A pacemaker, ICD (cardiac defibrillator) or other implant: Bring the ID card.  An implanted stimulator: Bring the remote control.  A legal guardian: Bring a copy of the certified (court-stamped) guardianship papers.  Please remove any jewelry, including body piercings. Leave jewelry and other valuables at home.  If you're going home the day of surgery  You must have a responsible adult drive you home. They should stay with you overnight as well.  If you don't have someone to stay with you, and you aren't safe to go home alone, we may keep you overnight. Insurance often won't pay for this.  After surgery  If it's hard to control your pain or you need more pain medicine, please call your surgeon's office.  Questions?   If you have any questions for your care team, list them here: _________________________________________________________________________________________________________________________________________________________________________ ____________________________________ ____________________________________ ____________________________________  For informational purposes only. Not to replace the advice of your health care provider. Copyright   2003, 2019 [facility]. All rights reserved.  Clinically reviewed by Hannah Ash MD. [employer] [id number] - REV 12/22.

## 2024-08-05 NOTE — H&P (VIEW-ONLY)
Preoperative Evaluation  [facility] - [place]  [address]  [place] [address]  Phone: [phone]  Primary Provider: Melyssa Encinas MD  Pre-op Performing Provider: Melyssa Encinas MD  Aug 5, 2024             8/5/2024   Surgical Information   What procedure is being done? Foot Surgery - right   Facility or Hospital where procedure/surgery will be performed: [facility] [place] [facility]   Who is doing the procedure / surgery? Dr Parker   Date of surgery / procedure: 08/16/2024   Time of surgery / procedure: TBD   Where do you plan to recover after surgery? at home with family        Fax number for surgical facility: unknown    Assessment & Plan     The proposed surgical procedure is considered INTERMEDIATE risk.    Preop general physical exam  Proceed as planned.  - CBC with platelets and differential; Future  - Basic metabolic panel; Future  - EKG 12-lead complete w/read - (Clinic Performed)  - CBC with platelets and differential  - Basic metabolic panel    Right foot pain  Right 1st and 2nd toes.  As above.  - CBC with platelets and differential; Future  - Basic metabolic panel; Future  - EKG 12-lead complete w/read - (Clinic Performed)  - CBC with platelets and differential  - Basic metabolic panel    Prediabetes  Stable.    Hyperlipidemia, unspecified hyperlipidemia type  Stable.    Hypothyroidism, unspecified type  Stable.    Restless legs syndrome (RLS)  Stable.    Osteoporosis, unspecified osteoporosis type, unspecified pathological fracture presence  Stable.  DEXA due 9/2025    Neutropenia, unspecified type (H24)  Prior intermittent.  Normal today.           Risks and Recommendations  The patient has the following additional risks and recommendations for perioperative complications:   - No identified additional risk factors other than previously addressed    Antiplatelet or Anticoagulation Medication Instructions   - aspirin: Discontinue aspirin 7-10 days prior to procedure to reduce bleeding risk.  It should be resumed postoperatively.     Additional Medication Instructions  Take all scheduled medications on the day of surgery EXCEPT for modifications listed below:   - Statins: Continue taking on the day of surgery.    - Herbal medications and vitamins: DO NOT TAKE 14 days prior to surgery.   - pregabalin, gabapentin: Continue without modification.   - ibuprofen (Advil, Motrin): DO NOT TAKE 1 day before surgery.     Recommendation  Approval given to proceed with proposed procedure, without further diagnostic evaluation.    Megha Chairez is a 74 year old, presenting for the following:  Pre-Op Exam          8/5/2024     1:07 PM   Additional Questions   Roomed by Hany Kirkpatrick   Accompanied by None         8/5/2024     1:07 PM   Patient Reported Additional Medications   Patient reports taking the following new medications None     HPI related to upcoming procedure: Patient with right foot 1st MTP fusion.  With ongoing pain.  Planning on hardware removal. Also, 2nd toe IP joint capsulotomy.        8/5/2024   Pre-Op Questionnaire   Have you ever had a heart attack or stroke? No   Have you ever had surgery on your heart or blood vessels, such as a stent placement, a coronary artery bypass, or surgery on an artery in your head, neck, heart, or legs? No   Do you have chest pain with activity? No   Do you have a history of heart failure? No   Do you currently have a cold, bronchitis or symptoms of other infection? No   Do you have a cough, shortness of breath, or wheezing? No   Do you or anyone in your family have previous history of blood clots? No   Do you or does anyone in your family have a serious bleeding problem such as prolonged bleeding following surgeries or cuts? No   Have you ever had problems with anemia or been told to take iron pills? No   Have you had any abnormal blood loss such as black, tarry or bloody stools, or abnormal vaginal bleeding? No   Have you ever had a blood transfusion? No    Are you willing to have a blood transfusion if it is medically needed before, during, or after your surgery? Yes   Have you or any of your relatives ever had problems with anesthesia? No   Do you have sleep apnea, excessive snoring or daytime drowsiness? No   Do you have any artifical heart valves or other implanted medical devices like a pacemaker, defibrillator, or continuous glucose monitor? No   Do you have artificial joints? No   Are you allergic to latex? No        Health Care Directive  Patient has a Health Care Directive on file      Preoperative Review of    reviewed - controlled substances reflected in medication list.      Status of Chronic Conditions:  See problem list for active medical problems.  Problems all longstanding and stable, except as noted/documented.  See ROS for pertinent symptoms related to these conditions.    HYPERLIPIDEMIA - Patient has a long history of significant Hyperlipidemia requiring medication for treatment with recent good control. Patient reports no problems or side effects with the medication.  Zocor 20 mg.    HYPOTHYROIDISM - Patient has a longstanding history of chronic Hypothyroidism. Patient has been doing well, noting no tremor, insomnia, hair loss or changes in skin texture. Continues to take medications as directed, without adverse reactions or side effects. Last TSH   Lab Results   Component Value Date    TSH 3.33 06/19/2024   .      PREDIABETES-   Lab Results   Component Value Date    A1C 5.8 06/19/2024    A1C 6.0 06/08/2023    A1C 5.6 06/08/2022    A1C 5.6 09/15/2020    A1C 5.7 10/21/2019     Osteoporosis - due for dexa 2025; had 5 doses Reclast    Neutropenia - chronic, stable.    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  Ensure this is not a melanoma, path pending      Hyperlipidemia, unspecified hyperlipidemia type 09/15/2016     Priority: Medium    ACP (advance care planning) 01/22/2016     Priority: Medium     Advance Care Planning 6/2/2016: Receipt of ACP document:  Received: Health Care Directive which was witnessed or notarized on 6/1/16.  Document not previously scanned.  Validation form completed and sent with document to be scanned.  Code Status reflects choices in most recent ACP document.  Confirmed/documented designated decision maker(s).  Added by Paola Johnson  Advance Care Planning 1/22/2016: Receipt of ACP document:  Received: Health Care Directive which was witnessed or notarized on 10-21-08.  Document previously scanned on 12-7-15.  Validation form completed and sent to be scanned.  Code Status needs to be updated to reflect choices in most recent ACP document.  Confirmed/documented designated decision maker(s).  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 11/16/2015     Priority: Medium    Personal history of malignant neoplasm of breast 12/23/2014     Priority: Medium    Early satiety 12/23/2014     Priority: Medium    Laryngopharyngeal reflux (LPR) 04/30/2013     Priority: Medium    Chronic rhinitis 04/30/2013     Priority: Medium    ETD (eustachian tube dysfunction) 04/30/2013     Priority: Medium    Atypical nevus 07/25/2012     Priority: Medium    Skin sensation disturbance 07/25/2012     Priority: Medium    Notalgia paresthetica 07/25/2012     Priority: Medium    Osteoporosis  09/10/2001     Priority: Medium     Problem list name updated by automated process. Provider to review        Past Medical History:   Diagnosis Date    Atypical nevi     Chondrodermatitis nodularis helicis of left ear     Dr Shipley, [facility]    Chronic rhinitis     [facility] allergy; negative skin testing; IgE mediated allergies; ENT - DC nasal steroid and antihistamine; saline rinses    Degenerative skin disorder     solar elastosis    Hallux rigidus 06/15/2000    Hyperlipidemia, unspecified hyperlipidemia type 09/15/2016    Laryngopharyngeal reflux disease     PPI    Malignant neoplasm of breast (female), unspecified site 03/10/2004    Notalgia paresthetica     Osteoarthritis 07/20/2011    Osteoporosis, unspecified 09/10/2001    Dr Moses; intermittent reclast;  Dr. Moses; repeat dexa after full 2 years Reclast    Other abnormal glucose 12/20/2013    Paresthesia     neck; notalgiaparesthetic; dr leahy & Dr Nevarez; neurontin    Personal history of malignant neoplasm of breast 06/07/2005    Rhinitis     Schamberg's purpura     Spinal stenosis of lumbar region      Past Surgical History:   Procedure Laterality Date    BONE MARROW BIOPSY      negative    COLONOSCOPY  07/2000    COLONOSCOPY  8/13/2013    DR Fu; repeat 5 years    COLONOSCOPY N/A 8/13/2018    Procedure: COLONOSCOPY;  COLONOSCOPY;  Surgeon: Ajit Uriarte MD;  Location: [facility]    COLONOSCOPY N/A 12/2/2022    Procedure: COLONOSCOPY, WITH POLYPECTOMY AND BIOPSY;  Surgeon: Marcellus Jimenez MD;  Location: [facility]    DILATION AND CURETTAGE, OPERATIVE HYSTEROSCOPY, COMBINED N/A 2/13/2019    Procedure: EXAM UNDER ANESTHESIA , HYSTEROSCOPY;  Surgeon: Jovi Gabriel MD;  Location: [facility]    HEMORRHOIDECTOMY  1986    IR CONSULTATION FOR IR EXAM  12/5/2023    MASTECTOMY, BILATERAL  03/01/2004    right sided breast cancer    RECONSTRUCT FOREFOOT WITH METATARSOPHALANGEAL (MTP) FUSION Right 6/16/2023    Procedure: Right First Metatarsophalangeal  Joint Fusion;  Surgeon: Ac Parker DPM;  Location: [facility]    RELEASE TRIGGER FINGER Right 3/7/2018    Procedure: RELEASE TRIGGER FINGER;  RELEASE TRIGGER DIGIT RIGHT THUMB;  Surgeon: Jose Alfredo Brewster DO;  Location: [facility]    RELEASE TRIGGER FINGER Left 3/15/2023    Procedure: Left Thumb Trigger Finger Release;  Surgeon: Rigoberto Olivera MD;  Location: [facility]    RELEASE TRIGGER FINGER Right 5/17/2023    Procedure: Revision Right Thumb Trigger Release;  Surgeon: Rigoberto Olivera MD;  Location: [facility]    UPPER GI ENDOSCOPY      [facility] COLONOSCOPY THRU STOMA WITH BIOPSY  08/25/2010    cancer screening, family h/o colon cancer; hyperplastic polyp     Current Outpatient Medications   Medication Sig Dispense Refill    aspirin 81 MG EC tablet Take 1 tablet (81 mg) by mouth 2 times daily (Patient taking differently: Take 81 mg by mouth daily) 60 tablet 0    CALCIUM CITRATE 1,500 mg two times daily       cinnamon 500 MG CAPS Take 1 capsule by mouth 2 times daily      ferrous sulfate (SLO-FE) 142 (45 Fe) MG CR tablet Take 1 tablet (142 mg) by mouth daily 90 tablet 1    fish oil-omega-3 fatty acids 1000 MG capsule Take 1 g by mouth 2 times daily       gabapentin (NEURONTIN) 300 MG capsule Take 3 capsules (900 mg) by mouth at bedtime Take 3-300mg daily at bedtime 270 capsule 1    levothyroxine (SYNTHROID/LEVOTHROID) 25 MCG tablet TAKE 1 TABLET BY MOUTH DAILY 3  DAYS WEEKLY AND 2 TABLETS BY  MOUTH DAILY FOR 4 DAYS PER WEEK (Patient taking differently: Take 2 tablets 4 days a week and 1 tablet 3 days a week.) 153 tablet 2    MAGNESIUM OXIDE PO Take 200 mg by mouth daily      Multiple Vitamins-Minerals (PRESERVISION AREDS PO) Take 1 tablet by mouth 2 times daily      Polyethylene Glycol 3350 (MIRALAX PO) Use every other day      rOPINIRole (REQUIP) 1 MG tablet Take 4 tablets (4 mg) by mouth at bedtime 400 tablet 3    simvastatin (ZOCOR) 20 MG tablet Take 1 tablet (20 mg) by mouth at bedtime 90 tablet 3    triamcinolone (KENALOG)  "0.025 % cream Apply to back once a day if the back is itchy. 454 g 1    Turmeric 500 MG CAPS Take 2 capsules by mouth daily       zinc 50 MG TABS Take 1 tablet by mouth daily          Allergies   Allergen Reactions    Chlorhexidine Gluconate Rash    Povidone Iodine Rash     Betadine     Soap Rash     Betadine         Social History     Tobacco Use    Smoking status: Never     Passive exposure: Never    Smokeless tobacco: Never   Substance Use Topics    Alcohol use: Yes     Alcohol/week: 0.0 standard drinks of alcohol     Comment: 1 glasso wine, weekly      Family History   Problem Relation Age of Onset    Dementia Mother         (cause of death)     High cholesterol Mother     Osteoarthritis Mother     C.A.D. Father         (cause of death)     Prostate Cancer Father     Breast Cancer Maternal Aunt 72    Cerebrovascular Disease Maternal Grandmother         CVA    Diabetes Maternal Grandmother      History   Drug Use No             Review of Systems  Constitutional, HEENT, cardiovascular, pulmonary, gi and gu systems are negative, except as otherwise noted.    Objective    /66 (BP Location: Right arm, Patient Position: Sitting, Cuff Size: Adult Small)   Pulse 65   Temp 97.9  F (36.6  C) (Tympanic)   Resp 16   Wt 50.9 kg (112 lb 4.8 oz)   SpO2 97%   BMI 21.22 kg/m     Estimated body mass index is 21.22 kg/m  as calculated from the following:    Height as of 7/15/24: 1.549 m (5' 1\").    Weight as of this encounter: 50.9 kg (112 lb 4.8 oz).  Physical Exam  GENERAL: alert and no distress  EYES: Eyes grossly normal to inspection, PERRL and conjunctivae and sclerae normal  HENT: ear canals and TM's normal, nose and mouth without ulcers or lesions  NECK: no adenopathy, no asymmetry, masses, or scars  RESP: lungs clear to auscultation - no rales, rhonchi or wheezes  CV: regular rate and rhythm, normal S1 S2, no S3 or S4, I-II/VI systolic murmur, click or rub, no peripheral edema  ABDOMEN: soft, nontender, no " hepatosplenomegaly, no masses and bowel sounds normal  MS: no gross musculoskeletal defects noted, no edema  SKIN: no suspicious lesions or rashes  NEURO: Normal strength and tone, mentation intact and speech normal  PSYCH: mentation appears normal, affect normal/bright    Recent Labs   Lab Test 06/19/24  0828 04/15/24  1358   HGB 12.3 12.3    289   * 135   POTASSIUM 4.8 4.8   CR 0.67 0.73   A1C 5.8*  --         Diagnostics  Recent Results (from the past 24 hour(s))   Basic metabolic panel    Collection Time: 08/05/24  1:15 PM   Result Value Ref Range    Sodium 133 (L) 135 - 145 mmol/L    Potassium 4.6 3.4 - 5.3 mmol/L    Chloride 98 98 - 107 mmol/L    Carbon Dioxide (CO2) 26 22 - 29 mmol/L    Anion Gap 9 7 - 15 mmol/L    Urea Nitrogen 20.3 8.0 - 23.0 mg/dL    Creatinine 0.68 0.51 - 0.95 mg/dL    GFR Estimate 90 >60 mL/min/1.73m2    Calcium 9.6 8.8 - 10.4 mg/dL    Glucose 93 70 - 99 mg/dL   CBC with platelets and differential    Collection Time: 08/05/24  1:15 PM   Result Value Ref Range    WBC Count 4.9 4.0 - 11.0 10e3/uL    RBC Count 3.99 3.80 - 5.20 10e6/uL    Hemoglobin 13.2 11.7 - 15.7 g/dL    Hematocrit 38.5 35.0 - 47.0 %    MCV 97 78 - 100 fL    MCH 33.1 (H) 26.5 - 33.0 pg    MCHC 34.3 31.5 - 36.5 g/dL    RDW 13.2 10.0 - 15.0 %    Platelet Count 257 150 - 450 10e3/uL    % Neutrophils 68 %    % Lymphocytes 19 %    % Monocytes 11 %    % Eosinophils 1 %    % Basophils 1 %    % Immature Granulocytes 0 %    NRBCs per 100 WBC 0 <1 /100    Absolute Neutrophils 3.4 1.6 - 8.3 10e3/uL    Absolute Lymphocytes 1.0 0.8 - 5.3 10e3/uL    Absolute Monocytes 0.5 0.0 - 1.3 10e3/uL    Absolute Eosinophils 0.0 0.0 - 0.7 10e3/uL    Absolute Basophils 0.0 0.0 - 0.2 10e3/uL    Absolute Immature Granulocytes 0.0 <=0.4 10e3/uL    Absolute NRBCs 0.0 10e3/uL   EKG 12-lead complete w/read - (Clinic Performed)    Collection Time: 08/05/24  1:23 PM   Result Value Ref Range    Systolic Blood Pressure  mmHg    Diastolic Blood  Pressure  mmHg    Ventricular Rate 60 BPM    Atrial Rate 60 BPM    MN Interval 200 ms    QRS Duration 104 ms     ms    QTc 380 ms    P Axis 73 degrees    R AXIS 72 degrees    T Axis 79 degrees    Interpretation ECG       Sinus rhythm  Nonspecific ST abnormality  Abnormal ECG  When compared with ECG of 15-Apr-2024 13:35,  No significant change was found        EKG: Normal Sinus Rhythm, normal axis, normal intervals, no acute ST/T changes c/w ischemia, no LVH by voltage criteria, nonspecific ST-T changes, unchanged from previous tracings    Revised Cardiac Risk Index (RCRI)  The patient has the following serious cardiovascular risks for perioperative complications:   - No serious cardiac risks = 0 points     RCRI Interpretation: 0 points: Class I (very low risk - 0.4% complication rate)         Signed Electronically by: Melyssa Encinas MD  A copy of this evaluation report is provided to the requesting physician.

## 2024-08-06 LAB
ATRIAL RATE - MUSE: 60 BPM
DIASTOLIC BLOOD PRESSURE - MUSE: NORMAL MMHG
INTERPRETATION ECG - MUSE: NORMAL
P AXIS - MUSE: 73 DEGREES
PR INTERVAL - MUSE: 200 MS
QRS DURATION - MUSE: 104 MS
QT - MUSE: 380 MS
QTC - MUSE: 380 MS
R AXIS - MUSE: 72 DEGREES
SYSTOLIC BLOOD PRESSURE - MUSE: NORMAL MMHG
T AXIS - MUSE: 79 DEGREES
VENTRICULAR RATE- MUSE: 60 BPM

## 2024-08-07 ENCOUNTER — ANESTHESIA EVENT (OUTPATIENT)
Dept: SURGERY | Facility: HOSPITAL | Age: 74
End: 2024-08-07
Payer: MEDICARE

## 2024-08-08 NOTE — ANESTHESIA PREPROCEDURE EVALUATION
Anesthesia Pre-Procedure Evaluation    Patient: Mihaela Jang   MRN: [id number] : 1950        Procedure : Procedure(s):  Removal Hardware Right Second toe Interphalangeal Joint Capsulotomy          Past Medical History:   Diagnosis Date     Atypical nevi      Chondrodermatitis nodularis helicis of left ear     Dr Shipley, [facility]     Chronic rhinitis     Dr Messina, [facility]'s allergy; negative skin testing; IgE mediated allergies; ENT - DC nasal steroid and antihistamine; saline rinses     Degenerative skin disorder     solar elastosis     Hallux rigidus 06/15/2000     Hyperlipidemia, unspecified hyperlipidemia type 09/15/2016     Laryngopharyngeal reflux disease     PPI     Malignant neoplasm of breast (female), unspecified site 03/10/2004     Notalgia paresthetica      Osteoarthritis 2011     Osteoporosis, unspecified 09/10/2001    Dr Moses; intermittent reclast;  Dr. Moses; repeat dexa after full 2 years Reclast     Other abnormal glucose 2013     Paresthesia     neck; notalgiaparesthetic; dr leahy & Dr Nevarez; neurontin     Personal history of malignant neoplasm of breast 2005     Rhinitis      Schamberg's purpura      Spinal stenosis of lumbar region       Past Surgical History:   Procedure Laterality Date     BONE MARROW BIOPSY      negative     COLONOSCOPY  2000     COLONOSCOPY  2013    DR Fu; repeat 5 years     COLONOSCOPY N/A 2018    Procedure: COLONOSCOPY;  COLONOSCOPY;  Surgeon: Ajit Uriarte MD;  [facility]     COLONOSCOPY N/A 2022    Procedure: COLONOSCOPY, WITH POLYPECTOMY AND BIOPSY;  Surgeon: Marcellus Jimenez MD;  Location: [facility]     DILATION AND CURETTAGE, OPERATIVE HYSTEROSCOPY, COMBINED N/A 2019    Procedure: EXAM UNDER ANESTHESIA , HYSTEROSCOPY;  Surgeon: Jovi Gabriel MD;  Location: [facility]     HEMORRHOIDECTOMY  1986     IR CONSULTATION FOR IR EXAM  2023     MASTECTOMY, BILATERAL  2004    right sided breast  cancer     RECONSTRUCT FOREFOOT WITH METATARSOPHALANGEAL (MTP) FUSION Right 6/16/2023    Procedure: Right First Metatarsophalangeal Joint Fusion;  Surgeon: Ac Parker DPM;  Location: [facility]     RELEASE TRIGGER FINGER Right 3/7/2018    Procedure: RELEASE TRIGGER FINGER;  RELEASE TRIGGER DIGIT RIGHT THUMB;  Surgeon: Jose Alfredo Brewster DO;  Location[facility]     RELEASE TRIGGER FINGER Left 3/15/2023    Procedure: Left Thumb Trigger Finger Release;  Surgeon: Rigoberto Olivera MD;  [facility]     RELEASE TRIGGER FINGER Right 5/17/2023    Procedure: Revision Right Thumb Trigger Release;  Surgeon: Rigoberto Olivera MD;  Location: [facility]     UPPER GI ENDOSCOPY       ZZHC COLONOSCOPY THRU STOMA WITH BIOPSY  08/25/2010    cancer screening, family h/o colon cancer; hyperplastic polyp      Allergies   Allergen Reactions     Chlorhexidine Gluconate Rash     Povidone Iodine Rash     Betadine      Soap Rash     Betadine       Social History     Tobacco Use     Smoking status: Never     Passive exposure: Never     Smokeless tobacco: Never   Substance Use Topics     Alcohol use: Yes     Alcohol/week: 0.0 standard drinks of alcohol     Comment: 1 glasso wine, weekly       Wt Readings from Last 1 Encounters:   08/05/24 50.9 kg (112 lb 4.8 oz)        Anesthesia Evaluation   Pt has had prior anesthetic. Type: General and MAC.    No history of anesthetic complications       ROS/MED HX  ENT/Pulmonary: Comment: Hypertrophic soft palate    (+)           allergic rhinitis,                             Neurologic: Comment: Notalgia paresthetica  RLS    (+)    peripheral neuropathy, - Paresthesia.                           Cardiovascular:     (+) Dyslipidemia - -   -  - -   Taking blood thinners Pt has received instructions: Instructions Given to patient: Stop asa 7-10 days prior.                      valvular problems/murmurs  ECHO 6/2024 - done for murmur - EF>70%; trace valve insufficiency.    Previous cardiac testing   Echo: Date: 6/2024  Results:  No pericardial effusion is present.  Global and regional left ventricular function is hyperkinetic with an EF >70%.  Global right ventricular function is normal.  Mild left atrial enlargement is present.  Trace to mild mitral insufficiency is present.  Trace to mild aortic insufficiency is present.  Aortic valve sclerosis is present.  No aortic stenosis is present.  Mild tricuspid insufficiency is present.    Stress Test:  Date: Results:    ECG Reviewed:  Date: 8/5/24 Results:  HR 60,   Sinus rhythm  Nonspecific ST abnormality  Abnormal ECG  When compared with ECG of 15-Apr-2024 13:35,  No significant change was found      Cath:  Date: Results:      METS/Exercise Tolerance:  Comment: >4  METS (6/16/23)   Hematologic: Comments: Neutropenia   Sodium 132 (6/19/24), 133 (8/5/24) - appears to have chronic mild hyponatremia    (+)      anemia,          Musculoskeletal: Comment: Spinal stenosis of lumbar region  Right foot pain - Right 1st and 2nd toes.    (+)  arthritis,             GI/Hepatic: Comment: Laryngopharyngeal reflux disease - neg GI/hepatic ROS  GERD: occasional, resolves spontaneously.   Renal/Genitourinary:  - neg Renal ROS     Endo: Comment: Prediabetes - A1C 5.8 (6/19/24)    (+)          thyroid problem, hypothyroidism synthroid,           Psychiatric/Substance Use:  - neg psychiatric ROS     Infectious Disease:  - neg infectious disease ROS     Malignancy:   (+) Malignancy, History of Breast.Breast CA Remission status post Surgery.      Other: Comment: Schamberg's purpura           Physical Exam    Airway      Comment: Small mouth opening, borderline 3 fingers    Mallampati: III   TM distance: > 3 FB   Neck ROM: full     Respiratory Devices and Support         Dental       (+) Minor Abnormalities - some fillings, tiny chips      Cardiovascular   cardiovascular exam normal       Rhythm and rate: regular and bradycardia     Pulmonary   pulmonary exam normal        breath sounds clear to  "auscultation       OUTSIDE LABS:  CBC:   Lab Results   Component Value Date    WBC 4.9 08/05/2024    WBC 4.8 06/19/2024    HGB 13.2 08/05/2024    HGB 12.3 06/19/2024    HCT 38.5 08/05/2024    HCT 36.3 06/19/2024     08/05/2024     06/19/2024     BMP:   Lab Results   Component Value Date     (L) 08/05/2024     (L) 06/19/2024    POTASSIUM 4.6 08/05/2024    POTASSIUM 4.8 06/19/2024    CHLORIDE 98 08/05/2024    CHLORIDE 99 06/19/2024    CO2 26 08/05/2024    CO2 24 06/19/2024    BUN 20.3 08/05/2024    BUN 15.6 06/19/2024    CR 0.68 08/05/2024    CR 0.67 06/19/2024    GLC 93 08/05/2024     (H) 06/19/2024     COAGS: No results found for: \"PTT\", \"INR\", \"FIBR\"  POC: No results found for: \"BGM\", \"HCG\", \"HCGS\"  HEPATIC:   Lab Results   Component Value Date    ALBUMIN 4.3 06/19/2024    PROTTOTAL 7.4 06/19/2024    ALT 17 06/19/2024    AST 23 06/19/2024    ALKPHOS 81 06/19/2024    BILITOTAL 0.3 06/19/2024     OTHER:   Lab Results   Component Value Date    A1C 5.8 (H) 06/19/2024    LEONID 9.6 08/05/2024    MAG 2.1 05/14/2020    LIPASE 208 09/21/2022    TSH 3.33 06/19/2024    T4 0.94 05/14/2021    CRP <2.9 09/21/2022    SED 17 11/24/2023       Anesthesia Plan    ASA Status:  3    NPO Status:  NPO Appropriate    Anesthesia Type: MAC.     - Reason for MAC: chronic cardiopulmonary disease, straight local not clinically adequate              Consents    Anesthesia Plan(s) and associated risks, benefits, and realistic alternatives discussed. Questions answered and patient/representative(s) expressed understanding.     - Discussed: Risks, Benefits and Alternatives for BOTH SEDATION and the PROCEDURE were discussed     - Discussed with:  Patient      - Extended Intubation/Ventilatory Support Discussed: No.      - Patient is DNR/DNI Status: No     Use of blood products discussed: No .     Postoperative Care            Comments:    Other Comments:  8/5 MICHAEL Linda CNP    I have " reviewed the pertinent notes and labs in the chart from the past 30 days. Any updates or changes from those notes are reflected in this note.     # Hyponatremia: Lowest Na = 133 mmol/L in last 30 days, will monitor as appropriate

## 2024-08-16 ENCOUNTER — HOSPITAL ENCOUNTER (OUTPATIENT)
Facility: HOSPITAL | Age: 74
Discharge: HOME | End: 2024-08-16
Attending: PODIATRIST | Admitting: PODIATRIST
Payer: MEDICARE

## 2024-08-16 ENCOUNTER — ANESTHESIA (OUTPATIENT)
Dept: SURGERY | Facility: HOSPITAL | Age: 74
End: 2024-08-16
Payer: MEDICARE

## 2024-08-16 VITALS
DIASTOLIC BLOOD PRESSURE: 67 MMHG | TEMPERATURE: 97.5 F | RESPIRATION RATE: 16 BRPM | SYSTOLIC BLOOD PRESSURE: 141 MMHG | WEIGHT: 109 LBS | HEART RATE: 53 BPM | BODY MASS INDEX: 20.58 KG/M2 | HEIGHT: 61 IN | OXYGEN SATURATION: 99 %

## 2024-08-16 DIAGNOSIS — G89.18 POST-OP PAIN: Primary | ICD-10-CM

## 2024-08-16 PROCEDURE — 258N000003 HC RX IP 258 OP 636: Performed by: NURSE PRACTITIONER

## 2024-08-16 PROCEDURE — 370N000017 HC ANESTHESIA TECHNICAL FEE, PER MIN: Performed by: PODIATRIST

## 2024-08-16 PROCEDURE — 250N000011 HC RX IP 250 OP 636: Performed by: NURSE ANESTHETIST, CERTIFIED REGISTERED

## 2024-08-16 PROCEDURE — 710N000012 HC RECOVERY PHASE 2, PER MINUTE: Performed by: PODIATRIST

## 2024-08-16 PROCEDURE — 28272 RELEASE OF TOE JOINT EACH: CPT | Mod: T6 | Performed by: PODIATRIST

## 2024-08-16 PROCEDURE — 272N000001 HC OR GENERAL SUPPLY STERILE: Performed by: PODIATRIST

## 2024-08-16 PROCEDURE — 20680 REMOVAL OF IMPLANT DEEP: CPT | Mod: RT | Performed by: PODIATRIST

## 2024-08-16 PROCEDURE — 250N000011 HC RX IP 250 OP 636: Performed by: PODIATRIST

## 2024-08-16 PROCEDURE — 360N000083 HC SURGERY LEVEL 3 W/ FLUORO, PER MIN: Performed by: PODIATRIST

## 2024-08-16 PROCEDURE — 250N000009 HC RX 250: Performed by: NURSE ANESTHETIST, CERTIFIED REGISTERED

## 2024-08-16 PROCEDURE — 20680 REMOVAL OF IMPLANT DEEP: CPT | Performed by: NURSE ANESTHETIST, CERTIFIED REGISTERED

## 2024-08-16 PROCEDURE — 99100 ANES PT EXTEME AGE<1 YR&>70: CPT | Performed by: NURSE ANESTHETIST, CERTIFIED REGISTERED

## 2024-08-16 PROCEDURE — 999N000141 HC STATISTIC PRE-PROCEDURE NURSING ASSESSMENT: Performed by: PODIATRIST

## 2024-08-16 PROCEDURE — 250N000009 HC RX 250: Performed by: PODIATRIST

## 2024-08-16 RX ORDER — CEFAZOLIN SODIUM/WATER 2 G/20 ML
2 SYRINGE (ML) INTRAVENOUS
Status: COMPLETED | OUTPATIENT
Start: 2024-08-16 | End: 2024-08-16

## 2024-08-16 RX ORDER — SODIUM CHLORIDE, SODIUM LACTATE, POTASSIUM CHLORIDE, CALCIUM CHLORIDE 600; 310; 30; 20 MG/100ML; MG/100ML; MG/100ML; MG/100ML
INJECTION, SOLUTION INTRAVENOUS CONTINUOUS
Status: DISCONTINUED | OUTPATIENT
Start: 2024-08-16 | End: 2024-08-16 | Stop reason: HOSPADM

## 2024-08-16 RX ORDER — ONDANSETRON 2 MG/ML
4 INJECTION INTRAMUSCULAR; INTRAVENOUS EVERY 30 MIN PRN
Status: DISCONTINUED | OUTPATIENT
Start: 2024-08-16 | End: 2024-08-16 | Stop reason: HOSPADM

## 2024-08-16 RX ORDER — LIDOCAINE HYDROCHLORIDE 20 MG/ML
INJECTION, SOLUTION INFILTRATION; PERINEURAL PRN
Status: DISCONTINUED | OUTPATIENT
Start: 2024-08-16 | End: 2024-08-16

## 2024-08-16 RX ORDER — HYDROMORPHONE HYDROCHLORIDE 1 MG/ML
0.2 INJECTION, SOLUTION INTRAMUSCULAR; INTRAVENOUS; SUBCUTANEOUS EVERY 5 MIN PRN
Status: DISCONTINUED | OUTPATIENT
Start: 2024-08-16 | End: 2024-08-16 | Stop reason: HOSPADM

## 2024-08-16 RX ORDER — IBUPROFEN 600 MG/1
600 TABLET, FILM COATED ORAL EVERY 6 HOURS
Qty: 12 TABLET | Refills: 0 | Status: SHIPPED | OUTPATIENT
Start: 2024-08-16 | End: 2024-08-19

## 2024-08-16 RX ORDER — LIDOCAINE 40 MG/G
CREAM TOPICAL
Status: DISCONTINUED | OUTPATIENT
Start: 2024-08-16 | End: 2024-08-16 | Stop reason: HOSPADM

## 2024-08-16 RX ORDER — ONDANSETRON 4 MG/1
4 TABLET, ORALLY DISINTEGRATING ORAL EVERY 30 MIN PRN
Status: DISCONTINUED | OUTPATIENT
Start: 2024-08-16 | End: 2024-08-16 | Stop reason: HOSPADM

## 2024-08-16 RX ORDER — FENTANYL CITRATE 50 UG/ML
INJECTION, SOLUTION INTRAMUSCULAR; INTRAVENOUS PRN
Status: DISCONTINUED | OUTPATIENT
Start: 2024-08-16 | End: 2024-08-16

## 2024-08-16 RX ORDER — ACETAMINOPHEN 500 MG
1000 TABLET ORAL EVERY 8 HOURS
Qty: 30 TABLET | Refills: 0 | Status: SHIPPED | OUTPATIENT
Start: 2024-08-16 | End: 2024-08-21

## 2024-08-16 RX ORDER — OXYCODONE HYDROCHLORIDE 5 MG/1
5 TABLET ORAL EVERY 6 HOURS PRN
Qty: 6 TABLET | Refills: 0 | Status: SHIPPED | OUTPATIENT
Start: 2024-08-16

## 2024-08-16 RX ORDER — OXYCODONE HYDROCHLORIDE 5 MG/1
5 TABLET ORAL
Status: DISCONTINUED | OUTPATIENT
Start: 2024-08-16 | End: 2024-08-16 | Stop reason: HOSPADM

## 2024-08-16 RX ORDER — BUPIVACAINE HYDROCHLORIDE 5 MG/ML
INJECTION, SOLUTION EPIDURAL; INTRACAUDAL
Status: DISCONTINUED
Start: 2024-08-16 | End: 2024-08-16 | Stop reason: HOSPADM

## 2024-08-16 RX ORDER — FENTANYL CITRATE 50 UG/ML
25 INJECTION, SOLUTION INTRAMUSCULAR; INTRAVENOUS EVERY 5 MIN PRN
Status: DISCONTINUED | OUTPATIENT
Start: 2024-08-16 | End: 2024-08-16 | Stop reason: HOSPADM

## 2024-08-16 RX ORDER — HYDROXYZINE HYDROCHLORIDE 10 MG/1
10 TABLET, FILM COATED ORAL
Status: DISCONTINUED | OUTPATIENT
Start: 2024-08-16 | End: 2024-08-16 | Stop reason: HOSPADM

## 2024-08-16 RX ORDER — LIDOCAINE HYDROCHLORIDE 10 MG/ML
INJECTION, SOLUTION EPIDURAL; INFILTRATION; INTRACAUDAL; PERINEURAL
Status: DISCONTINUED
Start: 2024-08-16 | End: 2024-08-16 | Stop reason: HOSPADM

## 2024-08-16 RX ORDER — HYDROMORPHONE HYDROCHLORIDE 1 MG/ML
0.4 INJECTION, SOLUTION INTRAMUSCULAR; INTRAVENOUS; SUBCUTANEOUS EVERY 5 MIN PRN
Status: DISCONTINUED | OUTPATIENT
Start: 2024-08-16 | End: 2024-08-16 | Stop reason: HOSPADM

## 2024-08-16 RX ORDER — PROPOFOL 10 MG/ML
INJECTION, EMULSION INTRAVENOUS PRN
Status: DISCONTINUED | OUTPATIENT
Start: 2024-08-16 | End: 2024-08-16

## 2024-08-16 RX ORDER — NALOXONE HYDROCHLORIDE 0.4 MG/ML
0.1 INJECTION, SOLUTION INTRAMUSCULAR; INTRAVENOUS; SUBCUTANEOUS
Status: DISCONTINUED | OUTPATIENT
Start: 2024-08-16 | End: 2024-08-16 | Stop reason: HOSPADM

## 2024-08-16 RX ORDER — PROPOFOL 10 MG/ML
INJECTION, EMULSION INTRAVENOUS CONTINUOUS PRN
Status: DISCONTINUED | OUTPATIENT
Start: 2024-08-16 | End: 2024-08-16

## 2024-08-16 RX ORDER — CEFAZOLIN SODIUM/WATER 2 G/20 ML
2 SYRINGE (ML) INTRAVENOUS SEE ADMIN INSTRUCTIONS
Status: DISCONTINUED | OUTPATIENT
Start: 2024-08-16 | End: 2024-08-16 | Stop reason: HOSPADM

## 2024-08-16 RX ORDER — FENTANYL CITRATE 50 UG/ML
50 INJECTION, SOLUTION INTRAMUSCULAR; INTRAVENOUS EVERY 5 MIN PRN
Status: DISCONTINUED | OUTPATIENT
Start: 2024-08-16 | End: 2024-08-16 | Stop reason: HOSPADM

## 2024-08-16 RX ORDER — DEXAMETHASONE SODIUM PHOSPHATE 10 MG/ML
4 INJECTION, SOLUTION INTRAMUSCULAR; INTRAVENOUS
Status: DISCONTINUED | OUTPATIENT
Start: 2024-08-16 | End: 2024-08-16 | Stop reason: HOSPADM

## 2024-08-16 RX ADMIN — SODIUM CHLORIDE, POTASSIUM CHLORIDE, SODIUM LACTATE AND CALCIUM CHLORIDE: 600; 310; 30; 20 INJECTION, SOLUTION INTRAVENOUS at 07:28

## 2024-08-16 RX ADMIN — PROPOFOL 50 MCG/KG/MIN: 10 INJECTION, EMULSION INTRAVENOUS at 07:37

## 2024-08-16 RX ADMIN — Medication 2 G: at 07:21

## 2024-08-16 RX ADMIN — FENTANYL CITRATE 50 MCG: 50 INJECTION INTRAMUSCULAR; INTRAVENOUS at 07:33

## 2024-08-16 RX ADMIN — PROPOFOL 50 MG: 10 INJECTION, EMULSION INTRAVENOUS at 07:34

## 2024-08-16 RX ADMIN — MIDAZOLAM 1 MG: 1 INJECTION INTRAMUSCULAR; INTRAVENOUS at 07:28

## 2024-08-16 RX ADMIN — LIDOCAINE HYDROCHLORIDE 40 MG: 20 INJECTION, SOLUTION INFILTRATION; PERINEURAL at 07:33

## 2024-08-16 ASSESSMENT — ACTIVITIES OF DAILY LIVING (ADL)
ADLS_ACUITY_SCORE: 18
ADLS_ACUITY_SCORE: 20

## 2024-08-16 NOTE — INTERVAL H&P NOTE
I have reviewed the surgical (or preoperative) H&P that is linked to this encounter, and examined the patient. There are no significant changes    Clinical Conditions Present on Arrival:  Clinically Significant Risk Factors Present on Admission         # Hyponatremia: Lowest Na = 133 mmol/L in last 30 days, will monitor as appropriate         # Drug Induced Platelet Defect: home medication list includes an antiplatelet medication

## 2024-08-16 NOTE — OP NOTE
SURGEON:  Ac Parker DPM.     ASSISTANT:  Ekta Mulligan PA-C.      A skilled first assistant was necessary due to technical complexity of the procedure and for the patient's safety.  The assistant helped with positioning, retraction, visualization of the operative field and moreover helped to complete the procedure in a technically safe and efficient manner.       PREOPERATIVE DIAGNOSIS:   Retained painful hardware right foot, right contracted second toe     POSTOPERATIVE DIAGNOSIS:   Same as preop     PROCEDURE:  1.  Right foot hardware removal  2.  Right second toe IPJ capsulotomy and flexor tenotomy     ANESTHESIA: MAC with local     HEMOSTASIS: None.     ESTIMATED BLOOD LOSS: Minimal    INDICATIONS FOR PROCEDURE: Patient has pain associated with previously implanted hardware and successful first MTPJ fusion.  She has a contracted second toe.  She has elected to address both of these surgically as discussed after discussion of surgery recovery associated risks and potential complications.    MATERIALS: None     PROCEDURE: Supine position utilized MAC anesthesia and a local block performed patient's right lower extremity prepped and draped in the usual sterile fashion.  I did not use exsanguination or tourniquet today.    Attention directed right first MPJ incision made at previous interval plate and screws as well as the compression screw identified and removed without incident flushed with saline deep tissue repaired with 2-0 Vicryl and skin with nonabsorbable suture.    Second procedure involved addressing the second toe.  Small transverse incision made to plantar sulcus of the toe the flexor tendon and IPJ capsule carefully released improving the contracture she had quite significant reduction in this contracture and improvement in deformity.  Flushed with saline skin repaired with nonabsorbable suture.  Placed in a sterile dressing and a postop shoe.  She will weight-bear as tolerated and follow-up as  scheduled.      Ac Parker DPM  8/16/2024

## 2024-08-16 NOTE — BRIEF OP NOTE
[facility]    Brief Operative Note    Pre-operative diagnosis: Pain from implanted hardware [T85.960D]  Post-operative diagnosis Same as pre-operative diagnosis    Procedure: Hardware Removal Right Foot, Right - Foot  Second Toe Interphalangeal Joint Capsulotomy right, Right - Toe    Surgeon: Surgeons and Role:     * Ac Parker DPM - Primary     * Ekta Mulligan PA-C - Assisting  Anesthesia: MAC with Local   Estimated Blood Loss: Minimal    Drains: None  Specimens: * No specimens in log *  Findings:   None.  Complications: None.  Implants: * No implants in log *

## 2024-08-16 NOTE — DISCHARGE INSTRUCTIONS
"You have a follow up appointment at [facility] in [place], with Dr Parker on   Friday, August 23rd at 2:00pm.    If you have any questions or concerns, please call the [facility] Triage Line at [phone].    IMPORTANT OBSERVATIONS  Check C-M-S of operative extremity every 4 hours while you are awake for the first 48 hours:    * C: color - should appear normal/pink   * M: motion - able to move fingers and toes.   * S: sensation - able to \"feel\": no numbness, tingling  If you experience changes in C-M-S and/or in severe pain, you may remove the elastic bandages and reapply ot - tight enough to provide support for the gauze dressing but loose enough to improve C-M-S. The gauze dressing should NOT be removed when rewrapping the elastic bandage.     "

## 2024-08-16 NOTE — ANESTHESIA POSTPROCEDURE EVALUATION
Patient: Mihaela Jang    Procedure: Procedure(s):  Hardware Removal Right Foot  Second Toe Interphalangeal Joint Capsulotomy right       Anesthesia Type:  MAC    Note:  Disposition: Outpatient   Postop Pain Control: Uneventful            Sign Out: Well controlled pain   PONV: No   Neuro/Psych: Uneventful            Sign Out: Acceptable/Baseline neuro status   Airway/Respiratory: Uneventful            Sign Out: Acceptable/Baseline resp. status   CV/Hemodynamics: Uneventful            Sign Out: Acceptable CV status; No obvious hypovolemia; No obvious fluid overload   Other NRE: NONE   DID A NON-ROUTINE EVENT OCCUR? No           Last vitals:  Vitals Value Taken Time   /67 08/16/24 0830   Temp 97.5  F (36.4  C) 08/16/24 0800   Pulse 53 08/16/24 0830   Resp 16 08/16/24 0830   SpO2 99 % 08/16/24 0835   Vitals shown include unfiled device data.    Electronically Signed By: MICHAEL Frey CRNA  August 16, 2024  10:12 AM

## 2024-08-16 NOTE — ANESTHESIA CARE TRANSFER NOTE
Patient: Mihaela Jang    Procedure: Procedure(s):  Hardware Removal Right Foot  Second Toe Interphalangeal Joint Capsulotomy right       Diagnosis: Pain from implanted hardware [T85.843I]  Diagnosis Additional Information: No value filed.    Anesthesia Type:   MAC     Note:    Oropharynx: oropharynx clear of all foreign objects and spontaneously breathing  Level of Consciousness: awake  Oxygen Supplementation: room air    Independent Airway: airway patency satisfactory and stable  Dentition: dentition unchanged  Vital Signs Stable: post-procedure vital signs reviewed and stable  Report to RN Given: handoff report given  Patient transferred to: Phase II    Handoff Report: Identifed the Patient, Identified the Reponsible Provider, Reviewed the pertinent medical history, Discussed the surgical course, Reviewed Intra-OP anesthesia mangement and issues during anesthesia, Set expectations for post-procedure period and Allowed opportunity for questions and acknowledgement of understanding  Vitals:  Vitals Value Taken Time   BP     Temp     Pulse     Resp     SpO2 98 % 08/16/24 0802   Vitals shown include unfiled device data.    Electronically Signed By: MICHAEL Bass CRNA  August 16, 2024  8:03 AM

## 2024-08-16 NOTE — OR NURSING
Patient and responsible adult given discharge instructions with no questions regarding instructions. Ashwini score 20/20. Pain level 0/10.  Discharged from unit via wheelchair. Patient discharged to home with friend.

## 2024-08-19 ENCOUNTER — TELEPHONE (OUTPATIENT)
Dept: FAMILY MEDICINE | Facility: OTHER | Age: 74
End: 2024-08-19

## 2024-08-19 NOTE — TELEPHONE ENCOUNTER
8:56 AM    Reason for Call: OVERBOOK    Patient is having the following symptoms: Hip pain / hit it on car door 1 week ago .    The patient is requesting an appointment for This week with Dr. Connors.    Was an appointment offered for this call? No  If yes : Appointment type              Date    Preferred method for responding to this message: Telephone Call  What is your phone number ?  [phone]     If we cannot reach you directly, may we leave a detailed response at the number you provided? Yes    Can this message wait until your PCP/provider returns, if unavailable today? Not applicable    Sara Guallpa

## 2024-08-19 NOTE — PROGRESS NOTES
Assessment & Plan     Hip pain, left  Bone bruise - iliac crest, hip pointer.  Mild symptoms now.  Discussed ice, rest, Tylenol/Ibuprofen.  Reassess if not resolving.  May be component of gait change due to ortho shoe on right foot, post op, acting as functional leg length discrepancy.  - XR Pelvis including Hip Left 2-3 Views(Clinic Performed); Future    Hypothyroidism, unspecified type  TSH up to date and at goal.  Just needs refills.  - levothyroxine (SYNTHROID/LEVOTHROID) 25 MCG tablet; Take 2 tablets 4 days a week and 1 tablet 3 days a week.            See Patient Instructions    Return if symptoms worsen or fail to improve.    Subjective   Mihaela is a 74 year old, presenting for the following health issues:  Musculoskeletal Problem        8/21/2024     8:23 AM   Additional Questions   Roomed by deyanira gracia   Accompanied by self         8/21/2024     8:23 AM   Patient Reported Additional Medications   Patient reports taking the following new medications none     History of Present Illness       Reason for visit:  Hip pain  Symptom onset:  1-2 weeks ago  Symptoms include:  Hip pain  Symptom intensity:  Moderate  Symptom progression:  Staying the same  Had these symptoms before:  No  What makes it worse:  Lots of walking or standing  What makes it better:  Sitting or laying down   She is taking medications regularly.       Musculoskeletal problem/pain - left  Duration: 7-10 days ago hit her hip with a car door  Description  Location: left hip   Intensity:  moderate  Accompanying signs and symptoms: none  History  Previous similar problem: no   Previous evaluation:  none  Precipitating or alleviating factors:  Trauma or overuse: no   Aggravating factors include: standing and walking  Therapies tried and outcome: acetaminophen and Ibuprofen  - at bedtime only  Sleeping well  Did not fall  Didn't do ice or heat  Didn't bruise  Pain worse when - not sure?  Ortho shoe on right foot      Review of  "Systems  Constitutional, HEENT, cardiovascular, pulmonary, gi and gu systems are negative, except as otherwise noted.      Objective    /70 (BP Location: Right arm, Patient Position: Sitting, Cuff Size: Adult Regular)   Pulse 56   Temp 97.9  F (36.6  C) (Tympanic)   Ht 1.549 m (5' 1\")   Wt 50.3 kg (110 lb 14.4 oz)   SpO2 98%   BMI 20.95 kg/m    Body mass index is 20.95 kg/m .  Physical Exam   GENERAL: alert, no distress, and frail  NECK: no adenopathy, no asymmetry, masses, or scars  RESP: lungs clear to auscultation - no rales, rhonchi or wheezes  CV: regular rate and rhythm, normal S1 S2, no S3 or S4, no murmur, click or rub, no peripheral edema  MS: normal muscle tone, no edema, and tenderness to palpation slight left hip pointer; but not worse with hip flexion or leg extension; full AROM lower legs and hips  SKIN: no suspicious lesions or rashes; no ecchymosis  NEURO: Normal strength and tone, mentation intact and speech normal  PSYCH: mentation appears normal, affect normal/bright    Xray - Reviewed and interpreted by me.  Appears negative.  Formal read pending.        Signed Electronically by: Melyssa Encinas MD    "

## 2024-08-21 ENCOUNTER — OFFICE VISIT (OUTPATIENT)
Dept: FAMILY MEDICINE | Facility: OTHER | Age: 74
End: 2024-08-21
Attending: FAMILY MEDICINE
Payer: MEDICARE

## 2024-08-21 ENCOUNTER — ANCILLARY PROCEDURE (OUTPATIENT)
Dept: GENERAL RADIOLOGY | Facility: OTHER | Age: 74
End: 2024-08-21
Attending: FAMILY MEDICINE
Payer: MEDICARE

## 2024-08-21 VITALS
SYSTOLIC BLOOD PRESSURE: 120 MMHG | TEMPERATURE: 97.9 F | BODY MASS INDEX: 20.94 KG/M2 | OXYGEN SATURATION: 98 % | HEIGHT: 61 IN | HEART RATE: 56 BPM | DIASTOLIC BLOOD PRESSURE: 70 MMHG | WEIGHT: 110.9 LBS

## 2024-08-21 DIAGNOSIS — M25.552 HIP PAIN, LEFT: Primary | ICD-10-CM

## 2024-08-21 DIAGNOSIS — E03.9 HYPOTHYROIDISM, UNSPECIFIED TYPE: ICD-10-CM

## 2024-08-21 DIAGNOSIS — M25.552 HIP PAIN, LEFT: ICD-10-CM

## 2024-08-21 PROCEDURE — G0463 HOSPITAL OUTPT CLINIC VISIT: HCPCS

## 2024-08-21 PROCEDURE — 99213 OFFICE O/P EST LOW 20 MIN: CPT | Mod: 24 | Performed by: FAMILY MEDICINE

## 2024-08-21 PROCEDURE — 73502 X-RAY EXAM HIP UNI 2-3 VIEWS: CPT | Mod: TC

## 2024-08-21 RX ORDER — LEVOTHYROXINE SODIUM 25 UG/1
TABLET ORAL
Qty: 153 TABLET | Refills: 3 | Status: SHIPPED | OUTPATIENT
Start: 2024-08-21

## 2024-08-23 ENCOUNTER — TELEPHONE (OUTPATIENT)
Dept: FAMILY MEDICINE | Facility: OTHER | Age: 74
End: 2024-08-23

## 2024-08-23 DIAGNOSIS — L29.9 SCALP ITCH: Primary | ICD-10-CM

## 2024-08-23 NOTE — TELEPHONE ENCOUNTER
Patient is calling requesting a referral to Dermatology. Patient states it is for itching in her scalp. Please call patient back with any questions. [phone]

## 2024-09-09 DIAGNOSIS — L29.89 XEROTIC ECZEMA: ICD-10-CM

## 2024-09-09 RX ORDER — TRIAMCINOLONE ACETONIDE 0.25 MG/G
CREAM TOPICAL
Qty: 454 G | Refills: 1 | Status: SHIPPED | OUTPATIENT
Start: 2024-09-09

## 2024-09-13 ENCOUNTER — TRANSFERRED RECORDS (OUTPATIENT)
Dept: HEALTH INFORMATION MANAGEMENT | Facility: CLINIC | Age: 74
End: 2024-09-13

## 2024-09-16 ENCOUNTER — OFFICE VISIT (OUTPATIENT)
Dept: PODIATRY | Facility: OTHER | Age: 74
End: 2024-09-16
Attending: PODIATRIST
Payer: COMMERCIAL

## 2024-09-16 ENCOUNTER — ANCILLARY PROCEDURE (OUTPATIENT)
Dept: GENERAL RADIOLOGY | Facility: OTHER | Age: 74
End: 2024-09-16
Attending: PODIATRIST
Payer: MEDICARE

## 2024-09-16 VITALS
TEMPERATURE: 97.5 F | DIASTOLIC BLOOD PRESSURE: 69 MMHG | OXYGEN SATURATION: 97 % | HEART RATE: 65 BPM | SYSTOLIC BLOOD PRESSURE: 130 MMHG

## 2024-09-16 DIAGNOSIS — M79.671 RIGHT FOOT PAIN: ICD-10-CM

## 2024-09-16 DIAGNOSIS — L60.3 ONYCHODYSTROPHY: ICD-10-CM

## 2024-09-16 DIAGNOSIS — M79.672 LEFT FOOT PAIN: ICD-10-CM

## 2024-09-16 DIAGNOSIS — G57.91 NEURITIS OF RIGHT FOOT: ICD-10-CM

## 2024-09-16 DIAGNOSIS — M79.672 LEFT FOOT PAIN: Primary | ICD-10-CM

## 2024-09-16 DIAGNOSIS — S92.355D CLOSED NONDISPLACED FRACTURE OF FIFTH METATARSAL BONE OF LEFT FOOT WITH ROUTINE HEALING, SUBSEQUENT ENCOUNTER: ICD-10-CM

## 2024-09-16 PROCEDURE — 99215 OFFICE O/P EST HI 40 MIN: CPT | Performed by: PODIATRIST

## 2024-09-16 PROCEDURE — 73630 X-RAY EXAM OF FOOT: CPT | Mod: TC,LT

## 2024-09-16 PROCEDURE — 73630 X-RAY EXAM OF FOOT: CPT | Mod: TC,RT

## 2024-09-16 PROCEDURE — G0463 HOSPITAL OUTPT CLINIC VISIT: HCPCS

## 2024-09-16 ASSESSMENT — PAIN SCALES - GENERAL: PAINLEVEL: MODERATE PAIN (5)

## 2024-09-16 NOTE — PROGRESS NOTES
Chief complaint: Patient presents with:  Musculoskeletal Problem: Right foot pain  Fracture: Left foot      History of Present Illness: This 74-year-old female is seen for LEFT foot and bilateral ankle pain.     She had bilateral sinus tarsi injections in July, 2024, and the pain has resolved. She still has mild discomfort in the LEFT lateral foot, but it is much better. She is wondering if the fracture site is healing.    She says today that she has pain on the RIGHT dorsal foot at her surgical site. She had a RIGHT 1st MTPJ fusion with Dr. Parker in June, 2023. She had hardware removal from the surgery in August, 2024. She has been unable to fit a regular shoe on her foot since the hardware removal because of pain in the dorsal foot. She is wondering what may be causing this pain. She is wearing a post-op shoe because tennis shoes are rubbing against the dorsal first ray. She has no pain at the surgical site when she wears a shoe that does not apply pressure on her dorsal foot. She also has no pain when walking in socks are while barefooted. Her pain is consistently from the pressure of the shoe over her incision.    Additionally, patient still has discomfort in the LEFT fifth metatarsal. It is greatly improved from the initial pain, but she still has an occasional zinger of pain and she is wondering if the fracture site is fully healed.    Lastly, patient previously had her LEFT hallux toenail removed. She has noticed regrowth of a small piece of the toenail. It sometimes catches on things and she is wondering how this can be treated.    No further pedal complaints today.      /69 (BP Location: Left arm, Patient Position: Sitting, Cuff Size: Adult Regular)   Pulse 65   Temp 97.5  F (36.4  C) (Tympanic)   SpO2 97%     Patient Active Problem List   Diagnosis    Laryngopharyngeal reflux (LPR)    Chronic rhinitis    ETD (eustachian tube dysfunction)    Osteoporosis    Personal history of malignant neoplasm  of breast    Early satiety    chronic neutropenia.    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    Hypothyroidism, unspecified type    Seborrheic dermatitis of scalp    Spinal stenosis of lumbar region    Restless legs syndrome (RLS)    Prediabetes       Past Surgical History:   Procedure Laterality Date    BONE MARROW BIOPSY      negative    COLONOSCOPY  07/2000    COLONOSCOPY  8/13/2013    DR Fu; repeat 5 years    COLONOSCOPY N/A 8/13/2018    Procedure: COLONOSCOPY;  COLONOSCOPY;  Surgeon: Ajit Uriarte MD;  Location: [facility]    COLONOSCOPY N/A 12/2/2022    Procedure: COLONOSCOPY, WITH POLYPECTOMY AND BIOPSY;  Surgeon: Marcellus Jimenez MD;  Location: [facility]    DILATION AND CURETTAGE, OPERATIVE HYSTEROSCOPY, COMBINED N/A 2/13/2019    Procedure: EXAM UNDER ANESTHESIA , HYSTEROSCOPY;  Surgeon: Jovi Gabriel MD;  Location: [facility]    HEMORRHOIDECTOMY  1986    IR CONSULTATION FOR IR EXAM  12/5/2023    MASTECTOMY, BILATERAL  03/01/2004    right sided breast cancer    RECONSTRUCT FOREFOOT WITH METATARSOPHALANGEAL (MTP) FUSION Right 6/16/2023    Procedure: Right First Metatarsophalangeal Joint Fusion;  Surgeon: Ac Parker DPM;  Location: [facility]    RELEASE TRIGGER FINGER Right 3/7/2018    Procedure: RELEASE TRIGGER FINGER;  RELEASE TRIGGER DIGIT RIGHT THUMB;  Surgeon: Jose Alfredo Brewster DO;  Location: [facility]    RELEASE TRIGGER FINGER Left 3/15/2023    Procedure: Left Thumb Trigger Finger Release;  Surgeon: Rigoberto Olivera MD;  [facility]    RELEASE TRIGGER FINGER Right 5/17/2023    Procedure: Revision Right Thumb Trigger Release;  Surgeon: Rigoberto Olivera MD;  [facility]    REMOVE HARDWARE FOOT Right 8/16/2024    Procedure: Hardware Removal Right Foot;  Surgeon: Ac Parker DPM;  [facility]    REPAIR HAMMER TOE  Right 8/16/2024    Procedure: Second Toe Interphalangeal Joint Capsulotomy right;  Surgeon: Ac Parker DPM;  Location: [facility]    UPPER GI ENDOSCOPY      [facility] COLONOSCOPY THRU STOMA WITH BIOPSY  08/25/2010    cancer screening, family h/o colon cancer; hyperplastic polyp       Current Outpatient Medications   Medication Sig Dispense Refill    aspirin 81 MG EC tablet Take 1 tablet (81 mg) by mouth 2 times daily (Patient taking differently: Take 81 mg by mouth daily.) 60 tablet 0    CALCIUM CITRATE 1,500 mg two times daily       cinnamon 500 MG CAPS Take 1 capsule by mouth 2 times daily      ferrous sulfate (SLO-FE) 142 (45 Fe) MG CR tablet Take 1 tablet (142 mg) by mouth daily 90 tablet 1    fish oil-omega-3 fatty acids 1000 MG capsule Take 1 g by mouth 2 times daily       gabapentin (NEURONTIN) 300 MG capsule Take 3 capsules (900 mg) by mouth at bedtime Take 3-300mg daily at bedtime 270 capsule 1    levothyroxine (SYNTHROID/LEVOTHROID) 25 MCG tablet Take 2 tablets 4 days a week and 1 tablet 3 days a week. 153 tablet 3    MAGNESIUM OXIDE PO Take 200 mg by mouth daily      Multiple Vitamins-Minerals (PRESERVISION AREDS PO) Take 1 tablet by mouth 2 times daily      oxyCODONE (ROXICODONE) 5 MG tablet Take 1 tablet (5 mg) by mouth every 6 hours as needed for moderate to severe pain 6 tablet 0    Polyethylene Glycol 3350 (MIRALAX PO) Use every other day      rOPINIRole (REQUIP) 1 MG tablet Take 4 tablets (4 mg) by mouth at bedtime 400 tablet 3    simvastatin (ZOCOR) 20 MG tablet Take 1 tablet (20 mg) by mouth at bedtime 90 tablet 3    triamcinolone (KENALOG) 0.025 % cream Apply to back once a day if the back is itchy. 454 g 1    Turmeric 500 MG CAPS Take 2 capsules by mouth daily       zinc 50 MG TABS Take 1 tablet by mouth daily        No current facility-administered medications for this visit.          Allergies   Allergen Reactions    Chlorhexidine Gluconate Rash    Povidone Iodine Rash     Betadine     Soap Rash      Betadine        Family History   Problem Relation Age of Onset    Dementia Mother         (cause of death)     High cholesterol Mother     Osteoarthritis Mother     C.A.D. Father         (cause of death)     Prostate Cancer Father     Breast Cancer Maternal Aunt 72    Cerebrovascular Disease Maternal Grandmother         CVA    Diabetes Maternal Grandmother        Social History     Socioeconomic History    Marital status: Single     Spouse name: None    Number of children: None    Years of education: None    Highest education level: None   Tobacco Use    Smoking status: Never    Smokeless tobacco: Never   Substance and Sexual Activity    Alcohol use: Yes     Alcohol/week: 0.0 standard drinks of alcohol     Comment: 1 glasso wine, weekly     Drug use: No    Sexual activity: Never   Other Topics Concern     Service No    Blood Transfusions Yes     Comment: Permits if needed    Caffeine Concern Yes     Comment: Tea, 2 cups daily     Exercise Yes     Comment: Walking, walks steps, daily     Seat Belt Yes    Parent/sibling w/ CABG, MI or angioplasty before 65F 55M? No       ROS: 10 point ROS neg other than the symptoms noted above in the HPI.  EXAM  Constitutional: healthy, alert and no distress    Psychiatric: mentation appears normal and affect normal/bright    VASCULAR:  -Dorsalis pedis pulse +2/4 b/l  -Posterior tibial pulse +2/4 b/l  NEURO:  -Light touch sensation intact to b/l plantar forefoot  DERM:  -Skin temperature, texture and turgor WNL b/l  -Small regrowth of nail spicule on the LEFT hallux lateral proximal toenail border  ---No open wounds, no drainage  ---No severe erythema, no ascending erythema, no calor, no purulence, no malodor, no other signs of infection.   -Cicatrix on the RIGHT dorsal first ray  ---Mildly firm skin and underlying tissues immediately on and around the incision consistent with scar tissue  MSK:  -Hypersensitivity with palpation to the RIGHT dorsal first ray cicatrix  -Mild  tenderness on palpation to the dorsal and lateral [id number] shaft of the LEFT fifth metatarsal   -No further Pain on palpation to the bilateral sinus tarsi    -RIGHT 1st MTPJ fused with no ROM    LEFT FOOT RADIOGRAPHS 07/15/2024  IMPRESSION:   Healing Fifth metatarsal fracture in anatomic alignment    CHAD AGUIRRE MD   RIGHT ANKLE RADIOGRAPHS 07/15/2024  IMPRESSION: Calcaneal spur    CHAD AGUIRRE MD   LEFT FOOT RADIOGRAPHS 09/16/2024  IMPRESSION: Healing distal fifth metatarsal fracture.  JOSEFINA LEVINE MD     RIGHT FOOT RADIOGRAPHS 09/16/2024  IMPRESSION: Fusion of the first metatarsal phalangeal joint since the prior exam. There is no hardware in place.  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 Treatment options discussed with no educational barriers noted.    LEFT fifth metatarsal fracture:  -LEFT foot radiographs ordered on 09/16/2024: There is a healing LEFT fifth distal metatarsal. The patient has been wearing a regular tennis shoe on the LEFT foot for the past two months. She has occasional pain, but her overall pain is controlled and there are no concerning changes to the fracture site. She is advised to continue wearing rigid tennis shoes when walking (both inside and outside the house) as long as she is still feeling occasional zinging pain. She will be called with the results.    ---------------------------------------------------------      -RIGHT foot neuritis:  -Patient's pain is specifically along the RIGHT first ray cicatrix. She has hypersensitivity upon touching the cicatrix. There is a mild firm palpation to the incision consistent  with scar tissue. This can entrap small nerves and hypersensitize this area of the foot. Reviewed patient's operative note from Dr. Parker on 08/16/2024 and there were no other procedures performed in this area of her foot other than hardware removal.  -Patient is advised to try the following:    RIGHT foot pain:    -Apply Voltaren gel on the top of the painful are of the right foot every morning. Massage the incision as tolerated and increase pressure with time as tolerated to massage the scar tissue on the top of the foot. (Note: Patient may also use the Voltaren gel on the LEFT foot and ice the LEFT foot).  -Apply topical Vitamin E on the top of the Right foot incision every evening. Massage the incision as tolerated and increase pressure with time as tolerated to massage the scar tissue on the top of the foot. ICE the foot for 10-20 minutes over a sock with a frozen bag of vegetables or an ice pack. (Also ice in the morning if you have time).    -If the pain does not improve, then she is advised to call the podiatry clinic.    -Custom inserts -- patient has CMOs. She has not worn them in a long time. She is again advised to slowly re-introduce them to the feet. Call the clinic for modifications if they are not comfortable.      ---------------------------------------------------    LEFT hallux nail spicule:   -Patient may file the nail spicule short or she may choose to have the toenail border removed with a repeat matrixectomy. She would like to have the nail border removed, but she would like to wait one month before having this done. Will follow-up in one month for a nail spicule matrixectomy.      Total time spent preparing to see the patient, review of chart, obtaining history and physical examination, review of treatment options, education, discussion with patient and documenting in Epic / EMR was 41 minutes. All time involved was spent on the day of service for the patient (the same day as the patient's  appointment).    -Patient in agreement with the above treatment plan and all of patient's questions were answered.      Return to clinic one month for LEFT hallux nail spicule matrixectomy and to evaluate RIGHT dorsal first cicatrix pain        Katerina Campos DPM

## 2024-09-16 NOTE — PATIENT INSTRUCTIONS
RIGHT foot pain:    -Apply Voltaren gel on the top of the painful are of the right foot every morning. Massage the incision as tolerated and increase pressure with time as tolerated to massage the scar tissue on the top of the foot. (Note: You may also use the Voltaren gel on the LEFT foot and ice the LEFT foot).  -Apply topical Vitamin E on the top of the Right foot incision every evening. Massage the incision as tolerated and increase pressure with time as tolerated to massage the scar tissue on the top of the foot. ICE the foot for 10-20 minutes over a sock with a frozen bag of vegetables or an ice pack. (Also ice in the morning if you have time).

## 2024-10-01 ENCOUNTER — TRANSFERRED RECORDS (OUTPATIENT)
Dept: HEALTH INFORMATION MANAGEMENT | Facility: CLINIC | Age: 74
End: 2024-10-01

## 2024-10-17 ENCOUNTER — TELEPHONE (OUTPATIENT)
Dept: FAMILY MEDICINE | Facility: OTHER | Age: 74
End: 2024-10-17

## 2024-10-17 DIAGNOSIS — R20.2 PARESTHESIA: ICD-10-CM

## 2024-10-17 RX ORDER — GABAPENTIN 300 MG/1
900 CAPSULE ORAL AT BEDTIME
Qty: 270 CAPSULE | Refills: 1 | Status: SHIPPED | OUTPATIENT
Start: 2024-10-17

## 2024-10-17 NOTE — TELEPHONE ENCOUNTER
Reason for call:  Medication      Have you contacted your pharmacy? Yes   If patient has contacted Pharmacy and it has been over 72hrs, continue to #2  Medication gabapentin  gabapentin (NEURONTIN) 300 MG capsule  Patient saw the dermatologist she recommended for Mihaela to take an additional gabapentin in the morning and now she takes 3 in the evening   What Pharmacy do you use? [facility] [place]  Phone: [phone]      (Please note that the turn-around-time for prescriptions is 72 business hours; I am sending your request at this time. SEND TO appropriate Care Team Pool )

## 2024-10-17 NOTE — TELEPHONE ENCOUNTER
gabapentin (NEURONTIN) 300 MG capsule       Last Written Prescription Date:  4/15/24  Last Fill Quantity: 270,   # refills: 1  Last Office Visit: 8/21/24  Future Office visit:    Next 5 appointments (look out 90 days)      Oct 21, 2024 1:30 PM  (Arrive by 1:15 PM)  Return Visit with Katerina Campos DPM  [facility] ([facility] ) [address] [place] [address] [id number] [phone]             Routing refill request to provider for review/approval because:

## 2024-10-21 ENCOUNTER — ANCILLARY PROCEDURE (OUTPATIENT)
Dept: GENERAL RADIOLOGY | Facility: OTHER | Age: 74
End: 2024-10-21
Attending: PODIATRIST
Payer: MEDICARE

## 2024-10-21 ENCOUNTER — OFFICE VISIT (OUTPATIENT)
Dept: PODIATRY | Facility: OTHER | Age: 74
End: 2024-10-21
Attending: PODIATRIST
Payer: MEDICARE

## 2024-10-21 VITALS
HEART RATE: 80 BPM | OXYGEN SATURATION: 96 % | SYSTOLIC BLOOD PRESSURE: 104 MMHG | DIASTOLIC BLOOD PRESSURE: 53 MMHG | TEMPERATURE: 97.5 F

## 2024-10-21 DIAGNOSIS — S92.355D CLOSED NONDISPLACED FRACTURE OF FIFTH METATARSAL BONE OF LEFT FOOT WITH ROUTINE HEALING, SUBSEQUENT ENCOUNTER: ICD-10-CM

## 2024-10-21 DIAGNOSIS — L60.3 ONYCHODYSTROPHY: Primary | ICD-10-CM

## 2024-10-21 PROCEDURE — 99213 OFFICE O/P EST LOW 20 MIN: CPT | Mod: 25 | Performed by: PODIATRIST

## 2024-10-21 PROCEDURE — 73630 X-RAY EXAM OF FOOT: CPT | Mod: TC,LT

## 2024-10-21 PROCEDURE — 11750 EXCISION NAIL&NAIL MATRIX: CPT | Performed by: PODIATRIST

## 2024-10-21 PROCEDURE — G0463 HOSPITAL OUTPT CLINIC VISIT: HCPCS | Mod: 25

## 2024-10-21 ASSESSMENT — PAIN SCALES - GENERAL: PAINLEVEL: NO PAIN (0)

## 2024-10-21 NOTE — PROGRESS NOTES
Chief complaint: Patient presents with:  Ingrown Toenail: Matrixectomy       History of Present Illness: This 74-year-old female is seen for LEFT foot pain and pain from a toenail spicule on the LEFT hallux.    She would like a matrixectomy of the LEFT hallux toenail spicule. The toenail was initially removed over five years ago and the spicule came back. It is giving her pain off and on and she would like the spicule removed. The spicule also catches on her socks. The nail is thick and causing her discomfort. She would like the toenail spicule permanently removed.    She is wearing a rigid tennis shoe on the LEFT foot. Her foot pain continues to improve, but she still sometimes has pain in the LEFT fifth metatarsal when walking. She is wondering if her fracture site is healed yet.    No further pedal complaints today.      /53 (BP Location: Left arm, Patient Position: Sitting, Cuff Size: Adult Regular)   Pulse 80   Temp 97.5  F (36.4  C) (Tympanic)   SpO2 96%     Patient Active Problem List   Diagnosis    Laryngopharyngeal reflux (LPR)    Chronic rhinitis    ETD (eustachian tube dysfunction)    Osteoporosis    Personal history of malignant neoplasm of breast    Early satiety    chronic neutropenia.    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    Hypothyroidism, unspecified type    Seborrheic dermatitis of scalp    Spinal stenosis of lumbar region    Restless legs syndrome (RLS)    Prediabetes       Past Surgical History:   Procedure Laterality Date    BONE MARROW BIOPSY      negative    COLONOSCOPY  07/2000    COLONOSCOPY  8/13/2013    DR Fu; repeat 5 years    COLONOSCOPY N/A 8/13/2018    Procedure: COLONOSCOPY;  COLONOSCOPY;  Surgeon: Ajit Uriarte MD;  Location: [facility]    COLONOSCOPY N/A 12/2/2022    Procedure:  COLONOSCOPY, WITH POLYPECTOMY AND BIOPSY;  Surgeon: Marcellus Jimenez MD;  Location: [facility]    DILATION AND CURETTAGE, OPERATIVE HYSTEROSCOPY, COMBINED N/A 2/13/2019    Procedure: EXAM UNDER ANESTHESIA , HYSTEROSCOPY;  Surgeon: Jovi Gabriel MD;  Location: [facility]    HEMORRHOIDECTOMY  1986    IR CONSULTATION FOR IR EXAM  12/5/2023    MASTECTOMY, BILATERAL  03/01/2004    right sided breast cancer    RECONSTRUCT FOREFOOT WITH METATARSOPHALANGEAL (MTP) FUSION Right 6/16/2023    Procedure: Right First Metatarsophalangeal Joint Fusion;  Surgeon: Ac Parker DPM;  Location: [facility]    RELEASE TRIGGER FINGER Right 3/7/2018    Procedure: RELEASE TRIGGER FINGER;  RELEASE TRIGGER DIGIT RIGHT THUMB;  Surgeon: Jose Alfredo Brewster DO;  Location: [facility]    RELEASE TRIGGER FINGER Left 3/15/2023    Procedure: Left Thumb Trigger Finger Release;  Surgeon: Rigoberto Olivera MD;  Location: [facility]    RELEASE TRIGGER FINGER Right 5/17/2023    Procedure: Revision Right Thumb Trigger Release;  Surgeon: Rigoberto Olivera MD;  [facility]: [facility]    REMOVE HARDWARE FOOT Right 8/16/2024    Procedure: Hardware Removal Right Foot;  Surgeon: Ac Parker DPM;  Location: [facility]    REPAIR HAMMER TOE Right 8/16/2024    Procedure: Second Toe Interphalangeal Joint Capsulotomy right;  Surgeon: Ac Parker DPM;  Location: [facility]    UPPER GI ENDOSCOPY      [facility] COLONOSCOPY THRU STOMA WITH BIOPSY  08/25/2010    cancer screening, family h/o colon cancer; hyperplastic polyp       Current Outpatient Medications   Medication Sig Dispense Refill    aspirin 81 MG EC tablet Take 1 tablet (81 mg) by mouth 2 times daily (Patient taking differently: Take 81 mg by mouth daily.) 60 tablet 0    CALCIUM CITRATE 1,500 mg two times daily       cinnamon 500 MG CAPS Take 1 capsule by mouth 2 times daily      ferrous sulfate (SLO-FE) 142 (45 Fe) MG CR tablet Take 1 tablet (142 mg) by mouth daily 90 tablet 1    fish oil-omega-3 fatty acids 1000 MG capsule Take 1 g by mouth 2 times  daily       gabapentin (NEURONTIN) 300 MG capsule Take 3 capsules (900 mg) by mouth at bedtime. Take 3-300mg daily at bedtime 270 capsule 1    levothyroxine (SYNTHROID/LEVOTHROID) 25 MCG tablet Take 2 tablets 4 days a week and 1 tablet 3 days a week. 153 tablet 3    MAGNESIUM OXIDE PO Take 200 mg by mouth daily      Multiple Vitamins-Minerals (PRESERVISION AREDS PO) Take 1 tablet by mouth 2 times daily      oxyCODONE (ROXICODONE) 5 MG tablet Take 1 tablet (5 mg) by mouth every 6 hours as needed for moderate to severe pain 6 tablet 0    Polyethylene Glycol 3350 (MIRALAX PO) Use every other day      rOPINIRole (REQUIP) 1 MG tablet Take 4 tablets (4 mg) by mouth at bedtime 400 tablet 3    simvastatin (ZOCOR) 20 MG tablet Take 1 tablet (20 mg) by mouth at bedtime 90 tablet 3    triamcinolone (KENALOG) 0.025 % cream Apply to back once a day if the back is itchy. 454 g 1    Turmeric 500 MG CAPS Take 2 capsules by mouth daily       zinc 50 MG TABS Take 1 tablet by mouth daily        No current facility-administered medications for this visit.          Allergies   Allergen Reactions    Chlorhexidine Gluconate Rash    Povidone Iodine Rash     Betadine     Soap Rash     Betadine        Family History   Problem Relation Age of Onset    Dementia Mother         (cause of death)     High cholesterol Mother     Osteoarthritis Mother     C.A.D. Father         (cause of death)     Prostate Cancer Father     Breast Cancer Maternal Aunt 72    Cerebrovascular Disease Maternal Grandmother         CVA    Diabetes Maternal Grandmother        Social History     Socioeconomic History    Marital status: Single     Spouse name: None    Number of children: None    Years of education: None    Highest education level: None   Tobacco Use    Smoking status: Never    Smokeless tobacco: Never   Substance and Sexual Activity    Alcohol use: Yes     Alcohol/week: 0.0 standard drinks of alcohol     Comment: 1 glasso wine, weekly     Drug use: No     Sexual activity: Never   Other Topics Concern     Service No    Blood Transfusions Yes     Comment: Permits if needed    Caffeine Concern Yes     Comment: Tea, 2 cups daily     Exercise Yes     Comment: Walking, walks steps, daily     Seat Belt Yes    Parent/sibling w/ CABG, MI or angioplasty before 65F 55M? No       ROS: 10 point ROS neg other than the symptoms noted above in the HPI.  EXAM  Constitutional: healthy, alert and no distress    Psychiatric: mentation appears normal and affect normal/bright    VASCULAR:  -Dorsalis pedis pulse +2/4 b/l  -Posterior tibial pulse +2/4 b/l  NEURO:  -Light touch sensation intact to b/l plantar forefoot  DERM:  -Skin temperature, texture and turgor WNL b/l  -Small regrowth of nail spicule on the LEFT hallux lateral proximal toenail border  ---No open wounds, no drainage  ---No severe erythema, no ascending erythema, no calor, no purulence, no malodor, no other signs of infection.   -Cicatrix on the RIGHT dorsal first ray  ---Mildly firm skin and underlying tissues immediately on and around the incision consistent with scar tissue  MSK:  -Mild tenderness on palpation to the dorsal and lateral [id number] shaft of the LEFT fifth metatarsal   -No further Pain on palpation to the bilateral sinus tarsi    -RIGHT 1st MTPJ fused with no ROM    LEFT FOOT RADIOGRAPHS 07/15/2024  IMPRESSION:   Healing Fifth metatarsal fracture in anatomic alignment    CHAD AGUIRRE MD   RIGHT ANKLE RADIOGRAPHS 07/15/2024  IMPRESSION: Calcaneal spur    CHAD AGUIRRE MD   LEFT FOOT RADIOGRAPHS 09/16/2024  IMPRESSION: Healing distal fifth metatarsal fracture.  JOSEFINA LEVINE MD     RIGHT FOOT RADIOGRAPHS 09/16/2024  IMPRESSION: Fusion of the first metatarsal phalangeal joint since the prior exam. There is no hardware in place.  JOSEFINA LEVINE MD   LEFT FOOT RADIOGRAPHS 10/21/2024  IMPRESSION: Fracture without significant change in position or alignment.  JOSEFINA LEVINE MD    ============================================================    ASSESSMENT:    (L60.3) Onychodystrophy  (primary encounter diagnosis)    (S92.355D) Closed nondisplaced fracture of fifth metatarsal bone of left foot with routine healing, subsequent encounter        PLAN:  -Patient evaluated and examined. Treatment options discussed with no educational barriers noted.    LEFT fifth metatarsal fracture:  -LEFT foot radiographs ordered on 10/21/2024: There is a healing LEFT fifth distal metatarsal. The fracture line is a little less distinct. There are no concerning changes to the fracture site. She may continue to slowly increase activities on the feet.  The patient has been wearing a regular tennis shoe on the LEFT foot since July, 2024. She has occasional pain and she is wondering if her fracture site is healed so she can increase activities.  -Compression socks: Advised patient to wear compression socks all day (especially when working) to decrease LOWER EXTREMITY edema b/l. Educated patient about applying the socks first thing in the morning before getting out of bed and removing them at night when the feet are elevated in bed.  -Ice the foot daily over a sock for 10-20 minutes.  -CMOs: Patient has CMOs but she has not been wearing them. She has an appointment to have them adjusted by the orthotist, Rupinder Rodriguez in early November, 2024. She is advised to start wearing her orthotics to see what causes her discomfort / what does or does not work with the orthotics.   ---Will have the orthotist, Rupinder Rodriguez add a mild forefoot valgus tilt to offload the distal LEFT fifth ray. Sent her a message on 10/21/2024 to add a milt valgus tilt to the LEFT CMO to offload the fifth metatarsal fracture site.  -Patient's pain continues to improve and her radiographs show the fracture site is almost healed. She may continue wearing new shoes. Will follow-up as needed if the foot becomes more painful.  -This is an acute,  uncomplicated illness/injury with OTC treatment options reviewed.     ---------------------------------------------------------    Ingrown toenail(s):  -Discussed nail procedure options and etiologies and treatments for ingrown toenails. Conservatively, patient could opt for a slant back today and keep monitoring the toe since there are no SOI. Discussed risks and benefits and healing course of a nail border avulsion vs. Matrixectomy including post procedure infection or a non-healing wound, both of which could lead to a life threatening infection or amputation of the foot or leg or a proximal amputation. Patient understands the risks and benefits and has decided to proceed with the following:    -Matrixectomy of left hallux toenail spicule: Written and verbal consent obtained after reviewing risks and benefits of the procedure. Patient understands that although phenol is used in attempt to prevent regrowth of the toenail, the nail can still grow back. There is also a risk of post procedure infection. A severe foot infection could lead to a proximal foot or leg amputation or loss of life, so the patient is advised to return to podiatry or the ED immediately if the patient notices any SOI. The patient is in agreement with this plan and wishes to proceed with the procedure. A time-out was performed to identify the correct patient, limb, digit and procedure.    An alcohol prep pad was applied to  to the base of the left hallux. The digit was injected with 5 mL of 1:1 of 2% Lidocaine plain and 0.5% marcaine plain in a ring block fashion at the base of the toe. Adequate local anesthesia was obtained. A ring tournicot was applied to the digit and a chloroprep was applied to the hallux. A freer was used to loosen the nail from the underlying nail bed. An English Anvil and a hemostat were then used to remove the nail spicule in total. A total of three applications of phenol were applied for 30 seconds per application. The  "digit was rinsed thoroughly with alcohol. The tournicot was removed from the toe and there was a prompt hyperemic response to the hallux. The wound was then dressed with an Silvadene, gauze and 1\" coban. The patient was educated on after procedure care including daily epsom salt soaks starting tomorrow followed by dressing of the toe with an antibiotic cream and a bandaid until the wound site on the toe stops draining (2-3 weeks). Provided education on how to look for signs of infection (redness, swelling, pain, purulence, fever, chills, nausea, vomiting) and the patient was instructed to return to the clinic or Emergency Department immediately if there are any signs of infection.    -Patient in agreement with the above treatment plan and all of patient's questions were answered.      Return to clinic as needed        Katerina Campos DPM  "

## 2024-10-21 NOTE — PATIENT INSTRUCTIONS
Nail procedure care:  -Start epsom salt soaks tomorrow. Soak the foot 1-2 times a day for 20 minutes.  -Apply an antibiotic cream, gauze and a bandaid over the toe.  -Do not apply a band aid directly over the nail procedure site without gauze or it will trap too much moisture beneath the band aid.  Note: Continue the epsom salt soaks and dressings every day until the wound is fully healed. You should not see drainage on the bandage for at least two days in a row. Call the clinic if the wound is still moderately draining two weeks after the procedure.    Keep the toe covered at all times until it is completely healed.  -You may develop a black scab over the nail bed--let this fall off on its own and don't pick at it.  -The toe may drain for 2-3 weeks. It is normal for it to have a clear drainage.    Watching for signs of infection:  If the toe has a thick, white pus coming from the procedure site or if the the toe becomes red, swollen, painful, or you begin to feel sick (fever/chills/nausea/vomitting), return to the podiatry clinic immediately or to the Emergency Department room if after hours.      Podiatry can be reached directly at [phone]. Leave a voicemail if there is not an answer.

## 2024-11-11 ENCOUNTER — OFFICE VISIT (OUTPATIENT)
Dept: FAMILY MEDICINE | Facility: OTHER | Age: 74
End: 2024-11-11
Attending: FAMILY MEDICINE
Payer: COMMERCIAL

## 2024-11-11 ENCOUNTER — TELEPHONE (OUTPATIENT)
Dept: FAMILY MEDICINE | Facility: OTHER | Age: 74
End: 2024-11-11

## 2024-11-11 VITALS
TEMPERATURE: 97.2 F | HEIGHT: 61 IN | SYSTOLIC BLOOD PRESSURE: 112 MMHG | WEIGHT: 115.7 LBS | DIASTOLIC BLOOD PRESSURE: 56 MMHG | OXYGEN SATURATION: 98 % | BODY MASS INDEX: 21.84 KG/M2 | HEART RATE: 64 BPM

## 2024-11-11 DIAGNOSIS — M48.062 SPINAL STENOSIS OF LUMBAR REGION WITH NEUROGENIC CLAUDICATION: ICD-10-CM

## 2024-11-11 DIAGNOSIS — Z98.890 HISTORY OF BACK SURGERY: ICD-10-CM

## 2024-11-11 DIAGNOSIS — M79.605 PAIN IN BOTH LOWER EXTREMITIES: Primary | ICD-10-CM

## 2024-11-11 DIAGNOSIS — M79.604 PAIN IN BOTH LOWER EXTREMITIES: Primary | ICD-10-CM

## 2024-11-11 DIAGNOSIS — G25.81 RESTLESS LEGS SYNDROME (RLS): ICD-10-CM

## 2024-11-11 LAB
BASOPHILS # BLD AUTO: 0 10E3/UL (ref 0–0.2)
BASOPHILS NFR BLD AUTO: 1 %
EOSINOPHIL # BLD AUTO: 0 10E3/UL (ref 0–0.7)
EOSINOPHIL NFR BLD AUTO: 1 %
ERYTHROCYTE [DISTWIDTH] IN BLOOD BY AUTOMATED COUNT: 12.7 % (ref 10–15)
HCT VFR BLD AUTO: 36.7 % (ref 35–47)
HGB BLD-MCNC: 12.3 G/DL (ref 11.7–15.7)
IMM GRANULOCYTES # BLD: 0 10E3/UL
IMM GRANULOCYTES NFR BLD: 0 %
IRON BINDING CAPACITY (ROCHE): 281 UG/DL (ref 240–430)
IRON SATN MFR SERPL: 25 % (ref 15–46)
IRON SERPL-MCNC: 69 UG/DL (ref 37–145)
LYMPHOCYTES # BLD AUTO: 1.1 10E3/UL (ref 0.8–5.3)
LYMPHOCYTES NFR BLD AUTO: 22 %
MCH RBC QN AUTO: 33 PG (ref 26.5–33)
MCHC RBC AUTO-ENTMCNC: 33.5 G/DL (ref 31.5–36.5)
MCV RBC AUTO: 98 FL (ref 78–100)
MONOCYTES # BLD AUTO: 0.6 10E3/UL (ref 0–1.3)
MONOCYTES NFR BLD AUTO: 12 %
NEUTROPHILS # BLD AUTO: 3.3 10E3/UL (ref 1.6–8.3)
NEUTROPHILS NFR BLD AUTO: 65 %
NRBC # BLD AUTO: 0 10E3/UL
NRBC BLD AUTO-RTO: 0 /100
PLATELET # BLD AUTO: 301 10E3/UL (ref 150–450)
RBC # BLD AUTO: 3.73 10E6/UL (ref 3.8–5.2)
WBC # BLD AUTO: 5 10E3/UL (ref 4–11)

## 2024-11-11 PROCEDURE — 83550 IRON BINDING TEST: CPT | Mod: ZL | Performed by: FAMILY MEDICINE

## 2024-11-11 PROCEDURE — 85004 AUTOMATED DIFF WBC COUNT: CPT | Mod: ZL | Performed by: FAMILY MEDICINE

## 2024-11-11 PROCEDURE — 36415 COLL VENOUS BLD VENIPUNCTURE: CPT | Mod: ZL | Performed by: FAMILY MEDICINE

## 2024-11-11 PROCEDURE — 82607 VITAMIN B-12: CPT | Mod: ZL | Performed by: FAMILY MEDICINE

## 2024-11-11 PROCEDURE — G0463 HOSPITAL OUTPT CLINIC VISIT: HCPCS

## 2024-11-11 PROCEDURE — 99214 OFFICE O/P EST MOD 30 MIN: CPT | Mod: 24 | Performed by: FAMILY MEDICINE

## 2024-11-11 ASSESSMENT — PAIN SCALES - GENERAL: PAINLEVEL_OUTOF10: MODERATE PAIN (5)

## 2024-11-11 NOTE — PROGRESS NOTES
Assessment & Plan     Pain in both lower extremities  Suspect claudication.  Surgery 5/2024 for this reason - but other levels evident on MRI 12/2023.  Update labs.  MRI with contrast.  Then depending on result - follow up with Dr Graf.  - Vitamin B12; Future  - CBC with platelets and differential; Future  - Iron and iron binding capacity; Future  - MR Lumbar Spine w/o & w Contrast; Future    Restless legs syndrome (RLS)  As above.  Stable with medication.  - Vitamin B12; Future  - CBC with platelets and differential; Future  - Iron and iron binding capacity; Future    History of back surgery  As abovd.  - Vitamin B12; Future  - CBC with platelets and differential; Future  - Iron and iron binding capacity; Future  - MR Lumbar Spine w/o & w Contrast; Future    Spinal stenosis of lumbar region with neurogenic claudication  As above.  - Vitamin B12; Future  - CBC with platelets and differential; Future  - Iron and iron binding capacity; Future  - MR Lumbar Spine w/o & w Contrast; Future        See Patient Instructions    Return if symptoms worsen or fail to improve.    Megha Chairez is a 74 year old, presenting for the following health issues:  Pain        11/11/2024     2:28 PM   Additional Questions   Roomed by Randi LINDSEY   Accompanied by self     History of Present Illness       Reason for visit:  Leg pain  Symptom onset:  1-2 weeks ago  Symptoms include:  Pain  Symptom intensity:  Moderate  Symptom progression:  Staying the same  Had these symptoms before:  No  What makes it worse:  Walking  What makes it better:  Pain medication   She is taking medications regularly.       Musculoskeletal problem/pain - leg pains  Duration: 2-3 weeks ago   Description  Location: back of both legs and both calf's - unsure if started unilateral - thinks both; symmetric; more so thighs than calves  Intensity:  moderate  Accompanying signs and symptoms: none  History  Previous similar problem: no   Previous evaluation:   "none  Precipitating or alleviating factors:  Trauma or overuse: unknown   Aggravating factors include: walking  Therapies tried and outcome: acetaminophen and Ibuprofen   No skin changes  No swelling  No weakness or numbness  More aching when walking  Left foot fracture - following with ortho - recovering - limits activity  No change in activity, fall, injury, change in medication, illness etc.  5/1/2024 - lumbar laminectomy; told pain in left leg was common x 1 year - Dr HINA Graf - add surgical history; L4/5      Requip takes care of RLS.  No abnormal bleeding  No fever.    Review of Systems  Constitutional, HEENT, cardiovascular, pulmonary, gi and gu systems are negative, except as otherwise noted.      Objective    /56 (BP Location: Right arm, Patient Position: Sitting, Cuff Size: Adult Regular)   Pulse 64   Temp 97.2  F (36.2  C) (Tympanic)   Ht 1.549 m (5' 1\")   Wt 52.5 kg (115 lb 11.2 oz)   SpO2 98%   BMI 21.86 kg/m    Body mass index is 21.86 kg/m .  Physical Exam   GENERAL: alert and no distress  NECK: no adenopathy, no asymmetry, masses, or scars  RESP: lungs clear to auscultation - no rales, rhonchi or wheezes  CV: regular rate and rhythm, normal S1 S2, no S3 or S4, no murmur, click or rub, no peripheral edema; pedal pulses 2+  ABDOMEN: soft, nontender, no hepatosplenomegaly, no masses and bowel sounds normal  MS: normal muscle tone, no edema, peripheral pulses normal, and symmetric reflexes; negative SLR bilaterally; strength 5/5 BLE  SKIN: no suspicious lesions or rashes  NEURO: Normal strength and tone, mentation intact and speech normal  PSYCH: mentation appears normal, affect normal/bright    Labs pending        Signed Electronically by: Melyssa Encinas MD    "

## 2024-11-11 NOTE — TELEPHONE ENCOUNTER
10:36 AM    Reason for Call: OVERBOOK    Patient is having the following symptoms: Patient is needing to be seen for pain back of legs going on for about 3 weeks. days.    The patient is requesting an appointment for Overbook with .    Was an appointment offered for this call? No  If yes : Appointment type              Date    Preferred method for responding to this message: Telephone Call  What is your phone number ?  [phone]    If we cannot reach you directly, may we leave a detailed response at the number you provided? Yes    Can this message wait until your PCP/provider returns, if unavailable today? Provider is in today    Tayler Fernandez

## 2024-11-13 LAB — VIT B12 SERPL-MCNC: 1524 PG/ML (ref 232–1245)

## 2024-11-14 ENCOUNTER — ALLIED HEALTH/NURSE VISIT (OUTPATIENT)
Dept: FAMILY MEDICINE | Facility: OTHER | Age: 74
End: 2024-11-14
Attending: FAMILY MEDICINE
Payer: MEDICARE

## 2024-11-14 ENCOUNTER — OFFICE VISIT (OUTPATIENT)
Dept: OTOLARYNGOLOGY | Facility: OTHER | Age: 74
End: 2024-11-14
Attending: PHYSICIAN ASSISTANT
Payer: MEDICARE

## 2024-11-14 VITALS
WEIGHT: 112 LBS | RESPIRATION RATE: 18 BRPM | BODY MASS INDEX: 21.14 KG/M2 | SYSTOLIC BLOOD PRESSURE: 104 MMHG | TEMPERATURE: 97.8 F | DIASTOLIC BLOOD PRESSURE: 58 MMHG | HEART RATE: 65 BPM | OXYGEN SATURATION: 98 % | HEIGHT: 61 IN

## 2024-11-14 DIAGNOSIS — Z23 ENCOUNTER FOR IMMUNIZATION: Primary | ICD-10-CM

## 2024-11-14 DIAGNOSIS — H61.23 EXCESSIVE CERUMEN IN BOTH EAR CANALS: Primary | ICD-10-CM

## 2024-11-14 PROCEDURE — G0008 ADMIN INFLUENZA VIRUS VAC: HCPCS

## 2024-11-14 PROCEDURE — G0463 HOSPITAL OUTPT CLINIC VISIT: HCPCS

## 2024-11-14 PROCEDURE — 91320 SARSCV2 VAC 30MCG TRS-SUC IM: CPT

## 2024-11-14 PROCEDURE — 92504 EAR MICROSCOPY EXAMINATION: CPT | Performed by: PHYSICIAN ASSISTANT

## 2024-11-14 ASSESSMENT — PAIN SCALES - GENERAL: PAINLEVEL_OUTOF10: NO PAIN (0)

## 2024-11-14 NOTE — LETTER
11/14/2024      Mihaela Jang  [address]  [facility] [address]      Dear Colleague,    Thank you for referring your patient, Mihaela Jang, to the [facility]. Please see a copy of my visit note below.    Chief Complaint   Patient presents with     Ear Problem     Ear Cleaning       Patient presents for ear cleaning and ear exam. patient was last seen in ENT on 9/5/23 for ear cleaning.  No concerns with her ears.   Denies otalgia, otorrhea  No vertigo or dizziness.      Reports her hearing is normal and equal.   Hearing has been good. Denies concerns with tinnitus.   She does use flonase PRN.   No concerns with nasal congestion.        Past Medical History:   Diagnosis Date     Atypical nevi      Chondrodermatitis nodularis helicis of left ear     Dr Shipley, [facility]     Chronic rhinitis     [facility] Messina[facility]; negative skin testing; IgE mediated allergies; ENT - DC nasal steroid and antihistamine; saline rinses     Degenerative skin disorder     solar elastosis     Hallux rigidus 06/15/2000     Hyperlipidemia, unspecified hyperlipidemia type 09/15/2016     Laryngopharyngeal reflux disease     PPI     Malignant neoplasm of breast (female), unspecified site 03/10/2004     Notalgia paresthetica      Osteoarthritis 07/20/2011     Osteoporosis, unspecified 09/10/2001    Dr Moses; intermittent reclast;  Dr. Moses; repeat dexa after full 2 years Reclast     Other abnormal glucose 12/20/2013     Paresthesia     neck; notalgiaparesthetic; dr leahy & Dr Nevarez; neurontin     Personal history of malignant neoplasm of breast 06/07/2005     Rhinitis      Schamberg's purpura      Spinal stenosis of lumbar region         Allergies   Allergen Reactions     Chlorhexidine Gluconate Rash     Povidone Iodine Rash     Betadine      Soap Rash     Betadine      Current Outpatient Medications   Medication Sig Dispense Refill     aspirin 81 MG EC tablet Take 1  "tablet (81 mg) by mouth 2 times daily 60 tablet 0     CALCIUM CITRATE 1,500 mg two times daily        cinnamon 500 MG CAPS Take 1 capsule by mouth 2 times daily       ferrous sulfate (SLO-FE) 142 (45 Fe) MG CR tablet Take 1 tablet (142 mg) by mouth daily 90 tablet 1     fish oil-omega-3 fatty acids 1000 MG capsule Take 1 g by mouth 2 times daily        gabapentin (NEURONTIN) 300 MG capsule Take 3 capsules (900 mg) by mouth at bedtime. Take 3-300mg daily at bedtime 270 capsule 1     levothyroxine (SYNTHROID/LEVOTHROID) 25 MCG tablet Take 2 tablets 4 days a week and 1 tablet 3 days a week. 153 tablet 3     MAGNESIUM OXIDE PO Take 200 mg by mouth daily       Multiple Vitamins-Minerals (PRESERVISION AREDS PO) Take 1 tablet by mouth 2 times daily       Polyethylene Glycol 3350 (MIRALAX PO) Use every other day       rOPINIRole (REQUIP) 1 MG tablet Take 4 tablets (4 mg) by mouth at bedtime 400 tablet 3     simvastatin (ZOCOR) 20 MG tablet Take 1 tablet (20 mg) by mouth at bedtime 90 tablet 3     triamcinolone (KENALOG) 0.025 % cream Apply to back once a day if the back is itchy. 454 g 1     Turmeric 500 MG CAPS Take 2 capsules by mouth daily        zinc 50 MG TABS Take 1 tablet by mouth daily        No current facility-administered medications for this visit.     ROS- SEE HPI  /58 (BP Location: Right arm, Patient Position: Sitting, Cuff Size: Adult Regular)   Pulse 65   Temp 97.8  F (36.6  C) (Tympanic)   Resp 18   Ht 1.549 m (5' 1\")   Wt 50.8 kg (112 lb)   SpO2 98%   BMI 21.16 kg/m      General - The patient is well nourished and well developed, and appears to have good nutritional status.  Alert and oriented to person and place, answers questions and cooperates with examination appropriately.   Head and Face - Normocephalic and atraumatic, with no gross asymmetry noted.  The facial nerve is intact, with strong symmetric movements.  Voice and Breathing - The patient was breathing comfortably without the use " of accessory muscles. There was no wheezing, stridor, or stertor.  The patients voice was clear and strong, and had appropriate pitch and quality.  Ears -examined under microscopy bilaterally using otologic speculum. Ears were cleaned with cupped forceps.   The external auditory canals are cerumen, the tympanic membranes are intact without effusion, retraction or mass.  Bony landmarks are intact.    Mouth - Examination of the oral cavity showed pink, healthy oral mucosa. No lesions or ulcerations noted.  The tongue was mobile and midline, and the dentition were in good condition.    Throat - The walls of the oropharynx were smooth, pink, moist, symmetric, and had no lesions or ulcerations.  The tonsillar pillars and soft palate were symmetric.  The uvula was midline on elevation.    Neck - Normal midline excursion of the laryngotracheal complex during swallowing.  Full range of motion on passive movement.  Palpation of the occipital, submental, submandibular, internal jugular chain, and supraclavicular nodes did not demonstrate any abnormal lymph nodes or masses.  Palpation of the thyroid was soft and smooth, with no nodules or goiter appreciated.  The trachea was mobile and midline.  Nose - External contour is symmetric, no gross deflection or scars.  Nasal mucosa is pink and moist with no abnormal mucus.  The septum was intact, turbinates of normal size and position.  No polyps, masses, or purulence noted on examination           ASSESSMENT/ PLAN:    ICD-10-CM    1. Excessive cerumen in both ear canals  H61.23           Ears were cleaned.  Reassured normal ear exam.  There is no effusion or retraction present.  Hearing protection.    May use vinegar rinses for as needed ear itching.  There is no improvement may consider trial of Elocon ointment.  Return in 1 year or sooner as needed      .  Sejal Gutierrez PA-C  ENT  [facility], [place]      Again, thank you for allowing me to participate in the care of your  patient.        Sincerely,        Sejal Gutierrez PA-C

## 2024-11-14 NOTE — PROGRESS NOTES
Prior to immunization administration, verified patients identity using patient s name and date of birth. Please see Immunization Activity for additional information.     Is the patient's temperature normal (100.5 or less)? Yes     Patient MEETS CRITERIA. PROCEED with vaccine administration.      Patient instructed to remain in clinic for 15 minutes afterwards, and to report any adverse reactions.      Link to Ancillary Visit Immunization Standing Orders SmartSet     Screening performed by Saadia Hollis LPN on 11/14/2024 at 1:11 PM.                  11/14/2024   COVID   Have you had myocarditis or pericarditis (inflammation of or around the heart muscle) after getting a COVID-19 vaccine? No   Have you had a serious reaction to a COVID vaccine or something in a COVID vaccine, like polyethylene glycol (PEG) or polysorbate? No   Have you had multisystem inflammatory syndrome from COVID-19 in the past 90 days? No   Have you received a bone marrow transplant within the previous 3 months? No            Patient MEETS CRITERIA. PROCEED with vaccine administration.            11/14/2024   INFLUENZA   Would you like to receive the flu shot or the nasal flu vaccine today? Flu Shot   Have you had a serious reaction to a flu vaccine or something in a flu vaccine? No   Have you had Guillain-Hunt syndrome within 6 weeks of getting a vaccine? No   Have you received a bone marrow transplant within the previous 6 months? No            Patient MEETS CRITERIA. PROCEED with vaccine administration.        Patient instructed to remain in clinic for 15 minutes afterwards, and to report any adverse reactions.      Link to Ancillary Visit Immunization Standing Orders SmartSet     Screening performed by Saadia Hollis LPN on 11/14/2024 at 1:13 PM.

## 2024-11-14 NOTE — PROGRESS NOTES
Chief Complaint   Patient presents with    Ear Problem     Ear Cleaning       Patient presents for ear cleaning and ear exam. patient was last seen in ENT on 9/5/23 for ear cleaning.  No concerns with her ears.   Denies otalgia, otorrhea  No vertigo or dizziness.      Reports her hearing is normal and equal.   Hearing has been good. Denies concerns with tinnitus.   She does use flonase PRN.   No concerns with nasal congestion.        Past Medical History:   Diagnosis Date    Atypical nevi     Chondrodermatitis nodularis helicis of left ear     Dr Shipley, [facility]    Chronic rhinitis     [facility]; negative skin testing; IgE mediated allergies; ENT - DC nasal steroid and antihistamine; saline rinses    Degenerative skin disorder     solar elastosis    Hallux rigidus 06/15/2000    Hyperlipidemia, unspecified hyperlipidemia type 09/15/2016    Laryngopharyngeal reflux disease     PPI    Malignant neoplasm of breast (female), unspecified site 03/10/2004    Notalgia paresthetica     Osteoarthritis 07/20/2011    Osteoporosis, unspecified 09/10/2001    Dr Moses; intermittent reclast;  Dr. Moses; repeat dexa after full 2 years Reclast    Other abnormal glucose 12/20/2013    Paresthesia     neck; notalgiaparesthetic; dr leahy & Dr Nevarez; neurontin    Personal history of malignant neoplasm of breast 06/07/2005    Rhinitis     Schamberg's purpura     Spinal stenosis of lumbar region         Allergies   Allergen Reactions    Chlorhexidine Gluconate Rash    Povidone Iodine Rash     Betadine     Soap Rash     Betadine      Current Outpatient Medications   Medication Sig Dispense Refill    aspirin 81 MG EC tablet Take 1 tablet (81 mg) by mouth 2 times daily 60 tablet 0    CALCIUM CITRATE 1,500 mg two times daily       cinnamon 500 MG CAPS Take 1 capsule by mouth 2 times daily      ferrous sulfate (SLO-FE) 142 (45 Fe) MG CR tablet Take 1 tablet (142 mg) by mouth daily 90 tablet 1    fish oil-omega-3 fatty  "acids 1000 MG capsule Take 1 g by mouth 2 times daily       gabapentin (NEURONTIN) 300 MG capsule Take 3 capsules (900 mg) by mouth at bedtime. Take 3-300mg daily at bedtime 270 capsule 1    levothyroxine (SYNTHROID/LEVOTHROID) 25 MCG tablet Take 2 tablets 4 days a week and 1 tablet 3 days a week. 153 tablet 3    MAGNESIUM OXIDE PO Take 200 mg by mouth daily      Multiple Vitamins-Minerals (PRESERVISION AREDS PO) Take 1 tablet by mouth 2 times daily      Polyethylene Glycol 3350 (MIRALAX PO) Use every other day      rOPINIRole (REQUIP) 1 MG tablet Take 4 tablets (4 mg) by mouth at bedtime 400 tablet 3    simvastatin (ZOCOR) 20 MG tablet Take 1 tablet (20 mg) by mouth at bedtime 90 tablet 3    triamcinolone (KENALOG) 0.025 % cream Apply to back once a day if the back is itchy. 454 g 1    Turmeric 500 MG CAPS Take 2 capsules by mouth daily       zinc 50 MG TABS Take 1 tablet by mouth daily        No current facility-administered medications for this visit.     ROS- SEE HPI  /58 (BP Location: Right arm, Patient Position: Sitting, Cuff Size: Adult Regular)   Pulse 65   Temp 97.8  F (36.6  C) (Tympanic)   Resp 18   Ht 1.549 m (5' 1\")   Wt 50.8 kg (112 lb)   SpO2 98%   BMI 21.16 kg/m      General - The patient is well nourished and well developed, and appears to have good nutritional status.  Alert and oriented to person and place, answers questions and cooperates with examination appropriately.   Head and Face - Normocephalic and atraumatic, with no gross asymmetry noted.  The facial nerve is intact, with strong symmetric movements.  Voice and Breathing - The patient was breathing comfortably without the use of accessory muscles. There was no wheezing, stridor, or stertor.  The patients voice was clear and strong, and had appropriate pitch and quality.  Ears -examined under microscopy bilaterally using otologic speculum. Ears were cleaned with cupped forceps.   The external auditory canals are cerumen, the " tympanic membranes are intact without effusion, retraction or mass.  Bony landmarks are intact.    Mouth - Examination of the oral cavity showed pink, healthy oral mucosa. No lesions or ulcerations noted.  The tongue was mobile and midline, and the dentition were in good condition.    Throat - The walls of the oropharynx were smooth, pink, moist, symmetric, and had no lesions or ulcerations.  The tonsillar pillars and soft palate were symmetric.  The uvula was midline on elevation.    Neck - Normal midline excursion of the laryngotracheal complex during swallowing.  Full range of motion on passive movement.  Palpation of the occipital, submental, submandibular, internal jugular chain, and supraclavicular nodes did not demonstrate any abnormal lymph nodes or masses.  Palpation of the thyroid was soft and smooth, with no nodules or goiter appreciated.  The trachea was mobile and midline.  Nose - External contour is symmetric, no gross deflection or scars.  Nasal mucosa is pink and moist with no abnormal mucus.  The septum was intact, turbinates of normal size and position.  No polyps, masses, or purulence noted on examination           ASSESSMENT/ PLAN:    ICD-10-CM    1. Excessive cerumen in both ear canals  H61.23           Ears were cleaned.  Reassured normal ear exam.  There is no effusion or retraction present.  Hearing protection.    May use vinegar rinses for as needed ear itching.  There is no improvement may consider trial of Elocon ointment.  Return in 1 year or sooner as needed      .  Sejal Gutierrez PA-C  ENT  [facility]

## 2024-11-14 NOTE — PATIENT INSTRUCTIONS
Ears were cleaned   Normal ear exam.   May use vinegar rinse for ear itching- Place 5 drops to affected as needed    Thank you for allowing KIM Cooney and our ENT team to participate in your care.  If your medications are too expensive, please give the nurse a call.  We can possibly change this medication.  If you have a scheduling or an appointment question please contact our Health Unit Coordinator at their direct line [phone].   ALL nursing questions or concerns can be directed to your ENT nurse, Olimpia at: [phone].      VINEGAR AND DISTILLED WATER IRRIGATION FOR EARS    This solution should only be used if the ears have noted drainage, or debris.    Using a sterile cup, mix 1/2 cup of white vinegar with 1/2 cup distilled water.  Irrigate the ear(s) using 15mL of solution every other day.  Completely dry the ear after irrigation, using a blow dryer on a low heat setting.       **If you are using ear drops, use the drops in the morning and the irrigation solution in the evening.

## 2024-11-26 ENCOUNTER — TELEPHONE (OUTPATIENT)
Dept: OTOLARYNGOLOGY | Facility: OTHER | Age: 74
End: 2024-11-26

## 2024-11-26 NOTE — TELEPHONE ENCOUNTER
"  Call received from Mihaela regarding her right ear.  She reported,  \"a constant rushing, feeling of air\". \"It feels blocked, plugged since last Thursday or Friday\".  \"It is bothersome and annoying.\"  She denies pain, no drainage noted and the itching has improved. Advised will speak with Provider and get back to her, Mihaela verbalized understanding.         "

## 2024-11-26 NOTE — TELEPHONE ENCOUNTER
Spoke to patient via phone regarding her right ear. Mihaela, has been advised per Sejal Gutierrez she may try a nasal spray (Flonase), as directed.  Instructions given for the valsalva maneuver.  If her hearing is affected she would need to schedule an audiogram.  Mihaela reported she does not feel hearing is different.  She is going to try the above as instructed and made an appointment in case her symptoms do not improve.  Appointment has been added to the waitlist.  She will call if symptoms go away or get worse.

## 2024-12-16 ENCOUNTER — TELEPHONE (OUTPATIENT)
Dept: FAMILY MEDICINE | Facility: OTHER | Age: 74
End: 2024-12-16

## 2024-12-16 DIAGNOSIS — R20.2 PARESTHESIA: ICD-10-CM

## 2024-12-16 DIAGNOSIS — F45.8 NEUROPATHIC PRURITUS: Primary | ICD-10-CM

## 2024-12-16 NOTE — TELEPHONE ENCOUNTER
3:17 PM    Reason for Call: Phone Call    Description: Mio nurse calling to speak to Dr Connors Care team they are looking increasing her  gabapentin to 1 in the morning and seeing if Dr Connors is comfortable in prescribing that?    Was an appointment offered for this call? No  If yes : Appointment type              Date    Preferred method for responding to this message: Telephone Call  What is your phone number ? [facility] [phone]    If we cannot reach you directly, may we leave a detailed response at the number you provided? Yes    Can this message wait until your PCP/provider returns, if available today? YES, No

## 2024-12-17 NOTE — TELEPHONE ENCOUNTER
Clarify please -   Current dose of Gabapentin is 900 mg at bedtime.  Is requesting 300 mg AM dose?  And for what symptom/diagnosis?   Prior Authorization Approval    Authorization Effective Date: 12/6/2020  Authorization Expiration Date: 1/5/2022  Medication: esomeprazole- APPROVED   Approved Dose/Quantity:  Reference #:     Insurance Company: [employer] - Phone [phone] Fax [phone]  Expected CoPay:       CoPay Card Available:      Foundation Assistance Needed:    Which Pharmacy is filling the prescription (Not needed for infusion/clinic administered[facility] [place], [place] - [address] AT [address]AL TO [facility]  Pharmacy Notified:    Patient Notified: Yes- informed pt to contact mailorder pharmacy.

## 2024-12-18 NOTE — TELEPHONE ENCOUNTER
Attempted to call Mio back with [facility]. No answer. Left message for nurse Mio to call clinic back. Direct line given.

## 2024-12-19 RX ORDER — GABAPENTIN 300 MG/1
CAPSULE ORAL
Qty: 360 CAPSULE | Refills: 2 | Status: SHIPPED | OUTPATIENT
Start: 2024-12-19

## 2024-12-19 NOTE — TELEPHONE ENCOUNTER
Received voicemail from Mio. Attempted to call. No answer. Left message for nurse to call clinic back. Direct line given.

## 2024-12-19 NOTE — TELEPHONE ENCOUNTER
Spoke with Mio from [facility]. Per nurse provider Angela states it would be 300 MG in the AM for Neuropathic Pruritus.

## 2024-12-23 ENCOUNTER — TELEPHONE (OUTPATIENT)
Dept: FAMILY MEDICINE | Facility: OTHER | Age: 74
End: 2024-12-23

## 2024-12-23 NOTE — TELEPHONE ENCOUNTER
10:04 AM    Reason for Call: Phone Call    Description: Patient is having trouble going to the bathroom the stools are very hard this has been going on for the last couple of days and is there anything Dr Connors would recommend?    Dr Connors is out of the office and she is wondering who would be able to help her out?     Was an appointment offered for this call? No  If yes : Appointment type              Date    Preferred method for responding to this message: Telephone Call  What is your phone number ? [phone]    If we cannot reach you directly, may we leave a detailed response at the number you provided? Yes    Can this message wait until your PCP/provider returns, if available today? YES, No

## 2024-12-23 NOTE — TELEPHONE ENCOUNTER
Spoke with patient, she reports that she was able to have a BM today. It was regular, but still a little hard. Currently doing Miralax daily in the morning. She is going to town today and has plans to  [facility] OTC and see if this helps.   She does not need anything additional at this time, she will call back if anything else is needed.

## 2025-01-07 ENCOUNTER — OFFICE VISIT (OUTPATIENT)
Dept: PODIATRY | Facility: OTHER | Age: 75
End: 2025-01-07
Attending: PODIATRIST
Payer: COMMERCIAL

## 2025-01-07 VITALS
OXYGEN SATURATION: 97 % | DIASTOLIC BLOOD PRESSURE: 78 MMHG | SYSTOLIC BLOOD PRESSURE: 146 MMHG | TEMPERATURE: 98.2 F | HEART RATE: 67 BPM

## 2025-01-07 DIAGNOSIS — M25.572 ACUTE LEFT ANKLE PAIN: ICD-10-CM

## 2025-01-07 DIAGNOSIS — M25.572 SINUS TARSI SYNDROME OF LEFT ANKLE: ICD-10-CM

## 2025-01-07 DIAGNOSIS — M76.72 PERONEAL TENDINITIS OF LEFT LOWER LEG: Primary | ICD-10-CM

## 2025-01-07 PROCEDURE — G0463 HOSPITAL OUTPT CLINIC VISIT: HCPCS

## 2025-01-07 RX ORDER — PREDNISONE 20 MG/1
TABLET ORAL
Qty: 12 TABLET | Refills: 0 | Status: SHIPPED | OUTPATIENT
Start: 2025-01-07 | End: 2025-01-16

## 2025-01-07 ASSESSMENT — PAIN SCALES - GENERAL: PAINLEVEL_OUTOF10: SEVERE PAIN (7)

## 2025-01-07 NOTE — PATIENT INSTRUCTIONS
Prednisone:   40mg for three days  30 mg for two days  20mg for two days  10mg for two days    CAM boot: Wear the long leg CAM boot on the LEFT foot/leg for one week. Slowly transition out of the boot, but only if tolerated.    -Stability Shoe Gear: This involves wearing a solid tennis shoe that bends at the toe, but has a solid midfoot portion of the shoe that does not bend or twist in half, and a rigid heel contour.   -----Ramírez, Asics, and New Balance are a few brands that have several types of stability tennis shoes. However, these brands also carry lightweight shoes that do not meet the above criteria, so look for a stability tennis shoe.  -----Ramírez tend to have a wider toe box if you have difficulty finding wide enough shoes for your feet.   -----Any brand of can be worn as long as it meets the above three criteria.  -Compression socks: Advised to wear compression socks all day (especially when working) to decrease LOWER EXTREMITY swelling in both feet and legs. Apply the socks first thing in the morning before getting out of bed and remove them at night when the feet are elevated in bed. (15-20mmHg amount of compression)  -Stretching: Stretch the calf muscles to increase flexibility of the calf muscles. If possible, aim to stretch the calf muscles for a combined total of one hour per day.  -Icing: Ice the painful area of the foot minimally once a day for ten minutes per foot (can be a frozen water bottle if pain is on the bottom surface of the foot). Ice after any extended amount of time on your feet.  -Consider supportive sandals for around the house (such as Hudson, Vionics, Birkenstocks, Keens, Merrells, or Oofos sandals)

## 2025-01-07 NOTE — PROGRESS NOTES
Chief complaint: Patient presents with:  Musculoskeletal Problem: Chronic ankle pain        History of Present Illness: This 74-year-old female is seen for LEFT ankle pain. She thinks the pain has been ongoing since around mid Decmeber, 2024. She presents in winter boots. She tries to wear supportive shoes during the day. She wears slip-in  shoes as well as laced-shoes during the day. She does not recall any history of injury or change in activity to cause the LEFT lateral ankle pain. It is causing her to limp by the end of the day and it is affecting her daily activities.    No further pedal complaints today.        BP (!) 146/78 (BP Location: Left arm, Patient Position: Sitting, Cuff Size: Adult Regular)   Pulse 67   Temp 98.2  F (36.8  C) (Tympanic)   SpO2 97%     Patient Active Problem List   Diagnosis    Laryngopharyngeal reflux (LPR)    Chronic rhinitis    ETD (eustachian tube dysfunction)    Osteoporosis    Personal history of malignant neoplasm of breast    Early satiety    chronic neutropenia.    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    Hypothyroidism, unspecified type    Seborrheic dermatitis of scalp    Spinal stenosis of lumbar region    Restless legs syndrome (RLS)    Prediabetes       Past Surgical History:   Procedure Laterality Date    BACK SURGERY  05/2024    Dr Graf; L4/5 laminectomy; spinal stenosis; claudication    BONE MARROW BIOPSY      negative    COLONOSCOPY  07/2000    COLONOSCOPY  08/13/2013    DR Fu; repeat 5 years    COLONOSCOPY N/A 08/13/2018    Procedure: COLONOSCOPY;  COLONOSCOPY;  Surgeon: Ajit Uriarte MD;  [facility]    COLONOSCOPY N/A 12/02/2022    Procedure: COLONOSCOPY, WITH POLYPECTOMY AND BIOPSY;  Surgeon: Marcellus Jimenez MD;  Location: [facility]    DILATION AND  CURETTAGE, OPERATIVE HYSTEROSCOPY, COMBINED N/A 02/13/2019    Procedure: EXAM UNDER ANESTHESIA , HYSTEROSCOPY;  Surgeon: Jovi Gabriel MD;  [facility]: [facility]    HEMORRHOIDECTOMY  1986    IR CONSULTATION FOR IR EXAM  12/05/2023    MASTECTOMY, BILATERAL  03/01/2004    right sided breast cancer    RECONSTRUCT FOREFOOT WITH METATARSOPHALANGEAL (MTP) FUSION Right 06/16/2023    Procedure: Right First Metatarsophalangeal Joint Fusion;  Surgeon: Ac Parker DPM;  [facility]: [facility]    RELEASE TRIGGER FINGER Right 03/07/2018    Procedure: RELEASE TRIGGER FINGER;  RELEASE TRIGGER DIGIT RIGHT THUMB;  Surgeon: Jose Alfredo Brewster DO;  [facility]    RELEASE TRIGGER FINGER Left 03/15/2023    Procedure: Left Thumb Trigger Finger Release;  Surgeon: Rigoberto Olivera MD;  [facility]    RELEASE TRIGGER FINGER Right 05/17/2023    Procedure: Revision Right Thumb Trigger Release;  Surgeon: Rigoberto Olivera MD;  [facility]    REMOVE HARDWARE FOOT Right 08/16/2024    Procedure: Hardware Removal Right Foot;  Surgeon: Ac Parker DPM;  [facility]    REPAIR HAMMER TOE Right 08/16/2024    Procedure: Second Toe Interphalangeal Joint Capsulotomy right;  Surgeon: Ac Parker DPM;  Location: [facility]    UPPER GI ENDOSCOPY      [facility] COLONOSCOPY THRU STOMA WITH BIOPSY  08/25/2010    cancer screening, family h/o colon cancer; hyperplastic polyp       Current Outpatient Medications   Medication Sig Dispense Refill    aspirin 81 MG EC tablet Take 1 tablet (81 mg) by mouth 2 times daily 60 tablet 0    CALCIUM CITRATE 1,500 mg two times daily       cinnamon 500 MG CAPS Take 1 capsule by mouth 2 times daily      ferrous sulfate (SLO-FE) 142 (45 Fe) MG CR tablet Take 1 tablet (142 mg) by mouth daily 90 tablet 1    fish oil-omega-3 fatty acids 1000 MG capsule Take 1 g by mouth 2 times daily       gabapentin (NEURONTIN) 300 MG capsule Take 300 mg AM and 900 mg  capsule 2    levothyroxine (SYNTHROID/LEVOTHROID) 25 MCG tablet Take 2  tablets 4 days a week and 1 tablet 3 days a week. 153 tablet 3    MAGNESIUM OXIDE PO Take 200 mg by mouth daily      Multiple Vitamins-Minerals (PRESERVISION AREDS PO) Take 1 tablet by mouth 2 times daily      Polyethylene Glycol 3350 (MIRALAX PO) Use every other day      rOPINIRole (REQUIP) 1 MG tablet Take 4 tablets (4 mg) by mouth at bedtime 400 tablet 3    simvastatin (ZOCOR) 20 MG tablet Take 1 tablet (20 mg) by mouth at bedtime 90 tablet 3    triamcinolone (KENALOG) 0.025 % cream Apply to back once a day if the back is itchy. 454 g 1    Turmeric 500 MG CAPS Take 2 capsules by mouth daily       zinc 50 MG TABS Take 1 tablet by mouth daily        No current facility-administered medications for this visit.          Allergies   Allergen Reactions    Chlorhexidine Gluconate Rash    Povidone Iodine Rash     Betadine     Soap Rash     Betadine        Family History   Problem Relation Age of Onset    Dementia Mother         (cause of death)     High cholesterol Mother     Osteoarthritis Mother     C.A.D. Father         (cause of death)     Prostate Cancer Father     Breast Cancer Maternal Aunt 72    Cerebrovascular Disease Maternal Grandmother         CVA    Diabetes Maternal Grandmother        Social History     Socioeconomic History    Marital status: Single     Spouse name: None    Number of children: None    Years of education: None    Highest education level: None   Tobacco Use    Smoking status: Never    Smokeless tobacco: Never   Substance and Sexual Activity    Alcohol use: Yes     Alcohol/week: 0.0 standard drinks of alcohol     Comment: 1 glasso wine, weekly     Drug use: No    Sexual activity: Never   Other Topics Concern     Service No    Blood Transfusions Yes     Comment: Permits if needed    Caffeine Concern Yes     Comment: Tea, 2 cups daily     Exercise Yes     Comment: Walking, walks steps, daily     Seat Belt Yes    Parent/sibling w/ CABG, MI or angioplasty before 65F 55M? No       ROS: 10  point ROS neg other than the symptoms noted above in the HPI.  EXAM  Constitutional: healthy, alert and no distress    Psychiatric: mentation appears normal and affect normal/bright    LEFT FOOT FOCUSED    VASCULAR:  -Dorsalis pedis pulse +2/4   -Posterior tibial pulse +2/4   NEURO:  -Light touch sensation intact to plantar forefoot  DERM:  -Skin temperature, texture and turgor within normal limits   -Cicatrix on the RIGHT dorsal first ray  ---Mildly firm skin and underlying tissues immediately on and around the incision consistent with scar tissue  MSK:  -Pain on palpation to the LEFT sinus tarsi  -Pain on palpation to the LEFT lateral ankle gutter  -Pain on palpation to the peroneal tendons immediately posterior to the lateral malleolus of the LEFT ankle  -No further Pain on palpation to the bilateral sinus tarsi    -RIGHT 1st MTPJ fused with no ROM  ============================================================    ASSESSMENT:    (M76.72) Peroneal tendinitis of left lower leg  (primary encounter diagnosis)    (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 Treatment options discussed with no educational barriers noted.  Peroneal tendonitis, LEFT sinus tarsi and LEFT lateral ankle gutter pain:  -Discussed peroneal tendon pain including potential etiologies and treatment options. Patient's pain was likely started due to a combination of factors that can include but are not limited to worn or improper shoe gear, trauma, overuse, sudden change in shoe gear, change in activity, imbalanced biomechanics (such as pes cavus, pes planus, gastroc equinus deformity of the calf muscles).   ---Surgery can be considered if conservative treatment options are fully exhausted and are not decreasing the pain, but conservative measures often resolve this pain with time.  ---Discussed conservative treatment options including compression socks, icing, elevating,  resting, orthotics, physical therapy, change in shoe gear (including proper shoe gear around the house). At this time, patient would like to proceed with the below treatment options:    Prednisone ordered:   40mg for three days  30 mg for two days  20mg for two days  10mg for two days    CAM boot: Wear the long leg CAM boot on the LEFT foot/leg for one week. Slowly transition out of the boot, but only if tolerated.  ---Patient has a long leg and short leg CAM boot at home from previous foot surgeries. She is advised to try the long leg CAM boot.    -Stability Shoe Gear: This involves wearing a solid tennis shoe that bends at the toe, but has a solid midfoot portion of the shoe that does not bend or twist in half, and a rigid heel contour.   -----Ram[employer], Asics, and New Balance are a few brands that have several types of stability tennis shoes. However, these brands also carry lightweight shoes that do not meet the above criteria, so look for a stability tennis shoe.  -----Ram[employer]rez tend to have a wider toe box if you have difficulty finding wide enough shoes for your feet.   -----Any brand of can be worn as long as it meets the above three criteria.  -Compression socks: Advised to wear compression socks all day (especially when working) to decrease LOWER EXTREMITY swelling in both feet and legs. Apply the socks first thing in the morning before getting out of bed and remove them at night when the feet are elevated in bed. (15-20mmHg amount of compression). She tried and ankle sleeve and she thinks it made her pain worse. She may instead try the compression socks.   -Stretching: Stretch the calf muscles to increase flexibility of the calf muscles. If possible, aim to stretch the calf muscles for a combined total of one hour per day.  -Icing: Ice the painful area of the foot minimally once a day for ten minutes per foot (can be a frozen water bottle if pain is on the bottom surface of the foot). Ice after any extended  amount of time on your feet.  -Consider supportive sandals for around the house (such as [place], Vionics, Birkenstocks, Keens, Merrells, or Oofos sandals)    -If pain does not improve in the next 2-3 weeks, will add physical therapy.    -This is an acute, uncomplicated illness/injury with OTC treatment options reviewed.     -Patient in agreement with the above treatment plan and all of patient's questions were answered.      Return to clinic 2-3 weeks to evaluate LEFT lateral ankle pain        Katerina Campos DPM

## 2025-01-13 ENCOUNTER — TELEPHONE (OUTPATIENT)
Dept: OTOLARYNGOLOGY | Facility: OTHER | Age: 75
End: 2025-01-13

## 2025-01-13 NOTE — TELEPHONE ENCOUNTER
Voicemail received from patient.  She would like to cancel her appointment with Sejal Gutierrez 1/15/25.  Mihaela reported she has been using Flonase and it has been working so she does not need the appointment at this time.  Appointment cancelled.

## 2025-01-23 ENCOUNTER — TRANSFERRED RECORDS (OUTPATIENT)
Dept: HEALTH INFORMATION MANAGEMENT | Facility: CLINIC | Age: 75
End: 2025-01-23

## 2025-01-29 ENCOUNTER — OFFICE VISIT (OUTPATIENT)
Dept: PODIATRY | Facility: OTHER | Age: 75
End: 2025-01-29
Attending: PODIATRIST
Payer: COMMERCIAL

## 2025-01-29 VITALS
SYSTOLIC BLOOD PRESSURE: 155 MMHG | DIASTOLIC BLOOD PRESSURE: 90 MMHG | TEMPERATURE: 97.4 F | OXYGEN SATURATION: 98 % | HEART RATE: 62 BPM

## 2025-01-29 DIAGNOSIS — M25.572 SINUS TARSI SYNDROME OF LEFT ANKLE: Primary | ICD-10-CM

## 2025-01-29 DIAGNOSIS — M76.72 PERONEAL TENDINITIS OF LEFT LOWER LEG: ICD-10-CM

## 2025-01-29 PROCEDURE — 20605 DRAIN/INJ JOINT/BURSA W/O US: CPT | Performed by: PODIATRIST

## 2025-01-29 PROCEDURE — 250N000011 HC RX IP 250 OP 636: Mod: JZ | Performed by: PODIATRIST

## 2025-01-29 PROCEDURE — G0463 HOSPITAL OUTPT CLINIC VISIT: HCPCS | Mod: 25

## 2025-01-29 RX ORDER — TRIAMCINOLONE ACETONIDE 40 MG/ML
40 INJECTION, SUSPENSION INTRA-ARTICULAR; INTRAMUSCULAR ONCE
Status: COMPLETED | OUTPATIENT
Start: 2025-01-29 | End: 2025-01-29

## 2025-01-29 RX ORDER — DEXAMETHASONE SODIUM PHOSPHATE 4 MG/ML
4 INJECTION, SOLUTION INTRA-ARTICULAR; INTRALESIONAL; INTRAMUSCULAR; INTRAVENOUS; SOFT TISSUE ONCE
Status: COMPLETED | OUTPATIENT
Start: 2025-01-29 | End: 2025-01-29

## 2025-01-29 RX ADMIN — DEXAMETHASONE SODIUM PHOSPHATE 4 MG: 4 INJECTION, SOLUTION INTRA-ARTICULAR; INTRALESIONAL; INTRAMUSCULAR; INTRAVENOUS; SOFT TISSUE at 14:59

## 2025-01-29 RX ADMIN — TRIAMCINOLONE ACETONIDE 40 MG: 40 INJECTION, SUSPENSION INTRA-ARTICULAR; INTRAMUSCULAR at 15:00

## 2025-01-29 ASSESSMENT — PAIN SCALES - GENERAL: PAINLEVEL_OUTOF10: MODERATE PAIN (5)

## 2025-01-29 NOTE — PATIENT INSTRUCTIONS
-A steroid injection was administered in the LEFT sinus tarsi today.    -Stability Shoe Gear: Continue wearing your stable shoes. Try wearing your custom orthotics in the shoes.  -Continue wearing the compression socks every day (remove at night).  -Ice the painful area of the foot/ankle 1-3 times a day for 10-20 minutes per time. Do this over a cloth or sock to avoid direct contact of the ice with the skin.      -Physical therapy will be ordered today-- you will be called to make an appointment

## 2025-01-29 NOTE — PROGRESS NOTES
Chief complaint: Patient presents with:  Musculoskeletal Problem: Left ankle pain      History of Present Illness: This 74-year-old female is seen for LEFT ankle pain.    She is consistently wearing compression socks which does seem to reduce some pain. She took a tapering dose of prednisone but it did not seem to help at all with her pain. She has a long leg CAM boot she also wore for a few days, but it also did not seem to help a lot. The pain can be a nagging pain at times. The pain is off and on and hurts the most when walking. She has no pain at night when resting. She is icing and wearing supportive shoes. She is looking for other treatment options to reduce her ankle pain.    No further pedal complaints today.      BP (!) 155/90 (BP Location: Left arm, Patient Position: Sitting, Cuff Size: Adult Regular)   Pulse 62   Temp 97.4  F (36.3  C) (Tympanic)   SpO2 98%     Patient Active Problem List   Diagnosis    Laryngopharyngeal reflux (LPR)    Chronic rhinitis    ETD (eustachian tube dysfunction)    Osteoporosis    Personal history of malignant neoplasm of breast    Early satiety    chronic neutropenia.    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    Hypothyroidism, unspecified type    Seborrheic dermatitis of scalp    Spinal stenosis of lumbar region    Restless legs syndrome (RLS)    Prediabetes       Past Surgical History:   Procedure Laterality Date    BACK SURGERY  05/2024    Dr Graf; L4/5 laminectomy; spinal stenosis; claudication    BONE MARROW BIOPSY      negative    COLONOSCOPY  07/2000    COLONOSCOPY  08/13/2013    DR Fu; repeat 5 years    COLONOSCOPY N/A 08/13/2018    Procedure: COLONOSCOPY;  COLONOSCOPY;  Surgeon: Ajit Uriarte MD;  [facility]    COLONOSCOPY N/A 12/02/2022    Procedure: COLONOSCOPY,  WITH POLYPECTOMY AND BIOPSY;  Surgeon: Marcellus Jimenez MD;  Location[facility]    DILATION AND CURETTAGE, OPERATIVE HYSTEROSCOPY, COMBINED N/A 02/13/2019    Procedure: EXAM UNDER ANESTHESIA , HYSTEROSCOPY;  Surgeon: Jovi Gabriel MD;  [facility]: [facility]    HEMORRHOIDECTOMY  1986    IR CONSULTATION FOR IR EXAM  12/05/2023    MASTECTOMY, BILATERAL  03/01/2004    right sided breast cancer    RECONSTRUCT FOREFOOT WITH METATARSOPHALANGEAL (MTP) FUSION Right 06/16/2023    Procedure: Right First Metatarsophalangeal Joint Fusion;  Surgeon: Ac Parker DPM;  [facility]: [facility]    RELEASE TRIGGER FINGER Right 03/07/2018    Procedure: RELEASE TRIGGER FINGER;  RELEASE TRIGGER DIGIT RIGHT THUMB;  Surgeon: Jose Alfredo Brewster DO;  [facility]    RELEASE TRIGGER FINGER Left 03/15/2023    Procedure: Left Thumb Trigger Finger Release;  Surgeon: Rigoberto Olivera MD;  [facility]    RELEASE TRIGGER FINGER Right 05/17/2023    Procedure: Revision Right Thumb Trigger Release;  Surgeon: Rigoberto Olivera MD;  [facility]    REMOVE HARDWARE FOOT Right 08/16/2024    Procedure: Hardware Removal Right Foot;  Surgeon: Ac Parker DPM;  [facility]    REPAIR HAMMER TOE Right 08/16/2024    Procedure: Second Toe Interphalangeal Joint Capsulotomy right;  Surgeon: Ac Parker DPM;  [facility]: [facility]    UPPER GI ENDOSCOPY      [facility] COLONOSCOPY THRU STOMA WITH BIOPSY  08/25/2010    cancer screening, family h/o colon cancer; hyperplastic polyp       Current Outpatient Medications   Medication Sig Dispense Refill    aspirin 81 MG EC tablet Take 1 tablet (81 mg) by mouth 2 times daily 60 tablet 0    CALCIUM CITRATE 1,500 mg two times daily       cinnamon 500 MG CAPS Take 1 capsule by mouth 2 times daily      ferrous sulfate (SLO-FE) 142 (45 Fe) MG CR tablet Take 1 tablet (142 mg) by mouth daily 90 tablet 1    fish oil-omega-3 fatty acids 1000 MG capsule Take 1 g by mouth 2 times daily       gabapentin (NEURONTIN) 300 MG capsule Take 300  mg AM and 900 mg  capsule 2    levothyroxine (SYNTHROID/LEVOTHROID) 25 MCG tablet Take 2 tablets 4 days a week and 1 tablet 3 days a week. 153 tablet 3    MAGNESIUM OXIDE PO Take 200 mg by mouth daily      Multiple Vitamins-Minerals (PRESERVISION AREDS PO) Take 1 tablet by mouth 2 times daily      Polyethylene Glycol 3350 (MIRALAX PO) Use every other day      rOPINIRole (REQUIP) 1 MG tablet Take 4 tablets (4 mg) by mouth at bedtime 400 tablet 3    simvastatin (ZOCOR) 20 MG tablet Take 1 tablet (20 mg) by mouth at bedtime 90 tablet 3    triamcinolone (KENALOG) 0.025 % cream Apply to back once a day if the back is itchy. 454 g 1    Turmeric 500 MG CAPS Take 2 capsules by mouth daily       zinc 50 MG TABS Take 1 tablet by mouth daily        No current facility-administered medications for this visit.          Allergies   Allergen Reactions    Chlorhexidine Gluconate Rash    Povidone Iodine Rash     Betadine     Soap Rash     Betadine        Family History   Problem Relation Age of Onset    Dementia Mother         (cause of death)     High cholesterol Mother     Osteoarthritis Mother     C.A.D. Father         (cause of death)     Prostate Cancer Father     Breast Cancer Maternal Aunt 72    Cerebrovascular Disease Maternal Grandmother         CVA    Diabetes Maternal Grandmother        Social History     Socioeconomic History    Marital status: Single     Spouse name: None    Number of children: None    Years of education: None    Highest education level: None   Tobacco Use    Smoking status: Never    Smokeless tobacco: Never   Substance and Sexual Activity    Alcohol use: Yes     Alcohol/week: 0.0 standard drinks of alcohol     Comment: 1 glasso wine, weekly     Drug use: No    Sexual activity: Never   Other Topics Concern     Service No    Blood Transfusions Yes     Comment: Permits if needed    Caffeine Concern Yes     Comment: Tea, 2 cups daily     Exercise Yes     Comment: Walking, walks steps, daily      Seat Belt Yes    Parent/sibling w/ CABG, MI or angioplasty before 65F 55M? No       ROS: 10 point ROS neg other than the symptoms noted above in the HPI.  EXAM  Constitutional: healthy, alert and no distress    Psychiatric: mentation appears normal and affect normal/bright    LEFT FOOT FOCUSED    VASCULAR:  -Dorsalis pedis pulse +2/4   -Posterior tibial pulse +2/4   NEURO:  -Light touch sensation intact to plantar forefoot  DERM:  -Skin temperature, texture and turgor within normal limits   -Cicatrix on the RIGHT dorsal first ray  ---Mildly firm skin and underlying tissues immediately on and around the incision consistent with scar tissue  MSK:  -Pain on palpation to the LEFT sinus tarsi  -No pain on palpation to the LEFT lateral ankle gutter  -Mild tenderness on palpation to the peroneal tendons near the insertion of the peroneus brevis    -RIGHT 1st MTPJ fused with no ROM  ============================================================    ASSESSMENT:    (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 Treatment options discussed with no educational barriers noted.    Sinus tarsi pain: Patient still has Pain on palpation to the sinus tarsi. Asteroid injection may create more localized edema reduction to improve pain. She would like to try a steroid injection today.    -Steroid injection x 1 to the LEFT sinus tarsi:   -Obtained written and verbal consent from patient for a steroid injection into the above location(s). Reviewed risks and benefits of the injection. Informed patient that the injection may decrease pain long-term, short-term, or not at all. A steroid injection can potentially weaken the surrounding structures of the injected site such as weaken the insertion of nearby tendons and cartilage or thin cartilage. The goal of the injection, however, is to reduce the inflammation to stop  the chronic inflammatory cycle. The injection in combination with other treatment options may help reduce pain. Some people develop an increased flare of pain within a few days of the injection which usually resolves. Patient understands risks and benefits of the injection and is in agreement with an injection today in an effort to slow or reverse the chronic inflammatory process and pain in the foot.   ---Patient signed informed consent and a time-out was performed and there was verification of correct patient, procedure and laterality.  ---Prepped the injection site with an alcohol swab.  ---Injected a total of 3 mL of 1:1:1 of 4mg Dexamethasone, 40mg Kenalog, and 1mL of 2% Lidocaine plain. Patient tolerated the injection well.    ------------------------------------------------    Peroneal tendonitis pain:    -Patient has tried a CAM boot, oral prednisone, rigid tennis shoes, compression socks, and icing. She still has consistent pain from the peroneal tendons. Physical therapy can help with strengthening the ankle / sinus tarsi / peroneal tendons and work on gait training. She is in agreement with this plan.  -Physical therapy ordered through [facility] per patient request on 01/29/2025.  -Stability Shoe Gear: Continue wearing your stable shoes. Try wearing your custom orthotics in the shoes.  -Continue wearing the compression socks every day (remove at night).  -Ice the painful area of the foot/ankle 1-3 times a day for 10-20 minutes per time. Do this over a cloth or sock to avoid direct contact of the ice with the skin.    -This is an acute, uncomplicated illness/injury with OTC treatment options reviewed.     -Patient in agreement with the above treatment plan and all of patient's questions were answered.      Return to clinic two months to evaluate LEFT sinus tarsi and peroneal tendon pain        Katerina Campos DPM

## 2025-02-10 ENCOUNTER — THERAPY VISIT (OUTPATIENT)
Dept: PHYSICAL THERAPY | Facility: HOSPITAL | Age: 75
End: 2025-02-10
Attending: PODIATRIST
Payer: COMMERCIAL

## 2025-02-10 DIAGNOSIS — M25.572 SINUS TARSI SYNDROME OF LEFT ANKLE: ICD-10-CM

## 2025-02-10 DIAGNOSIS — M76.72 PERONEAL TENDINITIS OF LEFT LOWER LEG: ICD-10-CM

## 2025-02-10 PROCEDURE — 97110 THERAPEUTIC EXERCISES: CPT | Mod: GP

## 2025-02-10 PROCEDURE — 97161 PT EVAL LOW COMPLEX 20 MIN: CPT | Mod: GP

## 2025-02-10 NOTE — PROGRESS NOTES
PHYSICAL THERAPY EVALUATION  Type of Visit: Evaluation and treat          Fall Risk Screen:  Fall screen completed by: PT  Have you fallen 2 or more times in the past year?: Yes  Have you fallen and had an injury in the past year?: No  Is patient a fall risk?: No    Subjective         Presenting condition or subjective complaint:  Pt has had intermittent L ankle pain but more permanent now since early December or so.  No triggers that she can recall.   Date of onset: 12/02/24    Relevant medical history:   see medical chart  Dates & types of surgery:  L ankle steroid injection 1/29/25 didn't seem to help.     Prior diagnostic imaging/testing results:       Prior therapy history for the same diagnosis, illness or injury:        Prior Level of Function  Transfers: Independent  Ambulation: Independent  ADL: Independent  IADL: Driving, Housekeeping, Laundry, Meal preparation, all indep     Living Environment  Social support:   lives alone, independently   Type of home:   2 story  Stairs to enter the home:         Ramp:   n/a  Stairs inside the home:       2nd story house- bedrooms up on 2nd story. Laundry in basement -does laundry 1x week. Railings on both sets of stairs.   Help at home:  n/a   Equipment owned:     N/a  Employment:    retired, active   Hobbies/Interests:      Patient goals for therapy:  weightbear without pain    Pain assessment: See objective evaluation for additional pain details     Objective      Cognitive Status Examination  Orientation: Oriented to person, place and time   Level of Consciousness: Alert  Follows Commands and Answers Questions: 100% of the time  Personal Safety and Judgement: Intact  Memory: Intact    OBSERVATION:   INTEGUMENTARY: Intact  POSTURE: WFL  PALPATION:   RANGE OF MOTION:   STRENGTH: LE Strength WFL    BED MOBILITY: Independent    TRANSFERS: Independent    WHEELCHAIR MOBILITY: n/a    GAIT:   Level of [place]: Independent  Assistive Device(s): None  Gait Deviations:    Gait Distance:   Stairs:     BALANCE:  deferred this date         SENSATION: LE Sensation WNL    REFLEXES:   COORDINATION:   MUSCLE TONE:       FOOT/ANKLE EVALUATION  PAIN: Pain Level at Rest: 0/10  Pain Level with Use: 7/10  Pain Location: ankle, foot , and lateral lower leg  Pain Quality: Aching and Nagging  Pain Frequency: intermittent  Pain is Worst: daytime  Pain is Exacerbated By: walking, standing,   Pain is Relieved By: cold, NSAIDs, rest, and elevate, compression socks  Pain Progression: Worsened  INTEGUMENTARY (edema, incisions): WNL  POSTURE: WFL  GAIT:   Weightbearing Status: WBAT  Assistive Device(s): None  Gait Deviations: Base of support decreased  Pronation increased L    BALANCE/PROPRIOCEPTION:   WEIGHT BEARING ALIGNMENT:   NON-WEIGHTBEARING ALIGNMENT:    ROM:   (Degrees) Left AROM Left PROM  Right AROM Right PROM   Ankle Dorsiflexion 90 neutral   90      Ankle Plantarflexion 120 degrees   123    Ankle Inversion 17  26    Ankle Eversion 26  25    Great Toe Flexion       Great Toe Extension       Pain:   End feel:     STRENGTH:     FLEXIBILITY:   SPECIAL TESTS:    Left Right   Tinel Sign     [place] Sign     Windlass Test     Ligamentous Stability     Anterior Drawer     Kleiger ER Test     Longitudinal Arch Angle Test     Talar Tilt       FUNCTIONAL TESTS:   PALPATION:   JOINT MOBILITY:     Assessment & Plan   CLINICAL IMPRESSIONS  Medical Diagnosis: L sinus tarsi syndrome/peroneal tendonitisankle pain.    Treatment Diagnosis: L sinus tarsi syndrome/peroneal tendonitisankle pain.   Impression/Assessment: Patient is a 74 year old female with L ankle and foot pain  complaints.  The following significant findings have been identified: Pain, Decreased proprioception, Impaired gait, and Impaired muscle performance. These impairments interfere with their ability to perform self care tasks, recreational activities, household chores, and community mobility as compared to previous level of function.      Clinical Decision Making (Complexity):  Clinical Presentation: Evolving/Changing  Clinical Presentation Rationale: based on medical and personal factors listed in PT evaluation  Clinical Decision Making (Complexity): Low complexity    PLAN OF CARE  Treatment Interventions:  Modalities: Cryotherapy, Ultrasound  Interventions: Manual Therapy, Therapeutic Activity, Therapeutic Exercise    Long Term Goals     PT Goal 1  Goal Identifier: STG 1  Goal Description: Pt will demon indep HEP Compliance  Target Date: 03/24/25  PT Goal 2  Goal Identifier: STG 2  Goal Description: Pt will report reduced ankle/foot pain under 4/10 after 30 mn standing and/or walking  Target Date: 03/24/25  PT Goal 3  Goal Identifier: LTG  Goal Description: shopping and adl's not affected by pain  Target Date: 05/05/25      Frequency of Treatment: 1x week  Duration of Treatment: 12 weeks    Recommended Referrals to Other Professionals:  none needed  Education Assessment:   Learner/Method: No Barriers to Learning;Patient    Risks and benefits of evaluation/treatment have been explained.   Patient/Family/caregiver agrees with Plan of Care.     Evaluation Time:     PT Eval, Low Complexity Minutes (15641): 25       Signing Clinician: Jada Hurst, PT        [facility]                                                                                   OUTPATIENT PHYSICAL THERAPY      PLAN OF TREATMENT FOR OUTPATIENT REHABILITATION   Patient's Last Name, First Name, HINAMihaela Bey YOB: 1950   Provider's Name   [facility]   Medical Record No.  [id number]     Onset Date: 12/02/24  Start of Care Date: 02/10/25     Medical Diagnosis:  L sinus tarsi syndrome/peroneal tendonitisankle pain.      PT Treatment Diagnosis:  L sinus tarsi syndrome/peroneal tendonitisankle pain. Plan of Treatment  Frequency/Duration: 1x week/ 12 weeks    Certification date from  02/10/25 to 05/05/25         See note for plan of treatment details and functional goals     Jada Hurst, PT                         I CERTIFY THE NEED FOR THESE SERVICES FURNISHED UNDER        THIS PLAN OF TREATMENT AND WHILE UNDER MY CARE     (Physician attestation of this document indicates review and certification of the therapy plan).              Referring Provider:  Katerina Campos    Initial Assessment  See Epic Evaluation- Start of Care Date: 02/10/25

## 2025-02-17 ENCOUNTER — THERAPY VISIT (OUTPATIENT)
Dept: PHYSICAL THERAPY | Facility: HOSPITAL | Age: 75
End: 2025-02-17
Attending: PODIATRIST
Payer: COMMERCIAL

## 2025-02-17 DIAGNOSIS — M25.572 SINUS TARSI SYNDROME OF LEFT ANKLE: Primary | ICD-10-CM

## 2025-02-17 DIAGNOSIS — M76.72 PERONEAL TENDINITIS OF LEFT LOWER LEG: ICD-10-CM

## 2025-02-17 PROCEDURE — 97110 THERAPEUTIC EXERCISES: CPT | Mod: GP

## 2025-02-17 PROCEDURE — 97035 APP MDLTY 1+ULTRASOUND EA 15: CPT | Mod: GP

## 2025-02-17 PROCEDURE — 97140 MANUAL THERAPY 1/> REGIONS: CPT | Mod: GP

## 2025-02-19 ENCOUNTER — TELEPHONE (OUTPATIENT)
Dept: FAMILY MEDICINE | Facility: OTHER | Age: 75
End: 2025-02-19

## 2025-02-19 NOTE — TELEPHONE ENCOUNTER
2:48 PM    Reason for Call: OVERBOOK    Patient preop / [facility] / DOS 4-7 / back surgery / Dr HINA Graf     The patient is requesting an appointment for prior to surgery with Dr. Connors.    Was an appointment offered for this call? No  If yes : Appointment type              Date    Preferred method for responding to this message: Telephone Call  What is your phone number ? [phone]    If we cannot reach you directly, may we leave a detailed response at the number you provided? Yes    Can this message wait until your PCP/provider returns, if unavailable today? No,     Kellie Perez

## 2025-02-20 NOTE — TELEPHONE ENCOUNTER
Attempt # 1  Outcome: Left Message   Comment: LVM for patient to call to schedule a pre-op with Dr. Connors for 2 weeks prior to procedure (SDS is all that is available to be used).

## 2025-03-03 ENCOUNTER — APPOINTMENT (OUTPATIENT)
Dept: CT IMAGING | Facility: HOSPITAL | Age: 75
End: 2025-03-03
Attending: PHYSICIAN ASSISTANT
Payer: COMMERCIAL

## 2025-03-03 ENCOUNTER — NURSE TRIAGE (OUTPATIENT)
Dept: CARE COORDINATION | Facility: OTHER | Age: 75
End: 2025-03-03

## 2025-03-03 ENCOUNTER — TELEPHONE (OUTPATIENT)
Dept: FAMILY MEDICINE | Facility: OTHER | Age: 75
End: 2025-03-03

## 2025-03-03 ENCOUNTER — HOSPITAL ENCOUNTER (EMERGENCY)
Facility: HOSPITAL | Age: 75
Discharge: HOME | End: 2025-03-03
Attending: PHYSICIAN ASSISTANT
Payer: COMMERCIAL

## 2025-03-03 VITALS
SYSTOLIC BLOOD PRESSURE: 147 MMHG | DIASTOLIC BLOOD PRESSURE: 70 MMHG | RESPIRATION RATE: 20 BRPM | OXYGEN SATURATION: 97 % | TEMPERATURE: 98.1 F | HEART RATE: 59 BPM

## 2025-03-03 DIAGNOSIS — S09.90XA CLOSED HEAD INJURY, INITIAL ENCOUNTER: ICD-10-CM

## 2025-03-03 DIAGNOSIS — R42 POSTURAL DIZZINESS WITH NEAR SYNCOPE: ICD-10-CM

## 2025-03-03 DIAGNOSIS — R55 POSTURAL DIZZINESS WITH NEAR SYNCOPE: ICD-10-CM

## 2025-03-03 PROCEDURE — 70450 CT HEAD/BRAIN W/O DYE: CPT

## 2025-03-03 PROCEDURE — 93010 ELECTROCARDIOGRAM REPORT: CPT | Performed by: INTERNAL MEDICINE

## 2025-03-03 PROCEDURE — 99284 EMERGENCY DEPT VISIT MOD MDM: CPT | Mod: 25

## 2025-03-03 PROCEDURE — 99284 EMERGENCY DEPT VISIT MOD MDM: CPT | Performed by: PHYSICIAN ASSISTANT

## 2025-03-03 PROCEDURE — 93005 ELECTROCARDIOGRAM TRACING: CPT

## 2025-03-03 ASSESSMENT — ENCOUNTER SYMPTOMS
SHORTNESS OF BREATH: 0
BACK PAIN: 0
ABDOMINAL PAIN: 0
COUGH: 0
NECK PAIN: 0
NECK STIFFNESS: 0

## 2025-03-03 ASSESSMENT — COLUMBIA-SUICIDE SEVERITY RATING SCALE - C-SSRS
2. HAVE YOU ACTUALLY HAD ANY THOUGHTS OF KILLING YOURSELF IN THE PAST MONTH?: NO
1. IN THE PAST MONTH, HAVE YOU WISHED YOU WERE DEAD OR WISHED YOU COULD GO TO SLEEP AND NOT WAKE UP?: NO
6. HAVE YOU EVER DONE ANYTHING, STARTED TO DO ANYTHING, OR PREPARED TO DO ANYTHING TO END YOUR LIFE?: NO

## 2025-03-03 NOTE — ED TRIAGE NOTES
Pt reports standing up and falling at home on Saturday, hitting her head on a piece of furniture. Pt denies any loss of conciousness, but does state that she has been intermittently dizzy and that it hurts when she touches that part of her head. Pt denies any blood thinner use. Denies any changes in eye sight.     HALI Hernández CNP assessed patient in triage and determined patient not Urgent Care appropriate. Will be seen in Emergency Department.

## 2025-03-03 NOTE — DISCHARGE INSTRUCTIONS
Rest and stay hydrated.    Please return to this emergency department for any new or worsening symptoms.    Please follow-up in the clinic this week for recheck.

## 2025-03-03 NOTE — TELEPHONE ENCOUNTER
Disposition: see in office today    Patient fell and hit head on Saturday. Patient was bending over and became dizzy patient states she fell and hit head on floor standing radio. Patient reports taking aspirin daily. Patient reports intermittent slight dizziness. Patient denies any loss of consciousness. Patient advised to be seen in UC/ED. Patient verbalized understanding.    Reason for Disposition   Patient wants to be seen    Additional Information   Negative: ACUTE NEURO SYMPTOM and symptom present now (Definition: Difficult to awaken OR confused thinking and talking OR slurred speech OR weakness of arms or legs OR unsteady walking.)   Negative: Knocked out (unconscious) > 1 minute   Negative: Seizure (convulsion) occurred  (Exception: Prior history of seizures and now alert and without Acute Neuro Symptoms.)   Negative: Neck pain after dangerous injury (e.g., MVA, diving, trampoline, contact sports, fall > 10 feet or 3 meters)  (Exception: Neck pain began > 1 hour after injury.)   Negative: Major bleeding (actively dripping or spurting) that can't be stopped   Negative: Penetrating head injury (e.g., knife, gunshot wound, metal object)   Negative: Sounds like a life-threatening emergency to the triager   Negative: Diagnosed with a concussion within last 14 days   Negative: Can't remember what happened (amnesia)   Negative: Vomiting once or more   Negative: Loss of vision or double vision is present now   Negative: Watery or blood-tinged fluid dripping from the nose or ears   Negative: ACUTE NEURO SYMPTOM and now fine  (Definition: Difficult to awaken OR confused thinking and talking OR slurred speech OR weakness of arms or legs OR unsteady walking.)   Negative: Knocked out (unconscious) < 1 minute and now fine   Negative: SEVERE headache   Negative: Dangerous injury (e.g., MVA, diving, trampoline, contact sports, fall > 10 feet or 3 meters) or severe blow from hard object (e.g., golf club or baseball bat)    "Negative: Large swelling or bruise (> 2 inches or 5 cm)   Negative: Skin is split open or gaping (length > 1/2 inch or 12 mm)   Negative: Bleeding won't stop after 10 minutes of direct pressure (using correct technique)   Negative: Taking Coumadin (warfarin) or other strong blood thinner, or known bleeding disorder (e.g., thrombocytopenia)   Negative: Age over 64 years with an area of head swelling or bruise   Negative: Sounds like a serious injury to the triager   Negative: Patient is confused or is an unreliable provider of information (e.g., dementia, severe intellectual disability, alcohol intoxication)   Negative: No prior tetanus shots (or is not fully vaccinated) and any wound (e.g., cut or scrape)   Negative: HIV positive or severe immunodeficiency (severely weak immune system) and DIRTY cut or scrape   Negative: One or two 'black eyes' (bruising, purple color of eyelids), onset over 24 hours after head injury    Answer Assessment - Initial Assessment Questions  1. MECHANISM: \"How did the injury happen?\" For falls, ask: \"What height did you fall from?\" and \"What surface did you fall against?\"       Lost balance when bending down. Hit head on a floor standing rado  2. ONSET: \"When did the injury happen?\" (e.g., minutes, hours ago)       Saturday  3. NEUROLOGIC SYMPTOMS: \"Was there any loss of consciousness?\" \"Are there any other neurological symptoms?\"       No loss of consciousness. Patient reports intermittent slight dizziness. Patient denies any slurred speech or one sided weakness  4. MENTAL STATUS: \"Does the person know who they are, who you are, and where they are?\"       Patient aware of herself and where she is at  5. LOCATION: \"What part of the head was hit?\"       Back right side  6. SCALP APPEARANCE: \"What does the scalp look like? Is it bleeding now?\" If Yes, ask: \"Is it difficult to stop?\"       no  7. SIZE: For cuts, bruises, or swelling, ask: \"How large is it?\" (e.g., inches or centimeters)     " "  no  8. PAIN: \"Is there any pain?\" If Yes, ask: \"How bad is it?\" (Scale 0-10; or none, mild, moderate, severe)      Yes, 3-4 on pain scale  9. TETANUS: For any breaks in the skin, ask: \"When was the last tetanus booster?\"      No breaks  10. BLOOD THINNERS: \"Do you take any blood thinners?\" (e.g., aspirin, clopidogrel / Plavix, coumadin, heparin). Notes: Other strong blood thinners include: Arixtra (fondaparinux), Eliquis (apixaban), Pradaxa (dabigatran), and Xarelto (rivaroxaban).        Aspirin one daily  11. OTHER SYMPTOMS: \"Do you have any other symptoms?\" (e.g., neck pain, vomiting)        Intermittent slight dizziness  12. PREGNANCY: \"Is there any chance you are pregnant?\" \"When was your last menstrual period?\"        no    Protocols used: Head Injury-A-[place]    "

## 2025-03-03 NOTE — TELEPHONE ENCOUNTER
Symptom or reason needing to speak to RN: fell and hit R side towards the back head     Best number to return call: [phone]     Best time to return call: soon

## 2025-03-04 ENCOUNTER — THERAPY VISIT (OUTPATIENT)
Dept: PHYSICAL THERAPY | Facility: HOSPITAL | Age: 75
End: 2025-03-04
Attending: PODIATRIST
Payer: COMMERCIAL

## 2025-03-04 DIAGNOSIS — M25.572 SINUS TARSI SYNDROME OF LEFT ANKLE: Primary | ICD-10-CM

## 2025-03-04 DIAGNOSIS — M76.72 PERONEAL TENDINITIS OF LEFT LOWER LEG: ICD-10-CM

## 2025-03-04 LAB
ATRIAL RATE - MUSE: 61 BPM
DIASTOLIC BLOOD PRESSURE - MUSE: NORMAL MMHG
INTERPRETATION ECG - MUSE: NORMAL
P AXIS - MUSE: 62 DEGREES
PR INTERVAL - MUSE: 206 MS
QRS DURATION - MUSE: 106 MS
QT - MUSE: 382 MS
QTC - MUSE: 384 MS
R AXIS - MUSE: 68 DEGREES
SYSTOLIC BLOOD PRESSURE - MUSE: NORMAL MMHG
T AXIS - MUSE: 56 DEGREES
VENTRICULAR RATE- MUSE: 61 BPM

## 2025-03-04 PROCEDURE — 97110 THERAPEUTIC EXERCISES: CPT | Mod: GP

## 2025-03-04 NOTE — ED PROVIDER NOTES
History     Chief Complaint   Patient presents with    Fall    Dizziness     The history is provided by the patient.     Mihaela Jang is a 74 year old female who presented to the emergency department ambulatory for evaluation of a previous fall with head injury.  The patient reports that on Saturday, March 1 she was bending over to do something at her bookshelf when she stood up too fast and had an episode of dizziness when she fell backwards and hit the back of her head on a radial stand.  Right sided.  No neck discomfort.  No LOC.  No seizures.  No true syncope.  No chest pains or shortness of breath.  Currently feels at her baseline other than some very mild persistent headache to the right parietal area.  No blood thinners.    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  Ensure this is not a melanoma, path pending      Hyperlipidemia, unspecified hyperlipidemia type 09/15/2016     Priority: Medium    ACP (advance care planning) 01/22/2016     Priority: Medium     Advance Care Planning 6/2/2016: Receipt of ACP document:  Received: Health Care Directive which was witnessed or notarized on 6/1/16.  Document not previously scanned.  Validation form completed and sent with document to be scanned.  Code Status reflects choices in most recent ACP document.  Confirmed/documented designated  decision maker(s).  Added by Paola Johnson  Advance Care Planning 1/22/2016: Receipt of ACP document:  Received: Health Care Directive which was witnessed or notarized on 10-21-08.  Document previously scanned on 12-7-15.  Validation form completed and sent to be scanned.  Code Status needs to be updated to reflect choices in most recent ACP document.  Confirmed/documented designated decision maker(s).  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 Provider to review          Past Medical History:    Past Medical History:   Diagnosis Date    Atypical nevi     Chondrodermatitis nodularis helicis of left ear     Chronic rhinitis     Degenerative skin disorder     Hallux rigidus 06/15/2000    Hyperlipidemia, unspecified hyperlipidemia type 09/15/2016    Laryngopharyngeal reflux disease     Malignant neoplasm of breast (female), unspecified site 03/10/2004    Notalgia paresthetica     Osteoarthritis 07/20/2011    Osteoporosis, unspecified 09/10/2001    Other abnormal glucose 12/20/2013    Paresthesia     Personal history of malignant neoplasm of breast 06/07/2005    Rhinitis     Schamberg's purpura     Spinal stenosis of lumbar region        Past Surgical History:    Past Surgical History:   Procedure  Laterality Date    BACK SURGERY  05/2024    Dr Graf; L4/5 laminectomy; spinal stenosis; claudication    BONE MARROW BIOPSY      negative    COLONOSCOPY  07/2000    COLONOSCOPY  08/13/2013    DR Fu; repeat 5 years    COLONOSCOPY N/A 08/13/2018    Procedure: COLONOSCOPY;  COLONOSCOPY;  Surgeon: Ajit Uriarte MD;  Location: [facility]    COLONOSCOPY N/A 12/02/2022    Procedure: COLONOSCOPY, WITH POLYPECTOMY AND BIOPSY;  Surgeon: Marcellus Jimenez MD;  Location: [facility]    DILATION AND CURETTAGE, OPERATIVE HYSTEROSCOPY, COMBINED N/A 02/13/2019    Procedure: EXAM UNDER ANESTHESIA , HYSTEROSCOPY;  Surgeon: Jovi Gabriel MD;  Location: [facility]    HEMORRHOIDECTOMY  1986    IR CONSULTATION FOR IR EXAM  12/05/2023    MASTECTOMY, BILATERAL  03/01/2004    right sided breast cancer    RECONSTRUCT FOREFOOT WITH METATARSOPHALANGEAL (MTP) FUSION Right 06/16/2023    Procedure: Right First Metatarsophalangeal Joint Fusion;  Surgeon: Ac Parker DPM;  Location: [facility]    RELEASE TRIGGER FINGER Right 03/07/2018    Procedure: RELEASE TRIGGER FINGER;  RELEASE TRIGGER DIGIT RIGHT THUMB;  Surgeon: Jose Alfredo Brewster DO;  Location: [facility]    RELEASE TRIGGER FINGER Left 03/15/2023    Procedure: Left Thumb Trigger Finger Release;  Surgeon: Rigoberto Olivera MD;  Location: [facility]    RELEASE TRIGGER FINGER Right 05/17/2023    Procedure: Revision Right Thumb Trigger Release;  Surgeon: Rigoberto Olivera MD;  Location: [facility]    REMOVE HARDWARE FOOT Right 08/16/2024    Procedure: Hardware Removal Right Foot;  Surgeon: Ac Parker DPM;  Location: [facility]    REPAIR HAMMER TOE Right 08/16/2024    Procedure: Second Toe Interphalangeal Joint Capsulotomy right;  Surgeon: Ac Parker DPM;  Location: [facility]    UPPER GI ENDOSCOPY      ZZHC COLONOSCOPY THRU STOMA WITH BIOPSY  08/25/2010    cancer screening, family h/o colon cancer; hyperplastic polyp       Family History:    Family History   Problem Relation Age of Onset    Dementia Mother          (cause of death)     High cholesterol Mother     Osteoarthritis Mother     C.A.D. Father         (cause of death)     Prostate Cancer Father     Breast Cancer Maternal Aunt 72    Cerebrovascular Disease Maternal Grandmother         CVA    Diabetes Maternal Grandmother        Social History:  Marital Status:  Single [1]  Social History     Tobacco Use    Smoking status: Never     Passive exposure: Never    Smokeless tobacco: Never   Vaping Use    Vaping status: Never Used   Substance Use Topics    Alcohol use: Yes     Alcohol/week: 0.0 standard drinks of alcohol     Comment: 1 glasso wine, weekly     Drug use: No        Medications:    aspirin 81 MG EC tablet  CALCIUM CITRATE  cinnamon 500 MG CAPS  ferrous sulfate (SLO-FE) 142 (45 Fe) MG CR tablet  gabapentin (NEURONTIN) 300 MG capsule  levothyroxine (SYNTHROID/LEVOTHROID) 25 MCG tablet  rOPINIRole (REQUIP) 1 MG tablet  simvastatin (ZOCOR) 20 MG tablet  Turmeric 500 MG CAPS  zinc 50 MG TABS  fish oil-omega-3 fatty acids 1000 MG capsule  MAGNESIUM OXIDE PO  Multiple Vitamins-Minerals (PRESERVISION AREDS PO)  Polyethylene Glycol 3350 (MIRALAX PO)  triamcinolone (KENALOG) 0.025 % cream          Review of Systems   Respiratory:  Negative for cough and shortness of breath.    Cardiovascular:  Negative for chest pain.   Gastrointestinal:  Negative for abdominal pain.   Musculoskeletal:  Negative for back pain, neck pain and neck stiffness.   Skin: Negative.    Neurological:         See HPI       Physical Exam   BP: (!) 159/80  Pulse: 66  Temp: 98.1  F (36.7  C)  Resp: 20  SpO2: 95 %      Physical Exam  Vitals and nursing note reviewed.   Constitutional:       General: She is not in acute distress.     Appearance: Normal appearance. She is normal weight. She is not ill-appearing, toxic-appearing or diaphoretic.      Comments: Smiling and talkative 74-year-old female found in no distress   HENT:      Head: Normocephalic and atraumatic.      Comments: No evidence of head  injury  Cardiovascular:      Rate and Rhythm: Normal rate and regular rhythm.   Pulmonary:      Effort: Pulmonary effort is normal.      Breath sounds: Normal breath sounds.   Musculoskeletal:      Cervical back: Normal range of motion and neck supple. No rigidity or tenderness.   Skin:     General: Skin is warm and dry.      Capillary Refill: Capillary refill takes less than 2 seconds.   Neurological:      General: No focal deficit present.      Mental Status: She is alert and oriented to person, place, and time.   Psychiatric:         Mood and Affect: Mood normal.         ED Course     ED Course as of 03/03/25 1800   Mon Mar 03, 2025   1746 My independent review of the EKG shows a normal sinus rhythm at a rate of 61.  NC interval is slightly prolonged at 206 ms.  Normal QRS duration.  Normal QTc.  Normal axis.  There are no concerning ST segments.  There are no concerning T waves.  There is no evidence of ectopy, preexcitation, or ischemia.     Procedures              Critical Care time:  none     None         Results for orders placed or performed during the hospital encounter of 03/03/25 (from the past 24 hours)   EKG 12-lead, tracing only   Result Value Ref Range    Systolic Blood Pressure  mmHg    Diastolic Blood Pressure  mmHg    Ventricular Rate 61 BPM    Atrial Rate 61 BPM    NC Interval 206 ms    QRS Duration 106 ms     ms    QTc 384 ms    P Axis 62 degrees    R AXIS 68 degrees    T Axis 56 degrees    Interpretation ECG       Sinus rhythm  Normal ECG  When compared with ECG of 05-Aug-2024 13:23,  No significant change was found     CT Head w/o Contrast    Narrative    EXAM: CT HEAD W/O CONTRAST  LOCATION: [facility]  DATE: 3/3/2025    INDICATION: fall with head injury  COMPARISON: None.  TECHNIQUE: Routine CT Head without IV contrast. Multiplanar reformats. Dose reduction techniques were used.    FINDINGS:  INTRACRANIAL CONTENTS: No evidence of acute intracranial hemorrhage or mass  effect. Brain attenuation and morphology are normal. The ventricles and sulci are prominent consistent with mild brain parenchymal volume loss. Normal gray-white matter   differentiation. The basilar cisterns are patent.    VISUALIZED ORBITS/SINUSES/MASTOIDS: The globes are unremarkable. The partially imaged paranasal sinuses, mastoid air cells and middle ear cavities are unremarkable.     BONES/SOFT TISSUES: The visualized skull base and calvarium are unremarkable.      Impression    IMPRESSION:    1.  No evidence of acute intracranial hemorrhage or mass effect.  2.  Mild brain parenchymal volume loss.       Medications - No data to display    Assessments & Plan (with Medical Decision Making)   74-year-old female with a fall consistent with vasovagal near syncope/postural dizziness 2 days ago.  Currently reports feeling at her baseline.  No neck pain.  No chest pains or shortness of breath.  No LOC.  EKG is unremarkable.  CT scan is unremarkable.  Discussed further workup with serum testing and urinalysis and the patient politely declined.  She states she feels at her baseline and would like to be discharged home.  Certainly reasonable.  Return here as needed.  Follow-up in the clinic.    This document was prepared using a combination of typing and voice generated software.  While every attempt was made for accuracy, spelling and grammatical errors may exist.     I have reviewed the nursing notes.    I have reviewed the findings, diagnosis, plan and need for follow up with the patient.           Medical Decision Making  The patient's presentation was of moderate complexity (an undiagnosed new problem with uncertain prognosis).    The patient's evaluation involved:  strong consideration of a test (labs and urinalysis) that was ultimately deferred  ordering and/or review of 2 test(s) in this encounter (CT head and EKG)    The patient's management necessitated only low risk treatment.        Discharge Medication List  as of 3/3/2025  5:55 PM          Final diagnoses:   Postural dizziness with near syncope   Closed head injury, initial encounter       3/3/2025   [facility]       Wallace Prince PA-C  03/03/25 [id number]

## 2025-03-05 ENCOUNTER — TELEPHONE (OUTPATIENT)
Dept: FAMILY MEDICINE | Facility: OTHER | Age: 75
End: 2025-03-05

## 2025-03-05 NOTE — TELEPHONE ENCOUNTER
Patient called back at 4:35pm wondering who the Covering Provider would be if she did choose to see the covering provider.   Please call back    [phone], OK to leave detailed voicemail

## 2025-03-05 NOTE — TELEPHONE ENCOUNTER
Emergency Department and Urgent Care Follow-up    Reason for ER/UC visit: head injury   Date seen: 03/03/25    New or Worsening symptoms:  symptoms improving     Prescription Received/Picked up from Pharmacy?: no   Medications started? N/a  Any questions or issues regarding your prescription?: n/a    Follow-up Results or Labs that are pending: everything completed    Questions or concerns?: no    ER Recommends Follow-up by: one week    RN Recommendations: be reseen in ED with any new worsening or concerning symptoms  Appointment scheduled: 03/12/25- offered to look at covering provider schedule. Patient declined stating she would like to be seen by PCP.    If you start feeling worse, or have any further questions, please feel free to contact Nurse Triage at [phone].  If needing immediate medical attention at any time please call 911/Go to the ER.

## 2025-03-05 NOTE — TELEPHONE ENCOUNTER
Symptom or reason needing to speak to RN: [facility] ER on 03/03, fell and hit her head on Saturday 03/01. Patient needs a follow up scheduled.     Best number to return call: [phone]      Best time to return call: Anytime

## 2025-03-06 NOTE — TELEPHONE ENCOUNTER
Patient returned call, explained that covering means who is available. She feels okay waiting until she is seen next week with primary provider.

## 2025-03-11 ENCOUNTER — THERAPY VISIT (OUTPATIENT)
Dept: PHYSICAL THERAPY | Facility: HOSPITAL | Age: 75
End: 2025-03-11
Attending: PODIATRIST
Payer: COMMERCIAL

## 2025-03-11 DIAGNOSIS — M25.572 SINUS TARSI SYNDROME OF LEFT ANKLE: Primary | ICD-10-CM

## 2025-03-11 PROCEDURE — 97110 THERAPEUTIC EXERCISES: CPT | Mod: GP

## 2025-03-11 NOTE — PROGRESS NOTES
"  Assessment & Plan     Closed head injury, subsequent encounter  Negative head CT in ER.  No new symptoms.  - TSH with free T4 reflex; Future  - CBC with platelets and differential; Future  - Comprehensive metabolic panel (BMP + Alb, Alk Phos, ALT, AST, Total. Bili, TP); Future    Dizziness  Minimal dizziness, non specific.  Not vertigo.  Update labs to evaluate thyroid dosing, electrolytes, anemia, etc.  - TSH with free T4 reflex; Future  - CBC with platelets and differential; Future  - Comprehensive metabolic panel (BMP + Alb, Alk Phos, ALT, AST, Total. Bili, TP); Future    Hypothyroidism, unspecified type  - TSH with free T4 reflex; Future        MED REC REQUIRED  Post Medication Reconciliation Status: discharge medications reconciled, continue medications without change    See Patient Instructions    No follow-ups on file.    Megha Chairez is a 74 year old, presenting for the following health issues:  ER F/U (Fall/Dizziness)        3/12/2025    10:34 AM   Additional Questions   Roomed by BETH Aguilar CMA   Accompanied by Self         3/12/2025    10:34 AM   Patient Reported Additional Medications   Patient reports taking the following new medications None     HPI      ED/UC Followup:    Facility:  [facility]  Date of visit: 03/03/2025  Reason for visit: Fall/Dizziness    Fell 3/1 after bending, standing, and getting dizzy, falling striking head on furniture.  No loss of consciousness.  Called triage 3/3 and was directed to UC due to slight dizziness.  No vertigo.  Mild light headed.    Head CT negative.  EKG stable.  No labs completed.  BP was elevated.    Current Status: \"pretty good\"    Hard to articulate ongoing symptoms- mild.  No chest pain, dyspnea, headache or vision changes.    Activity - less walking in winter.  Active around house - 2 story - up and down stairs several times per day.  No urinary symptoms.    Echo June -     Review of Systems  Constitutional, HEENT, " cardiovascular, pulmonary, gi and gu systems are negative, except as otherwise noted.      Objective    /60   Pulse 66   Temp 97  F (36.1  C) (Tympanic)   Resp 18   Wt 51.7 kg (114 lb)   SpO2 99%   BMI 21.54 kg/m    Body mass index is 21.54 kg/m .  Physical Exam   GENERAL: alert and no distress  EYES: Eyes grossly normal to inspection, PERRL and conjunctivae and sclerae normal  HENT: ear canals and TM's normal, nose and mouth without ulcers or lesions  NECK: no adenopathy, no asymmetry, masses, or scars  RESP: lungs clear to auscultation - no rales, rhonchi or wheezes  CV: regular rates and rhythm, normal S1 S2, no S3 or S4, grade 1-2/6 systolic murmur, peripheral pulses strong, and no peripheral edema  ABDOMEN: soft, nontender, no hepatosplenomegaly, no masses and bowel sounds normal  MS: no gross musculoskeletal defects noted, no edema  NEURO: Normal strength and tone, mentation intact and speech normal  PSYCH: mentation appears normal, affect normal/bright  IRVING Hallpike-negative    Pending - cbc, cmp, tsh        Signed Electronically by: Melyssa Encinas MD

## 2025-03-12 ENCOUNTER — OFFICE VISIT (OUTPATIENT)
Dept: FAMILY MEDICINE | Facility: OTHER | Age: 75
End: 2025-03-12
Attending: FAMILY MEDICINE
Payer: COMMERCIAL

## 2025-03-12 VITALS
WEIGHT: 114 LBS | HEART RATE: 66 BPM | SYSTOLIC BLOOD PRESSURE: 118 MMHG | RESPIRATION RATE: 18 BRPM | TEMPERATURE: 97 F | BODY MASS INDEX: 21.54 KG/M2 | OXYGEN SATURATION: 99 % | DIASTOLIC BLOOD PRESSURE: 60 MMHG

## 2025-03-12 DIAGNOSIS — R42 DIZZINESS: ICD-10-CM

## 2025-03-12 DIAGNOSIS — E03.9 HYPOTHYROIDISM, UNSPECIFIED TYPE: Chronic | ICD-10-CM

## 2025-03-12 DIAGNOSIS — S09.90XD CLOSED HEAD INJURY, SUBSEQUENT ENCOUNTER: Primary | ICD-10-CM

## 2025-03-12 LAB
ALBUMIN SERPL BCG-MCNC: 4.4 G/DL (ref 3.5–5.2)
ALP SERPL-CCNC: 87 U/L (ref 40–150)
ALT SERPL W P-5'-P-CCNC: 23 U/L (ref 0–50)
ANION GAP SERPL CALCULATED.3IONS-SCNC: 12 MMOL/L (ref 7–15)
AST SERPL W P-5'-P-CCNC: 26 U/L (ref 0–45)
BASOPHILS # BLD AUTO: 0.1 10E3/UL (ref 0–0.2)
BASOPHILS NFR BLD AUTO: 1 %
BILIRUB SERPL-MCNC: 0.2 MG/DL
BUN SERPL-MCNC: 18 MG/DL (ref 8–23)
CALCIUM SERPL-MCNC: 10 MG/DL (ref 8.8–10.4)
CHLORIDE SERPL-SCNC: 101 MMOL/L (ref 98–107)
CREAT SERPL-MCNC: 0.68 MG/DL (ref 0.51–0.95)
EGFRCR SERPLBLD CKD-EPI 2021: 90 ML/MIN/1.73M2
EOSINOPHIL # BLD AUTO: 0.1 10E3/UL (ref 0–0.7)
EOSINOPHIL NFR BLD AUTO: 2 %
ERYTHROCYTE [DISTWIDTH] IN BLOOD BY AUTOMATED COUNT: 13.3 % (ref 10–15)
GLUCOSE SERPL-MCNC: 116 MG/DL (ref 70–99)
HCO3 SERPL-SCNC: 28 MMOL/L (ref 22–29)
HCT VFR BLD AUTO: 37.9 % (ref 35–47)
HGB BLD-MCNC: 12.7 G/DL (ref 11.7–15.7)
IMM GRANULOCYTES # BLD: 0 10E3/UL
IMM GRANULOCYTES NFR BLD: 0 %
LYMPHOCYTES # BLD AUTO: 1 10E3/UL (ref 0.8–5.3)
LYMPHOCYTES NFR BLD AUTO: 22 %
MCH RBC QN AUTO: 33.1 PG (ref 26.5–33)
MCHC RBC AUTO-ENTMCNC: 33.5 G/DL (ref 31.5–36.5)
MCV RBC AUTO: 99 FL (ref 78–100)
MONOCYTES # BLD AUTO: 0.6 10E3/UL (ref 0–1.3)
MONOCYTES NFR BLD AUTO: 13 %
NEUTROPHILS # BLD AUTO: 2.8 10E3/UL (ref 1.6–8.3)
NEUTROPHILS NFR BLD AUTO: 62 %
NRBC # BLD AUTO: 0 10E3/UL
NRBC BLD AUTO-RTO: 0 /100
PLATELET # BLD AUTO: 282 10E3/UL (ref 150–450)
POTASSIUM SERPL-SCNC: 4.6 MMOL/L (ref 3.4–5.3)
PROT SERPL-MCNC: 7.7 G/DL (ref 6.4–8.3)
RBC # BLD AUTO: 3.84 10E6/UL (ref 3.8–5.2)
SODIUM SERPL-SCNC: 141 MMOL/L (ref 135–145)
TSH SERPL DL<=0.005 MIU/L-ACNC: 3.2 UIU/ML (ref 0.3–4.2)
WBC # BLD AUTO: 4.6 10E3/UL (ref 4–11)

## 2025-03-12 PROCEDURE — 84443 ASSAY THYROID STIM HORMONE: CPT | Mod: ZL | Performed by: FAMILY MEDICINE

## 2025-03-12 PROCEDURE — 36415 COLL VENOUS BLD VENIPUNCTURE: CPT | Mod: ZL | Performed by: FAMILY MEDICINE

## 2025-03-12 PROCEDURE — 85025 COMPLETE CBC W/AUTO DIFF WBC: CPT | Mod: ZL | Performed by: FAMILY MEDICINE

## 2025-03-12 PROCEDURE — 82435 ASSAY OF BLOOD CHLORIDE: CPT | Mod: ZL | Performed by: FAMILY MEDICINE

## 2025-03-12 PROCEDURE — G0463 HOSPITAL OUTPT CLINIC VISIT: HCPCS

## 2025-03-12 PROCEDURE — 82565 ASSAY OF CREATININE: CPT | Mod: ZL | Performed by: FAMILY MEDICINE

## 2025-03-12 ASSESSMENT — PAIN SCALES - GENERAL: PAINLEVEL_OUTOF10: NO PAIN (0)

## 2025-03-12 NOTE — PATIENT INSTRUCTIONS
Will notify of lab results.  Keep hydrated.  Change position slowly.  Healthy diet.  Follow up for preop as planned.

## 2025-03-18 ENCOUNTER — THERAPY VISIT (OUTPATIENT)
Dept: PHYSICAL THERAPY | Facility: HOSPITAL | Age: 75
End: 2025-03-18
Attending: PODIATRIST
Payer: COMMERCIAL

## 2025-03-18 DIAGNOSIS — M76.72 PERONEAL TENDINITIS OF LEFT LOWER LEG: ICD-10-CM

## 2025-03-18 DIAGNOSIS — M25.572 SINUS TARSI SYNDROME OF LEFT ANKLE: Primary | ICD-10-CM

## 2025-03-18 PROCEDURE — 97110 THERAPEUTIC EXERCISES: CPT | Mod: GP

## 2025-03-23 NOTE — PATIENT INSTRUCTIONS
How to Take Your Medication Before Surgery  Preoperative Medication Instructions   Antiplatelet or Anticoagulation Medication Instructions   - aspirin: Discontinue aspirin 7 days prior to procedure to reduce bleeding risk. It should be resumed postoperatively.     Additional Medication Instructions  Take all scheduled medications on the day of surgery EXCEPT for modifications listed below:   - Herbal medications and vitamins: DO NOT TAKE 14 days prior to surgery.   - ibuprofen (Advil, Motrin): DO NOT TAKE 1 day before surgery.    - ketorolac (Toradol): DO NOT TAKE 1 day before surgery.    - naproxen (Aleve, Naprosyn): DO NOT TAKE 4 days before surgery.    - Topicals: DO NOT TAKE day of surgery.    Ok to use Tylenol as needed.  Ok to use miralax as needed.  Continue your thyroid, Neuronin, Simvastatin, Ropinerole.       Patient Education   Preparing for Your Surgery  For Adults  Getting started  In most cases, a nurse will call to review your health history and instructions. They will give you an arrival time based on your scheduled surgery time. Please be ready to share:  Your doctor's clinic name and phone number  Your medical, surgical, and anesthesia history  A list of allergies and sensitivities  A list of medicines, including herbal treatments and over-the-counter drugs  Whether the patient has a legal guardian (ask how to send us the papers in advance)  Note: You may not receive a call if you were seen at our PAC (Preoperative Assessment Center).  Please tell us if you're pregnant--or if there's any chance you might be pregnant. Some surgeries may injure a fetus (unborn baby), so they require a pregnancy test. Surgeries that are safe for a fetus don't always need a test, and you can choose whether to have one.   Preparing for surgery  Within 10 to 30 days of surgery: Have a pre-op exam (sometimes called an H&P, or History and Physical). This can be done at a clinic or pre-operative center.  If you're having a  , you may not need this exam. Talk to your care team.  At your pre-op exam, talk to your care team about all medicines you take. (This includes CBD oil and any drugs, such as THC, marijuana, and other forms of cannabis.) If you need to stop any medicine before surgery, ask when to start taking it again.  This is for your safety. Many medicines and drugs can make you bleed too much during surgery. Some change how well surgery (anesthesia) drugs work.  Call your insurance company to let them know you're having surgery. (If you don't have insurance, call [phone].)  Call your clinic if there's any change in your health. This includes a scrape or scratch near the surgery site, or any signs of a cold (sore throat, runny nose, cough, rash, fever).  Eating and drinking guidelines  For your safety: Unless your surgeon tells you otherwise, follow the guidelines below.  Eat and drink as normal until 8 hours before you arrive for surgery. After that, no food or milk. You can spit out gum when you arrive.  Drink clear liquids until 2 hours before you arrive. These are liquids you can see through, like water, Gatorade, and Propel Water. They also include plain black coffee and tea (no cream or milk).  No alcohol for 24 hours before you arrive. The night before surgery, stop any drinks that contain THC.  If your care team tells you to take medicine on the morning of surgery, it's okay to take it with a sip of water. No other medicines or drugs are allowed (including CBD oil)--follow your care team's instructions.  If you have questions the day of surgery, call your hospital or surgery center.   Preventing infection  Shower or bathe the night before and the morning of surgery. Follow the instructions your clinic gave you. (If no instructions, use regular soap.)  Don't shave or clip hair near your surgery site. We'll remove the hair if needed.  Don't smoke or vape the morning of surgery. No chewing tobacco for 6 hours  before you arrive. A nicotine patch is okay. You may spit out nicotine gum when you arrive.  For some surgeries, the surgeon will tell you to fully quit smoking and nicotine.  We will make every effort to keep you safe from infection. We will:  Clean our hands often with soap and water (or an alcohol-based hand rub).  Clean the skin at your surgery site with a special soap that kills germs.  Give you a special gown to keep you warm. (Cold raises the risk of infection.)  Wear hair covers, masks, gowns, and gloves during surgery.  Give antibiotic medicine, if prescribed. Not all surgeries need this medicine.  What to bring on the day of surgery  Photo ID and insurance card  Copy of your health care directive, if you have one  Glasses and hearing aids (bring cases)  You can't wear contacts during surgery  Inhaler and eye drops, if you use them (tell us about these when you arrive)  CPAP machine or breathing device, if you use them  A few personal items, if spending the night  If you have . . .  A pacemaker, ICD (cardiac defibrillator), or other implant: Bring the ID card.  An implanted stimulator: Bring the remote control.  A legal guardian: Bring a copy of the certified (court-stamped) guardianship papers.  Please remove any jewelry, including body piercings. Leave jewelry and other valuables at home.  If you're going home the day of surgery  You must have a responsible adult drive you home. They should stay with you overnight as well.  If you don't have someone to stay with you, and you aren't safe to go home alone, we may keep you overnight. Insurance often won't pay for this.  After surgery  If it's hard to control your pain or you need more pain medicine, please call your surgeon's office.  Questions?   If you have any questions for your care team, list them here:    ____________________________________________________________________________________________________________________________________________________________________________________________________________________________________________________________  For informational purposes only. Not to replace the advice of your health care provider. Copyright   2003, 2019 [facility]. All rights reserved. Clinically reviewed by Yovanny Fu MD. [employer] 100198 - REV 08/24.

## 2025-03-24 ENCOUNTER — OFFICE VISIT (OUTPATIENT)
Dept: FAMILY MEDICINE | Facility: OTHER | Age: 75
End: 2025-03-24
Attending: FAMILY MEDICINE
Payer: COMMERCIAL

## 2025-03-24 ENCOUNTER — TELEPHONE (OUTPATIENT)
Dept: FAMILY MEDICINE | Facility: OTHER | Age: 75
End: 2025-03-24

## 2025-03-24 VITALS
OXYGEN SATURATION: 93 % | HEIGHT: 61 IN | TEMPERATURE: 97.9 F | SYSTOLIC BLOOD PRESSURE: 116 MMHG | RESPIRATION RATE: 16 BRPM | HEART RATE: 67 BPM | BODY MASS INDEX: 21.79 KG/M2 | WEIGHT: 115.4 LBS | DIASTOLIC BLOOD PRESSURE: 56 MMHG

## 2025-03-24 DIAGNOSIS — E03.9 HYPOTHYROIDISM, UNSPECIFIED TYPE: Chronic | ICD-10-CM

## 2025-03-24 DIAGNOSIS — M48.062 SPINAL STENOSIS OF LUMBAR REGION WITH NEUROGENIC CLAUDICATION: ICD-10-CM

## 2025-03-24 DIAGNOSIS — G25.81 RESTLESS LEGS SYNDROME (RLS): ICD-10-CM

## 2025-03-24 DIAGNOSIS — M81.0 AGE-RELATED OSTEOPOROSIS WITHOUT CURRENT PATHOLOGICAL FRACTURE: Chronic | ICD-10-CM

## 2025-03-24 DIAGNOSIS — R73.03 PREDIABETES: ICD-10-CM

## 2025-03-24 DIAGNOSIS — E78.5 HYPERLIPIDEMIA, UNSPECIFIED HYPERLIPIDEMIA TYPE: ICD-10-CM

## 2025-03-24 DIAGNOSIS — K21.9 LARYNGOPHARYNGEAL REFLUX (LPR): ICD-10-CM

## 2025-03-24 DIAGNOSIS — Z01.818 PREOP GENERAL PHYSICAL EXAM: Primary | ICD-10-CM

## 2025-03-24 PROCEDURE — G0463 HOSPITAL OUTPT CLINIC VISIT: HCPCS | Mod: 25

## 2025-03-24 ASSESSMENT — PATIENT HEALTH QUESTIONNAIRE - PHQ9
10. IF YOU CHECKED OFF ANY PROBLEMS, HOW DIFFICULT HAVE THESE PROBLEMS MADE IT FOR YOU TO DO YOUR WORK, TAKE CARE OF THINGS AT HOME, OR GET ALONG WITH OTHER PEOPLE: NOT DIFFICULT AT ALL
SUM OF ALL RESPONSES TO PHQ QUESTIONS 1-9: 0
SUM OF ALL RESPONSES TO PHQ QUESTIONS 1-9: 0

## 2025-03-24 ASSESSMENT — ANXIETY QUESTIONNAIRES: GAD7 TOTAL SCORE: INCOMPLETE

## 2025-03-24 ASSESSMENT — PAIN SCALES - GENERAL: PAINLEVEL_OUTOF10: MODERATE PAIN (5)

## 2025-03-24 NOTE — PROGRESS NOTES
Preoperative Evaluation  [facility] [place]  [address]  [place] [address]  Phone: [phone]  Primary Provider: Melyssa Encinas MD  Pre-op Performing Provider: Melyssa Encinas MD  Mar 24, 2025             3/24/2025   Surgical Information   What procedure is being done? Lumbar Fusion   Facility or Hospital where procedure/surgery will be performed: [facility]   Who is doing the procedure / surgery? Dr. Vic Graf   Date of surgery / procedure: 04/07/2025   Time of surgery / procedure: Unknown   Where do you plan to recover after surgery? at a nursing home     Fax number for surgical facility: [phone] [facility]    Assessment & Plan     The proposed surgical procedure is considered INTERMEDIATE risk.    Preop general physical exam  Proceed as planned.  Advised to share her preop instructions with dentist at upcoming appointment/procedure for reference on NSAIDs and timing before surgery.    Spinal stenosis of lumbar region with neurogenic claudication  As above    Prediabetes  Stable.  Annual A1c up to date.    Hyperlipidemia, unspecified hyperlipidemia type  Continue statin  Lipids at goal.    Hypothyroidism, unspecified type  Stable.  Continue synthroid    Age-related osteoporosis without current pathological fracture  On drug holiday from Reclast.   DEXA due in 9/2025.    Restless legs syndrome (RLS)  Stable with Requip.    Laryngopharyngeal reflux (LPR)  Not currently active.           Risks and Recommendations  The patient has the following additional risks and recommendations for perioperative complications:   - No identified additional risk factors other than previously addressed    Antiplatelet or Anticoagulation Medication Instructions   - aspirin: Discontinue aspirin 7 days prior to procedure to reduce bleeding risk. It should be resumed postoperatively.     Additional Medication Instructions  Take all scheduled medications on the day of surgery EXCEPT for  modifications listed below:   - Herbal medications and vitamins: DO NOT TAKE 14 days prior to surgery.   - ibuprofen (Advil, Motrin): DO NOT TAKE 1 day before surgery.    - ketorolac (Toradol): DO NOT TAKE 1 day before surgery.    - naproxen (Aleve, Naprosyn): DO NOT TAKE 4 days before surgery.    - Topicals: DO NOT TAKE day of surgery.    Recommendation  Approval given to proceed with proposed procedure, without further diagnostic evaluation.    Megha Chairez is a 74 year old, presenting for the following:  Pre-Op Exam (Lumbar Fusion)          3/24/2025     1:02 PM   Additional Questions   Roomed by Clay Rodriguez LPN   Accompanied by Self         3/24/2025     1:02 PM   Patient Reported Additional Medications   Patient reports taking the following new medications None     HPI: Patient with lumbar stenosis, neurogenic claudication, grade 1 L3-4 spondylolisthesis, and left lateral recess neuroforaminal stenosis L4/5.    Prior L4/5 laminectomy 5/2024 with Dr Graf.    Planning L3/4 and L4/5 transforaminal lumbar interbody fusion, left sided facetectomies.    2 day hospitalization.        3/24/2025   Pre-Op Questionnaire   Have you ever had a heart attack or stroke? No   Have you ever had surgery on your heart or blood vessels, such as a stent placement, a coronary artery bypass, or surgery on an artery in your head, neck, heart, or legs? No   Do you have chest pain with activity? No   Do you have a history of heart failure? No   Do you currently have a cold, bronchitis or symptoms of other infection? No   Do you have a cough, shortness of breath, or wheezing? No   Do you or anyone in your family have previous history of blood clots? (!) YES Father - femoral bypass - post op leg clot - details unsure   Do you or does anyone in your family have a serious bleeding problem such as prolonged bleeding following surgeries or cuts? No   Have you ever had problems with anemia or been told to take iron pills? (!) YES  Is taking iron pills; iron levels normal 11/11/24   Have you had any abnormal blood loss such as black, tarry or bloody stools, or abnormal vaginal bleeding? No   Have you ever had a blood transfusion? No   Are you willing to have a blood transfusion if it is medically needed before, during, or after your surgery? Yes   Have you or any of your relatives ever had problems with anesthesia? No   Do you have sleep apnea, excessive snoring or daytime drowsiness? No   Do you have any artifical heart valves or other implanted medical devices like a pacemaker, defibrillator, or continuous glucose monitor? No   Do you have artificial joints? No   Are you allergic to latex? No     Health Care Directive  Patient has a Health Care Directive on file      Preoperative Review of    reviewed - controlled substances reflected in medication list.    Gabapentin only.    Status of Chronic Conditions:  See problem list for active medical problems.  Problems all longstanding and stable, except as noted/documented.  See ROS for pertinent symptoms related to these conditions.    HYPERLIPIDEMIA - Patient has a long history of significant Hyperlipidemia requiring medication for treatment with recent good control. Patient reports no problems or side effects with the medication.   Zocor 20 mg    HYPOTHYROIDISM - Patient has a longstanding history of chronic Hypothyroidism. Patient has been doing well, noting no tremor, insomnia, hair loss or changes in skin texture. Continues to take medications as directed, without adverse reactions or side effects. Last TSH   Lab Results   Component Value Date    TSH 3.20 03/12/2025   .      PREDIABETES -   Lab Results   Component Value Date    A1C 5.8 06/19/2024    A1C 6.0 06/08/2023    A1C 5.6 06/08/2022    A1C 5.6 09/15/2020    A1C 5.7 10/21/2019    A1C 5.7 10/08/2018    A1C 5.5 11/01/2017    A1C 5.6 10/28/2016     OSTEOPOROSIS - prior endocrine consult; prior Reclast x 5 doses; last dexa 9/2023 -  increased in density compared with prior; due for repeat dex 9/2025; currently on drug holiday    LPR/GERD - off PPI; not active    RLS - Requip - stable    DENTAL FRACTURE 3/14/25 - right lower - seen by dentist - has temporary crown in place; can't eat on that side; permanent one going in place 4/3/25.    HISTORY OF BREAST CANCER - s/p bilateral mastectomy 2004.    Patient Active Problem List    Diagnosis Date Noted    Restless legs syndrome (RLS) 04/15/2024     Priority: Medium    Prediabetes 04/15/2024     Priority: Medium    Spinal stenosis of lumbar region 12/04/2023     Priority: Medium    Seborrheic dermatitis of scalp 08/17/2021     Priority: Medium    Hypothyroidism, unspecified type 11/29/2019     Priority: Medium    probably LEFT first branchial cleft cyst 09/30/2016     Priority: Medium     no further surgery recommended unless chronic drainage occurs.  Ensure this is not a melanoma, path pending      Hyperlipidemia, unspecified hyperlipidemia type 09/15/2016     Priority: Medium    ACP (advance care planning) 01/22/2016     Priority: Medium     Advance Care Planning 6/2/2016: Receipt of ACP document:  Received: Health Care Directive which was witnessed or notarized on 6/1/16.  Document not previously scanned.  Validation form completed and sent with document to be scanned.  Code Status reflects choices in most recent ACP document.  Confirmed/documented designated decision maker(s).  Added by Paola Johnson  Advance Care Planning 1/22/2016: Receipt of ACP document:  Received: Health Care Directive which was witnessed or notarized on 10-21-08.  Document previously scanned on 12-7-15.  Validation form completed and sent to be scanned.  Code Status needs to be updated to reflect choices in most recent ACP document.  Confirmed/documented designated decision maker(s).  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 Provider to review        Past Medical History:   Diagnosis Date    Atypical nevi     Chondrodermatitis nodularis helicis of left ear     Dr Shipley, [facility]    Chronic rhinitis     Dr Messina, [facility] allergy; negative skin testing; IgE mediated allergies; ENT - DC nasal steroid and antihistamine; saline rinses    Degenerative skin disorder     solar elastosis    Hallux rigidus 06/15/2000    Hyperlipidemia, unspecified hyperlipidemia type 09/15/2016    Laryngopharyngeal reflux disease     PPI    Malignant neoplasm of breast (female), unspecified site 03/10/2004    Notalgia paresthetica     Osteoarthritis 07/20/2011    Osteoporosis, unspecified 09/10/2001    Dr Moses; intermittent reclast;  Dr. Moses; repeat dexa after full 2 years Reclast    Other abnormal glucose 12/20/2013    Paresthesia     neck; notalgiaparesthetic; dr leahy & Dr Nevarez; neurontin    Personal history of malignant neoplasm of breast 06/07/2005    Rhinitis     Schamberg's purpura     Spinal stenosis of lumbar region      Past Surgical History:   Procedure Laterality Date    BACK SURGERY  05/2024    Dr Graf; L4/5 laminectomy; spinal stenosis; claudication    BONE MARROW BIOPSY      negative    COLONOSCOPY  07/2000    COLONOSCOPY  08/13/2013    DR Fu; repeat 5 years    COLONOSCOPY N/A 08/13/2018    Procedure:  COLONOSCOPY;  COLONOSCOPY;  Surgeon: Ajit Uriarte MD;  Location: [facility]    COLONOSCOPY N/A 12/02/2022    Procedure: COLONOSCOPY, WITH POLYPECTOMY AND BIOPSY;  Surgeon: Marcellus Jimenez MD;  Location: [facility]    DILATION AND CURETTAGE, OPERATIVE HYSTEROSCOPY, COMBINED N/A 02/13/2019    Procedure: EXAM UNDER ANESTHESIA , HYSTEROSCOPY;  Surgeon: Jovi Gabriel MD;  Location: [facility]    HEMORRHOIDECTOMY  1986    IR CONSULTATION FOR IR EXAM  12/05/2023    MASTECTOMY, BILATERAL  03/01/2004    right sided breast cancer    RECONSTRUCT FOREFOOT WITH METATARSOPHALANGEAL (MTP) FUSION Right 06/16/2023    Procedure: Right First Metatarsophalangeal Joint Fusion;  Surgeon: Ac Parker DPM;  Location: [facility]    RELEASE TRIGGER FINGER Right 03/07/2018    Procedure: RELEASE TRIGGER FINGER;  RELEASE TRIGGER DIGIT RIGHT THUMB;  Surgeon: Jose Alfredo Brewster DO;  Location: [facility]    RELEASE TRIGGER FINGER Left 03/15/2023    Procedure: Left Thumb Trigger Finger Release;  Surgeon: Rigoberto Olivera MD;  Location: [facility]    RELEASE TRIGGER FINGER Right 05/17/2023    Procedure: Revision Right Thumb Trigger Release;  Surgeon: Rigoberto Olivera MD;  Location: [facility]    REMOVE HARDWARE FOOT Right 08/16/2024    Procedure: Hardware Removal Right Foot;  Surgeon: Ac Parker DPM;  Location: [facility]    REPAIR HAMMER TOE Right 08/16/2024    Procedure: Second Toe Interphalangeal Joint Capsulotomy right;  Surgeon: Ac Parker DPM;  Location: [facility]    [facility]      [facility]  [facility]    cancer screening, family h/o colon cancer; hyperplastic polyp     Current Outpatient Medications   Medication Sig Dispense Refill    aspirin 81 MG EC tablet Take 1 tablet (81 mg) by mouth 2 times daily 60 tablet 0    CALCIUM CITRATE 1,500 mg two times daily       cinnamon 500 MG CAPS Take 1 capsule by mouth 2 times daily      ferrous sulfate (SLO-FE) 142 (45 Fe) MG CR tablet Take 1 tablet (142 mg) by mouth daily 90 tablet 1  "   fish oil-omega-3 fatty acids 1000 MG capsule Take 1 g by mouth 2 times daily       gabapentin (NEURONTIN) 300 MG capsule Take 300 mg AM and 900 mg  capsule 2    levothyroxine (SYNTHROID/LEVOTHROID) 25 MCG tablet Take 2 tablets 4 days a week and 1 tablet 3 days a week. 153 tablet 3    MAGNESIUM OXIDE PO Take 200 mg by mouth daily      Multiple Vitamins-Minerals (PRESERVISION AREDS PO) Take 1 tablet by mouth 2 times daily      Polyethylene Glycol 3350 (MIRALAX PO) Use every other day      rOPINIRole (REQUIP) 1 MG tablet Take 4 tablets (4 mg) by mouth at bedtime 400 tablet 3    simvastatin (ZOCOR) 20 MG tablet Take 1 tablet (20 mg) by mouth at bedtime 90 tablet 3    triamcinolone (KENALOG) 0.025 % cream Apply to back once a day if the back is itchy. 454 g 1    Turmeric 500 MG CAPS Take 2 capsules by mouth daily       zinc 50 MG TABS Take 1 tablet by mouth daily          Allergies   Allergen Reactions    Chlorhexidine Gluconate Rash    Povidone Iodine Rash     Betadine     Soap Rash     Betadine         Social History     Tobacco Use    Smoking status: Never     Passive exposure: Never    Smokeless tobacco: Never   Substance Use Topics    Alcohol use: Yes     Alcohol/week: 0.0 standard drinks of alcohol     Comment: 1 glasso wine, weekly      Family History   Problem Relation Age of Onset    Dementia Mother         (cause of death)     High cholesterol Mother     Osteoarthritis Mother     C.A.D. Father         (cause of death)     Prostate Cancer Father     Breast Cancer Maternal Aunt 72    Cerebrovascular Disease Maternal Grandmother         CVA    Diabetes Maternal Grandmother      History   Drug Use No             Review of Systems  Constitutional, HEENT, cardiovascular, pulmonary, gi and gu systems are negative, except as otherwise noted.    Objective    /56   Pulse 67   Temp 97.9  F (36.6  C) (Tympanic)   Resp 16   Ht 1.549 m (5' 1\")   Wt 52.3 kg (115 lb 6.4 oz)   SpO2 93%   BMI 21.80 kg/m   " "  Estimated body mass index is 21.8 kg/m  as calculated from the following:    Height as of this encounter: 1.549 m (5' 1\").    Weight as of this encounter: 52.3 kg (115 lb 6.4 oz).  Physical Exam  GENERAL: alert and no distress  EYES: Eyes grossly normal to inspection, PERRL and conjunctivae and sclerae normal  HENT: ear canals and TM's normal, nose and mouth without ulcers or lesions  HENT: slight swelling right lower jaw line  NECK: no adenopathy, no asymmetry, masses, or scars  RESP: lungs clear to auscultation - no rales, rhonchi or wheezes  CV: regular rate and rhythm, normal S1 S2, no S3 or S4, no murmur, click or rub, no peripheral edema  ABDOMEN: soft, nontender, no hepatosplenomegaly, no masses and bowel sounds normal  MS: no gross musculoskeletal defects noted, no edema  SKIN: no suspicious lesions or rashes  NEURO: Normal strength and tone, mentation intact and speech normal  PSYCH: mentation appears normal, affect normal/bright    Recent Labs   Lab Test 03/12/25  1103 11/11/24  1458 08/05/24  1315 06/19/24  0828   HGB 12.7 12.3 13.2 12.3    301 257 292     --  133* 132*   POTASSIUM 4.6  --  4.6 4.8   CR 0.68  --  0.68 0.67   A1C  --   --   --  5.8*        Diagnostics  Recent Results (from the past 720 hours)   EKG 12-lead, tracing only    Collection Time: 03/03/25  5:29 PM   Result Value Ref Range    Systolic Blood Pressure  mmHg    Diastolic Blood Pressure  mmHg    Ventricular Rate 61 BPM    Atrial Rate 61 BPM    MN Interval 206 ms    QRS Duration 106 ms     ms    QTc 384 ms    P Axis 62 degrees    R AXIS 68 degrees    T Axis 56 degrees    Interpretation ECG       Sinus rhythm  Normal ECG  When compared with ECG of 05-Aug-2024 13:23,  No significant change was found  Confirmed by MD Mireille, Gerson ([id number]) on 3/4/2025 2:53:06 PM     TSH with free T4 reflex    Collection Time: 03/12/25 11:03 AM   Result Value Ref Range    TSH 3.20 0.30 - 4.20 uIU/mL   Comprehensive metabolic " panel (BMP + Alb, Alk Phos, ALT, AST, Total. Bili, TP)    Collection Time: 03/12/25 11:03 AM   Result Value Ref Range    Sodium 141 135 - 145 mmol/L    Potassium 4.6 3.4 - 5.3 mmol/L    Carbon Dioxide (CO2) 28 22 - 29 mmol/L    Anion Gap 12 7 - 15 mmol/L    Urea Nitrogen 18.0 8.0 - 23.0 mg/dL    Creatinine 0.68 0.51 - 0.95 mg/dL    GFR Estimate 90 >60 mL/min/1.73m2    Calcium 10.0 8.8 - 10.4 mg/dL    Chloride 101 98 - 107 mmol/L    Glucose 116 (H) 70 - 99 mg/dL    Alkaline Phosphatase 87 40 - 150 U/L    AST 26 0 - 45 U/L    ALT 23 0 - 50 U/L    Protein Total 7.7 6.4 - 8.3 g/dL    Albumin 4.4 3.5 - 5.2 g/dL    Bilirubin Total 0.2 <=1.2 mg/dL   CBC with platelets and differential    Collection Time: 03/12/25 11:03 AM   Result Value Ref Range    WBC Count 4.6 4.0 - 11.0 10e3/uL    RBC Count 3.84 3.80 - 5.20 10e6/uL    Hemoglobin 12.7 11.7 - 15.7 g/dL    Hematocrit 37.9 35.0 - 47.0 %    MCV 99 78 - 100 fL    MCH 33.1 (H) 26.5 - 33.0 pg    MCHC 33.5 31.5 - 36.5 g/dL    RDW 13.3 10.0 - 15.0 %    Platelet Count 282 150 - 450 10e3/uL    % Neutrophils 62 %    % Lymphocytes 22 %    % Monocytes 13 %    % Eosinophils 2 %    % Basophils 1 %    % Immature Granulocytes 0 %    NRBCs per 100 WBC 0 <1 /100    Absolute Neutrophils 2.8 1.6 - 8.3 10e3/uL    Absolute Lymphocytes 1.0 0.8 - 5.3 10e3/uL    Absolute Monocytes 0.6 0.0 - 1.3 10e3/uL    Absolute Eosinophils 0.1 0.0 - 0.7 10e3/uL    Absolute Basophils 0.1 0.0 - 0.2 10e3/uL    Absolute Immature Granulocytes 0.0 <=0.4 10e3/uL    Absolute NRBCs 0.0 10e3/uL      No EKG this visit, completed in the last 90 days.  EKG: appears normal, NSR, normal axis, normal intervals, no acute ST/T changes c/w ischemia, no LVH by voltage criteria, unchanged from previous tracings, and was performed 3/3/25    The longitudinal plan of care for the diagnosis(es)/condition(s) as documented were addressed during this visit. Due to the added complexity in care, I will continue to support Mihaela in the  subsequent management and with ongoing continuity of care.    Revised Cardiac Risk Index (RCRI)  The patient has the following serious cardiovascular risks for perioperative complications:   - No serious cardiac risks = 0 points     RCRI Interpretation: 0 points: Class I (very low risk - 0.4% complication rate)         Signed Electronically by: Melyssa Encinas MD  A copy of this evaluation report is provided to the requesting physician.

## 2025-03-27 ENCOUNTER — TRANSFERRED RECORDS (OUTPATIENT)
Dept: HEALTH INFORMATION MANAGEMENT | Facility: CLINIC | Age: 75
End: 2025-03-27

## 2025-04-08 ENCOUNTER — TELEPHONE (OUTPATIENT)
Dept: FAMILY MEDICINE | Facility: OTHER | Age: 75
End: 2025-04-08

## 2025-04-08 NOTE — TELEPHONE ENCOUNTER
11:02 AM    Reason for Call: OVERBOOK    Patient is having the following symptoms: growth in nostril.    The patient is requesting an appointment for as soon as possible with Dr. Connors.    Was an appointment offered for this call? No  If yes : Appointment type              Date    Preferred method for responding to this message: Telephone Call    What is your phone number [phone]    If we cannot reach you directly, may we leave a detailed response at the number you provided? Yes    Can this message wait until your PCP/provider returns, if unavailable today? No, please call her back today (04/08/2025) if possible     Alma Rosa Harp

## 2025-04-08 NOTE — TELEPHONE ENCOUNTER
Can use a same day this Friday please.   Doxycycline Pregnancy And Lactation Text: This medication is Pregnancy Category D and not consider safe during pregnancy. It is also excreted in breast milk but is considered safe for shorter treatment courses.

## 2025-04-15 NOTE — PATIENT INSTRUCTIONS
DEXA scan due 9/2025 - order placed.  Radiology will call to schedule.  Labs fasting to be done prior to preop next month.  Water and medications fine.        Patient Education   Preventive Care Advice   This is general advice given by our system to help you stay healthy. However, your care team may have specific advice just for you. Please talk to your care team about your preventive care needs.  Nutrition  Eat 5 or more servings of fruits and vegetables each day.  Try wheat bread, brown rice and whole grain pasta (instead of white bread, rice, and pasta).  Get enough calcium and vitamin D. Check the label on foods and aim for 100% of the RDA (recommended daily allowance).  Lifestyle  Exercise at least 150 minutes each week  (30 minutes a day, 5 days a week).  Do muscle strengthening activities 2 days a week. These help control your weight and prevent disease.  No smoking.  Wear sunscreen to prevent skin cancer.  Have a dental exam and cleaning every 6 months.  Yearly exams  See your health care team every year to talk about:  Any changes in your health.  Any medicines your care team has prescribed.  Preventive care, family planning, and ways to prevent chronic diseases.  Shots (vaccines)   HPV shots (up to age 26), if you've never had them before.  Hepatitis B shots (up to age 59), if you've never had them before.  COVID-19 shot: Get this shot when it's due.  Flu shot: Get a flu shot every year.  Tetanus shot: Get a tetanus shot every 10 years.  Pneumococcal, hepatitis A, and RSV shots: Ask your care team if you need these based on your risk.  Shingles shot (for age 50 and up)  General health tests  Diabetes screening:  Starting at age 35, Get screened for diabetes at least every 3 years.  If you are younger than age 35, ask your care team if you should be screened for diabetes.  Cholesterol test: At age 39, start having a cholesterol test every 5 years, or more often if advised.  Bone density scan (DEXA): At age  50, ask your care team if you should have this scan for osteoporosis (brittle bones).  Hepatitis C: Get tested at least once in your life.  STIs (sexually transmitted infections)  Before age 24: Ask your care team if you should be screened for STIs.  After age 24: Get screened for STIs if you're at risk. You are at risk for STIs (including HIV) if:  You are sexually active with more than one person.  You don't use condoms every time.  You or a partner was diagnosed with a sexually transmitted infection.  If you are at risk for HIV, ask about PrEP medicine to prevent HIV.  Get tested for HIV at least once in your life, whether you are at risk for HIV or not.  Cancer screening tests  Cervical cancer screening: If you have a cervix, begin getting regular cervical cancer screening tests starting at age 21.  Breast cancer scan (mammogram): If you've ever had breasts, begin having regular mammograms starting at age 40. This is a scan to check for breast cancer.  Colon cancer screening: It is important to start screening for colon cancer at age 45.  Have a colonoscopy test every 10 years (or more often if you're at risk) Or, ask your provider about stool tests like a FIT test every year or Cologuard test every 3 years.  To learn more about your testing options, visit:   .  For help making a decision, visit:   https://bit.ly/jv09970.  Prostate cancer screening test: If you have a prostate, ask your care team if a prostate cancer screening test (PSA) at age 55 is right for you.  Lung cancer screening: If you are a current or former smoker ages 50 to 80, ask your care team if ongoing lung cancer screenings are right for you.  For informational purposes only. Not to replace the advice of your health care provider. Copyright   2023 [employer]. All rights reserved. Clinically reviewed by the [facility] Transitions Program. [employer] [id number] - REV 01/24.

## 2025-04-21 ENCOUNTER — TELEPHONE (OUTPATIENT)
Dept: FAMILY MEDICINE | Facility: OTHER | Age: 75
End: 2025-04-21

## 2025-04-21 NOTE — TELEPHONE ENCOUNTER
8:51 AM    Reason for Call: Phone Call    Description: pt called and is having surgery in June and pt needs Dr. Ramírez care team to make sure that cream that she puts on her back triamcinolone (KENALOG) 0.025 % [facility]. She was not sure if this was on med list or not that was faxed to these places. Please call pt     Was an appointment offered for this call? No  If yes : Appointment type              Date    Preferred method for responding to this message: Telephone Call  What is your phone number ? [phone]    If we cannot reach you directly, may we leave a detailed response at the number you provided? Yes    Can this message wait until your PCP/provider returns, if available today? Elli Perez

## 2025-04-30 ENCOUNTER — OFFICE VISIT (OUTPATIENT)
Dept: FAMILY MEDICINE | Facility: OTHER | Age: 75
End: 2025-04-30
Attending: FAMILY MEDICINE
Payer: COMMERCIAL

## 2025-04-30 VITALS
DIASTOLIC BLOOD PRESSURE: 54 MMHG | TEMPERATURE: 97.9 F | BODY MASS INDEX: 21.09 KG/M2 | WEIGHT: 111.7 LBS | RESPIRATION RATE: 14 BRPM | HEIGHT: 61 IN | HEART RATE: 57 BPM | OXYGEN SATURATION: 97 % | SYSTOLIC BLOOD PRESSURE: 112 MMHG

## 2025-04-30 DIAGNOSIS — Z00.00 ENCOUNTER FOR MEDICARE ANNUAL WELLNESS EXAM: Primary | ICD-10-CM

## 2025-04-30 DIAGNOSIS — E78.5 HYPERLIPIDEMIA, UNSPECIFIED HYPERLIPIDEMIA TYPE: ICD-10-CM

## 2025-04-30 DIAGNOSIS — M81.0 AGE-RELATED OSTEOPOROSIS WITHOUT CURRENT PATHOLOGICAL FRACTURE: Chronic | ICD-10-CM

## 2025-04-30 DIAGNOSIS — G25.81 RESTLESS LEGS SYNDROME (RLS): ICD-10-CM

## 2025-04-30 DIAGNOSIS — R73.03 PREDIABETES: ICD-10-CM

## 2025-04-30 DIAGNOSIS — E03.9 HYPOTHYROIDISM, UNSPECIFIED TYPE: Chronic | ICD-10-CM

## 2025-04-30 PROCEDURE — G0463 HOSPITAL OUTPT CLINIC VISIT: HCPCS

## 2025-04-30 SDOH — HEALTH STABILITY: PHYSICAL HEALTH: ON AVERAGE, HOW MANY DAYS PER WEEK DO YOU ENGAGE IN MODERATE TO STRENUOUS EXERCISE (LIKE A BRISK WALK)?: 5 DAYS

## 2025-04-30 SDOH — HEALTH STABILITY: PHYSICAL HEALTH: ON AVERAGE, HOW MANY MINUTES DO YOU ENGAGE IN EXERCISE AT THIS LEVEL?: 30 MIN

## 2025-04-30 ASSESSMENT — ANXIETY QUESTIONNAIRES
2. NOT BEING ABLE TO STOP OR CONTROL WORRYING: NOT AT ALL
4. TROUBLE RELAXING: NOT AT ALL
7. FEELING AFRAID AS IF SOMETHING AWFUL MIGHT HAPPEN: NOT AT ALL
GAD7 TOTAL SCORE: 0
6. BECOMING EASILY ANNOYED OR IRRITABLE: NOT AT ALL
6. BECOMING EASILY ANNOYED OR IRRITABLE: NOT AT ALL
4. TROUBLE RELAXING: NOT AT ALL
5. BEING SO RESTLESS THAT IT IS HARD TO SIT STILL: NOT AT ALL
5. BEING SO RESTLESS THAT IT IS HARD TO SIT STILL: NOT AT ALL
7. FEELING AFRAID AS IF SOMETHING AWFUL MIGHT HAPPEN: NOT AT ALL
3. WORRYING TOO MUCH ABOUT DIFFERENT THINGS: NOT AT ALL
2. NOT BEING ABLE TO STOP OR CONTROL WORRYING: NOT AT ALL
GAD7 TOTAL SCORE: 0
GAD7 TOTAL SCORE: 0
7. FEELING AFRAID AS IF SOMETHING AWFUL MIGHT HAPPEN: NOT AT ALL
GAD7 TOTAL SCORE: 0
1. FEELING NERVOUS, ANXIOUS, OR ON EDGE: NOT AT ALL
3. WORRYING TOO MUCH ABOUT DIFFERENT THINGS: NOT AT ALL
1. FEELING NERVOUS, ANXIOUS, OR ON EDGE: NOT AT ALL

## 2025-04-30 ASSESSMENT — SOCIAL DETERMINANTS OF HEALTH (SDOH): HOW OFTEN DO YOU GET TOGETHER WITH FRIENDS OR RELATIVES?: ONCE A WEEK

## 2025-04-30 ASSESSMENT — PATIENT HEALTH QUESTIONNAIRE - PHQ9
SUM OF ALL RESPONSES TO PHQ QUESTIONS 1-9: 0
SUM OF ALL RESPONSES TO PHQ QUESTIONS 1-9: 0

## 2025-04-30 ASSESSMENT — PAIN SCALES - GENERAL: PAINLEVEL_OUTOF10: NO PAIN (0)

## 2025-04-30 NOTE — PROGRESS NOTES
Preventive Care Visit  [facility]  Melyssa Encinas MD, Family Medicine  Apr 30, 2025      Assessment & Plan     Encounter for Medicare annual wellness exam  Preventative cares reviewed.  Vaccines and cancer screenings up to date.    Prediabetes  Annual A1c to be done at upcoming preop.  - Hemoglobin A1c; Future    Hyperlipidemia, unspecified hyperlipidemia type  Fasting labs to be done next month at preop.  Not fasting today.  - Lipid Profile (Chol, Trig, HDL, LDL calc); Future  - CBC with platelets and differential; Future  - Comprehensive metabolic panel (BMP + Alb, Alk Phos, ALT, AST, Total. Bili, TP); Future    Hypothyroidism, unspecified type  Stable.  Continue synthroid.    Age-related osteoporosis without current pathological fracture  Prior reclast x 5 years with improvement noted.  Holiday x 5 years.  Dexa due 9/2025.  Anticipate restarting reclast at that time.  - DX Bone Density; Future  - Vitamin D Deficiency; Future    Restless legs syndrome (RLS)  Stable.      Counseling  Appropriate preventive services were addressed with this patient via screening, questionnaire, or discussion as appropriate for fall prevention, nutrition, physical activity, Tobacco-use cessation, social engagement, weight loss and cognition.  Checklist reviewing preventive services available has been given to the patient.  Reviewed patient's diet, addressing concerns and/or questions.       Follow-up  Return in about 1 year (around 4/30/2026) for annual physical; also fasting labs day of or priro to preop next month.    Megha Chairez is a 75 year old, presenting for the following:  Physical, Lipids, and Thyroid Problem        4/30/2025     1:37 PM   Additional Questions   Roomed by Karen MARTÍNEZ   Accompanied by Self           HPI    Up to date on vaccines.    Colon screen 2022 - due 2032.  Screening age will be exceeded.    DEXA 9/2023 - due 9/2025.  Reclast - last dose 2020- completed 5 years.  Had noted  improvement in density. Drug holiday.    ENT referral recent - 5/30/25.  Nasal papule in nostril.     Lumbar fusion - moved back due to dental issues - scheduled 6/9/25.  Daily dental cares to socket until gum tissue heals.  Then can do implant.      Hyperlipidemia Follow-Up  Are you regularly taking any medication or supplement to lower your cholesterol?   Yes- Simvastatin  Are you having muscle aches or other side effects that you think could be caused by your cholesterol lowering medication?  No    Hypothyroidism Follow-up  Since last visit, patient describes the following symptoms: Weight stable, no hair loss, no skin changes, no constipation, no loose stools    Advance Care Planning    Document on file is a Health Care Directive or POLST.        12/13/2023   General Health   How would you rate your overall physical health? Good         12/13/2023   Nutrition   At least 4 servings of fruits and vegetables/day No         12/13/2023   Exercise   Frequency of exercise: 6-7 days/week         2/5/2024   Social Factors   Worry food won't last until get money to buy more No   Food not last or not have enough money for food? No   Do you have housing? (Housing is defined as stable permanent housing and does not include staying outside in a car, in a tent, in an abandoned building, in an overnight shelter, or couch-surfing.) Yes   Are you worried about losing your housing? No   Lack of transportation? No   Unable to get utilities (heat,electricity)? No         12/13/2023   Activities of Daily Living- Home Safety   Needs help with the following daily activites NO assistance is needed   Safety concerns in the home None of the above          No data to display                  12/13/2023   Hearing Screening   Hearing concerns? No concerns            No data to display                  Today's PHQ-2 Score:       4/30/2025     1:49 PM   PHQ-2 ( 1999 [employer])   Q1: Little interest or pleasure in doing things 0    Q2: Feeling  down, depressed or hopeless 0    PHQ-2 Score 0    Q1: Little interest or pleasure in doing things Not at all   Q2: Feeling down, depressed or hopeless Not at all   PHQ-2 Score 0       Proxy-reported           12/13/2023   Substance Use   Alcohol more than 3/day or more than 7/wk No     Social History     Tobacco Use    Smoking status: Never     Passive exposure: Never    Smokeless tobacco: Never   Vaping Use    Vaping status: Never Used   Substance Use Topics    Alcohol use: Yes     Alcohol/week: 0.0 standard drinks of alcohol     Comment: 1 glasso wine, weekly     Drug use: No          Mammogram Screening - After age 74- determine frequency with patient based on health status, life expectancy and patient goals    ASCVD Risk   The 10-year ASCVD risk score (Bin CANALES, et al., 2019) is: 15.8%    Values used to calculate the score:      Age: 75 years      Sex: Female      Is Non- : No      Diabetic: No      Tobacco smoker: No      Systolic Blood Pressure: 112 mmHg      Is BP treated: No      HDL Cholesterol: 68 mg/dL      Total Cholesterol: 168 mg/dL            Reviewed and updated as needed this visit by Provider      Problems               Past Medical History:   Diagnosis Date    Atypical nevi     Chondrodermatitis nodularis helicis of left ear     [facility]    Chronic rhinitis     [facility] allergy; negative skin testing; IgE mediated allergies; ENT - DC nasal steroid and antihistamine; saline rinses    Degenerative skin disorder     solar elastosis    Hallux rigidus 06/15/2000    Hyperlipidemia, unspecified hyperlipidemia type 09/15/2016    Laryngopharyngeal reflux disease     PPI    Malignant neoplasm of breast (female), unspecified site 03/10/2004    Notalgia paresthetica     Osteoarthritis 07/20/2011    Osteoporosis, unspecified 09/10/2001    Dr Moses; intermittent reclast;  Dr. Moses; repeat dexa after full 2 years Reclast    Other abnormal glucose  12/20/2013    Paresthesia     neck; notalgiaparesthetic; dr leahy & Dr Nevarez; neurontin    Personal history of malignant neoplasm of breast 06/07/2005    Rhinitis     Schamberg's purpura     Spinal stenosis of lumbar region      Past Surgical History:   Procedure Laterality Date    BACK SURGERY  05/2024    Dr Graf; L4/5 laminectomy; spinal stenosis; claudication    BONE MARROW BIOPSY      negative    COLONOSCOPY  07/2000    COLONOSCOPY  08/13/2013    DR Fu; repeat 5 years    COLONOSCOPY N/A 08/13/2018    Procedure: COLONOSCOPY;  COLONOSCOPY;  Surgeon: Ajit Uriarte MD;  Location: [facility]    COLONOSCOPY N/A 12/02/2022    Procedure: COLONOSCOPY, WITH POLYPECTOMY AND BIOPSY;  Surgeon: Marcellus Jimenez MD;  Location: [facility]    DILATION AND CURETTAGE, OPERATIVE HYSTEROSCOPY, COMBINED N/A 02/13/2019    Procedure: EXAM UNDER ANESTHESIA , HYSTEROSCOPY;  Surgeon: Jovi Gabriel MD;  Location: [facility]    HEMORRHOIDECTOMY  1986    IR CONSULTATION FOR IR EXAM  12/05/2023    MASTECTOMY, BILATERAL  03/01/2004    right sided breast cancer    RECONSTRUCT FOREFOOT WITH METATARSOPHALANGEAL (MTP) FUSION Right 06/16/2023    Procedure: Right First Metatarsophalangeal Joint Fusion;  Surgeon: Ac Parker DPM;  Location: [facility]    RELEASE TRIGGER FINGER Right 03/07/2018    Procedure: RELEASE TRIGGER FINGER;  RELEASE TRIGGER DIGIT RIGHT THUMB;  Surgeon: Jose Alfredo Brewster DO;  Location: [facility]    RELEASE TRIGGER FINGER Left 03/15/2023    Procedure: Left Thumb Trigger Finger Release;  Surgeon: Rigoberto Olivera MD;  [facility]    RELEASE TRIGGER FINGER Right 05/17/2023    Procedure: Revision Right Thumb Trigger Release;  Surgeon: Rigoberto Olivera MD;  [facility]    REMOVE HARDWARE FOOT Right 08/16/2024    Procedure: Hardware Removal Right Foot;  Surgeon: Ac Parker DPM;  [facility]    REPAIR HAMMER TOE Right 08/16/2024    Procedure: Second Toe Interphalangeal Joint Capsulotomy right;  Surgeon: Ac Parker  "DPM;  Location: [facility]    UPPER GI ENDOSCOPY      ZZ COLONOSCOPY THRU STOMA WITH BIOPSY  08/25/2010    cancer screening, family h/o colon cancer; hyperplastic polyp     Current providers sharing in care for this patient include:  Patient Care Team:  Melyssa Connors MD as PCP - General  Melyssa Connors MD as Assigned PCP  Katerina Campos DPM as Assigned Musculoskeletal Provider  Sejal Gutierrez PA-C as Assigned Surgical Provider  FAHAD Barakat MD as Assigned Dermatology Provider    The following health maintenance items are reviewed in Epic and correct as of today:  Health Maintenance   Topic Date Due    COVID-19 Vaccine (7 - 2024-25 season) 05/14/2025    LIPID  06/19/2025    JENNIFER ASSESSMENT  10/30/2025    PHQ-9  10/30/2025    TSH W/FREE T4 REFLEX  03/12/2026    MEDICARE ANNUAL WELLNESS VISIT  04/30/2026    FALL RISK ASSESSMENT  04/30/2026    DIABETES SCREENING  03/12/2028    ADVANCE CARE PLANNING  12/13/2028    DTAP/TDAP/TD IMMUNIZATION (3 - Td or Tdap) 05/14/2031    COLORECTAL CANCER SCREENING  12/02/2032    DEXA  09/27/2038    HEPATITIS C SCREENING  Completed    PHQ-2 (once per calendar year)  Completed    INFLUENZA VACCINE  Completed    Pneumococcal Vaccine: 50+ Years  Completed    ZOSTER IMMUNIZATION  Completed    RSV VACCINE  Completed    HPV IMMUNIZATION  Aged Out    MENINGITIS IMMUNIZATION  Aged Out         Review of Systems  Constitutional, HEENT, cardiovascular, pulmonary, gi and gu systems are negative, except as otherwise noted.     Objective    Exam  /54 (BP Location: Left arm, Patient Position: Sitting, Cuff Size: Adult Small)   Pulse 57   Temp 97.9  F (36.6  C) (Tympanic)   Resp 14   Ht 1.549 m (5' 1\")   Wt 50.7 kg (111 lb 11.2 oz)   SpO2 97%   BMI 21.11 kg/m     Estimated body mass index is 21.11 kg/m  as calculated from the following:    Height as of this encounter: 1.549 m (5' 1\").    Weight as of this encounter: 50.7 kg (111 lb 11.2 oz).    Physical Exam  GENERAL: alert " and no distress  EYES: Eyes grossly normal to inspection, PERRL and conjunctivae and sclerae normal  HENT: ear canals and TM's normal, nose and mouth without ulcers or lesions  HENT: small nasal polyp left nostril, anterior  NECK: no adenopathy, no asymmetry, masses, or scars  RESP: lungs clear to auscultation - no rales, rhonchi or wheezes  BREAST: no palpable axillary masses or adenopathy and s/p mastectomy bilatearl  CV: regular rate and rhythm, normal S1 S2, no S3 or S4, no murmur, click or rub, no peripheral edema  ABDOMEN: soft, nontender, no hepatosplenomegaly, no masses and bowel sounds normal  MS: no gross musculoskeletal defects noted, no edema  SKIN: no suspicious lesions or rashes  NEURO: Normal strength and tone, mentation intact and speech normal  PSYCH: mentation appears normal, affect normal/bright        4/30/2025   Mini Cog   Clock Draw Score 2 Normal   3 Item Recall 3 objects recalled   Mini Cog Total Score 5              Signed Electronically by: Melyssa Encinas MD

## 2025-05-01 ENCOUNTER — TELEPHONE (OUTPATIENT)
Dept: FAMILY MEDICINE | Facility: OTHER | Age: 75
End: 2025-05-01

## 2025-05-01 DIAGNOSIS — M81.0 OSTEOPOROSIS, UNSPECIFIED OSTEOPOROSIS TYPE, UNSPECIFIED PATHOLOGICAL FRACTURE PRESENCE: ICD-10-CM

## 2025-05-01 DIAGNOSIS — R53.81 PHYSICAL DECONDITIONING: Primary | ICD-10-CM

## 2025-05-01 NOTE — TELEPHONE ENCOUNTER
Not sure what she is referencing. If referring to osteoporosis prevention, any weight bearing exercise is fine - including walking.  If would like referral to physical therapy for strength training, conditioning guidance, can pend referral.

## 2025-05-01 NOTE — TELEPHONE ENCOUNTER
Spoke with patient. She received instructions from her visit with you yesterday, she was wondering what  muscle strengthening exercises you recommend her doing?

## 2025-05-01 NOTE — TELEPHONE ENCOUNTER
11:17 AM    Reason for Call: Phone Call    Description: Patient has questions on the print out she received after her visit she had with Dr Connors on Wednesday April 30     Was an appointment offered for this call? Yes  If yes : Appointment type              Date    Preferred method for responding to this message: Telephone Call  What is your phone number ? [phone]    If we cannot reach you directly, may we leave a detailed response at the number you provided? Yes    Can this message wait until your PCP/provider returns, if available today? Not applicable

## 2025-05-07 ENCOUNTER — TRANSFERRED RECORDS (OUTPATIENT)
Dept: HEALTH INFORMATION MANAGEMENT | Facility: CLINIC | Age: 75
End: 2025-05-07

## 2025-05-15 ENCOUNTER — TELEPHONE (OUTPATIENT)
Dept: FAMILY MEDICINE | Facility: OTHER | Age: 75
End: 2025-05-15

## 2025-05-15 NOTE — TELEPHONE ENCOUNTER
11:21 AM    Reason for Call: Phone Call    Description: Patient called regarding neck injections with OA, future labs on 05/16, and fleshy mass at septum of her nose removal on 05/30 is going to interfere with her back surgery on 06/09? Please call her to advise.    Was an appointment offered for this call? No  If yes : Appointment type              Date    Preferred method for responding to this message: Telephone Call  What is your phone number ?  [phone]     If we cannot reach you directly, may we leave a detailed response at the number you provided? Yes    Can this message wait until your PCP/provider returns, if available today? Not applicable    Lyndsey Knox

## 2025-05-15 NOTE — TELEPHONE ENCOUNTER
Spoke with patient. I had her contact [facility] to see what there protocol is. Not sure if the surgery transfers the medications to [facility] or does her primary to need to send in meds. She does have an upcoming appointment on 5/19 for the Preop.

## 2025-05-16 ENCOUNTER — RESULTS FOLLOW-UP (OUTPATIENT)
Dept: FAMILY MEDICINE | Facility: OTHER | Age: 75
End: 2025-05-16

## 2025-05-16 ENCOUNTER — LAB (OUTPATIENT)
Dept: LAB | Facility: OTHER | Age: 75
End: 2025-05-16
Attending: FAMILY MEDICINE
Payer: COMMERCIAL

## 2025-05-16 DIAGNOSIS — E78.5 HYPERLIPIDEMIA, UNSPECIFIED HYPERLIPIDEMIA TYPE: ICD-10-CM

## 2025-05-16 DIAGNOSIS — R73.03 PREDIABETES: ICD-10-CM

## 2025-05-16 DIAGNOSIS — M81.0 AGE-RELATED OSTEOPOROSIS WITHOUT CURRENT PATHOLOGICAL FRACTURE: Chronic | ICD-10-CM

## 2025-05-16 LAB
ALBUMIN SERPL BCG-MCNC: 4.4 G/DL (ref 3.5–5.2)
ALP SERPL-CCNC: 97 U/L (ref 40–150)
ALT SERPL W P-5'-P-CCNC: 22 U/L (ref 0–50)
ANION GAP SERPL CALCULATED.3IONS-SCNC: 9 MMOL/L (ref 7–15)
AST SERPL W P-5'-P-CCNC: 23 U/L (ref 0–45)
BASOPHILS # BLD AUTO: 0.1 10E3/UL (ref 0–0.2)
BASOPHILS NFR BLD AUTO: 1 %
BILIRUB SERPL-MCNC: 0.2 MG/DL
BUN SERPL-MCNC: 20.7 MG/DL (ref 8–23)
CALCIUM SERPL-MCNC: 9.7 MG/DL (ref 8.8–10.4)
CHLORIDE SERPL-SCNC: 100 MMOL/L (ref 98–107)
CHOLEST SERPL-MCNC: 162 MG/DL
CREAT SERPL-MCNC: 0.61 MG/DL (ref 0.51–0.95)
EGFRCR SERPLBLD CKD-EPI 2021: >90 ML/MIN/1.73M2
EOSINOPHIL # BLD AUTO: 0 10E3/UL (ref 0–0.7)
EOSINOPHIL NFR BLD AUTO: 1 %
ERYTHROCYTE [DISTWIDTH] IN BLOOD BY AUTOMATED COUNT: 12.5 % (ref 10–15)
EST. AVERAGE GLUCOSE BLD GHB EST-MCNC: 120 MG/DL
FASTING STATUS PATIENT QL REPORTED: YES
FASTING STATUS PATIENT QL REPORTED: YES
GLUCOSE SERPL-MCNC: 102 MG/DL (ref 70–99)
HBA1C MFR BLD: 5.8 %
HCO3 SERPL-SCNC: 24 MMOL/L (ref 22–29)
HCT VFR BLD AUTO: 34.7 % (ref 35–47)
HDLC SERPL-MCNC: 71 MG/DL
HGB BLD-MCNC: 12 G/DL (ref 11.7–15.7)
IMM GRANULOCYTES # BLD: 0 10E3/UL
IMM GRANULOCYTES NFR BLD: 0 %
LDLC SERPL CALC-MCNC: 78 MG/DL
LYMPHOCYTES # BLD AUTO: 1.3 10E3/UL (ref 0.8–5.3)
LYMPHOCYTES NFR BLD AUTO: 27 %
MCH RBC QN AUTO: 33.5 PG (ref 26.5–33)
MCHC RBC AUTO-ENTMCNC: 34.6 G/DL (ref 31.5–36.5)
MCV RBC AUTO: 97 FL (ref 78–100)
MONOCYTES # BLD AUTO: 0.6 10E3/UL (ref 0–1.3)
MONOCYTES NFR BLD AUTO: 12 %
NEUTROPHILS # BLD AUTO: 3 10E3/UL (ref 1.6–8.3)
NEUTROPHILS NFR BLD AUTO: 59 %
NONHDLC SERPL-MCNC: 91 MG/DL
NRBC # BLD AUTO: 0 10E3/UL
NRBC BLD AUTO-RTO: 0 /100
PLATELET # BLD AUTO: 267 10E3/UL (ref 150–450)
POTASSIUM SERPL-SCNC: 4.8 MMOL/L (ref 3.4–5.3)
PROT SERPL-MCNC: 6.9 G/DL (ref 6.4–8.3)
RBC # BLD AUTO: 3.58 10E6/UL (ref 3.8–5.2)
SODIUM SERPL-SCNC: 133 MMOL/L (ref 135–145)
TRIGL SERPL-MCNC: 65 MG/DL
VIT D+METAB SERPL-MCNC: 42 NG/ML (ref 20–50)
WBC # BLD AUTO: 5 10E3/UL (ref 4–11)

## 2025-05-16 PROCEDURE — 3048F LDL-C <100 MG/DL: CPT

## 2025-05-16 PROCEDURE — 80061 LIPID PANEL: CPT | Mod: ZL

## 2025-05-16 PROCEDURE — 3044F HG A1C LEVEL LT 7.0%: CPT

## 2025-05-16 PROCEDURE — 85025 COMPLETE CBC W/AUTO DIFF WBC: CPT | Mod: ZL

## 2025-05-16 PROCEDURE — 82306 VITAMIN D 25 HYDROXY: CPT | Mod: ZL

## 2025-05-16 PROCEDURE — 36415 COLL VENOUS BLD VENIPUNCTURE: CPT | Mod: ZL

## 2025-05-16 PROCEDURE — 83036 HEMOGLOBIN GLYCOSYLATED A1C: CPT | Mod: ZL

## 2025-05-16 PROCEDURE — 80053 COMPREHEN METABOLIC PANEL: CPT | Mod: ZL

## 2025-05-18 NOTE — PATIENT INSTRUCTIONS
How to Take Your Medication Before Surgery  Preoperative Medication Instructions   Antiplatelet or Anticoagulation Medication Instructions   - aspirin: Discontinue aspirin 7 days prior to procedure to reduce bleeding risk. It should be resumed postoperatively.     Additional Medication Instructions  Take all scheduled medications on the day of surgery EXCEPT for modifications listed below:   - Herbal medications and vitamins: DO NOT TAKE 14 days prior to surgery. This includes fish oil, tumeric, etc.   - ibuprofen (Advil, Motrin): DO NOT TAKE 1 day before surgery.        Ok to continue -   Neurontin/gabapentin  Synthroid  Requip/ropinerole  Simvastatin/zocor  Miralax    Patient Education   Preparing for Your Surgery  For Adults  Getting started  In most cases, a nurse will call to review your health history and instructions. They will give you an arrival time based on your scheduled surgery time. Please be ready to share:  Your doctor's clinic name and phone number  Your medical, surgical, and anesthesia history  A list of allergies and sensitivities  A list of medicines, including herbal treatments and over-the-counter drugs  Whether the patient has a legal guardian (ask how to send us the papers in advance)  Note: You may not receive a call if you were seen at our PAC (Preoperative Assessment Center).  Please tell us if you're pregnant--or if there's any chance you might be pregnant. Some surgeries may injure a fetus (unborn baby), so they require a pregnancy test. Surgeries that are safe for a fetus don't always need a test, and you can choose whether to have one.   Preparing for surgery  Within 10 to 30 days of surgery: Have a pre-op exam (sometimes called an H&P, or History and Physical). This can be done at a clinic or pre-operative center.  If you're having a , you may not need this exam. Talk to your care team.  At your pre-op exam, talk to your care team about all medicines you take. (This includes  CBD oil and any drugs, such as THC, marijuana, and other forms of cannabis.) If you need to stop any medicine before surgery, ask when to start taking it again.  This is for your safety. Many medicines and drugs can make you bleed too much during surgery. Some change how well surgery (anesthesia) drugs work.  Call your insurance company to let them know you're having surgery. (If you don't have insurance, call [phone].)  Call your clinic if there's any change in your health. This includes a scrape or scratch near the surgery site, or any signs of a cold (sore throat, runny nose, cough, rash, fever).  Eating and drinking guidelines  For your safety: Unless your surgeon tells you otherwise, follow the guidelines below.  Eat and drink as normal until 8 hours before you arrive for surgery. After that, no food or milk. You can spit out gum when you arrive.  Drink clear liquids until 2 hours before you arrive. These are liquids you can see through, like water, Gatorade, and Propel Water. They also include plain black coffee and tea (no cream or milk).  No alcohol for 24 hours before you arrive. The night before surgery, stop any drinks that contain THC.  If your care team tells you to take medicine on the morning of surgery, it's okay to take it with a sip of water. No other medicines or drugs are allowed (including CBD oil)--follow your care team's instructions.  If you have questions the day of surgery, call your hospital or surgery center.   Preventing infection  Shower or bathe the night before and the morning of surgery. Follow the instructions your clinic gave you. (If no instructions, use regular soap.)  Don't shave or clip hair near your surgery site. We'll remove the hair if needed.  Don't smoke or vape the morning of surgery. No chewing tobacco for 6 hours before you arrive. A nicotine patch is okay. You may spit out nicotine gum when you arrive.  For some surgeries, the surgeon will tell you to fully quit  smoking and nicotine.  We will make every effort to keep you safe from infection. We will:  Clean our hands often with soap and water (or an alcohol-based hand rub).  Clean the skin at your surgery site with a special soap that kills germs.  Give you a special gown to keep you warm. (Cold raises the risk of infection.)  Wear hair covers, masks, gowns, and gloves during surgery.  Give antibiotic medicine, if prescribed. Not all surgeries need this medicine.  What to bring on the day of surgery  Photo ID and insurance card  Copy of your health care directive, if you have one  Glasses and hearing aids (bring cases)  You can't wear contacts during surgery  Inhaler and eye drops, if you use them (tell us about these when you arrive)  CPAP machine or breathing device, if you use them  A few personal items, if spending the night  If you have . . .  A pacemaker, ICD (cardiac defibrillator), or other implant: Bring the ID card.  An implanted stimulator: Bring the remote control.  A legal guardian: Bring a copy of the certified (court-stamped) guardianship papers.  Please remove any jewelry, including body piercings. Leave jewelry and other valuables at home.  If you're going home the day of surgery  You must have a responsible adult drive you home. They should stay with you overnight as well.  If you don't have someone to stay with you, and you aren't safe to go home alone, we may keep you overnight. Insurance often won't pay for this.  After surgery  If it's hard to control your pain or you need more pain medicine, please call your surgeon's office.  Questions?   If you have any questions for your care team, list them here:   ____________________________________________________________________________________________________________________________________________________________________________________________________________________________________________________________  For informational purposes only. Not to replace  the advice of your health care provider. Copyright   2003, 2019 [facility]. All rights reserved. Clinically reviewed by Yovanny Fu MD. [employer] [id number] - REV 08/24.

## 2025-05-19 ENCOUNTER — OFFICE VISIT (OUTPATIENT)
Dept: FAMILY MEDICINE | Facility: OTHER | Age: 75
End: 2025-05-19
Attending: FAMILY MEDICINE
Payer: COMMERCIAL

## 2025-05-19 VITALS
HEART RATE: 59 BPM | WEIGHT: 113.4 LBS | TEMPERATURE: 97.3 F | BODY MASS INDEX: 21.41 KG/M2 | OXYGEN SATURATION: 97 % | SYSTOLIC BLOOD PRESSURE: 132 MMHG | DIASTOLIC BLOOD PRESSURE: 64 MMHG | HEIGHT: 61 IN | RESPIRATION RATE: 16 BRPM

## 2025-05-19 DIAGNOSIS — E03.9 HYPOTHYROIDISM, UNSPECIFIED TYPE: Chronic | ICD-10-CM

## 2025-05-19 DIAGNOSIS — R73.03 PREDIABETES: ICD-10-CM

## 2025-05-19 DIAGNOSIS — M48.061 SPINAL STENOSIS OF LUMBAR REGION, UNSPECIFIED WHETHER NEUROGENIC CLAUDICATION PRESENT: ICD-10-CM

## 2025-05-19 DIAGNOSIS — G25.81 RESTLESS LEGS SYNDROME (RLS): ICD-10-CM

## 2025-05-19 DIAGNOSIS — M81.0 AGE-RELATED OSTEOPOROSIS WITHOUT CURRENT PATHOLOGICAL FRACTURE: Chronic | ICD-10-CM

## 2025-05-19 DIAGNOSIS — E78.5 HYPERLIPIDEMIA, UNSPECIFIED HYPERLIPIDEMIA TYPE: ICD-10-CM

## 2025-05-19 DIAGNOSIS — Z01.818 PREOP GENERAL PHYSICAL EXAM: Primary | ICD-10-CM

## 2025-05-19 PROCEDURE — G0463 HOSPITAL OUTPT CLINIC VISIT: HCPCS

## 2025-05-19 PROCEDURE — 93005 ELECTROCARDIOGRAM TRACING: CPT | Performed by: FAMILY MEDICINE

## 2025-05-19 ASSESSMENT — ANXIETY QUESTIONNAIRES
8. IF YOU CHECKED OFF ANY PROBLEMS, HOW DIFFICULT HAVE THESE MADE IT FOR YOU TO DO YOUR WORK, TAKE CARE OF THINGS AT HOME, OR GET ALONG WITH OTHER PEOPLE?: NOT DIFFICULT AT ALL
4. TROUBLE RELAXING: NOT AT ALL
5. BEING SO RESTLESS THAT IT IS HARD TO SIT STILL: NOT AT ALL
3. WORRYING TOO MUCH ABOUT DIFFERENT THINGS: NOT AT ALL
GAD7 TOTAL SCORE: 0
7. FEELING AFRAID AS IF SOMETHING AWFUL MIGHT HAPPEN: NOT AT ALL
IF YOU CHECKED OFF ANY PROBLEMS ON THIS QUESTIONNAIRE, HOW DIFFICULT HAVE THESE PROBLEMS MADE IT FOR YOU TO DO YOUR WORK, TAKE CARE OF THINGS AT HOME, OR GET ALONG WITH OTHER PEOPLE: NOT DIFFICULT AT ALL
1. FEELING NERVOUS, ANXIOUS, OR ON EDGE: NOT AT ALL
6. BECOMING EASILY ANNOYED OR IRRITABLE: NOT AT ALL
GAD7 TOTAL SCORE: 0
2. NOT BEING ABLE TO STOP OR CONTROL WORRYING: NOT AT ALL
GAD7 TOTAL SCORE: 0
7. FEELING AFRAID AS IF SOMETHING AWFUL MIGHT HAPPEN: NOT AT ALL

## 2025-05-19 ASSESSMENT — PAIN SCALES - GENERAL: PAINLEVEL_OUTOF10: NO PAIN (0)

## 2025-05-19 NOTE — PROGRESS NOTES
Preoperative Evaluation  [facility] [place]  [address]  [place] [address]  Phone: [phone]  Primary Provider: Melyssa Encinas MD  Pre-op Performing Provider: Melyssa Encinas MD  May 19, 2025             5/19/2025   Surgical Information   What procedure is being done? Lumbar Fusion    Facility or Hospital where procedure/surgery will be performed: [facility]    Who is doing the procedure / surgery? Dr. Vic Grfa    Date of surgery / procedure: 06/09/2025    Time of surgery / procedure: N/A    Where do you plan to recover after surgery? at a nursing home        Proxy-reported     Fax number for surgical facility: [phone]     Assessment & Plan     The proposed surgical procedure is considered INTERMEDIATE risk.    Preop general physical exam  Proceed as planned.  - EKG 12-lead complete w/read - (Clinic Performed)    Spinal stenosis of lumbar region, unspecified whether neurogenic claudication present  As above.    Hyperlipidemia, unspecified hyperlipidemia type  Stable.  Continue statin.    Hypothyroidism, unspecified type  Stable.  Continue Synthroid.    Prediabetes  Stable.  A1c up to date at 5.8%.    Age-related osteoporosis without current pathological fracture  Stable.  Dexa due this fall.    Restless legs syndrome (RLS)  Stable with requip - continue.           Risks and Recommendations  The patient has the following additional risks and recommendations for perioperative complications:   - No identified additional risk factors other than previously addressed    Antiplatelet or Anticoagulation Medication Instructions   - aspirin: Discontinue aspirin 7 days prior to procedure to reduce bleeding risk. It should be resumed postoperatively.     Additional Medication Instructions  Take all scheduled medications on the day of surgery EXCEPT for modifications listed below:   - Herbal medications and vitamins: DO NOT TAKE 14 days prior to surgery.   - ibuprofen (Advil,  Motrin): DO NOT TAKE 1 day before surgery.    - Topicals: DO NOT TAKE day of surgery.    Recommendation  Approval given to proceed with proposed procedure, without further diagnostic evaluation.    Follow-up  No follow-ups on file.    Megha Chairez is a 75 year old, presenting for the following:  Pre-Op Exam (Lumbar Fusion)          5/19/2025     2:53 PM   Additional Questions   Roomed by Clay Rodriguez LPN   Accompanied by Self         5/19/2025     2:53 PM   Patient Reported Additional Medications   Patient reports taking the following new medications None     HPI: Rescheduled surgery from 4/2025 due to dental work.    Patient with lumbar stenosis, neurogenic claudication, grade 1 L3-4 spondylolisthesis, and left lateral recess neuroforaminal stenosis L4/5.    Prior L4/5 laminectomy 5/2024 with Dr Graf.     Planning L3/4 and L4/5 transforaminal lumbar interbody fusion, left sided facetectomies.     2 day hospitalization.        5/19/2025   Pre-Op Questionnaire   Have you ever had a heart attack or stroke? No    Have you ever had surgery on your heart or blood vessels, such as a stent placement, a coronary artery bypass, or surgery on an artery in your head, neck, heart, or legs? No    Do you have chest pain with activity? No    Do you have a history of heart failure? No    Do you currently have a cold, bronchitis or symptoms of other infection? No    Do you have a cough, shortness of breath, or wheezing? No    Do you or anyone in your family have previous history of blood clots? No    Do you or does anyone in your family have a serious bleeding problem such as prolonged bleeding following surgeries or cuts? No    Have you ever had problems with anemia or been told to take iron pills? (!) YES iron supplement; normal iron labs 11/11/24    Have you had any abnormal blood loss such as black, tarry or bloody stools, or abnormal vaginal bleeding? No    Have you ever had a blood transfusion? No    Are you  willing to have a blood transfusion if it is medically needed before, during, or after your surgery? Yes    Have you or any of your relatives ever had problems with anesthesia? No    Do you have sleep apnea, excessive snoring or daytime drowsiness? No    Do you have any artifical heart valves or other implanted medical devices like a pacemaker, defibrillator, or continuous glucose monitor? No    Do you have artificial joints? No    Are you allergic to latex? No        Proxy-reported     Advance Care Planning    Patient states has Health Care Directive and will send to [facility] [employer].    Preoperative Review of    reviewed - controlled substances reflected in medication list.    Gabapentin.    Status of Chronic Conditions:  See problem list for active medical problems.  Problems all longstanding and stable, except as noted/documented.  See ROS for pertinent symptoms related to these conditions.    HYPERLIPIDEMIA - Patient has a long history of significant Hyperlipidemia requiring medication for treatment with recent good control. Patient reports no problems or side effects with the medication.   Zocor 20 mg    HYPOTHYROIDISM - Patient has a longstanding history of chronic Hypothyroidism. Patient has been doing well, noting no tremor, insomnia, hair loss or changes in skin texture. Continues to take medications as directed, without adverse reactions or side effects. Last TSH   Lab Results   Component Value Date    TSH 3.20 03/12/2025   .      PREDIABETES -   Lab Results   Component Value Date    A1C 5.8 05/16/2025    A1C 5.8 06/19/2024    A1C 6.0 06/08/2023    A1C 5.6 06/08/2022    A1C 5.6 09/15/2020    A1C 5.7 10/21/2019     OSTEOPOROSIS - prior endocrine consult; prior Reclast x 5 doses; last dexa 9/2023 - increased in density compared with prior; due for repeat dex 9/2025; currently on drug holiday     LPR/GERD - off PPI; not active    RLS - Requip - stable     DENTAL FRACTURE 3/14/25 - right lower - seen by  dentist - had extraction done.  Needs to allow socket to heal before implant can be permanently placed.    HISTORY OF BREAST CANCER - s/p bilateral mastectomy 2004.      Patient Active Problem List    Diagnosis Date Noted    Restless legs syndrome (RLS) 04/15/2024     Priority: Medium    Prediabetes 04/15/2024     Priority: Medium    Spinal stenosis of lumbar region 12/04/2023     Priority: Medium    Seborrheic dermatitis of scalp 08/17/2021     Priority: Medium    Hypothyroidism, unspecified type 11/29/2019     Priority: Medium    probably LEFT first branchial cleft cyst 09/30/2016     Priority: Medium     no further surgery recommended unless chronic drainage occurs.  Ensure this is not a melanoma, path pending      Hyperlipidemia, unspecified hyperlipidemia type 09/15/2016     Priority: Medium    ACP (advance care planning) 01/22/2016     Priority: Medium     Advance Care Planning 6/2/2016: Receipt of ACP document:  Received: Health Care Directive which was witnessed or notarized on 6/1/16.  Document not previously scanned.  Validation form completed and sent with document to be scanned.  Code Status reflects choices in most recent ACP document.  Confirmed/documented designated decision maker(s).  Added by Paola Johnson  Advance Care Planning 1/22/2016: Receipt of ACP document:  Received: Health Care Directive which was witnessed or notarized on 10-21-08.  Document previously scanned on 12-7-15.  Validation form completed and sent to be scanned.  Code Status needs to be updated to reflect choices in most recent ACP document.  Confirmed/documented designated decision maker(s).  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 Provider to review        Past Medical History:   Diagnosis Date    Atypical nevi     Chondrodermatitis nodularis helicis of left ear     Dr Shipley, [facility]    Chronic rhinitis     Dr Messina, [facility]'s allergy; negative skin testing; IgE mediated allergies; ENT - DC nasal steroid and antihistamine; saline rinses    Degenerative skin disorder     solar elastosis    Hallux rigidus 06/15/2000    Hyperlipidemia, unspecified hyperlipidemia type 09/15/2016    Laryngopharyngeal reflux disease     PPI    Malignant neoplasm of breast (female), unspecified site 03/10/2004    Notalgia paresthetica     Osteoarthritis 07/20/2011    Osteoporosis, unspecified 09/10/2001    Dr Moses; intermittent reclast;  Dr. Moses; repeat dexa after full 2 years Reclast    Other abnormal glucose 12/20/2013    Paresthesia     neck; notalgiaparesthetic; dr leayh & Dr Nevarez; neurontin    Personal history of malignant neoplasm of breast 06/07/2005    Rhinitis     Schamberg's purpura     Spinal stenosis of lumbar region      Past Surgical History:   Procedure Laterality Date    BACK SURGERY  05/2024    Dr Graf; L4/5 laminectomy; spinal stenosis; claudication    BONE MARROW BIOPSY      negative    COLONOSCOPY  07/2000    COLONOSCOPY  08/13/2013    DR Fu; repeat 5 years    COLONOSCOPY N/A 08/13/2018    Procedure: COLONOSCOPY;  COLONOSCOPY;  Surgeon: Ajit Uriarte MD;  Location: [facility]    COLONOSCOPY N/A 12/02/2022    Procedure: COLONOSCOPY, WITH POLYPECTOMY AND BIOPSY;  Surgeon: Marcellus Jimenez MD;   Location: [facility]    DILATION AND CURETTAGE, OPERATIVE HYSTEROSCOPY, COMBINED N/A 02/13/2019    Procedure: EXAM UNDER ANESTHESIA , HYSTEROSCOPY;  Surgeon: Jovi Gabriel MD;  Location: [facility]    HEMORRHOIDECTOMY  1986    IR CONSULTATION FOR IR EXAM  12/05/2023    MASTECTOMY, BILATERAL  03/01/2004    right sided breast cancer    RECONSTRUCT FOREFOOT WITH METATARSOPHALANGEAL (MTP) FUSION Right 06/16/2023    Procedure: Right First Metatarsophalangeal Joint Fusion;  Surgeon: Ac Parker DPM;  Location: [facility]    RELEASE TRIGGER FINGER Right 03/07/2018    Procedure: RELEASE TRIGGER FINGER;  RELEASE TRIGGER DIGIT RIGHT THUMB;  Surgeon: Jose Alfredo Brewster DO;  Location: [facility]    RELEASE TRIGGER FINGER Left 03/15/2023    Procedure: Left Thumb Trigger Finger Release;  Surgeon: Rigoberto Olivera MD;  Location: [facility]    RELEASE TRIGGER FINGER Right 05/17/2023    Procedure: Revision Right Thumb Trigger Release;  Surgeon: Rigoberto Olivera MD;  Location: [facility]    REMOVE HARDWARE FOOT Right 08/16/2024    Procedure: Hardware Removal Right Foot;  Surgeon: Ac Parker DPM;  Location: [facility]    REPAIR HAMMER TOE Right 08/16/2024    Procedure: Second Toe Interphalangeal Joint Capsulotomy right;  Surgeon: Ac Parker DPM;  Location: [facility]    UPPER GI ENDOSCOPY      [facility] COLONOSCOPY THRU STOMA WITH BIOPSY  08/25/2010    cancer screening, family h/o colon cancer; hyperplastic polyp     Current Outpatient Medications   Medication Sig Dispense Refill    aspirin 81 MG EC tablet Take 1 tablet (81 mg) by mouth 2 times daily 60 tablet 0    CALCIUM CITRATE 1,500 mg two times daily       cinnamon 500 MG CAPS Take 1 capsule by mouth 2 times daily      ferrous sulfate (SLO-FE) 142 (45 Fe) MG CR tablet Take 1 tablet (142 mg) by mouth daily 90 tablet 1    fish oil-omega-3 fatty acids 1000 MG capsule Take 1 g by mouth 2 times daily       gabapentin (NEURONTIN) 300 MG capsule Take 300 mg AM and 900 mg  capsule 2    levothyroxine  "(SYNTHROID/LEVOTHROID) 25 MCG tablet Take 2 tablets 4 days a week and 1 tablet 3 days a week. 153 tablet 3    MAGNESIUM OXIDE PO Take 200 mg by mouth daily      Multiple Vitamins-Minerals (PRESERVISION AREDS PO) Take 1 tablet by mouth 2 times daily      Polyethylene Glycol 3350 (MIRALAX PO) Use every other day      rOPINIRole (REQUIP) 1 MG tablet Take 4 tablets (4 mg) by mouth at bedtime 400 tablet 3    simvastatin (ZOCOR) 20 MG tablet Take 1 tablet (20 mg) by mouth at bedtime 90 tablet 3    triamcinolone (KENALOG) 0.025 % cream Apply to back once a day if the back is itchy. 454 g 1    Turmeric 500 MG CAPS Take 2 capsules by mouth daily       zinc 50 MG TABS Take 1 tablet by mouth daily          Allergies   Allergen Reactions    Chlorhexidine Gluconate Rash    Povidone Iodine Rash     Betadine     Soap Rash     Betadine         Social History     Tobacco Use    Smoking status: Never     Passive exposure: Never    Smokeless tobacco: Never   Substance Use Topics    Alcohol use: Yes     Alcohol/week: 0.0 standard drinks of alcohol     Comment: 1 glasso wine, weekly      Family History   Problem Relation Age of Onset    Dementia Mother         (cause of death)     High cholesterol Mother     Osteoarthritis Mother     C.A.D. Father         (cause of death)     Prostate Cancer Father     Breast Cancer Maternal Aunt 72    Cerebrovascular Disease Maternal Grandmother         CVA    Diabetes Maternal Grandmother      History   Drug Use No             Review of Systems  Constitutional, HEENT, cardiovascular, pulmonary, gi and gu systems are negative, except as otherwise noted.    Objective    /64   Pulse 59   Temp 97.3  F (36.3  C) (Tympanic)   Resp 16   Ht 1.549 m (5' 1\")   Wt 51.4 kg (113 lb 6.4 oz)   SpO2 97%   BMI 21.43 kg/m     Estimated body mass index is 21.43 kg/m  as calculated from the following:    Height as of this encounter: 1.549 m (5' 1\").    Weight as of this encounter: 51.4 kg (113 lb 6.4 " oz).  Physical Exam  GENERAL: alert and no distress  EYES: Eyes grossly normal to inspection, PERRL and conjunctivae and sclerae normal  HENT: ear canals and TM's normal, nose and mouth without ulcers or lesions  NECK: no adenopathy, no asymmetry, masses, or scars  RESP: lungs clear to auscultation - no rales, rhonchi or wheezes  CV: regular rate and rhythm, normal S1 S2, no S3 or S4, no murmur, click or rub, no peripheral edema  ABDOMEN: soft, nontender, no hepatosplenomegaly, no masses and bowel sounds normal  MS: no gross musculoskeletal defects noted, no edema  SKIN: no suspicious lesions or rashes  NEURO: Normal strength and tone, mentation intact and speech normal  PSYCH: mentation appears normal, affect normal/bright    Recent Labs   Lab Test 05/16/25  0801 03/12/25  1103 08/05/24  1315 06/19/24  0828   HGB 12.0 12.7   < > 12.3    282   < > 292   * 141   < > 132*   POTASSIUM 4.8 4.6   < > 4.8   CR 0.61 0.68   < > 0.67   A1C 5.8*  --   --  5.8*    < > = values in this interval not displayed.        Diagnostics  Recent Results (from the past 720 hours)   Lipid Profile (Chol, Trig, HDL, LDL calc)    Collection Time: 05/16/25  8:01 AM   Result Value Ref Range    Cholesterol 162 <200 mg/dL    Triglycerides 65 <150 mg/dL    Direct Measure HDL 71 >=50 mg/dL    LDL Cholesterol Calculated 78 <100 mg/dL    Non HDL Cholesterol 91 <130 mg/dL    Patient Fasting > 8hrs? Yes    Hemoglobin A1c    Collection Time: 05/16/25  8:01 AM   Result Value Ref Range    Estimated Average Glucose 120 (H) <117 mg/dL    Hemoglobin A1C 5.8 (H) <5.7 %   Vitamin D Deficiency    Collection Time: 05/16/25  8:01 AM   Result Value Ref Range    Vitamin D, Total (25-Hydroxy) 42 20 - 50 ng/mL   Comprehensive metabolic panel (BMP + Alb, Alk Phos, ALT, AST, Total. Bili, TP)    Collection Time: 05/16/25  8:01 AM   Result Value Ref Range    Sodium 133 (L) 135 - 145 mmol/L    Potassium 4.8 3.4 - 5.3 mmol/L    Carbon Dioxide (CO2) 24 22 - 29  mmol/L    Anion Gap 9 7 - 15 mmol/L    Urea Nitrogen 20.7 8.0 - 23.0 mg/dL    Creatinine 0.61 0.51 - 0.95 mg/dL    GFR Estimate >90 >60 mL/min/1.73m2    Calcium 9.7 8.8 - 10.4 mg/dL    Chloride 100 98 - 107 mmol/L    Glucose 102 (H) 70 - 99 mg/dL    Alkaline Phosphatase 97 40 - 150 U/L    AST 23 0 - 45 U/L    ALT 22 0 - 50 U/L    Protein Total 6.9 6.4 - 8.3 g/dL    Albumin 4.4 3.5 - 5.2 g/dL    Bilirubin Total 0.2 <=1.2 mg/dL    Patient Fasting > 8hrs? Yes    CBC with platelets and differential    Collection Time: 05/16/25  8:01 AM   Result Value Ref Range    WBC Count 5.0 4.0 - 11.0 10e3/uL    RBC Count 3.58 (L) 3.80 - 5.20 10e6/uL    Hemoglobin 12.0 11.7 - 15.7 g/dL    Hematocrit 34.7 (L) 35.0 - 47.0 %    MCV 97 78 - 100 fL    MCH 33.5 (H) 26.5 - 33.0 pg    MCHC 34.6 31.5 - 36.5 g/dL    RDW 12.5 10.0 - 15.0 %    Platelet Count 267 150 - 450 10e3/uL    % Neutrophils 59 %    % Lymphocytes 27 %    % Monocytes 12 %    % Eosinophils 1 %    % Basophils 1 %    % Immature Granulocytes 0 %    NRBCs per 100 WBC 0 <1 /100    Absolute Neutrophils 3.0 1.6 - 8.3 10e3/uL    Absolute Lymphocytes 1.3 0.8 - 5.3 10e3/uL    Absolute Monocytes 0.6 0.0 - 1.3 10e3/uL    Absolute Eosinophils 0.0 0.0 - 0.7 10e3/uL    Absolute Basophils 0.1 0.0 - 0.2 10e3/uL    Absolute Immature Granulocytes 0.0 <=0.4 10e3/uL    Absolute NRBCs 0.0 10e3/uL      EKG: sinus bradycardia, rate 54, normal axis, normal intervals, no acute ST/T changes c/w ischemia, no LVH by voltage criteria, unchanged from previous tracings    Revised Cardiac Risk Index (RCRI)  The patient has the following serious cardiovascular risks for perioperative complications:   - No serious cardiac risks = 0 points     RCRI Interpretation: 0 points: Class I (very low risk - 0.4% complication rate)       The longitudinal plan of care for the diagnosis(es)/condition(s) as documented were addressed during this visit. Due to the added complexity in care, I will continue to support Mihaela  in the subsequent management and with ongoing continuity of care.  Signed Electronically by: Melyssa Encinas MD  A copy of this evaluation report is provided to the requesting physician.

## 2025-05-20 ENCOUNTER — TELEPHONE (OUTPATIENT)
Dept: FAMILY MEDICINE | Facility: OTHER | Age: 75
End: 2025-05-20

## 2025-05-20 LAB
ATRIAL RATE - MUSE: 54 BPM
DIASTOLIC BLOOD PRESSURE - MUSE: NORMAL MMHG
INTERPRETATION ECG - MUSE: NORMAL
P AXIS - MUSE: 71 DEGREES
PR INTERVAL - MUSE: 194 MS
QRS DURATION - MUSE: 102 MS
QT - MUSE: 374 MS
QTC - MUSE: 354 MS
R AXIS - MUSE: 70 DEGREES
SYSTOLIC BLOOD PRESSURE - MUSE: NORMAL MMHG
T AXIS - MUSE: 65 DEGREES
VENTRICULAR RATE- MUSE: 54 BPM

## 2025-05-30 ENCOUNTER — OFFICE VISIT (OUTPATIENT)
Dept: OTOLARYNGOLOGY | Facility: OTHER | Age: 75
End: 2025-05-30
Attending: FAMILY MEDICINE
Payer: COMMERCIAL

## 2025-05-30 VITALS
HEART RATE: 59 BPM | DIASTOLIC BLOOD PRESSURE: 72 MMHG | TEMPERATURE: 97.6 F | HEIGHT: 61 IN | RESPIRATION RATE: 16 BRPM | WEIGHT: 113.32 LBS | SYSTOLIC BLOOD PRESSURE: 142 MMHG | BODY MASS INDEX: 21.39 KG/M2

## 2025-05-30 DIAGNOSIS — J34.89 NASAL VESTIBULITIS: Primary | ICD-10-CM

## 2025-05-30 DIAGNOSIS — H93.13 TINNITUS, BILATERAL: ICD-10-CM

## 2025-05-30 PROCEDURE — 1126F AMNT PAIN NOTED NONE PRSNT: CPT | Performed by: NURSE PRACTITIONER

## 2025-05-30 PROCEDURE — 3077F SYST BP >= 140 MM HG: CPT | Performed by: NURSE PRACTITIONER

## 2025-05-30 PROCEDURE — 31231 NASAL ENDOSCOPY DX: CPT | Performed by: NURSE PRACTITIONER

## 2025-05-30 PROCEDURE — G0463 HOSPITAL OUTPT CLINIC VISIT: HCPCS | Mod: 25

## 2025-05-30 PROCEDURE — 3078F DIAST BP <80 MM HG: CPT | Performed by: NURSE PRACTITIONER

## 2025-05-30 PROCEDURE — 99213 OFFICE O/P EST LOW 20 MIN: CPT | Mod: 25 | Performed by: NURSE PRACTITIONER

## 2025-05-30 PROCEDURE — 31231 NASAL ENDOSCOPY DX: CPT | Mod: 52 | Performed by: NURSE PRACTITIONER

## 2025-05-30 PROCEDURE — 92504 EAR MICROSCOPY EXAMINATION: CPT | Performed by: NURSE PRACTITIONER

## 2025-05-30 RX ORDER — MUPIROCIN 20 MG/G
OINTMENT TOPICAL 3 TIMES DAILY
Qty: 1 G | Refills: 1 | Status: SHIPPED | OUTPATIENT
Start: 2025-05-30 | End: 2025-06-06

## 2025-05-30 ASSESSMENT — PAIN SCALES - GENERAL: PAINLEVEL_OUTOF10: NO PAIN (0)

## 2025-05-30 NOTE — PROGRESS NOTES
Otolaryngology Note         Chief Complaint:     Patient presents with:  Nose Problem: Nasal cavity mass            History of Present Illness:     Mihaela Jang is a 75 year old female seen today for concerns for popping and clicking in her ears and jaw.      She was recently seen by PCP and c/o a small bump in the left nasal vestibule.  It ebbs and flows and is tender at times.    She was last seen in ENT on 11/14/2020 for by Sejal for ear cleaning.  On exam bilateral ears appeared grossly normal after cleaning.  No audiogram on file.    Symptoms have been present for a couple of months.  Occasional right ear ache that comes and goes    She notes it when she is upright or up walking, chewing or moving her jaw.  She has improvement when she lays her head back, rests her neck or sleeps at night.    She denies changes in hearing  No concerns for tinnitus  History of sinus issues, post nasal drainage.   She is able to breath through her nose, no nasal obstruction, able to smell and taste   No facial pain or pressure.    She is currently using flonase without improvement  No concerns for seasonal allergies, tested in the past and negative  No rotary vertigo   No flux hearing.   No dysphagia, pharyngitis, weight loss, otalgia or dysphonia.     No current audiogram on file         Medications:     Current Outpatient Rx   Medication Sig Dispense Refill    aspirin 81 MG EC tablet Take 1 tablet (81 mg) by mouth 2 times daily 60 tablet 0    CALCIUM CITRATE 1,500 mg two times daily       cinnamon 500 MG CAPS Take 1 capsule by mouth 2 times daily      ferrous sulfate (SLO-FE) 142 (45 Fe) MG CR tablet Take 1 tablet (142 mg) by mouth daily 90 tablet 1    fish oil-omega-3 fatty acids 1000 MG capsule Take 1 g by mouth 2 times daily       gabapentin (NEURONTIN) 300 MG capsule Take 300 mg AM and 900 mg  capsule 2    levothyroxine (SYNTHROID/LEVOTHROID) 25 MCG tablet Take 2 tablets 4 days a week and 1 tablet 3 days a  week. 153 tablet 3    MAGNESIUM OXIDE PO Take 200 mg by mouth daily      Multiple Vitamins-Minerals (PRESERVISION AREDS PO) Take 1 tablet by mouth 2 times daily      mupirocin (BACTROBAN) 2 % external ointment Apply topically 3 times daily for 7 days. 1 g 1    Polyethylene Glycol 3350 (MIRALAX PO) Use every other day      rOPINIRole (REQUIP) 1 MG tablet Take 4 tablets (4 mg) by mouth at bedtime 400 tablet 3    simvastatin (ZOCOR) 20 MG tablet Take 1 tablet (20 mg) by mouth at bedtime 90 tablet 3    triamcinolone (KENALOG) 0.025 % cream Apply to back once a day if the back is itchy. 454 g 1    Turmeric 500 MG CAPS Take 2 capsules by mouth daily       zinc 50 MG TABS Take 1 tablet by mouth daily               Allergies:     Allergies: Chlorhexidine gluconate, Povidone iodine, and Soap          Past Medical History:     Past Medical History:   Diagnosis Date    Atypical nevi     Chondrodermatitis nodularis helicis of left ear     Dr Shipley, [facility]    Chronic rhinitis     Dr Messina, [facility] allergy; negative skin testing; IgE mediated allergies; ENT - DC nasal steroid and antihistamine; saline rinses    Degenerative skin disorder     solar elastosis    Hallux rigidus 06/15/2000    Hyperlipidemia, unspecified hyperlipidemia type 09/15/2016    Laryngopharyngeal reflux disease     PPI    Malignant neoplasm of breast (female), unspecified site 03/10/2004    Notalgia paresthetica     Osteoarthritis 07/20/2011    Osteoporosis, unspecified 09/10/2001    Dr Moses; intermittent reclast;  Dr. Moses; repeat dexa after full 2 years Reclast    Other abnormal glucose 12/20/2013    Paresthesia     neck; notalgiaparesthetic; dr leahy & Dr Nevarez; neurontin    Personal history of malignant neoplasm of breast 06/07/2005    Rhinitis     Schamberg's purpura     Spinal stenosis of lumbar region             Past Surgical History:     Past Surgical History:   Procedure Laterality Date    BACK SURGERY  05/2024    Dr Graf; L4/5  laminectomy; spinal stenosis; claudication    BONE MARROW BIOPSY      negative    COLONOSCOPY  07/2000    COLONOSCOPY  08/13/2013    DR Fu; repeat 5 years    COLONOSCOPY N/A 08/13/2018    Procedure: COLONOSCOPY;  COLONOSCOPY;  Surgeon: Ajit Uriarte MD;  [facility]    COLONOSCOPY N/A 12/02/2022    Procedure: COLONOSCOPY, WITH POLYPECTOMY AND BIOPSY;  Surgeon: Marcellus Jimenez MD;  Location: [facility]    DILATION AND CURETTAGE, OPERATIVE HYSTEROSCOPY, COMBINED N/A 02/13/2019    Procedure: EXAM UNDER ANESTHESIA , HYSTEROSCOPY;  Surgeon: Jovi Gabriel MD;  Location: [facility]    HEMORRHOIDECTOMY  1986    IR CONSULTATION FOR IR EXAM  12/05/2023    MASTECTOMY, BILATERAL  03/01/2004    right sided breast cancer    RECONSTRUCT FOREFOOT WITH METATARSOPHALANGEAL (MTP) FUSION Right 06/16/2023    Procedure: Right First Metatarsophalangeal Joint Fusion;  Surgeon: Ac Parker DPM;  Location: [facility]    RELEASE TRIGGER FINGER Right 03/07/2018    Procedure: RELEASE TRIGGER FINGER;  RELEASE TRIGGER DIGIT RIGHT THUMB;  Surgeon: Jose Alfredo Brewster DO;  Location: [facility]    RELEASE TRIGGER FINGER Left 03/15/2023    Procedure: Left Thumb Trigger Finger Release;  Surgeon: Rigoberto Olivera MD;  [facility]    RELEASE TRIGGER FINGER Right 05/17/2023    Procedure: Revision Right Thumb Trigger Release;  Surgeon: Rigoberto Olivera MD;  [facility]    REMOVE HARDWARE FOOT Right 08/16/2024    Procedure: Hardware Removal Right Foot;  Surgeon: Ac Parker DPM;  [facility]    REPAIR HAMMER TOE Right 08/16/2024    Procedure: Second Toe Interphalangeal Joint Capsulotomy right;  Surgeon: Ac Parker DPM;  [facility]: [facility]    UPPER GI ENDOSCOPY      [facility] COLONOSCOPY THRU STOMA WITH BIOPSY  08/25/2010    cancer screening, family h/o colon cancer; hyperplastic polyp       ENT family history reviewed         Social History:     Social History     Tobacco Use    Smoking status: Never     Passive exposure: Never    Smokeless  "tobacco: Never   Vaping Use    Vaping status: Never Used   Substance Use Topics    Alcohol use: Yes     Alcohol/week: 0.0 standard drinks of alcohol     Comment: 1 glasso wine, weekly     Drug use: No            Review of Systems:     ROS: See HPI         Physical Exam:     BP (!) 142/72 (BP Location: Right arm, Patient Position: Sitting, Cuff Size: Adult Small)   Pulse 59   Temp 97.6  F (36.4  C) (Tympanic)   Resp 16   Ht 1.549 m (5' 0.98\")   Wt 51.4 kg (113 lb 5.1 oz)   BMI 21.42 kg/m      General - The patient is well nourished and well developed, and appears to have good nutritional status.  Alert and oriented to person and place, answers questions and cooperates with examination appropriately.   Head and Face - Normocephalic and atraumatic, with no gross asymmetry noted.  The facial nerve is intact, with strong symmetric movements.  Voice and Breathing - The patient was breathing comfortably without the use of accessory muscles. There was no wheezing, stridor. The patients voice was clear and strong, and had appropriate pitch and quality.  Ears - External ear normal. The ears were examined under binocular microscopy and with otoscope.  Bilateral ceruminosis, removed with cupped forceps and cerumen loop.  Bilateral TMs are intact without effusion or retraction   Eyes - Extraocular movements intact, sclera were not icteric or injected.  Mouth - Examination of the oral cavity showed pink, healthy oral mucosa. Dentition in good condition. No lesions or ulcerations noted. The tongue was mobile and midline.   Throat - The walls of the oropharynx were smooth, pink, moist, symmetric, and had no lesions or ulcerations.  The tonsillar pillars and soft palate were symmetric. The uvula was midline on elevation.    Neck - Normal midline excursion of the laryngotracheal complex during swallowing.  Full range of motion on passive movement.  Palpation of the occipital, submental, submandibular, internal jugular chain, " and supraclavicular nodes did not demonstrate any abnormal lymph nodes or masses.  Palpation of the thyroid was soft and smooth, with no nodules or goiter appreciated.  The trachea was mobile and midline.  Nose - External contour is symmetric, no gross deflection or scars.  Nasal mucosa is pink and moist with no abnormal mucus.  The septum and turbinates were evaluated with nasal speculum, no polyps, masses, or purulence noted on examination.  She points to an area of concern on the superior anterior nasal vestibule that has been tender.  I am unable to visualize any concerning masses, there is mild irritation in the area with clear mucous drainage    To evaluate the nose and sinuses, I performed rigid nasal endoscopy.  I sprayed both nares with 2 sprays lidocaine and neosynephrine.     I began with the RIGHT side using a 0 degree rigid nasal endoscope, and then similarly examined the LEFT side     Findings: septum is grossly midline and intact, irriation of the left anterior superior nasal vestibule as documented above  Inferior turbinates: grossly normal, clear drainage   Middle turbinate and middle meatus: normal  The superior meatus is examined and unremarkable  Mucosa is healthy throughout,  no polyps nor polypoid degeneration  Nasopharynx clear, ET patent, no edema  The patient tolerated the procedure well              Assessment and Plan:       ICD-10-CM    1. Nasal vestibulitis  J34.89 mupirocin (BACTROBAN) 2 % external ointment      2. Tinnitus, bilateral  H93.13         Reassured no concerning nasal masses noted, ETs are patent, TMs patent without effusion  Start bactroban as prescribed for vestibulitis  Follow up for audiogram then see ENT after for recheck of the nose  Clicking in ears may be TMJ associated.  TMJ instructions given    Elsa RAMÍREZ  [facility]

## 2025-05-30 NOTE — LETTER
5/30/2025      Mihaela Jang  [address]  [facility] [address]      Dear Colleague,    Thank you for referring your patient, Mihaela Jang, to the [facility]. Please see a copy of my visit note below.    Otolaryngology Note         Chief Complaint:     Patient presents with:  Nose Problem: Nasal cavity mass            History of Present Illness:     Mihaela Jang is a 75 year old female seen today for concerns for popping and clicking in her ears and jaw.      She was recently seen by PCP and c/o a small bump in the left nasal vestibule.  It ebbs and flows and is tender at times.    She was last seen in ENT on 11/14/2020 for by Sejal for ear cleaning.  On exam bilateral ears appeared grossly normal after cleaning.  No audiogram on file.    Symptoms have been present for a couple of months.  Occasional right ear ache that comes and goes    She notes it when she is upright or up walking, chewing or moving her jaw.  She has improvement when she lays her head back, rests her neck or sleeps at night.    She denies changes in hearing  No concerns for tinnitus  History of sinus issues, post nasal drainage.   She is able to breath through her nose, no nasal obstruction, able to smell and taste   No facial pain or pressure.    She is currently using flonase without improvement  No concerns for seasonal allergies, tested in the past and negative  No rotary vertigo   No flux hearing.   No dysphagia, pharyngitis, weight loss, otalgia or dysphonia.     No current audiogram on file         Medications:     Current Outpatient Rx   Medication Sig Dispense Refill     aspirin 81 MG EC tablet Take 1 tablet (81 mg) by mouth 2 times daily 60 tablet 0     CALCIUM CITRATE 1,500 mg two times daily        cinnamon 500 MG CAPS Take 1 capsule by mouth 2 times daily       ferrous sulfate (SLO-FE) 142 (45 Fe) MG CR tablet Take 1 tablet (142 mg) by mouth daily 90 tablet 1     fish  oil-omega-3 fatty acids 1000 MG capsule Take 1 g by mouth 2 times daily        gabapentin (NEURONTIN) 300 MG capsule Take 300 mg AM and 900 mg  capsule 2     levothyroxine (SYNTHROID/LEVOTHROID) 25 MCG tablet Take 2 tablets 4 days a week and 1 tablet 3 days a week. 153 tablet 3     MAGNESIUM OXIDE PO Take 200 mg by mouth daily       Multiple Vitamins-Minerals (PRESERVISION AREDS PO) Take 1 tablet by mouth 2 times daily       mupirocin (BACTROBAN) 2 % external ointment Apply topically 3 times daily for 7 days. 1 g 1     Polyethylene Glycol 3350 (MIRALAX PO) Use every other day       rOPINIRole (REQUIP) 1 MG tablet Take 4 tablets (4 mg) by mouth at bedtime 400 tablet 3     simvastatin (ZOCOR) 20 MG tablet Take 1 tablet (20 mg) by mouth at bedtime 90 tablet 3     triamcinolone (KENALOG) 0.025 % cream Apply to back once a day if the back is itchy. 454 g 1     Turmeric 500 MG CAPS Take 2 capsules by mouth daily        zinc 50 MG TABS Take 1 tablet by mouth daily               Allergies:     Allergies: Chlorhexidine gluconate, Povidone iodine, and Soap          Past Medical History:     Past Medical History:   Diagnosis Date     Atypical nevi      Chondrodermatitis nodularis helicis of left ear     Dr Shipley, [facility]     Chronic rhinitis     [facility] allergy; negative skin testing; IgE mediated allergies; ENT - DC nasal steroid and antihistamine; saline rinses     Degenerative skin disorder     solar elastosis     Hallux rigidus 06/15/2000     Hyperlipidemia, unspecified hyperlipidemia type 09/15/2016     Laryngopharyngeal reflux disease     PPI     Malignant neoplasm of breast (female), unspecified site 03/10/2004     Notalgia paresthetica      Osteoarthritis 07/20/2011     Osteoporosis, unspecified 09/10/2001    Dr Moses; intermittent reclast;  Dr. Moses; repeat dexa after full 2 years Reclast     Other abnormal glucose 12/20/2013     Paresthesia     neck; notalgiaparesthetic; dr leahy &   Geri; neurontin     Personal history of malignant neoplasm of breast 06/07/2005     Rhinitis      Schamberg's purpura      Spinal stenosis of lumbar region             Past Surgical History:     Past Surgical History:   Procedure Laterality Date     BACK SURGERY  05/2024    Dr Graf; L4/5 laminectomy; spinal stenosis; claudication     BONE MARROW BIOPSY      negative     COLONOSCOPY  07/2000     COLONOSCOPY  08/13/2013    DR Fu; repeat 5 years     COLONOSCOPY N/A 08/13/2018    Procedure: COLONOSCOPY;  COLONOSCOPY;  Surgeon: Ajit Uriarte MD;  [facility]     COLONOSCOPY N/A 12/02/2022    Procedure: COLONOSCOPY, WITH POLYPECTOMY AND BIOPSY;  Surgeon: Marcellus Jimenez MD;  [facility]     DILATION AND CURETTAGE, OPERATIVE HYSTEROSCOPY, COMBINED N/A 02/13/2019    Procedure: EXAM UNDER ANESTHESIA , HYSTEROSCOPY;  Surgeon: Jovi Gabriel MD;  [facility]     HEMORRHOIDECTOMY  1986     IR CONSULTATION FOR IR EXAM  12/05/2023     MASTECTOMY, BILATERAL  03/01/2004    right sided breast cancer     RECONSTRUCT FOREFOOT WITH METATARSOPHALANGEAL (MTP) FUSION Right 06/16/2023    Procedure: Right First Metatarsophalangeal Joint Fusion;  Surgeon: Ac Parker DPM;  [facility]     RELEASE TRIGGER FINGER Right 03/07/2018    Procedure: RELEASE TRIGGER FINGER;  RELEASE TRIGGER DIGIT RIGHT THUMB;  Surgeon: Jose Alfredo Brewster DO;  [facility]     RELEASE TRIGGER FINGER Left 03/15/2023    Procedure: Left Thumb Trigger Finger Release;  Surgeon: Rigoberto Olivera MD;  [facility]     RELEASE TRIGGER FINGER Right 05/17/2023    Procedure: Revision Right Thumb Trigger Release;  Surgeon: Rigoberto Olivera MD;  [facility]     REMOVE HARDWARE FOOT Right 08/16/2024    Procedure: Hardware Removal Right Foot;  Surgeon: Ac Parker DPM;  [facility]     REPAIR HAMMER TOE Right 08/16/2024    Procedure: Second Toe Interphalangeal Joint Capsulotomy right;  Surgeon: Ac Parker DPM;  Location: [facility]  "OR     UPPER GI ENDOSCOPY       [facility] COLONOSCOPY THRU STOMA WITH BIOPSY  08/25/2010    cancer screening, family h/o colon cancer; hyperplastic polyp       ENT family history reviewed         Social History:     Social History     Tobacco Use     Smoking status: Never     Passive exposure: Never     Smokeless tobacco: Never   Vaping Use     Vaping status: Never Used   Substance Use Topics     Alcohol use: Yes     Alcohol/week: 0.0 standard drinks of alcohol     Comment: 1 glasso wine, weekly      Drug use: No            Review of Systems:     ROS: See HPI         Physical Exam:     BP (!) 142/72 (BP Location: Right arm, Patient Position: Sitting, Cuff Size: Adult Small)   Pulse 59   Temp 97.6  F (36.4  C) (Tympanic)   Resp 16   Ht 1.549 m (5' 0.98\")   Wt 51.4 kg (113 lb 5.1 oz)   BMI 21.42 kg/m      General - The patient is well nourished and well developed, and appears to have good nutritional status.  Alert and oriented to person and place, answers questions and cooperates with examination appropriately.   Head and Face - Normocephalic and atraumatic, with no gross asymmetry noted.  The facial nerve is intact, with strong symmetric movements.  Voice and Breathing - The patient was breathing comfortably without the use of accessory muscles. There was no wheezing, stridor. The patients voice was clear and strong, and had appropriate pitch and quality.  Ears - External ear normal. The ears were examined under binocular microscopy and with otoscope.  Bilateral ceruminosis, removed with cupped forceps and cerumen loop.  Bilateral TMs are intact without effusion or retraction   Eyes - Extraocular movements intact, sclera were not icteric or injected.  Mouth - Examination of the oral cavity showed pink, healthy oral mucosa. Dentition in good condition. No lesions or ulcerations noted. The tongue was mobile and midline.   Throat - The walls of the oropharynx were smooth, pink, moist, symmetric, and had no lesions or " ulcerations.  The tonsillar pillars and soft palate were symmetric. The uvula was midline on elevation.    Neck - Normal midline excursion of the laryngotracheal complex during swallowing.  Full range of motion on passive movement.  Palpation of the occipital, submental, submandibular, internal jugular chain, and supraclavicular nodes did not demonstrate any abnormal lymph nodes or masses.  Palpation of the thyroid was soft and smooth, with no nodules or goiter appreciated.  The trachea was mobile and midline.  Nose - External contour is symmetric, no gross deflection or scars.  Nasal mucosa is pink and moist with no abnormal mucus.  The septum and turbinates were evaluated with nasal speculum, no polyps, masses, or purulence noted on examination.  She points to an area of concern on the superior anterior nasal vestibule that has been tender.  I am unable to visualize any concerning masses, there is mild irritation in the area with clear mucous drainage    To evaluate the nose and sinuses, I performed rigid nasal endoscopy.  I sprayed both nares with 2 sprays lidocaine and neosynephrine.     I began with the RIGHT side using a 0 degree rigid nasal endoscope, and then similarly examined the LEFT side     Findings: septum is grossly midline and intact, irriation of the left anterior superior nasal vestibule as documented above  Inferior turbinates: grossly normal, clear drainage   Middle turbinate and middle meatus: normal  The superior meatus is examined and unremarkable  Mucosa is healthy throughout,  no polyps nor polypoid degeneration  Nasopharynx clear, ET patent, no edema  The patient tolerated the procedure well              Assessment and Plan:       ICD-10-CM    1. Nasal vestibulitis  J34.89 mupirocin (BACTROBAN) 2 % external ointment      2. Tinnitus, bilateral  H93.13         Reassured no concerning nasal masses noted, ETs are patent, TMs patent without effusion  Start bactroban as prescribed for  vestibulitis  Follow up for audiogram then see ENT after for recheck of the nose  Clicking in ears may be TMJ associated.  TMJ instructions given    Elsa RAMÍREZ  [facility]      Again, thank you for allowing me to participate in the care of your patient.        Sincerely,        Elsa Alba NP    Electronically signed

## 2025-05-30 NOTE — PATIENT INSTRUCTIONS
Follow-up for an audiogram and see ENT again after  Start Bactroban cream in the nose    Your TMJ (Jaw) Disorder    What is it?  A TMJ disorder is a problem with the joints or muscles of the jaw. Your doctor can tell you what kind of TMJ disorder you have.The jaw meets the upper skull in front of the ear. The joint that connects them is called the TMJ (temporomandibular joint). Everyone has two TMJs.TMJ problems, including locked jaws, are common. They are uncomfortable, but they are not life threatening. They occur slightly more often in women than in men.    What causes it?  Like any joint, the TMJ or jaw muscles can be strained or injured. The injury may result from a specific trauma to the jaw, or it may occur over time due to certain oral habits (micro trauma). Other causes include poor posture and lots of dental work (where the mouth is open for long periods of time).Once a joint or muscle is strained, it can be easily re-injured.    What are some of the symptoms?  Symptoms include:  Noises in the TMJs, such as popping or grinding  Jaw pain  Locked jaw  Toothaches, earaches or headaches.  Most often these symptoms will improve over time.    What can I do about it?  Make regular attempts to relax the jaw muscles, and avoid activities that would overwork the area. This will help reduce pain and prevent more strain on the joint and muscles.Because the jaw is used in so many activities (talking, eating, yawning, laughing), the joint and muscles are almost always moving. It is hard to fully relax the jaw joint and muscles. But with practice, you can learn to relax the jaw more often. The following tips should help.Use moist heat or cold on the painful area.    Heat or ice can reduce pain and relax the muscles.   For heat therapy:  1. Microwave a gel pack (or hot water bottle) along   with a wet towel until they are very warm.  2. Wrap the towel around the gel pack or hot   water bottle.  3. Place this over both  sides of your jaw, going under   your chin. Or heat one side of the jaw at a time.   Your jaw should feel very warm but comfortable.  4. Do this for 15 to 20 minutes two to four times   each day.  For cold therapy: Use ice wrapped in a very thin   cloth for 5 to 10 minutes, two to four times each   day. You may feel  burning  at first--this is normal.   When you start to feel numb, remove the ice.  You might also try using cold followed by heat.    Eat a pain-free diet.  Eat anything you wish, as long as it does not cause pain or locking in your jaw.  Avoid hard or chewy foods (such as [place] bread, bagels, steak, candy, etc.).  Cut fruits into small pieces. Steam your vegetables.  If  normal  food causes pain, put it in a  and start on grind. Blend to the chewiest thickness that does not cause pain or locking in your jaw.  Do not stay on a soft diet for more than 4 to 6 weeks. Keep trying to increase the thickness of your food as you are able. Discuss the details of your diet with your doctor.    Chew your food on both sides at the same time.  Chew with your back teeth instead of biting with your front. To reduce strain on one side, cut normal-size pieces of food in half. Put one piece on each side of your mouth and chew. This will take practice. Eat slowly and carefully.    Practice keeping your tongue up, teeth apart and jaw muscles relaxed.   Your teeth should never touch or rest together unless you are swallowing. (When you swallow, they may sometimes lightly touch together.)  Check your jaw during the day. It should be in a relaxed, comfortable position. If it s not, place your tongue lightly on the top of your mouth where it is most comfortable. (Often, this is the position where you can softly say  n. ) Allow the teeth to come apart, relaxing the jaw muscles.This practice may also help keep your jaw more relaxed when you are asleep.    Avoid caffeine.  Caffeine can make your muscles tighten. It is  found in coffee, tea, soda pop, chocolate and some kinds of aspirin. Decaf coffee often has half as much caffeine as regular coffee.    Avoid oral habits that strain the jaw muscles and joints.  No gum chewing.  Also, don t:  clench or grind your teeth  touch or hold the teeth together  bite your cheeks or lips  push your tongue against your teeth  tense the jaw muscles  bite pens, pencils or other objects.    Don t rest your jaw on your hand.  If you rest your jaw on your hand, you may strain the joint and increase the pain.    Don t open your jaw widely, even while yawning.  When you feel like yawning, put your tongue hard against the top of your mouth. Let your mouth open as far as it can without moving your tongue from the top of your mouth. You can also put your hand under your jaw to limit the opening.  If you need dental work that requires you to keep your mouth open for a long time, try to delay it until the pain has improved. (This includes crowns, root canals and other long treatments.) If you will have a cavity filled, ask your dentist to let you open and close the jaw from time to time.    Don t sleep on your stomach.  Sleeping on the stomach puts strain on the jaw and neck muscles.  You may sleep on your side if it does not put pressure on your jaw. It is best to sleep on your back.    Ask your doctor if it s safe to use over-the-counter medicines.  To reduce pain and inflammation, choose ibuprofen (Advil), acetaminophen (Tylenol) or caffeine-free aspirin.    Be sure to get enough calcium every day.  Calcium helps healing and may improve the health of your TMJs and jaw muscles. It is found in many milk products and certain vegetables.You will get enough calcium if you drink three large   glasses of milk per day. Or ask your doctor about calcium pills. (Take up to 1,200 mg per day.)    Thank you for allowing Elsa Alba and our ENT team to participate in your care.  If your medications are too  expensive, please give the nurse a call.  We can possibly change this medication.  If you have a scheduling or an appointment question please contact our Health Unit Coordinator at their direct line [phone]  ALL nursing questions or concerns can be directed to your ENT nurse at: [phone] - [place]

## 2025-06-09 ENCOUNTER — TRANSFERRED RECORDS (OUTPATIENT)
Dept: HEALTH INFORMATION MANAGEMENT | Facility: CLINIC | Age: 75
End: 2025-06-09

## 2025-06-12 ENCOUNTER — TELEPHONE (OUTPATIENT)
Dept: GERIATRICS | Facility: OTHER | Age: 75
End: 2025-06-12

## 2025-06-12 NOTE — TELEPHONE ENCOUNTER
Approved by Dr. Arrieta  Notification sent to Nursing [place].  Ileana Elena, [facility]

## 2025-06-17 ENCOUNTER — TELEPHONE (OUTPATIENT)
Dept: GERIATRICS | Facility: OTHER | Age: 75
End: 2025-06-17

## 2025-06-17 DIAGNOSIS — M48.061 SPINAL STENOSIS OF LUMBAR REGION WITHOUT NEUROGENIC CLAUDICATION: Primary | ICD-10-CM

## 2025-06-17 DIAGNOSIS — F51.04 INSOMNIA, PSYCHOPHYSIOLOGICAL: Primary | ICD-10-CM

## 2025-06-17 DIAGNOSIS — K59.01 SLOW TRANSIT CONSTIPATION: ICD-10-CM

## 2025-06-17 NOTE — TELEPHONE ENCOUNTER
Oxycodone 5 mg       Last Written Prescription Date:  6/11/25  by Saadia Younger for 20 tablets with 0 refills   Last Office Visit: 5/19/25  Future Office visit:    Next 5 appointments (look out 90 days)      Aug 18, 2025 3:00 PM  (Arrive by 2:45 PM)  Return Visit with Elsa Alba NP  [facility] [place] ([facility] [place] ) [address]  [place] [address]  [phone]             Routing refill request to provider for review/approval because:  Drug not on the [facility], [facility] or [facility] refill protocol or controlled substance

## 2025-06-18 RX ORDER — OXYCODONE HYDROCHLORIDE 5 MG/1
5 TABLET ORAL EVERY 6 HOURS PRN
Qty: 360 TABLET | Refills: 0 | Status: SHIPPED | OUTPATIENT
Start: 2025-06-18

## 2025-06-23 VITALS
HEART RATE: 68 BPM | TEMPERATURE: 97.3 F | WEIGHT: 90 LBS | SYSTOLIC BLOOD PRESSURE: 112 MMHG | BODY MASS INDEX: 17.67 KG/M2 | OXYGEN SATURATION: 96 % | DIASTOLIC BLOOD PRESSURE: 68 MMHG | RESPIRATION RATE: 16 BRPM | HEIGHT: 60 IN

## 2025-06-23 RX ORDER — EMOLLIENT BASE
CREAM (GRAM) TOPICAL PRN
COMMUNITY

## 2025-06-23 RX ORDER — ONDANSETRON 4 MG/1
TABLET, FILM COATED ORAL EVERY 8 HOURS PRN
COMMUNITY

## 2025-06-23 RX ORDER — BISACODYL 5 MG/1
5 TABLET, DELAYED RELEASE ORAL DAILY PRN
COMMUNITY

## 2025-06-23 RX ORDER — ACETAMINOPHEN 325 MG/1
650 TABLET ORAL EVERY 6 HOURS PRN
COMMUNITY

## 2025-06-23 RX ORDER — METHOCARBAMOL 750 MG/1
750 TABLET, FILM COATED ORAL EVERY 6 HOURS PRN
COMMUNITY

## 2025-06-25 ENCOUNTER — NURSING HOME VISIT (OUTPATIENT)
Dept: GERIATRICS | Facility: OTHER | Age: 75
End: 2025-06-25
Attending: FAMILY MEDICINE
Payer: COMMERCIAL

## 2025-06-25 DIAGNOSIS — M48.061 SPINAL STENOSIS OF LUMBAR REGION WITHOUT NEUROGENIC CLAUDICATION: ICD-10-CM

## 2025-06-25 DIAGNOSIS — J31.0 CHRONIC RHINITIS: ICD-10-CM

## 2025-06-25 DIAGNOSIS — G25.81 RESTLESS LEGS SYNDROME (RLS): ICD-10-CM

## 2025-06-25 DIAGNOSIS — K21.9 LARYNGOPHARYNGEAL REFLUX (LPR): ICD-10-CM

## 2025-06-25 DIAGNOSIS — Z85.3 PERSONAL HISTORY OF MALIGNANT NEOPLASM OF BREAST: ICD-10-CM

## 2025-06-25 DIAGNOSIS — R73.03 PREDIABETES: ICD-10-CM

## 2025-06-25 DIAGNOSIS — Z78.9 NURSING HOME RESIDENT: Primary | ICD-10-CM

## 2025-06-25 DIAGNOSIS — Z98.1 S/P LAMINECTOMY WITH SPINAL FUSION: ICD-10-CM

## 2025-06-25 DIAGNOSIS — E78.5 HYPERLIPIDEMIA, UNSPECIFIED HYPERLIPIDEMIA TYPE: ICD-10-CM

## 2025-06-25 DIAGNOSIS — E03.9 HYPOTHYROIDISM, UNSPECIFIED TYPE: Chronic | ICD-10-CM

## 2025-06-25 DIAGNOSIS — M81.0 AGE-RELATED OSTEOPOROSIS WITHOUT CURRENT PATHOLOGICAL FRACTURE: Chronic | ICD-10-CM

## 2025-06-25 DIAGNOSIS — R20.2 NOTALGIA PARESTHETICA: Chronic | ICD-10-CM

## 2025-06-25 PROCEDURE — 99310 SBSQ NF CARE HIGH MDM 45: CPT | Performed by: FAMILY MEDICINE

## 2025-06-25 ASSESSMENT — ENCOUNTER SYMPTOMS
SLEEP DISTURBANCE: 0
EYE PROBLEMS: 0
FATIGUE: 0
DEPRESSION: 0
ARTHRALGIAS: 0
WHEEZING: 0
COUGH: 0
PALPITATIONS: 0
SPEECH DIFFICULTY: 0
LIGHT-HEADEDNESS: 0
MYALGIAS: 0
CONSTIPATION: 0
WOUND: 0
UNEXPECTED WEIGHT CHANGE: 0
LEG SWELLING: 0
APPETITE CHANGE: 0
ABDOMINAL PAIN: 0
EXTREMITY WEAKNESS: 0
HEMATURIA: 0
FEVER: 0
CONFUSION: 0
NUMBNESS: 0
TROUBLE SWALLOWING: 0
NERVOUS/ANXIOUS: 0
BLOOD IN STOOL: 0
VOICE CHANGE: 0
DIARRHEA: 0
ADENOPATHY: 0
FREQUENCY: 0
DIZZINESS: 0
SEIZURES: 0
SHORTNESS OF BREATH: 0
DYSURIA: 0

## 2025-06-25 NOTE — PROGRESS NOTES
Nursing Home Initial Visit2    HISTORY OF PRESENT ILLNESS:  Mihaela is a 75 year old female (1950)  resident of [facility] who is being seen today for initial Nursing Home Visit.     She has a past medical history significant for, but not limited to, breast cancer, prediabetes, hypothyroidism, dyslipidemia, RLS (restless legs syndrome), age related osteoporosis, lumbar spinal stenosis, laryngopharyngeal reflux, as well as chronic rhinits.    She was admitted from [facility] in [place] after undergoing laminectomy and lumbar fusion L3-L4 and L4-L5 by Dr Graf.  Her postoperative course was uncomplicated and was transferred to [facility] for transitional care short term rehabilitation.    Advanced Directives:  POLST ***    The patient notes ***.    Discussed with nursing staff who note ***.    The patient is seen in *** room accompanied by facility nurse.  Family is *** present.     Medical records are reviewed.    Current medications, allergies, and interdisciplinary care plan are reviewed.      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  Ensure this is not a melanoma, path pending      Hyperlipidemia, unspecified hyperlipidemia type 09/15/2016     Priority: Medium    Hypertrophic soft palate 12/14/2015     Priority: Medium    chronic neutropenia. 11/16/2015     Priority: Medium    Early satiety 12/23/2014      Priority: Medium    Laryngopharyngeal reflux (LPR) 04/30/2013     Priority: Medium    Chronic rhinitis 04/30/2013     Priority: Medium    ETD (eustachian tube dysfunction) 04/30/2013     Priority: Medium    Atypical nevus 07/25/2012     Priority: Medium    Skin sensation disturbance 07/25/2012     Priority: Medium    Notalgia paresthetica 07/25/2012     Priority: Medium    Osteoporosis 09/10/2001     Priority: Medium     Problem list name updated by automated process. Provider to review      Nursing Home Visit 06/25/2025     Priority: Low    ACP (advance care planning) 01/22/2016     Priority: Low     Advance Care Planning 6/2/2016: Receipt of ACP document:  Received: Health Care Directive which was witnessed or notarized on 6/1/16.  Document not previously scanned.  Validation form completed and sent with document to be scanned.  Code Status reflects choices in most recent ACP document.  Confirmed/documented designated decision maker(s).  Added by Paola Jonhson  Advance Care Planning 1/22/2016: Receipt of ACP document:  Received: Health Care Directive which was witnessed or notarized on 10-21-08.  Document previously scanned on 12-7-15.  Validation form completed and sent to be scanned.  Code Status needs to be updated to reflect choices in most recent ACP document.  Confirmed/documented designated decision maker(s).  Added by Lyndsey May RN, System Director ACP-Honoring Choices                     Social History     Socioeconomic History    Marital status: Single     Spouse name: Not on file    Number of children: Not on file    Years of education: Not on file    Highest education level: Not on file   Occupational History    Not on file   Tobacco Use    Smoking status: Never     Passive exposure: Never    Smokeless tobacco: Never   Vaping Use    Vaping status: Never Used   Substance and Sexual Activity    Alcohol use: Yes     Alcohol/week: 0.0 standard drinks of alcohol     Comment: 1 glasso wine, weekly     Drug  use: No    Sexual activity: Never   Other Topics Concern     Service No    Blood Transfusions Yes     Comment: Permits if needed    Caffeine Concern Yes     Comment: Tea, 2 cups daily     Occupational Exposure Not Asked    Hobby Hazards Not Asked    Sleep Concern Not Asked    Stress Concern Not Asked    Weight Concern Not Asked    Special Diet Not Asked    Back Care Not Asked    Exercise Yes     Comment: Walking, walks steps, daily     Bike Helmet Not Asked    Seat Belt Yes    Self-Exams Not Asked    Parent/sibling w/ CABG, MI or angioplasty before 65F 55M? No   Social History Narrative    Not on file     Social Drivers of Health     Financial Resource Strain: Low Risk  (4/30/2025)    Financial Resource Strain     Within the past 12 months, have you or your family members you live with been unable to get utilities (heat, electricity) when it was really needed?: No   Food Insecurity: Low Risk  (4/30/2025)    Food Insecurity     Within the past 12 months, did you worry that your food would run out before you got money to buy more?: No     Within the past 12 months, did the food you bought just not last and you didn t have money to get more?: No   Transportation Needs: Low Risk  (4/30/2025)    Transportation Needs     Within the past 12 months, has lack of transportation kept you from medical appointments, getting your medicines, non-medical meetings or appointments, work, or from getting things that you need?: No   Physical Activity: Sufficiently Active (4/30/2025)    Exercise Vital Sign     Days of Exercise per Week: 5 days     Minutes of Exercise per Session: 30 min   Stress: No Stress Concern Present (4/30/2025)    [employer] [place] [employer] - Occupational Stress Questionnaire     Feeling of Stress : Not at all   Social Connections: Unknown (4/30/2025)    Social Connection and Isolation Panel [NHANES]     Frequency of Communication with Friends and Family: Not on file     Frequency of Social  Gatherings with Friends and Family: Once a week     Attends Orthodoxy Services: Not on file     Active Member of Clubs or Organizations: Not on file     Attends Club or Organization Meetings: Not on file     Marital Status: Not on file   Interpersonal Safety: Low Risk  (4/30/2025)    Interpersonal Safety     Do you feel physically and emotionally safe where you currently live?: Yes     Within the past 12 months, have you been hit, slapped, kicked or otherwise physically hurt by someone?: No     Within the past 12 months, have you been humiliated or emotionally abused in other ways by your partner or ex-partner?: No   Housing Stability: Low Risk  (4/30/2025)    Housing Stability     Do you have housing? : Yes     Are you worried about losing your housing?: No        Current Outpatient Medications   Medication Sig Dispense Refill    acetaminophen (TYLENOL) 325 MG tablet Take 650 mg by mouth every 6 hours as needed for mild pain.      aluminum & magnesium hydroxide (MAG-AL) 200-200 MG/5ML supsension Take 30 mLs by mouth every 6 hours as needed for indigestion.      benzocaine-menthol (CEPACOL) 15-3.6 MG lozenge Place 1 lozenge inside cheek every hour as needed for sore throat.      bisacodyl (DULCOLAX) 5 MG EC tablet Take 5 mg by mouth daily as needed for constipation.      emollient (VANICREAM) external cream Apply topically as needed for other.      gabapentin (NEURONTIN) 300 MG capsule Take 300 mg AM and 900 mg  capsule 2    levothyroxine (SYNTHROID/LEVOTHROID) 25 MCG tablet Take 2 tablets 4 days a week and 1 tablet 3 days a week. (Patient taking differently: Give 1 tablet by mouth one time a day every Mon, Wed, Fri related to SPINAL STENOSIS, LUMBAR REGION WITHOUT NEUROGENIC CLAUDICATION (M48.061) do not give within 4 hrs of iron or calcium AND Give 2 tablet by mouth one time a day every Tue, Thu, Sat, Sun related to SPINAL STENOSIS, LUMBAR REGION WITHOUT NEUROGENIC CLAUDICATION (M48.061) do not give within 4  hrs of iron or calcium) 153 tablet 3    MAGNESIUM OXIDE PO Take 200 mg by mouth daily (Patient taking differently: Take 400 mg by mouth daily.)      melatonin 3 MG tablet Take 1 tablet (3 mg) by mouth nightly as needed for sleep. 90 tablet 0    methocarbamol (ROBAXIN) 750 MG tablet Take 750 mg by mouth every 6 hours as needed for muscle spasms.      ondansetron (ZOFRAN) 4 MG tablet Take by mouth every 8 hours as needed for nausea. (Patient taking differently: Take 4 mg by mouth every 6 hours as needed for nausea.)      oxyCODONE (ROXICODONE) 5 MG tablet Take 1 tablet (5 mg) by mouth every 6 hours as needed for severe pain. 360 tablet 0    Polyethylene Glycol 3350 (MIRALAX PO) Use every other day      psyllium (METAMUCIL/KONSYL) 58.6 % powder Take 18 g (1 Tablespoonful) by mouth daily. 425 g 3    rOPINIRole (REQUIP) 1 MG tablet Take 4 tablets (4 mg) by mouth at bedtime 400 tablet 3    simvastatin (ZOCOR) 20 MG tablet Take 1 tablet (20 mg) by mouth at bedtime 90 tablet 3    sodium chloride 0.9 % SOLN 45 mL with heparin (porcine) 1000 UNIT/ML SOLN 5,000 Units Inject 5,000 Units into catheter 2 times daily.      triamcinolone (KENALOG) 0.025 % cream Apply to back once a day if the back is itchy. 454 g 1     No current facility-administered medications for this visit.       Allergies   Allergen Reactions    Chlorhexidine Gluconate Rash    Povidone Iodine Rash     Betadine     Soap Rash     Betadine        I have reviewed the MDS and I have reviewed the care plan and do agree with the plan.      ROS:  Review of Systems   Constitutional:  Negative for appetite change, fatigue, fever and unexpected weight change.   HENT:   Negative for hearing loss, trouble swallowing and voice change.    Eyes:  Negative for eye problems.   Respiratory:  Negative for cough, shortness of breath and wheezing.    Cardiovascular:  Negative for chest pain, leg swelling and palpitations.   Gastrointestinal:  Negative for abdominal pain, blood in  stool, constipation and diarrhea.   Genitourinary:  Negative for bladder incontinence, dysuria, frequency and hematuria.    Musculoskeletal:  Negative for arthralgias, gait problem and myalgias.   Skin:  Negative for itching, rash and wound.   Neurological:  Negative for dizziness, extremity weakness, gait problem, light-headedness, numbness, seizures and speech difficulty.   Hematological:  Negative for adenopathy.   Psychiatric/Behavioral:  Negative for confusion, depression and sleep disturbance. The patient is not nervous/anxious.    All other systems reviewed and are negative.        OBJECTIVE:  /68   Pulse 68   Temp 97.3  F (36.3  C)   Resp 16   Ht 1.524 m (5')   Wt 40.8 kg (90 lb)   SpO2 96%   BMI 17.58 kg/m      Physical Exam  Vitals and nursing note reviewed.   Constitutional:       General: She is not in acute distress.     Appearance: She is not ill-appearing or toxic-appearing.   HENT:      Head: Normocephalic.      Right Ear: External ear normal.      Left Ear: External ear normal.      Nose: Nose normal.      Mouth/Throat:      Mouth: Mucous membranes are moist.   Eyes:      Extraocular Movements: Extraocular movements intact.      Conjunctiva/sclera: Conjunctivae normal.      Pupils: Pupils are equal, round, and reactive to light.   Cardiovascular:      Rate and Rhythm: Normal rate and regular rhythm.      Pulses: Normal pulses.      Heart sounds: Normal heart sounds. No murmur heard.     No friction rub. No gallop.   Pulmonary:      Effort: Pulmonary effort is normal.      Breath sounds: Normal breath sounds. No wheezing, rhonchi or rales.   Musculoskeletal:      Right lower leg: No edema.      Left lower leg: No edema.   Skin:     General: Skin is warm and dry.      Findings: No bruising, erythema, lesion or rash.   Neurological:      General: No focal deficit present.      Mental Status: She is alert and oriented to person, place, and time. Mental status is at baseline.   Psychiatric:          Mood and Affect: Mood normal.         Behavior: Behavior normal.         Lab/Diagnostic data:    Reviewed    ASSESSMENT/ORDERS:  ***      Total time spent on the day of service was {1/2/3/4/5:518502} including patient visit, review of pertinent clinical information, treatment plan, and documentation.      Frank Arrieta MD FAAFP [facility] CMD  Geriatrics  Hospice and Palliative Care

## 2025-06-28 ENCOUNTER — MEDICAL CORRESPONDENCE (OUTPATIENT)
Dept: HEALTH INFORMATION MANAGEMENT | Facility: HOSPITAL | Age: 75
End: 2025-06-28

## 2025-07-01 ENCOUNTER — TELEPHONE (OUTPATIENT)
Dept: GERIATRICS | Facility: OTHER | Age: 75
End: 2025-07-01

## 2025-07-01 DIAGNOSIS — M48.062 SPINAL STENOSIS OF LUMBAR REGION WITH NEUROGENIC CLAUDICATION: Primary | ICD-10-CM

## 2025-07-01 RX ORDER — OXYCODONE HYDROCHLORIDE 5 MG/1
5 TABLET ORAL EVERY 6 HOURS PRN
Qty: 20 TABLET | Refills: 0 | Status: SHIPPED | OUTPATIENT
Start: 2025-07-01 | End: 2025-07-04

## 2025-07-03 ENCOUNTER — MEDICAL CORRESPONDENCE (OUTPATIENT)
Dept: HEALTH INFORMATION MANAGEMENT | Facility: HOSPITAL | Age: 75
End: 2025-07-03

## 2025-07-03 DIAGNOSIS — Z53.9 PERSONS ENCOUNTERING HEALTH SERVICES FOR SPECIFIC PROCEDURES, NOT CARRIED OUT: Primary | ICD-10-CM

## 2025-07-03 PROCEDURE — G0180 MD CERTIFICATION HHA PATIENT: HCPCS | Performed by: FAMILY MEDICINE

## 2025-07-07 ENCOUNTER — HOSPITAL ENCOUNTER (OUTPATIENT)
Dept: CT IMAGING | Facility: HOSPITAL | Age: 75
Discharge: HOME | End: 2025-07-07
Attending: PHYSICIAN ASSISTANT | Admitting: STUDENT IN AN ORGANIZED HEALTH CARE EDUCATION/TRAINING PROGRAM
Payer: COMMERCIAL

## 2025-07-07 DIAGNOSIS — M43.26 FUSION OF SPINE OF LUMBAR REGION: ICD-10-CM

## 2025-07-07 DIAGNOSIS — T84.296A: ICD-10-CM

## 2025-07-07 PROCEDURE — 72131 CT LUMBAR SPINE W/O DYE: CPT | Mod: 26 | Performed by: STUDENT IN AN ORGANIZED HEALTH CARE EDUCATION/TRAINING PROGRAM

## 2025-07-07 PROCEDURE — 72131 CT LUMBAR SPINE W/O DYE: CPT

## 2025-07-08 ENCOUNTER — TELEPHONE (OUTPATIENT)
Dept: FAMILY MEDICINE | Facility: OTHER | Age: 75
End: 2025-07-08

## 2025-07-08 NOTE — TELEPHONE ENCOUNTER
Symptom or reason needing to speak to RN: Verbal orders   killed nursing 1 once a week for 9 weeks     Best number to return call: [phone] [facility]     Best time to return call: Anytime can leave a voicemail

## 2025-07-08 NOTE — TELEPHONE ENCOUNTER
Called back and left verbal orders for [facility] via secure voicemail.   Attempted to call Amie back, unsure if this is secure voicemail so did not leave VM. Need to attempt to call Amie back.

## 2025-07-08 NOTE — TELEPHONE ENCOUNTER
Libertad Ellis RN from [facility] [place] calling to request verbal orders for skilled nursing one time a week for 9 weeks related to orthopedic aftercare  And Amie PT from [facility] calling to request verbal orders for PT one time a week for 4 weeks.   [phone] [facility]   [phone] for Amie.

## 2025-07-09 DIAGNOSIS — M48.061 SPINAL STENOSIS OF LUMBAR REGION WITHOUT NEUROGENIC CLAUDICATION: ICD-10-CM

## 2025-07-09 PROBLEM — Z98.1 S/P LAMINECTOMY WITH SPINAL FUSION: Status: ACTIVE | Noted: 2025-07-09

## 2025-07-09 RX ORDER — OXYCODONE HYDROCHLORIDE 5 MG/1
5 TABLET ORAL EVERY 6 HOURS PRN
Qty: 20 TABLET | Refills: 0 | OUTPATIENT
Start: 2025-07-09

## 2025-07-09 ASSESSMENT — ENCOUNTER SYMPTOMS
BACK PAIN: 1
CONSTIPATION: 1
ARTHRALGIAS: 1
APPETITE CHANGE: 1

## 2025-07-09 NOTE — TELEPHONE ENCOUNTER
Please contact patient and triage.  Post op pain should be managed by her surgeon.  Would suggest routing refill to surgeon.

## 2025-07-09 NOTE — TELEPHONE ENCOUNTER
Spoke with patient, it is post-op pain related. Nothing else. Has 3 left and actually sees Dr. Graf tomorrow at 12:45. Will discuss with him. Refused request.

## 2025-07-09 NOTE — TELEPHONE ENCOUNTER
Oxycodone -   originally prescribed for postop pain by surgeon- and then Dr. Arrieta filled while pt was at [facility].      Last Written Prescription Date:  7.1.25  Last Fill Quantity: #20,   # refills: 0  Last Office Visit: 5.19.25  Future Office visit:    Next 5 appointments (look out 90 days)      Aug 13, 2025 2:30 PM  (Arrive by 2:15 PM)  Provider Visit with Melyssa Connors MD  [facility] [place] ([facility] [place] ) [address]  [place] [address]  [phone]             Routing refill request to provider for review/approval because:  Drug not on the [facility], [facility] or [facility] refill protocol or controlled substance

## 2025-07-10 ENCOUNTER — TELEPHONE (OUTPATIENT)
Dept: FAMILY MEDICINE | Facility: OTHER | Age: 75
End: 2025-07-10

## 2025-07-10 ENCOUNTER — TRANSFERRED RECORDS (OUTPATIENT)
Dept: HEALTH INFORMATION MANAGEMENT | Facility: CLINIC | Age: 75
End: 2025-07-10

## 2025-07-10 RX ORDER — OXYCODONE HYDROCHLORIDE 5 MG/1
5 TABLET ORAL EVERY 6 HOURS PRN
Qty: 20 TABLET | Refills: 0 | OUTPATIENT
Start: 2025-07-10

## 2025-07-10 NOTE — TELEPHONE ENCOUNTER
Symptom or reason needing to speak to RN: verbal orders/OT 1 time a week for 4 weeks     Best number to return call: [phone]     Best time to return call: anytime

## 2025-07-10 NOTE — TELEPHONE ENCOUNTER
Received voicemail from [facility] asking for verbal orders for OT 1X4 for [facility]AD[facility].     Contact number: [phone]

## 2025-07-13 PROBLEM — Z78.9 NURSING HOME RESIDENT: Status: RESOLVED | Noted: 2025-06-25 | Resolved: 2025-07-13

## 2025-07-13 NOTE — PATIENT INSTRUCTIONS
Hospital bed order placed - check with [facility] regarding coverage/delivery.    Labs today - will call with results.    How to Take Your Medication Before Surgery  Preoperative Medication Instructions   Antiplatelet or Anticoagulation Medication Instructions   - We reviewed the medication list and the patient is not on an antiplatelet or anticoagulation medications.    Additional Medication Instructions  Take all scheduled medications on the day of surgery EXCEPT for modifications listed below:   - Herbal medications and vitamins: DO NOT TAKE 14 days prior to surgery.   - ibuprofen (Advil, Motrin): DO NOT TAKE 1 day before surgery.    - naproxen (Aleve, Naprosyn): DO NOT TAKE 4 days before surgery.    - Topicals: DO NOT TAKE day of surgery.       Patient Education   Preparing for Your Surgery  For Adults  Getting started  In most cases, a nurse will call to review your health history and instructions. They will give you an arrival time based on your scheduled surgery time. Please be ready to share:  Your doctor's clinic name and phone number  Your medical, surgical, and anesthesia history  A list of allergies and sensitivities  A list of medicines, including herbal treatments and over-the-counter drugs  Whether the patient has a legal guardian (ask how to send us the papers in advance)  Note: You may not receive a call if you were seen at our PAC (Pre[facility]).  Please tell us if you're pregnant--or if there's any chance you might be pregnant. Some surgeries may injure a fetus (unborn baby), so they require a pregnancy test. Surgeries that are safe for a fetus don't always need a test, and you can choose whether to have one.   Preparing for surgery  Within 10 to 30 days of surgery: Have a pre-op exam (sometimes called an H&P, or History and Physical). This can be done at a clinic or pre-operative center.  If you're having a , you may not need this exam. Talk to your care team.  At your  pre-op exam, talk to your care team about all medicines you take. (This includes CBD oil and any drugs, such as THC, marijuana, and other forms of cannabis.) If you need to stop any medicine before surgery, ask when to start taking it again.  This is for your safety. Many medicines and drugs can make you bleed too much during surgery. Some change how well surgery (anesthesia) drugs work.  Call your insurance company to let them know you're having surgery. (If you don't have insurance, call [phone].)  Call your clinic if there's any change in your health. This includes a scrape or scratch near the surgery site, or any signs of a cold (sore throat, runny nose, cough, rash, fever).  Eating and drinking guidelines  For your safety: Unless your surgeon tells you otherwise, follow the guidelines below.  Eat and drink as normal until 8 hours before you arrive for surgery. After that, no food or milk. You can spit out gum when you arrive.  Drink clear liquids until 2 hours before you arrive. These are liquids you can see through, like water, Gatorade, and Propel Water. They also include plain black coffee and tea (no cream or milk).  No alcohol for 24 hours before you arrive. The night before surgery, stop any drinks that contain THC.  If your care team tells you to take medicine on the morning of surgery, it's okay to take it with a sip of water. No other medicines or drugs are allowed (including CBD oil)--follow your care team's instructions.  If you have questions the day of surgery, call your hospital or surgery center.   Preventing infection  Shower or bathe the night before and the morning of surgery. Follow the instructions your clinic gave you. (If no instructions, use regular soap.)  Don't shave or clip hair near your surgery site. We'll remove the hair if needed.  Don't smoke or vape the morning of surgery. No chewing tobacco for 6 hours before you arrive. A nicotine patch is okay. You may spit out nicotine  gum when you arrive.  For some surgeries, the surgeon will tell you to fully quit smoking and nicotine.  We will make every effort to keep you safe from infection. We will:  Clean our hands often with soap and water (or an alcohol-based hand rub).  Clean the skin at your surgery site with a special soap that kills germs.  Give you a special gown to keep you warm. (Cold raises the risk of infection.)  Wear hair covers, masks, gowns, and gloves during surgery.  Give antibiotic medicine, if prescribed. Not all surgeries need this medicine.  What to bring on the day of surgery  Photo ID and insurance card  Copy of your health care directive, if you have one  Glasses and hearing aids (bring cases)  You can't wear contacts during surgery  Inhaler and eye drops, if you use them (tell us about these when you arrive)  CPAP machine or breathing device, if you use them  A few personal items, if spending the night  If you have . . .  A pacemaker, ICD (cardiac defibrillator), or other implant: Bring the ID card.  An implanted stimulator: Bring the remote control.  A legal guardian: Bring a copy of the certified (court-stamped) guardianship papers.  Please remove any jewelry, including body piercings. Leave jewelry and other valuables at home.  If you're going home the day of surgery  You must have a support person drive you home. They should stay with you overnight, and they may need to help with your self-care.  If you don't have a support person, please tells us as soon as possible. We can help.  After surgery  If it's hard to control your pain or you need more pain medicine, please call your surgeon's office.  Questions?   If you have any questions for your care team, list them here:    ____________________________________________________________________________________________________________________________________________________________________________________________________________________________________________________________  For informational purposes only. Not to replace the advice of your health care provider. Copyright   2003, 2019 [facility]. All rights reserved. Clinically reviewed by Yovanny Fu MD. [employer] [id number] - REV 02/25.

## 2025-07-14 ENCOUNTER — RESULTS FOLLOW-UP (OUTPATIENT)
Dept: FAMILY MEDICINE | Facility: OTHER | Age: 75
End: 2025-07-14

## 2025-07-14 ENCOUNTER — OFFICE VISIT (OUTPATIENT)
Dept: FAMILY MEDICINE | Facility: OTHER | Age: 75
End: 2025-07-14
Attending: FAMILY MEDICINE
Payer: COMMERCIAL

## 2025-07-14 VITALS
DIASTOLIC BLOOD PRESSURE: 60 MMHG | BODY MASS INDEX: 19.44 KG/M2 | HEIGHT: 60 IN | HEART RATE: 64 BPM | OXYGEN SATURATION: 97 % | TEMPERATURE: 96.8 F | WEIGHT: 99 LBS | SYSTOLIC BLOOD PRESSURE: 110 MMHG | RESPIRATION RATE: 18 BRPM

## 2025-07-14 DIAGNOSIS — M51.362 DEGENERATION OF INTERVERTEBRAL DISC OF LUMBAR REGION WITH DISCOGENIC BACK PAIN AND LOWER EXTREMITY PAIN: ICD-10-CM

## 2025-07-14 DIAGNOSIS — M81.0 AGE-RELATED OSTEOPOROSIS WITHOUT CURRENT PATHOLOGICAL FRACTURE: ICD-10-CM

## 2025-07-14 DIAGNOSIS — R73.03 PREDIABETES: ICD-10-CM

## 2025-07-14 DIAGNOSIS — Z01.818 PREOP GENERAL PHYSICAL EXAM: Primary | ICD-10-CM

## 2025-07-14 DIAGNOSIS — G25.81 RESTLESS LEGS SYNDROME (RLS): ICD-10-CM

## 2025-07-14 DIAGNOSIS — K21.9 LARYNGOPHARYNGEAL REFLUX (LPR): ICD-10-CM

## 2025-07-14 DIAGNOSIS — E03.9 HYPOTHYROIDISM, UNSPECIFIED TYPE: Chronic | ICD-10-CM

## 2025-07-14 DIAGNOSIS — E78.5 HYPERLIPIDEMIA, UNSPECIFIED HYPERLIPIDEMIA TYPE: ICD-10-CM

## 2025-07-14 DIAGNOSIS — M48.061 SPINAL STENOSIS OF LUMBAR REGION, UNSPECIFIED WHETHER NEUROGENIC CLAUDICATION PRESENT: ICD-10-CM

## 2025-07-14 LAB
ANION GAP SERPL CALCULATED.3IONS-SCNC: 10 MMOL/L (ref 7–15)
BASOPHILS # BLD AUTO: 0.1 10E3/UL (ref 0–0.2)
BASOPHILS NFR BLD AUTO: 1 %
BUN SERPL-MCNC: 13.7 MG/DL (ref 8–23)
CALCIUM SERPL-MCNC: 9.7 MG/DL (ref 8.8–10.4)
CHLORIDE SERPL-SCNC: 101 MMOL/L (ref 98–107)
CREAT SERPL-MCNC: 0.58 MG/DL (ref 0.51–0.95)
EGFRCR SERPLBLD CKD-EPI 2021: >90 ML/MIN/1.73M2
EOSINOPHIL # BLD AUTO: 0.1 10E3/UL (ref 0–0.7)
EOSINOPHIL NFR BLD AUTO: 2 %
ERYTHROCYTE [DISTWIDTH] IN BLOOD BY AUTOMATED COUNT: 13.1 % (ref 10–15)
GLUCOSE SERPL-MCNC: 99 MG/DL (ref 70–99)
HCO3 SERPL-SCNC: 25 MMOL/L (ref 22–29)
HCT VFR BLD AUTO: 36.3 % (ref 35–47)
HGB BLD-MCNC: 12.1 G/DL (ref 11.7–15.7)
IMM GRANULOCYTES # BLD: 0 10E3/UL
IMM GRANULOCYTES NFR BLD: 0 %
LYMPHOCYTES # BLD AUTO: 1.1 10E3/UL (ref 0.8–5.3)
LYMPHOCYTES NFR BLD AUTO: 28 %
MCH RBC QN AUTO: 33.1 PG (ref 26.5–33)
MCHC RBC AUTO-ENTMCNC: 33.3 G/DL (ref 31.5–36.5)
MCV RBC AUTO: 99 FL (ref 78–100)
MONOCYTES # BLD AUTO: 0.5 10E3/UL (ref 0–1.3)
MONOCYTES NFR BLD AUTO: 14 %
NEUTROPHILS # BLD AUTO: 2.2 10E3/UL (ref 1.6–8.3)
NEUTROPHILS NFR BLD AUTO: 55 %
NRBC # BLD AUTO: 0 10E3/UL
NRBC BLD AUTO-RTO: 0 /100
PLATELET # BLD AUTO: 346 10E3/UL (ref 150–450)
POTASSIUM SERPL-SCNC: 4.2 MMOL/L (ref 3.4–5.3)
RBC # BLD AUTO: 3.66 10E6/UL (ref 3.8–5.2)
SODIUM SERPL-SCNC: 136 MMOL/L (ref 135–145)
WBC # BLD AUTO: 3.9 10E3/UL (ref 4–11)

## 2025-07-14 PROCEDURE — 36415 COLL VENOUS BLD VENIPUNCTURE: CPT | Mod: ZL | Performed by: FAMILY MEDICINE

## 2025-07-14 PROCEDURE — G0463 HOSPITAL OUTPT CLINIC VISIT: HCPCS

## 2025-07-14 PROCEDURE — 80048 BASIC METABOLIC PNL TOTAL CA: CPT | Mod: ZL | Performed by: FAMILY MEDICINE

## 2025-07-14 PROCEDURE — 85004 AUTOMATED DIFF WBC COUNT: CPT | Mod: ZL | Performed by: FAMILY MEDICINE

## 2025-07-14 RX ORDER — HEPARIN SODIUM 5000 [USP'U]/ML
INJECTION, SOLUTION INTRAVENOUS; SUBCUTANEOUS
COMMUNITY
Start: 2025-06-18 | End: 2025-07-14

## 2025-07-14 RX ORDER — ALUMINUM HYDROXIDE, MAGNESIUM HYDROXIDE, DIMETHICONE 400; 400; 40 MG/10ML; MG/10ML; MG/10ML
SUSPENSION ORAL
COMMUNITY
Start: 2025-06-17

## 2025-07-14 ASSESSMENT — PAIN SCALES - GENERAL: PAINLEVEL_OUTOF10: NO PAIN (0)

## 2025-07-14 NOTE — PROGRESS NOTES
Preoperative Evaluation  [facility] - [place]  [address]  [place] [address]  Phone: [phone]  Primary Provider: Melyssa Encinas MD  Pre-op Performing Provider: Melyssa Encinas MD  Jul 14, 2025 7/14/2025   Surgical Information   What procedure is being done? Back surgery; Revision L4/5 LLIF   Facility or Hospital where procedure/surgery will be performed: [facility]    Who is doing the procedure / surgery? dr schmidt   Date of surgery / procedure: july 15   Time of surgery / procedure: ?   Where do you plan to recover after surgery? at home alone     Fax number for surgical facility: Dr. Schmidt, OA    Assessment & Plan     The proposed surgical procedure is considered INTERMEDIATE risk.    Preop general physical exam  Proceed as planned.  - CBC with platelets and differential; Future  - Basic metabolic panel; Future  - CBC with platelets and differential  - Basic metabolic panel    Spinal stenosis of lumbar region, unspecified whether neurogenic claudication present  As above.  - CBC with platelets and differential; Future  - Basic metabolic panel; Future  - Hospital Bed Order for DME - ONLY FOR DME  - CBC with platelets and differential  - Basic metabolic panel    Degeneration of intervertebral disc of lumbar region with discogenic back pain and lower extremity pain  As above..    Hyperlipidemia, unspecified hyperlipidemia type  Stable.    Prediabetes  Stable.    Hypothyroidism, unspecified type  Stable.    Laryngopharyngeal reflux (LPR)  Stable.    Age-related osteoporosis without current pathological fracture  Stable.    Restless legs syndrome (RLS)  Stable  - Hospital Bed Order for DME - ONLY FOR DME           Risks and Recommendations  The patient has the following additional risks and recommendations for perioperative complications:   - No identified additional risk factors other than previously addressed    Antiplatelet or Anticoagulation Medication  "Instructions   - We reviewed the medication list and the patient is not on an antiplatelet or anticoagulation medications.    Additional Medication Instructions  Take all scheduled medications on the day of surgery EXCEPT for modifications listed below:   - Herbal medications and vitamins: DO NOT TAKE 14 days prior to surgery.   - ibuprofen (Advil, Motrin): DO NOT TAKE 1 day before surgery.    - naproxen (Aleve, Naprosyn): DO NOT TAKE 4 days before surgery.    - Topicals: DO NOT TAKE day of surgery.    Recommendation  Approval given to proceed with proposed procedure, without further diagnostic evaluation.        Megha Chairez is a 75 year old, presenting for the following:  Pre-Op Exam          7/14/2025     7:49 AM   Additional Questions   Roomed by GAYE Aguilar CMA   Accompanied by Self         7/14/2025     7:49 AM   Patient Reported Additional Medications   Patient reports taking the following new medications None     HPI:     Patient with lumbar stenosis, neurogenic claudication, grade 1 L3-4 spondylolisthesis, and left lateral recess neuroforaminal stenosis L4/5.    Prior L4/5 laminectomy 5/2024 with Dr Graf.     Had L3/4 and L4/5 transforaminal lumbar interbody fusion, left sided facetectomies.  6/9/2025.     2 day hospitalization and nursing home stay.    Now, planning revision surgery patient.  Patient has ongoing low back pain, bilateral.  Occasional left leg pain.    Sometimes feels numbness in feet at night laying down to sleep.  No weakness, other daytime numbness, bowel/bladder changes.    Had CT 7/7/25.  Per patient \"bone shifted out of place\"  Revision L4/5 LLIF.        7/14/2025   Pre-Op Questionnaire   Have you ever had a heart attack or stroke? No   Have you ever had surgery on your heart or blood vessels, such as a stent placement, a coronary artery bypass, or surgery on an artery in your head, neck, heart, or legs? No   Do you have chest pain with activity? No   Do you have a " history of heart failure? No   Do you currently have a cold, bronchitis or symptoms of other infection? No   Do you have a cough, shortness of breath, or wheezing? No   Do you or anyone in your family have previous history of blood clots? No   Do you or does anyone in your family have a serious bleeding problem such as prolonged bleeding following surgeries or cuts? No   Have you ever had problems with anemia or been told to take iron pills? (!) YES iron supplement; normal labs 11/2024   Have you had any abnormal blood loss such as black, tarry or bloody stools, or abnormal vaginal bleeding? No   Have you ever had a blood transfusion? No   Are you willing to have a blood transfusion if it is medically needed before, during, or after your surgery? Yes   Have you or any of your relatives ever had problems with anesthesia? No   Do you have sleep apnea, excessive snoring or daytime drowsiness? No   Do you have any artifical heart valves or other implanted medical devices like a pacemaker, defibrillator, or continuous glucose monitor? No   Do you have artificial joints? No   Are you allergic to latex? No     Advance Care Planning    Document on file is a Health Care Directive or POLST.    Preoperative Review of    reviewed - controlled substances reflected in medication list.    Gabapentin only chronically.  Oxyocodone - prn from surgeon    Status of Chronic Conditions:  See problem list for active medical problems.  Problems all longstanding and stable, except as noted/documented.  See ROS for pertinent symptoms related to these conditions.    HYPERLIPIDEMIA - Patient has a long history of significant Hyperlipidemia requiring medication for treatment with recent good control. Patient reports no problems or side effects with the medication.     HYPOTHYROIDISM - Patient has a longstanding history of chronic Hypothyroidism. Patient has been doing well, noting no tremor, insomnia, hair loss or changes in skin texture.  "Continues to take medications as directed, without adverse reactions or side effects. Last TSH   Lab Results   Component Value Date    TSH 3.20 03/12/2025   .      PREDIABETES -   Lab Results   Component Value Date    A1C 5.8 05/16/2025    A1C 5.8 06/19/2024    A1C 6.0 06/08/2023    A1C 5.6 06/08/2022    A1C 5.6 09/15/2020    A1C 5.7 10/21/2019     OSTEOPOROSIS - prior endocrine consult; prior Reclast x 5 doses; last dexa 9/2023 - increased in density compared with prior; due for repeat dex 9/2025; currently on drug holiday     LPR/GERD - off PPI; not active    RLS - Requip - stable     DENTAL FRACTURE 3/14/25 - right lower - seen by dentist - had extraction done.  Needs to allow socket to heal before implant can be permanently placed.    HISTORY OF BREAST CANCER - s/p bilateral mastectomy 2004.    Hospital bed appointment 8/2025 - used it after last surgery while at nursing home.  Was helpful for pain, mobility, sleep.  No falls.    DME (Durable Medical Equipment) Orders and Documentation  Orders Placed This Encounter   Procedures    Hospital Bed Order for DME - ONLY FOR DME      The patient was assessed and it was determined the patient is in need of the following listed DME Supplies/Equipment. Please complete supporting documentation below to demonstrate medical necessity.      Hospital Bed/Accessories Documentation  Hospital bed is required for body positioning, to allow for safe transfers to wheelchair and standing and frequent changes in body position, not feasible in an ordinary bed     NOTE: Patient must have a \"Yes\" in one of the four following questions to qualify for a hospital bed.    1. Does the patient require positioning of the body in ways not feasible with an ordinary bed due to a medical condition that is expected to last at least 1 month? Yes (Please explain): back pain, radicular pain; recent back surgeries; limited mobility    2. Does the patient require, for the alleviation of pain, positioning " of the body in ways not feasible with an ordinary bed? Yes (Please explain): back and leg pain, uncomfortable laying flat.     3. Does the patient require the head of the bed to be elevated more than 30 degrees most of the time due to congestive heart failure, chronic pulmonary disease, or aspiration? No    4. Does the patient require traction that can only be attached to a hospital bed? No    Additional Criteria:    Does the patient require frequent changes in body position and/or have an immediate need for change in body position? Yes - Patient qualifies for Semi Electric Bed     Trapeze Criteria:  (Patient must meet standard hospital bed criteria also)   1. Does patient need this device to sit up because of a respiratory condition, for change in body position for other medical reasons, or to get in or out of bed? No                         Patient Active Problem List    Diagnosis Date Noted    S/P laminectomy with spinal fusion 07/09/2025     Priority: Medium    Restless legs syndrome (RLS) 04/15/2024     Priority: Medium    Prediabetes 04/15/2024     Priority: Medium    Spinal stenosis of lumbar region 12/04/2023     Priority: Medium    Seborrheic dermatitis of scalp 08/17/2021     Priority: Medium    Hypothyroidism, unspecified type 11/29/2019     Priority: Medium    probably LEFT first branchial cleft cyst 09/30/2016     Priority: Medium     no further surgery recommended unless chronic drainage occurs.  Ensure this is not a melanoma, path pending      Hyperlipidemia, unspecified hyperlipidemia type 09/15/2016     Priority: Medium    ACP (advance care planning) 01/22/2016     Priority: Medium     Advance Care Planning 6/2/2016: Receipt of ACP document:  Received: Health Care Directive which was witnessed or notarized on 6/1/16.  Document not previously scanned.  Validation form completed and sent with document to be scanned.  Code Status reflects choices in most recent ACP document.  Confirmed/documented  designated decision maker(s).  Added by Paola Johnson  Advance Care Planning 1/22/2016: Receipt of ACP document:  Received: Health Care Directive which was witnessed or notarized on 10-21-08.  Document previously scanned on 12-7-15.  Validation form completed and sent to be scanned.  Code Status needs to be updated to reflect choices in most recent ACP document.  Confirmed/documented designated decision maker(s).  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 11/16/2015     Priority: Medium    Personal history of malignant neoplasm of breast 12/23/2014     Priority: Medium    Early satiety 12/23/2014     Priority: Medium    Malignant tumor of breast (H) 12/23/2014     Priority: Medium    Laryngopharyngeal reflux (LPR) 04/30/2013     Priority: Medium    Chronic rhinitis 04/30/2013     Priority: Medium    ETD (eustachian tube dysfunction) 04/30/2013     Priority: Medium    Gastroesophageal reflux disease without esophagitis 04/30/2013     Priority: Medium    Atypical nevus 07/25/2012     Priority: Medium    Skin sensation disturbance 07/25/2012     Priority: Medium    Notalgia paresthetica 07/25/2012     Priority: Medium    Osteoporosis 09/10/2001     Priority: Medium     Problem list name updated by automated process. Provider to review        Past Medical History:   Diagnosis Date    Atypical nevi     Chondrodermatitis nodularis helicis of left ear     [facility]    Chronic rhinitis     [facility] allergy; negative skin testing; IgE mediated allergies; ENT - DC nasal steroid and antihistamine; saline rinses    Degenerative skin disorder     solar elastosis    Hallux rigidus 06/15/2000    Hyperlipidemia, unspecified hyperlipidemia type 09/15/2016    Laryngopharyngeal reflux disease     PPI    Malignant neoplasm of breast (female), unspecified site 03/10/2004    Notalgia paresthetica      Osteoarthritis 07/20/2011    Osteoporosis, unspecified 09/10/2001    Dr Moses; intermittent reclast;  Dr. Moses; repeat dexa after full 2 years Reclast    Other abnormal glucose 12/20/2013    Paresthesia     neck; notalgiaparesthetic; dr leahy & Dr Nevarez; neurontin    Personal history of malignant neoplasm of breast 06/07/2005    Rhinitis     Schamberg's purpura     Spinal stenosis of lumbar region      Past Surgical History:   Procedure Laterality Date    BACK SURGERY  05/2024    Dr Graf; L4/5 laminectomy; spinal stenosis; claudication    BONE MARROW BIOPSY      negative    COLONOSCOPY  07/2000    COLONOSCOPY  08/13/2013    DR Fu; repeat 5 years    COLONOSCOPY N/A 08/13/2018    Procedure: COLONOSCOPY;  COLONOSCOPY;  Surgeon: Ajit Uriarte MD;  Location: [facility]    COLONOSCOPY N/A 12/02/2022    Procedure: COLONOSCOPY, WITH POLYPECTOMY AND BIOPSY;  Surgeon: Marcellus Jimenez MD;  Location: [facility]    DILATION AND CURETTAGE, OPERATIVE HYSTEROSCOPY, COMBINED N/A 02/13/2019    Procedure: EXAM UNDER ANESTHESIA , HYSTEROSCOPY;  Surgeon: Jovi Gabriel MD;  Location: [facility]    HEMORRHOIDECTOMY  1986    IR CONSULTATION FOR IR EXAM  12/05/2023    MASTECTOMY, BILATERAL  03/01/2004    right sided breast cancer    RECONSTRUCT FOREFOOT WITH METATARSOPHALANGEAL (MTP) FUSION Right 06/16/2023    Procedure: Right First Metatarsophalangeal Joint Fusion;  Surgeon: Ac Parker DPM;  Location: [facility]    RELEASE TRIGGER FINGER Right 03/07/2018    Procedure: RELEASE TRIGGER FINGER;  RELEASE TRIGGER DIGIT RIGHT THUMB;  Surgeon: Jose Alfredo Brewster DO;  Location: [facility]    RELEASE TRIGGER FINGER Left 03/15/2023    Procedure: Left Thumb Trigger Finger Release;  Surgeon: Rigoberto Olivera MD;  Location: [facility]    RELEASE TRIGGER FINGER Right 05/17/2023    Procedure: Revision Right Thumb Trigger Release;  Surgeon: Rigoberto Olivera MD;  Location: [facility]    REMOVE HARDWARE FOOT Right 08/16/2024    Procedure: Hardware Removal Right Foot;   Surgeon: Ac Parker DPM;  Location: [facility]    REPAIR HAMMER TOE Right 08/16/2024    Procedure: Second Toe Interphalangeal Joint Capsulotomy right;  Surgeon: Ac Parker DPM;  Location: [facility]    UPPER GI ENDOSCOPY      [facility] COLONOSCOPY THRU STOMA WITH BIOPSY  08/25/2010    cancer screening, family h/o colon cancer; hyperplastic polyp     Current Outpatient Medications   Medication Sig Dispense Refill    ANTACID/ANTIGAS 400-400-40 MG/10ML SUSP suspension TAKE 30 mL BY MOUTH FOUR TIMES DAILY AS NEEDED      bisacodyl (DULCOLAX) 5 MG EC tablet Take 5 mg by mouth daily as needed for constipation.      emollient (VANICREAM) external cream Apply topically as needed for other.      gabapentin (NEURONTIN) 300 MG capsule Take 300 mg AM and 900 mg  capsule 2    Heparin Sodium, Porcine, (HEPARIN, PORCINE,) 5000 UNIT/ML SOLN injection INJECT 1 mL SUBCUTANEOUSLY TWICE A DAY FOR 6 WEEKS      levothyroxine (SYNTHROID/LEVOTHROID) 25 MCG tablet Take 2 tablets 4 days a week and 1 tablet 3 days a week. 153 tablet 3    MAGNESIUM OXIDE PO Take 200 mg by mouth daily      melatonin 3 MG tablet Take 1 tablet (3 mg) by mouth nightly as needed for sleep. 90 tablet 0    methocarbamol (ROBAXIN) 750 MG tablet Take 750 mg by mouth every 6 hours as needed for muscle spasms.      Polyethylene Glycol 3350 (MIRALAX PO) Use every other day      psyllium (METAMUCIL/KONSYL) 58.6 % powder Take 18 g (1 Tablespoonful) by mouth daily. 425 g 3    rOPINIRole (REQUIP) 1 MG tablet Take 4 tablets (4 mg) by mouth at bedtime 400 tablet 3    simvastatin (ZOCOR) 20 MG tablet Take 1 tablet (20 mg) by mouth at bedtime 90 tablet 3    triamcinolone (KENALOG) 0.025 % cream Apply to back once a day if the back is itchy. 454 g 1    acetaminophen (TYLENOL) 325 MG tablet Take 650 mg by mouth every 6 hours as needed for mild pain.      aluminum & magnesium hydroxide (MAG-AL) 200-200 MG/5ML supsension Take 30 mLs by mouth every 6 hours as needed for indigestion.       benzocaine-menthol (CEPACOL) 15-3.6 MG lozenge Place 1 lozenge inside cheek every hour as needed for sore throat.      ondansetron (ZOFRAN) 4 MG tablet Take by mouth every 8 hours as needed for nausea. (Patient taking differently: Take 4 mg by mouth every 6 hours as needed for nausea.)      oxyCODONE (ROXICODONE) 5 MG tablet Take 1 tablet (5 mg) by mouth every 6 hours as needed for severe pain. 360 tablet 0    sodium chloride 0.9 % SOLN 45 mL with heparin (porcine) 1000 UNIT/ML SOLN 5,000 Units Inject 5,000 Units into catheter 2 times daily.         Allergies   Allergen Reactions    Chlorhexidine     Iodine     Isopropyl Alcohol     Chlorhexidine Gluconate Rash    Povidone Iodine Rash     Betadine     Soap Rash     Betadine         Social History     Tobacco Use    Smoking status: Never     Passive exposure: Never    Smokeless tobacco: Never   Substance Use Topics    Alcohol use: Yes     Alcohol/week: 0.0 standard drinks of alcohol     Comment: 1 glasso wine, weekly      Family History   Problem Relation Age of Onset    Dementia Mother         (cause of death)     High cholesterol Mother     Osteoarthritis Mother     C.A.D. Father         (cause of death)     Prostate Cancer Father     Breast Cancer Maternal Aunt 72    Cerebrovascular Disease Maternal Grandmother         CVA    Diabetes Maternal Grandmother      History   Drug Use No             Review of Systems  Constitutional, HEENT, cardiovascular, pulmonary, gi and gu systems are negative, except as otherwise noted.    Objective    /60   Pulse 64   Temp 96.8  F (36  C) (Tympanic)   Resp 18   Ht 1.524 m (5')   Wt 44.9 kg (99 lb)   SpO2 97%   BMI 19.33 kg/m     Estimated body mass index is 19.33 kg/m  as calculated from the following:    Height as of this encounter: 1.524 m (5').    Weight as of this encounter: 44.9 kg (99 lb).  Physical Exam  GENERAL: alert and no distress  EYES: Eyes grossly normal to inspection, PERRL and conjunctivae and  sclerae normal  HENT: ear canals and TM's normal, nose and mouth without ulcers or lesions  NECK: no adenopathy, no asymmetry, masses, or scars  RESP: lungs clear to auscultation - no rales, rhonchi or wheezes  CV: regular rate and rhythm, normal S1 S2, no S3 or S4, no murmur, click or rub, no peripheral edema  ABDOMEN: soft, nontender, no hepatosplenomegaly, no masses and bowel sounds normal  MS: no gross musculoskeletal defects noted, no edema  SKIN: no suspicious lesions or rashes  NEURO: Normal strength and tone, mentation intact and speech normal  PSYCH: mentation appears normal, affect normal/bright    Recent Labs   Lab Test 05/16/25  0801 03/12/25  1103   HGB 12.0 12.7    282   * 141   POTASSIUM 4.8 4.6   CR 0.61 0.68   A1C 5.8*  --         Diagnostics  Recent Results (from the past 24 hours)   CBC with platelets and differential    Collection Time: 07/14/25  8:23 AM   Result Value Ref Range    WBC Count 3.9 (L) 4.0 - 11.0 10e3/uL    RBC Count 3.66 (L) 3.80 - 5.20 10e6/uL    Hemoglobin 12.1 11.7 - 15.7 g/dL    Hematocrit 36.3 35.0 - 47.0 %    MCV 99 78 - 100 fL    MCH 33.1 (H) 26.5 - 33.0 pg    MCHC 33.3 31.5 - 36.5 g/dL    RDW 13.1 10.0 - 15.0 %    Platelet Count 346 150 - 450 10e3/uL    % Neutrophils 55 %    % Lymphocytes 28 %    % Monocytes 14 %    % Eosinophils 2 %    % Basophils 1 %    % Immature Granulocytes 0 %    NRBCs per 100 WBC 0 <1 /100    Absolute Neutrophils 2.2 1.6 - 8.3 10e3/uL    Absolute Lymphocytes 1.1 0.8 - 5.3 10e3/uL    Absolute Monocytes 0.5 0.0 - 1.3 10e3/uL    Absolute Eosinophils 0.1 0.0 - 0.7 10e3/uL    Absolute Basophils 0.1 0.0 - 0.2 10e3/uL    Absolute Immature Granulocytes 0.0 <=0.4 10e3/uL    Absolute NRBCs 0.0 10e3/uL      No EKG this visit, completed in the last 90 days.  Send -   Revised Cardiac Risk Index (RCRI)  The patient has the following serious cardiovascular risks for perioperative complications:   - No serious cardiac risks = 0 points     RCRI  Interpretation: 0 points: Class I (very low risk - 0.4% complication rate)         Signed Electronically by: Melyssa Encinas MD  A copy of this evaluation report is provided to the requesting physician.    The longitudinal plan of care for the diagnosis(es)/condition(s) as documented were addressed during this visit. Due to the added complexity in care, I will continue to support Mihaela in the subsequent management and with ongoing continuity of care.

## 2025-07-18 ENCOUNTER — MEDICAL CORRESPONDENCE (OUTPATIENT)
Dept: HEALTH INFORMATION MANAGEMENT | Facility: HOSPITAL | Age: 75
End: 2025-07-18

## 2025-07-29 VITALS
DIASTOLIC BLOOD PRESSURE: 58 MMHG | HEIGHT: 60 IN | SYSTOLIC BLOOD PRESSURE: 104 MMHG | OXYGEN SATURATION: 97 % | TEMPERATURE: 98.1 F | BODY MASS INDEX: 20.03 KG/M2 | RESPIRATION RATE: 16 BRPM | HEART RATE: 66 BPM | WEIGHT: 102 LBS

## 2025-07-29 RX ORDER — METHOCARBAMOL 750 MG/1
750 TABLET, FILM COATED ORAL PRN
COMMUNITY

## 2025-07-29 RX ORDER — ASPIRIN 81 MG/1
81 TABLET ORAL DAILY
COMMUNITY

## 2025-07-29 RX ORDER — VIT C/B6/B5/MAGNESIUM/HERB 173 50-5-6-5MG
500 CAPSULE ORAL 2 TIMES DAILY
COMMUNITY

## 2025-07-29 RX ORDER — TIOCONAZOLE 6.5 %
OINTMENT WITH PREFILLED APPLICATOR VAGINAL 2 TIMES DAILY
COMMUNITY

## 2025-07-29 RX ORDER — AMPICILLIN TRIHYDRATE 250 MG
2 CAPSULE ORAL 2 TIMES DAILY
COMMUNITY

## 2025-07-29 RX ORDER — LIDOCAINE 50 MG/G
PATCH TOPICAL EVERY 24 HOURS
COMMUNITY

## 2025-07-29 RX ORDER — EMOLLIENT BASE
CREAM (GRAM) TOPICAL PRN
COMMUNITY
End: 2025-07-29

## 2025-07-29 RX ORDER — CETIRIZINE HYDROCHLORIDE 5 MG/1
5 TABLET ORAL DAILY
COMMUNITY

## 2025-07-30 ENCOUNTER — NURSING HOME VISIT (OUTPATIENT)
Dept: GERIATRICS | Facility: OTHER | Age: 75
End: 2025-07-30
Attending: FAMILY MEDICINE
Payer: COMMERCIAL

## 2025-07-30 DIAGNOSIS — Z98.1 S/P LAMINECTOMY WITH SPINAL FUSION: ICD-10-CM

## 2025-07-30 DIAGNOSIS — R73.03 PREDIABETES: ICD-10-CM

## 2025-07-30 DIAGNOSIS — G25.81 RESTLESS LEGS SYNDROME (RLS): ICD-10-CM

## 2025-07-30 DIAGNOSIS — M48.061 SPINAL STENOSIS OF LUMBAR REGION WITHOUT NEUROGENIC CLAUDICATION: ICD-10-CM

## 2025-07-30 DIAGNOSIS — Z78.9 NURSING HOME RESIDENT: Primary | ICD-10-CM

## 2025-07-30 PROCEDURE — 99309 SBSQ NF CARE MODERATE MDM 30: CPT | Performed by: FAMILY MEDICINE

## 2025-07-31 ASSESSMENT — ENCOUNTER SYMPTOMS
FEVER: 0
DYSURIA: 0
ADENOPATHY: 0
SPEECH DIFFICULTY: 0
CONFUSION: 0
BACK PAIN: 1
LEG SWELLING: 0
SHORTNESS OF BREATH: 0
LIGHT-HEADEDNESS: 0
DIZZINESS: 0
VOICE CHANGE: 0
TROUBLE SWALLOWING: 0
BLOOD IN STOOL: 0
DEPRESSION: 0
UNEXPECTED WEIGHT CHANGE: 0
ABDOMINAL PAIN: 0
EXTREMITY WEAKNESS: 0
APPETITE CHANGE: 1
MYALGIAS: 0
EYE PROBLEMS: 0
NUMBNESS: 0
SLEEP DISTURBANCE: 0
FREQUENCY: 0
CONSTIPATION: 1
HEMATURIA: 0
FATIGUE: 0
PALPITATIONS: 0
WOUND: 0
DIARRHEA: 0
ARTHRALGIAS: 1
NERVOUS/ANXIOUS: 0
WHEEZING: 0
SEIZURES: 0
COUGH: 0

## 2025-08-07 ENCOUNTER — TELEPHONE (OUTPATIENT)
Dept: FAMILY MEDICINE | Facility: OTHER | Age: 75
End: 2025-08-07

## 2025-08-14 ENCOUNTER — APPOINTMENT (OUTPATIENT)
Dept: GENERAL RADIOLOGY | Facility: HOSPITAL | Age: 75
End: 2025-08-14
Attending: PHYSICIAN ASSISTANT
Payer: COMMERCIAL

## 2025-08-14 ENCOUNTER — HOSPITAL ENCOUNTER (EMERGENCY)
Facility: HOSPITAL | Age: 75
Discharge: HOME | End: 2025-08-14
Attending: NURSE PRACTITIONER | Admitting: NURSE PRACTITIONER
Payer: COMMERCIAL

## 2025-08-14 VITALS
DIASTOLIC BLOOD PRESSURE: 60 MMHG | SYSTOLIC BLOOD PRESSURE: 111 MMHG | HEART RATE: 65 BPM | OXYGEN SATURATION: 100 % | TEMPERATURE: 97.6 F | RESPIRATION RATE: 18 BRPM

## 2025-08-14 DIAGNOSIS — S93.401A RIGHT ANKLE SPRAIN: Primary | ICD-10-CM

## 2025-08-14 DIAGNOSIS — E03.9 HYPOTHYROIDISM, UNSPECIFIED TYPE: ICD-10-CM

## 2025-08-14 PROCEDURE — G0463 HOSPITAL OUTPT CLINIC VISIT: HCPCS | Performed by: NURSE PRACTITIONER

## 2025-08-14 PROCEDURE — 73610 X-RAY EXAM OF ANKLE: CPT | Mod: RT

## 2025-08-14 PROCEDURE — 99213 OFFICE O/P EST LOW 20 MIN: CPT | Performed by: NURSE PRACTITIONER

## 2025-08-14 PROCEDURE — 73610 X-RAY EXAM OF ANKLE: CPT | Mod: 26 | Performed by: RADIOLOGY

## 2025-08-14 RX ORDER — LEVOTHYROXINE SODIUM 25 UG/1
TABLET ORAL
Qty: 153 TABLET | Refills: 0 | Status: SHIPPED | OUTPATIENT
Start: 2025-08-14

## 2025-08-14 ASSESSMENT — ENCOUNTER SYMPTOMS
COLOR CHANGE: 1
PSYCHIATRIC NEGATIVE: 1
WOUND: 0
SHORTNESS OF BREATH: 0
DIARRHEA: 0
NAUSEA: 0
VOMITING: 0
CHILLS: 0
FEVER: 0

## 2025-08-14 ASSESSMENT — ACTIVITIES OF DAILY LIVING (ADL): ADLS_ACUITY_SCORE: 41

## 2025-08-14 ASSESSMENT — COLUMBIA-SUICIDE SEVERITY RATING SCALE - C-SSRS
6. HAVE YOU EVER DONE ANYTHING, STARTED TO DO ANYTHING, OR PREPARED TO DO ANYTHING TO END YOUR LIFE?: NO
2. HAVE YOU ACTUALLY HAD ANY THOUGHTS OF KILLING YOURSELF IN THE PAST MONTH?: NO
1. IN THE PAST MONTH, HAVE YOU WISHED YOU WERE DEAD OR WISHED YOU COULD GO TO SLEEP AND NOT WAKE UP?: NO

## 2025-08-18 ENCOUNTER — OFFICE VISIT (OUTPATIENT)
Dept: PODIATRY | Facility: OTHER | Age: 75
End: 2025-08-18
Attending: PODIATRIST
Payer: COMMERCIAL

## 2025-08-18 VITALS
SYSTOLIC BLOOD PRESSURE: 118 MMHG | OXYGEN SATURATION: 100 % | RESPIRATION RATE: 16 BRPM | TEMPERATURE: 96.4 F | HEART RATE: 62 BPM | DIASTOLIC BLOOD PRESSURE: 59 MMHG

## 2025-08-18 DIAGNOSIS — M76.72 PERONEAL TENDINITIS OF LEFT LOWER LEG: ICD-10-CM

## 2025-08-18 DIAGNOSIS — M76.61 ACHILLES TENDINITIS OF RIGHT LOWER EXTREMITY: ICD-10-CM

## 2025-08-18 DIAGNOSIS — M62.461 GASTROCNEMIUS EQUINUS OF RIGHT LOWER EXTREMITY: ICD-10-CM

## 2025-08-18 DIAGNOSIS — M76.71 PERONEAL TENDINITIS OF RIGHT LOWER EXTREMITY: Primary | ICD-10-CM

## 2025-08-18 DIAGNOSIS — M62.462 GASTROCNEMIUS EQUINUS OF LEFT LOWER EXTREMITY: ICD-10-CM

## 2025-08-18 PROCEDURE — G0463 HOSPITAL OUTPT CLINIC VISIT: HCPCS

## 2025-08-18 ASSESSMENT — PAIN SCALES - GENERAL: PAINLEVEL_OUTOF10: MODERATE PAIN (4)

## 2025-08-20 ENCOUNTER — MEDICAL CORRESPONDENCE (OUTPATIENT)
Dept: HEALTH INFORMATION MANAGEMENT | Facility: HOSPITAL | Age: 75
End: 2025-08-20

## 2025-08-22 ENCOUNTER — THERAPY VISIT (OUTPATIENT)
Dept: PHYSICAL THERAPY | Facility: HOSPITAL | Age: 75
End: 2025-08-22
Attending: PODIATRIST
Payer: COMMERCIAL

## 2025-08-22 DIAGNOSIS — M76.71 PERONEAL TENDINITIS OF RIGHT LOWER LEG: Primary | ICD-10-CM

## 2025-08-22 DIAGNOSIS — M76.72 PERONEAL TENDINITIS OF LEFT LOWER LEG: ICD-10-CM

## 2025-08-22 DIAGNOSIS — M76.71 PERONEAL TENDINITIS OF RIGHT LOWER EXTREMITY: ICD-10-CM

## 2025-08-22 DIAGNOSIS — M76.61 ACHILLES TENDINITIS OF RIGHT LOWER EXTREMITY: ICD-10-CM

## 2025-08-22 DIAGNOSIS — M62.462 GASTROCNEMIUS EQUINUS OF LEFT LOWER EXTREMITY: ICD-10-CM

## 2025-08-22 DIAGNOSIS — M62.461 GASTROCNEMIUS EQUINUS OF RIGHT LOWER EXTREMITY: ICD-10-CM

## 2025-08-22 PROCEDURE — 999N000104 HC STATISTIC NO CHARGE

## 2025-08-22 PROCEDURE — 97161 PT EVAL LOW COMPLEX 20 MIN: CPT | Mod: GP

## 2025-08-22 PROCEDURE — 97140 MANUAL THERAPY 1/> REGIONS: CPT | Mod: GP

## 2025-08-27 ENCOUNTER — THERAPY VISIT (OUTPATIENT)
Dept: PHYSICAL THERAPY | Facility: HOSPITAL | Age: 75
End: 2025-08-27
Attending: PODIATRIST
Payer: COMMERCIAL

## 2025-08-27 DIAGNOSIS — M76.71 PERONEAL TENDINITIS OF RIGHT LOWER EXTREMITY: Primary | ICD-10-CM

## 2025-08-27 PROBLEM — M76.72 PERONEAL TENDINITIS OF LEFT LOWER LEG: Status: ACTIVE | Noted: 2025-08-27

## 2025-08-27 PROBLEM — M62.461 GASTROCNEMIUS EQUINUS OF RIGHT LOWER EXTREMITY: Status: ACTIVE | Noted: 2025-08-27

## 2025-08-27 PROBLEM — M62.462 GASTROCNEMIUS EQUINUS OF LEFT LOWER EXTREMITY: Status: ACTIVE | Noted: 2025-08-27

## 2025-08-27 PROCEDURE — 97140 MANUAL THERAPY 1/> REGIONS: CPT | Mod: GP,CQ

## (undated) DEVICE — LABEL STERILE PREPRINTED FOR OR FRRH01-2M

## (undated) DEVICE — DRAPE SHEET REV FOLD 3/4 9349

## (undated) DEVICE — SENSOR-OXISENSOR II ADULT

## (undated) DEVICE — TUBING SUCTION 20FT N620A

## (undated) DEVICE — SLEEVE SCD EXPRESS KNEE LENGTH MED 9529

## (undated) DEVICE — Device

## (undated) DEVICE — TRAY-SKIN PREP POVIDONE/IODINE

## (undated) DEVICE — NDL 25GA 1.5" 305127

## (undated) DEVICE — STERI-STRIP-1/2" X 4"

## (undated) DEVICE — ESU GROUND PAD ADULT W/CORD E7507

## (undated) DEVICE — BLADE 15 RB BK SS STRL LF DISPLF DISP 371215

## (undated) DEVICE — GLV-8.5 ORTHO PROTEXIS PI LF/PF

## (undated) DEVICE — SOL WATER IRRIG 1000ML BOTTLE 2F7114

## (undated) DEVICE — BNDG ELASTIC 4"X5YDS UNSTERILE 6611-40

## (undated) DEVICE — DRSG KERLIX 4 1/2"X4YDS ROLL 6715

## (undated) DEVICE — DRAPE CONVERTORS U-DRAPE 60X72" 8476

## (undated) DEVICE — DRAPE STERI TOWEL LG 1010

## (undated) DEVICE — COVER LT HANDLE 2/PK 5160-2FG

## (undated) DEVICE — DRAPE-THREE QUARTER (LARGE) SHEET

## (undated) DEVICE — BNDG ELASTIC 4"X5YDS STERILE 6611-4S

## (undated) DEVICE — SPONGE-LAPAROTOMY PADS 18 X 18

## (undated) DEVICE — GOWN SURG XL LVL 3 REINFORCED 9541

## (undated) DEVICE — BNDG ESMARK 4" STERILE LF 820-3412

## (undated) DEVICE — DRSG-SPONGE X-RAY 4 X 4

## (undated) DEVICE — LABEL-STERILE PREPRINTED FOR OR

## (undated) DEVICE — LIGHT HANDLE COVER

## (undated) DEVICE — SU VICRYL 2-0 SH 27" UND J417H

## (undated) DEVICE — PACK BASIN SET UP SUTCNBSBBA

## (undated) DEVICE — GOWN-SURG XL LVL 3 REINFORCED

## (undated) DEVICE — SU VICRYL 3-0 CT-1 36" J944H

## (undated) DEVICE — SU VICRYL 3-0 SH 27" UND J416H

## (undated) DEVICE — DRSG-KERLIX 6 X 6 3/4 FLUFF

## (undated) DEVICE — PACK LAPAROTOMY CUSTOM SBA32LPMBG

## (undated) DEVICE — SOL NACL 0.9% IRRIG 1000ML BOTTLE 2F7124

## (undated) DEVICE — BNDG ESMARK 6" STERILE LF 820-3612

## (undated) DEVICE — GLOVE BIOGEL INDICATOR 8.0 LATEX

## (undated) DEVICE — SANITARY NAPKINS-SINGLE

## (undated) DEVICE — DRSG XEROFORM GAUZE 1X8" LP 8884433301

## (undated) DEVICE — BDG CONFORM 2 INCH 441504

## (undated) DEVICE — FORCEPS BIOPSY RADIAL JAW 4 LARGE W/NEEDLE 240CM M00513332

## (undated) DEVICE — CONNECTOR ERBEFLO 2 PORT 20325-215

## (undated) DEVICE — IRRIGATION-NACL 1000ML

## (undated) DEVICE — SYR 10ML LL W/O NDL 302995

## (undated) DEVICE — DRAPE BACK TABLE  44X90" 8377

## (undated) DEVICE — GLOVE PROTEXIS POWDER FREE 9.0 ORTHOPEDIC 2D73ET90

## (undated) DEVICE — BDG-ELASTIC 2 INCH

## (undated) DEVICE — CANISTER-SUCTION 2000CC

## (undated) DEVICE — TRAY PREP DRY SKIN SCRUB 067

## (undated) DEVICE — NEEDLE HYPO MONOJECT STANDARD 22GA 1 1/2IN BLUE 1188822112

## (undated) DEVICE — DRAPE EXTREMITY UPPER 120X76" 29414

## (undated) DEVICE — SUCTION TUBE YANKAUR K61

## (undated) DEVICE — CANISTER SUCTION MEDI-VAC GUARDIAN 2000ML 90D 65651-220

## (undated) DEVICE — TUBING-OUTFLOW HYSTEROPUMP

## (undated) DEVICE — CAST PADDING 3" STERILE 9043S

## (undated) DEVICE — DRILL BIT ARTHREX MTP 2.0MM AR-8944-22

## (undated) DEVICE — CAST PADDING-STERILE 2"

## (undated) DEVICE — PACK-SET UP-CUSTOM

## (undated) DEVICE — TOURNIQUET SGL BLADDER 18"X3" RED 5921-018-135

## (undated) DEVICE — NDL-25G 1-1/2" NON-SAFETY

## (undated) DEVICE — DRSG GAUZE 4X4" 3033

## (undated) DEVICE — BLADE-SCALPEL #15

## (undated) DEVICE — IRRIGATION-H2O 1000ML

## (undated) DEVICE — IRRIGATION-NACL 3000ML (BAG)

## (undated) DEVICE — DRILL BIT-2MM CANNULATED

## (undated) DEVICE — PAD ABD 10X8IN DERMACEA ABS NWVN 4 SL EDG SFT LF STRL 7198D

## (undated) DEVICE — GLOVE PROTEXIS POWDER FREE 8.5 ORTHOPEDIC 2D73ET85

## (undated) DEVICE — TUBING-SUCTION 20FT

## (undated) DEVICE — SU PROLENE 3-0 PS-1 18" 8663G

## (undated) DEVICE — PREP CHLORAPREP 26ML TINTED HI-LITE ORANGE 930815

## (undated) DEVICE — HOLSTER-STERILE FOR CAUTERY

## (undated) DEVICE — CONNECTOR-ERBEFLO 2 PORT

## (undated) DEVICE — GLV-8.5 BIOGEL LATEX

## (undated) DEVICE — NDL-SPINAL 22G X 3.5IN QUINCKE

## (undated) DEVICE — DRAPE-EXTREMITY SHEET

## (undated) DEVICE — DRSG-NON ADHERING 3 X 8 TELFA

## (undated) DEVICE — CATH-URETHRAL 14FR

## (undated) DEVICE — BDG-ESMARK 4 INCH X 9 FT

## (undated) DEVICE — SUTURE-MONOCRYL 5-0 P-3 VIOLET Y463G

## (undated) DEVICE — DRAPE C-ARM 60X42" 1013

## (undated) DEVICE — NDL COUNTER-20-40 CT MAGNET/FOAM BLOCK

## (undated) DEVICE — TUBING-INFLOW HYSTEROPUMP

## (undated) DEVICE — PACK-GYN CYSTO-CUSTOM

## (undated) DEVICE — CAUTERY PAD-POLYHESIVE II ADULT

## (undated) DEVICE — GLV-8.0 BIOGEL PF/LF BLUE INDICATOR UNDERGLOVE

## (undated) DEVICE — DRAPE-STERI 45X60CM #1010

## (undated) DEVICE — BIN-MINI, MAXI, MICRO DRIVER BLADES BIN

## (undated) DEVICE — CUFF-DISP STERILE 18IN DBL BLADDER

## (undated) DEVICE — ENDOSCOPIC SEAL-GYN HYSTEROSCOPE

## (undated) DEVICE — BLADE SAW 25.0MM X 9.0MM KM3-111

## (undated) DEVICE — APPLICATOR-CHLORAPREP 26ML TINTED CHG 2%+ 70% IPA-SURGICAL

## (undated) DEVICE — PRESSURE INFUSOR DISP. 3000CC

## (undated) RX ORDER — LIDOCAINE HYDROCHLORIDE 20 MG/ML
INJECTION, SOLUTION EPIDURAL; INFILTRATION; INTRACAUDAL; PERINEURAL
Status: DISPENSED
Start: 2018-08-13

## (undated) RX ORDER — CEFAZOLIN SODIUM 1 G/3ML
INJECTION, POWDER, FOR SOLUTION INTRAMUSCULAR; INTRAVENOUS
Status: DISPENSED
Start: 2018-03-07

## (undated) RX ORDER — FENTANYL CITRATE 50 UG/ML
INJECTION, SOLUTION INTRAMUSCULAR; INTRAVENOUS
Status: DISPENSED
Start: 2023-03-15

## (undated) RX ORDER — PROPOFOL 10 MG/ML
INJECTION, EMULSION INTRAVENOUS
Status: DISPENSED
Start: 2019-02-13

## (undated) RX ORDER — PROPOFOL 10 MG/ML
INJECTION, EMULSION INTRAVENOUS
Status: DISPENSED
Start: 2018-08-13

## (undated) RX ORDER — PROPOFOL 10 MG/ML
INJECTION, EMULSION INTRAVENOUS
Status: DISPENSED
Start: 2023-03-15

## (undated) RX ORDER — PROPOFOL 10 MG/ML
INJECTION, EMULSION INTRAVENOUS
Status: DISPENSED
Start: 2018-03-07

## (undated) RX ORDER — ONDANSETRON 2 MG/ML
INJECTION INTRAMUSCULAR; INTRAVENOUS
Status: DISPENSED
Start: 2019-02-13

## (undated) RX ORDER — PROPOFOL 10 MG/ML
INJECTION, EMULSION INTRAVENOUS
Status: DISPENSED
Start: 2023-06-16

## (undated) RX ORDER — SEVOFLURANE 250 ML/250ML
LIQUID RESPIRATORY (INHALATION)
Status: DISPENSED
Start: 2024-08-16

## (undated) RX ORDER — FENTANYL CITRATE 50 UG/ML
INJECTION, SOLUTION INTRAMUSCULAR; INTRAVENOUS
Status: DISPENSED
Start: 2023-05-17

## (undated) RX ORDER — FENTANYL CITRATE 50 UG/ML
INJECTION, SOLUTION INTRAMUSCULAR; INTRAVENOUS
Status: DISPENSED
Start: 2024-08-16

## (undated) RX ORDER — PROPOFOL 10 MG/ML
INJECTION, EMULSION INTRAVENOUS
Status: DISPENSED
Start: 2024-08-16

## (undated) RX ORDER — FENTANYL CITRATE 50 UG/ML
INJECTION, SOLUTION INTRAMUSCULAR; INTRAVENOUS
Status: DISPENSED
Start: 2019-02-13